# Patient Record
Sex: FEMALE | Race: WHITE | NOT HISPANIC OR LATINO | Employment: OTHER | URBAN - METROPOLITAN AREA
[De-identification: names, ages, dates, MRNs, and addresses within clinical notes are randomized per-mention and may not be internally consistent; named-entity substitution may affect disease eponyms.]

---

## 2017-01-02 ENCOUNTER — COMMUNICATION - HEALTHEAST (OUTPATIENT)
Dept: INTERNAL MEDICINE | Facility: CLINIC | Age: 62
End: 2017-01-02

## 2017-01-02 DIAGNOSIS — I10 ESSENTIAL HYPERTENSION WITH GOAL BLOOD PRESSURE LESS THAN 140/90: ICD-10-CM

## 2017-01-06 ENCOUNTER — COMMUNICATION - HEALTHEAST (OUTPATIENT)
Dept: INTERNAL MEDICINE | Facility: CLINIC | Age: 62
End: 2017-01-06

## 2017-01-06 DIAGNOSIS — M25.50 JOINT PAIN: ICD-10-CM

## 2017-01-09 ENCOUNTER — COMMUNICATION - HEALTHEAST (OUTPATIENT)
Dept: NURSING | Facility: CLINIC | Age: 62
End: 2017-01-09

## 2017-01-09 DIAGNOSIS — E11.9 DIABETES TYPE 2, CONTROLLED (H): ICD-10-CM

## 2017-02-06 ENCOUNTER — OFFICE VISIT - HEALTHEAST (OUTPATIENT)
Dept: NURSING | Facility: CLINIC | Age: 62
End: 2017-02-06

## 2017-02-06 DIAGNOSIS — E11.65 TYPE 2 DIABETES MELLITUS WITH HYPERGLYCEMIA, WITH LONG-TERM CURRENT USE OF INSULIN (H): ICD-10-CM

## 2017-02-06 DIAGNOSIS — Z79.4 TYPE 2 DIABETES MELLITUS WITH HYPERGLYCEMIA, WITH LONG-TERM CURRENT USE OF INSULIN (H): ICD-10-CM

## 2017-02-07 ENCOUNTER — COMMUNICATION - HEALTHEAST (OUTPATIENT)
Dept: NURSING | Facility: CLINIC | Age: 62
End: 2017-02-07

## 2017-02-08 ENCOUNTER — OFFICE VISIT - HEALTHEAST (OUTPATIENT)
Dept: INTERNAL MEDICINE | Facility: CLINIC | Age: 62
End: 2017-02-08

## 2017-02-08 DIAGNOSIS — M25.50 JOINT PAIN: ICD-10-CM

## 2017-02-08 DIAGNOSIS — E11.65 TYPE 2 DIABETES MELLITUS WITH HYPERGLYCEMIA, WITH LONG-TERM CURRENT USE OF INSULIN (H): ICD-10-CM

## 2017-02-08 DIAGNOSIS — Z79.4 TYPE 2 DIABETES MELLITUS WITH HYPERGLYCEMIA, WITH LONG-TERM CURRENT USE OF INSULIN (H): ICD-10-CM

## 2017-02-08 DIAGNOSIS — I10 ESSENTIAL HYPERTENSION WITH GOAL BLOOD PRESSURE LESS THAN 140/90: ICD-10-CM

## 2017-02-08 ASSESSMENT — MIFFLIN-ST. JEOR: SCORE: 1700.75

## 2017-02-21 ENCOUNTER — COMMUNICATION - HEALTHEAST (OUTPATIENT)
Dept: NURSING | Facility: CLINIC | Age: 62
End: 2017-02-21

## 2017-02-21 DIAGNOSIS — E11.9 DIABETES TYPE 2, CONTROLLED (H): ICD-10-CM

## 2017-02-22 ENCOUNTER — COMMUNICATION - HEALTHEAST (OUTPATIENT)
Dept: NURSING | Facility: CLINIC | Age: 62
End: 2017-02-22

## 2017-02-22 ENCOUNTER — AMBULATORY - HEALTHEAST (OUTPATIENT)
Dept: INTERNAL MEDICINE | Facility: CLINIC | Age: 62
End: 2017-02-22

## 2017-02-22 ENCOUNTER — COMMUNICATION - HEALTHEAST (OUTPATIENT)
Dept: INTERNAL MEDICINE | Facility: CLINIC | Age: 62
End: 2017-02-22

## 2017-02-23 ENCOUNTER — COMMUNICATION - HEALTHEAST (OUTPATIENT)
Dept: NURSING | Facility: CLINIC | Age: 62
End: 2017-02-23

## 2017-02-23 ENCOUNTER — COMMUNICATION - HEALTHEAST (OUTPATIENT)
Dept: INTERNAL MEDICINE | Facility: CLINIC | Age: 62
End: 2017-02-23

## 2017-03-14 ENCOUNTER — COMMUNICATION - HEALTHEAST (OUTPATIENT)
Dept: NURSING | Facility: CLINIC | Age: 62
End: 2017-03-14

## 2017-03-31 ENCOUNTER — COMMUNICATION - HEALTHEAST (OUTPATIENT)
Dept: SCHEDULING | Facility: CLINIC | Age: 62
End: 2017-03-31

## 2017-04-05 ENCOUNTER — COMMUNICATION - HEALTHEAST (OUTPATIENT)
Dept: INTERNAL MEDICINE | Facility: CLINIC | Age: 62
End: 2017-04-05

## 2017-04-05 ENCOUNTER — OFFICE VISIT - HEALTHEAST (OUTPATIENT)
Dept: INTERNAL MEDICINE | Facility: CLINIC | Age: 62
End: 2017-04-05

## 2017-04-05 DIAGNOSIS — Z79.4 TYPE 2 DIABETES MELLITUS WITH HYPERGLYCEMIA, WITH LONG-TERM CURRENT USE OF INSULIN (H): ICD-10-CM

## 2017-04-05 DIAGNOSIS — E11.65 TYPE 2 DIABETES MELLITUS WITH HYPERGLYCEMIA, WITH LONG-TERM CURRENT USE OF INSULIN (H): ICD-10-CM

## 2017-04-05 DIAGNOSIS — K52.9 GASTROENTERITIS: ICD-10-CM

## 2017-04-05 DIAGNOSIS — R05.9 COUGH: ICD-10-CM

## 2017-04-07 ENCOUNTER — COMMUNICATION - HEALTHEAST (OUTPATIENT)
Dept: SCHEDULING | Facility: CLINIC | Age: 62
End: 2017-04-07

## 2017-04-07 ENCOUNTER — AMBULATORY - HEALTHEAST (OUTPATIENT)
Dept: INTERNAL MEDICINE | Facility: CLINIC | Age: 62
End: 2017-04-07

## 2017-04-17 ENCOUNTER — OFFICE VISIT - HEALTHEAST (OUTPATIENT)
Dept: INTERNAL MEDICINE | Facility: CLINIC | Age: 62
End: 2017-04-17

## 2017-04-17 ENCOUNTER — COMMUNICATION - HEALTHEAST (OUTPATIENT)
Dept: SCHEDULING | Facility: CLINIC | Age: 62
End: 2017-04-17

## 2017-04-17 DIAGNOSIS — R10.84 GENERALIZED ABDOMINAL PAIN: ICD-10-CM

## 2017-04-17 DIAGNOSIS — L30.9 DERMATITIS: ICD-10-CM

## 2017-04-18 ENCOUNTER — COMMUNICATION - HEALTHEAST (OUTPATIENT)
Dept: INTERNAL MEDICINE | Facility: CLINIC | Age: 62
End: 2017-04-18

## 2017-04-18 ENCOUNTER — COMMUNICATION - HEALTHEAST (OUTPATIENT)
Dept: SCHEDULING | Facility: CLINIC | Age: 62
End: 2017-04-18

## 2017-04-19 ENCOUNTER — COMMUNICATION - HEALTHEAST (OUTPATIENT)
Dept: INTERNAL MEDICINE | Facility: CLINIC | Age: 62
End: 2017-04-19

## 2017-04-19 ENCOUNTER — OFFICE VISIT - HEALTHEAST (OUTPATIENT)
Dept: INTERNAL MEDICINE | Facility: CLINIC | Age: 62
End: 2017-04-19

## 2017-04-19 DIAGNOSIS — M25.50 JOINT PAIN: ICD-10-CM

## 2017-04-19 DIAGNOSIS — E11.65 TYPE 2 DIABETES MELLITUS WITH HYPERGLYCEMIA, WITH LONG-TERM CURRENT USE OF INSULIN (H): ICD-10-CM

## 2017-04-19 DIAGNOSIS — Z79.4 TYPE 2 DIABETES MELLITUS WITH HYPERGLYCEMIA, WITH LONG-TERM CURRENT USE OF INSULIN (H): ICD-10-CM

## 2017-04-19 LAB
HSV1 IGG SERPL QL IA: POSITIVE
HSV2 IGG SERPL QL IA: POSITIVE

## 2017-04-21 ENCOUNTER — COMMUNICATION - HEALTHEAST (OUTPATIENT)
Dept: SCHEDULING | Facility: CLINIC | Age: 62
End: 2017-04-21

## 2017-04-21 ASSESSMENT — MIFFLIN-ST. JEOR
SCORE: 1713.75
SCORE: 1705.74

## 2017-04-22 ASSESSMENT — MIFFLIN-ST. JEOR: SCORE: 1708.29

## 2017-04-23 ASSESSMENT — MIFFLIN-ST. JEOR: SCORE: 1698.49

## 2017-04-24 ENCOUNTER — SURGERY - HEALTHEAST (OUTPATIENT)
Dept: CARDIOLOGY | Facility: CLINIC | Age: 62
End: 2017-04-24

## 2017-04-24 ASSESSMENT — MIFFLIN-ST. JEOR: SCORE: 1712.55

## 2017-04-25 ENCOUNTER — COMMUNICATION - HEALTHEAST (OUTPATIENT)
Dept: INTERNAL MEDICINE | Facility: CLINIC | Age: 62
End: 2017-04-25

## 2017-04-25 ASSESSMENT — MIFFLIN-ST. JEOR: SCORE: 1715.72

## 2017-04-26 ENCOUNTER — AMBULATORY - HEALTHEAST (OUTPATIENT)
Dept: INTERNAL MEDICINE | Facility: CLINIC | Age: 62
End: 2017-04-26

## 2017-04-26 ENCOUNTER — COMMUNICATION - HEALTHEAST (OUTPATIENT)
Dept: SCHEDULING | Facility: CLINIC | Age: 62
End: 2017-04-26

## 2017-04-26 DIAGNOSIS — R10.84 GENERALIZED ABDOMINAL PAIN: ICD-10-CM

## 2017-04-27 ENCOUNTER — HOSPITAL ENCOUNTER (OUTPATIENT)
Dept: CT IMAGING | Facility: CLINIC | Age: 62
Discharge: HOME | End: 2017-04-27
Attending: INTERNAL MEDICINE

## 2017-04-27 ENCOUNTER — AMBULATORY - HEALTHEAST (OUTPATIENT)
Dept: CARDIOLOGY | Facility: CLINIC | Age: 62
End: 2017-04-27

## 2017-04-27 ENCOUNTER — COMMUNICATION - HEALTHEAST (OUTPATIENT)
Dept: INTERNAL MEDICINE | Facility: CLINIC | Age: 62
End: 2017-04-27

## 2017-04-27 DIAGNOSIS — R10.84 GENERALIZED ABDOMINAL PAIN: ICD-10-CM

## 2017-04-29 ENCOUNTER — COMMUNICATION - HEALTHEAST (OUTPATIENT)
Dept: INTERNAL MEDICINE | Facility: CLINIC | Age: 62
End: 2017-04-29

## 2017-04-29 DIAGNOSIS — K21.00 GASTROESOPHAGEAL REFLUX DISEASE WITH ESOPHAGITIS: ICD-10-CM

## 2017-04-29 DIAGNOSIS — I10 ESSENTIAL HYPERTENSION WITH GOAL BLOOD PRESSURE LESS THAN 140/90: ICD-10-CM

## 2017-05-01 ENCOUNTER — COMMUNICATION - HEALTHEAST (OUTPATIENT)
Dept: INTERNAL MEDICINE | Facility: CLINIC | Age: 62
End: 2017-05-01

## 2017-05-02 ENCOUNTER — OFFICE VISIT - HEALTHEAST (OUTPATIENT)
Dept: INTERNAL MEDICINE | Facility: CLINIC | Age: 62
End: 2017-05-02

## 2017-05-02 ENCOUNTER — COMMUNICATION - HEALTHEAST (OUTPATIENT)
Dept: INTERNAL MEDICINE | Facility: CLINIC | Age: 62
End: 2017-05-02

## 2017-05-02 DIAGNOSIS — I25.10 CORONARY ARTERY DISEASE DUE TO LIPID RICH PLAQUE: ICD-10-CM

## 2017-05-02 DIAGNOSIS — Z79.4 TYPE 2 DIABETES MELLITUS WITH HYPERGLYCEMIA, WITH LONG-TERM CURRENT USE OF INSULIN (H): ICD-10-CM

## 2017-05-02 DIAGNOSIS — I65.22 STENOSIS OF LEFT CAROTID ARTERY: ICD-10-CM

## 2017-05-02 DIAGNOSIS — E11.65 TYPE 2 DIABETES MELLITUS WITH HYPERGLYCEMIA, WITH LONG-TERM CURRENT USE OF INSULIN (H): ICD-10-CM

## 2017-05-02 DIAGNOSIS — I25.83 CORONARY ARTERY DISEASE DUE TO LIPID RICH PLAQUE: ICD-10-CM

## 2017-05-10 ENCOUNTER — COMMUNICATION - HEALTHEAST (OUTPATIENT)
Dept: SCHEDULING | Facility: CLINIC | Age: 62
End: 2017-05-10

## 2017-05-11 ENCOUNTER — OFFICE VISIT - HEALTHEAST (OUTPATIENT)
Dept: INTERNAL MEDICINE | Facility: CLINIC | Age: 62
End: 2017-05-11

## 2017-05-11 ENCOUNTER — OFFICE VISIT - HEALTHEAST (OUTPATIENT)
Dept: NURSING | Facility: CLINIC | Age: 62
End: 2017-05-11

## 2017-05-11 DIAGNOSIS — R11.0 NAUSEA: ICD-10-CM

## 2017-05-11 DIAGNOSIS — E78.5 DYSLIPIDEMIA, GOAL LDL BELOW 70: ICD-10-CM

## 2017-05-11 DIAGNOSIS — I20.0 UNSTABLE ANGINA PECTORIS (H): ICD-10-CM

## 2017-05-11 DIAGNOSIS — I25.10 CAD (CORONARY ARTERY DISEASE): ICD-10-CM

## 2017-05-11 DIAGNOSIS — R42 DIZZINESS: ICD-10-CM

## 2017-05-11 DIAGNOSIS — E11.69 TYPE 2 DIABETES MELLITUS WITH OTHER SPECIFIED COMPLICATION, WITH LONG-TERM CURRENT USE OF INSULIN (H): ICD-10-CM

## 2017-05-11 DIAGNOSIS — Z79.899 MEDICATION MANAGEMENT: ICD-10-CM

## 2017-05-11 DIAGNOSIS — Z79.4 TYPE 2 DIABETES MELLITUS WITH OTHER SPECIFIED COMPLICATION, WITH LONG-TERM CURRENT USE OF INSULIN (H): ICD-10-CM

## 2017-05-11 DIAGNOSIS — I25.119 CORONARY ARTERY DISEASE INVOLVING NATIVE HEART WITH ANGINA PECTORIS, UNSPECIFIED VESSEL OR LESION TYPE (H): ICD-10-CM

## 2017-05-12 ENCOUNTER — COMMUNICATION - HEALTHEAST (OUTPATIENT)
Dept: INTERNAL MEDICINE | Facility: CLINIC | Age: 62
End: 2017-05-12

## 2017-05-12 ENCOUNTER — COMMUNICATION - HEALTHEAST (OUTPATIENT)
Dept: SCHEDULING | Facility: CLINIC | Age: 62
End: 2017-05-12

## 2017-05-12 LAB
ATRIAL RATE - MUSE: 75 BPM
DIASTOLIC BLOOD PRESSURE - MUSE: NORMAL MMHG
INTERPRETATION ECG - MUSE: NORMAL
P AXIS - MUSE: 47 DEGREES
PR INTERVAL - MUSE: 146 MS
QRS DURATION - MUSE: 72 MS
QT - MUSE: 418 MS
QTC - MUSE: 466 MS
R AXIS - MUSE: -8 DEGREES
SYSTOLIC BLOOD PRESSURE - MUSE: NORMAL MMHG
T AXIS - MUSE: 70 DEGREES
VENTRICULAR RATE- MUSE: 75 BPM

## 2017-05-16 ENCOUNTER — AMBULATORY - HEALTHEAST (OUTPATIENT)
Dept: CARDIAC REHAB | Facility: CLINIC | Age: 62
End: 2017-05-16

## 2017-05-16 ENCOUNTER — AMBULATORY - HEALTHEAST (OUTPATIENT)
Dept: INTERNAL MEDICINE | Facility: CLINIC | Age: 62
End: 2017-05-16

## 2017-05-16 ENCOUNTER — COMMUNICATION - HEALTHEAST (OUTPATIENT)
Dept: INTERNAL MEDICINE | Facility: CLINIC | Age: 62
End: 2017-05-16

## 2017-05-16 ENCOUNTER — COMMUNICATION - HEALTHEAST (OUTPATIENT)
Dept: SCHEDULING | Facility: CLINIC | Age: 62
End: 2017-05-16

## 2017-05-16 DIAGNOSIS — Z09 HOSPITAL DISCHARGE FOLLOW-UP: ICD-10-CM

## 2017-05-16 DIAGNOSIS — Z95.5 STENTED CORONARY ARTERY: ICD-10-CM

## 2017-05-16 DIAGNOSIS — R07.9 CHEST PAIN: ICD-10-CM

## 2017-05-17 ENCOUNTER — COMMUNICATION - HEALTHEAST (OUTPATIENT)
Dept: INTERNAL MEDICINE | Facility: CLINIC | Age: 62
End: 2017-05-17

## 2017-05-17 ENCOUNTER — AMBULATORY - HEALTHEAST (OUTPATIENT)
Dept: CARDIAC REHAB | Facility: CLINIC | Age: 62
End: 2017-05-17

## 2017-05-17 DIAGNOSIS — Z95.5 STENTED CORONARY ARTERY: ICD-10-CM

## 2017-05-18 ENCOUNTER — SURGERY - HEALTHEAST (OUTPATIENT)
Dept: CARDIOLOGY | Facility: CLINIC | Age: 62
End: 2017-05-18

## 2017-05-18 ENCOUNTER — AMBULATORY - HEALTHEAST (OUTPATIENT)
Dept: CARDIOLOGY | Facility: CLINIC | Age: 62
End: 2017-05-18

## 2017-05-18 ENCOUNTER — OFFICE VISIT - HEALTHEAST (OUTPATIENT)
Dept: CARDIOLOGY | Facility: CLINIC | Age: 62
End: 2017-05-18

## 2017-05-18 DIAGNOSIS — E66.01 MORBID OBESITY (H): ICD-10-CM

## 2017-05-18 DIAGNOSIS — Z78.9 STATIN INTOLERANCE: ICD-10-CM

## 2017-05-18 DIAGNOSIS — E78.5 DYSLIPIDEMIA, GOAL LDL BELOW 70: ICD-10-CM

## 2017-05-18 DIAGNOSIS — I10 ESSENTIAL HYPERTENSION WITH GOAL BLOOD PRESSURE LESS THAN 140/90: ICD-10-CM

## 2017-05-18 DIAGNOSIS — E11.9 TYPE 2 DIABETES MELLITUS (H): ICD-10-CM

## 2017-05-18 DIAGNOSIS — I65.22 STENOSIS OF LEFT CAROTID ARTERY: ICD-10-CM

## 2017-05-18 DIAGNOSIS — I25.83 CORONARY ARTERY DISEASE DUE TO LIPID RICH PLAQUE: ICD-10-CM

## 2017-05-18 DIAGNOSIS — R07.9 CHEST PAIN: ICD-10-CM

## 2017-05-18 DIAGNOSIS — I25.10 CORONARY ARTERY DISEASE DUE TO LIPID RICH PLAQUE: ICD-10-CM

## 2017-05-19 ENCOUNTER — SURGERY - HEALTHEAST (OUTPATIENT)
Dept: CARDIOLOGY | Facility: CLINIC | Age: 62
End: 2017-05-19

## 2017-05-19 LAB
ATRIAL RATE - MUSE: 70 BPM
DIASTOLIC BLOOD PRESSURE - MUSE: NORMAL MMHG
INTERPRETATION ECG - MUSE: NORMAL
P AXIS - MUSE: 48 DEGREES
PR INTERVAL - MUSE: 162 MS
QRS DURATION - MUSE: 72 MS
QT - MUSE: 454 MS
QTC - MUSE: 490 MS
R AXIS - MUSE: -8 DEGREES
SYSTOLIC BLOOD PRESSURE - MUSE: NORMAL MMHG
T AXIS - MUSE: 49 DEGREES
VENTRICULAR RATE- MUSE: 70 BPM

## 2017-05-19 ASSESSMENT — MIFFLIN-ST. JEOR: SCORE: 1737.49

## 2017-05-21 ENCOUNTER — COMMUNICATION - HEALTHEAST (OUTPATIENT)
Dept: NURSING | Facility: CLINIC | Age: 62
End: 2017-05-21

## 2017-05-21 DIAGNOSIS — E11.9 DIABETES TYPE 2, CONTROLLED (H): ICD-10-CM

## 2017-05-24 ENCOUNTER — AMBULATORY - HEALTHEAST (OUTPATIENT)
Dept: CARDIAC REHAB | Facility: CLINIC | Age: 62
End: 2017-05-24

## 2017-05-24 DIAGNOSIS — Z95.5 STENTED CORONARY ARTERY: ICD-10-CM

## 2017-05-25 ENCOUNTER — COMMUNICATION - HEALTHEAST (OUTPATIENT)
Dept: INTERNAL MEDICINE | Facility: CLINIC | Age: 62
End: 2017-05-25

## 2017-05-26 ENCOUNTER — AMBULATORY - HEALTHEAST (OUTPATIENT)
Dept: CARDIAC REHAB | Facility: CLINIC | Age: 62
End: 2017-05-26

## 2017-05-26 ENCOUNTER — COMMUNICATION - HEALTHEAST (OUTPATIENT)
Dept: NURSING | Facility: CLINIC | Age: 62
End: 2017-05-26

## 2017-05-26 DIAGNOSIS — Z95.5 STENTED CORONARY ARTERY: ICD-10-CM

## 2017-05-26 DIAGNOSIS — E11.9 DIABETES TYPE 2, CONTROLLED (H): ICD-10-CM

## 2017-05-31 ENCOUNTER — AMBULATORY - HEALTHEAST (OUTPATIENT)
Dept: CARDIAC REHAB | Facility: CLINIC | Age: 62
End: 2017-05-31

## 2017-05-31 DIAGNOSIS — Z95.5 STENTED CORONARY ARTERY: ICD-10-CM

## 2017-06-05 ENCOUNTER — AMBULATORY - HEALTHEAST (OUTPATIENT)
Dept: CARDIAC REHAB | Facility: CLINIC | Age: 62
End: 2017-06-05

## 2017-06-05 DIAGNOSIS — Z95.5 STENTED CORONARY ARTERY: ICD-10-CM

## 2017-06-06 ENCOUNTER — OFFICE VISIT - HEALTHEAST (OUTPATIENT)
Dept: CARDIOLOGY | Facility: CLINIC | Age: 62
End: 2017-06-06

## 2017-06-06 DIAGNOSIS — I25.10 CORONARY ARTERY DISEASE DUE TO LIPID RICH PLAQUE: ICD-10-CM

## 2017-06-06 DIAGNOSIS — I25.83 CORONARY ARTERY DISEASE DUE TO LIPID RICH PLAQUE: ICD-10-CM

## 2017-06-06 DIAGNOSIS — E78.5 DYSLIPIDEMIA, GOAL LDL BELOW 70: ICD-10-CM

## 2017-06-06 LAB
CHOLEST SERPL-MCNC: 106 MG/DL
FASTING STATUS PATIENT QL REPORTED: ABNORMAL
HDLC SERPL-MCNC: 20 MG/DL
LDLC SERPL CALC-MCNC: 42 MG/DL
LDLC SERPL CALC-MCNC: 48 MG/DL
TRIGL SERPL-MCNC: 222 MG/DL

## 2017-06-06 ASSESSMENT — MIFFLIN-ST. JEOR: SCORE: 1735.68

## 2017-06-07 ENCOUNTER — COMMUNICATION - HEALTHEAST (OUTPATIENT)
Dept: CARDIOLOGY | Facility: CLINIC | Age: 62
End: 2017-06-07

## 2017-06-09 ENCOUNTER — AMBULATORY - HEALTHEAST (OUTPATIENT)
Dept: CARDIAC REHAB | Facility: CLINIC | Age: 62
End: 2017-06-09

## 2017-06-09 DIAGNOSIS — Z95.5 STENTED CORONARY ARTERY: ICD-10-CM

## 2017-06-12 ENCOUNTER — AMBULATORY - HEALTHEAST (OUTPATIENT)
Dept: CARDIAC REHAB | Facility: CLINIC | Age: 62
End: 2017-06-12

## 2017-06-12 DIAGNOSIS — Z95.5 STENTED CORONARY ARTERY: ICD-10-CM

## 2017-06-14 ENCOUNTER — AMBULATORY - HEALTHEAST (OUTPATIENT)
Dept: CARDIAC REHAB | Facility: CLINIC | Age: 62
End: 2017-06-14

## 2017-06-14 DIAGNOSIS — Z95.5 STENTED CORONARY ARTERY: ICD-10-CM

## 2017-06-22 ENCOUNTER — COMMUNICATION - HEALTHEAST (OUTPATIENT)
Dept: INTERNAL MEDICINE | Facility: CLINIC | Age: 62
End: 2017-06-22

## 2017-06-22 ENCOUNTER — COMMUNICATION - HEALTHEAST (OUTPATIENT)
Dept: INTENSIVE CARE | Facility: CLINIC | Age: 62
End: 2017-06-22

## 2017-06-26 ENCOUNTER — COMMUNICATION - HEALTHEAST (OUTPATIENT)
Dept: INTERNAL MEDICINE | Facility: CLINIC | Age: 62
End: 2017-06-26

## 2017-06-26 DIAGNOSIS — M25.50 JOINT PAIN: ICD-10-CM

## 2017-06-28 ENCOUNTER — AMBULATORY - HEALTHEAST (OUTPATIENT)
Dept: CARDIAC REHAB | Facility: CLINIC | Age: 62
End: 2017-06-28

## 2017-06-28 ENCOUNTER — COMMUNICATION - HEALTHEAST (OUTPATIENT)
Dept: LAB | Facility: CLINIC | Age: 62
End: 2017-06-28

## 2017-06-28 ENCOUNTER — OFFICE VISIT - HEALTHEAST (OUTPATIENT)
Dept: INTERNAL MEDICINE | Facility: CLINIC | Age: 62
End: 2017-06-28

## 2017-06-28 DIAGNOSIS — Z79.4 TYPE 2 DIABETES MELLITUS WITH HYPERGLYCEMIA, WITH LONG-TERM CURRENT USE OF INSULIN (H): ICD-10-CM

## 2017-06-28 DIAGNOSIS — I25.10 CORONARY ARTERY DISEASE DUE TO LIPID RICH PLAQUE: ICD-10-CM

## 2017-06-28 DIAGNOSIS — Z95.5 STENTED CORONARY ARTERY: ICD-10-CM

## 2017-06-28 DIAGNOSIS — Z79.4 TYPE 2 DIABETES MELLITUS WITH OTHER SPECIFIED COMPLICATION, WITH LONG-TERM CURRENT USE OF INSULIN (H): ICD-10-CM

## 2017-06-28 DIAGNOSIS — M25.50 JOINT PAIN: ICD-10-CM

## 2017-06-28 DIAGNOSIS — R13.10 DYSPHAGIA: ICD-10-CM

## 2017-06-28 DIAGNOSIS — E11.65 TYPE 2 DIABETES MELLITUS WITH HYPERGLYCEMIA, WITH LONG-TERM CURRENT USE OF INSULIN (H): ICD-10-CM

## 2017-06-28 DIAGNOSIS — I10 ESSENTIAL HYPERTENSION WITH GOAL BLOOD PRESSURE LESS THAN 140/90: ICD-10-CM

## 2017-06-28 DIAGNOSIS — E11.69 TYPE 2 DIABETES MELLITUS WITH OTHER SPECIFIED COMPLICATION, WITH LONG-TERM CURRENT USE OF INSULIN (H): ICD-10-CM

## 2017-06-28 DIAGNOSIS — I25.83 CORONARY ARTERY DISEASE DUE TO LIPID RICH PLAQUE: ICD-10-CM

## 2017-06-28 LAB — HBA1C MFR BLD: 9.6 % (ref 3.5–6)

## 2017-06-30 ENCOUNTER — AMBULATORY - HEALTHEAST (OUTPATIENT)
Dept: CARDIAC REHAB | Facility: CLINIC | Age: 62
End: 2017-06-30

## 2017-06-30 DIAGNOSIS — Z95.5 STENTED CORONARY ARTERY: ICD-10-CM

## 2017-07-03 ENCOUNTER — OFFICE VISIT - HEALTHEAST (OUTPATIENT)
Dept: NURSING | Facility: CLINIC | Age: 62
End: 2017-07-03

## 2017-07-03 ENCOUNTER — COMMUNICATION - HEALTHEAST (OUTPATIENT)
Dept: NURSING | Facility: CLINIC | Age: 62
End: 2017-07-03

## 2017-07-03 ENCOUNTER — AMBULATORY - HEALTHEAST (OUTPATIENT)
Dept: NURSING | Facility: CLINIC | Age: 62
End: 2017-07-03

## 2017-07-03 DIAGNOSIS — Z79.4 TYPE 2 DIABETES MELLITUS WITH OTHER SPECIFIED COMPLICATION, WITH LONG-TERM CURRENT USE OF INSULIN (H): ICD-10-CM

## 2017-07-03 DIAGNOSIS — E11.69 TYPE 2 DIABETES MELLITUS WITH OTHER SPECIFIED COMPLICATION, WITH LONG-TERM CURRENT USE OF INSULIN (H): ICD-10-CM

## 2017-07-05 ENCOUNTER — AMBULATORY - HEALTHEAST (OUTPATIENT)
Dept: CARDIAC REHAB | Facility: CLINIC | Age: 62
End: 2017-07-05

## 2017-07-05 DIAGNOSIS — Z95.5 STENTED CORONARY ARTERY: ICD-10-CM

## 2017-07-10 ENCOUNTER — AMBULATORY - HEALTHEAST (OUTPATIENT)
Dept: CARDIAC REHAB | Facility: CLINIC | Age: 62
End: 2017-07-10

## 2017-07-10 DIAGNOSIS — Z95.5 STENTED CORONARY ARTERY: ICD-10-CM

## 2017-07-11 ENCOUNTER — OFFICE VISIT - HEALTHEAST (OUTPATIENT)
Dept: NURSING | Facility: CLINIC | Age: 62
End: 2017-07-11

## 2017-07-11 DIAGNOSIS — E11.65 TYPE 2 DIABETES MELLITUS WITH HYPERGLYCEMIA, WITH LONG-TERM CURRENT USE OF INSULIN (H): ICD-10-CM

## 2017-07-11 DIAGNOSIS — I10 ESSENTIAL HYPERTENSION WITH GOAL BLOOD PRESSURE LESS THAN 140/90: ICD-10-CM

## 2017-07-11 DIAGNOSIS — Z79.4 TYPE 2 DIABETES MELLITUS WITH OTHER SPECIFIED COMPLICATION, WITH LONG-TERM CURRENT USE OF INSULIN (H): ICD-10-CM

## 2017-07-11 DIAGNOSIS — Z79.4 TYPE 2 DIABETES MELLITUS WITH HYPERGLYCEMIA, WITH LONG-TERM CURRENT USE OF INSULIN (H): ICD-10-CM

## 2017-07-11 DIAGNOSIS — E11.69 TYPE 2 DIABETES MELLITUS WITH OTHER SPECIFIED COMPLICATION, WITH LONG-TERM CURRENT USE OF INSULIN (H): ICD-10-CM

## 2017-07-12 ENCOUNTER — COMMUNICATION - HEALTHEAST (OUTPATIENT)
Dept: INTERNAL MEDICINE | Facility: CLINIC | Age: 62
End: 2017-07-12

## 2017-07-12 ENCOUNTER — HOSPITAL ENCOUNTER (OUTPATIENT)
Dept: RADIOLOGY | Facility: CLINIC | Age: 62
Discharge: HOME | End: 2017-07-12
Attending: INTERNAL MEDICINE

## 2017-07-12 DIAGNOSIS — R13.10 DYSPHAGIA: ICD-10-CM

## 2017-07-13 ENCOUNTER — AMBULATORY - HEALTHEAST (OUTPATIENT)
Dept: CARDIAC REHAB | Facility: CLINIC | Age: 62
End: 2017-07-13

## 2017-07-13 DIAGNOSIS — Z95.5 STENTED CORONARY ARTERY: ICD-10-CM

## 2017-07-14 ENCOUNTER — COMMUNICATION - HEALTHEAST (OUTPATIENT)
Dept: NURSING | Facility: CLINIC | Age: 62
End: 2017-07-14

## 2017-07-17 ENCOUNTER — OFFICE VISIT - HEALTHEAST (OUTPATIENT)
Dept: INTERNAL MEDICINE | Facility: CLINIC | Age: 62
End: 2017-07-17

## 2017-07-17 ENCOUNTER — COMMUNICATION - HEALTHEAST (OUTPATIENT)
Dept: SCHEDULING | Facility: CLINIC | Age: 62
End: 2017-07-17

## 2017-07-17 DIAGNOSIS — L02.91 ABSCESS: ICD-10-CM

## 2017-07-19 ENCOUNTER — COMMUNICATION - HEALTHEAST (OUTPATIENT)
Dept: INTERNAL MEDICINE | Facility: CLINIC | Age: 62
End: 2017-07-19

## 2017-07-19 ENCOUNTER — COMMUNICATION - HEALTHEAST (OUTPATIENT)
Dept: NURSING | Facility: CLINIC | Age: 62
End: 2017-07-19

## 2017-07-20 ENCOUNTER — OFFICE VISIT - HEALTHEAST (OUTPATIENT)
Dept: INTERNAL MEDICINE | Facility: CLINIC | Age: 62
End: 2017-07-20

## 2017-07-20 ENCOUNTER — OFFICE VISIT - HEALTHEAST (OUTPATIENT)
Dept: NURSING | Facility: CLINIC | Age: 62
End: 2017-07-20

## 2017-07-20 DIAGNOSIS — Z79.4 TYPE 2 DIABETES MELLITUS WITH OTHER SPECIFIED COMPLICATION, WITH LONG-TERM CURRENT USE OF INSULIN (H): ICD-10-CM

## 2017-07-20 DIAGNOSIS — L02.91 ABSCESS: ICD-10-CM

## 2017-07-20 DIAGNOSIS — I10 ESSENTIAL HYPERTENSION WITH GOAL BLOOD PRESSURE LESS THAN 140/90: ICD-10-CM

## 2017-07-20 DIAGNOSIS — E11.69 TYPE 2 DIABETES MELLITUS WITH OTHER SPECIFIED COMPLICATION, WITH LONG-TERM CURRENT USE OF INSULIN (H): ICD-10-CM

## 2017-07-24 ENCOUNTER — AMBULATORY - HEALTHEAST (OUTPATIENT)
Dept: CARDIAC REHAB | Facility: CLINIC | Age: 62
End: 2017-07-24

## 2017-07-24 DIAGNOSIS — Z95.5 STENTED CORONARY ARTERY: ICD-10-CM

## 2017-07-26 ENCOUNTER — OFFICE VISIT - HEALTHEAST (OUTPATIENT)
Dept: INTERNAL MEDICINE | Facility: CLINIC | Age: 62
End: 2017-07-26

## 2017-07-26 DIAGNOSIS — I25.83 CORONARY ARTERY DISEASE DUE TO LIPID RICH PLAQUE: ICD-10-CM

## 2017-07-26 DIAGNOSIS — I25.10 CORONARY ARTERY DISEASE DUE TO LIPID RICH PLAQUE: ICD-10-CM

## 2017-07-26 DIAGNOSIS — M54.9 CHRONIC BACK PAIN: ICD-10-CM

## 2017-07-26 DIAGNOSIS — E11.9 TYPE 2 DIABETES MELLITUS (H): ICD-10-CM

## 2017-07-26 DIAGNOSIS — I10 ESSENTIAL HYPERTENSION WITH GOAL BLOOD PRESSURE LESS THAN 140/90: ICD-10-CM

## 2017-07-26 DIAGNOSIS — G89.29 CHRONIC BACK PAIN: ICD-10-CM

## 2017-07-26 DIAGNOSIS — E66.9 OBESITY: ICD-10-CM

## 2017-08-04 ENCOUNTER — OFFICE VISIT - HEALTHEAST (OUTPATIENT)
Dept: FAMILY MEDICINE | Facility: CLINIC | Age: 62
End: 2017-08-04

## 2017-08-04 ENCOUNTER — COMMUNICATION - HEALTHEAST (OUTPATIENT)
Dept: OBGYN | Facility: HOSPITAL | Age: 62
End: 2017-08-04

## 2017-08-04 DIAGNOSIS — L03.90 CELLULITIS: ICD-10-CM

## 2017-08-07 ENCOUNTER — HOSPITAL ENCOUNTER (OUTPATIENT)
Dept: MAMMOGRAPHY | Facility: HOSPITAL | Age: 62
Discharge: HOME | End: 2017-08-07
Attending: SURGERY

## 2017-08-07 ENCOUNTER — OFFICE VISIT - HEALTHEAST (OUTPATIENT)
Dept: INTERNAL MEDICINE | Facility: CLINIC | Age: 62
End: 2017-08-07

## 2017-08-07 ENCOUNTER — OFFICE VISIT - HEALTHEAST (OUTPATIENT)
Dept: SURGERY | Facility: CLINIC | Age: 62
End: 2017-08-07

## 2017-08-07 DIAGNOSIS — N61.1 BREAST ABSCESS: ICD-10-CM

## 2017-08-07 ASSESSMENT — MIFFLIN-ST. JEOR: SCORE: 1764.71

## 2017-08-09 ENCOUNTER — COMMUNICATION - HEALTHEAST (OUTPATIENT)
Dept: INTERNAL MEDICINE | Facility: CLINIC | Age: 62
End: 2017-08-09

## 2017-08-09 ENCOUNTER — OFFICE VISIT - HEALTHEAST (OUTPATIENT)
Dept: INTERNAL MEDICINE | Facility: CLINIC | Age: 62
End: 2017-08-09

## 2017-08-09 DIAGNOSIS — I10 ESSENTIAL HYPERTENSION WITH GOAL BLOOD PRESSURE LESS THAN 140/90: ICD-10-CM

## 2017-08-09 DIAGNOSIS — K21.00 GASTROESOPHAGEAL REFLUX DISEASE WITH ESOPHAGITIS: ICD-10-CM

## 2017-08-09 DIAGNOSIS — N61.1 BREAST ABSCESS: ICD-10-CM

## 2017-08-12 ENCOUNTER — COMMUNICATION - HEALTHEAST (OUTPATIENT)
Dept: INTERNAL MEDICINE | Facility: CLINIC | Age: 62
End: 2017-08-12

## 2017-08-12 DIAGNOSIS — E78.5 HYPERLIPEMIA: ICD-10-CM

## 2017-08-14 ENCOUNTER — AMBULATORY - HEALTHEAST (OUTPATIENT)
Dept: CARDIAC REHAB | Facility: CLINIC | Age: 62
End: 2017-08-14

## 2017-08-14 DIAGNOSIS — Z95.5 STENTED CORONARY ARTERY: ICD-10-CM

## 2017-08-15 ENCOUNTER — OFFICE VISIT - HEALTHEAST (OUTPATIENT)
Dept: SURGERY | Facility: CLINIC | Age: 62
End: 2017-08-15

## 2017-08-15 DIAGNOSIS — L72.3 INFECTED SEBACEOUS CYST: ICD-10-CM

## 2017-08-15 DIAGNOSIS — L08.9 INFECTED SEBACEOUS CYST: ICD-10-CM

## 2017-08-17 ENCOUNTER — AMBULATORY - HEALTHEAST (OUTPATIENT)
Dept: CARE COORDINATION | Facility: CLINIC | Age: 62
End: 2017-08-17

## 2017-08-21 ENCOUNTER — OFFICE VISIT - HEALTHEAST (OUTPATIENT)
Dept: NURSING | Facility: CLINIC | Age: 62
End: 2017-08-21

## 2017-08-21 ENCOUNTER — AMBULATORY - HEALTHEAST (OUTPATIENT)
Dept: CARDIAC REHAB | Facility: CLINIC | Age: 62
End: 2017-08-21

## 2017-08-21 DIAGNOSIS — E11.69 TYPE 2 DIABETES MELLITUS WITH OTHER SPECIFIED COMPLICATION, WITH LONG-TERM CURRENT USE OF INSULIN (H): ICD-10-CM

## 2017-08-21 DIAGNOSIS — Z95.5 STENTED CORONARY ARTERY: ICD-10-CM

## 2017-08-21 DIAGNOSIS — Z79.4 TYPE 2 DIABETES MELLITUS WITH OTHER SPECIFIED COMPLICATION, WITH LONG-TERM CURRENT USE OF INSULIN (H): ICD-10-CM

## 2017-08-23 ENCOUNTER — OFFICE VISIT - HEALTHEAST (OUTPATIENT)
Dept: INTERNAL MEDICINE | Facility: CLINIC | Age: 62
End: 2017-08-23

## 2017-08-23 DIAGNOSIS — M54.9 CHRONIC BACK PAIN: ICD-10-CM

## 2017-08-23 DIAGNOSIS — Z79.4 TYPE 2 DIABETES MELLITUS WITH HYPERGLYCEMIA, WITH LONG-TERM CURRENT USE OF INSULIN (H): ICD-10-CM

## 2017-08-23 DIAGNOSIS — E11.65 TYPE 2 DIABETES MELLITUS WITH HYPERGLYCEMIA, WITH LONG-TERM CURRENT USE OF INSULIN (H): ICD-10-CM

## 2017-08-23 DIAGNOSIS — N61.1 BREAST ABSCESS: ICD-10-CM

## 2017-08-23 DIAGNOSIS — G89.29 CHRONIC BACK PAIN: ICD-10-CM

## 2017-09-11 ENCOUNTER — COMMUNICATION - HEALTHEAST (OUTPATIENT)
Dept: INTERNAL MEDICINE | Facility: CLINIC | Age: 62
End: 2017-09-11

## 2017-09-11 ENCOUNTER — COMMUNICATION - HEALTHEAST (OUTPATIENT)
Dept: SCHEDULING | Facility: CLINIC | Age: 62
End: 2017-09-11

## 2017-09-11 DIAGNOSIS — E11.69 TYPE 2 DIABETES MELLITUS WITH OTHER SPECIFIED COMPLICATION, WITH LONG-TERM CURRENT USE OF INSULIN (H): ICD-10-CM

## 2017-09-11 DIAGNOSIS — Z79.4 TYPE 2 DIABETES MELLITUS WITH OTHER SPECIFIED COMPLICATION, WITH LONG-TERM CURRENT USE OF INSULIN (H): ICD-10-CM

## 2017-09-15 ENCOUNTER — COMMUNICATION - HEALTHEAST (OUTPATIENT)
Dept: NURSING | Facility: CLINIC | Age: 62
End: 2017-09-15

## 2017-10-09 ENCOUNTER — COMMUNICATION - HEALTHEAST (OUTPATIENT)
Dept: SURGERY | Facility: CLINIC | Age: 62
End: 2017-10-09

## 2017-10-13 ENCOUNTER — COMMUNICATION - HEALTHEAST (OUTPATIENT)
Dept: CARDIOLOGY | Facility: CLINIC | Age: 62
End: 2017-10-13

## 2017-10-16 ENCOUNTER — COMMUNICATION - HEALTHEAST (OUTPATIENT)
Dept: INTERNAL MEDICINE | Facility: CLINIC | Age: 62
End: 2017-10-16

## 2017-10-16 ENCOUNTER — COMMUNICATION - HEALTHEAST (OUTPATIENT)
Dept: NURSING | Facility: CLINIC | Age: 62
End: 2017-10-16

## 2017-10-20 ENCOUNTER — COMMUNICATION - HEALTHEAST (OUTPATIENT)
Dept: NURSING | Facility: CLINIC | Age: 62
End: 2017-10-20

## 2017-10-23 ENCOUNTER — COMMUNICATION - HEALTHEAST (OUTPATIENT)
Dept: INTERNAL MEDICINE | Facility: CLINIC | Age: 62
End: 2017-10-23

## 2017-10-23 ENCOUNTER — COMMUNICATION - HEALTHEAST (OUTPATIENT)
Dept: NURSING | Facility: CLINIC | Age: 62
End: 2017-10-23

## 2017-10-23 DIAGNOSIS — E11.69 TYPE 2 DIABETES MELLITUS WITH OTHER SPECIFIED COMPLICATION, WITH LONG-TERM CURRENT USE OF INSULIN (H): ICD-10-CM

## 2017-10-23 DIAGNOSIS — Z79.4 TYPE 2 DIABETES MELLITUS WITH OTHER SPECIFIED COMPLICATION, WITH LONG-TERM CURRENT USE OF INSULIN (H): ICD-10-CM

## 2017-10-24 ENCOUNTER — OFFICE VISIT - HEALTHEAST (OUTPATIENT)
Dept: NURSING | Facility: CLINIC | Age: 62
End: 2017-10-24

## 2017-10-24 ENCOUNTER — COMMUNICATION - HEALTHEAST (OUTPATIENT)
Dept: NURSING | Facility: CLINIC | Age: 62
End: 2017-10-24

## 2017-10-24 DIAGNOSIS — E11.65 TYPE 2 DIABETES MELLITUS WITH HYPERGLYCEMIA, WITH LONG-TERM CURRENT USE OF INSULIN (H): ICD-10-CM

## 2017-10-24 DIAGNOSIS — Z79.4 TYPE 2 DIABETES MELLITUS WITH OTHER SPECIFIED COMPLICATION, WITH LONG-TERM CURRENT USE OF INSULIN (H): ICD-10-CM

## 2017-10-24 DIAGNOSIS — Z79.4 TYPE 2 DIABETES MELLITUS WITH HYPERGLYCEMIA, WITH LONG-TERM CURRENT USE OF INSULIN (H): ICD-10-CM

## 2017-10-24 DIAGNOSIS — E11.69 TYPE 2 DIABETES MELLITUS WITH OTHER SPECIFIED COMPLICATION, WITH LONG-TERM CURRENT USE OF INSULIN (H): ICD-10-CM

## 2017-10-24 LAB — HBA1C MFR BLD: 8.9 % (ref 3.5–6)

## 2017-10-25 ENCOUNTER — COMMUNICATION - HEALTHEAST (OUTPATIENT)
Dept: INTERNAL MEDICINE | Facility: CLINIC | Age: 62
End: 2017-10-25

## 2017-10-25 ENCOUNTER — COMMUNICATION - HEALTHEAST (OUTPATIENT)
Dept: NURSING | Facility: CLINIC | Age: 62
End: 2017-10-25

## 2017-10-25 DIAGNOSIS — G62.9 NEUROPATHY: ICD-10-CM

## 2017-10-27 ENCOUNTER — COMMUNICATION - HEALTHEAST (OUTPATIENT)
Dept: NURSING | Facility: CLINIC | Age: 62
End: 2017-10-27

## 2017-10-27 ENCOUNTER — COMMUNICATION - HEALTHEAST (OUTPATIENT)
Dept: INTERNAL MEDICINE | Facility: CLINIC | Age: 62
End: 2017-10-27

## 2017-10-31 ENCOUNTER — RECORDS - HEALTHEAST (OUTPATIENT)
Dept: GENERAL RADIOLOGY | Facility: CLINIC | Age: 62
End: 2017-10-31

## 2017-10-31 ENCOUNTER — OFFICE VISIT - HEALTHEAST (OUTPATIENT)
Dept: NURSING | Facility: CLINIC | Age: 62
End: 2017-10-31

## 2017-10-31 ENCOUNTER — AMBULATORY - HEALTHEAST (OUTPATIENT)
Dept: INTERNAL MEDICINE | Facility: CLINIC | Age: 62
End: 2017-10-31

## 2017-10-31 ENCOUNTER — COMMUNICATION - HEALTHEAST (OUTPATIENT)
Dept: NURSING | Facility: CLINIC | Age: 62
End: 2017-10-31

## 2017-10-31 DIAGNOSIS — M54.9 CHRONIC BACK PAIN: ICD-10-CM

## 2017-10-31 DIAGNOSIS — Z79.4 TYPE 2 DIABETES MELLITUS WITH HYPERGLYCEMIA, WITH LONG-TERM CURRENT USE OF INSULIN (H): ICD-10-CM

## 2017-10-31 DIAGNOSIS — M25.552 HIP PAIN, ACUTE, LEFT: ICD-10-CM

## 2017-10-31 DIAGNOSIS — I10 ESSENTIAL HYPERTENSION WITH GOAL BLOOD PRESSURE LESS THAN 140/90: ICD-10-CM

## 2017-10-31 DIAGNOSIS — E11.69 TYPE 2 DIABETES MELLITUS WITH OTHER SPECIFIED COMPLICATION, WITH LONG-TERM CURRENT USE OF INSULIN (H): ICD-10-CM

## 2017-10-31 DIAGNOSIS — E11.65 TYPE 2 DIABETES MELLITUS WITH HYPERGLYCEMIA, WITH LONG-TERM CURRENT USE OF INSULIN (H): ICD-10-CM

## 2017-10-31 DIAGNOSIS — G62.9 NEUROPATHY: ICD-10-CM

## 2017-10-31 DIAGNOSIS — M25.552 PAIN IN LEFT HIP: ICD-10-CM

## 2017-10-31 DIAGNOSIS — Z79.4 TYPE 2 DIABETES MELLITUS WITH OTHER SPECIFIED COMPLICATION, WITH LONG-TERM CURRENT USE OF INSULIN (H): ICD-10-CM

## 2017-10-31 DIAGNOSIS — G89.29 CHRONIC BACK PAIN: ICD-10-CM

## 2017-11-01 ENCOUNTER — COMMUNICATION - HEALTHEAST (OUTPATIENT)
Dept: INTERNAL MEDICINE | Facility: CLINIC | Age: 62
End: 2017-11-01

## 2017-11-14 ENCOUNTER — OFFICE VISIT - HEALTHEAST (OUTPATIENT)
Dept: INTERNAL MEDICINE | Facility: CLINIC | Age: 62
End: 2017-11-14

## 2017-11-14 ENCOUNTER — OFFICE VISIT - HEALTHEAST (OUTPATIENT)
Dept: NURSING | Facility: CLINIC | Age: 62
End: 2017-11-14

## 2017-11-14 DIAGNOSIS — Z79.4 TYPE 2 DIABETES MELLITUS WITH HYPERGLYCEMIA, WITH LONG-TERM CURRENT USE OF INSULIN (H): ICD-10-CM

## 2017-11-14 DIAGNOSIS — R20.2 NUMBNESS AND TINGLING IN RIGHT HAND: ICD-10-CM

## 2017-11-14 DIAGNOSIS — G62.9 NEUROPATHY: ICD-10-CM

## 2017-11-14 DIAGNOSIS — E11.69 TYPE 2 DIABETES MELLITUS WITH OTHER SPECIFIED COMPLICATION, WITH LONG-TERM CURRENT USE OF INSULIN (H): ICD-10-CM

## 2017-11-14 DIAGNOSIS — G89.29 HIP PAIN, CHRONIC, LEFT: ICD-10-CM

## 2017-11-14 DIAGNOSIS — Z79.4 TYPE 2 DIABETES MELLITUS WITH OTHER SPECIFIED COMPLICATION, WITH LONG-TERM CURRENT USE OF INSULIN (H): ICD-10-CM

## 2017-11-14 DIAGNOSIS — R20.0 NUMBNESS AND TINGLING IN RIGHT HAND: ICD-10-CM

## 2017-11-14 DIAGNOSIS — E11.65 TYPE 2 DIABETES MELLITUS WITH HYPERGLYCEMIA, WITH LONG-TERM CURRENT USE OF INSULIN (H): ICD-10-CM

## 2017-11-14 DIAGNOSIS — M25.552 HIP PAIN, CHRONIC, LEFT: ICD-10-CM

## 2017-11-20 ENCOUNTER — OFFICE VISIT - HEALTHEAST (OUTPATIENT)
Dept: INTERNAL MEDICINE | Facility: CLINIC | Age: 62
End: 2017-11-20

## 2017-11-20 ENCOUNTER — COMMUNICATION - HEALTHEAST (OUTPATIENT)
Dept: NURSING | Facility: CLINIC | Age: 62
End: 2017-11-20

## 2017-11-20 ENCOUNTER — COMMUNICATION - HEALTHEAST (OUTPATIENT)
Dept: INTERNAL MEDICINE | Facility: CLINIC | Age: 62
End: 2017-11-20

## 2017-11-20 DIAGNOSIS — M54.50 ACUTE RIGHT-SIDED LOW BACK PAIN WITHOUT SCIATICA: ICD-10-CM

## 2017-12-04 ENCOUNTER — RECORDS - HEALTHEAST (OUTPATIENT)
Dept: ADMINISTRATIVE | Facility: OTHER | Age: 62
End: 2017-12-04

## 2017-12-04 ENCOUNTER — COMMUNICATION - HEALTHEAST (OUTPATIENT)
Dept: INTERNAL MEDICINE | Facility: CLINIC | Age: 62
End: 2017-12-04

## 2017-12-04 ENCOUNTER — HOSPITAL ENCOUNTER (OUTPATIENT)
Dept: PHYSICAL MEDICINE AND REHAB | Facility: CLINIC | Age: 62
Discharge: HOME | End: 2017-12-04
Attending: INTERNAL MEDICINE

## 2017-12-04 ENCOUNTER — AMBULATORY - HEALTHEAST (OUTPATIENT)
Dept: EDUCATION SERVICES | Facility: CLINIC | Age: 62
End: 2017-12-04

## 2017-12-04 ENCOUNTER — AMBULATORY - HEALTHEAST (OUTPATIENT)
Dept: INTERNAL MEDICINE | Facility: CLINIC | Age: 62
End: 2017-12-04

## 2017-12-04 DIAGNOSIS — Z79.4 TYPE 2 DIABETES MELLITUS WITH HYPERGLYCEMIA, WITH LONG-TERM CURRENT USE OF INSULIN (H): ICD-10-CM

## 2017-12-04 DIAGNOSIS — E11.65 TYPE 2 DIABETES MELLITUS WITH HYPERGLYCEMIA, WITH LONG-TERM CURRENT USE OF INSULIN (H): ICD-10-CM

## 2017-12-04 DIAGNOSIS — R20.2 NUMBNESS AND TINGLING IN RIGHT HAND: ICD-10-CM

## 2017-12-04 DIAGNOSIS — R20.0 NUMBNESS AND TINGLING IN RIGHT HAND: ICD-10-CM

## 2017-12-04 DIAGNOSIS — G56.00 CARPAL TUNNEL SYNDROME: ICD-10-CM

## 2017-12-06 ENCOUNTER — COMMUNICATION - HEALTHEAST (OUTPATIENT)
Dept: INTERNAL MEDICINE | Facility: CLINIC | Age: 62
End: 2017-12-06

## 2017-12-11 ENCOUNTER — RECORDS - HEALTHEAST (OUTPATIENT)
Dept: ADMINISTRATIVE | Facility: OTHER | Age: 62
End: 2017-12-11

## 2017-12-11 ENCOUNTER — OFFICE VISIT - HEALTHEAST (OUTPATIENT)
Dept: EDUCATION SERVICES | Facility: CLINIC | Age: 62
End: 2017-12-11

## 2017-12-11 DIAGNOSIS — E11.65 TYPE 2 DIABETES MELLITUS WITH HYPERGLYCEMIA, WITH LONG-TERM CURRENT USE OF INSULIN (H): ICD-10-CM

## 2017-12-11 DIAGNOSIS — Z79.4 TYPE 2 DIABETES MELLITUS WITH HYPERGLYCEMIA, WITH LONG-TERM CURRENT USE OF INSULIN (H): ICD-10-CM

## 2017-12-12 ENCOUNTER — OFFICE VISIT - HEALTHEAST (OUTPATIENT)
Dept: INTERNAL MEDICINE | Facility: CLINIC | Age: 62
End: 2017-12-12

## 2017-12-12 DIAGNOSIS — Z79.4 TYPE 2 DIABETES MELLITUS WITH HYPERGLYCEMIA, WITH LONG-TERM CURRENT USE OF INSULIN (H): ICD-10-CM

## 2017-12-12 DIAGNOSIS — G62.9 NEUROPATHY: ICD-10-CM

## 2017-12-12 DIAGNOSIS — E11.65 TYPE 2 DIABETES MELLITUS WITH HYPERGLYCEMIA, WITH LONG-TERM CURRENT USE OF INSULIN (H): ICD-10-CM

## 2017-12-12 DIAGNOSIS — G56.00 CARPAL TUNNEL SYNDROME: ICD-10-CM

## 2017-12-14 ENCOUNTER — RECORDS - HEALTHEAST (OUTPATIENT)
Dept: ADMINISTRATIVE | Facility: OTHER | Age: 62
End: 2017-12-14

## 2017-12-18 ENCOUNTER — OFFICE VISIT - HEALTHEAST (OUTPATIENT)
Dept: EDUCATION SERVICES | Facility: CLINIC | Age: 62
End: 2017-12-18

## 2017-12-18 DIAGNOSIS — E11.65 TYPE 2 DIABETES MELLITUS WITH HYPERGLYCEMIA, WITH LONG-TERM CURRENT USE OF INSULIN (H): ICD-10-CM

## 2017-12-18 DIAGNOSIS — Z79.4 TYPE 2 DIABETES MELLITUS WITH HYPERGLYCEMIA, WITH LONG-TERM CURRENT USE OF INSULIN (H): ICD-10-CM

## 2017-12-19 ENCOUNTER — COMMUNICATION - HEALTHEAST (OUTPATIENT)
Dept: INTERNAL MEDICINE | Facility: CLINIC | Age: 62
End: 2017-12-19

## 2017-12-20 ENCOUNTER — COMMUNICATION - HEALTHEAST (OUTPATIENT)
Dept: NURSING | Facility: CLINIC | Age: 62
End: 2017-12-20

## 2017-12-21 ENCOUNTER — COMMUNICATION - HEALTHEAST (OUTPATIENT)
Dept: NURSING | Facility: CLINIC | Age: 62
End: 2017-12-21

## 2017-12-27 ENCOUNTER — COMMUNICATION - HEALTHEAST (OUTPATIENT)
Dept: NURSING | Facility: CLINIC | Age: 62
End: 2017-12-27

## 2017-12-27 ENCOUNTER — OFFICE VISIT - HEALTHEAST (OUTPATIENT)
Dept: INTERNAL MEDICINE | Facility: CLINIC | Age: 62
End: 2017-12-27

## 2017-12-27 ENCOUNTER — OFFICE VISIT - HEALTHEAST (OUTPATIENT)
Dept: NURSING | Facility: CLINIC | Age: 62
End: 2017-12-27

## 2017-12-27 DIAGNOSIS — I25.83 CORONARY ARTERY DISEASE DUE TO LIPID RICH PLAQUE: ICD-10-CM

## 2017-12-27 DIAGNOSIS — E11.69 TYPE 2 DIABETES MELLITUS WITH OTHER SPECIFIED COMPLICATION, WITH LONG-TERM CURRENT USE OF INSULIN (H): ICD-10-CM

## 2017-12-27 DIAGNOSIS — I25.10 CORONARY ARTERY DISEASE DUE TO LIPID RICH PLAQUE: ICD-10-CM

## 2017-12-27 DIAGNOSIS — J06.9 VIRAL URI WITH COUGH: ICD-10-CM

## 2017-12-27 DIAGNOSIS — G56.00 CARPAL TUNNEL SYNDROME: ICD-10-CM

## 2017-12-27 DIAGNOSIS — Z01.818 PREOP EXAM FOR INTERNAL MEDICINE: ICD-10-CM

## 2017-12-27 DIAGNOSIS — Z79.4 TYPE 2 DIABETES MELLITUS WITH HYPERGLYCEMIA, WITH LONG-TERM CURRENT USE OF INSULIN (H): ICD-10-CM

## 2017-12-27 DIAGNOSIS — G62.9 NEUROPATHY: ICD-10-CM

## 2017-12-27 DIAGNOSIS — Z79.4 TYPE 2 DIABETES MELLITUS WITH OTHER SPECIFIED COMPLICATION, WITH LONG-TERM CURRENT USE OF INSULIN (H): ICD-10-CM

## 2017-12-27 DIAGNOSIS — E11.65 TYPE 2 DIABETES MELLITUS WITH HYPERGLYCEMIA, WITH LONG-TERM CURRENT USE OF INSULIN (H): ICD-10-CM

## 2017-12-27 DIAGNOSIS — I10 ESSENTIAL HYPERTENSION: ICD-10-CM

## 2017-12-27 LAB
ATRIAL RATE - MUSE: 78 BPM
DIASTOLIC BLOOD PRESSURE - MUSE: NORMAL MMHG
HBA1C MFR BLD: 7.7 % (ref 3.5–6)
INTERPRETATION ECG - MUSE: NORMAL
P AXIS - MUSE: 56 DEGREES
PR INTERVAL - MUSE: 156 MS
QRS DURATION - MUSE: 68 MS
QT - MUSE: 400 MS
QTC - MUSE: 456 MS
R AXIS - MUSE: 2 DEGREES
SYSTOLIC BLOOD PRESSURE - MUSE: NORMAL MMHG
T AXIS - MUSE: 44 DEGREES
VENTRICULAR RATE- MUSE: 78 BPM

## 2017-12-27 ASSESSMENT — MIFFLIN-ST. JEOR: SCORE: 1796.46

## 2018-01-02 ENCOUNTER — COMMUNICATION - HEALTHEAST (OUTPATIENT)
Dept: NURSING | Facility: CLINIC | Age: 63
End: 2018-01-02

## 2018-01-02 ENCOUNTER — COMMUNICATION - HEALTHEAST (OUTPATIENT)
Dept: INTERNAL MEDICINE | Facility: CLINIC | Age: 63
End: 2018-01-02

## 2018-01-04 ENCOUNTER — COMMUNICATION - HEALTHEAST (OUTPATIENT)
Dept: NURSING | Facility: CLINIC | Age: 63
End: 2018-01-04

## 2018-01-08 ENCOUNTER — RECORDS - HEALTHEAST (OUTPATIENT)
Dept: ADMINISTRATIVE | Facility: OTHER | Age: 63
End: 2018-01-08

## 2018-01-18 ENCOUNTER — RECORDS - HEALTHEAST (OUTPATIENT)
Dept: ADMINISTRATIVE | Facility: OTHER | Age: 63
End: 2018-01-18

## 2018-01-22 ENCOUNTER — COMMUNICATION - HEALTHEAST (OUTPATIENT)
Dept: NURSING | Facility: CLINIC | Age: 63
End: 2018-01-22

## 2018-02-02 ENCOUNTER — COMMUNICATION - HEALTHEAST (OUTPATIENT)
Dept: NURSING | Facility: CLINIC | Age: 63
End: 2018-02-02

## 2018-02-02 ENCOUNTER — COMMUNICATION - HEALTHEAST (OUTPATIENT)
Dept: INTERNAL MEDICINE | Facility: CLINIC | Age: 63
End: 2018-02-02

## 2018-02-02 DIAGNOSIS — G56.00 CARPAL TUNNEL SYNDROME: ICD-10-CM

## 2018-02-02 DIAGNOSIS — K21.00 GASTROESOPHAGEAL REFLUX DISEASE WITH ESOPHAGITIS: ICD-10-CM

## 2018-02-06 ENCOUNTER — AMBULATORY - HEALTHEAST (OUTPATIENT)
Dept: INTERNAL MEDICINE | Facility: CLINIC | Age: 63
End: 2018-02-06

## 2018-02-06 DIAGNOSIS — Z98.890 HISTORY OF CARPAL TUNNEL SURGERY: ICD-10-CM

## 2018-02-08 ENCOUNTER — COMMUNICATION - HEALTHEAST (OUTPATIENT)
Dept: INTERNAL MEDICINE | Facility: CLINIC | Age: 63
End: 2018-02-08

## 2018-02-08 ENCOUNTER — RECORDS - HEALTHEAST (OUTPATIENT)
Dept: ADMINISTRATIVE | Facility: OTHER | Age: 63
End: 2018-02-08

## 2018-02-09 ENCOUNTER — COMMUNICATION - HEALTHEAST (OUTPATIENT)
Dept: INTERNAL MEDICINE | Facility: CLINIC | Age: 63
End: 2018-02-09

## 2018-02-09 DIAGNOSIS — G62.9 NEUROPATHY: ICD-10-CM

## 2018-02-15 ENCOUNTER — OFFICE VISIT - HEALTHEAST (OUTPATIENT)
Dept: OCCUPATIONAL THERAPY | Facility: REHABILITATION | Age: 63
End: 2018-02-15

## 2018-02-15 DIAGNOSIS — M25.531 WRIST PAIN, RIGHT: ICD-10-CM

## 2018-02-15 DIAGNOSIS — Z78.9 DECREASED ACTIVITIES OF DAILY LIVING (ADL): ICD-10-CM

## 2018-02-15 DIAGNOSIS — R29.898 WEAKNESS OF RIGHT HAND: ICD-10-CM

## 2018-02-15 DIAGNOSIS — R27.9 INCOORDINATION: ICD-10-CM

## 2018-02-20 ENCOUNTER — COMMUNICATION - HEALTHEAST (OUTPATIENT)
Dept: INTERNAL MEDICINE | Facility: CLINIC | Age: 63
End: 2018-02-20

## 2018-02-20 DIAGNOSIS — M54.9 CHRONIC BACK PAIN: ICD-10-CM

## 2018-02-20 DIAGNOSIS — G89.29 CHRONIC BACK PAIN: ICD-10-CM

## 2018-02-22 ENCOUNTER — OFFICE VISIT - HEALTHEAST (OUTPATIENT)
Dept: OCCUPATIONAL THERAPY | Facility: REHABILITATION | Age: 63
End: 2018-02-22

## 2018-02-22 DIAGNOSIS — R29.898 WEAKNESS OF RIGHT HAND: ICD-10-CM

## 2018-02-22 DIAGNOSIS — Z78.9 DECREASED ACTIVITIES OF DAILY LIVING (ADL): ICD-10-CM

## 2018-02-22 DIAGNOSIS — M25.531 WRIST PAIN, RIGHT: ICD-10-CM

## 2018-02-22 DIAGNOSIS — R27.9 INCOORDINATION: ICD-10-CM

## 2018-02-23 ENCOUNTER — COMMUNICATION - HEALTHEAST (OUTPATIENT)
Dept: CARE COORDINATION | Facility: CLINIC | Age: 63
End: 2018-02-23

## 2018-03-01 ENCOUNTER — OFFICE VISIT - HEALTHEAST (OUTPATIENT)
Dept: OCCUPATIONAL THERAPY | Facility: REHABILITATION | Age: 63
End: 2018-03-01

## 2018-03-01 ENCOUNTER — RECORDS - HEALTHEAST (OUTPATIENT)
Dept: ADMINISTRATIVE | Facility: OTHER | Age: 63
End: 2018-03-01

## 2018-03-01 DIAGNOSIS — R27.9 INCOORDINATION: ICD-10-CM

## 2018-03-01 DIAGNOSIS — Z78.9 DECREASED ACTIVITIES OF DAILY LIVING (ADL): ICD-10-CM

## 2018-03-01 DIAGNOSIS — R29.898 WEAKNESS OF RIGHT HAND: ICD-10-CM

## 2018-03-01 DIAGNOSIS — M25.531 WRIST PAIN, RIGHT: ICD-10-CM

## 2018-03-02 ENCOUNTER — COMMUNICATION - HEALTHEAST (OUTPATIENT)
Dept: INTERNAL MEDICINE | Facility: CLINIC | Age: 63
End: 2018-03-02

## 2018-03-02 DIAGNOSIS — E11.69 TYPE 2 DIABETES MELLITUS WITH OTHER SPECIFIED COMPLICATION, WITH LONG-TERM CURRENT USE OF INSULIN (H): ICD-10-CM

## 2018-03-02 DIAGNOSIS — Z79.4 TYPE 2 DIABETES MELLITUS WITH OTHER SPECIFIED COMPLICATION, WITH LONG-TERM CURRENT USE OF INSULIN (H): ICD-10-CM

## 2018-03-06 ENCOUNTER — COMMUNICATION - HEALTHEAST (OUTPATIENT)
Dept: SCHEDULING | Facility: CLINIC | Age: 63
End: 2018-03-06

## 2018-03-06 ENCOUNTER — COMMUNICATION - HEALTHEAST (OUTPATIENT)
Dept: NURSING | Facility: CLINIC | Age: 63
End: 2018-03-06

## 2018-03-06 DIAGNOSIS — Z79.4 TYPE 2 DIABETES MELLITUS WITH OTHER SPECIFIED COMPLICATION, WITH LONG-TERM CURRENT USE OF INSULIN (H): ICD-10-CM

## 2018-03-06 DIAGNOSIS — E11.69 TYPE 2 DIABETES MELLITUS WITH OTHER SPECIFIED COMPLICATION, WITH LONG-TERM CURRENT USE OF INSULIN (H): ICD-10-CM

## 2018-03-09 ENCOUNTER — COMMUNICATION - HEALTHEAST (OUTPATIENT)
Dept: NURSING | Facility: CLINIC | Age: 63
End: 2018-03-09

## 2018-03-09 ENCOUNTER — COMMUNICATION - HEALTHEAST (OUTPATIENT)
Dept: SCHEDULING | Facility: CLINIC | Age: 63
End: 2018-03-09

## 2018-03-09 ENCOUNTER — OFFICE VISIT - HEALTHEAST (OUTPATIENT)
Dept: OCCUPATIONAL THERAPY | Facility: REHABILITATION | Age: 63
End: 2018-03-09

## 2018-03-09 DIAGNOSIS — I25.83 CORONARY ARTERY DISEASE DUE TO LIPID RICH PLAQUE: ICD-10-CM

## 2018-03-09 DIAGNOSIS — Z78.9 DECREASED ACTIVITIES OF DAILY LIVING (ADL): ICD-10-CM

## 2018-03-09 DIAGNOSIS — R27.9 INCOORDINATION: ICD-10-CM

## 2018-03-09 DIAGNOSIS — I25.10 CORONARY ARTERY DISEASE DUE TO LIPID RICH PLAQUE: ICD-10-CM

## 2018-03-09 DIAGNOSIS — R29.898 WEAKNESS OF RIGHT HAND: ICD-10-CM

## 2018-03-09 DIAGNOSIS — M25.531 WRIST PAIN, RIGHT: ICD-10-CM

## 2018-03-13 ENCOUNTER — OFFICE VISIT - HEALTHEAST (OUTPATIENT)
Dept: INTERNAL MEDICINE | Facility: CLINIC | Age: 63
End: 2018-03-13

## 2018-03-13 DIAGNOSIS — I25.83 CORONARY ARTERY DISEASE DUE TO LIPID RICH PLAQUE: ICD-10-CM

## 2018-03-13 DIAGNOSIS — Z79.4 TYPE 2 DIABETES MELLITUS WITH HYPERGLYCEMIA, WITH LONG-TERM CURRENT USE OF INSULIN (H): ICD-10-CM

## 2018-03-13 DIAGNOSIS — Z51.81 MEDICATION MONITORING ENCOUNTER: ICD-10-CM

## 2018-03-13 DIAGNOSIS — M79.602 LEFT ARM PAIN: ICD-10-CM

## 2018-03-13 DIAGNOSIS — I10 ESSENTIAL HYPERTENSION: ICD-10-CM

## 2018-03-13 DIAGNOSIS — E03.9 HYPOTHYROIDISM: ICD-10-CM

## 2018-03-13 DIAGNOSIS — E11.65 TYPE 2 DIABETES MELLITUS WITH HYPERGLYCEMIA, WITH LONG-TERM CURRENT USE OF INSULIN (H): ICD-10-CM

## 2018-03-13 DIAGNOSIS — I25.10 CORONARY ARTERY DISEASE DUE TO LIPID RICH PLAQUE: ICD-10-CM

## 2018-03-13 DIAGNOSIS — E78.5 DYSLIPIDEMIA, GOAL LDL BELOW 70: ICD-10-CM

## 2018-03-13 LAB
ALBUMIN SERPL-MCNC: 4 G/DL (ref 3.5–5)
ALP SERPL-CCNC: 109 U/L (ref 45–120)
ALT SERPL W P-5'-P-CCNC: 27 U/L (ref 0–45)
ANION GAP SERPL CALCULATED.3IONS-SCNC: 9 MMOL/L (ref 5–18)
AST SERPL W P-5'-P-CCNC: 25 U/L (ref 0–40)
ATRIAL RATE - MUSE: 88 BPM
BILIRUB SERPL-MCNC: 0.5 MG/DL (ref 0–1)
BUN SERPL-MCNC: 16 MG/DL (ref 8–22)
CALCIUM SERPL-MCNC: 10 MG/DL (ref 8.5–10.5)
CHLORIDE BLD-SCNC: 105 MMOL/L (ref 98–107)
CHOLEST SERPL-MCNC: 134 MG/DL
CO2 SERPL-SCNC: 27 MMOL/L (ref 22–31)
CREAT SERPL-MCNC: 0.86 MG/DL (ref 0.6–1.1)
DIASTOLIC BLOOD PRESSURE - MUSE: NORMAL MMHG
ERYTHROCYTE [DISTWIDTH] IN BLOOD BY AUTOMATED COUNT: 13.8 % (ref 11–14.5)
FASTING STATUS PATIENT QL REPORTED: YES
GFR SERPL CREATININE-BSD FRML MDRD: >60 ML/MIN/1.73M2
GLUCOSE BLD-MCNC: 166 MG/DL (ref 70–125)
HCT VFR BLD AUTO: 36.1 % (ref 35–47)
HDLC SERPL-MCNC: 28 MG/DL
HGB BLD-MCNC: 12.2 G/DL (ref 12–16)
INTERPRETATION ECG - MUSE: NORMAL
LDLC SERPL CALC-MCNC: 51 MG/DL
MCH RBC QN AUTO: 28.8 PG (ref 27–34)
MCHC RBC AUTO-ENTMCNC: 33.8 G/DL (ref 32–36)
MCV RBC AUTO: 85 FL (ref 80–100)
P AXIS - MUSE: 61 DEGREES
PLATELET # BLD AUTO: 233 THOU/UL (ref 140–440)
PMV BLD AUTO: 7.7 FL (ref 7–10)
POTASSIUM BLD-SCNC: 4.2 MMOL/L (ref 3.5–5)
PR INTERVAL - MUSE: 142 MS
PROT SERPL-MCNC: 7.2 G/DL (ref 6–8)
QRS DURATION - MUSE: 66 MS
QT - MUSE: 390 MS
QTC - MUSE: 471 MS
R AXIS - MUSE: -3 DEGREES
RBC # BLD AUTO: 4.23 MILL/UL (ref 3.8–5.4)
SODIUM SERPL-SCNC: 141 MMOL/L (ref 136–145)
SYSTOLIC BLOOD PRESSURE - MUSE: NORMAL MMHG
T AXIS - MUSE: 35 DEGREES
TRIGL SERPL-MCNC: 276 MG/DL
TSH SERPL DL<=0.005 MIU/L-ACNC: 3.6 UIU/ML (ref 0.3–5)
VENTRICULAR RATE- MUSE: 88 BPM
WBC: 6.9 THOU/UL (ref 4–11)

## 2018-03-20 ENCOUNTER — OFFICE VISIT - HEALTHEAST (OUTPATIENT)
Dept: OCCUPATIONAL THERAPY | Facility: REHABILITATION | Age: 63
End: 2018-03-20

## 2018-03-20 DIAGNOSIS — M25.531 WRIST PAIN, RIGHT: ICD-10-CM

## 2018-03-20 DIAGNOSIS — Z78.9 DECREASED ACTIVITIES OF DAILY LIVING (ADL): ICD-10-CM

## 2018-03-20 DIAGNOSIS — R29.898 WEAKNESS OF RIGHT HAND: ICD-10-CM

## 2018-03-20 DIAGNOSIS — R27.9 INCOORDINATION: ICD-10-CM

## 2018-03-29 ENCOUNTER — RECORDS - HEALTHEAST (OUTPATIENT)
Dept: ADMINISTRATIVE | Facility: OTHER | Age: 63
End: 2018-03-29

## 2018-04-09 ENCOUNTER — COMMUNICATION - HEALTHEAST (OUTPATIENT)
Dept: NURSING | Facility: CLINIC | Age: 63
End: 2018-04-09

## 2018-04-11 ENCOUNTER — OFFICE VISIT - HEALTHEAST (OUTPATIENT)
Dept: NURSING | Facility: CLINIC | Age: 63
End: 2018-04-11

## 2018-04-11 DIAGNOSIS — Z79.4 TYPE 2 DIABETES MELLITUS WITH HYPERGLYCEMIA, WITH LONG-TERM CURRENT USE OF INSULIN (H): ICD-10-CM

## 2018-04-11 DIAGNOSIS — E11.65 TYPE 2 DIABETES MELLITUS WITH HYPERGLYCEMIA, WITH LONG-TERM CURRENT USE OF INSULIN (H): ICD-10-CM

## 2018-04-12 ENCOUNTER — OFFICE VISIT - HEALTHEAST (OUTPATIENT)
Dept: OCCUPATIONAL THERAPY | Facility: REHABILITATION | Age: 63
End: 2018-04-12

## 2018-04-12 DIAGNOSIS — R27.9 INCOORDINATION: ICD-10-CM

## 2018-04-12 DIAGNOSIS — R29.898 WEAKNESS OF RIGHT HAND: ICD-10-CM

## 2018-04-12 DIAGNOSIS — Z78.9 DECREASED ACTIVITIES OF DAILY LIVING (ADL): ICD-10-CM

## 2018-04-12 DIAGNOSIS — M25.531 WRIST PAIN, RIGHT: ICD-10-CM

## 2018-04-25 ENCOUNTER — RECORDS - HEALTHEAST (OUTPATIENT)
Dept: ADMINISTRATIVE | Facility: OTHER | Age: 63
End: 2018-04-25

## 2018-04-25 ENCOUNTER — COMMUNICATION - HEALTHEAST (OUTPATIENT)
Dept: INTERNAL MEDICINE | Facility: CLINIC | Age: 63
End: 2018-04-25

## 2018-04-26 ENCOUNTER — RECORDS - HEALTHEAST (OUTPATIENT)
Dept: ADMINISTRATIVE | Facility: OTHER | Age: 63
End: 2018-04-26

## 2018-04-27 ENCOUNTER — COMMUNICATION - HEALTHEAST (OUTPATIENT)
Dept: NURSING | Facility: CLINIC | Age: 63
End: 2018-04-27

## 2018-05-03 ENCOUNTER — COMMUNICATION - HEALTHEAST (OUTPATIENT)
Dept: INTERNAL MEDICINE | Facility: CLINIC | Age: 63
End: 2018-05-03

## 2018-05-03 DIAGNOSIS — G89.29 CHRONIC BACK PAIN: ICD-10-CM

## 2018-05-03 DIAGNOSIS — M54.9 CHRONIC BACK PAIN: ICD-10-CM

## 2018-05-07 ENCOUNTER — COMMUNICATION - HEALTHEAST (OUTPATIENT)
Dept: INTERNAL MEDICINE | Facility: CLINIC | Age: 63
End: 2018-05-07

## 2018-05-07 DIAGNOSIS — Z11.1 ENCOUNTER FOR TUBERCULIN SKIN TEST: ICD-10-CM

## 2018-05-08 ENCOUNTER — AMBULATORY - HEALTHEAST (OUTPATIENT)
Dept: NURSING | Facility: CLINIC | Age: 63
End: 2018-05-08

## 2018-05-08 DIAGNOSIS — Z11.1 ENCOUNTER FOR TUBERCULIN SKIN TEST: ICD-10-CM

## 2018-05-10 ENCOUNTER — AMBULATORY - HEALTHEAST (OUTPATIENT)
Dept: NURSING | Facility: CLINIC | Age: 63
End: 2018-05-10

## 2018-05-10 DIAGNOSIS — Z11.1 ENCOUNTER FOR TUBERCULIN SKIN TEST: ICD-10-CM

## 2018-05-10 LAB — TB SKIN TEST - HISTORICAL: NEGATIVE

## 2018-05-15 ENCOUNTER — OFFICE VISIT - HEALTHEAST (OUTPATIENT)
Dept: INTERNAL MEDICINE | Facility: CLINIC | Age: 63
End: 2018-05-15

## 2018-05-15 DIAGNOSIS — E11.9 TYPE 2 DIABETES MELLITUS (H): ICD-10-CM

## 2018-05-15 DIAGNOSIS — I10 ESSENTIAL HYPERTENSION: ICD-10-CM

## 2018-05-15 DIAGNOSIS — M54.9 CHRONIC BACK PAIN: ICD-10-CM

## 2018-05-15 DIAGNOSIS — G89.29 CHRONIC BACK PAIN: ICD-10-CM

## 2018-05-15 DIAGNOSIS — E66.01 MORBID OBESITY (H): ICD-10-CM

## 2018-05-17 ENCOUNTER — COMMUNICATION - HEALTHEAST (OUTPATIENT)
Dept: NURSING | Facility: CLINIC | Age: 63
End: 2018-05-17

## 2018-05-18 ENCOUNTER — COMMUNICATION - HEALTHEAST (OUTPATIENT)
Dept: NURSING | Facility: CLINIC | Age: 63
End: 2018-05-18

## 2018-05-21 ENCOUNTER — HOSPITAL ENCOUNTER (OUTPATIENT)
Dept: PHYSICAL MEDICINE AND REHAB | Facility: CLINIC | Age: 63
Discharge: HOME | End: 2018-05-21
Attending: INTERNAL MEDICINE

## 2018-05-21 DIAGNOSIS — G89.29 CHRONIC BACK PAIN: ICD-10-CM

## 2018-05-21 DIAGNOSIS — M54.50 LUMBALGIA: ICD-10-CM

## 2018-05-21 DIAGNOSIS — M79.18 MYOFASCIAL PAIN: ICD-10-CM

## 2018-05-21 DIAGNOSIS — G47.9 SLEEP DISTURBANCE: ICD-10-CM

## 2018-05-21 DIAGNOSIS — M54.9 CHRONIC BACK PAIN: ICD-10-CM

## 2018-05-21 DIAGNOSIS — M54.16 LUMBAR RADICULITIS: ICD-10-CM

## 2018-05-21 ASSESSMENT — MIFFLIN-ST. JEOR: SCORE: 1826.85

## 2018-05-23 ENCOUNTER — HOSPITAL ENCOUNTER (OUTPATIENT)
Dept: MRI IMAGING | Facility: CLINIC | Age: 63
Discharge: HOME | End: 2018-05-23
Attending: PHYSICIAN ASSISTANT

## 2018-05-23 DIAGNOSIS — M79.18 MYOFASCIAL PAIN: ICD-10-CM

## 2018-05-23 DIAGNOSIS — G47.9 SLEEP DISTURBANCE: ICD-10-CM

## 2018-05-23 DIAGNOSIS — M54.50 LUMBALGIA: ICD-10-CM

## 2018-05-23 DIAGNOSIS — G89.29 CHRONIC BACK PAIN: ICD-10-CM

## 2018-05-23 DIAGNOSIS — M54.16 LUMBAR RADICULITIS: ICD-10-CM

## 2018-05-23 DIAGNOSIS — M54.9 CHRONIC BACK PAIN: ICD-10-CM

## 2018-05-25 ENCOUNTER — HOSPITAL ENCOUNTER (OUTPATIENT)
Dept: PHYSICAL MEDICINE AND REHAB | Facility: CLINIC | Age: 63
Discharge: HOME | End: 2018-05-25
Attending: PHYSICIAN ASSISTANT

## 2018-05-25 DIAGNOSIS — M54.16 LUMBAR RADICULITIS: ICD-10-CM

## 2018-05-25 DIAGNOSIS — M54.9 CHRONIC BACK PAIN: ICD-10-CM

## 2018-05-25 DIAGNOSIS — M54.50 LUMBALGIA: ICD-10-CM

## 2018-05-25 DIAGNOSIS — G47.9 SLEEP DISTURBANCE: ICD-10-CM

## 2018-05-25 DIAGNOSIS — G89.29 CHRONIC BACK PAIN: ICD-10-CM

## 2018-05-25 DIAGNOSIS — M79.18 MYOFASCIAL PAIN: ICD-10-CM

## 2018-05-25 ASSESSMENT — MIFFLIN-ST. JEOR: SCORE: 1823.68

## 2018-06-01 ENCOUNTER — COMMUNICATION - HEALTHEAST (OUTPATIENT)
Dept: NURSING | Facility: CLINIC | Age: 63
End: 2018-06-01

## 2018-06-04 ENCOUNTER — OFFICE VISIT - HEALTHEAST (OUTPATIENT)
Dept: CARDIOLOGY | Facility: CLINIC | Age: 63
End: 2018-06-04

## 2018-06-04 DIAGNOSIS — E78.5 DYSLIPIDEMIA, GOAL LDL BELOW 70: ICD-10-CM

## 2018-06-04 DIAGNOSIS — I25.83 CORONARY ARTERY DISEASE DUE TO LIPID RICH PLAQUE: ICD-10-CM

## 2018-06-04 DIAGNOSIS — I25.10 CORONARY ARTERY DISEASE DUE TO LIPID RICH PLAQUE: ICD-10-CM

## 2018-06-04 ASSESSMENT — MIFFLIN-ST. JEOR: SCORE: 1837.29

## 2018-06-07 ENCOUNTER — OFFICE VISIT - HEALTHEAST (OUTPATIENT)
Dept: PHYSICAL THERAPY | Facility: REHABILITATION | Age: 63
End: 2018-06-07

## 2018-06-07 DIAGNOSIS — M53.86 DECREASED ROM OF LUMBAR SPINE: ICD-10-CM

## 2018-06-07 DIAGNOSIS — M54.16 LUMBAR RADICULOPATHY: ICD-10-CM

## 2018-06-07 DIAGNOSIS — R29.3 POOR POSTURE: ICD-10-CM

## 2018-06-07 DIAGNOSIS — G89.29 CHRONIC BACK PAIN, UNSPECIFIED BACK LOCATION, UNSPECIFIED BACK PAIN LATERALITY: ICD-10-CM

## 2018-06-07 DIAGNOSIS — M79.18 MYOFASCIAL PAIN: ICD-10-CM

## 2018-06-07 DIAGNOSIS — M62.81 MUSCLE WEAKNESS (GENERALIZED): ICD-10-CM

## 2018-06-07 DIAGNOSIS — M54.9 CHRONIC BACK PAIN, UNSPECIFIED BACK LOCATION, UNSPECIFIED BACK PAIN LATERALITY: ICD-10-CM

## 2018-06-12 ENCOUNTER — OFFICE VISIT - HEALTHEAST (OUTPATIENT)
Dept: PHYSICAL THERAPY | Facility: REHABILITATION | Age: 63
End: 2018-06-12

## 2018-06-12 DIAGNOSIS — M79.18 MYOFASCIAL PAIN: ICD-10-CM

## 2018-06-12 DIAGNOSIS — M62.81 MUSCLE WEAKNESS (GENERALIZED): ICD-10-CM

## 2018-06-12 DIAGNOSIS — M54.9 CHRONIC BACK PAIN, UNSPECIFIED BACK LOCATION, UNSPECIFIED BACK PAIN LATERALITY: ICD-10-CM

## 2018-06-12 DIAGNOSIS — M54.16 LUMBAR RADICULOPATHY: ICD-10-CM

## 2018-06-12 DIAGNOSIS — M53.86 DECREASED ROM OF LUMBAR SPINE: ICD-10-CM

## 2018-06-12 DIAGNOSIS — G89.29 CHRONIC BACK PAIN, UNSPECIFIED BACK LOCATION, UNSPECIFIED BACK PAIN LATERALITY: ICD-10-CM

## 2018-06-12 DIAGNOSIS — R29.3 POOR POSTURE: ICD-10-CM

## 2018-06-14 ENCOUNTER — OFFICE VISIT - HEALTHEAST (OUTPATIENT)
Dept: PODIATRY | Facility: CLINIC | Age: 63
End: 2018-06-14

## 2018-06-14 ENCOUNTER — OFFICE VISIT - HEALTHEAST (OUTPATIENT)
Dept: PHYSICAL THERAPY | Facility: REHABILITATION | Age: 63
End: 2018-06-14

## 2018-06-14 DIAGNOSIS — G89.29 CHRONIC BACK PAIN, UNSPECIFIED BACK LOCATION, UNSPECIFIED BACK PAIN LATERALITY: ICD-10-CM

## 2018-06-14 DIAGNOSIS — E11.9 TYPE 2 DIABETES MELLITUS WITHOUT COMPLICATION, WITHOUT LONG-TERM CURRENT USE OF INSULIN (H): ICD-10-CM

## 2018-06-14 DIAGNOSIS — M62.81 MUSCLE WEAKNESS (GENERALIZED): ICD-10-CM

## 2018-06-14 DIAGNOSIS — M54.9 CHRONIC BACK PAIN, UNSPECIFIED BACK LOCATION, UNSPECIFIED BACK PAIN LATERALITY: ICD-10-CM

## 2018-06-14 DIAGNOSIS — M21.6X2 PRONATION DEFORMITY OF BOTH FEET: ICD-10-CM

## 2018-06-14 DIAGNOSIS — R29.3 POOR POSTURE: ICD-10-CM

## 2018-06-14 DIAGNOSIS — M53.86 DECREASED ROM OF LUMBAR SPINE: ICD-10-CM

## 2018-06-14 DIAGNOSIS — M21.6X1 PRONATION DEFORMITY OF BOTH FEET: ICD-10-CM

## 2018-06-14 DIAGNOSIS — M79.18 MYOFASCIAL PAIN: ICD-10-CM

## 2018-06-14 DIAGNOSIS — M54.16 LUMBAR RADICULOPATHY: ICD-10-CM

## 2018-06-15 ENCOUNTER — COMMUNICATION - HEALTHEAST (OUTPATIENT)
Dept: SURGERY | Facility: CLINIC | Age: 63
End: 2018-06-15

## 2018-06-15 ENCOUNTER — OFFICE VISIT - HEALTHEAST (OUTPATIENT)
Dept: INTERNAL MEDICINE | Facility: CLINIC | Age: 63
End: 2018-06-15

## 2018-06-15 ENCOUNTER — COMMUNICATION - HEALTHEAST (OUTPATIENT)
Dept: PHYSICAL MEDICINE AND REHAB | Facility: CLINIC | Age: 63
End: 2018-06-15

## 2018-06-15 DIAGNOSIS — E04.9 GOITER: ICD-10-CM

## 2018-06-15 DIAGNOSIS — E11.65 TYPE 2 DIABETES MELLITUS WITH HYPERGLYCEMIA, WITH LONG-TERM CURRENT USE OF INSULIN (H): ICD-10-CM

## 2018-06-15 DIAGNOSIS — M54.9 CHRONIC BACK PAIN: ICD-10-CM

## 2018-06-15 DIAGNOSIS — Z79.4 TYPE 2 DIABETES MELLITUS WITH HYPERGLYCEMIA, WITH LONG-TERM CURRENT USE OF INSULIN (H): ICD-10-CM

## 2018-06-15 DIAGNOSIS — I10 ESSENTIAL HYPERTENSION: ICD-10-CM

## 2018-06-15 DIAGNOSIS — E66.01 MORBID OBESITY (H): ICD-10-CM

## 2018-06-15 DIAGNOSIS — R10.9 FLANK PAIN: ICD-10-CM

## 2018-06-15 DIAGNOSIS — G89.29 CHRONIC BACK PAIN: ICD-10-CM

## 2018-06-15 LAB
ALBUMIN UR-MCNC: NEGATIVE MG/DL
ANION GAP SERPL CALCULATED.3IONS-SCNC: 11 MMOL/L (ref 5–18)
APPEARANCE UR: CLEAR
BILIRUB UR QL STRIP: NEGATIVE
BUN SERPL-MCNC: 16 MG/DL (ref 8–22)
CALCIUM SERPL-MCNC: 10.3 MG/DL (ref 8.5–10.5)
CHLORIDE BLD-SCNC: 104 MMOL/L (ref 98–107)
CO2 SERPL-SCNC: 24 MMOL/L (ref 22–31)
COLOR UR AUTO: YELLOW
CREAT SERPL-MCNC: 1.17 MG/DL (ref 0.6–1.1)
GFR SERPL CREATININE-BSD FRML MDRD: 47 ML/MIN/1.73M2
GLUCOSE BLD-MCNC: 258 MG/DL (ref 70–125)
GLUCOSE UR STRIP-MCNC: ABNORMAL MG/DL
HBA1C MFR BLD: 9.6 % (ref 3.5–6)
HGB UR QL STRIP: NEGATIVE
KETONES UR STRIP-MCNC: NEGATIVE MG/DL
LEUKOCYTE ESTERASE UR QL STRIP: NEGATIVE
NITRATE UR QL: NEGATIVE
PH UR STRIP: 5.5 [PH] (ref 5–8)
POTASSIUM BLD-SCNC: 4.6 MMOL/L (ref 3.5–5)
SODIUM SERPL-SCNC: 139 MMOL/L (ref 136–145)
SP GR UR STRIP: 1.02 (ref 1–1.03)
T4 FREE SERPL-MCNC: 0.9 NG/DL (ref 0.7–1.8)
TSH SERPL DL<=0.005 MIU/L-ACNC: 2.12 UIU/ML (ref 0.3–5)
UROBILINOGEN UR STRIP-ACNC: ABNORMAL

## 2018-06-15 ASSESSMENT — MIFFLIN-ST. JEOR: SCORE: 1832.75

## 2018-06-18 ENCOUNTER — HOSPITAL ENCOUNTER (OUTPATIENT)
Dept: ULTRASOUND IMAGING | Facility: CLINIC | Age: 63
Discharge: HOME | End: 2018-06-18
Attending: INTERNAL MEDICINE

## 2018-06-18 ENCOUNTER — OFFICE VISIT - HEALTHEAST (OUTPATIENT)
Dept: PHARMACY | Facility: CLINIC | Age: 63
End: 2018-06-18

## 2018-06-18 ENCOUNTER — AMBULATORY - HEALTHEAST (OUTPATIENT)
Dept: LAB | Facility: CLINIC | Age: 63
End: 2018-06-18

## 2018-06-18 ENCOUNTER — HOSPITAL ENCOUNTER (OUTPATIENT)
Dept: CT IMAGING | Facility: CLINIC | Age: 63
Discharge: HOME | End: 2018-06-18
Attending: INTERNAL MEDICINE

## 2018-06-18 DIAGNOSIS — Z79.4 TYPE 2 DIABETES MELLITUS WITH HYPERGLYCEMIA, WITH LONG-TERM CURRENT USE OF INSULIN (H): ICD-10-CM

## 2018-06-18 DIAGNOSIS — M47.819 ARTHROPATHY OF SPINAL FACET JOINT: ICD-10-CM

## 2018-06-18 DIAGNOSIS — E04.9 GOITER: ICD-10-CM

## 2018-06-18 DIAGNOSIS — E11.65 TYPE 2 DIABETES MELLITUS WITH HYPERGLYCEMIA, WITH LONG-TERM CURRENT USE OF INSULIN (H): ICD-10-CM

## 2018-06-18 DIAGNOSIS — R10.9 FLANK PAIN: ICD-10-CM

## 2018-06-18 LAB
CREAT UR-MCNC: 96.6 MG/DL
MICROALBUMIN UR-MCNC: 3.04 MG/DL (ref 0–1.99)
MICROALBUMIN/CREAT UR: 31.5 MG/G

## 2018-06-19 ENCOUNTER — COMMUNICATION - HEALTHEAST (OUTPATIENT)
Dept: INTERNAL MEDICINE | Facility: CLINIC | Age: 63
End: 2018-06-19

## 2018-06-19 ENCOUNTER — OFFICE VISIT - HEALTHEAST (OUTPATIENT)
Dept: PHYSICAL THERAPY | Facility: REHABILITATION | Age: 63
End: 2018-06-19

## 2018-06-19 DIAGNOSIS — G89.29 CHRONIC BACK PAIN, UNSPECIFIED BACK LOCATION, UNSPECIFIED BACK PAIN LATERALITY: ICD-10-CM

## 2018-06-19 DIAGNOSIS — M54.16 LUMBAR RADICULOPATHY: ICD-10-CM

## 2018-06-19 DIAGNOSIS — M54.9 CHRONIC BACK PAIN, UNSPECIFIED BACK LOCATION, UNSPECIFIED BACK PAIN LATERALITY: ICD-10-CM

## 2018-06-19 DIAGNOSIS — M62.81 MUSCLE WEAKNESS (GENERALIZED): ICD-10-CM

## 2018-06-19 DIAGNOSIS — M79.18 MYOFASCIAL PAIN: ICD-10-CM

## 2018-06-19 DIAGNOSIS — E04.1 THYROID NODULE: ICD-10-CM

## 2018-06-19 DIAGNOSIS — M53.86 DECREASED ROM OF LUMBAR SPINE: ICD-10-CM

## 2018-06-19 DIAGNOSIS — R29.3 POOR POSTURE: ICD-10-CM

## 2018-06-20 ENCOUNTER — COMMUNICATION - HEALTHEAST (OUTPATIENT)
Dept: INTERNAL MEDICINE | Facility: CLINIC | Age: 63
End: 2018-06-20

## 2018-06-21 ENCOUNTER — OFFICE VISIT - HEALTHEAST (OUTPATIENT)
Dept: PHYSICAL THERAPY | Facility: REHABILITATION | Age: 63
End: 2018-06-21

## 2018-06-21 ENCOUNTER — COMMUNICATION - HEALTHEAST (OUTPATIENT)
Dept: MEDSURG UNIT | Facility: CLINIC | Age: 63
End: 2018-06-21

## 2018-06-21 DIAGNOSIS — M53.86 DECREASED ROM OF LUMBAR SPINE: ICD-10-CM

## 2018-06-21 DIAGNOSIS — M79.18 MYOFASCIAL PAIN: ICD-10-CM

## 2018-06-21 DIAGNOSIS — R29.3 POOR POSTURE: ICD-10-CM

## 2018-06-21 DIAGNOSIS — M62.81 MUSCLE WEAKNESS (GENERALIZED): ICD-10-CM

## 2018-06-21 DIAGNOSIS — G89.29 CHRONIC BACK PAIN, UNSPECIFIED BACK LOCATION, UNSPECIFIED BACK PAIN LATERALITY: ICD-10-CM

## 2018-06-21 DIAGNOSIS — M54.16 LUMBAR RADICULOPATHY: ICD-10-CM

## 2018-06-21 DIAGNOSIS — M54.9 CHRONIC BACK PAIN, UNSPECIFIED BACK LOCATION, UNSPECIFIED BACK PAIN LATERALITY: ICD-10-CM

## 2018-06-22 ENCOUNTER — COMMUNICATION - HEALTHEAST (OUTPATIENT)
Dept: PHARMACY | Facility: CLINIC | Age: 63
End: 2018-06-22

## 2018-06-22 ENCOUNTER — COMMUNICATION - HEALTHEAST (OUTPATIENT)
Dept: NURSING | Facility: CLINIC | Age: 63
End: 2018-06-22

## 2018-06-22 ENCOUNTER — HOSPITAL ENCOUNTER (OUTPATIENT)
Dept: ULTRASOUND IMAGING | Facility: HOSPITAL | Age: 63
Discharge: HOME | End: 2018-06-22
Attending: INTERNAL MEDICINE | Admitting: RADIOLOGY

## 2018-06-22 DIAGNOSIS — E66.01 MORBID OBESITY (H): ICD-10-CM

## 2018-06-22 DIAGNOSIS — E04.1 THYROID NODULE: ICD-10-CM

## 2018-06-25 ENCOUNTER — COMMUNICATION - HEALTHEAST (OUTPATIENT)
Dept: INTERNAL MEDICINE | Facility: CLINIC | Age: 63
End: 2018-06-25

## 2018-06-26 ENCOUNTER — OFFICE VISIT - HEALTHEAST (OUTPATIENT)
Dept: PHARMACY | Facility: CLINIC | Age: 63
End: 2018-06-26

## 2018-06-26 ENCOUNTER — OFFICE VISIT - HEALTHEAST (OUTPATIENT)
Dept: PHYSICAL THERAPY | Facility: REHABILITATION | Age: 63
End: 2018-06-26

## 2018-06-26 DIAGNOSIS — M54.9 CHRONIC BACK PAIN, UNSPECIFIED BACK LOCATION, UNSPECIFIED BACK PAIN LATERALITY: ICD-10-CM

## 2018-06-26 DIAGNOSIS — G89.29 CHRONIC BACK PAIN, UNSPECIFIED BACK LOCATION, UNSPECIFIED BACK PAIN LATERALITY: ICD-10-CM

## 2018-06-26 DIAGNOSIS — G89.29 CHRONIC BACK PAIN: ICD-10-CM

## 2018-06-26 DIAGNOSIS — M53.86 DECREASED ROM OF LUMBAR SPINE: ICD-10-CM

## 2018-06-26 DIAGNOSIS — M79.18 MYOFASCIAL PAIN: ICD-10-CM

## 2018-06-26 DIAGNOSIS — M62.81 MUSCLE WEAKNESS (GENERALIZED): ICD-10-CM

## 2018-06-26 DIAGNOSIS — M54.9 CHRONIC BACK PAIN: ICD-10-CM

## 2018-06-26 DIAGNOSIS — R29.3 POOR POSTURE: ICD-10-CM

## 2018-06-26 DIAGNOSIS — M54.16 LUMBAR RADICULOPATHY: ICD-10-CM

## 2018-06-26 DIAGNOSIS — E11.65 TYPE 2 DIABETES MELLITUS WITH HYPERGLYCEMIA, WITH LONG-TERM CURRENT USE OF INSULIN (H): ICD-10-CM

## 2018-06-26 DIAGNOSIS — Z79.4 TYPE 2 DIABETES MELLITUS WITH HYPERGLYCEMIA, WITH LONG-TERM CURRENT USE OF INSULIN (H): ICD-10-CM

## 2018-06-28 ENCOUNTER — RECORDS - HEALTHEAST (OUTPATIENT)
Dept: ADMINISTRATIVE | Facility: OTHER | Age: 63
End: 2018-06-28

## 2018-06-28 ENCOUNTER — AMBULATORY - HEALTHEAST (OUTPATIENT)
Dept: OTHER | Facility: CLINIC | Age: 63
End: 2018-06-28

## 2018-06-28 ENCOUNTER — COMMUNICATION - HEALTHEAST (OUTPATIENT)
Dept: INTERNAL MEDICINE | Facility: CLINIC | Age: 63
End: 2018-06-28

## 2018-06-28 ENCOUNTER — OFFICE VISIT - HEALTHEAST (OUTPATIENT)
Dept: PHYSICAL THERAPY | Facility: REHABILITATION | Age: 63
End: 2018-06-28

## 2018-06-28 DIAGNOSIS — G89.29 CHRONIC BACK PAIN, UNSPECIFIED BACK LOCATION, UNSPECIFIED BACK PAIN LATERALITY: ICD-10-CM

## 2018-06-28 DIAGNOSIS — M62.81 MUSCLE WEAKNESS (GENERALIZED): ICD-10-CM

## 2018-06-28 DIAGNOSIS — M53.86 DECREASED ROM OF LUMBAR SPINE: ICD-10-CM

## 2018-06-28 DIAGNOSIS — E11.9 TYPE 2 DIABETES MELLITUS WITHOUT COMPLICATION, WITHOUT LONG-TERM CURRENT USE OF INSULIN (H): ICD-10-CM

## 2018-06-28 DIAGNOSIS — M54.16 LUMBAR RADICULOPATHY: ICD-10-CM

## 2018-06-28 DIAGNOSIS — M54.9 CHRONIC BACK PAIN, UNSPECIFIED BACK LOCATION, UNSPECIFIED BACK PAIN LATERALITY: ICD-10-CM

## 2018-06-28 DIAGNOSIS — R29.3 POOR POSTURE: ICD-10-CM

## 2018-06-28 DIAGNOSIS — M79.18 MYOFASCIAL PAIN: ICD-10-CM

## 2018-06-28 LAB
LAB AP CHARGES (HE HISTORICAL CONVERSION): NORMAL
LAB AP INITIAL CYTO EVAL (HE HISTORICAL CONVERSION): NORMAL
LAB MED GENERAL PATH INTERP (HE HISTORICAL CONVERSION): NORMAL
PATH REPORT.COMMENTS IMP SPEC: NORMAL
PATH REPORT.COMMENTS IMP SPEC: NORMAL
PATH REPORT.FINAL DX SPEC: NORMAL
PATH REPORT.MICROSCOPIC SPEC OTHER STN: NORMAL
PATH REPORT.RELEVANT HX SPEC: NORMAL
SPECIMEN DESCRIPTION: NORMAL

## 2018-06-29 ENCOUNTER — RECORDS - HEALTHEAST (OUTPATIENT)
Dept: GENERAL RADIOLOGY | Facility: CLINIC | Age: 63
End: 2018-06-29

## 2018-06-29 ENCOUNTER — AMBULATORY - HEALTHEAST (OUTPATIENT)
Dept: INTERNAL MEDICINE | Facility: CLINIC | Age: 63
End: 2018-06-29

## 2018-06-29 ENCOUNTER — COMMUNICATION - HEALTHEAST (OUTPATIENT)
Dept: NURSING | Facility: CLINIC | Age: 63
End: 2018-06-29

## 2018-06-29 ENCOUNTER — OFFICE VISIT - HEALTHEAST (OUTPATIENT)
Dept: INTERNAL MEDICINE | Facility: CLINIC | Age: 63
End: 2018-06-29

## 2018-06-29 DIAGNOSIS — M25.561 PAIN IN RIGHT KNEE: ICD-10-CM

## 2018-06-29 DIAGNOSIS — G89.29 CHRONIC BACK PAIN: ICD-10-CM

## 2018-06-29 DIAGNOSIS — M25.561 RIGHT KNEE PAIN: ICD-10-CM

## 2018-06-29 DIAGNOSIS — E11.65 TYPE 2 DIABETES MELLITUS WITH HYPERGLYCEMIA, WITH LONG-TERM CURRENT USE OF INSULIN (H): ICD-10-CM

## 2018-06-29 DIAGNOSIS — G47.33 OSA (OBSTRUCTIVE SLEEP APNEA): ICD-10-CM

## 2018-06-29 DIAGNOSIS — Z79.4 TYPE 2 DIABETES MELLITUS WITH HYPERGLYCEMIA, WITH LONG-TERM CURRENT USE OF INSULIN (H): ICD-10-CM

## 2018-06-29 DIAGNOSIS — M54.9 CHRONIC BACK PAIN: ICD-10-CM

## 2018-06-29 DIAGNOSIS — E04.9 GOITER: ICD-10-CM

## 2018-06-29 DIAGNOSIS — I25.83 CORONARY ARTERY DISEASE DUE TO LIPID RICH PLAQUE: ICD-10-CM

## 2018-06-29 DIAGNOSIS — I25.10 CORONARY ARTERY DISEASE DUE TO LIPID RICH PLAQUE: ICD-10-CM

## 2018-07-03 ENCOUNTER — OFFICE VISIT - HEALTHEAST (OUTPATIENT)
Dept: PHYSICAL THERAPY | Facility: REHABILITATION | Age: 63
End: 2018-07-03

## 2018-07-03 DIAGNOSIS — M54.9 CHRONIC BACK PAIN, UNSPECIFIED BACK LOCATION, UNSPECIFIED BACK PAIN LATERALITY: ICD-10-CM

## 2018-07-03 DIAGNOSIS — M54.16 LUMBAR RADICULOPATHY: ICD-10-CM

## 2018-07-03 DIAGNOSIS — R29.3 POOR POSTURE: ICD-10-CM

## 2018-07-03 DIAGNOSIS — M62.81 MUSCLE WEAKNESS (GENERALIZED): ICD-10-CM

## 2018-07-03 DIAGNOSIS — M53.86 DECREASED ROM OF LUMBAR SPINE: ICD-10-CM

## 2018-07-03 DIAGNOSIS — G89.29 CHRONIC BACK PAIN, UNSPECIFIED BACK LOCATION, UNSPECIFIED BACK PAIN LATERALITY: ICD-10-CM

## 2018-07-03 DIAGNOSIS — M79.18 MYOFASCIAL PAIN: ICD-10-CM

## 2018-07-05 ENCOUNTER — OFFICE VISIT - HEALTHEAST (OUTPATIENT)
Dept: PHYSICAL THERAPY | Facility: REHABILITATION | Age: 63
End: 2018-07-05

## 2018-07-05 DIAGNOSIS — M79.18 MYOFASCIAL PAIN: ICD-10-CM

## 2018-07-05 DIAGNOSIS — G89.29 CHRONIC BACK PAIN, UNSPECIFIED BACK LOCATION, UNSPECIFIED BACK PAIN LATERALITY: ICD-10-CM

## 2018-07-05 DIAGNOSIS — M53.86 DECREASED ROM OF LUMBAR SPINE: ICD-10-CM

## 2018-07-05 DIAGNOSIS — R29.3 POOR POSTURE: ICD-10-CM

## 2018-07-05 DIAGNOSIS — M54.16 LUMBAR RADICULOPATHY: ICD-10-CM

## 2018-07-05 DIAGNOSIS — M54.9 CHRONIC BACK PAIN, UNSPECIFIED BACK LOCATION, UNSPECIFIED BACK PAIN LATERALITY: ICD-10-CM

## 2018-07-05 DIAGNOSIS — M25.561 RIGHT KNEE PAIN, UNSPECIFIED CHRONICITY: ICD-10-CM

## 2018-07-05 DIAGNOSIS — M62.81 MUSCLE WEAKNESS (GENERALIZED): ICD-10-CM

## 2018-07-10 ENCOUNTER — AMBULATORY - HEALTHEAST (OUTPATIENT)
Dept: OTHER | Facility: CLINIC | Age: 63
End: 2018-07-10

## 2018-07-12 ENCOUNTER — HOSPITAL ENCOUNTER (OUTPATIENT)
Dept: PHYSICAL MEDICINE AND REHAB | Facility: CLINIC | Age: 63
Discharge: HOME | End: 2018-07-12
Attending: PHYSICIAN ASSISTANT

## 2018-07-12 ENCOUNTER — OFFICE VISIT - HEALTHEAST (OUTPATIENT)
Dept: PHYSICAL THERAPY | Facility: REHABILITATION | Age: 63
End: 2018-07-12

## 2018-07-12 DIAGNOSIS — M79.18 MYOFASCIAL PAIN: ICD-10-CM

## 2018-07-12 DIAGNOSIS — M54.50 LUMBALGIA: ICD-10-CM

## 2018-07-12 DIAGNOSIS — M25.561 RIGHT KNEE PAIN, UNSPECIFIED CHRONICITY: ICD-10-CM

## 2018-07-12 DIAGNOSIS — M47.817 FACET ARTHROPATHY, LUMBOSACRAL: ICD-10-CM

## 2018-07-12 DIAGNOSIS — M54.16 LUMBAR RADICULOPATHY: ICD-10-CM

## 2018-07-12 DIAGNOSIS — G89.29 CHRONIC BACK PAIN, UNSPECIFIED BACK LOCATION, UNSPECIFIED BACK PAIN LATERALITY: ICD-10-CM

## 2018-07-12 DIAGNOSIS — M54.9 CHRONIC BACK PAIN, UNSPECIFIED BACK LOCATION, UNSPECIFIED BACK PAIN LATERALITY: ICD-10-CM

## 2018-07-12 ASSESSMENT — MIFFLIN-ST. JEOR: SCORE: 1834.11

## 2018-07-15 ENCOUNTER — COMMUNICATION - HEALTHEAST (OUTPATIENT)
Dept: SCHEDULING | Facility: CLINIC | Age: 63
End: 2018-07-15

## 2018-07-15 DIAGNOSIS — Z79.4 TYPE 2 DIABETES MELLITUS WITH OTHER SPECIFIED COMPLICATION, WITH LONG-TERM CURRENT USE OF INSULIN (H): ICD-10-CM

## 2018-07-15 DIAGNOSIS — E11.69 TYPE 2 DIABETES MELLITUS WITH OTHER SPECIFIED COMPLICATION, WITH LONG-TERM CURRENT USE OF INSULIN (H): ICD-10-CM

## 2018-07-18 ENCOUNTER — COMMUNICATION - HEALTHEAST (OUTPATIENT)
Dept: SLEEP MEDICINE | Facility: CLINIC | Age: 63
End: 2018-07-18

## 2018-07-24 ENCOUNTER — COMMUNICATION - HEALTHEAST (OUTPATIENT)
Dept: INTERNAL MEDICINE | Facility: CLINIC | Age: 63
End: 2018-07-24

## 2018-07-24 ENCOUNTER — COMMUNICATION - HEALTHEAST (OUTPATIENT)
Dept: NURSING | Facility: CLINIC | Age: 63
End: 2018-07-24

## 2018-07-24 ENCOUNTER — OFFICE VISIT - HEALTHEAST (OUTPATIENT)
Dept: PHYSICAL THERAPY | Facility: REHABILITATION | Age: 63
End: 2018-07-24

## 2018-07-24 DIAGNOSIS — M62.81 MUSCLE WEAKNESS (GENERALIZED): ICD-10-CM

## 2018-07-24 DIAGNOSIS — M79.18 MYOFASCIAL PAIN: ICD-10-CM

## 2018-07-24 DIAGNOSIS — M25.561 RIGHT KNEE PAIN, UNSPECIFIED CHRONICITY: ICD-10-CM

## 2018-07-24 DIAGNOSIS — R29.3 POOR POSTURE: ICD-10-CM

## 2018-07-24 DIAGNOSIS — M53.86 DECREASED ROM OF LUMBAR SPINE: ICD-10-CM

## 2018-07-24 DIAGNOSIS — M54.16 LUMBAR RADICULOPATHY: ICD-10-CM

## 2018-07-24 DIAGNOSIS — M54.9 CHRONIC BACK PAIN, UNSPECIFIED BACK LOCATION, UNSPECIFIED BACK PAIN LATERALITY: ICD-10-CM

## 2018-07-24 DIAGNOSIS — G89.29 CHRONIC BACK PAIN, UNSPECIFIED BACK LOCATION, UNSPECIFIED BACK PAIN LATERALITY: ICD-10-CM

## 2018-07-25 ENCOUNTER — AMBULATORY - HEALTHEAST (OUTPATIENT)
Dept: INTERNAL MEDICINE | Facility: CLINIC | Age: 63
End: 2018-07-25

## 2018-07-25 ENCOUNTER — OFFICE VISIT - HEALTHEAST (OUTPATIENT)
Dept: SLEEP MEDICINE | Facility: CLINIC | Age: 63
End: 2018-07-25

## 2018-07-25 DIAGNOSIS — G47.33 OSA ON CPAP: ICD-10-CM

## 2018-07-25 DIAGNOSIS — M47.816 FACET ARTHROPATHY, LUMBAR: ICD-10-CM

## 2018-07-25 DIAGNOSIS — G89.29 CHRONIC BACK PAIN: ICD-10-CM

## 2018-07-25 DIAGNOSIS — M54.9 CHRONIC BACK PAIN: ICD-10-CM

## 2018-07-25 ASSESSMENT — MIFFLIN-ST. JEOR: SCORE: 1832.75

## 2018-07-26 ENCOUNTER — OFFICE VISIT - HEALTHEAST (OUTPATIENT)
Dept: PHYSICAL THERAPY | Facility: REHABILITATION | Age: 63
End: 2018-07-26

## 2018-07-26 ENCOUNTER — HOSPITAL ENCOUNTER (OUTPATIENT)
Dept: PHYSICAL MEDICINE AND REHAB | Facility: CLINIC | Age: 63
Discharge: HOME | End: 2018-07-26
Attending: PHYSICIAN ASSISTANT

## 2018-07-26 ENCOUNTER — AMBULATORY - HEALTHEAST (OUTPATIENT)
Dept: SLEEP MEDICINE | Facility: CLINIC | Age: 63
End: 2018-07-26

## 2018-07-26 DIAGNOSIS — M79.18 MYOFASCIAL PAIN: ICD-10-CM

## 2018-07-26 DIAGNOSIS — G47.9 SLEEP DISTURBANCE: ICD-10-CM

## 2018-07-26 DIAGNOSIS — M47.817 FACET ARTHROPATHY, LUMBOSACRAL: ICD-10-CM

## 2018-07-26 DIAGNOSIS — M62.81 MUSCLE WEAKNESS (GENERALIZED): ICD-10-CM

## 2018-07-26 DIAGNOSIS — M54.50 LUMBALGIA: ICD-10-CM

## 2018-07-26 DIAGNOSIS — G89.29 CHRONIC BACK PAIN, UNSPECIFIED BACK LOCATION, UNSPECIFIED BACK PAIN LATERALITY: ICD-10-CM

## 2018-07-26 DIAGNOSIS — M54.9 CHRONIC BACK PAIN, UNSPECIFIED BACK LOCATION, UNSPECIFIED BACK PAIN LATERALITY: ICD-10-CM

## 2018-07-26 DIAGNOSIS — M53.86 DECREASED ROM OF LUMBAR SPINE: ICD-10-CM

## 2018-07-26 DIAGNOSIS — M25.561 RIGHT KNEE PAIN, UNSPECIFIED CHRONICITY: ICD-10-CM

## 2018-07-26 DIAGNOSIS — R29.3 POOR POSTURE: ICD-10-CM

## 2018-07-26 DIAGNOSIS — M54.16 LUMBAR RADICULOPATHY: ICD-10-CM

## 2018-07-26 ASSESSMENT — MIFFLIN-ST. JEOR: SCORE: 1834.56

## 2018-07-31 ENCOUNTER — AMBULATORY - HEALTHEAST (OUTPATIENT)
Dept: PHYSICAL MEDICINE AND REHAB | Facility: CLINIC | Age: 63
End: 2018-07-31

## 2018-07-31 ENCOUNTER — HOSPITAL ENCOUNTER (OUTPATIENT)
Dept: PHYSICAL MEDICINE AND REHAB | Facility: CLINIC | Age: 63
Discharge: HOME | End: 2018-07-31
Attending: PHYSICIAN ASSISTANT

## 2018-07-31 ENCOUNTER — COMMUNICATION - HEALTHEAST (OUTPATIENT)
Dept: INTERNAL MEDICINE | Facility: CLINIC | Age: 63
End: 2018-07-31

## 2018-07-31 DIAGNOSIS — M47.817 FACET ARTHROPATHY, LUMBOSACRAL: ICD-10-CM

## 2018-07-31 DIAGNOSIS — E11.9 DIABETES MELLITUS, TYPE 2 (H): ICD-10-CM

## 2018-07-31 DIAGNOSIS — E10.9 DIABETES MELLITUS TYPE 1 (H): ICD-10-CM

## 2018-07-31 DIAGNOSIS — M79.18 MYOFASCIAL PAIN: ICD-10-CM

## 2018-07-31 DIAGNOSIS — M54.50 LUMBALGIA: ICD-10-CM

## 2018-07-31 LAB — GLUCOSE BLDC GLUCOMTR-MCNC: 444 MG/DL (ref 70–125)

## 2018-08-01 ENCOUNTER — COMMUNICATION - HEALTHEAST (OUTPATIENT)
Dept: NURSING | Facility: CLINIC | Age: 63
End: 2018-08-01

## 2018-08-01 ENCOUNTER — COMMUNICATION - HEALTHEAST (OUTPATIENT)
Dept: PHYSICAL MEDICINE AND REHAB | Facility: CLINIC | Age: 63
End: 2018-08-01

## 2018-08-01 DIAGNOSIS — Z76.89 REFERRED BY PRIMARY CARE PHYSICIAN: ICD-10-CM

## 2018-08-08 ENCOUNTER — AMBULATORY - HEALTHEAST (OUTPATIENT)
Dept: INTERNAL MEDICINE | Facility: CLINIC | Age: 63
End: 2018-08-08

## 2018-08-08 ENCOUNTER — COMMUNICATION - HEALTHEAST (OUTPATIENT)
Dept: INTERNAL MEDICINE | Facility: CLINIC | Age: 63
End: 2018-08-08

## 2018-08-09 ENCOUNTER — COMMUNICATION - HEALTHEAST (OUTPATIENT)
Dept: INTERNAL MEDICINE | Facility: CLINIC | Age: 63
End: 2018-08-09

## 2018-08-09 DIAGNOSIS — K21.00 GASTROESOPHAGEAL REFLUX DISEASE WITH ESOPHAGITIS: ICD-10-CM

## 2018-08-15 ENCOUNTER — RECORDS - HEALTHEAST (OUTPATIENT)
Dept: ADMINISTRATIVE | Facility: OTHER | Age: 63
End: 2018-08-15

## 2018-08-20 ENCOUNTER — AMBULATORY - HEALTHEAST (OUTPATIENT)
Dept: ADMINISTRATIVE | Facility: REHABILITATION | Age: 63
End: 2018-08-20

## 2018-08-20 DIAGNOSIS — M54.50 LOW BACK PAIN: ICD-10-CM

## 2018-08-24 ENCOUNTER — COMMUNICATION - HEALTHEAST (OUTPATIENT)
Dept: INTERNAL MEDICINE | Facility: CLINIC | Age: 63
End: 2018-08-24

## 2018-08-24 ENCOUNTER — OFFICE VISIT - HEALTHEAST (OUTPATIENT)
Dept: PHYSICAL THERAPY | Facility: REHABILITATION | Age: 63
End: 2018-08-24

## 2018-08-24 ENCOUNTER — RECORDS - HEALTHEAST (OUTPATIENT)
Dept: ADMINISTRATIVE | Facility: OTHER | Age: 63
End: 2018-08-24

## 2018-08-24 DIAGNOSIS — M62.81 MUSCLE WEAKNESS (GENERALIZED): ICD-10-CM

## 2018-08-24 DIAGNOSIS — M54.9 CHRONIC BACK PAIN, UNSPECIFIED BACK LOCATION, UNSPECIFIED BACK PAIN LATERALITY: ICD-10-CM

## 2018-08-24 DIAGNOSIS — M54.9 CHRONIC BACK PAIN: ICD-10-CM

## 2018-08-24 DIAGNOSIS — G89.29 CHRONIC BACK PAIN, UNSPECIFIED BACK LOCATION, UNSPECIFIED BACK PAIN LATERALITY: ICD-10-CM

## 2018-08-24 DIAGNOSIS — M79.18 MYOFASCIAL PAIN: ICD-10-CM

## 2018-08-24 DIAGNOSIS — M53.86 DECREASED ROM OF LUMBAR SPINE: ICD-10-CM

## 2018-08-24 DIAGNOSIS — M54.16 LUMBAR RADICULOPATHY: ICD-10-CM

## 2018-08-24 DIAGNOSIS — R29.3 POOR POSTURE: ICD-10-CM

## 2018-08-24 DIAGNOSIS — G89.29 CHRONIC BACK PAIN: ICD-10-CM

## 2018-08-31 ENCOUNTER — COMMUNICATION - HEALTHEAST (OUTPATIENT)
Dept: NURSING | Facility: CLINIC | Age: 63
End: 2018-08-31

## 2018-09-04 ENCOUNTER — OFFICE VISIT - HEALTHEAST (OUTPATIENT)
Dept: INTERNAL MEDICINE | Facility: CLINIC | Age: 63
End: 2018-09-04

## 2018-09-04 ENCOUNTER — COMMUNICATION - HEALTHEAST (OUTPATIENT)
Dept: PHARMACY | Facility: CLINIC | Age: 63
End: 2018-09-04

## 2018-09-04 ENCOUNTER — COMMUNICATION - HEALTHEAST (OUTPATIENT)
Dept: INTERNAL MEDICINE | Facility: CLINIC | Age: 63
End: 2018-09-04

## 2018-09-04 DIAGNOSIS — I25.10 CORONARY ARTERY DISEASE DUE TO LIPID RICH PLAQUE: ICD-10-CM

## 2018-09-04 DIAGNOSIS — R23.8 OTHER SKIN CHANGES: ICD-10-CM

## 2018-09-04 DIAGNOSIS — Z79.4 TYPE 2 DIABETES MELLITUS WITH OTHER SPECIFIED COMPLICATION, WITH LONG-TERM CURRENT USE OF INSULIN (H): ICD-10-CM

## 2018-09-04 DIAGNOSIS — Z79.4 TYPE 2 DIABETES MELLITUS WITH HYPERGLYCEMIA, WITH LONG-TERM CURRENT USE OF INSULIN (H): ICD-10-CM

## 2018-09-04 DIAGNOSIS — I10 ESSENTIAL HYPERTENSION: ICD-10-CM

## 2018-09-04 DIAGNOSIS — I25.83 CORONARY ARTERY DISEASE DUE TO LIPID RICH PLAQUE: ICD-10-CM

## 2018-09-04 DIAGNOSIS — E11.65 TYPE 2 DIABETES MELLITUS WITH HYPERGLYCEMIA, WITH LONG-TERM CURRENT USE OF INSULIN (H): ICD-10-CM

## 2018-09-04 DIAGNOSIS — E66.01 MORBID OBESITY (H): ICD-10-CM

## 2018-09-04 DIAGNOSIS — E11.69 TYPE 2 DIABETES MELLITUS WITH OTHER SPECIFIED COMPLICATION, WITH LONG-TERM CURRENT USE OF INSULIN (H): ICD-10-CM

## 2018-09-04 DIAGNOSIS — Z01.818 PREOP GENERAL PHYSICAL EXAM: ICD-10-CM

## 2018-09-04 LAB
ANION GAP SERPL CALCULATED.3IONS-SCNC: 8 MMOL/L (ref 5–18)
BUN SERPL-MCNC: 16 MG/DL (ref 8–22)
CALCIUM SERPL-MCNC: 9.6 MG/DL (ref 8.5–10.5)
CHLORIDE BLD-SCNC: 104 MMOL/L (ref 98–107)
CO2 SERPL-SCNC: 26 MMOL/L (ref 22–31)
CREAT SERPL-MCNC: 1 MG/DL (ref 0.6–1.1)
ERYTHROCYTE [DISTWIDTH] IN BLOOD BY AUTOMATED COUNT: 13.5 % (ref 11–14.5)
GFR SERPL CREATININE-BSD FRML MDRD: 56 ML/MIN/1.73M2
GLUCOSE BLD-MCNC: 240 MG/DL (ref 70–125)
HBA1C MFR BLD: 10.3 % (ref 3.5–6)
HCT VFR BLD AUTO: 35.3 % (ref 35–47)
HGB BLD-MCNC: 11.9 G/DL (ref 12–16)
MCH RBC QN AUTO: 29.3 PG (ref 27–34)
MCHC RBC AUTO-ENTMCNC: 33.6 G/DL (ref 32–36)
MCV RBC AUTO: 87 FL (ref 80–100)
PLATELET # BLD AUTO: 236 THOU/UL (ref 140–440)
PMV BLD AUTO: 7.7 FL (ref 7–10)
POTASSIUM BLD-SCNC: 4.7 MMOL/L (ref 3.5–5)
RBC # BLD AUTO: 4.06 MILL/UL (ref 3.8–5.4)
SODIUM SERPL-SCNC: 138 MMOL/L (ref 136–145)
WBC: 6.3 THOU/UL (ref 4–11)

## 2018-09-04 ASSESSMENT — MIFFLIN-ST. JEOR: SCORE: 1820.36

## 2018-09-05 ENCOUNTER — RECORDS - HEALTHEAST (OUTPATIENT)
Dept: ADMINISTRATIVE | Facility: OTHER | Age: 63
End: 2018-09-05

## 2018-09-06 ENCOUNTER — AMBULATORY - HEALTHEAST (OUTPATIENT)
Dept: OTHER | Facility: CLINIC | Age: 63
End: 2018-09-06

## 2018-09-12 ENCOUNTER — COMMUNICATION - HEALTHEAST (OUTPATIENT)
Dept: NURSING | Facility: CLINIC | Age: 63
End: 2018-09-12

## 2018-09-13 ENCOUNTER — OFFICE VISIT - HEALTHEAST (OUTPATIENT)
Dept: PHYSICAL THERAPY | Facility: REHABILITATION | Age: 63
End: 2018-09-13

## 2018-09-13 DIAGNOSIS — M79.18 MYOFASCIAL PAIN: ICD-10-CM

## 2018-09-13 DIAGNOSIS — M54.16 LUMBAR RADICULOPATHY: ICD-10-CM

## 2018-09-13 DIAGNOSIS — M53.86 DECREASED ROM OF LUMBAR SPINE: ICD-10-CM

## 2018-09-13 DIAGNOSIS — M54.9 CHRONIC BACK PAIN, UNSPECIFIED BACK LOCATION, UNSPECIFIED BACK PAIN LATERALITY: ICD-10-CM

## 2018-09-13 DIAGNOSIS — G89.29 CHRONIC BACK PAIN, UNSPECIFIED BACK LOCATION, UNSPECIFIED BACK PAIN LATERALITY: ICD-10-CM

## 2018-09-13 DIAGNOSIS — R29.3 POOR POSTURE: ICD-10-CM

## 2018-09-13 DIAGNOSIS — M62.81 MUSCLE WEAKNESS (GENERALIZED): ICD-10-CM

## 2018-09-14 ENCOUNTER — COMMUNICATION - HEALTHEAST (OUTPATIENT)
Dept: SURGERY | Facility: CLINIC | Age: 63
End: 2018-09-14

## 2018-09-14 ENCOUNTER — COMMUNICATION - HEALTHEAST (OUTPATIENT)
Dept: INTERNAL MEDICINE | Facility: CLINIC | Age: 63
End: 2018-09-14

## 2018-09-17 ENCOUNTER — COMMUNICATION - HEALTHEAST (OUTPATIENT)
Dept: INTERNAL MEDICINE | Facility: CLINIC | Age: 63
End: 2018-09-17

## 2018-09-17 DIAGNOSIS — E78.5 HYPERLIPEMIA: ICD-10-CM

## 2018-09-18 ENCOUNTER — OFFICE VISIT - HEALTHEAST (OUTPATIENT)
Dept: PHYSICAL THERAPY | Facility: REHABILITATION | Age: 63
End: 2018-09-18

## 2018-09-18 DIAGNOSIS — M79.18 MYOFASCIAL PAIN: ICD-10-CM

## 2018-09-18 DIAGNOSIS — M53.86 DECREASED ROM OF LUMBAR SPINE: ICD-10-CM

## 2018-09-18 DIAGNOSIS — M25.561 RIGHT KNEE PAIN, UNSPECIFIED CHRONICITY: ICD-10-CM

## 2018-09-18 DIAGNOSIS — G89.29 CHRONIC BACK PAIN, UNSPECIFIED BACK LOCATION, UNSPECIFIED BACK PAIN LATERALITY: ICD-10-CM

## 2018-09-18 DIAGNOSIS — R29.3 POOR POSTURE: ICD-10-CM

## 2018-09-18 DIAGNOSIS — M54.16 LUMBAR RADICULOPATHY: ICD-10-CM

## 2018-09-18 DIAGNOSIS — M62.81 MUSCLE WEAKNESS (GENERALIZED): ICD-10-CM

## 2018-09-18 DIAGNOSIS — M54.9 CHRONIC BACK PAIN, UNSPECIFIED BACK LOCATION, UNSPECIFIED BACK PAIN LATERALITY: ICD-10-CM

## 2018-09-19 ENCOUNTER — RECORDS - HEALTHEAST (OUTPATIENT)
Dept: ADMINISTRATIVE | Facility: OTHER | Age: 63
End: 2018-09-19

## 2018-09-20 ENCOUNTER — COMMUNICATION - HEALTHEAST (OUTPATIENT)
Dept: NURSING | Facility: CLINIC | Age: 63
End: 2018-09-20

## 2018-09-21 ENCOUNTER — COMMUNICATION - HEALTHEAST (OUTPATIENT)
Dept: PHARMACY | Facility: CLINIC | Age: 63
End: 2018-09-21

## 2018-09-21 ENCOUNTER — COMMUNICATION - HEALTHEAST (OUTPATIENT)
Dept: NURSING | Facility: CLINIC | Age: 63
End: 2018-09-21

## 2018-09-21 ENCOUNTER — OFFICE VISIT - HEALTHEAST (OUTPATIENT)
Dept: PHARMACY | Facility: CLINIC | Age: 63
End: 2018-09-21

## 2018-09-21 DIAGNOSIS — Z79.4 TYPE 2 DIABETES MELLITUS WITH OTHER SPECIFIED COMPLICATION, WITH LONG-TERM CURRENT USE OF INSULIN (H): ICD-10-CM

## 2018-09-21 DIAGNOSIS — I25.83 CORONARY ARTERY DISEASE DUE TO LIPID RICH PLAQUE: ICD-10-CM

## 2018-09-21 DIAGNOSIS — M54.5 LOW BACK PAIN, UNSPECIFIED BACK PAIN LATERALITY, UNSPECIFIED CHRONICITY, WITH SCIATICA PRESENCE UNSPECIFIED: ICD-10-CM

## 2018-09-21 DIAGNOSIS — I25.10 CORONARY ARTERY DISEASE DUE TO LIPID RICH PLAQUE: ICD-10-CM

## 2018-09-21 DIAGNOSIS — E11.69 TYPE 2 DIABETES MELLITUS WITH OTHER SPECIFIED COMPLICATION, WITH LONG-TERM CURRENT USE OF INSULIN (H): ICD-10-CM

## 2018-09-27 ENCOUNTER — COMMUNICATION - HEALTHEAST (OUTPATIENT)
Dept: SCHEDULING | Facility: CLINIC | Age: 63
End: 2018-09-27

## 2018-09-28 ENCOUNTER — OFFICE VISIT - HEALTHEAST (OUTPATIENT)
Dept: PHYSICAL THERAPY | Facility: REHABILITATION | Age: 63
End: 2018-09-28

## 2018-09-28 ENCOUNTER — OFFICE VISIT - HEALTHEAST (OUTPATIENT)
Dept: PHARMACY | Facility: CLINIC | Age: 63
End: 2018-09-28

## 2018-09-28 DIAGNOSIS — R29.3 POOR POSTURE: ICD-10-CM

## 2018-09-28 DIAGNOSIS — M62.81 MUSCLE WEAKNESS (GENERALIZED): ICD-10-CM

## 2018-09-28 DIAGNOSIS — Z79.4 TYPE 2 DIABETES MELLITUS WITH OTHER SPECIFIED COMPLICATION, WITH LONG-TERM CURRENT USE OF INSULIN (H): ICD-10-CM

## 2018-09-28 DIAGNOSIS — M53.86 DECREASED ROM OF LUMBAR SPINE: ICD-10-CM

## 2018-09-28 DIAGNOSIS — E11.69 TYPE 2 DIABETES MELLITUS WITH OTHER SPECIFIED COMPLICATION, WITH LONG-TERM CURRENT USE OF INSULIN (H): ICD-10-CM

## 2018-09-28 DIAGNOSIS — M79.18 MYOFASCIAL PAIN: ICD-10-CM

## 2018-09-28 DIAGNOSIS — G89.29 CHRONIC BACK PAIN, UNSPECIFIED BACK LOCATION, UNSPECIFIED BACK PAIN LATERALITY: ICD-10-CM

## 2018-09-28 DIAGNOSIS — M54.9 CHRONIC BACK PAIN, UNSPECIFIED BACK LOCATION, UNSPECIFIED BACK PAIN LATERALITY: ICD-10-CM

## 2018-09-28 DIAGNOSIS — M54.16 LUMBAR RADICULOPATHY: ICD-10-CM

## 2018-10-01 ENCOUNTER — COMMUNICATION - HEALTHEAST (OUTPATIENT)
Dept: PHARMACY | Facility: CLINIC | Age: 63
End: 2018-10-01

## 2018-10-02 ENCOUNTER — OFFICE VISIT - HEALTHEAST (OUTPATIENT)
Dept: PHYSICAL THERAPY | Facility: REHABILITATION | Age: 63
End: 2018-10-02

## 2018-10-02 DIAGNOSIS — M62.81 MUSCLE WEAKNESS (GENERALIZED): ICD-10-CM

## 2018-10-02 DIAGNOSIS — G89.29 CHRONIC BACK PAIN, UNSPECIFIED BACK LOCATION, UNSPECIFIED BACK PAIN LATERALITY: ICD-10-CM

## 2018-10-02 DIAGNOSIS — M53.86 DECREASED ROM OF LUMBAR SPINE: ICD-10-CM

## 2018-10-02 DIAGNOSIS — M54.16 LUMBAR RADICULOPATHY: ICD-10-CM

## 2018-10-02 DIAGNOSIS — M54.9 CHRONIC BACK PAIN, UNSPECIFIED BACK LOCATION, UNSPECIFIED BACK PAIN LATERALITY: ICD-10-CM

## 2018-10-02 DIAGNOSIS — M79.18 MYOFASCIAL PAIN: ICD-10-CM

## 2018-10-02 DIAGNOSIS — R29.3 POOR POSTURE: ICD-10-CM

## 2018-10-04 ENCOUNTER — OFFICE VISIT - HEALTHEAST (OUTPATIENT)
Dept: SLEEP MEDICINE | Facility: CLINIC | Age: 63
End: 2018-10-04

## 2018-10-04 ENCOUNTER — OFFICE VISIT - HEALTHEAST (OUTPATIENT)
Dept: PHYSICAL THERAPY | Facility: REHABILITATION | Age: 63
End: 2018-10-04

## 2018-10-04 DIAGNOSIS — R29.3 POOR POSTURE: ICD-10-CM

## 2018-10-04 DIAGNOSIS — M54.9 CHRONIC BACK PAIN, UNSPECIFIED BACK LOCATION, UNSPECIFIED BACK PAIN LATERALITY: ICD-10-CM

## 2018-10-04 DIAGNOSIS — M62.81 MUSCLE WEAKNESS (GENERALIZED): ICD-10-CM

## 2018-10-04 DIAGNOSIS — M53.86 DECREASED ROM OF LUMBAR SPINE: ICD-10-CM

## 2018-10-04 DIAGNOSIS — M25.561 RIGHT KNEE PAIN, UNSPECIFIED CHRONICITY: ICD-10-CM

## 2018-10-04 DIAGNOSIS — M79.18 MYOFASCIAL PAIN: ICD-10-CM

## 2018-10-04 DIAGNOSIS — G47.33 OSA ON CPAP: ICD-10-CM

## 2018-10-04 DIAGNOSIS — G89.29 CHRONIC BACK PAIN, UNSPECIFIED BACK LOCATION, UNSPECIFIED BACK PAIN LATERALITY: ICD-10-CM

## 2018-10-04 DIAGNOSIS — M54.16 LUMBAR RADICULOPATHY: ICD-10-CM

## 2018-10-04 ASSESSMENT — MIFFLIN-ST. JEOR: SCORE: 1815.74

## 2018-10-05 ENCOUNTER — COMMUNICATION - HEALTHEAST (OUTPATIENT)
Dept: INTERNAL MEDICINE | Facility: CLINIC | Age: 63
End: 2018-10-05

## 2018-10-05 DIAGNOSIS — E66.01 MORBID OBESITY (H): ICD-10-CM

## 2018-10-09 ENCOUNTER — COMMUNICATION - HEALTHEAST (OUTPATIENT)
Dept: PHARMACY | Facility: CLINIC | Age: 63
End: 2018-10-09

## 2018-10-09 ENCOUNTER — OFFICE VISIT - HEALTHEAST (OUTPATIENT)
Dept: PHYSICAL THERAPY | Facility: REHABILITATION | Age: 63
End: 2018-10-09

## 2018-10-09 DIAGNOSIS — G89.29 CHRONIC BACK PAIN, UNSPECIFIED BACK LOCATION, UNSPECIFIED BACK PAIN LATERALITY: ICD-10-CM

## 2018-10-09 DIAGNOSIS — Z79.4 TYPE 2 DIABETES MELLITUS WITH HYPERGLYCEMIA, WITH LONG-TERM CURRENT USE OF INSULIN (H): ICD-10-CM

## 2018-10-09 DIAGNOSIS — M54.9 CHRONIC BACK PAIN, UNSPECIFIED BACK LOCATION, UNSPECIFIED BACK PAIN LATERALITY: ICD-10-CM

## 2018-10-09 DIAGNOSIS — M79.18 MYOFASCIAL PAIN: ICD-10-CM

## 2018-10-09 DIAGNOSIS — M62.81 MUSCLE WEAKNESS (GENERALIZED): ICD-10-CM

## 2018-10-09 DIAGNOSIS — E11.65 TYPE 2 DIABETES MELLITUS WITH HYPERGLYCEMIA, WITH LONG-TERM CURRENT USE OF INSULIN (H): ICD-10-CM

## 2018-10-09 DIAGNOSIS — M54.16 LUMBAR RADICULOPATHY: ICD-10-CM

## 2018-10-09 DIAGNOSIS — R29.3 POOR POSTURE: ICD-10-CM

## 2018-10-09 DIAGNOSIS — M53.86 DECREASED ROM OF LUMBAR SPINE: ICD-10-CM

## 2018-10-10 ENCOUNTER — COMMUNICATION - HEALTHEAST (OUTPATIENT)
Dept: INTERNAL MEDICINE | Facility: CLINIC | Age: 63
End: 2018-10-10

## 2018-10-10 DIAGNOSIS — E11.65 TYPE 2 DIABETES MELLITUS WITH HYPERGLYCEMIA, WITH LONG-TERM CURRENT USE OF INSULIN (H): ICD-10-CM

## 2018-10-10 DIAGNOSIS — Z79.4 TYPE 2 DIABETES MELLITUS WITH HYPERGLYCEMIA, WITH LONG-TERM CURRENT USE OF INSULIN (H): ICD-10-CM

## 2018-10-11 ENCOUNTER — AMBULATORY - HEALTHEAST (OUTPATIENT)
Dept: SURGERY | Facility: CLINIC | Age: 63
End: 2018-10-11

## 2018-10-11 ENCOUNTER — AMBULATORY - HEALTHEAST (OUTPATIENT)
Dept: LAB | Facility: CLINIC | Age: 63
End: 2018-10-11

## 2018-10-11 ENCOUNTER — OFFICE VISIT - HEALTHEAST (OUTPATIENT)
Dept: SURGERY | Facility: CLINIC | Age: 63
End: 2018-10-11

## 2018-10-11 DIAGNOSIS — G47.33 OSA (OBSTRUCTIVE SLEEP APNEA): ICD-10-CM

## 2018-10-11 DIAGNOSIS — K76.0 FATTY LIVER: ICD-10-CM

## 2018-10-11 DIAGNOSIS — E11.9 DIABETES MELLITUS, TYPE 2 (H): ICD-10-CM

## 2018-10-11 DIAGNOSIS — E66.01 MORBID OBESITY WITH BMI OF 40.0-44.9, ADULT (H): ICD-10-CM

## 2018-10-11 DIAGNOSIS — E78.5 DYSLIPIDEMIA: ICD-10-CM

## 2018-10-11 DIAGNOSIS — Z86.14 HISTORY OF MRSA INFECTION: ICD-10-CM

## 2018-10-11 DIAGNOSIS — R60.0 LOWER LEG EDEMA: ICD-10-CM

## 2018-10-11 LAB
ALBUMIN SERPL-MCNC: 4 G/DL (ref 3.5–5)
ALP SERPL-CCNC: 107 U/L (ref 45–120)
ALT SERPL W P-5'-P-CCNC: 33 U/L (ref 0–45)
AST SERPL W P-5'-P-CCNC: 24 U/L (ref 0–40)
BILIRUB DIRECT SERPL-MCNC: 0.1 MG/DL
BILIRUB SERPL-MCNC: 0.4 MG/DL (ref 0–1)
FERRITIN SERPL-MCNC: 657 NG/ML (ref 10–130)
FOLATE SERPL-MCNC: 9.2 NG/ML
PROT SERPL-MCNC: 7.2 G/DL (ref 6–8)
PTH-INTACT SERPL-MCNC: 95 PG/ML (ref 10–86)
VIT B12 SERPL-MCNC: 358 PG/ML (ref 213–816)

## 2018-10-11 ASSESSMENT — MIFFLIN-ST. JEOR: SCORE: 1810.07

## 2018-10-12 ENCOUNTER — COMMUNICATION - HEALTHEAST (OUTPATIENT)
Dept: PHARMACY | Facility: CLINIC | Age: 63
End: 2018-10-12

## 2018-10-12 ENCOUNTER — OFFICE VISIT - HEALTHEAST (OUTPATIENT)
Dept: PHARMACY | Facility: CLINIC | Age: 63
End: 2018-10-12

## 2018-10-12 DIAGNOSIS — G89.29 CHRONIC LOW BACK PAIN, UNSPECIFIED BACK PAIN LATERALITY, WITH SCIATICA PRESENCE UNSPECIFIED: ICD-10-CM

## 2018-10-12 DIAGNOSIS — Z79.4 TYPE 2 DIABETES MELLITUS WITH HYPERGLYCEMIA, WITH LONG-TERM CURRENT USE OF INSULIN (H): ICD-10-CM

## 2018-10-12 DIAGNOSIS — M54.5 LOW BACK PAIN, UNSPECIFIED BACK PAIN LATERALITY, UNSPECIFIED CHRONICITY, WITH SCIATICA PRESENCE UNSPECIFIED: ICD-10-CM

## 2018-10-12 DIAGNOSIS — E11.65 TYPE 2 DIABETES MELLITUS WITH HYPERGLYCEMIA, WITH LONG-TERM CURRENT USE OF INSULIN (H): ICD-10-CM

## 2018-10-12 DIAGNOSIS — E78.5 DYSLIPIDEMIA: ICD-10-CM

## 2018-10-12 DIAGNOSIS — M54.5 CHRONIC LOW BACK PAIN, UNSPECIFIED BACK PAIN LATERALITY, WITH SCIATICA PRESENCE UNSPECIFIED: ICD-10-CM

## 2018-10-12 DIAGNOSIS — I10 ESSENTIAL HYPERTENSION: ICD-10-CM

## 2018-10-12 LAB
25(OH)D3 SERPL-MCNC: 24 NG/ML (ref 30–80)
BACTERIA SPEC CULT: ABNORMAL

## 2018-10-12 RX ORDER — ACETAMINOPHEN 500 MG
1000 TABLET ORAL 3 TIMES DAILY PRN
Status: SHIPPED | COMMUNITY
Start: 2018-10-12

## 2018-10-14 LAB
ANNOTATION COMMENT IMP: NORMAL
VIT A SERPL-MCNC: 0.73 MG/L (ref 0.3–1.2)
VITAMIN A (RETINYL PALMITATE): 0.05 MG/L (ref 0–0.1)

## 2018-10-15 LAB
C PEPTIDE SERPL-MCNC: 7.4 NG/ML (ref 0.9–6.9)
ZINC SERPL-MCNC: 69 UG/DL (ref 60–120)

## 2018-10-16 ENCOUNTER — OFFICE VISIT - HEALTHEAST (OUTPATIENT)
Dept: PHYSICAL THERAPY | Facility: REHABILITATION | Age: 63
End: 2018-10-16

## 2018-10-16 DIAGNOSIS — M53.86 DECREASED ROM OF LUMBAR SPINE: ICD-10-CM

## 2018-10-16 DIAGNOSIS — M54.9 CHRONIC BACK PAIN, UNSPECIFIED BACK LOCATION, UNSPECIFIED BACK PAIN LATERALITY: ICD-10-CM

## 2018-10-16 DIAGNOSIS — M79.18 MYOFASCIAL PAIN: ICD-10-CM

## 2018-10-16 DIAGNOSIS — G89.29 CHRONIC BACK PAIN, UNSPECIFIED BACK LOCATION, UNSPECIFIED BACK PAIN LATERALITY: ICD-10-CM

## 2018-10-16 DIAGNOSIS — R29.3 POOR POSTURE: ICD-10-CM

## 2018-10-16 DIAGNOSIS — M62.81 MUSCLE WEAKNESS (GENERALIZED): ICD-10-CM

## 2018-10-16 DIAGNOSIS — M54.16 LUMBAR RADICULOPATHY: ICD-10-CM

## 2018-10-16 LAB — VIT B1 PYROPHOSHATE BLD-SCNC: 59 NMOL/L (ref 70–180)

## 2018-10-18 ENCOUNTER — COMMUNICATION - HEALTHEAST (OUTPATIENT)
Dept: INTERNAL MEDICINE | Facility: CLINIC | Age: 63
End: 2018-10-18

## 2018-10-18 DIAGNOSIS — Z79.4 TYPE 2 DIABETES MELLITUS WITH OTHER SPECIFIED COMPLICATION, WITH LONG-TERM CURRENT USE OF INSULIN (H): ICD-10-CM

## 2018-10-18 DIAGNOSIS — E11.69 TYPE 2 DIABETES MELLITUS WITH OTHER SPECIFIED COMPLICATION, WITH LONG-TERM CURRENT USE OF INSULIN (H): ICD-10-CM

## 2018-10-23 ENCOUNTER — OFFICE VISIT - HEALTHEAST (OUTPATIENT)
Dept: PHYSICAL THERAPY | Facility: REHABILITATION | Age: 63
End: 2018-10-23

## 2018-10-23 ENCOUNTER — COMMUNICATION - HEALTHEAST (OUTPATIENT)
Dept: NURSING | Facility: CLINIC | Age: 63
End: 2018-10-23

## 2018-10-23 DIAGNOSIS — M62.81 MUSCLE WEAKNESS (GENERALIZED): ICD-10-CM

## 2018-10-23 DIAGNOSIS — M53.86 DECREASED ROM OF LUMBAR SPINE: ICD-10-CM

## 2018-10-23 DIAGNOSIS — R29.3 POOR POSTURE: ICD-10-CM

## 2018-10-23 DIAGNOSIS — M54.9 CHRONIC BACK PAIN, UNSPECIFIED BACK LOCATION, UNSPECIFIED BACK PAIN LATERALITY: ICD-10-CM

## 2018-10-23 DIAGNOSIS — M79.18 MYOFASCIAL PAIN: ICD-10-CM

## 2018-10-23 DIAGNOSIS — M54.16 LUMBAR RADICULOPATHY: ICD-10-CM

## 2018-10-23 DIAGNOSIS — G89.29 CHRONIC BACK PAIN, UNSPECIFIED BACK LOCATION, UNSPECIFIED BACK PAIN LATERALITY: ICD-10-CM

## 2018-10-24 ENCOUNTER — AMBULATORY - HEALTHEAST (OUTPATIENT)
Dept: SURGERY | Facility: CLINIC | Age: 63
End: 2018-10-24

## 2018-10-24 ENCOUNTER — COMMUNICATION - HEALTHEAST (OUTPATIENT)
Dept: NURSING | Facility: CLINIC | Age: 63
End: 2018-10-24

## 2018-10-25 ENCOUNTER — OFFICE VISIT - HEALTHEAST (OUTPATIENT)
Dept: PHYSICAL THERAPY | Facility: REHABILITATION | Age: 63
End: 2018-10-25

## 2018-10-25 DIAGNOSIS — R29.3 POOR POSTURE: ICD-10-CM

## 2018-10-25 DIAGNOSIS — M54.16 LUMBAR RADICULOPATHY: ICD-10-CM

## 2018-10-25 DIAGNOSIS — M54.9 CHRONIC BACK PAIN, UNSPECIFIED BACK LOCATION, UNSPECIFIED BACK PAIN LATERALITY: ICD-10-CM

## 2018-10-25 DIAGNOSIS — M79.18 MYOFASCIAL PAIN: ICD-10-CM

## 2018-10-25 DIAGNOSIS — G89.29 CHRONIC BACK PAIN, UNSPECIFIED BACK LOCATION, UNSPECIFIED BACK PAIN LATERALITY: ICD-10-CM

## 2018-10-25 DIAGNOSIS — M53.86 DECREASED ROM OF LUMBAR SPINE: ICD-10-CM

## 2018-10-25 DIAGNOSIS — M62.81 MUSCLE WEAKNESS (GENERALIZED): ICD-10-CM

## 2018-10-26 ENCOUNTER — COMMUNICATION - HEALTHEAST (OUTPATIENT)
Dept: INTERNAL MEDICINE | Facility: CLINIC | Age: 63
End: 2018-10-26

## 2018-10-29 ENCOUNTER — OFFICE VISIT - HEALTHEAST (OUTPATIENT)
Dept: SURGERY | Facility: CLINIC | Age: 63
End: 2018-10-29

## 2018-10-29 ENCOUNTER — COMMUNICATION - HEALTHEAST (OUTPATIENT)
Dept: INTERNAL MEDICINE | Facility: CLINIC | Age: 63
End: 2018-10-29

## 2018-10-29 ENCOUNTER — AMBULATORY - HEALTHEAST (OUTPATIENT)
Dept: NURSING | Facility: CLINIC | Age: 63
End: 2018-10-29

## 2018-10-29 DIAGNOSIS — Z79.4 TYPE 2 DIABETES MELLITUS WITH OTHER SPECIFIED COMPLICATION, WITH LONG-TERM CURRENT USE OF INSULIN (H): ICD-10-CM

## 2018-10-29 DIAGNOSIS — E11.69 TYPE 2 DIABETES MELLITUS WITH OTHER SPECIFIED COMPLICATION, WITH LONG-TERM CURRENT USE OF INSULIN (H): ICD-10-CM

## 2018-10-29 DIAGNOSIS — E66.01 MORBID OBESITY (H): ICD-10-CM

## 2018-10-30 ENCOUNTER — OFFICE VISIT - HEALTHEAST (OUTPATIENT)
Dept: PHYSICAL THERAPY | Facility: REHABILITATION | Age: 63
End: 2018-10-30

## 2018-10-30 ENCOUNTER — OFFICE VISIT - HEALTHEAST (OUTPATIENT)
Dept: CARDIOLOGY | Facility: CLINIC | Age: 63
End: 2018-10-30

## 2018-10-30 DIAGNOSIS — M54.16 LUMBAR RADICULOPATHY: ICD-10-CM

## 2018-10-30 DIAGNOSIS — G47.33 OSA ON CPAP: ICD-10-CM

## 2018-10-30 DIAGNOSIS — M79.18 MYOFASCIAL PAIN: ICD-10-CM

## 2018-10-30 DIAGNOSIS — I25.10 CORONARY ARTERY DISEASE DUE TO LIPID RICH PLAQUE: ICD-10-CM

## 2018-10-30 DIAGNOSIS — R29.3 POOR POSTURE: ICD-10-CM

## 2018-10-30 DIAGNOSIS — M53.86 DECREASED ROM OF LUMBAR SPINE: ICD-10-CM

## 2018-10-30 DIAGNOSIS — G89.29 CHRONIC BACK PAIN, UNSPECIFIED BACK LOCATION, UNSPECIFIED BACK PAIN LATERALITY: ICD-10-CM

## 2018-10-30 DIAGNOSIS — E78.5 DYSLIPIDEMIA, GOAL LDL BELOW 70: ICD-10-CM

## 2018-10-30 DIAGNOSIS — Z01.810 PREOPERATIVE CARDIOVASCULAR EXAMINATION: ICD-10-CM

## 2018-10-30 DIAGNOSIS — I25.83 CORONARY ARTERY DISEASE DUE TO LIPID RICH PLAQUE: ICD-10-CM

## 2018-10-30 DIAGNOSIS — M54.9 CHRONIC BACK PAIN, UNSPECIFIED BACK LOCATION, UNSPECIFIED BACK PAIN LATERALITY: ICD-10-CM

## 2018-10-30 DIAGNOSIS — M62.81 MUSCLE WEAKNESS (GENERALIZED): ICD-10-CM

## 2018-10-30 ASSESSMENT — MIFFLIN-ST. JEOR: SCORE: 1819.14

## 2018-11-01 ENCOUNTER — COMMUNICATION - HEALTHEAST (OUTPATIENT)
Dept: NURSING | Facility: CLINIC | Age: 63
End: 2018-11-01

## 2018-11-04 ENCOUNTER — COMMUNICATION - HEALTHEAST (OUTPATIENT)
Dept: SURGERY | Facility: CLINIC | Age: 63
End: 2018-11-04

## 2018-11-04 ENCOUNTER — AMBULATORY - HEALTHEAST (OUTPATIENT)
Dept: SURGERY | Facility: CLINIC | Age: 63
End: 2018-11-04

## 2018-11-04 DIAGNOSIS — E51.9 THIAMINE DEFICIENCY: ICD-10-CM

## 2018-11-05 ENCOUNTER — OFFICE VISIT - HEALTHEAST (OUTPATIENT)
Dept: SURGERY | Facility: CLINIC | Age: 63
End: 2018-11-05

## 2018-11-05 ENCOUNTER — COMMUNICATION - HEALTHEAST (OUTPATIENT)
Dept: SURGERY | Facility: CLINIC | Age: 63
End: 2018-11-05

## 2018-11-05 DIAGNOSIS — E66.01 MORBID OBESITY (H): ICD-10-CM

## 2018-11-06 ENCOUNTER — OFFICE VISIT - HEALTHEAST (OUTPATIENT)
Dept: PHYSICAL THERAPY | Facility: REHABILITATION | Age: 63
End: 2018-11-06

## 2018-11-06 DIAGNOSIS — R29.3 POOR POSTURE: ICD-10-CM

## 2018-11-06 DIAGNOSIS — M54.9 CHRONIC BACK PAIN, UNSPECIFIED BACK LOCATION, UNSPECIFIED BACK PAIN LATERALITY: ICD-10-CM

## 2018-11-06 DIAGNOSIS — G89.29 CHRONIC BACK PAIN, UNSPECIFIED BACK LOCATION, UNSPECIFIED BACK PAIN LATERALITY: ICD-10-CM

## 2018-11-06 DIAGNOSIS — M54.16 LUMBAR RADICULOPATHY: ICD-10-CM

## 2018-11-06 DIAGNOSIS — M62.81 MUSCLE WEAKNESS (GENERALIZED): ICD-10-CM

## 2018-11-06 DIAGNOSIS — M53.86 DECREASED ROM OF LUMBAR SPINE: ICD-10-CM

## 2018-11-06 DIAGNOSIS — M79.18 MYOFASCIAL PAIN: ICD-10-CM

## 2018-11-07 ENCOUNTER — COMMUNICATION - HEALTHEAST (OUTPATIENT)
Dept: INTERNAL MEDICINE | Facility: CLINIC | Age: 63
End: 2018-11-07

## 2018-11-07 DIAGNOSIS — M54.5 CHRONIC LOW BACK PAIN, UNSPECIFIED BACK PAIN LATERALITY, WITH SCIATICA PRESENCE UNSPECIFIED: ICD-10-CM

## 2018-11-07 DIAGNOSIS — G89.29 CHRONIC LOW BACK PAIN, UNSPECIFIED BACK PAIN LATERALITY, WITH SCIATICA PRESENCE UNSPECIFIED: ICD-10-CM

## 2018-11-07 DIAGNOSIS — I10 ESSENTIAL HYPERTENSION WITH GOAL BLOOD PRESSURE LESS THAN 140/90: ICD-10-CM

## 2018-11-09 ENCOUNTER — OFFICE VISIT - HEALTHEAST (OUTPATIENT)
Dept: INTERNAL MEDICINE | Facility: CLINIC | Age: 63
End: 2018-11-09

## 2018-11-09 DIAGNOSIS — E11.9 DIABETES MELLITUS, TYPE 2 (H): ICD-10-CM

## 2018-11-09 DIAGNOSIS — M54.5 CHRONIC LOW BACK PAIN, UNSPECIFIED BACK PAIN LATERALITY, WITH SCIATICA PRESENCE UNSPECIFIED: ICD-10-CM

## 2018-11-09 DIAGNOSIS — Z00.00 PREVENTATIVE HEALTH CARE: ICD-10-CM

## 2018-11-09 DIAGNOSIS — I10 ESSENTIAL HYPERTENSION WITH GOAL BLOOD PRESSURE LESS THAN 140/90: ICD-10-CM

## 2018-11-09 DIAGNOSIS — G89.29 CHRONIC LOW BACK PAIN, UNSPECIFIED BACK PAIN LATERALITY, WITH SCIATICA PRESENCE UNSPECIFIED: ICD-10-CM

## 2018-11-09 LAB
AMPHETAMINES UR QL SCN: ABNORMAL
BARBITURATES UR QL: ABNORMAL
BENZODIAZ UR QL: ABNORMAL
CANNABINOIDS UR QL SCN: ABNORMAL
COCAINE UR QL: ABNORMAL
CREAT UR-MCNC: 245.1 MG/DL
HBA1C MFR BLD: 8.7 % (ref 3.5–6)
OPIATES UR QL SCN: ABNORMAL
OXYCODONE UR QL: ABNORMAL
PCP UR QL SCN: ABNORMAL

## 2018-11-12 ENCOUNTER — COMMUNICATION - HEALTHEAST (OUTPATIENT)
Dept: NURSING | Facility: CLINIC | Age: 63
End: 2018-11-12

## 2018-11-13 ENCOUNTER — OFFICE VISIT - HEALTHEAST (OUTPATIENT)
Dept: SURGERY | Facility: CLINIC | Age: 63
End: 2018-11-13

## 2018-11-13 ENCOUNTER — COMMUNICATION - HEALTHEAST (OUTPATIENT)
Dept: PHARMACY | Facility: CLINIC | Age: 63
End: 2018-11-13

## 2018-11-13 ENCOUNTER — AMBULATORY - HEALTHEAST (OUTPATIENT)
Dept: CARE COORDINATION | Facility: CLINIC | Age: 63
End: 2018-11-13

## 2018-11-13 DIAGNOSIS — Z79.4 TYPE 2 DIABETES MELLITUS WITH OTHER SPECIFIED COMPLICATION, WITH LONG-TERM CURRENT USE OF INSULIN (H): ICD-10-CM

## 2018-11-13 DIAGNOSIS — E11.69 TYPE 2 DIABETES MELLITUS WITH OTHER SPECIFIED COMPLICATION, WITH LONG-TERM CURRENT USE OF INSULIN (H): ICD-10-CM

## 2018-11-13 DIAGNOSIS — E78.5 DYSLIPIDEMIA: ICD-10-CM

## 2018-11-13 DIAGNOSIS — Z71.3 DIETARY COUNSELING: ICD-10-CM

## 2018-11-13 DIAGNOSIS — G47.33 OSA (OBSTRUCTIVE SLEEP APNEA): ICD-10-CM

## 2018-11-13 DIAGNOSIS — E66.01 OBESITY, MORBID, BMI 40.0-49.9 (H): ICD-10-CM

## 2018-11-13 DIAGNOSIS — E11.8 TYPE 2 DIABETES MELLITUS WITH COMPLICATION, UNSPECIFIED WHETHER LONG TERM INSULIN USE: ICD-10-CM

## 2018-11-13 ASSESSMENT — MIFFLIN-ST. JEOR: SCORE: 1823.68

## 2018-11-14 ENCOUNTER — COMMUNICATION - HEALTHEAST (OUTPATIENT)
Dept: INTERNAL MEDICINE | Facility: CLINIC | Age: 63
End: 2018-11-14

## 2018-11-20 ENCOUNTER — AMBULATORY - HEALTHEAST (OUTPATIENT)
Dept: LAB | Facility: CLINIC | Age: 63
End: 2018-11-20

## 2018-11-20 ENCOUNTER — OFFICE VISIT - HEALTHEAST (OUTPATIENT)
Dept: PHYSICAL THERAPY | Facility: REHABILITATION | Age: 63
End: 2018-11-20

## 2018-11-20 ENCOUNTER — COMMUNICATION - HEALTHEAST (OUTPATIENT)
Dept: INTERNAL MEDICINE | Facility: CLINIC | Age: 63
End: 2018-11-20

## 2018-11-20 DIAGNOSIS — M54.9 CHRONIC BACK PAIN, UNSPECIFIED BACK LOCATION, UNSPECIFIED BACK PAIN LATERALITY: ICD-10-CM

## 2018-11-20 DIAGNOSIS — M79.18 MYOFASCIAL PAIN: ICD-10-CM

## 2018-11-20 DIAGNOSIS — R29.3 POOR POSTURE: ICD-10-CM

## 2018-11-20 DIAGNOSIS — G89.29 CHRONIC BACK PAIN, UNSPECIFIED BACK LOCATION, UNSPECIFIED BACK PAIN LATERALITY: ICD-10-CM

## 2018-11-20 DIAGNOSIS — M62.81 MUSCLE WEAKNESS (GENERALIZED): ICD-10-CM

## 2018-11-20 DIAGNOSIS — M54.16 LUMBAR RADICULOPATHY: ICD-10-CM

## 2018-11-20 DIAGNOSIS — E51.9 THIAMINE DEFICIENCY: ICD-10-CM

## 2018-11-20 DIAGNOSIS — M53.86 DECREASED ROM OF LUMBAR SPINE: ICD-10-CM

## 2018-11-21 ENCOUNTER — AMBULATORY - HEALTHEAST (OUTPATIENT)
Dept: SURGERY | Facility: CLINIC | Age: 63
End: 2018-11-21

## 2018-11-23 LAB — VIT B1 PYROPHOSHATE BLD-SCNC: 113 NMOL/L (ref 70–180)

## 2018-11-26 ENCOUNTER — COMMUNICATION - HEALTHEAST (OUTPATIENT)
Dept: PHARMACY | Facility: CLINIC | Age: 63
End: 2018-11-26

## 2018-11-28 ENCOUNTER — OFFICE VISIT - HEALTHEAST (OUTPATIENT)
Dept: PHYSICAL THERAPY | Facility: REHABILITATION | Age: 63
End: 2018-11-28

## 2018-11-28 DIAGNOSIS — M53.86 DECREASED ROM OF LUMBAR SPINE: ICD-10-CM

## 2018-11-28 DIAGNOSIS — M62.81 MUSCLE WEAKNESS (GENERALIZED): ICD-10-CM

## 2018-11-28 DIAGNOSIS — M79.18 MYOFASCIAL PAIN: ICD-10-CM

## 2018-11-28 DIAGNOSIS — M54.16 LUMBAR RADICULOPATHY: ICD-10-CM

## 2018-11-28 DIAGNOSIS — M54.9 CHRONIC BACK PAIN, UNSPECIFIED BACK LOCATION, UNSPECIFIED BACK PAIN LATERALITY: ICD-10-CM

## 2018-11-28 DIAGNOSIS — G89.29 CHRONIC BACK PAIN, UNSPECIFIED BACK LOCATION, UNSPECIFIED BACK PAIN LATERALITY: ICD-10-CM

## 2018-11-29 ENCOUNTER — COMMUNICATION - HEALTHEAST (OUTPATIENT)
Dept: INTERNAL MEDICINE | Facility: CLINIC | Age: 63
End: 2018-11-29

## 2018-11-29 DIAGNOSIS — E11.69 TYPE 2 DIABETES MELLITUS WITH OTHER SPECIFIED COMPLICATION, WITH LONG-TERM CURRENT USE OF INSULIN (H): ICD-10-CM

## 2018-11-29 DIAGNOSIS — Z79.4 TYPE 2 DIABETES MELLITUS WITH OTHER SPECIFIED COMPLICATION, WITH LONG-TERM CURRENT USE OF INSULIN (H): ICD-10-CM

## 2018-12-03 ENCOUNTER — OFFICE VISIT - HEALTHEAST (OUTPATIENT)
Dept: SURGERY | Facility: CLINIC | Age: 63
End: 2018-12-03

## 2018-12-03 DIAGNOSIS — Z71.3 DIETARY COUNSELING: ICD-10-CM

## 2018-12-03 DIAGNOSIS — E78.5 DYSLIPIDEMIA: ICD-10-CM

## 2018-12-03 DIAGNOSIS — E66.01 OBESITY, MORBID, BMI 40.0-49.9 (H): ICD-10-CM

## 2018-12-03 DIAGNOSIS — G47.33 OSA (OBSTRUCTIVE SLEEP APNEA): ICD-10-CM

## 2018-12-03 DIAGNOSIS — E11.8 TYPE 2 DIABETES MELLITUS WITH COMPLICATION, UNSPECIFIED WHETHER LONG TERM INSULIN USE: ICD-10-CM

## 2018-12-03 ASSESSMENT — MIFFLIN-ST. JEOR: SCORE: 1796.46

## 2018-12-06 ENCOUNTER — COMMUNICATION - HEALTHEAST (OUTPATIENT)
Dept: SURGERY | Facility: CLINIC | Age: 63
End: 2018-12-06

## 2018-12-10 ENCOUNTER — COMMUNICATION - HEALTHEAST (OUTPATIENT)
Dept: SURGERY | Facility: CLINIC | Age: 63
End: 2018-12-10

## 2018-12-11 ENCOUNTER — OFFICE VISIT - HEALTHEAST (OUTPATIENT)
Dept: PHYSICAL THERAPY | Facility: REHABILITATION | Age: 63
End: 2018-12-11

## 2018-12-11 ENCOUNTER — OFFICE VISIT - HEALTHEAST (OUTPATIENT)
Dept: INTERNAL MEDICINE | Facility: CLINIC | Age: 63
End: 2018-12-11

## 2018-12-11 DIAGNOSIS — M53.86 DECREASED ROM OF LUMBAR SPINE: ICD-10-CM

## 2018-12-11 DIAGNOSIS — M54.16 LUMBAR RADICULOPATHY: ICD-10-CM

## 2018-12-11 DIAGNOSIS — K57.32 DIVERTICULITIS OF COLON: ICD-10-CM

## 2018-12-11 DIAGNOSIS — M79.18 MYOFASCIAL PAIN: ICD-10-CM

## 2018-12-11 DIAGNOSIS — M62.81 MUSCLE WEAKNESS (GENERALIZED): ICD-10-CM

## 2018-12-11 DIAGNOSIS — Z12.11 SCREEN FOR COLON CANCER: ICD-10-CM

## 2018-12-11 DIAGNOSIS — G89.29 CHRONIC BACK PAIN, UNSPECIFIED BACK LOCATION, UNSPECIFIED BACK PAIN LATERALITY: ICD-10-CM

## 2018-12-11 DIAGNOSIS — M54.9 CHRONIC BACK PAIN, UNSPECIFIED BACK LOCATION, UNSPECIFIED BACK PAIN LATERALITY: ICD-10-CM

## 2018-12-11 DIAGNOSIS — R29.3 POOR POSTURE: ICD-10-CM

## 2018-12-17 ENCOUNTER — OFFICE VISIT - HEALTHEAST (OUTPATIENT)
Dept: SURGERY | Facility: CLINIC | Age: 63
End: 2018-12-17

## 2018-12-17 DIAGNOSIS — E66.01 MORBID OBESITY (H): ICD-10-CM

## 2018-12-18 ENCOUNTER — OFFICE VISIT - HEALTHEAST (OUTPATIENT)
Dept: PHYSICAL THERAPY | Facility: REHABILITATION | Age: 63
End: 2018-12-18

## 2018-12-18 DIAGNOSIS — M79.18 MYOFASCIAL PAIN: ICD-10-CM

## 2018-12-18 DIAGNOSIS — M54.16 LUMBAR RADICULOPATHY: ICD-10-CM

## 2018-12-18 DIAGNOSIS — M53.86 DECREASED ROM OF LUMBAR SPINE: ICD-10-CM

## 2018-12-18 DIAGNOSIS — R29.3 POOR POSTURE: ICD-10-CM

## 2018-12-18 DIAGNOSIS — M54.9 CHRONIC BACK PAIN, UNSPECIFIED BACK LOCATION, UNSPECIFIED BACK PAIN LATERALITY: ICD-10-CM

## 2018-12-18 DIAGNOSIS — G89.29 CHRONIC BACK PAIN, UNSPECIFIED BACK LOCATION, UNSPECIFIED BACK PAIN LATERALITY: ICD-10-CM

## 2018-12-18 DIAGNOSIS — M62.81 MUSCLE WEAKNESS (GENERALIZED): ICD-10-CM

## 2018-12-25 ENCOUNTER — COMMUNICATION - HEALTHEAST (OUTPATIENT)
Dept: INTERNAL MEDICINE | Facility: CLINIC | Age: 63
End: 2018-12-25

## 2018-12-25 DIAGNOSIS — E11.69 TYPE 2 DIABETES MELLITUS WITH OTHER SPECIFIED COMPLICATION, WITH LONG-TERM CURRENT USE OF INSULIN (H): ICD-10-CM

## 2018-12-25 DIAGNOSIS — Z79.4 TYPE 2 DIABETES MELLITUS WITH OTHER SPECIFIED COMPLICATION, WITH LONG-TERM CURRENT USE OF INSULIN (H): ICD-10-CM

## 2019-01-03 ENCOUNTER — COMMUNICATION - HEALTHEAST (OUTPATIENT)
Dept: INTERNAL MEDICINE | Facility: CLINIC | Age: 64
End: 2019-01-03

## 2019-01-03 ENCOUNTER — OFFICE VISIT - HEALTHEAST (OUTPATIENT)
Dept: INTERNAL MEDICINE | Facility: CLINIC | Age: 64
End: 2019-01-03

## 2019-01-03 DIAGNOSIS — Z12.11 SCREEN FOR COLON CANCER: ICD-10-CM

## 2019-01-03 DIAGNOSIS — G89.29 CHRONIC LOW BACK PAIN, UNSPECIFIED BACK PAIN LATERALITY, WITH SCIATICA PRESENCE UNSPECIFIED: ICD-10-CM

## 2019-01-03 DIAGNOSIS — G57.12 MERALGIA PARESTHETICA OF LEFT SIDE: ICD-10-CM

## 2019-01-03 DIAGNOSIS — M54.5 CHRONIC LOW BACK PAIN, UNSPECIFIED BACK PAIN LATERALITY, WITH SCIATICA PRESENCE UNSPECIFIED: ICD-10-CM

## 2019-01-03 ASSESSMENT — MIFFLIN-ST. JEOR: SCORE: 1798.16

## 2019-01-04 ENCOUNTER — OFFICE VISIT - HEALTHEAST (OUTPATIENT)
Dept: INTERNAL MEDICINE | Facility: CLINIC | Age: 64
End: 2019-01-04

## 2019-01-04 DIAGNOSIS — Z79.4 TYPE 2 DIABETES MELLITUS WITH HYPERGLYCEMIA, WITH LONG-TERM CURRENT USE OF INSULIN (H): ICD-10-CM

## 2019-01-04 DIAGNOSIS — G57.12 MERALGIA PARAESTHETICA, LEFT: ICD-10-CM

## 2019-01-04 DIAGNOSIS — E66.01 MORBID OBESITY WITH BMI OF 40.0-44.9, ADULT (H): ICD-10-CM

## 2019-01-04 DIAGNOSIS — R20.0 NUMBNESS AND TINGLING OF LEFT LOWER EXTREMITY: ICD-10-CM

## 2019-01-04 DIAGNOSIS — R20.2 NUMBNESS AND TINGLING OF LEFT LOWER EXTREMITY: ICD-10-CM

## 2019-01-04 DIAGNOSIS — E11.65 TYPE 2 DIABETES MELLITUS WITH HYPERGLYCEMIA, WITH LONG-TERM CURRENT USE OF INSULIN (H): ICD-10-CM

## 2019-01-04 LAB
ERYTHROCYTE [DISTWIDTH] IN BLOOD BY AUTOMATED COUNT: 12.8 % (ref 11–14.5)
HBA1C MFR BLD: 6.9 % (ref 3.5–6)
HCT VFR BLD AUTO: 34.7 % (ref 35–47)
HGB BLD-MCNC: 11.4 G/DL (ref 12–16)
MCH RBC QN AUTO: 28.6 PG (ref 27–34)
MCHC RBC AUTO-ENTMCNC: 33 G/DL (ref 32–36)
MCV RBC AUTO: 87 FL (ref 80–100)
PLATELET # BLD AUTO: 278 THOU/UL (ref 140–440)
PMV BLD AUTO: 8.2 FL (ref 7–10)
RBC # BLD AUTO: 4 MILL/UL (ref 3.8–5.4)
WBC: 6.8 THOU/UL (ref 4–11)

## 2019-01-08 ENCOUNTER — OFFICE VISIT - HEALTHEAST (OUTPATIENT)
Dept: PHYSICAL THERAPY | Facility: REHABILITATION | Age: 64
End: 2019-01-08

## 2019-01-08 DIAGNOSIS — M54.16 LUMBAR RADICULOPATHY: ICD-10-CM

## 2019-01-08 DIAGNOSIS — G89.29 CHRONIC BACK PAIN, UNSPECIFIED BACK LOCATION, UNSPECIFIED BACK PAIN LATERALITY: ICD-10-CM

## 2019-01-08 DIAGNOSIS — M54.9 CHRONIC BACK PAIN, UNSPECIFIED BACK LOCATION, UNSPECIFIED BACK PAIN LATERALITY: ICD-10-CM

## 2019-01-08 DIAGNOSIS — R29.3 POOR POSTURE: ICD-10-CM

## 2019-01-08 DIAGNOSIS — M53.86 DECREASED ROM OF LUMBAR SPINE: ICD-10-CM

## 2019-01-08 DIAGNOSIS — M62.81 MUSCLE WEAKNESS (GENERALIZED): ICD-10-CM

## 2019-01-08 DIAGNOSIS — M79.18 MYOFASCIAL PAIN: ICD-10-CM

## 2019-01-08 DIAGNOSIS — M25.561 RIGHT KNEE PAIN, UNSPECIFIED CHRONICITY: ICD-10-CM

## 2019-01-14 ENCOUNTER — OFFICE VISIT - HEALTHEAST (OUTPATIENT)
Dept: SURGERY | Facility: CLINIC | Age: 64
End: 2019-01-14

## 2019-01-14 ENCOUNTER — COMMUNICATION - HEALTHEAST (OUTPATIENT)
Dept: INTERNAL MEDICINE | Facility: CLINIC | Age: 64
End: 2019-01-14

## 2019-01-14 ENCOUNTER — RECORDS - HEALTHEAST (OUTPATIENT)
Dept: ADMINISTRATIVE | Facility: OTHER | Age: 64
End: 2019-01-14

## 2019-01-14 DIAGNOSIS — E66.01 MORBID OBESITY (H): ICD-10-CM

## 2019-01-16 ENCOUNTER — OFFICE VISIT - HEALTHEAST (OUTPATIENT)
Dept: SLEEP MEDICINE | Facility: CLINIC | Age: 64
End: 2019-01-16

## 2019-01-16 DIAGNOSIS — G47.33 OBSTRUCTIVE SLEEP APNEA (ADULT) (PEDIATRIC): ICD-10-CM

## 2019-01-16 ASSESSMENT — MIFFLIN-ST. JEOR: SCORE: 1780.13

## 2019-01-17 ENCOUNTER — COMMUNICATION - HEALTHEAST (OUTPATIENT)
Dept: INTERNAL MEDICINE | Facility: CLINIC | Age: 64
End: 2019-01-17

## 2019-01-17 DIAGNOSIS — E11.65 TYPE 2 DIABETES MELLITUS WITH HYPERGLYCEMIA, WITH LONG-TERM CURRENT USE OF INSULIN (H): ICD-10-CM

## 2019-01-17 DIAGNOSIS — Z79.4 TYPE 2 DIABETES MELLITUS WITH HYPERGLYCEMIA, WITH LONG-TERM CURRENT USE OF INSULIN (H): ICD-10-CM

## 2019-01-22 ENCOUNTER — OFFICE VISIT - HEALTHEAST (OUTPATIENT)
Dept: PHYSICAL THERAPY | Facility: REHABILITATION | Age: 64
End: 2019-01-22

## 2019-01-22 DIAGNOSIS — M54.9 CHRONIC BACK PAIN, UNSPECIFIED BACK LOCATION, UNSPECIFIED BACK PAIN LATERALITY: ICD-10-CM

## 2019-01-22 DIAGNOSIS — G89.29 CHRONIC BACK PAIN, UNSPECIFIED BACK LOCATION, UNSPECIFIED BACK PAIN LATERALITY: ICD-10-CM

## 2019-01-22 DIAGNOSIS — R29.3 POOR POSTURE: ICD-10-CM

## 2019-01-22 DIAGNOSIS — M54.16 LUMBAR RADICULOPATHY: ICD-10-CM

## 2019-01-22 DIAGNOSIS — M62.81 MUSCLE WEAKNESS (GENERALIZED): ICD-10-CM

## 2019-01-22 DIAGNOSIS — M53.86 DECREASED ROM OF LUMBAR SPINE: ICD-10-CM

## 2019-01-22 DIAGNOSIS — M79.18 MYOFASCIAL PAIN: ICD-10-CM

## 2019-01-25 ENCOUNTER — COMMUNICATION - HEALTHEAST (OUTPATIENT)
Dept: PHARMACY | Facility: CLINIC | Age: 64
End: 2019-01-25

## 2019-01-25 DIAGNOSIS — E11.69 TYPE 2 DIABETES MELLITUS WITH OTHER SPECIFIED COMPLICATION, WITH LONG-TERM CURRENT USE OF INSULIN (H): ICD-10-CM

## 2019-01-25 DIAGNOSIS — Z79.4 TYPE 2 DIABETES MELLITUS WITH HYPERGLYCEMIA, WITH LONG-TERM CURRENT USE OF INSULIN (H): ICD-10-CM

## 2019-01-25 DIAGNOSIS — Z79.4 TYPE 2 DIABETES MELLITUS WITH OTHER SPECIFIED COMPLICATION, WITH LONG-TERM CURRENT USE OF INSULIN (H): ICD-10-CM

## 2019-01-25 DIAGNOSIS — E11.65 TYPE 2 DIABETES MELLITUS WITH HYPERGLYCEMIA, WITH LONG-TERM CURRENT USE OF INSULIN (H): ICD-10-CM

## 2019-01-28 ENCOUNTER — OFFICE VISIT - HEALTHEAST (OUTPATIENT)
Dept: SURGERY | Facility: CLINIC | Age: 64
End: 2019-01-28

## 2019-01-28 DIAGNOSIS — E66.01 MORBID OBESITY (H): ICD-10-CM

## 2019-01-28 DIAGNOSIS — Z71.3 DIETARY COUNSELING: ICD-10-CM

## 2019-01-28 DIAGNOSIS — E66.01 OBESITY, MORBID, BMI 40.0-49.9 (H): ICD-10-CM

## 2019-01-28 DIAGNOSIS — G47.33 OSA (OBSTRUCTIVE SLEEP APNEA): ICD-10-CM

## 2019-01-28 DIAGNOSIS — E11.8 TYPE 2 DIABETES MELLITUS WITH COMPLICATION, UNSPECIFIED WHETHER LONG TERM INSULIN USE: ICD-10-CM

## 2019-01-28 DIAGNOSIS — E78.5 DYSLIPIDEMIA: ICD-10-CM

## 2019-01-28 ASSESSMENT — MIFFLIN-ST. JEOR: SCORE: 1768.34

## 2019-02-06 ENCOUNTER — COMMUNICATION - HEALTHEAST (OUTPATIENT)
Dept: INTERNAL MEDICINE | Facility: CLINIC | Age: 64
End: 2019-02-06

## 2019-02-06 DIAGNOSIS — K21.00 GASTROESOPHAGEAL REFLUX DISEASE WITH ESOPHAGITIS: ICD-10-CM

## 2019-02-13 ENCOUNTER — COMMUNICATION - HEALTHEAST (OUTPATIENT)
Dept: NURSING | Facility: CLINIC | Age: 64
End: 2019-02-13

## 2019-03-04 ENCOUNTER — OFFICE VISIT - HEALTHEAST (OUTPATIENT)
Dept: SURGERY | Facility: CLINIC | Age: 64
End: 2019-03-04

## 2019-03-04 DIAGNOSIS — E66.01 OBESITY, MORBID, BMI 40.0-49.9 (H): ICD-10-CM

## 2019-03-04 DIAGNOSIS — Z71.3 DIETARY COUNSELING: ICD-10-CM

## 2019-03-04 DIAGNOSIS — E11.8 TYPE 2 DIABETES MELLITUS WITH COMPLICATION, UNSPECIFIED WHETHER LONG TERM INSULIN USE: ICD-10-CM

## 2019-03-04 DIAGNOSIS — E66.01 MORBID OBESITY (H): ICD-10-CM

## 2019-03-04 ASSESSMENT — MIFFLIN-ST. JEOR: SCORE: 1751.1

## 2019-03-08 ENCOUNTER — OFFICE VISIT - HEALTHEAST (OUTPATIENT)
Dept: INTERNAL MEDICINE | Facility: CLINIC | Age: 64
End: 2019-03-08

## 2019-03-08 DIAGNOSIS — G89.29 CHRONIC LOW BACK PAIN, UNSPECIFIED BACK PAIN LATERALITY, WITH SCIATICA PRESENCE UNSPECIFIED: ICD-10-CM

## 2019-03-08 DIAGNOSIS — R19.5 GREEN STOOL: ICD-10-CM

## 2019-03-08 DIAGNOSIS — Z79.4 TYPE 2 DIABETES MELLITUS WITH OTHER SPECIFIED COMPLICATION, WITH LONG-TERM CURRENT USE OF INSULIN (H): ICD-10-CM

## 2019-03-08 DIAGNOSIS — G60.9 IDIOPATHIC PERIPHERAL NEUROPATHY: ICD-10-CM

## 2019-03-08 DIAGNOSIS — M54.5 CHRONIC LOW BACK PAIN, UNSPECIFIED BACK PAIN LATERALITY, WITH SCIATICA PRESENCE UNSPECIFIED: ICD-10-CM

## 2019-03-08 DIAGNOSIS — E11.69 TYPE 2 DIABETES MELLITUS WITH OTHER SPECIFIED COMPLICATION, WITH LONG-TERM CURRENT USE OF INSULIN (H): ICD-10-CM

## 2019-03-08 LAB
ALBUMIN SERPL-MCNC: 4 G/DL (ref 3.5–5)
ALP SERPL-CCNC: 85 U/L (ref 45–120)
ALT SERPL W P-5'-P-CCNC: 19 U/L (ref 0–45)
ANION GAP SERPL CALCULATED.3IONS-SCNC: 10 MMOL/L (ref 5–18)
AST SERPL W P-5'-P-CCNC: 20 U/L (ref 0–40)
BILIRUB DIRECT SERPL-MCNC: 0.1 MG/DL
BILIRUB SERPL-MCNC: 0.3 MG/DL (ref 0–1)
BUN SERPL-MCNC: 17 MG/DL (ref 8–22)
CALCIUM SERPL-MCNC: 10.6 MG/DL (ref 8.5–10.5)
CHLORIDE BLD-SCNC: 104 MMOL/L (ref 98–107)
CO2 SERPL-SCNC: 26 MMOL/L (ref 22–31)
CREAT SERPL-MCNC: 0.91 MG/DL (ref 0.6–1.1)
GFR SERPL CREATININE-BSD FRML MDRD: >60 ML/MIN/1.73M2
GLUCOSE BLD-MCNC: 79 MG/DL (ref 70–125)
POTASSIUM BLD-SCNC: 4 MMOL/L (ref 3.5–5)
PROT SERPL-MCNC: 7.6 G/DL (ref 6–8)
SODIUM SERPL-SCNC: 140 MMOL/L (ref 136–145)

## 2019-03-08 ASSESSMENT — MIFFLIN-ST. JEOR: SCORE: 1748.84

## 2019-03-12 ENCOUNTER — COMMUNICATION - HEALTHEAST (OUTPATIENT)
Dept: INTERNAL MEDICINE | Facility: CLINIC | Age: 64
End: 2019-03-12

## 2019-03-21 ENCOUNTER — OFFICE VISIT - HEALTHEAST (OUTPATIENT)
Dept: PHYSICAL THERAPY | Facility: REHABILITATION | Age: 64
End: 2019-03-21

## 2019-03-21 DIAGNOSIS — M54.16 LUMBAR RADICULOPATHY: ICD-10-CM

## 2019-03-21 DIAGNOSIS — G89.29 CHRONIC BACK PAIN, UNSPECIFIED BACK LOCATION, UNSPECIFIED BACK PAIN LATERALITY: ICD-10-CM

## 2019-03-21 DIAGNOSIS — M79.18 MYOFASCIAL PAIN: ICD-10-CM

## 2019-03-21 DIAGNOSIS — M53.86 DECREASED ROM OF LUMBAR SPINE: ICD-10-CM

## 2019-03-21 DIAGNOSIS — R29.3 POOR POSTURE: ICD-10-CM

## 2019-03-21 DIAGNOSIS — M62.81 MUSCLE WEAKNESS (GENERALIZED): ICD-10-CM

## 2019-03-21 DIAGNOSIS — M54.9 CHRONIC BACK PAIN, UNSPECIFIED BACK LOCATION, UNSPECIFIED BACK PAIN LATERALITY: ICD-10-CM

## 2019-03-22 ENCOUNTER — COMMUNICATION - HEALTHEAST (OUTPATIENT)
Dept: INTERNAL MEDICINE | Facility: CLINIC | Age: 64
End: 2019-03-22

## 2019-03-22 DIAGNOSIS — E78.5 HYPERLIPEMIA: ICD-10-CM

## 2019-03-25 ENCOUNTER — COMMUNICATION - HEALTHEAST (OUTPATIENT)
Dept: INTERNAL MEDICINE | Facility: CLINIC | Age: 64
End: 2019-03-25

## 2019-03-25 ENCOUNTER — HOSPITAL ENCOUNTER (OUTPATIENT)
Dept: PHYSICAL MEDICINE AND REHAB | Facility: CLINIC | Age: 64
Discharge: HOME | End: 2019-03-25
Attending: INTERNAL MEDICINE

## 2019-03-25 DIAGNOSIS — G60.9 IDIOPATHIC PERIPHERAL NEUROPATHY: ICD-10-CM

## 2019-03-31 ENCOUNTER — COMMUNICATION - HEALTHEAST (OUTPATIENT)
Dept: INTERNAL MEDICINE | Facility: CLINIC | Age: 64
End: 2019-03-31

## 2019-04-01 ENCOUNTER — COMMUNICATION - HEALTHEAST (OUTPATIENT)
Dept: INTERNAL MEDICINE | Facility: CLINIC | Age: 64
End: 2019-04-01

## 2019-04-01 ENCOUNTER — AMBULATORY - HEALTHEAST (OUTPATIENT)
Dept: INTERNAL MEDICINE | Facility: CLINIC | Age: 64
End: 2019-04-01

## 2019-04-01 DIAGNOSIS — R30.0 DYSURIA: ICD-10-CM

## 2019-04-08 ENCOUNTER — OFFICE VISIT - HEALTHEAST (OUTPATIENT)
Dept: SURGERY | Facility: CLINIC | Age: 64
End: 2019-04-08

## 2019-04-08 DIAGNOSIS — Z71.3 DIETARY COUNSELING: ICD-10-CM

## 2019-04-08 DIAGNOSIS — G47.33 OSA (OBSTRUCTIVE SLEEP APNEA): ICD-10-CM

## 2019-04-08 DIAGNOSIS — E78.5 DYSLIPIDEMIA: ICD-10-CM

## 2019-04-08 DIAGNOSIS — E11.8 TYPE 2 DIABETES MELLITUS WITH COMPLICATION, UNSPECIFIED WHETHER LONG TERM INSULIN USE: ICD-10-CM

## 2019-04-08 DIAGNOSIS — E66.01 OBESITY, MORBID, BMI 40.0-49.9 (H): ICD-10-CM

## 2019-04-08 ASSESSMENT — MIFFLIN-ST. JEOR: SCORE: 1714.81

## 2019-04-15 ENCOUNTER — COMMUNICATION - HEALTHEAST (OUTPATIENT)
Dept: INTERNAL MEDICINE | Facility: CLINIC | Age: 64
End: 2019-04-15

## 2019-04-15 DIAGNOSIS — Z79.4 TYPE 2 DIABETES MELLITUS WITH HYPERGLYCEMIA, WITH LONG-TERM CURRENT USE OF INSULIN (H): ICD-10-CM

## 2019-04-15 DIAGNOSIS — E11.65 TYPE 2 DIABETES MELLITUS WITH HYPERGLYCEMIA, WITH LONG-TERM CURRENT USE OF INSULIN (H): ICD-10-CM

## 2019-04-16 ENCOUNTER — AMBULATORY - HEALTHEAST (OUTPATIENT)
Dept: LAB | Facility: CLINIC | Age: 64
End: 2019-04-16

## 2019-04-16 DIAGNOSIS — R30.0 DYSURIA: ICD-10-CM

## 2019-04-16 LAB
ALBUMIN UR-MCNC: NEGATIVE MG/DL
APPEARANCE UR: ABNORMAL
BACTERIA #/AREA URNS HPF: ABNORMAL HPF
BILIRUB UR QL STRIP: NEGATIVE
COLOR UR AUTO: YELLOW
GLUCOSE UR STRIP-MCNC: NEGATIVE MG/DL
HGB UR QL STRIP: NEGATIVE
KETONES UR STRIP-MCNC: NEGATIVE MG/DL
LEUKOCYTE ESTERASE UR QL STRIP: ABNORMAL
NITRATE UR QL: POSITIVE
PH UR STRIP: 5.5 [PH] (ref 5–8)
RBC #/AREA URNS AUTO: ABNORMAL HPF
SP GR UR STRIP: 1.02 (ref 1–1.03)
SQUAMOUS #/AREA URNS AUTO: ABNORMAL LPF
UROBILINOGEN UR STRIP-ACNC: ABNORMAL
WBC #/AREA URNS AUTO: ABNORMAL HPF

## 2019-04-18 LAB — BACTERIA SPEC CULT: ABNORMAL

## 2019-04-19 ENCOUNTER — COMMUNICATION - HEALTHEAST (OUTPATIENT)
Dept: INTERNAL MEDICINE | Facility: CLINIC | Age: 64
End: 2019-04-19

## 2019-04-19 DIAGNOSIS — E11.9 TYPE 2 DIABETES MELLITUS WITHOUT COMPLICATION, WITH LONG-TERM CURRENT USE OF INSULIN (H): ICD-10-CM

## 2019-04-19 DIAGNOSIS — Z79.4 TYPE 2 DIABETES MELLITUS WITHOUT COMPLICATION, WITH LONG-TERM CURRENT USE OF INSULIN (H): ICD-10-CM

## 2019-04-29 ENCOUNTER — COMMUNICATION - HEALTHEAST (OUTPATIENT)
Dept: INTERNAL MEDICINE | Facility: CLINIC | Age: 64
End: 2019-04-29

## 2019-04-29 DIAGNOSIS — E11.9 TYPE 2 DIABETES MELLITUS WITHOUT COMPLICATION, WITH LONG-TERM CURRENT USE OF INSULIN (H): ICD-10-CM

## 2019-04-29 DIAGNOSIS — E11.65 TYPE 2 DIABETES MELLITUS WITH HYPERGLYCEMIA, WITH LONG-TERM CURRENT USE OF INSULIN (H): ICD-10-CM

## 2019-04-29 DIAGNOSIS — Z79.4 TYPE 2 DIABETES MELLITUS WITH HYPERGLYCEMIA, WITH LONG-TERM CURRENT USE OF INSULIN (H): ICD-10-CM

## 2019-04-29 DIAGNOSIS — Z79.4 TYPE 2 DIABETES MELLITUS WITHOUT COMPLICATION, WITH LONG-TERM CURRENT USE OF INSULIN (H): ICD-10-CM

## 2019-05-01 ENCOUNTER — OFFICE VISIT - HEALTHEAST (OUTPATIENT)
Dept: INTERNAL MEDICINE | Facility: CLINIC | Age: 64
End: 2019-05-01

## 2019-05-01 DIAGNOSIS — M54.5 CHRONIC LOW BACK PAIN, UNSPECIFIED BACK PAIN LATERALITY, WITH SCIATICA PRESENCE UNSPECIFIED: ICD-10-CM

## 2019-05-01 DIAGNOSIS — N10 PYELONEPHRITIS, ACUTE: ICD-10-CM

## 2019-05-01 DIAGNOSIS — G89.29 CHRONIC LOW BACK PAIN, UNSPECIFIED BACK PAIN LATERALITY, WITH SCIATICA PRESENCE UNSPECIFIED: ICD-10-CM

## 2019-05-01 DIAGNOSIS — Z79.4 TYPE 2 DIABETES MELLITUS WITH HYPERGLYCEMIA, WITH LONG-TERM CURRENT USE OF INSULIN (H): ICD-10-CM

## 2019-05-01 DIAGNOSIS — E11.65 TYPE 2 DIABETES MELLITUS WITH HYPERGLYCEMIA, WITH LONG-TERM CURRENT USE OF INSULIN (H): ICD-10-CM

## 2019-05-01 DIAGNOSIS — R11.2 NON-INTRACTABLE VOMITING WITH NAUSEA, UNSPECIFIED VOMITING TYPE: ICD-10-CM

## 2019-05-10 ENCOUNTER — OFFICE VISIT - HEALTHEAST (OUTPATIENT)
Dept: INTERNAL MEDICINE | Facility: CLINIC | Age: 64
End: 2019-05-10

## 2019-05-10 DIAGNOSIS — Z79.4 TYPE 2 DIABETES MELLITUS WITH HYPERGLYCEMIA, WITH LONG-TERM CURRENT USE OF INSULIN (H): ICD-10-CM

## 2019-05-10 DIAGNOSIS — M54.5 CHRONIC LOW BACK PAIN, UNSPECIFIED BACK PAIN LATERALITY, WITH SCIATICA PRESENCE UNSPECIFIED: ICD-10-CM

## 2019-05-10 DIAGNOSIS — I10 ESSENTIAL HYPERTENSION WITH GOAL BLOOD PRESSURE LESS THAN 140/90: ICD-10-CM

## 2019-05-10 DIAGNOSIS — E11.65 TYPE 2 DIABETES MELLITUS WITH HYPERGLYCEMIA, WITH LONG-TERM CURRENT USE OF INSULIN (H): ICD-10-CM

## 2019-05-10 DIAGNOSIS — I10 ESSENTIAL HYPERTENSION: ICD-10-CM

## 2019-05-10 DIAGNOSIS — N12 PYELONEPHRITIS: ICD-10-CM

## 2019-05-10 DIAGNOSIS — G89.29 CHRONIC LOW BACK PAIN, UNSPECIFIED BACK PAIN LATERALITY, WITH SCIATICA PRESENCE UNSPECIFIED: ICD-10-CM

## 2019-05-20 ENCOUNTER — AMBULATORY - HEALTHEAST (OUTPATIENT)
Dept: NURSING | Facility: CLINIC | Age: 64
End: 2019-05-20

## 2019-05-20 ENCOUNTER — AMBULATORY - HEALTHEAST (OUTPATIENT)
Dept: LAB | Facility: CLINIC | Age: 64
End: 2019-05-20

## 2019-05-20 DIAGNOSIS — N12 PYELONEPHRITIS: ICD-10-CM

## 2019-05-20 LAB
ALBUMIN UR-MCNC: NEGATIVE MG/DL
APPEARANCE UR: CLEAR
BACTERIA #/AREA URNS HPF: ABNORMAL HPF
BILIRUB UR QL STRIP: NEGATIVE
COLOR UR AUTO: YELLOW
GLUCOSE UR STRIP-MCNC: NEGATIVE MG/DL
HGB UR QL STRIP: NEGATIVE
KETONES UR STRIP-MCNC: NEGATIVE MG/DL
LEUKOCYTE ESTERASE UR QL STRIP: ABNORMAL
NITRATE UR QL: NEGATIVE
PH UR STRIP: 5.5 [PH] (ref 5–8)
RBC #/AREA URNS AUTO: ABNORMAL HPF
SP GR UR STRIP: 1.02 (ref 1–1.03)
SQUAMOUS #/AREA URNS AUTO: ABNORMAL LPF
UROBILINOGEN UR STRIP-ACNC: ABNORMAL
WBC #/AREA URNS AUTO: ABNORMAL HPF

## 2019-05-21 LAB — BACTERIA SPEC CULT: NO GROWTH

## 2019-06-05 ENCOUNTER — OFFICE VISIT - HEALTHEAST (OUTPATIENT)
Dept: INTERNAL MEDICINE | Facility: CLINIC | Age: 64
End: 2019-06-05

## 2019-06-05 DIAGNOSIS — M25.562 LEFT KNEE PAIN, UNSPECIFIED CHRONICITY: ICD-10-CM

## 2019-06-05 ASSESSMENT — MIFFLIN-ST. JEOR: SCORE: 1726.16

## 2019-06-07 ENCOUNTER — HOSPITAL ENCOUNTER (OUTPATIENT)
Dept: ULTRASOUND IMAGING | Facility: HOSPITAL | Age: 64
Discharge: HOME | End: 2019-06-07

## 2019-06-07 DIAGNOSIS — M25.562 LEFT KNEE PAIN, UNSPECIFIED CHRONICITY: ICD-10-CM

## 2019-06-18 ENCOUNTER — COMMUNICATION - HEALTHEAST (OUTPATIENT)
Dept: INTERNAL MEDICINE | Facility: CLINIC | Age: 64
End: 2019-06-18

## 2019-06-18 DIAGNOSIS — G89.29 CHRONIC LOW BACK PAIN, UNSPECIFIED BACK PAIN LATERALITY, WITH SCIATICA PRESENCE UNSPECIFIED: ICD-10-CM

## 2019-06-18 DIAGNOSIS — M25.562 ACUTE PAIN OF LEFT KNEE: ICD-10-CM

## 2019-06-18 DIAGNOSIS — M54.5 CHRONIC LOW BACK PAIN, UNSPECIFIED BACK PAIN LATERALITY, WITH SCIATICA PRESENCE UNSPECIFIED: ICD-10-CM

## 2019-06-20 ENCOUNTER — COMMUNICATION - HEALTHEAST (OUTPATIENT)
Dept: INTERNAL MEDICINE | Facility: CLINIC | Age: 64
End: 2019-06-20

## 2019-06-20 DIAGNOSIS — E78.5 HYPERLIPEMIA: ICD-10-CM

## 2019-06-21 ENCOUNTER — OFFICE VISIT - HEALTHEAST (OUTPATIENT)
Dept: INTERNAL MEDICINE | Facility: CLINIC | Age: 64
End: 2019-06-21

## 2019-06-21 DIAGNOSIS — M25.562 ACUTE PAIN OF LEFT KNEE: ICD-10-CM

## 2019-06-21 DIAGNOSIS — E11.69 TYPE 2 DIABETES MELLITUS WITH OTHER SPECIFIED COMPLICATION, WITH LONG-TERM CURRENT USE OF INSULIN (H): ICD-10-CM

## 2019-06-21 DIAGNOSIS — G89.29 CHRONIC LOW BACK PAIN, UNSPECIFIED BACK PAIN LATERALITY, WITH SCIATICA PRESENCE UNSPECIFIED: ICD-10-CM

## 2019-06-21 DIAGNOSIS — M54.5 CHRONIC LOW BACK PAIN, UNSPECIFIED BACK PAIN LATERALITY, WITH SCIATICA PRESENCE UNSPECIFIED: ICD-10-CM

## 2019-06-21 DIAGNOSIS — B37.31 VAGINAL YEAST INFECTION: ICD-10-CM

## 2019-06-21 DIAGNOSIS — Z79.4 TYPE 2 DIABETES MELLITUS WITH OTHER SPECIFIED COMPLICATION, WITH LONG-TERM CURRENT USE OF INSULIN (H): ICD-10-CM

## 2019-06-28 ENCOUNTER — COMMUNICATION - HEALTHEAST (OUTPATIENT)
Dept: INTERNAL MEDICINE | Facility: CLINIC | Age: 64
End: 2019-06-28

## 2019-07-02 ENCOUNTER — RECORDS - HEALTHEAST (OUTPATIENT)
Dept: ADMINISTRATIVE | Facility: OTHER | Age: 64
End: 2019-07-02

## 2019-07-10 ENCOUNTER — COMMUNICATION - HEALTHEAST (OUTPATIENT)
Dept: INTERNAL MEDICINE | Facility: CLINIC | Age: 64
End: 2019-07-10

## 2019-07-12 ENCOUNTER — COMMUNICATION - HEALTHEAST (OUTPATIENT)
Dept: LAB | Facility: CLINIC | Age: 64
End: 2019-07-12

## 2019-07-23 ENCOUNTER — RECORDS - HEALTHEAST (OUTPATIENT)
Dept: ADMINISTRATIVE | Facility: OTHER | Age: 64
End: 2019-07-23

## 2019-07-29 ENCOUNTER — AMBULATORY - HEALTHEAST (OUTPATIENT)
Dept: LAB | Facility: CLINIC | Age: 64
End: 2019-07-29

## 2019-07-29 DIAGNOSIS — I25.10 CORONARY ARTERY DISEASE DUE TO LIPID RICH PLAQUE: ICD-10-CM

## 2019-07-29 DIAGNOSIS — Z79.4 TYPE 2 DIABETES MELLITUS WITH HYPERGLYCEMIA, WITH LONG-TERM CURRENT USE OF INSULIN (H): ICD-10-CM

## 2019-07-29 DIAGNOSIS — E78.5 DYSLIPIDEMIA, GOAL LDL BELOW 70: ICD-10-CM

## 2019-07-29 DIAGNOSIS — E55.9 VITAMIN D DEFICIENCY: ICD-10-CM

## 2019-07-29 DIAGNOSIS — E04.9 GOITER: ICD-10-CM

## 2019-07-29 DIAGNOSIS — E11.65 TYPE 2 DIABETES MELLITUS WITH HYPERGLYCEMIA, WITH LONG-TERM CURRENT USE OF INSULIN (H): ICD-10-CM

## 2019-07-29 DIAGNOSIS — I25.83 CORONARY ARTERY DISEASE DUE TO LIPID RICH PLAQUE: ICD-10-CM

## 2019-07-29 DIAGNOSIS — D64.9 ANEMIA, UNSPECIFIED TYPE: ICD-10-CM

## 2019-07-29 DIAGNOSIS — R30.0 DYSURIA: ICD-10-CM

## 2019-07-29 LAB
25(OH)D3 SERPL-MCNC: 35.5 NG/ML (ref 30–80)
25(OH)D3 SERPL-MCNC: 35.5 NG/ML (ref 30–80)
ALBUMIN SERPL-MCNC: 3.6 G/DL (ref 3.5–5)
ALBUMIN UR-MCNC: NEGATIVE MG/DL
ALP SERPL-CCNC: 104 U/L (ref 45–120)
ALT SERPL W P-5'-P-CCNC: 20 U/L (ref 0–45)
ANION GAP SERPL CALCULATED.3IONS-SCNC: 9 MMOL/L (ref 5–18)
APPEARANCE UR: ABNORMAL
AST SERPL W P-5'-P-CCNC: 18 U/L (ref 0–40)
BACTERIA #/AREA URNS HPF: ABNORMAL HPF
BILIRUB SERPL-MCNC: 0.3 MG/DL (ref 0–1)
BILIRUB UR QL STRIP: NEGATIVE
BUN SERPL-MCNC: 18 MG/DL (ref 8–22)
CALCIUM SERPL-MCNC: 9.7 MG/DL (ref 8.5–10.5)
CHLORIDE BLD-SCNC: 103 MMOL/L (ref 98–107)
CO2 SERPL-SCNC: 27 MMOL/L (ref 22–31)
COLOR UR AUTO: YELLOW
CREAT SERPL-MCNC: 0.88 MG/DL (ref 0.6–1.1)
ERYTHROCYTE [DISTWIDTH] IN BLOOD BY AUTOMATED COUNT: 12.8 % (ref 11–14.5)
GFR SERPL CREATININE-BSD FRML MDRD: >60 ML/MIN/1.73M2
GLUCOSE BLD-MCNC: 109 MG/DL (ref 70–125)
GLUCOSE UR STRIP-MCNC: NEGATIVE MG/DL
HBA1C MFR BLD: 6.9 % (ref 3.5–6)
HCT VFR BLD AUTO: 36 % (ref 35–47)
HGB BLD-MCNC: 12 G/DL (ref 12–16)
HGB UR QL STRIP: NEGATIVE
KETONES UR STRIP-MCNC: NEGATIVE MG/DL
LDLC SERPL CALC-MCNC: 63 MG/DL
LEUKOCYTE ESTERASE UR QL STRIP: ABNORMAL
MCH RBC QN AUTO: 30 PG (ref 27–34)
MCHC RBC AUTO-ENTMCNC: 33.4 G/DL (ref 32–36)
MCV RBC AUTO: 90 FL (ref 80–100)
NITRATE UR QL: NEGATIVE
PH UR STRIP: 6.5 [PH] (ref 5–8)
PLATELET # BLD AUTO: 269 THOU/UL (ref 140–440)
PMV BLD AUTO: 7.3 FL (ref 7–10)
POTASSIUM BLD-SCNC: 4 MMOL/L (ref 3.5–5)
PROT SERPL-MCNC: 6.6 G/DL (ref 6–8)
RBC # BLD AUTO: 4.01 MILL/UL (ref 3.8–5.4)
RBC #/AREA URNS AUTO: ABNORMAL HPF
SODIUM SERPL-SCNC: 139 MMOL/L (ref 136–145)
SP GR UR STRIP: 1.02 (ref 1–1.03)
SQUAMOUS #/AREA URNS AUTO: ABNORMAL LPF
TSH SERPL DL<=0.005 MIU/L-ACNC: 3.99 UIU/ML (ref 0.3–5)
UROBILINOGEN UR STRIP-ACNC: ABNORMAL
WBC #/AREA URNS AUTO: ABNORMAL HPF
WBC: 7.7 THOU/UL (ref 4–11)

## 2019-07-30 LAB — BACTERIA SPEC CULT: NORMAL

## 2019-08-02 ENCOUNTER — COMMUNICATION - HEALTHEAST (OUTPATIENT)
Dept: INTERNAL MEDICINE | Facility: CLINIC | Age: 64
End: 2019-08-02

## 2019-08-02 ENCOUNTER — OFFICE VISIT - HEALTHEAST (OUTPATIENT)
Dept: INTERNAL MEDICINE | Facility: CLINIC | Age: 64
End: 2019-08-02

## 2019-08-02 DIAGNOSIS — M17.10 ARTHRITIS OF KNEE: ICD-10-CM

## 2019-08-02 DIAGNOSIS — Z79.4 TYPE 2 DIABETES MELLITUS WITH HYPERGLYCEMIA, WITH LONG-TERM CURRENT USE OF INSULIN (H): ICD-10-CM

## 2019-08-02 DIAGNOSIS — M54.5 CHRONIC LOW BACK PAIN, UNSPECIFIED BACK PAIN LATERALITY, WITH SCIATICA PRESENCE UNSPECIFIED: ICD-10-CM

## 2019-08-02 DIAGNOSIS — B00.9 HSV (HERPES SIMPLEX VIRUS) INFECTION: ICD-10-CM

## 2019-08-02 DIAGNOSIS — Z12.31 ENCOUNTER FOR SCREENING MAMMOGRAM FOR MALIGNANT NEOPLASM OF BREAST: ICD-10-CM

## 2019-08-02 DIAGNOSIS — I10 ESSENTIAL HYPERTENSION: ICD-10-CM

## 2019-08-02 DIAGNOSIS — I25.10 CORONARY ARTERY DISEASE DUE TO LIPID RICH PLAQUE: ICD-10-CM

## 2019-08-02 DIAGNOSIS — G89.29 CHRONIC LOW BACK PAIN, UNSPECIFIED BACK PAIN LATERALITY, WITH SCIATICA PRESENCE UNSPECIFIED: ICD-10-CM

## 2019-08-02 DIAGNOSIS — E11.65 TYPE 2 DIABETES MELLITUS WITH HYPERGLYCEMIA, WITH LONG-TERM CURRENT USE OF INSULIN (H): ICD-10-CM

## 2019-08-02 DIAGNOSIS — S49.92XA INJURY OF LEFT SHOULDER, INITIAL ENCOUNTER: ICD-10-CM

## 2019-08-02 DIAGNOSIS — Z00.00 PREVENTATIVE HEALTH CARE: ICD-10-CM

## 2019-08-02 DIAGNOSIS — I25.83 CORONARY ARTERY DISEASE DUE TO LIPID RICH PLAQUE: ICD-10-CM

## 2019-08-02 ASSESSMENT — MIFFLIN-ST. JEOR: SCORE: 1739.77

## 2019-08-06 ENCOUNTER — RECORDS - HEALTHEAST (OUTPATIENT)
Dept: ADMINISTRATIVE | Facility: OTHER | Age: 64
End: 2019-08-06

## 2019-08-06 ENCOUNTER — COMMUNICATION - HEALTHEAST (OUTPATIENT)
Dept: INTERNAL MEDICINE | Facility: CLINIC | Age: 64
End: 2019-08-06

## 2019-08-06 ENCOUNTER — RECORDS - HEALTHEAST (OUTPATIENT)
Dept: MAMMOGRAPHY | Facility: CLINIC | Age: 64
End: 2019-08-06

## 2019-08-06 DIAGNOSIS — Z00.00 ENCOUNTER FOR GENERAL ADULT MEDICAL EXAMINATION WITHOUT ABNORMAL FINDINGS: ICD-10-CM

## 2019-08-06 DIAGNOSIS — Z12.31 ENCOUNTER FOR SCREENING MAMMOGRAM FOR MALIGNANT NEOPLASM OF BREAST: ICD-10-CM

## 2019-08-13 ENCOUNTER — COMMUNICATION - HEALTHEAST (OUTPATIENT)
Dept: INTERNAL MEDICINE | Facility: CLINIC | Age: 64
End: 2019-08-13

## 2019-08-19 ENCOUNTER — OFFICE VISIT - HEALTHEAST (OUTPATIENT)
Dept: PHARMACY | Facility: CLINIC | Age: 64
End: 2019-08-19

## 2019-08-19 DIAGNOSIS — I10 ESSENTIAL HYPERTENSION WITH GOAL BLOOD PRESSURE LESS THAN 140/90: ICD-10-CM

## 2019-08-19 DIAGNOSIS — Z79.4 TYPE 2 DIABETES MELLITUS WITH HYPERGLYCEMIA, WITH LONG-TERM CURRENT USE OF INSULIN (H): ICD-10-CM

## 2019-08-19 DIAGNOSIS — E11.65 TYPE 2 DIABETES MELLITUS WITH HYPERGLYCEMIA, WITH LONG-TERM CURRENT USE OF INSULIN (H): ICD-10-CM

## 2019-08-20 ENCOUNTER — RECORDS - HEALTHEAST (OUTPATIENT)
Dept: ADMINISTRATIVE | Facility: OTHER | Age: 64
End: 2019-08-20

## 2019-08-27 ENCOUNTER — RECORDS - HEALTHEAST (OUTPATIENT)
Dept: ADMINISTRATIVE | Facility: OTHER | Age: 64
End: 2019-08-27

## 2019-08-27 ENCOUNTER — COMMUNICATION - HEALTHEAST (OUTPATIENT)
Dept: INTERNAL MEDICINE | Facility: CLINIC | Age: 64
End: 2019-08-27

## 2019-08-27 DIAGNOSIS — E11.69 TYPE 2 DIABETES MELLITUS WITH OTHER SPECIFIED COMPLICATION, WITH LONG-TERM CURRENT USE OF INSULIN (H): ICD-10-CM

## 2019-08-27 DIAGNOSIS — Z79.4 TYPE 2 DIABETES MELLITUS WITH OTHER SPECIFIED COMPLICATION, WITH LONG-TERM CURRENT USE OF INSULIN (H): ICD-10-CM

## 2019-08-28 ENCOUNTER — COMMUNICATION - HEALTHEAST (OUTPATIENT)
Dept: INTERNAL MEDICINE | Facility: CLINIC | Age: 64
End: 2019-08-28

## 2019-09-03 ENCOUNTER — RECORDS - HEALTHEAST (OUTPATIENT)
Dept: HEALTH INFORMATION MANAGEMENT | Facility: CLINIC | Age: 64
End: 2019-09-03

## 2019-09-05 ENCOUNTER — COMMUNICATION - HEALTHEAST (OUTPATIENT)
Dept: PHARMACY | Facility: CLINIC | Age: 64
End: 2019-09-05

## 2019-09-09 ENCOUNTER — RECORDS - HEALTHEAST (OUTPATIENT)
Dept: ADMINISTRATIVE | Facility: OTHER | Age: 64
End: 2019-09-09

## 2019-09-12 ENCOUNTER — COMMUNICATION - HEALTHEAST (OUTPATIENT)
Dept: INTERNAL MEDICINE | Facility: CLINIC | Age: 64
End: 2019-09-12

## 2019-09-12 DIAGNOSIS — G89.29 CHRONIC LOW BACK PAIN, UNSPECIFIED BACK PAIN LATERALITY, WITH SCIATICA PRESENCE UNSPECIFIED: ICD-10-CM

## 2019-09-12 DIAGNOSIS — S49.92XA INJURY OF LEFT SHOULDER, INITIAL ENCOUNTER: ICD-10-CM

## 2019-09-12 DIAGNOSIS — M17.10 ARTHRITIS OF KNEE: ICD-10-CM

## 2019-09-12 DIAGNOSIS — M54.5 CHRONIC LOW BACK PAIN, UNSPECIFIED BACK PAIN LATERALITY, WITH SCIATICA PRESENCE UNSPECIFIED: ICD-10-CM

## 2019-09-23 ENCOUNTER — COMMUNICATION - HEALTHEAST (OUTPATIENT)
Dept: NURSING | Facility: CLINIC | Age: 64
End: 2019-09-23

## 2019-09-23 DIAGNOSIS — Z79.4 TYPE 2 DIABETES MELLITUS WITH HYPERGLYCEMIA, WITH LONG-TERM CURRENT USE OF INSULIN (H): ICD-10-CM

## 2019-09-23 DIAGNOSIS — E11.65 TYPE 2 DIABETES MELLITUS WITH HYPERGLYCEMIA, WITH LONG-TERM CURRENT USE OF INSULIN (H): ICD-10-CM

## 2019-09-24 ENCOUNTER — COMMUNICATION - HEALTHEAST (OUTPATIENT)
Dept: INTERNAL MEDICINE | Facility: CLINIC | Age: 64
End: 2019-09-24

## 2019-09-24 DIAGNOSIS — E78.5 HYPERLIPEMIA: ICD-10-CM

## 2019-09-25 ENCOUNTER — COMMUNICATION - HEALTHEAST (OUTPATIENT)
Dept: INTERNAL MEDICINE | Facility: CLINIC | Age: 64
End: 2019-09-25

## 2019-09-26 ENCOUNTER — COMMUNICATION - HEALTHEAST (OUTPATIENT)
Dept: INTERNAL MEDICINE | Facility: CLINIC | Age: 64
End: 2019-09-26

## 2019-10-01 ENCOUNTER — COMMUNICATION - HEALTHEAST (OUTPATIENT)
Dept: SCHEDULING | Facility: CLINIC | Age: 64
End: 2019-10-01

## 2019-10-02 ENCOUNTER — OFFICE VISIT - HEALTHEAST (OUTPATIENT)
Dept: INTERNAL MEDICINE | Facility: CLINIC | Age: 64
End: 2019-10-02

## 2019-10-02 DIAGNOSIS — E11.65 TYPE 2 DIABETES MELLITUS WITH HYPERGLYCEMIA, WITH LONG-TERM CURRENT USE OF INSULIN (H): ICD-10-CM

## 2019-10-02 DIAGNOSIS — Z79.4 TYPE 2 DIABETES MELLITUS WITH HYPERGLYCEMIA, WITH LONG-TERM CURRENT USE OF INSULIN (H): ICD-10-CM
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
## 2019-10-02 DIAGNOSIS — Z86.0100 HISTORY OF COLONIC POLYPS: ICD-10-CM

## 2019-10-02 DIAGNOSIS — I87.8 VENOUS STASIS: ICD-10-CM

## 2019-10-17 ENCOUNTER — COMMUNICATION - HEALTHEAST (OUTPATIENT)
Dept: INTERNAL MEDICINE | Facility: CLINIC | Age: 64
End: 2019-10-17

## 2019-10-20 ENCOUNTER — COMMUNICATION - HEALTHEAST (OUTPATIENT)
Dept: INTERNAL MEDICINE | Facility: CLINIC | Age: 64
End: 2019-10-20

## 2019-10-20 DIAGNOSIS — I10 ESSENTIAL HYPERTENSION WITH GOAL BLOOD PRESSURE LESS THAN 140/90: ICD-10-CM

## 2019-10-25 ENCOUNTER — COMMUNICATION - HEALTHEAST (OUTPATIENT)
Dept: INTERNAL MEDICINE | Facility: CLINIC | Age: 64
End: 2019-10-25

## 2019-11-01 ENCOUNTER — COMMUNICATION - HEALTHEAST (OUTPATIENT)
Dept: INTERNAL MEDICINE | Facility: CLINIC | Age: 64
End: 2019-11-01

## 2019-11-01 DIAGNOSIS — S49.92XA INJURY OF LEFT SHOULDER, INITIAL ENCOUNTER: ICD-10-CM

## 2019-11-01 DIAGNOSIS — G89.29 CHRONIC LOW BACK PAIN: ICD-10-CM

## 2019-11-01 DIAGNOSIS — M25.569 KNEE PAIN: ICD-10-CM

## 2019-11-01 DIAGNOSIS — M25.562 LEFT KNEE PAIN: ICD-10-CM

## 2019-11-01 DIAGNOSIS — M17.10 ARTHRITIS OF KNEE: ICD-10-CM

## 2019-11-01 DIAGNOSIS — M54.50 CHRONIC LOW BACK PAIN: ICD-10-CM

## 2019-11-11 ENCOUNTER — COMMUNICATION - HEALTHEAST (OUTPATIENT)
Dept: INTERNAL MEDICINE | Facility: CLINIC | Age: 64
End: 2019-11-11

## 2019-11-11 DIAGNOSIS — I10 ESSENTIAL HYPERTENSION WITH GOAL BLOOD PRESSURE LESS THAN 140/90: ICD-10-CM

## 2019-11-18 ENCOUNTER — COMMUNICATION - HEALTHEAST (OUTPATIENT)
Dept: NURSING | Facility: CLINIC | Age: 64
End: 2019-11-18

## 2019-11-19 ENCOUNTER — COMMUNICATION - HEALTHEAST (OUTPATIENT)
Dept: INTERNAL MEDICINE | Facility: CLINIC | Age: 64
End: 2019-11-19

## 2019-11-20 ENCOUNTER — COMMUNICATION - HEALTHEAST (OUTPATIENT)
Dept: INTERNAL MEDICINE | Facility: CLINIC | Age: 64
End: 2019-11-20

## 2019-11-21 ENCOUNTER — RECORDS - HEALTHEAST (OUTPATIENT)
Dept: ADMINISTRATIVE | Facility: OTHER | Age: 64
End: 2019-11-21

## 2019-11-25 ENCOUNTER — OFFICE VISIT - HEALTHEAST (OUTPATIENT)
Dept: INTERNAL MEDICINE | Facility: CLINIC | Age: 64
End: 2019-11-25

## 2019-11-25 ENCOUNTER — COMMUNICATION - HEALTHEAST (OUTPATIENT)
Dept: INTERNAL MEDICINE | Facility: CLINIC | Age: 64
End: 2019-11-25

## 2019-11-25 ENCOUNTER — RECORDS - HEALTHEAST (OUTPATIENT)
Dept: ADMINISTRATIVE | Facility: OTHER | Age: 64
End: 2019-11-25

## 2019-11-25 ENCOUNTER — RECORDS - HEALTHEAST (OUTPATIENT)
Dept: GENERAL RADIOLOGY | Facility: CLINIC | Age: 64
End: 2019-11-25

## 2019-11-25 DIAGNOSIS — E11.65 TYPE 2 DIABETES MELLITUS WITH HYPERGLYCEMIA, WITH LONG-TERM CURRENT USE OF INSULIN (H): ICD-10-CM

## 2019-11-25 DIAGNOSIS — M10.9 ACUTE GOUTY ARTHROPATHY: ICD-10-CM

## 2019-11-25 DIAGNOSIS — L29.9 PRURITIC DISORDER: ICD-10-CM

## 2019-11-25 DIAGNOSIS — M10.072 IDIOPATHIC GOUT, LEFT ANKLE AND FOOT: ICD-10-CM

## 2019-11-25 DIAGNOSIS — M10.072 ACUTE IDIOPATHIC GOUT OF LEFT FOOT: ICD-10-CM

## 2019-11-25 DIAGNOSIS — I25.10 CORONARY ARTERY DISEASE DUE TO LIPID RICH PLAQUE: ICD-10-CM

## 2019-11-25 DIAGNOSIS — I25.83 CORONARY ARTERY DISEASE DUE TO LIPID RICH PLAQUE: ICD-10-CM

## 2019-11-25 DIAGNOSIS — Z79.4 TYPE 2 DIABETES MELLITUS WITH HYPERGLYCEMIA, WITH LONG-TERM CURRENT USE OF INSULIN (H): ICD-10-CM

## 2019-11-25 DIAGNOSIS — I10 ESSENTIAL HYPERTENSION: ICD-10-CM

## 2019-11-25 DIAGNOSIS — E51.9 THIAMINE DEFICIENCY: ICD-10-CM

## 2019-11-25 DIAGNOSIS — N95.1 MENOPAUSAL SYNDROME (HOT FLASHES): ICD-10-CM

## 2019-11-25 LAB
ALBUMIN SERPL-MCNC: 4 G/DL (ref 3.5–5)
ALP SERPL-CCNC: 90 U/L (ref 45–120)
ALT SERPL W P-5'-P-CCNC: 22 U/L (ref 0–45)
ANION GAP SERPL CALCULATED.3IONS-SCNC: 7 MMOL/L (ref 5–18)
AST SERPL W P-5'-P-CCNC: 20 U/L (ref 0–40)
BILIRUB SERPL-MCNC: 0.4 MG/DL (ref 0–1)
BUN SERPL-MCNC: 14 MG/DL (ref 8–22)
CALCIUM SERPL-MCNC: 10.1 MG/DL (ref 8.5–10.5)
CHLORIDE BLD-SCNC: 105 MMOL/L (ref 98–107)
CO2 SERPL-SCNC: 27 MMOL/L (ref 22–31)
CREAT SERPL-MCNC: 0.89 MG/DL (ref 0.6–1.1)
ERYTHROCYTE [DISTWIDTH] IN BLOOD BY AUTOMATED COUNT: 12.1 % (ref 11–14.5)
GFR SERPL CREATININE-BSD FRML MDRD: >60 ML/MIN/1.73M2
GLUCOSE BLD-MCNC: 103 MG/DL (ref 70–125)
HBA1C MFR BLD: 6.7 % (ref 3.5–6)
HCT VFR BLD AUTO: 36.6 % (ref 35–47)
HGB BLD-MCNC: 12.2 G/DL (ref 12–16)
LDLC SERPL CALC-MCNC: 60 MG/DL
MCH RBC QN AUTO: 30.6 PG (ref 27–34)
MCHC RBC AUTO-ENTMCNC: 33.3 G/DL (ref 32–36)
MCV RBC AUTO: 92 FL (ref 80–100)
PLATELET # BLD AUTO: 311 THOU/UL (ref 140–440)
PMV BLD AUTO: 7.3 FL (ref 7–10)
POTASSIUM BLD-SCNC: 4.6 MMOL/L (ref 3.5–5)
PROT SERPL-MCNC: 7 G/DL (ref 6–8)
RBC # BLD AUTO: 3.99 MILL/UL (ref 3.8–5.4)
SODIUM SERPL-SCNC: 139 MMOL/L (ref 136–145)
TSH SERPL DL<=0.005 MIU/L-ACNC: 2.69 UIU/ML (ref 0.3–5)
URATE SERPL-MCNC: 8.1 MG/DL (ref 2–7.5)
WBC: 7.8 THOU/UL (ref 4–11)

## 2019-11-26 ENCOUNTER — RECORDS - HEALTHEAST (OUTPATIENT)
Dept: ADMINISTRATIVE | Facility: OTHER | Age: 64
End: 2019-11-26

## 2019-12-05 ENCOUNTER — COMMUNICATION - HEALTHEAST (OUTPATIENT)
Dept: INTERNAL MEDICINE | Facility: CLINIC | Age: 64
End: 2019-12-05

## 2019-12-05 DIAGNOSIS — Z79.4 TYPE 2 DIABETES MELLITUS WITH HYPERGLYCEMIA, WITH LONG-TERM CURRENT USE OF INSULIN (H): ICD-10-CM

## 2019-12-05 DIAGNOSIS — E11.65 TYPE 2 DIABETES MELLITUS WITH HYPERGLYCEMIA, WITH LONG-TERM CURRENT USE OF INSULIN (H): ICD-10-CM

## 2019-12-12 ENCOUNTER — COMMUNICATION - HEALTHEAST (OUTPATIENT)
Dept: INTERNAL MEDICINE | Facility: CLINIC | Age: 64
End: 2019-12-12

## 2019-12-14 ENCOUNTER — COMMUNICATION - HEALTHEAST (OUTPATIENT)
Dept: INTERNAL MEDICINE | Facility: CLINIC | Age: 64
End: 2019-12-14

## 2019-12-14 DIAGNOSIS — E11.69 TYPE 2 DIABETES MELLITUS WITH OTHER SPECIFIED COMPLICATION, WITH LONG-TERM CURRENT USE OF INSULIN (H): ICD-10-CM

## 2019-12-14 DIAGNOSIS — Z79.4 TYPE 2 DIABETES MELLITUS WITH OTHER SPECIFIED COMPLICATION, WITH LONG-TERM CURRENT USE OF INSULIN (H): ICD-10-CM

## 2019-12-17 ENCOUNTER — COMMUNICATION - HEALTHEAST (OUTPATIENT)
Dept: INTERNAL MEDICINE | Facility: CLINIC | Age: 64
End: 2019-12-17

## 2019-12-26 ENCOUNTER — COMMUNICATION - HEALTHEAST (OUTPATIENT)
Dept: SCHEDULING | Facility: CLINIC | Age: 64
End: 2019-12-26

## 2019-12-26 ENCOUNTER — COMMUNICATION - HEALTHEAST (OUTPATIENT)
Dept: INTERNAL MEDICINE | Facility: CLINIC | Age: 64
End: 2019-12-26

## 2019-12-27 ENCOUNTER — RECORDS - HEALTHEAST (OUTPATIENT)
Dept: ADMINISTRATIVE | Facility: OTHER | Age: 64
End: 2019-12-27

## 2019-12-27 ENCOUNTER — COMMUNICATION - HEALTHEAST (OUTPATIENT)
Dept: SCHEDULING | Facility: CLINIC | Age: 64
End: 2019-12-27

## 2019-12-30 ENCOUNTER — COMMUNICATION - HEALTHEAST (OUTPATIENT)
Dept: FAMILY MEDICINE | Facility: CLINIC | Age: 64
End: 2019-12-30

## 2019-12-30 ENCOUNTER — OFFICE VISIT - HEALTHEAST (OUTPATIENT)
Dept: INTERNAL MEDICINE | Facility: CLINIC | Age: 64
End: 2019-12-30

## 2019-12-30 DIAGNOSIS — S06.0X0S CONCUSSION WITHOUT LOSS OF CONSCIOUSNESS, SEQUELA (H): ICD-10-CM

## 2019-12-30 DIAGNOSIS — I10 ESSENTIAL HYPERTENSION: ICD-10-CM

## 2019-12-30 DIAGNOSIS — G47.33 OSA ON CPAP: ICD-10-CM

## 2019-12-30 DIAGNOSIS — Z01.818 PRE-OPERATIVE EXAMINATION: ICD-10-CM

## 2019-12-30 DIAGNOSIS — M10.9 GOUT, UNSPECIFIED CAUSE, UNSPECIFIED CHRONICITY, UNSPECIFIED SITE: ICD-10-CM

## 2019-12-30 DIAGNOSIS — Z79.4 TYPE 2 DIABETES MELLITUS WITH HYPERGLYCEMIA, WITH LONG-TERM CURRENT USE OF INSULIN (H): ICD-10-CM

## 2019-12-30 DIAGNOSIS — E11.65 TYPE 2 DIABETES MELLITUS WITH HYPERGLYCEMIA, WITH LONG-TERM CURRENT USE OF INSULIN (H): ICD-10-CM

## 2019-12-30 DIAGNOSIS — I25.10 CORONARY ARTERY DISEASE DUE TO LIPID RICH PLAQUE: ICD-10-CM

## 2019-12-30 DIAGNOSIS — I25.83 CORONARY ARTERY DISEASE DUE TO LIPID RICH PLAQUE: ICD-10-CM

## 2019-12-30 DIAGNOSIS — S83.207S ACUTE MENISCAL TEAR OF LEFT KNEE, SEQUELA: ICD-10-CM

## 2019-12-30 LAB
ALBUMIN UR-MCNC: ABNORMAL MG/DL
APPEARANCE UR: ABNORMAL
BACTERIA #/AREA URNS HPF: ABNORMAL HPF
BILIRUB UR QL STRIP: NEGATIVE
COLOR UR AUTO: YELLOW
GLUCOSE UR STRIP-MCNC: NEGATIVE MG/DL
HGB UR QL STRIP: NEGATIVE
KETONES UR STRIP-MCNC: NEGATIVE MG/DL
LEUKOCYTE ESTERASE UR QL STRIP: ABNORMAL
NITRATE UR QL: NEGATIVE
PH UR STRIP: 5.5 [PH] (ref 5–8)
RBC #/AREA URNS AUTO: ABNORMAL HPF
SP GR UR STRIP: 1.02 (ref 1–1.03)
SQUAMOUS #/AREA URNS AUTO: ABNORMAL LPF
UROBILINOGEN UR STRIP-ACNC: ABNORMAL
WBC #/AREA URNS AUTO: ABNORMAL HPF

## 2019-12-30 ASSESSMENT — PATIENT HEALTH QUESTIONNAIRE - PHQ9: SUM OF ALL RESPONSES TO PHQ QUESTIONS 1-9: 0

## 2019-12-30 ASSESSMENT — MIFFLIN-ST. JEOR: SCORE: 1785.58

## 2019-12-31 LAB
ATRIAL RATE - MUSE: 77 BPM
BACTERIA SPEC CULT: NO GROWTH
DIASTOLIC BLOOD PRESSURE - MUSE: NORMAL
INTERPRETATION ECG - MUSE: NORMAL
P AXIS - MUSE: 53 DEGREES
PR INTERVAL - MUSE: 154 MS
QRS DURATION - MUSE: 70 MS
QT - MUSE: 380 MS
QTC - MUSE: 430 MS
R AXIS - MUSE: -11 DEGREES
SYSTOLIC BLOOD PRESSURE - MUSE: NORMAL
T AXIS - MUSE: 71 DEGREES
VENTRICULAR RATE- MUSE: 77 BPM

## 2020-01-02 ENCOUNTER — HOSPITAL ENCOUNTER (OUTPATIENT)
Dept: CARDIOLOGY | Facility: HOSPITAL | Age: 65
Discharge: HOME | End: 2020-01-02
Attending: INTERNAL MEDICINE

## 2020-01-02 DIAGNOSIS — I25.83 CORONARY ARTERY DISEASE DUE TO LIPID RICH PLAQUE: ICD-10-CM

## 2020-01-02 DIAGNOSIS — I25.10 CORONARY ARTERY DISEASE DUE TO LIPID RICH PLAQUE: ICD-10-CM

## 2020-01-02 DIAGNOSIS — Z01.818 PRE-OPERATIVE EXAMINATION: ICD-10-CM

## 2020-01-02 DIAGNOSIS — S83.207S ACUTE MENISCAL TEAR OF LEFT KNEE, SEQUELA: ICD-10-CM

## 2020-01-02 LAB
AORTIC ROOT: 2.9 CM
BSA FOR ECHO PROCEDURE: 2.37 M2
CV BLOOD PRESSURE: ABNORMAL MMHG
CV ECHO HEIGHT: 65 IN
CV ECHO WEIGHT: 271 LBS
DOP CALC LVOT AREA: 3.14 CM2
DOP CALC LVOT DIAMETER: 2 CM
DOP CALC LVOT STROKE VOLUME: 58.7 CM3
DOP CALCLVOT PEAK VEL VTI: 18.7 CM
EJECTION FRACTION: 57 % (ref 55–75)
FRACTIONAL SHORTENING: 29.7 % (ref 28–44)
INTERVENTRICULAR SEPTUM IN END DIASTOLE: 1.7 CM (ref 0.6–0.9)
IVS/PW RATIO: 1.1
LA AREA 1: 16.9 CM2
LA AREA 2: 18 CM2
LEFT ATRIUM LENGTH: 4.4 CM
LEFT ATRIUM SIZE: 3.5 CM
LEFT ATRIUM VOLUME INDEX: 24.8 ML/M2
LEFT ATRIUM VOLUME: 58.8 ML
LEFT VENTRICLE CARDIAC INDEX: 2 L/MIN/M2
LEFT VENTRICLE CARDIAC OUTPUT: 4.6 L/MIN
LEFT VENTRICLE DIASTOLIC VOLUME INDEX: 25.7 CM3/M2 (ref 29–61)
LEFT VENTRICLE DIASTOLIC VOLUME: 61 CM3 (ref 46–106)
LEFT VENTRICLE HEART RATE: 79 BPM
LEFT VENTRICLE MASS INDEX: 97.8 G/M2
LEFT VENTRICLE SYSTOLIC VOLUME INDEX: 11 CM3/M2 (ref 8–24)
LEFT VENTRICLE SYSTOLIC VOLUME: 26 CM3 (ref 14–42)
LEFT VENTRICULAR INTERNAL DIMENSION IN DIASTOLE: 3.7 CM (ref 3.8–5.2)
LEFT VENTRICULAR INTERNAL DIMENSION IN SYSTOLE: 2.6 CM (ref 2.2–3.5)
LEFT VENTRICULAR MASS: 231.8 G
LEFT VENTRICULAR OUTFLOW TRACT MEAN GRADIENT: 2 MMHG
LEFT VENTRICULAR OUTFLOW TRACT MEAN VELOCITY: 57.2 CM/S
LEFT VENTRICULAR POSTERIOR WALL IN END DIASTOLE: 1.5 CM (ref 0.6–0.9)
LV STROKE VOLUME INDEX: 24.8 ML/M2
MITRAL VALVE E/A RATIO: 1.4
MV AVERAGE E/E' RATIO: 11.4 CM/S
MV DECELERATION TIME: 113 MS
MV E'TISSUE VEL-LAT: 11.6 CM/S
MV E'TISSUE VEL-MED: 7.12 CM/S
MV LATERAL E/E' RATIO: 9.2
MV MEDIAL E/E' RATIO: 15
MV PEAK A VELOCITY: 77.9 CM/S
MV PEAK E VELOCITY: 107 CM/S
NUC REST DIASTOLIC VOLUME INDEX: 4336 LBS
NUC REST SYSTOLIC VOLUME INDEX: 65 IN
PR MAX PG: 4 MMHG
PR PEAK VELOCITY: 95.6 CM/S
TRICUSPID REGURGITATION PEAK PRESSURE GRADIENT: 16.5 MMHG
TRICUSPID VALVE ANULAR PLANE SYSTOLIC EXCURSION: 1.7 CM
TRICUSPID VALVE PEAK REGURGITANT VELOCITY: 203 CM/S

## 2020-01-02 ASSESSMENT — MIFFLIN-ST. JEOR: SCORE: 1785.13

## 2020-01-06 ENCOUNTER — RECORDS - HEALTHEAST (OUTPATIENT)
Dept: ADMINISTRATIVE | Facility: OTHER | Age: 65
End: 2020-01-06

## 2020-01-06 ENCOUNTER — COMMUNICATION - HEALTHEAST (OUTPATIENT)
Dept: SCHEDULING | Facility: CLINIC | Age: 65
End: 2020-01-06

## 2020-01-11 ENCOUNTER — COMMUNICATION - HEALTHEAST (OUTPATIENT)
Dept: INTERNAL MEDICINE | Facility: CLINIC | Age: 65
End: 2020-01-11

## 2020-01-11 DIAGNOSIS — K21.00 GASTROESOPHAGEAL REFLUX DISEASE WITH ESOPHAGITIS: ICD-10-CM

## 2020-01-13 ENCOUNTER — RECORDS - HEALTHEAST (OUTPATIENT)
Dept: ADMINISTRATIVE | Facility: OTHER | Age: 65
End: 2020-01-13

## 2020-01-20 ENCOUNTER — COMMUNICATION - HEALTHEAST (OUTPATIENT)
Dept: INTERNAL MEDICINE | Facility: CLINIC | Age: 65
End: 2020-01-20

## 2020-01-20 DIAGNOSIS — M54.50 CHRONIC LOW BACK PAIN: ICD-10-CM

## 2020-01-20 DIAGNOSIS — G89.29 CHRONIC LOW BACK PAIN: ICD-10-CM

## 2020-01-20 DIAGNOSIS — M17.10 ARTHRITIS OF KNEE: ICD-10-CM

## 2020-01-20 DIAGNOSIS — S49.92XA INJURY OF LEFT SHOULDER, INITIAL ENCOUNTER: ICD-10-CM

## 2020-01-22 ENCOUNTER — COMMUNICATION - HEALTHEAST (OUTPATIENT)
Dept: INTERNAL MEDICINE | Facility: CLINIC | Age: 65
End: 2020-01-22

## 2020-01-22 DIAGNOSIS — Z79.4 TYPE 2 DIABETES MELLITUS WITH HYPERGLYCEMIA, WITH LONG-TERM CURRENT USE OF INSULIN (H): ICD-10-CM

## 2020-01-22 DIAGNOSIS — E11.65 TYPE 2 DIABETES MELLITUS WITH HYPERGLYCEMIA, WITH LONG-TERM CURRENT USE OF INSULIN (H): ICD-10-CM

## 2020-01-23 ENCOUNTER — RECORDS - HEALTHEAST (OUTPATIENT)
Dept: ADMINISTRATIVE | Facility: OTHER | Age: 65
End: 2020-01-23

## 2020-01-28 ENCOUNTER — OFFICE VISIT - HEALTHEAST (OUTPATIENT)
Dept: INTERNAL MEDICINE | Facility: CLINIC | Age: 65
End: 2020-01-28

## 2020-01-28 DIAGNOSIS — Z11.4 ENCOUNTER FOR SCREENING FOR HUMAN IMMUNODEFICIENCY VIRUS (HIV): ICD-10-CM

## 2020-01-28 DIAGNOSIS — Z00.00 PREVENTATIVE HEALTH CARE: ICD-10-CM

## 2020-01-28 DIAGNOSIS — M17.10 ARTHRITIS OF KNEE: ICD-10-CM

## 2020-01-28 DIAGNOSIS — Z79.4 TYPE 2 DIABETES MELLITUS WITH HYPERGLYCEMIA, WITH LONG-TERM CURRENT USE OF INSULIN (H): ICD-10-CM

## 2020-01-28 DIAGNOSIS — E55.9 VITAMIN D DEFICIENCY: ICD-10-CM

## 2020-01-28 DIAGNOSIS — Z79.899 MEDICATION MANAGEMENT: ICD-10-CM

## 2020-01-28 DIAGNOSIS — M54.50 CHRONIC LOW BACK PAIN: ICD-10-CM

## 2020-01-28 DIAGNOSIS — E11.65 TYPE 2 DIABETES MELLITUS WITH HYPERGLYCEMIA, WITH LONG-TERM CURRENT USE OF INSULIN (H): ICD-10-CM

## 2020-01-28 DIAGNOSIS — S06.0X9S CONCUSSION WITH LOSS OF CONSCIOUSNESS, SEQUELA (H): ICD-10-CM

## 2020-01-28 DIAGNOSIS — M10.072 ACUTE IDIOPATHIC GOUT OF LEFT FOOT: ICD-10-CM

## 2020-01-28 DIAGNOSIS — G89.29 CHRONIC LOW BACK PAIN: ICD-10-CM

## 2020-01-28 LAB
ALBUMIN SERPL-MCNC: 4 G/DL (ref 3.5–5)
ALP SERPL-CCNC: 92 U/L (ref 45–120)
ALT SERPL W P-5'-P-CCNC: 25 U/L (ref 0–45)
AMPHETAMINES UR QL SCN: NORMAL
ANION GAP SERPL CALCULATED.3IONS-SCNC: 10 MMOL/L (ref 5–18)
AST SERPL W P-5'-P-CCNC: 18 U/L (ref 0–40)
BARBITURATES UR QL: NORMAL
BENZODIAZ UR QL: NORMAL
BILIRUB SERPL-MCNC: 0.4 MG/DL (ref 0–1)
BUN SERPL-MCNC: 15 MG/DL (ref 8–22)
CALCIUM SERPL-MCNC: 9.5 MG/DL (ref 8.5–10.5)
CANNABINOIDS UR QL SCN: NORMAL
CHLORIDE BLD-SCNC: 104 MMOL/L (ref 98–107)
CO2 SERPL-SCNC: 24 MMOL/L (ref 22–31)
COCAINE UR QL: NORMAL
CREAT SERPL-MCNC: 0.95 MG/DL (ref 0.6–1.1)
CREAT UR-MCNC: 184 MG/DL
ERYTHROCYTE [DISTWIDTH] IN BLOOD BY AUTOMATED COUNT: 11.8 % (ref 11–14.5)
ERYTHROCYTE [SEDIMENTATION RATE] IN BLOOD BY WESTERGREN METHOD: 46 MM/HR (ref 0–20)
GFR SERPL CREATININE-BSD FRML MDRD: 60 ML/MIN/1.73M2
GLUCOSE BLD-MCNC: 176 MG/DL (ref 70–125)
HCT VFR BLD AUTO: 34.4 % (ref 35–47)
HCV AB SERPL QL IA: NEGATIVE
HGB BLD-MCNC: 11.7 G/DL (ref 12–16)
HIV 1+2 AB+HIV1 P24 AG SERPL QL IA: NEGATIVE
MCH RBC QN AUTO: 30.9 PG (ref 27–34)
MCHC RBC AUTO-ENTMCNC: 34 G/DL (ref 32–36)
MCV RBC AUTO: 91 FL (ref 80–100)
OPIATES UR QL SCN: NORMAL
OXYCODONE UR QL: NORMAL
PCP UR QL SCN: NORMAL
PLATELET # BLD AUTO: 281 THOU/UL (ref 140–440)
PMV BLD AUTO: 7.3 FL (ref 7–10)
POTASSIUM BLD-SCNC: 3.8 MMOL/L (ref 3.5–5)
PROT SERPL-MCNC: 6.9 G/DL (ref 6–8)
RBC # BLD AUTO: 3.78 MILL/UL (ref 3.8–5.4)
SODIUM SERPL-SCNC: 138 MMOL/L (ref 136–145)
URATE SERPL-MCNC: 6.1 MG/DL (ref 2–7.5)
WBC: 6.6 THOU/UL (ref 4–11)

## 2020-01-31 ENCOUNTER — RECORDS - HEALTHEAST (OUTPATIENT)
Dept: ADMINISTRATIVE | Facility: OTHER | Age: 65
End: 2020-01-31

## 2020-02-19 ENCOUNTER — COMMUNICATION - HEALTHEAST (OUTPATIENT)
Dept: INTERNAL MEDICINE | Facility: CLINIC | Age: 65
End: 2020-02-19

## 2020-03-10 ENCOUNTER — COMMUNICATION - HEALTHEAST (OUTPATIENT)
Dept: INTERNAL MEDICINE | Facility: CLINIC | Age: 65
End: 2020-03-10

## 2020-03-28 ENCOUNTER — COMMUNICATION - HEALTHEAST (OUTPATIENT)
Dept: INTERNAL MEDICINE | Facility: CLINIC | Age: 65
End: 2020-03-28

## 2020-03-28 DIAGNOSIS — E11.65 TYPE 2 DIABETES MELLITUS WITH HYPERGLYCEMIA, WITH LONG-TERM CURRENT USE OF INSULIN (H): ICD-10-CM

## 2020-03-28 DIAGNOSIS — Z79.4 TYPE 2 DIABETES MELLITUS WITH HYPERGLYCEMIA, WITH LONG-TERM CURRENT USE OF INSULIN (H): ICD-10-CM

## 2020-04-06 ENCOUNTER — COMMUNICATION - HEALTHEAST (OUTPATIENT)
Dept: SCHEDULING | Facility: CLINIC | Age: 65
End: 2020-04-06

## 2020-04-07 ENCOUNTER — COMMUNICATION - HEALTHEAST (OUTPATIENT)
Dept: INTERNAL MEDICINE | Facility: CLINIC | Age: 65
End: 2020-04-07

## 2020-04-13 ENCOUNTER — AMBULATORY - HEALTHEAST (OUTPATIENT)
Dept: INTERNAL MEDICINE | Facility: CLINIC | Age: 65
End: 2020-04-13

## 2020-04-13 DIAGNOSIS — Z79.4 TYPE 2 DIABETES MELLITUS WITH HYPERGLYCEMIA, WITH LONG-TERM CURRENT USE OF INSULIN (H): ICD-10-CM

## 2020-04-13 DIAGNOSIS — E11.65 TYPE 2 DIABETES MELLITUS WITH HYPERGLYCEMIA, WITH LONG-TERM CURRENT USE OF INSULIN (H): ICD-10-CM

## 2020-04-14 ENCOUNTER — COMMUNICATION - HEALTHEAST (OUTPATIENT)
Dept: INTERNAL MEDICINE | Facility: CLINIC | Age: 65
End: 2020-04-14

## 2020-04-14 DIAGNOSIS — G89.29 CHRONIC LOW BACK PAIN: ICD-10-CM

## 2020-04-14 DIAGNOSIS — M54.50 CHRONIC LOW BACK PAIN: ICD-10-CM

## 2020-04-14 DIAGNOSIS — M17.10 ARTHRITIS OF KNEE: ICD-10-CM

## 2020-04-17 ENCOUNTER — OFFICE VISIT - HEALTHEAST (OUTPATIENT)
Dept: INTERNAL MEDICINE | Facility: CLINIC | Age: 65
End: 2020-04-17

## 2020-04-17 DIAGNOSIS — R09.82 POST-NASAL DRIP: ICD-10-CM

## 2020-04-17 DIAGNOSIS — E11.65 TYPE 2 DIABETES MELLITUS WITH HYPERGLYCEMIA, WITH LONG-TERM CURRENT USE OF INSULIN (H): ICD-10-CM

## 2020-04-17 DIAGNOSIS — L29.9 PRURITIC DISORDER: ICD-10-CM

## 2020-04-17 DIAGNOSIS — K21.00 GASTROESOPHAGEAL REFLUX DISEASE WITH ESOPHAGITIS: ICD-10-CM

## 2020-04-17 DIAGNOSIS — G89.29 CHRONIC LOW BACK PAIN: ICD-10-CM

## 2020-04-17 DIAGNOSIS — M54.50 CHRONIC LOW BACK PAIN: ICD-10-CM

## 2020-04-17 DIAGNOSIS — E66.01 MORBID OBESITY WITH BMI OF 40.0-44.9, ADULT (H): ICD-10-CM

## 2020-04-17 DIAGNOSIS — Z79.4 TYPE 2 DIABETES MELLITUS WITH HYPERGLYCEMIA, WITH LONG-TERM CURRENT USE OF INSULIN (H): ICD-10-CM

## 2020-04-17 DIAGNOSIS — M17.10 ARTHRITIS OF KNEE: ICD-10-CM

## 2020-04-17 DIAGNOSIS — I10 ESSENTIAL HYPERTENSION: ICD-10-CM

## 2020-04-17 DIAGNOSIS — I65.22 STENOSIS OF LEFT CAROTID ARTERY: ICD-10-CM

## 2020-04-17 RX ORDER — CETIRIZINE HYDROCHLORIDE 10 MG/1
10 TABLET ORAL DAILY
Qty: 30 TABLET | Refills: 2 | Status: SHIPPED | OUTPATIENT
Start: 2020-04-17 | End: 2021-08-27

## 2020-04-20 ENCOUNTER — AMBULATORY - HEALTHEAST (OUTPATIENT)
Dept: LAB | Facility: CLINIC | Age: 65
End: 2020-04-20

## 2020-04-20 ENCOUNTER — COMMUNICATION - HEALTHEAST (OUTPATIENT)
Dept: INTERNAL MEDICINE | Facility: CLINIC | Age: 65
End: 2020-04-20

## 2020-04-20 DIAGNOSIS — Z79.4 TYPE 2 DIABETES MELLITUS WITH HYPERGLYCEMIA, WITH LONG-TERM CURRENT USE OF INSULIN (H): ICD-10-CM

## 2020-04-20 DIAGNOSIS — L29.9 PRURITIC DISORDER: ICD-10-CM

## 2020-04-20 DIAGNOSIS — E11.65 TYPE 2 DIABETES MELLITUS WITH HYPERGLYCEMIA, WITH LONG-TERM CURRENT USE OF INSULIN (H): ICD-10-CM

## 2020-04-20 LAB
ALBUMIN SERPL-MCNC: 4.2 G/DL (ref 3.5–5)
ALP SERPL-CCNC: 100 U/L (ref 45–120)
ALT SERPL W P-5'-P-CCNC: 25 U/L (ref 0–45)
ANION GAP SERPL CALCULATED.3IONS-SCNC: 8 MMOL/L (ref 5–18)
AST SERPL W P-5'-P-CCNC: 21 U/L (ref 0–40)
BILIRUB SERPL-MCNC: 0.3 MG/DL (ref 0–1)
BUN SERPL-MCNC: 14 MG/DL (ref 8–22)
CALCIUM SERPL-MCNC: 10.4 MG/DL (ref 8.5–10.5)
CHLORIDE BLD-SCNC: 103 MMOL/L (ref 98–107)
CO2 SERPL-SCNC: 27 MMOL/L (ref 22–31)
CREAT SERPL-MCNC: 1.07 MG/DL (ref 0.6–1.1)
GFR SERPL CREATININE-BSD FRML MDRD: 52 ML/MIN/1.73M2
GLUCOSE BLD-MCNC: 195 MG/DL (ref 70–125)
POTASSIUM BLD-SCNC: 4.5 MMOL/L (ref 3.5–5)
PROT SERPL-MCNC: 7.4 G/DL (ref 6–8)
SODIUM SERPL-SCNC: 138 MMOL/L (ref 136–145)

## 2020-04-21 ENCOUNTER — COMMUNICATION - HEALTHEAST (OUTPATIENT)
Dept: INTERNAL MEDICINE | Facility: CLINIC | Age: 65
End: 2020-04-21

## 2020-04-21 DIAGNOSIS — E78.5 HYPERLIPEMIA: ICD-10-CM

## 2020-04-21 LAB — HBA1C MFR BLD: 7.2 % (ref 3.5–6)

## 2020-04-25 ENCOUNTER — COMMUNICATION - HEALTHEAST (OUTPATIENT)
Dept: INTERNAL MEDICINE | Facility: CLINIC | Age: 65
End: 2020-04-25

## 2020-04-27 ENCOUNTER — COMMUNICATION - HEALTHEAST (OUTPATIENT)
Dept: INTERNAL MEDICINE | Facility: CLINIC | Age: 65
End: 2020-04-27

## 2020-04-27 ENCOUNTER — COMMUNICATION - HEALTHEAST (OUTPATIENT)
Dept: NURSING | Facility: CLINIC | Age: 65
End: 2020-04-27

## 2020-04-28 ENCOUNTER — COMMUNICATION - HEALTHEAST (OUTPATIENT)
Dept: INTERNAL MEDICINE | Facility: CLINIC | Age: 65
End: 2020-04-28

## 2020-05-26 ENCOUNTER — COMMUNICATION - HEALTHEAST (OUTPATIENT)
Dept: NURSING | Facility: CLINIC | Age: 65
End: 2020-05-26

## 2020-05-26 DIAGNOSIS — Z79.4 TYPE 2 DIABETES MELLITUS WITH HYPERGLYCEMIA, WITH LONG-TERM CURRENT USE OF INSULIN (H): ICD-10-CM

## 2020-05-26 DIAGNOSIS — E11.65 TYPE 2 DIABETES MELLITUS WITH HYPERGLYCEMIA, WITH LONG-TERM CURRENT USE OF INSULIN (H): ICD-10-CM

## 2020-06-22 ENCOUNTER — COMMUNICATION - HEALTHEAST (OUTPATIENT)
Dept: INTERNAL MEDICINE | Facility: CLINIC | Age: 65
End: 2020-06-22

## 2020-07-02 ENCOUNTER — COMMUNICATION - HEALTHEAST (OUTPATIENT)
Dept: INTERNAL MEDICINE | Facility: CLINIC | Age: 65
End: 2020-07-02

## 2020-07-10 ENCOUNTER — COMMUNICATION - HEALTHEAST (OUTPATIENT)
Dept: INTERNAL MEDICINE | Facility: CLINIC | Age: 65
End: 2020-07-10

## 2020-07-14 ENCOUNTER — COMMUNICATION - HEALTHEAST (OUTPATIENT)
Dept: INTERNAL MEDICINE | Facility: CLINIC | Age: 65
End: 2020-07-14

## 2020-07-17 ENCOUNTER — HOSPITAL ENCOUNTER (OUTPATIENT)
Dept: ULTRASOUND IMAGING | Facility: HOSPITAL | Age: 65
Discharge: HOME | End: 2020-07-17
Attending: INTERNAL MEDICINE

## 2020-07-17 ENCOUNTER — AMBULATORY - HEALTHEAST (OUTPATIENT)
Dept: INTERNAL MEDICINE | Facility: CLINIC | Age: 65
End: 2020-07-17

## 2020-07-17 DIAGNOSIS — I25.83 CORONARY ARTERY DISEASE DUE TO LIPID RICH PLAQUE: ICD-10-CM

## 2020-07-17 DIAGNOSIS — I65.22 STENOSIS OF LEFT CAROTID ARTERY: ICD-10-CM

## 2020-07-17 DIAGNOSIS — I25.10 CORONARY ARTERY DISEASE DUE TO LIPID RICH PLAQUE: ICD-10-CM

## 2020-07-17 DIAGNOSIS — R07.89 OTHER CHEST PAIN: ICD-10-CM

## 2020-07-21 ENCOUNTER — AMBULATORY - HEALTHEAST (OUTPATIENT)
Dept: NURSING | Facility: CLINIC | Age: 65
End: 2020-07-21

## 2020-07-21 ENCOUNTER — OFFICE VISIT - HEALTHEAST (OUTPATIENT)
Dept: INTERNAL MEDICINE | Facility: CLINIC | Age: 65
End: 2020-07-21

## 2020-07-21 ENCOUNTER — AMBULATORY - HEALTHEAST (OUTPATIENT)
Dept: LAB | Facility: CLINIC | Age: 65
End: 2020-07-21

## 2020-07-21 DIAGNOSIS — Z79.4 TYPE 2 DIABETES MELLITUS WITH HYPERGLYCEMIA, WITH LONG-TERM CURRENT USE OF INSULIN (H): ICD-10-CM

## 2020-07-21 DIAGNOSIS — L30.9 DERMATITIS: ICD-10-CM

## 2020-07-21 DIAGNOSIS — R07.9 CHEST PAIN, UNSPECIFIED TYPE: ICD-10-CM

## 2020-07-21 DIAGNOSIS — I25.10 CORONARY ARTERY DISEASE DUE TO LIPID RICH PLAQUE: ICD-10-CM

## 2020-07-21 DIAGNOSIS — E11.65 TYPE 2 DIABETES MELLITUS WITH HYPERGLYCEMIA, WITH LONG-TERM CURRENT USE OF INSULIN (H): ICD-10-CM

## 2020-07-21 DIAGNOSIS — L29.9 PRURITIC DISORDER: ICD-10-CM

## 2020-07-21 DIAGNOSIS — F43.21 GRIEF: ICD-10-CM

## 2020-07-21 DIAGNOSIS — I10 ESSENTIAL HYPERTENSION: ICD-10-CM

## 2020-07-21 DIAGNOSIS — I25.83 CORONARY ARTERY DISEASE DUE TO LIPID RICH PLAQUE: ICD-10-CM

## 2020-07-21 DIAGNOSIS — I65.22 STENOSIS OF LEFT CAROTID ARTERY: ICD-10-CM

## 2020-07-21 LAB
ANION GAP SERPL CALCULATED.3IONS-SCNC: 9 MMOL/L (ref 5–18)
ATRIAL RATE - MUSE: 79 BPM
BUN SERPL-MCNC: 14 MG/DL (ref 8–22)
CALCIUM SERPL-MCNC: 9.6 MG/DL (ref 8.5–10.5)
CHLORIDE BLD-SCNC: 106 MMOL/L (ref 98–107)
CO2 SERPL-SCNC: 26 MMOL/L (ref 22–31)
CREAT SERPL-MCNC: 0.98 MG/DL (ref 0.6–1.1)
DIASTOLIC BLOOD PRESSURE - MUSE: NORMAL
ERYTHROCYTE [DISTWIDTH] IN BLOOD BY AUTOMATED COUNT: 13.7 % (ref 11–14.5)
GFR SERPL CREATININE-BSD FRML MDRD: 57 ML/MIN/1.73M2
GLUCOSE BLD-MCNC: 102 MG/DL (ref 70–125)
HBA1C MFR BLD: 7.2 % (ref 3.5–6)
HCT VFR BLD AUTO: 35 % (ref 35–47)
HGB BLD-MCNC: 11.7 G/DL (ref 12–16)
INTERPRETATION ECG - MUSE: NORMAL
LDLC SERPL CALC-MCNC: 59 MG/DL
MCH RBC QN AUTO: 30 PG (ref 27–34)
MCHC RBC AUTO-ENTMCNC: 33.5 G/DL (ref 32–36)
MCV RBC AUTO: 90 FL (ref 80–100)
P AXIS - MUSE: 55 DEGREES
PLATELET # BLD AUTO: 306 THOU/UL (ref 140–440)
PMV BLD AUTO: 7.4 FL (ref 7–10)
POTASSIUM BLD-SCNC: 3.7 MMOL/L (ref 3.5–5)
PR INTERVAL - MUSE: 160 MS
QRS DURATION - MUSE: 70 MS
QT - MUSE: 418 MS
QTC - MUSE: 479 MS
R AXIS - MUSE: -13 DEGREES
RBC # BLD AUTO: 3.9 MILL/UL (ref 3.8–5.4)
SODIUM SERPL-SCNC: 141 MMOL/L (ref 136–145)
SYSTOLIC BLOOD PRESSURE - MUSE: NORMAL
T AXIS - MUSE: 51 DEGREES
VENTRICULAR RATE- MUSE: 79 BPM
WBC: 6.7 THOU/UL (ref 4–11)

## 2020-07-21 RX ORDER — NITROGLYCERIN 0.3 MG/1
0.3 TABLET SUBLINGUAL EVERY 5 MIN PRN
Qty: 100 TABLET | Refills: 3 | Status: SHIPPED | OUTPATIENT
Start: 2020-07-21 | End: 2022-12-12

## 2020-07-21 RX ORDER — TRIAMCINOLONE ACETONIDE 1 MG/G
CREAM TOPICAL
Qty: 30 G | Refills: 0 | Status: SHIPPED | OUTPATIENT
Start: 2020-07-21 | End: 2022-09-06

## 2020-07-22 ENCOUNTER — HOSPITAL ENCOUNTER (OUTPATIENT)
Dept: CT IMAGING | Facility: HOSPITAL | Age: 65
Discharge: HOME | End: 2020-07-22
Attending: INTERNAL MEDICINE

## 2020-07-22 DIAGNOSIS — I65.22 STENOSIS OF LEFT CAROTID ARTERY: ICD-10-CM

## 2020-07-24 ENCOUNTER — COMMUNICATION - HEALTHEAST (OUTPATIENT)
Dept: INTERNAL MEDICINE | Facility: CLINIC | Age: 65
End: 2020-07-24

## 2020-07-27 ENCOUNTER — COMMUNICATION - HEALTHEAST (OUTPATIENT)
Dept: INTERNAL MEDICINE | Facility: CLINIC | Age: 65
End: 2020-07-27

## 2020-07-27 DIAGNOSIS — L72.3 SEBACEOUS CYST: ICD-10-CM

## 2020-07-28 ENCOUNTER — COMMUNICATION - HEALTHEAST (OUTPATIENT)
Dept: CARDIOLOGY | Facility: CLINIC | Age: 65
End: 2020-07-28

## 2020-07-28 ENCOUNTER — OFFICE VISIT - HEALTHEAST (OUTPATIENT)
Dept: INTERNAL MEDICINE | Facility: CLINIC | Age: 65
End: 2020-07-28

## 2020-07-28 DIAGNOSIS — E55.9 VITAMIN D DEFICIENCY: ICD-10-CM

## 2020-07-28 DIAGNOSIS — Z12.31 ENCOUNTER FOR SCREENING MAMMOGRAM FOR MALIGNANT NEOPLASM OF BREAST: ICD-10-CM

## 2020-07-28 DIAGNOSIS — Z79.4 TYPE 2 DIABETES MELLITUS WITH HYPERGLYCEMIA, WITH LONG-TERM CURRENT USE OF INSULIN (H): ICD-10-CM

## 2020-07-28 DIAGNOSIS — E11.65 TYPE 2 DIABETES MELLITUS WITH HYPERGLYCEMIA, WITH LONG-TERM CURRENT USE OF INSULIN (H): ICD-10-CM

## 2020-07-28 DIAGNOSIS — Z12.4 ENCOUNTER FOR SCREENING FOR MALIGNANT NEOPLASM OF CERVIX: ICD-10-CM

## 2020-07-28 DIAGNOSIS — I10 ESSENTIAL HYPERTENSION: ICD-10-CM

## 2020-07-28 DIAGNOSIS — Z00.00 ENCOUNTER FOR PREVENTIVE CARE: ICD-10-CM

## 2020-07-28 DIAGNOSIS — I65.22 STENOSIS OF LEFT CAROTID ARTERY: ICD-10-CM

## 2020-07-28 DIAGNOSIS — I25.83 CORONARY ARTERY DISEASE DUE TO LIPID RICH PLAQUE: ICD-10-CM

## 2020-07-28 DIAGNOSIS — I25.10 CORONARY ARTERY DISEASE DUE TO LIPID RICH PLAQUE: ICD-10-CM

## 2020-07-28 ASSESSMENT — MIFFLIN-ST. JEOR: SCORE: 1780.59

## 2020-07-29 ENCOUNTER — RECORDS - HEALTHEAST (OUTPATIENT)
Dept: ADMINISTRATIVE | Facility: OTHER | Age: 65
End: 2020-07-29

## 2020-07-29 ENCOUNTER — OFFICE VISIT - HEALTHEAST (OUTPATIENT)
Dept: CARDIOLOGY | Facility: CLINIC | Age: 65
End: 2020-07-29

## 2020-07-29 DIAGNOSIS — I25.10 CORONARY ARTERY DISEASE DUE TO LIPID RICH PLAQUE: ICD-10-CM

## 2020-07-29 DIAGNOSIS — E78.5 DYSLIPIDEMIA, GOAL LDL BELOW 70: ICD-10-CM

## 2020-07-29 DIAGNOSIS — I25.83 CORONARY ARTERY DISEASE DUE TO LIPID RICH PLAQUE: ICD-10-CM

## 2020-07-29 DIAGNOSIS — R07.9 NONSPECIFIC CHEST PAIN: ICD-10-CM

## 2020-07-29 ASSESSMENT — MIFFLIN-ST. JEOR: SCORE: 1785.13

## 2020-07-30 ENCOUNTER — HOSPITAL ENCOUNTER (OUTPATIENT)
Dept: NUCLEAR MEDICINE | Facility: HOSPITAL | Age: 65
Discharge: HOME | End: 2020-07-30
Attending: INTERNAL MEDICINE

## 2020-07-30 ENCOUNTER — HOSPITAL ENCOUNTER (OUTPATIENT)
Dept: CARDIOLOGY | Facility: HOSPITAL | Age: 65
Discharge: HOME | End: 2020-07-30
Attending: INTERNAL MEDICINE

## 2020-07-30 DIAGNOSIS — I25.10 CORONARY ARTERY DISEASE DUE TO LIPID RICH PLAQUE: ICD-10-CM

## 2020-07-30 DIAGNOSIS — I25.83 CORONARY ARTERY DISEASE DUE TO LIPID RICH PLAQUE: ICD-10-CM

## 2020-07-30 DIAGNOSIS — R07.9 NONSPECIFIC CHEST PAIN: ICD-10-CM

## 2020-07-30 LAB
CV STRESS CURRENT BP HE: NORMAL
CV STRESS CURRENT HR HE: 101
CV STRESS CURRENT HR HE: 101
CV STRESS CURRENT HR HE: 102
CV STRESS CURRENT HR HE: 102
CV STRESS CURRENT HR HE: 104
CV STRESS CURRENT HR HE: 104
CV STRESS CURRENT HR HE: 108
CV STRESS CURRENT HR HE: 111
CV STRESS CURRENT HR HE: 111
CV STRESS CURRENT HR HE: 115
CV STRESS CURRENT HR HE: 116
CV STRESS CURRENT HR HE: 84
CV STRESS CURRENT HR HE: 86
CV STRESS CURRENT HR HE: 89
CV STRESS CURRENT HR HE: 91
CV STRESS CURRENT HR HE: 94
CV STRESS CURRENT HR HE: 94
CV STRESS CURRENT HR HE: 96
CV STRESS CURRENT HR HE: 98
CV STRESS DEVIATION TIME HE: NORMAL
CV STRESS ECHO PERCENT HR HE: NORMAL
CV STRESS EXERCISE STAGE HE: NORMAL
CV STRESS FINAL RESTING BP HE: NORMAL
CV STRESS FINAL RESTING HR HE: 94
CV STRESS MAX HR HE: 117
CV STRESS MAX TREADMILL GRADE HE: 0
CV STRESS MAX TREADMILL SPEED HE: 0
CV STRESS PEAK DIA BP HE: NORMAL
CV STRESS PEAK SYS BP HE: NORMAL
CV STRESS PHASE HE: NORMAL
CV STRESS PROTOCOL HE: NORMAL
CV STRESS RESTING PT POSITION HE: NORMAL
CV STRESS ST DEVIATION AMOUNT HE: NORMAL
CV STRESS ST DEVIATION ELEVATION HE: NORMAL
CV STRESS ST EVELATION AMOUNT HE: NORMAL
CV STRESS TEST TYPE HE: NORMAL
CV STRESS TOTAL STAGE TIME MIN 1 HE: NORMAL
NUC STRESS EJECTION FRACTION: 60 %
RATE PRESSURE PRODUCT: NORMAL
STRESS ECHO BASELINE DIASTOLIC HE: 67
STRESS ECHO BASELINE HR: 87
STRESS ECHO BASELINE SYSTOLIC BP: 135
STRESS ECHO CALCULATED PERCENT HR: 75 %
STRESS ECHO LAST STRESS DIASTOLIC BP: 58
STRESS ECHO LAST STRESS HR: 101
STRESS ECHO LAST STRESS SYSTOLIC BP: 122
STRESS ECHO TARGET HR: 157

## 2020-08-01 ENCOUNTER — COMMUNICATION - HEALTHEAST (OUTPATIENT)
Dept: INTERNAL MEDICINE | Facility: CLINIC | Age: 65
End: 2020-08-01

## 2020-08-04 ENCOUNTER — COMMUNICATION - HEALTHEAST (OUTPATIENT)
Dept: SCHEDULING | Facility: CLINIC | Age: 65
End: 2020-08-04

## 2020-08-05 ENCOUNTER — OFFICE VISIT - HEALTHEAST (OUTPATIENT)
Dept: INTERNAL MEDICINE | Facility: CLINIC | Age: 65
End: 2020-08-05

## 2020-08-05 ENCOUNTER — COMMUNICATION - HEALTHEAST (OUTPATIENT)
Dept: INTERNAL MEDICINE | Facility: CLINIC | Age: 65
End: 2020-08-05

## 2020-08-05 ENCOUNTER — AMBULATORY - HEALTHEAST (OUTPATIENT)
Dept: CARE COORDINATION | Facility: CLINIC | Age: 65
End: 2020-08-05

## 2020-08-05 DIAGNOSIS — J18.9 PNEUMONIA OF RIGHT LOWER LOBE DUE TO INFECTIOUS ORGANISM: ICD-10-CM

## 2020-08-05 DIAGNOSIS — I25.10 CORONARY ARTERY DISEASE DUE TO LIPID RICH PLAQUE: ICD-10-CM

## 2020-08-05 DIAGNOSIS — I25.83 CORONARY ARTERY DISEASE DUE TO LIPID RICH PLAQUE: ICD-10-CM

## 2020-08-05 DIAGNOSIS — R11.0 NAUSEA: ICD-10-CM

## 2020-08-05 DIAGNOSIS — U07.1 INFECTION DUE TO 2019 NOVEL CORONAVIRUS: ICD-10-CM

## 2020-08-05 DIAGNOSIS — U07.1 2019 NOVEL CORONAVIRUS DISEASE (COVID-19): ICD-10-CM

## 2020-08-05 DIAGNOSIS — E11.9 TYPE 2 DIABETES MELLITUS (H): ICD-10-CM

## 2020-08-06 ENCOUNTER — COMMUNICATION - HEALTHEAST (OUTPATIENT)
Dept: INTERNAL MEDICINE | Facility: CLINIC | Age: 65
End: 2020-08-06

## 2020-08-07 ENCOUNTER — COMMUNICATION - HEALTHEAST (OUTPATIENT)
Dept: NURSING | Facility: CLINIC | Age: 65
End: 2020-08-07

## 2020-08-09 ENCOUNTER — COMMUNICATION - HEALTHEAST (OUTPATIENT)
Dept: SCHEDULING | Facility: CLINIC | Age: 65
End: 2020-08-09

## 2020-08-10 ENCOUNTER — OFFICE VISIT - HEALTHEAST (OUTPATIENT)
Dept: FAMILY MEDICINE | Facility: CLINIC | Age: 65
End: 2020-08-10

## 2020-08-10 DIAGNOSIS — E11.65 TYPE 2 DIABETES MELLITUS WITH HYPERGLYCEMIA, WITH LONG-TERM CURRENT USE OF INSULIN (H): ICD-10-CM

## 2020-08-10 DIAGNOSIS — K59.00 CONSTIPATION, UNSPECIFIED CONSTIPATION TYPE: ICD-10-CM

## 2020-08-10 DIAGNOSIS — Z79.4 TYPE 2 DIABETES MELLITUS WITH HYPERGLYCEMIA, WITH LONG-TERM CURRENT USE OF INSULIN (H): ICD-10-CM

## 2020-08-10 DIAGNOSIS — U07.1 INFECTION DUE TO 2019 NOVEL CORONAVIRUS: ICD-10-CM

## 2020-08-10 DIAGNOSIS — I10 ESSENTIAL HYPERTENSION: ICD-10-CM

## 2020-08-12 ENCOUNTER — COMMUNICATION - HEALTHEAST (OUTPATIENT)
Dept: SCHEDULING | Facility: CLINIC | Age: 65
End: 2020-08-12

## 2020-08-13 ENCOUNTER — OFFICE VISIT - HEALTHEAST (OUTPATIENT)
Dept: INTERNAL MEDICINE | Facility: CLINIC | Age: 65
End: 2020-08-13

## 2020-08-13 DIAGNOSIS — Z79.4 TYPE 2 DIABETES MELLITUS WITHOUT COMPLICATION, WITH LONG-TERM CURRENT USE OF INSULIN (H): ICD-10-CM

## 2020-08-13 DIAGNOSIS — E11.9 TYPE 2 DIABETES MELLITUS WITHOUT COMPLICATION, WITH LONG-TERM CURRENT USE OF INSULIN (H): ICD-10-CM

## 2020-08-13 DIAGNOSIS — U07.1 INFECTION DUE TO 2019 NOVEL CORONAVIRUS: ICD-10-CM

## 2020-08-13 DIAGNOSIS — R05.9 COUGH: ICD-10-CM

## 2020-08-18 ENCOUNTER — OFFICE VISIT - HEALTHEAST (OUTPATIENT)
Dept: INTERNAL MEDICINE | Facility: CLINIC | Age: 65
End: 2020-08-18

## 2020-08-18 DIAGNOSIS — E11.65 TYPE 2 DIABETES MELLITUS WITH HYPERGLYCEMIA, WITH LONG-TERM CURRENT USE OF INSULIN (H): ICD-10-CM

## 2020-08-18 DIAGNOSIS — Z79.4 TYPE 2 DIABETES MELLITUS WITH HYPERGLYCEMIA, WITH LONG-TERM CURRENT USE OF INSULIN (H): ICD-10-CM

## 2020-08-18 DIAGNOSIS — G47.33 OSA ON CPAP: ICD-10-CM

## 2020-08-18 DIAGNOSIS — E66.01 MORBID OBESITY WITH BMI OF 40.0-44.9, ADULT (H): ICD-10-CM

## 2020-08-18 DIAGNOSIS — U07.1 2019 NOVEL CORONAVIRUS DISEASE (COVID-19): ICD-10-CM

## 2020-08-18 DIAGNOSIS — I10 ESSENTIAL HYPERTENSION: ICD-10-CM

## 2020-08-26 ENCOUNTER — COMMUNICATION - HEALTHEAST (OUTPATIENT)
Dept: INTERNAL MEDICINE | Facility: CLINIC | Age: 65
End: 2020-08-26

## 2020-08-26 DIAGNOSIS — E11.65 TYPE 2 DIABETES MELLITUS WITH HYPERGLYCEMIA, WITH LONG-TERM CURRENT USE OF INSULIN (H): ICD-10-CM

## 2020-08-26 DIAGNOSIS — Z79.4 TYPE 2 DIABETES MELLITUS WITH HYPERGLYCEMIA, WITH LONG-TERM CURRENT USE OF INSULIN (H): ICD-10-CM

## 2020-08-27 ENCOUNTER — AMBULATORY - HEALTHEAST (OUTPATIENT)
Dept: MULTI SPECIALTY CLINIC | Facility: CLINIC | Age: 65
End: 2020-08-27

## 2020-08-27 ENCOUNTER — COMMUNICATION - HEALTHEAST (OUTPATIENT)
Dept: INTERNAL MEDICINE | Facility: CLINIC | Age: 65
End: 2020-08-27

## 2020-08-27 DIAGNOSIS — Z79.4 TYPE 2 DIABETES MELLITUS WITH OTHER SPECIFIED COMPLICATION, WITH LONG-TERM CURRENT USE OF INSULIN (H): ICD-10-CM

## 2020-08-27 DIAGNOSIS — E11.69 TYPE 2 DIABETES MELLITUS WITH OTHER SPECIFIED COMPLICATION, WITH LONG-TERM CURRENT USE OF INSULIN (H): ICD-10-CM

## 2020-08-27 LAB — RETINOPATHY: NEGATIVE

## 2020-08-27 RX ORDER — SEMAGLUTIDE 1.34 MG/ML
INJECTION, SOLUTION SUBCUTANEOUS
Qty: 3 ML | Refills: 12 | Status: SHIPPED | OUTPATIENT
Start: 2020-08-27 | End: 2021-09-28

## 2020-09-02 ENCOUNTER — OFFICE VISIT - HEALTHEAST (OUTPATIENT)
Dept: INTERNAL MEDICINE | Facility: CLINIC | Age: 65
End: 2020-09-02

## 2020-09-02 DIAGNOSIS — G44.209 TENSION HEADACHE: ICD-10-CM

## 2020-09-02 DIAGNOSIS — U07.1 CLINICAL DIAGNOSIS OF COVID-19: ICD-10-CM

## 2020-09-02 DIAGNOSIS — Z79.4 TYPE 2 DIABETES MELLITUS WITH HYPERGLYCEMIA, WITH LONG-TERM CURRENT USE OF INSULIN (H): ICD-10-CM

## 2020-09-02 DIAGNOSIS — I10 ESSENTIAL HYPERTENSION: ICD-10-CM

## 2020-09-02 DIAGNOSIS — Z12.4 ENCOUNTER FOR SCREENING FOR MALIGNANT NEOPLASM OF CERVIX: ICD-10-CM

## 2020-09-02 DIAGNOSIS — N93.9 VAGINAL BLEEDING: ICD-10-CM

## 2020-09-02 DIAGNOSIS — Z00.00 ENCOUNTER FOR PREVENTIVE CARE: ICD-10-CM

## 2020-09-02 DIAGNOSIS — J34.89 NASAL OBSTRUCTION: ICD-10-CM

## 2020-09-02 DIAGNOSIS — E11.65 TYPE 2 DIABETES MELLITUS WITH HYPERGLYCEMIA, WITH LONG-TERM CURRENT USE OF INSULIN (H): ICD-10-CM

## 2020-09-02 LAB
ANION GAP SERPL CALCULATED.3IONS-SCNC: 10 MMOL/L (ref 5–18)
BUN SERPL-MCNC: 17 MG/DL (ref 8–22)
CALCIUM SERPL-MCNC: 10.1 MG/DL (ref 8.5–10.5)
CHLORIDE BLD-SCNC: 105 MMOL/L (ref 98–107)
CO2 SERPL-SCNC: 25 MMOL/L (ref 22–31)
CREAT SERPL-MCNC: 1.05 MG/DL (ref 0.6–1.1)
ERYTHROCYTE [DISTWIDTH] IN BLOOD BY AUTOMATED COUNT: 14.1 % (ref 11–14.5)
ERYTHROCYTE [SEDIMENTATION RATE] IN BLOOD BY WESTERGREN METHOD: 65 MM/HR (ref 0–20)
GFR SERPL CREATININE-BSD FRML MDRD: 53 ML/MIN/1.73M2
GLUCOSE BLD-MCNC: 150 MG/DL (ref 70–125)
HCT VFR BLD AUTO: 35.7 % (ref 35–47)
HGB BLD-MCNC: 11.7 G/DL (ref 12–16)
MCH RBC QN AUTO: 29.5 PG (ref 27–34)
MCHC RBC AUTO-ENTMCNC: 32.9 G/DL (ref 32–36)
MCV RBC AUTO: 90 FL (ref 80–100)
PLATELET # BLD AUTO: 282 THOU/UL (ref 140–440)
PMV BLD AUTO: 7.3 FL (ref 7–10)
POTASSIUM BLD-SCNC: 4.2 MMOL/L (ref 3.5–5)
RBC # BLD AUTO: 3.98 MILL/UL (ref 3.8–5.4)
SODIUM SERPL-SCNC: 140 MMOL/L (ref 136–145)
WBC: 6.3 THOU/UL (ref 4–11)

## 2020-09-02 ASSESSMENT — MIFFLIN-ST. JEOR: SCORE: 1757.91

## 2020-09-03 LAB
HPV SOURCE: NORMAL
HUMAN PAPILLOMA VIRUS 16 DNA: NEGATIVE
HUMAN PAPILLOMA VIRUS 18 DNA: NEGATIVE
HUMAN PAPILLOMA VIRUS FINAL DIAGNOSIS: NORMAL
HUMAN PAPILLOMA VIRUS OTHER HR: NEGATIVE
SPECIMEN DESCRIPTION: NORMAL

## 2020-09-04 ENCOUNTER — COMMUNICATION - HEALTHEAST (OUTPATIENT)
Dept: PHARMACY | Facility: CLINIC | Age: 65
End: 2020-09-04

## 2020-09-04 DIAGNOSIS — Z79.4 TYPE 2 DIABETES MELLITUS WITH HYPERGLYCEMIA, WITH LONG-TERM CURRENT USE OF INSULIN (H): ICD-10-CM

## 2020-09-04 DIAGNOSIS — E11.65 TYPE 2 DIABETES MELLITUS WITH HYPERGLYCEMIA, WITH LONG-TERM CURRENT USE OF INSULIN (H): ICD-10-CM

## 2020-09-10 ENCOUNTER — COMMUNICATION - HEALTHEAST (OUTPATIENT)
Dept: INTERNAL MEDICINE | Facility: CLINIC | Age: 65
End: 2020-09-10

## 2020-09-10 RX ORDER — FLASH GLUCOSE SCANNING READER
1 EACH MISCELLANEOUS DAILY
Qty: 1 EACH | Refills: 1 | Status: SHIPPED | OUTPATIENT
Start: 2020-09-10 | End: 2021-10-11 | Stop reason: ALTCHOICE

## 2020-09-10 RX ORDER — FLASH GLUCOSE SENSOR
1 KIT MISCELLANEOUS
Qty: 2 KIT | Refills: 11 | Status: SHIPPED | OUTPATIENT
Start: 2020-09-10 | End: 2022-01-26

## 2020-09-13 ENCOUNTER — HOSPITAL ENCOUNTER (OUTPATIENT)
Dept: CT IMAGING | Facility: HOSPITAL | Age: 65
Discharge: HOME | End: 2020-09-13
Attending: INTERNAL MEDICINE

## 2020-09-13 ENCOUNTER — HOSPITAL ENCOUNTER (OUTPATIENT)
Dept: ULTRASOUND IMAGING | Facility: HOSPITAL | Age: 65
Discharge: HOME | End: 2020-09-13
Attending: INTERNAL MEDICINE

## 2020-09-13 DIAGNOSIS — G44.209 TENSION HEADACHE: ICD-10-CM

## 2020-09-13 DIAGNOSIS — N93.9 VAGINAL BLEEDING: ICD-10-CM

## 2020-09-14 ENCOUNTER — COMMUNICATION - HEALTHEAST (OUTPATIENT)
Dept: INTERNAL MEDICINE | Facility: CLINIC | Age: 65
End: 2020-09-14

## 2020-10-26 ENCOUNTER — COMMUNICATION - HEALTHEAST (OUTPATIENT)
Dept: INTERNAL MEDICINE | Facility: CLINIC | Age: 65
End: 2020-10-26

## 2020-11-08 ENCOUNTER — COMMUNICATION - HEALTHEAST (OUTPATIENT)
Dept: INTERNAL MEDICINE | Facility: CLINIC | Age: 65
End: 2020-11-08

## 2020-11-08 DIAGNOSIS — M54.50 CHRONIC LOW BACK PAIN: ICD-10-CM

## 2020-11-08 DIAGNOSIS — G89.29 CHRONIC LOW BACK PAIN: ICD-10-CM

## 2020-11-08 DIAGNOSIS — I10 ESSENTIAL HYPERTENSION WITH GOAL BLOOD PRESSURE LESS THAN 140/90: ICD-10-CM

## 2020-11-10 RX ORDER — HYDROCHLOROTHIAZIDE 12.5 MG/1
CAPSULE ORAL
Qty: 90 CAPSULE | Refills: 3 | Status: SHIPPED | OUTPATIENT
Start: 2020-11-10 | End: 2021-10-01

## 2020-11-18 ENCOUNTER — COMMUNICATION - HEALTHEAST (OUTPATIENT)
Dept: INTERNAL MEDICINE | Facility: CLINIC | Age: 65
End: 2020-11-18

## 2020-11-18 DIAGNOSIS — M10.9 ACUTE GOUTY ARTHROPATHY: ICD-10-CM

## 2020-11-18 DIAGNOSIS — E78.5 HYPERLIPEMIA: ICD-10-CM

## 2020-11-20 RX ORDER — OMEGA-3-ACID ETHYL ESTERS 1 G/1
CAPSULE, LIQUID FILLED ORAL
Qty: 360 CAPSULE | Refills: 3 | Status: SHIPPED | OUTPATIENT
Start: 2020-11-20 | End: 2021-12-31

## 2020-11-29 ENCOUNTER — COMMUNICATION - HEALTHEAST (OUTPATIENT)
Dept: INTERNAL MEDICINE | Facility: CLINIC | Age: 65
End: 2020-11-29

## 2020-11-29 DIAGNOSIS — E11.65 TYPE 2 DIABETES MELLITUS WITH HYPERGLYCEMIA, WITH LONG-TERM CURRENT USE OF INSULIN (H): ICD-10-CM

## 2020-11-29 DIAGNOSIS — Z79.4 TYPE 2 DIABETES MELLITUS WITH HYPERGLYCEMIA, WITH LONG-TERM CURRENT USE OF INSULIN (H): ICD-10-CM

## 2020-11-30 ENCOUNTER — COMMUNICATION - HEALTHEAST (OUTPATIENT)
Dept: INTERNAL MEDICINE | Facility: CLINIC | Age: 65
End: 2020-11-30

## 2020-11-30 DIAGNOSIS — M54.50 CHRONIC LOW BACK PAIN: ICD-10-CM

## 2020-11-30 DIAGNOSIS — M17.10 ARTHRITIS OF KNEE: ICD-10-CM

## 2020-11-30 DIAGNOSIS — G89.29 CHRONIC LOW BACK PAIN: ICD-10-CM

## 2020-11-30 DIAGNOSIS — M10.072 ACUTE IDIOPATHIC GOUT OF LEFT FOOT: ICD-10-CM

## 2020-12-01 ENCOUNTER — RECORDS - HEALTHEAST (OUTPATIENT)
Dept: ADMINISTRATIVE | Facility: OTHER | Age: 65
End: 2020-12-01

## 2020-12-01 LAB — RETINOPATHY: NEGATIVE

## 2020-12-03 ENCOUNTER — COMMUNICATION - HEALTHEAST (OUTPATIENT)
Dept: INTERNAL MEDICINE | Facility: CLINIC | Age: 65
End: 2020-12-03

## 2020-12-03 DIAGNOSIS — G89.29 CHRONIC LOW BACK PAIN: ICD-10-CM

## 2020-12-03 DIAGNOSIS — M17.10 ARTHRITIS OF KNEE: ICD-10-CM

## 2020-12-03 DIAGNOSIS — M54.50 CHRONIC LOW BACK PAIN: ICD-10-CM

## 2020-12-03 DIAGNOSIS — M10.072 ACUTE IDIOPATHIC GOUT OF LEFT FOOT: ICD-10-CM

## 2020-12-03 RX ORDER — GABAPENTIN 300 MG/1
CAPSULE ORAL
Qty: 90 CAPSULE | Refills: 3 | Status: SHIPPED | OUTPATIENT
Start: 2020-12-03 | End: 2021-08-12

## 2020-12-08 ENCOUNTER — RECORDS - HEALTHEAST (OUTPATIENT)
Dept: HEALTH INFORMATION MANAGEMENT | Facility: CLINIC | Age: 65
End: 2020-12-08

## 2020-12-15 ENCOUNTER — COMMUNICATION - HEALTHEAST (OUTPATIENT)
Dept: SCHEDULING | Facility: CLINIC | Age: 65
End: 2020-12-15

## 2020-12-15 DIAGNOSIS — I10 ESSENTIAL HYPERTENSION WITH GOAL BLOOD PRESSURE LESS THAN 140/90: ICD-10-CM

## 2020-12-17 ENCOUNTER — COMMUNICATION - HEALTHEAST (OUTPATIENT)
Dept: INTERNAL MEDICINE | Facility: CLINIC | Age: 65
End: 2020-12-17

## 2020-12-17 DIAGNOSIS — I10 ESSENTIAL HYPERTENSION WITH GOAL BLOOD PRESSURE LESS THAN 140/90: ICD-10-CM

## 2020-12-17 RX ORDER — LISINOPRIL 5 MG/1
7.5 TABLET ORAL DAILY
Qty: 135 TABLET | Refills: 2 | Status: SHIPPED | OUTPATIENT
Start: 2020-12-17 | End: 2021-12-31

## 2021-01-12 ENCOUNTER — COMMUNICATION - HEALTHEAST (OUTPATIENT)
Dept: INTERNAL MEDICINE | Facility: CLINIC | Age: 66
End: 2021-01-12

## 2021-01-12 DIAGNOSIS — E11.65 TYPE 2 DIABETES MELLITUS WITH HYPERGLYCEMIA, WITH LONG-TERM CURRENT USE OF INSULIN (H): ICD-10-CM

## 2021-01-12 DIAGNOSIS — Z79.4 TYPE 2 DIABETES MELLITUS WITH HYPERGLYCEMIA, WITH LONG-TERM CURRENT USE OF INSULIN (H): ICD-10-CM

## 2021-01-12 DIAGNOSIS — G89.29 CHRONIC LOW BACK PAIN: ICD-10-CM

## 2021-01-12 DIAGNOSIS — M54.50 CHRONIC LOW BACK PAIN: ICD-10-CM

## 2021-01-12 DIAGNOSIS — M17.10 ARTHRITIS OF KNEE: ICD-10-CM

## 2021-01-16 ENCOUNTER — COMMUNICATION - HEALTHEAST (OUTPATIENT)
Dept: INTERNAL MEDICINE | Facility: CLINIC | Age: 66
End: 2021-01-16

## 2021-01-16 DIAGNOSIS — E11.65 TYPE 2 DIABETES MELLITUS WITH HYPERGLYCEMIA, WITH LONG-TERM CURRENT USE OF INSULIN (H): ICD-10-CM

## 2021-01-16 DIAGNOSIS — Z79.4 TYPE 2 DIABETES MELLITUS WITH HYPERGLYCEMIA, WITH LONG-TERM CURRENT USE OF INSULIN (H): ICD-10-CM

## 2021-01-19 ENCOUNTER — OFFICE VISIT - HEALTHEAST (OUTPATIENT)
Dept: INTERNAL MEDICINE | Facility: CLINIC | Age: 66
End: 2021-01-19

## 2021-01-19 DIAGNOSIS — Z20.822 ENCOUNTER FOR LABORATORY TESTING FOR COVID-19 VIRUS: ICD-10-CM

## 2021-01-19 DIAGNOSIS — E11.65 TYPE 2 DIABETES MELLITUS WITH HYPERGLYCEMIA, WITH LONG-TERM CURRENT USE OF INSULIN (H): ICD-10-CM

## 2021-01-19 DIAGNOSIS — I25.83 CORONARY ARTERY DISEASE DUE TO LIPID RICH PLAQUE: ICD-10-CM

## 2021-01-19 DIAGNOSIS — I25.10 CORONARY ARTERY DISEASE DUE TO LIPID RICH PLAQUE: ICD-10-CM

## 2021-01-19 DIAGNOSIS — M54.42 CHRONIC BILATERAL LOW BACK PAIN WITH LEFT-SIDED SCIATICA: ICD-10-CM

## 2021-01-19 DIAGNOSIS — G89.29 CHRONIC BILATERAL LOW BACK PAIN WITH LEFT-SIDED SCIATICA: ICD-10-CM

## 2021-01-19 DIAGNOSIS — E04.9 GOITER: ICD-10-CM

## 2021-01-19 DIAGNOSIS — I65.22 STENOSIS OF LEFT CAROTID ARTERY: ICD-10-CM

## 2021-01-19 DIAGNOSIS — N39.491 COITAL URINARY INCONTINENCE: ICD-10-CM

## 2021-01-19 DIAGNOSIS — Z79.4 TYPE 2 DIABETES MELLITUS WITH HYPERGLYCEMIA, WITH LONG-TERM CURRENT USE OF INSULIN (H): ICD-10-CM

## 2021-01-19 DIAGNOSIS — I10 ESSENTIAL HYPERTENSION: ICD-10-CM

## 2021-01-19 DIAGNOSIS — E66.01 MORBID OBESITY WITH BMI OF 40.0-44.9, ADULT (H): ICD-10-CM

## 2021-01-19 DIAGNOSIS — B00.9 HSV (HERPES SIMPLEX VIRUS) INFECTION: ICD-10-CM

## 2021-01-19 LAB
ALBUMIN SERPL-MCNC: 4.1 G/DL (ref 3.5–5)
ALBUMIN UR-MCNC: ABNORMAL MG/DL
ALP SERPL-CCNC: 96 U/L (ref 45–120)
ALT SERPL W P-5'-P-CCNC: 20 U/L (ref 0–45)
ANION GAP SERPL CALCULATED.3IONS-SCNC: 8 MMOL/L (ref 5–18)
APPEARANCE UR: CLEAR
AST SERPL W P-5'-P-CCNC: 20 U/L (ref 0–40)
BACTERIA #/AREA URNS HPF: ABNORMAL HPF
BILIRUB SERPL-MCNC: 0.3 MG/DL (ref 0–1)
BILIRUB UR QL STRIP: NEGATIVE
BUN SERPL-MCNC: 15 MG/DL (ref 8–22)
CALCIUM SERPL-MCNC: 9.5 MG/DL (ref 8.5–10.5)
CHLORIDE BLD-SCNC: 106 MMOL/L (ref 98–107)
CO2 SERPL-SCNC: 27 MMOL/L (ref 22–31)
COLOR UR AUTO: YELLOW
CREAT SERPL-MCNC: 0.88 MG/DL (ref 0.6–1.1)
ERYTHROCYTE [DISTWIDTH] IN BLOOD BY AUTOMATED COUNT: 14.1 % (ref 11–14.5)
GFR SERPL CREATININE-BSD FRML MDRD: >60 ML/MIN/1.73M2
GLUCOSE BLD-MCNC: 121 MG/DL (ref 70–125)
GLUCOSE UR STRIP-MCNC: NEGATIVE MG/DL
HBA1C MFR BLD: 6.8 %
HCT VFR BLD AUTO: 33.5 % (ref 35–47)
HGB BLD-MCNC: 11.5 G/DL (ref 12–16)
HGB UR QL STRIP: NEGATIVE
KETONES UR STRIP-MCNC: NEGATIVE MG/DL
LEUKOCYTE ESTERASE UR QL STRIP: ABNORMAL
MCH RBC QN AUTO: 29.8 PG (ref 27–34)
MCHC RBC AUTO-ENTMCNC: 34.4 G/DL (ref 32–36)
MCV RBC AUTO: 87 FL (ref 80–100)
NITRATE UR QL: NEGATIVE
PH UR STRIP: 5.5 [PH] (ref 5–8)
PLATELET # BLD AUTO: 262 THOU/UL (ref 140–440)
PMV BLD AUTO: 7.3 FL (ref 7–10)
POTASSIUM BLD-SCNC: 4.3 MMOL/L (ref 3.5–5)
PROT SERPL-MCNC: 7.1 G/DL (ref 6–8)
RBC # BLD AUTO: 3.87 MILL/UL (ref 3.8–5.4)
RBC #/AREA URNS AUTO: ABNORMAL HPF
SODIUM SERPL-SCNC: 141 MMOL/L (ref 136–145)
SP GR UR STRIP: 1.02 (ref 1–1.03)
SQUAMOUS #/AREA URNS AUTO: ABNORMAL LPF
TSH SERPL DL<=0.005 MIU/L-ACNC: 3.78 UIU/ML (ref 0.3–5)
UROBILINOGEN UR STRIP-ACNC: ABNORMAL
WBC #/AREA URNS AUTO: ABNORMAL HPF
WBC: 7 THOU/UL (ref 4–11)

## 2021-01-19 ASSESSMENT — MIFFLIN-ST. JEOR: SCORE: 1789.66

## 2021-01-20 ENCOUNTER — COMMUNICATION - HEALTHEAST (OUTPATIENT)
Dept: INTERNAL MEDICINE | Facility: CLINIC | Age: 66
End: 2021-01-20

## 2021-01-20 ENCOUNTER — COMMUNICATION - HEALTHEAST (OUTPATIENT)
Dept: NURSING | Facility: CLINIC | Age: 66
End: 2021-01-20

## 2021-01-20 LAB — BACTERIA SPEC CULT: NO GROWTH

## 2021-01-21 ENCOUNTER — COMMUNICATION - HEALTHEAST (OUTPATIENT)
Dept: INTERNAL MEDICINE | Facility: CLINIC | Age: 66
End: 2021-01-21

## 2021-01-22 ENCOUNTER — COMMUNICATION - HEALTHEAST (OUTPATIENT)
Dept: INTERNAL MEDICINE | Facility: CLINIC | Age: 66
End: 2021-01-22

## 2021-01-22 ENCOUNTER — COMMUNICATION - HEALTHEAST (OUTPATIENT)
Dept: PHARMACY | Facility: CLINIC | Age: 66
End: 2021-01-22

## 2021-01-22 ENCOUNTER — COMMUNICATION - HEALTHEAST (OUTPATIENT)
Dept: SCHEDULING | Facility: CLINIC | Age: 66
End: 2021-01-22

## 2021-01-25 ENCOUNTER — COMMUNICATION - HEALTHEAST (OUTPATIENT)
Dept: INTERNAL MEDICINE | Facility: CLINIC | Age: 66
End: 2021-01-25

## 2021-01-27 ENCOUNTER — OFFICE VISIT - HEALTHEAST (OUTPATIENT)
Dept: PHARMACY | Facility: CLINIC | Age: 66
End: 2021-01-27

## 2021-01-27 DIAGNOSIS — E11.65 TYPE 2 DIABETES MELLITUS WITH HYPERGLYCEMIA, WITH LONG-TERM CURRENT USE OF INSULIN (H): ICD-10-CM

## 2021-01-27 DIAGNOSIS — Z79.4 TYPE 2 DIABETES MELLITUS WITH HYPERGLYCEMIA, WITH LONG-TERM CURRENT USE OF INSULIN (H): ICD-10-CM

## 2021-01-28 ENCOUNTER — HOSPITAL ENCOUNTER (OUTPATIENT)
Dept: MRI IMAGING | Facility: HOSPITAL | Age: 66
Discharge: HOME | End: 2021-01-28
Attending: INTERNAL MEDICINE

## 2021-01-28 DIAGNOSIS — N39.491 COITAL URINARY INCONTINENCE: ICD-10-CM

## 2021-01-28 DIAGNOSIS — G89.29 CHRONIC BILATERAL LOW BACK PAIN WITH LEFT-SIDED SCIATICA: ICD-10-CM

## 2021-01-28 DIAGNOSIS — M54.42 CHRONIC BILATERAL LOW BACK PAIN WITH LEFT-SIDED SCIATICA: ICD-10-CM

## 2021-01-29 ENCOUNTER — COMMUNICATION - HEALTHEAST (OUTPATIENT)
Dept: INTERNAL MEDICINE | Facility: CLINIC | Age: 66
End: 2021-01-29

## 2021-02-02 ENCOUNTER — COMMUNICATION - HEALTHEAST (OUTPATIENT)
Dept: PHARMACY | Facility: CLINIC | Age: 66
End: 2021-02-02

## 2021-02-03 ENCOUNTER — COMMUNICATION - HEALTHEAST (OUTPATIENT)
Dept: INTERNAL MEDICINE | Facility: CLINIC | Age: 66
End: 2021-02-03

## 2021-02-03 DIAGNOSIS — K21.00 GASTROESOPHAGEAL REFLUX DISEASE WITH ESOPHAGITIS: ICD-10-CM

## 2021-02-03 RX ORDER — OMEPRAZOLE 40 MG/1
CAPSULE, DELAYED RELEASE ORAL
Qty: 90 CAPSULE | Refills: 3 | Status: SHIPPED | OUTPATIENT
Start: 2021-02-03 | End: 2021-10-01

## 2021-02-05 ENCOUNTER — COMMUNICATION - HEALTHEAST (OUTPATIENT)
Dept: INTERNAL MEDICINE | Facility: CLINIC | Age: 66
End: 2021-02-05

## 2021-02-05 DIAGNOSIS — Z79.4 TYPE 2 DIABETES MELLITUS WITH HYPERGLYCEMIA, WITH LONG-TERM CURRENT USE OF INSULIN (H): ICD-10-CM

## 2021-02-05 DIAGNOSIS — E11.65 TYPE 2 DIABETES MELLITUS WITH HYPERGLYCEMIA, WITH LONG-TERM CURRENT USE OF INSULIN (H): ICD-10-CM

## 2021-02-05 DIAGNOSIS — M10.9 ACUTE GOUTY ARTHROPATHY: ICD-10-CM

## 2021-02-05 RX ORDER — ALLOPURINOL 100 MG/1
100 TABLET ORAL DAILY
Qty: 90 TABLET | Refills: 2 | Status: SHIPPED | OUTPATIENT
Start: 2021-02-05 | End: 2021-12-31

## 2021-02-05 RX ORDER — GLUCOSAMINE HCL/CHONDROITIN SU 500-400 MG
1 CAPSULE ORAL
Qty: 300 STRIP | Refills: 3 | Status: SHIPPED | OUTPATIENT
Start: 2021-02-05 | End: 2023-11-16

## 2021-02-11 ENCOUNTER — COMMUNICATION - HEALTHEAST (OUTPATIENT)
Dept: SCHEDULING | Facility: CLINIC | Age: 66
End: 2021-02-11

## 2021-02-17 ENCOUNTER — OFFICE VISIT - HEALTHEAST (OUTPATIENT)
Dept: PHARMACY | Facility: CLINIC | Age: 66
End: 2021-02-17

## 2021-02-17 DIAGNOSIS — Z79.4 TYPE 2 DIABETES MELLITUS WITH HYPERGLYCEMIA, WITH LONG-TERM CURRENT USE OF INSULIN (H): ICD-10-CM

## 2021-02-17 DIAGNOSIS — E11.65 TYPE 2 DIABETES MELLITUS WITH HYPERGLYCEMIA, WITH LONG-TERM CURRENT USE OF INSULIN (H): ICD-10-CM

## 2021-02-24 ENCOUNTER — COMMUNICATION - HEALTHEAST (OUTPATIENT)
Dept: PHARMACY | Facility: CLINIC | Age: 66
End: 2021-02-24

## 2021-02-24 DIAGNOSIS — E11.65 TYPE 2 DIABETES MELLITUS WITH HYPERGLYCEMIA, WITH LONG-TERM CURRENT USE OF INSULIN (H): ICD-10-CM

## 2021-02-24 DIAGNOSIS — Z79.4 TYPE 2 DIABETES MELLITUS WITH HYPERGLYCEMIA, WITH LONG-TERM CURRENT USE OF INSULIN (H): ICD-10-CM

## 2021-02-25 ENCOUNTER — COMMUNICATION - HEALTHEAST (OUTPATIENT)
Dept: NURSING | Facility: CLINIC | Age: 66
End: 2021-02-25

## 2021-02-26 ENCOUNTER — AMBULATORY - HEALTHEAST (OUTPATIENT)
Dept: NURSING | Facility: CLINIC | Age: 66
End: 2021-02-26

## 2021-02-28 ENCOUNTER — COMMUNICATION - HEALTHEAST (OUTPATIENT)
Dept: INTERNAL MEDICINE | Facility: CLINIC | Age: 66
End: 2021-02-28

## 2021-03-15 ENCOUNTER — COMMUNICATION - HEALTHEAST (OUTPATIENT)
Dept: NURSING | Facility: CLINIC | Age: 66
End: 2021-03-15

## 2021-03-19 ENCOUNTER — AMBULATORY - HEALTHEAST (OUTPATIENT)
Dept: NURSING | Facility: CLINIC | Age: 66
End: 2021-03-19

## 2021-03-19 ENCOUNTER — COMMUNICATION - HEALTHEAST (OUTPATIENT)
Dept: SCHEDULING | Facility: CLINIC | Age: 66
End: 2021-03-19

## 2021-03-19 ENCOUNTER — MEDICAL CORRESPONDENCE (OUTPATIENT)
Dept: HEALTH INFORMATION MANAGEMENT | Facility: CLINIC | Age: 66
End: 2021-03-19

## 2021-03-22 ENCOUNTER — COMMUNICATION - HEALTHEAST (OUTPATIENT)
Dept: INTERNAL MEDICINE | Facility: CLINIC | Age: 66
End: 2021-03-22

## 2021-04-30 ENCOUNTER — RECORDS - HEALTHEAST (OUTPATIENT)
Dept: PHARMACY | Facility: CLINIC | Age: 66
End: 2021-04-30

## 2021-05-04 ENCOUNTER — OFFICE VISIT - HEALTHEAST (OUTPATIENT)
Dept: INTERNAL MEDICINE | Facility: CLINIC | Age: 66
End: 2021-05-04

## 2021-05-04 DIAGNOSIS — Z79.4 TYPE 2 DIABETES MELLITUS WITH HYPERGLYCEMIA, WITH LONG-TERM CURRENT USE OF INSULIN (H): ICD-10-CM

## 2021-05-04 DIAGNOSIS — M54.50 CHRONIC LOW BACK PAIN: ICD-10-CM

## 2021-05-04 DIAGNOSIS — E53.8 LOW SERUM VITAMIN B12: ICD-10-CM

## 2021-05-04 DIAGNOSIS — I25.83 CORONARY ARTERY DISEASE DUE TO LIPID RICH PLAQUE: ICD-10-CM

## 2021-05-04 DIAGNOSIS — M54.42 CHRONIC MIDLINE LOW BACK PAIN WITH LEFT-SIDED SCIATICA: ICD-10-CM

## 2021-05-04 DIAGNOSIS — M17.10 ARTHRITIS OF KNEE: ICD-10-CM

## 2021-05-04 DIAGNOSIS — I25.10 CORONARY ARTERY DISEASE DUE TO LIPID RICH PLAQUE: ICD-10-CM

## 2021-05-04 DIAGNOSIS — K21.00 GASTROESOPHAGEAL REFLUX DISEASE WITH ESOPHAGITIS WITHOUT HEMORRHAGE: ICD-10-CM

## 2021-05-04 DIAGNOSIS — G89.29 CHRONIC MIDLINE LOW BACK PAIN WITH LEFT-SIDED SCIATICA: ICD-10-CM

## 2021-05-04 DIAGNOSIS — G89.29 CHRONIC LOW BACK PAIN: ICD-10-CM

## 2021-05-04 DIAGNOSIS — E11.65 TYPE 2 DIABETES MELLITUS WITH HYPERGLYCEMIA, WITH LONG-TERM CURRENT USE OF INSULIN (H): ICD-10-CM

## 2021-05-04 DIAGNOSIS — I65.22 STENOSIS OF LEFT CAROTID ARTERY: ICD-10-CM

## 2021-05-04 DIAGNOSIS — R79.89 ELEVATED FERRITIN LEVEL: ICD-10-CM

## 2021-05-04 DIAGNOSIS — I10 ESSENTIAL HYPERTENSION: ICD-10-CM

## 2021-05-04 RX ORDER — HYDROCODONE BITARTRATE AND ACETAMINOPHEN 5; 325 MG/1; MG/1
1 TABLET ORAL 2 TIMES DAILY PRN
Qty: 40 TABLET | Refills: 0 | Status: SHIPPED | OUTPATIENT
Start: 2021-05-04 | End: 2021-07-16

## 2021-05-04 RX ORDER — CYCLOBENZAPRINE HCL 10 MG
10 TABLET ORAL
Qty: 21 TABLET | Refills: 1 | Status: SHIPPED | OUTPATIENT
Start: 2021-05-04 | End: 2022-04-05

## 2021-05-17 ENCOUNTER — COMMUNICATION - HEALTHEAST (OUTPATIENT)
Dept: PHARMACY | Facility: CLINIC | Age: 66
End: 2021-05-17

## 2021-05-19 ENCOUNTER — OFFICE VISIT - HEALTHEAST (OUTPATIENT)
Dept: PHARMACY | Facility: CLINIC | Age: 66
End: 2021-05-19

## 2021-05-19 ENCOUNTER — AMBULATORY - HEALTHEAST (OUTPATIENT)
Dept: LAB | Facility: CLINIC | Age: 66
End: 2021-05-19

## 2021-05-19 DIAGNOSIS — E11.65 TYPE 2 DIABETES MELLITUS WITH HYPERGLYCEMIA, WITH LONG-TERM CURRENT USE OF INSULIN (H): ICD-10-CM

## 2021-05-19 DIAGNOSIS — Z79.4 TYPE 2 DIABETES MELLITUS WITH HYPERGLYCEMIA, WITH LONG-TERM CURRENT USE OF INSULIN (H): ICD-10-CM

## 2021-05-19 DIAGNOSIS — R79.89 ELEVATED FERRITIN LEVEL: ICD-10-CM

## 2021-05-19 DIAGNOSIS — I25.83 CORONARY ARTERY DISEASE DUE TO LIPID RICH PLAQUE: ICD-10-CM

## 2021-05-19 DIAGNOSIS — I25.10 CORONARY ARTERY DISEASE DUE TO LIPID RICH PLAQUE: ICD-10-CM

## 2021-05-19 LAB
ANION GAP SERPL CALCULATED.3IONS-SCNC: 12 MMOL/L (ref 5–18)
BUN SERPL-MCNC: 16 MG/DL (ref 8–22)
CALCIUM SERPL-MCNC: 9.3 MG/DL (ref 8.5–10.5)
CHLORIDE BLD-SCNC: 106 MMOL/L (ref 98–107)
CHOLEST SERPL-MCNC: 117 MG/DL
CO2 SERPL-SCNC: 23 MMOL/L (ref 22–31)
CREAT SERPL-MCNC: 0.95 MG/DL (ref 0.6–1.1)
ERYTHROCYTE [DISTWIDTH] IN BLOOD BY AUTOMATED COUNT: 13.6 % (ref 11–14.5)
FASTING STATUS PATIENT QL REPORTED: YES
FERRITIN SERPL-MCNC: 468 NG/ML (ref 10–130)
GFR SERPL CREATININE-BSD FRML MDRD: 59 ML/MIN/1.73M2
GLUCOSE BLD-MCNC: 127 MG/DL (ref 70–125)
HBA1C MFR BLD: 7 %
HCT VFR BLD AUTO: 32.8 % (ref 35–47)
HDLC SERPL-MCNC: 31 MG/DL
HGB BLD-MCNC: 10.6 G/DL (ref 12–16)
LDLC SERPL CALC-MCNC: 46 MG/DL
MCH RBC QN AUTO: 29.4 PG (ref 27–34)
MCHC RBC AUTO-ENTMCNC: 32.3 G/DL (ref 32–36)
MCV RBC AUTO: 91 FL (ref 80–100)
PLATELET # BLD AUTO: 226 THOU/UL (ref 140–440)
PMV BLD AUTO: 9.6 FL (ref 7–10)
POTASSIUM BLD-SCNC: 4 MMOL/L (ref 3.5–5)
RBC # BLD AUTO: 3.61 MILL/UL (ref 3.8–5.4)
SODIUM SERPL-SCNC: 141 MMOL/L (ref 136–145)
TRIGL SERPL-MCNC: 200 MG/DL
WBC: 6.6 THOU/UL (ref 4–11)

## 2021-05-19 RX ORDER — INSULIN DEGLUDEC 200 U/ML
80 INJECTION, SOLUTION SUBCUTANEOUS DAILY
Status: SHIPPED
Start: 2021-05-19 | End: 2021-08-12

## 2021-05-24 ENCOUNTER — RECORDS - HEALTHEAST (OUTPATIENT)
Dept: ADMINISTRATIVE | Facility: CLINIC | Age: 66
End: 2021-05-24

## 2021-05-26 ENCOUNTER — RECORDS - HEALTHEAST (OUTPATIENT)
Dept: ADMINISTRATIVE | Facility: OTHER | Age: 66
End: 2021-05-26

## 2021-05-26 DIAGNOSIS — E78.5 HYPERLIPEMIA: ICD-10-CM

## 2021-05-26 RX ORDER — ROSUVASTATIN CALCIUM 10 MG/1
10 TABLET, COATED ORAL DAILY
Qty: 90 TABLET | Refills: 3 | Status: SHIPPED | OUTPATIENT
Start: 2021-05-26 | End: 2022-06-06

## 2021-05-26 ASSESSMENT — PATIENT HEALTH QUESTIONNAIRE - PHQ9: SUM OF ALL RESPONSES TO PHQ QUESTIONS 1-9: 0

## 2021-05-26 NOTE — TELEPHONE ENCOUNTER
RN cannot approve Refill Request    RN can NOT refill this medication PCP messaged that patient is overdue for Labs.      Last office visit: 4/17/2017 Tim Moe MD Last Physical: Visit date not found Last MTM visit: Visit date not found Last visit same specialty: 3/8/2019 Mimi Fuentes MD.  Next visit within 3 mo: Visit date not found  Next physical within 3 mo: Visit date not found      Georges Cool, [facility] [place] Connection Triage/Med Refill 3/23/2019    Requested Prescriptions   Pending Prescriptions Disp Refills     omega-3 acid ethyl esters (LOVAZA) 1 gram capsule [Pharmacy Med Name: OMEGA-3-ACID 1GM CAPSULES (RX)] 360 capsule 0     Sig: TAKE 2 CAPSULES BY MOUTH TWICE DAILY    Lovaza/Ezetimibe-Combination Refill Protocol Failed - 3/22/2019  5:03 AM       Failed - Fasting lipid cascade in last 12 months    Cholesterol   Date Value Ref Range Status   03/13/2018 134 <=199 mg/dL Final     Triglycerides   Date Value Ref Range Status   03/13/2018 276 (H) <=149 mg/dL Final     HDL Cholesterol   Date Value Ref Range Status   03/13/2018 28 (L) >=50 mg/dL Final     LDL Calculated   Date Value Ref Range Status   03/13/2018 51 <=129 mg/dL Final     Patient Fasting > 8hrs?   Date Value Ref Range Status   03/13/2018 Yes  Final            Passed - PCP or prescribing provider visit in past 12 months      Last office visit with prescriber/PCP: 4/17/2017 Tim Moe MD OR same dept: 3/8/2019 Mimi Fuentes MD OR same specialty: 3/8/2019 Mimi Fuentes MD  Last physical: Visit date not found Last MTM visit: Visit date not found   Next visit within 3 mo: Visit date not found  Next physical within 3 mo: Visit date not found  Prescriber OR PCP: Tim Moe MD  Last diagnosis associated with med order: 1. Hyperlipemia  - omega-3 acid ethyl esters (LOVAZA) 1 gram capsule [Pharmacy Med Name: OMEGA-3-ACID 1GM CAPSULES (RX)]; TAKE 2 CAPSULES BY MOUTH TWICE DAILY  Dispense: 360 capsule; Refill: 0    If protocol  passes may refill for 12 months if within 3 months of last provider visit (or a total of 15 months).

## 2021-05-27 NOTE — PROGRESS NOTES
Non-surgical Weight Loss Follow Up Diet Evaluation    Assessment:  This patient is a 63 y.o. female is being seen today for follow-up non-surgical nutritional evaluation. Today we reviewed the patients current eating habits and level of physical activity, and instructed on the changes that are required for successful weight loss outcomes.    ++weekends are challenging  GOAL: under 200lbs by end of year    Pt's Initial Weight: 273 lbs  Weight: (!) 252 lb (114.3 kg)  Weight loss from initial: 21  % Weight loss: 7.69 %    BMI: Body mass index is 40.67 kg/m .     Pt Active Problem List Diagnosis:     Patient Active Problem List   Diagnosis     Obstructive Sleep Apnea     Diffuse Nontoxic Goiter     Type 2 diabetes mellitus (H)     Essential hypertension     Esophageal reflux     Dyslipidemia, goal LDL below 70     Hiatal Hernia     Sensorineural hearing loss     Stenosis of left carotid artery     Coronary artery disease due to lipid rich plaque     Morbid obesity with BMI of 40.0-44.9, adult (H)     RADHA on CPAP     Thiamine deficiency     Vitamin D deficiency     Increased PTH level     Low serum vitamin B12     Elevated ferritin level     Positive result for methicillin resistant Staphylococcus aureus (MRSA) screening     Estimated RMR (Sycamore-St Jeor equation): 1839 calories  Protein requirements (.5grams to .9grams per pound IBW, 20-30% of calories, minimum of 60-80gm per day):   grams    Progress made since last visit: Pt has found a rhythm during the week and has even started exercising more regularly   CONCERNS: pt has poor meal choices    +Nueropathy - gabapentin making her crabby and out of it (1time vs 3 times recommended)    Diabetes  ++BS more controlled 108-112    Diet Recall/Time:   +looked over mitesh     Meals per week away from home: 1-2X/month    Recommended limiting eating out to no more than 2x/week.  Patient and I reviewed the importance of eating three consistent meals per day; as well as meal  timing to be spaced 4-5 hours apart.  Snack choices: 100-150 calories (1-2x/day if physically hungry), incorporating a fruit/vegetable w/ protein source.    Portion Sizes problematic? NO per patient/diet recall  Encouraged slowing meal times down, 20-30 minutes, chewing to applesauce consistency.   To aid in proper portion control and slow meal time down discussed consuming meals off smaller plates, use toddler/children utensils and set utensils down after each bite.    Protein, vegetables/fruits, carbohydrates:   The patient and I discussed the importance of including lean/low fat protein at each meal and limiting carbohydrate intake to less than 25% of plate volume.     Beverages (Type/Oz. per day)  Water: 32-48oz  Coffee: 1-2 cups daily   Tea: none    Discussed the importance of adequate hydration and the goal of 64+ oz of fluid daily.   The patient understands the importance of  avoiding all sweetened and alcoholic drinks, and instead choosing 64 oz plain water.    Exercise  3-5X/week     Pt's understands that 45-60 minutes of daily activity is an important part of weight loss success.   Encouraged pt to incorporate  strength training exercise in addition to cardiovascular exercise most days of the week.    PES statement:     1. (NC-3.3.5) Obese, class III, BMI ?40 related to physical inactivity as evidenced by Infrequent, low-duration and or low intensity physical activity; and Large amounts of sedentary activities; no structured physical activity regimen    Intervention:  Discussion:  1. Tried to assist with other options for meals   2. Plate Method: The patient and I discussed the importance of including lean/low  fat protein at each meal and limiting carbohydrate intake to less  than 25% of plate volume.  Instructions/Goals:   1. Include 20-30gm protein at each meal.  2. Increase vegetable/fruit intake, by having a vegetable or fruit with each meal daily. Recommended pt to increase vegetable/fruit intake to  4-5 servings daily.  3. Increase fluid intake to 64oz daily: choose plain or calorie/alcohol-free beverages.  4. Incorporate daily structured activity, 45-60 minutes most days of the week  5. Read food labels more consistently: keeping total fat grams <10, total sugar grams <10, fiber >3gm per serving.  6. Practice plate method: 1/2 plate lean/low fat protein source, vegetable/fruit, <25% of plate complex carbohydrates.  7. Carbohydrates from grain sources at meal times to be no more than 1 Carb Choice, ie: 15-20 gm total carbohydrate per serving  8. Practice eating off of smaller plates/bowls, chewing to applesauce consistency, taking 20-30 minutes to eat in a calm/relaxed environment without distractions of tv/email/cell phone.    Handouts Provided:  Plate Method    Monitor/Evaluation:    Pt will f/u in one month with bariatrician, and f/u in two months with RD.    Plan for next visit with RD:  GOALS:  1) lunch protein (protein drink)  2) try out more options    Time In: 1:20p  Time Out: 1:50p    ABN signed: Yes

## 2021-05-27 NOTE — TELEPHONE ENCOUNTER
Really, the treatment to prevent progression is good control.  It needs to be sustained over time.  So hopefully, things will continue to go well for her.  Medications like gabapentin, which she has tried in the past are typically used to treat the symptoms.  Also, some patients do fine with just plain Tylenol.  We will have to just work to monitor her discomfort.

## 2021-05-27 NOTE — TELEPHONE ENCOUNTER
Medication Request  Medication name: Freestyle meter and sensor  Pharmacy Name and Location: [facility].      Reason for request:Patient would like to try this meter.  Please have covering provider address this message.        Okay to leave a detailed message: yes, call patient when sent to pharmacy.

## 2021-05-27 NOTE — PROGRESS NOTES
The patient presents at the request of  for bilateral lower extremity EMG.  She has bilateral foot numbness and tingling from the toes up to the ankles.  Burning.  Worse at night.  Right equal to left in severity.  History of diabetes mellitus.    EMG/NCS  results: Please see scanned full report.    Comment NCS: Abnormal study  1.  Absent bilateral sural SNAPs.  2.  Low amplitude bilateral tibial CMAPs with slightly slowed conduction velocities.  3.  Normal right peroneal CMAP, right radial SNAP and ulnar CMAP.    Comment EMG: Normal study  1.  Normal needle EMG bilateral lower extremities.    Interpretation: Abnormal study    1.  Electrodiagnostic findings are consistent with a length dependent sensorimotor polyneuropathy, predominantly axonal.  Relatively mild in severity.  This would be consistent with her history of diabetes mellitus.    2. There is no electrodiagnostic evidence of lumbosacral radiculopathy, lumbosacral plexopathy, or focal neuropathy in the bilateral lower extremities.    The testing was completed in its entirety by the physician.      It was our pleasure caring for your patient today, if there any questions or concerns please do not hesitate to contact us.

## 2021-05-27 NOTE — TELEPHONE ENCOUNTER
Please let Nila know that she has diabetic neuropathy causing the numbness in the lower extremities and likely not the back.  We need to continue to work on diabetic control which ultimately is the way to prevent this from progressing.

## 2021-05-27 NOTE — TELEPHONE ENCOUNTER
Medication Question or Clarification  Who is calling: Patient  What medication are you calling about? (include dose and sig)   blood glucose test strips 30  10/10/2018     Sig - Route: Use 1 each As Directed 3 (three) times a day. Dispense brand per patient's insurance at pharmacy discretion. (E11.9) - Miscellaneous    Sent to pharmacy as: blood glucose test strips      Who prescribed the medication?: Dr Fuentes    What is your question/concern?: Patient is requesting a small amount be sent to the pharmacy as she is trying to get the Freestyle meter/sensor ordered and will run out before new meter comes in.  Please have covering provider address.        Pharmacy: [facility].  Okay to leave a detailed message?: Yes  Site CMT - Please call the pharmacy to obtain any additional needed information.

## 2021-05-27 NOTE — TELEPHONE ENCOUNTER
Spoke with pt and relayed PCP message.  PT understanding, and would like to know what the next step is to control her diabetic neuropathy.  Pt reports good blood sugar readings and improved A1C results.

## 2021-05-27 NOTE — TELEPHONE ENCOUNTER
Contacted patient and gave message below. Patient verbalizes understanding and has no further questions or concerns at this time.

## 2021-05-27 NOTE — PATIENT INSTRUCTIONS - HE
Thank you for choosing the [facility] for your EMG testing.    The ordering provider will receive your final EMG results within the next few days.  Please follow up with your provider for the results and further treatment recommendations.

## 2021-05-28 NOTE — PROGRESS NOTES
ASSESSMENT:  1. Pyelonephritis, acute  Reviewed original urinary tract infection with E. coli from April 14, haphazard treatment, then recurrence of symptoms with back pain.  Reviewed normal white count, back pain, and perinephric stranding which suggests pyelonephritis.  Doubt benefit of nitrofurantoin with tissue penetration.  Side effects of nausea noted.    Adjust antibiotics to decrease side effects and achieve higher tissue penetration.  Treat back pain with aggressive anti-inflammatories.  Renal function stable  - levoFLOXacin (LEVAQUIN) 750 MG tablet; Take 1 tablet (750 mg total) by mouth daily for 10 days.  Dispense: 10 tablet; Refill: 0    2. Type 2 diabetes mellitus with hyperglycemia, with long-term current use of insulin (H)  Hyperglycemia the last 5 days while ill normal.  Continue medicines same    3. Chronic low back pain, unspecified back pain laterality, with sciatica presence unspecified  Trial of prednisone and anti-inflammatories  - predniSONE (DELTASONE) 20 MG tablet; Take 20 mg by mouth daily for 4 days.  Dispense: 4 tablet; Refill: 0    4. Non-intractable vomiting with nausea, unspecified vomiting type  Clinic dose Zofran.  Home dose metoclopramide and Zofran with close follow-up  - metoclopramide (REGLAN) 10 MG tablet; Take 1 tablet (10 mg total) by mouth 3 (three) times a day as needed for nausea.  Dispense: 20 tablet; Refill: 1  - ondansetron (ZOFRAN) 8 MG tablet; Take 1 tablet (8 mg total) by mouth every 8 (eight) hours as needed for nausea.  Dispense: 20 tablet; Refill: 1        PLAN:  Patient Instructions   Stop Nitrofurantoin     Levofloxacin 750 mgs once a day for 10 days for kidney and bladder infection    zofran 8 mgs every 6 hours as needed for nausea.  Works in the brain for nausea    Metoclopramide 10 mgs every 8 hours as needed for nausea.  That works on a sluggish diabetic stomach     Continue Omeprazole once a day    You may not feel better until 72 hours    advil 400 mgs with  food twice a day for back pain which is inflammatory     We could add Prednisone to the Advil for 4 days               No orders of the defined types were placed in this encounter.    There are no discontinued medications.  Administrations This Visit     ondansetron disintegrating tablet 4 mg (ZOFRAN-ODT)     Admin Date  05/01/2019 Action  Given Dose  4 mg Route  Oral Administered By  Nohemi Pierce LPN              Return if symptoms worsen or fail to improve.    CHIEF COMPLAINT:  Chief Complaint   Patient presents with     Back Pain       HISTORY OF PRESENT ILLNESS:  Liana is a 63 y.o. female Dr. Fuentes patient presenting to the clinic today for ED follow up with recent bladder infection, back pain, and perinephric soft tissue stranding on CT, and medication monitoring for GERD and DMII.     Nephritis/back pain: She recently went to ER for a lot of pain in her back. Before that she started taking abx for bladder infection, and did not take it the right way, with a missed day, and altered dose another day. Then that following weekend, she went to [facility]'s ER with excruciating pain in her back that she could not tolerate anymore. They did a CT, urine test, and said the bladder infection was not gone. They gave her nitrofurantoin, and it is making her sick. Yesterday she has a temperature of 101. Every time she takes her medication it makes her nauseated and makes her belch. She uses ice packs. She has been on a few Abx in the past couple years. She has not had this current problem with prior Abx. She was told at the hospital that it could be a kidney infection. She does not normally get nauseated with Abx.     Pain management: For the back pain she was taking vicodin, but she is down to taking only a 1/2 pill and that does not cover it. She tried ibuprofen, but she has an ulcer to consider as well. She would prefer to avoid prednisone due to it's affect on her diabetes.     GERD: She takes omeprazole that  helps her with her stomach. She tolerates it well, but she can not be without it. If she misses it for even a day, she will get heartburn.     DMII: She has lost 23 pounds and has been exercising. 19 A1C 6.9. She has taken prednisone in the past and does not like it as she eats everything she can, and her blood sugars go way up on it. Her blood sugars have been up with this infection. This concerns her because they had been so good.     REVIEW OF SYSTEMS:   Admits to eructation, nausea, hot flashes, back pain, fever, current heartburn  All other systems are negative.    PFSH:  She has been exercising recently and lost 23 pounds.   She is hoping for a work slip as she can not work while feeling so poorly with this illness and back pain.   Reviewed as below.     Social History     Tobacco Use   Smoking Status Former Smoker     Packs/day: 0.25     Years: 44.00     Pack years: 11.00     Types: Cigarettes     Last attempt to quit: 2016     Years since quitting: 3.2   Smokeless Tobacco Never Used   Tobacco Comment    age 18 to age 62 up to 1/2 ppd       Family History   Problem Relation Age of Onset     Heart disease Father         unknown type     Valvular heart disease Mother      Kidney disease Mother      Hypertension Mother      Diabetes Mother      Leukemia Brother          at a young age     No Medical Problems Son      Diabetes Paternal Aunt      Kidney disease Sister      Heart disease Sister      Breast cancer Neg Hx        Social History     Socioeconomic History     Marital status: Domestic Partner     Spouse name: Panchito Mclean     Number of children: 1     Years of education: Not on file     Highest education level: Not on file   Occupational History     Occupation: no longer there as they closed the center     Employer: [employer]     Occupation: PCA     Comment: four hours a day   Social Needs     Financial resource strain: Not on file     Food insecurity:     Worry: Not on file      Inability: Not on file     Transportation needs:     Medical: Not on file     Non-medical: Not on file   Tobacco Use     Smoking status: Former Smoker     Packs/day: 0.25     Years: 44.00     Pack years: 11.00     Types: Cigarettes     Last attempt to quit: 1/24/2016     Years since quitting: 3.2     Smokeless tobacco: Never Used     Tobacco comment: age 18 to age 62 up to 1/2 ppd   Substance and Sexual Activity     Alcohol use: Yes     Alcohol/week: 0.0 oz     Comment: < 1 drink a week     Drug use: No     Sexual activity: Not on file   Lifestyle     Physical activity:     Days per week: Not on file     Minutes per session: Not on file     Stress: Not on file   Relationships     Social connections:     Talks on phone: Not on file     Gets together: Not on file     Attends Adventism service: Not on file     Active member of club or organization: Not on file     Attends meetings of clubs or organizations: Not on file     Relationship status: Not on file     Intimate partner violence:     Fear of current or ex partner: Not on file     Emotionally abused: Not on file     Physically abused: Not on file     Forced sexual activity: Not on file   Other Topics Concern     Not on file   Social History Narrative    Single but lives with her significant other, has one son, she is a .  She is a PCA.     Disability for LBP. Work injury.       Past Surgical History:   Procedure Laterality Date     BREAST BIOPSY       BREAST CYST EXCISION       CARDIAC CATHETERIZATION N/A 4/24/2017    Procedure: Coronary Angiogram;  Surgeon: Ariane Biswas MD;  Location: [facility] Cath Lab;  Service:      CARDIAC CATHETERIZATION N/A 5/19/2017    Procedure: Coronary Angiogram;  Surgeon: Howard Gutierrez MD;  Location: [facility] Cath Lab;  Service:      CHOLECYSTECTOMY  2000     CORONARY STENT PLACEMENT  04/24/2017    BO to RCA by Dr. Biswas     WI EXPLO/DRAIN BREAST ABSCESS      Description: Breast Surgery Mastotomy With Drainage  Of Abscess     NH L HRT CATH W/NJX L VENTRICULOGRAPHY IMG S&I N/A 4/24/2017    Procedure: Left Heart Catheterization with Left Ventriculogram;  Surgeon: Ariane Biswas MD;  Location: [facility] Cath Lab;  Service: Cardiology     NH REVISE MEDIAN N/CARPAL TUNNEL SURG      Description: Neuroplasty Decompression Median Nerve At Carpal Tunnel;  Recorded: 05/22/2009;     NH THYROID LOBECTOMY,UNILAT       TOTAL HIP ARTHROPLASTY Right        Allergies   Allergen Reactions     Penicillins Other (See Comments)     Passed out.      Doxycycline Dizziness     Eggshell Membrane      Naloxone      itching     Pneumococcal 23-Valent Polysaccharide Vaccine Other (See Comments)     pneumonia     Statins-Hmg-Coa Reductase Inhibitors Myalgia     Shaking with simvastatin and atorvastatin     Azithromycin Rash       Active Ambulatory Problems     Diagnosis Date Noted     Obstructive Sleep Apnea      Diffuse Nontoxic Goiter      Type 2 diabetes mellitus (H)      Essential hypertension      Esophageal reflux      Dyslipidemia, goal LDL below 70      Hiatal Hernia      Sensorineural hearing loss      Stenosis of left carotid artery 05/02/2017     Coronary artery disease due to lipid rich plaque 05/12/2017     Morbid obesity with BMI of 40.0-44.9, adult (H)      RADHA on CPAP      Thiamine deficiency 11/04/2018     Vitamin D deficiency 11/04/2018     Increased PTH level 11/04/2018     Low serum vitamin B12 11/04/2018     Elevated ferritin level 11/04/2018     Positive result for methicillin resistant Staphylococcus aureus (MRSA) screening 11/04/2018     Resolved Ambulatory Problems     Diagnosis Date Noted     Carpal tunnel syndrome      Paroxysmal SVT -- S/P ablation      Arthropathy Of The Shoulder Region      Joint Pain, Localized In The Right Shoulder      Contact Dermatitis      Skin: A Rash      Hoarseness      Sweating Heavily At Night      Nausea      Morbid obesity (H)      Benign Adenomatous Polyp Of The Large Intestine       Headache      Strained Right Biceps Tendon      Difficulty Swallowing (Dysphagia)      Osteoarthritis Of The Knee      Backache      Abdomen Tenderness Direct RUQ      Cerumen Impaction In The Left Ear      Muscle Aches, Generalized (Myalgias)      Numbness (Hypesthesia)      Shortness of breath      Joint Pain, Localized In The Hip      Lower Back Pain      Joint Pain, Localized In The Knee      Chest pain, Atypical -- Probable Costochondritis 01/22/2016     Smoker 01/22/2016     Lateral epicondylitis of elbow 02/12/2016     Paresthesia facial tinlging and concerns with primary for facial droop 02/12/2016     Cramps of right lower extremity lateral 02/12/2016     Statin intolerance 09/24/2016     Precordial pain 09/30/2016     Unstable angina (H) 09/30/2016     Chest pain 04/21/2017     Unstable angina pectoris (H) 04/22/2017     Former smoker      Dizziness 05/11/2017     Dyslipidemia      Fatty liver      Lower leg edema      RADHA (obstructive sleep apnea)      Urinary incontinence      Low back pain      Foot pain      S/P hip replacement      Arthritis      Past Medical History:   Diagnosis Date     Anemia      Arthritis      Breast cyst      Carpal tunnel syndrome      Coronary artery disease due to lipid rich plaque      Dyslipidemia, goal LDL below 70      Esophageal reflux      Essential hypertension      Fatty liver      Goiter diffuse, nontoxic      Hiatal hernia      History of gestational diabetes      History of MRSA infection 07/17/2017     Morbid obesity with BMI of 40.0-44.9, adult (H)      RADHA on CPAP      Paroxysmal SVT (supraventricular tachycardia) (H)      S/P hip replacement      Type 2 diabetes mellitus (H)        VITALS:  Vitals:    05/01/19 1325 05/01/19 1327   BP: 148/72 138/74   Patient Site: Left Arm Right Arm   Patient Position: Sitting Sitting   Cuff Size: Adult Large Adult Large   Pulse: 78    Temp: 97.2  F (36.2  C)    TempSrc: Tympanic    SpO2: 98%    Weight: (!) 256 lb (116.1 kg)       Wt Readings from Last 3 Encounters:   05/01/19 (!) 256 lb (116.1 kg)   04/28/19 (!) 255 lb (115.7 kg)   04/08/19 (!) 252 lb (114.3 kg)     Body mass index is 41.32 kg/m .    PHYSICAL EXAM:  Constitutional:  Reveals a somnolent oriented woman, affect lethargic, speaks slowly, appears uncomfortable. Vitals:  Per nursing notes.  Cardiac:  Bradycardic rate and regular rhythm without murmurs, rubs, or gallops. Legs without edema. Palpation of the radial pulse regular.  Lungs: Clear lung fields bilaterally, no wheezes crackles or rhonchi.  Respiratory effort normal.  Back: Right CVA pain  Neurologic: Cranial nerves II-XII, motor strength, sensation, and coordination grossly intact.     Psychiatric:  Mood appropriate, memory intact.     QUALITY MEASURES:  The following high BMI interventions were performed this visit: weight loss from baseline weight    MEDICATIONS:  Current Outpatient Medications   Medication Sig Dispense Refill     acetaminophen (TYLENOL) 500 MG tablet Take 1,000 mg by mouth 3 (three) times a day as needed.       aspirin 81 MG EC tablet Take 81 mg by mouth daily.       blood glucose meter (FREESTYLE LITE METER) Use 1 each As Directed 3 (three) times a day as needed. Dispense glucometer brand per patient's insurance at pharmacy discretion. 1 each 0     blood glucose test strips Use 1 each As Directed 3 (three) times a day. Dispense brand per patient's insurance at pharmacy discretion. (E11.9) 300 strip 1     cholecalciferol, vitamin D3, (CHOLECALCIFEROL) 1,000 unit tablet Take 1,000 Units by mouth daily.       cyclobenzaprine (FLEXERIL) 10 MG tablet Take 1 tablet (10 mg total) by mouth at bedtime as needed for muscle spasms. 21 tablet 1     flash glucose sensor (FREESTYLE MIRANDA 14 DAY SENSOR) Kit Use 1 each As Directed 4 (four) times a day. 1 kit 0     fluconazole (DIFLUCAN) 150 MG tablet Use once per week times three 3 tablet 0     generic lancets (MICROLET LANCET) Use 1 each As Directed 4 (four)  "times a day. 300 each 3     hydroCHLOROthiazide (MICROZIDE) 12.5 mg capsule Take 1 capsule (12.5 mg total) by mouth daily. 90 capsule 3     HYDROcodone-acetaminophen 5-325 mg per tablet Take 1 tablet by mouth 2 (two) times a day as needed for pain. 40 tablet 0     insulin degludec (TRESIBA FLEXTOUCH U-200) 200 unit/mL (3 mL) InPn Inject 115 Units under the skin daily. 20 mL 11     insulin lispro (HUMALOG KWIKPEN INSULIN) 100 unit/mL pen Inject 30 Units under the skin 3 (three) times a day. 15 adj dose pen 3     levoFLOXacin (LEVAQUIN) 750 MG tablet Take 1 tablet (750 mg total) by mouth daily for 10 days. 10 tablet 0     lisinopril (PRINIVIL,ZESTRIL) 5 MG tablet Take 1.5 tablets (7.5 mg total) by mouth daily. 135 tablet 3     melatonin (MELATIN) 3 mg Tab tablet Take 3 mg by mouth at bedtime as needed.       metoclopramide (REGLAN) 10 MG tablet Take 1 tablet (10 mg total) by mouth 3 (three) times a day as needed for nausea. 20 tablet 1     nitrofurantoin, macrocrystal-monohydrate, (MACROBID) 100 MG capsule Take 1 capsule (100 mg total) by mouth 2 (two) times a day for 5 days. 10 capsule 0     nitroglycerin (NITROSTAT) 0.4 MG SL tablet Place 1 tablet (0.4 mg total) under the tongue every 5 (five) minutes as needed for chest pain. 25 tablet 3     omega-3 acid ethyl esters (LOVAZA) 1 gram capsule TAKE 2 CAPSULES BY MOUTH TWICE DAILY 360 capsule 0     omeprazole (PRILOSEC) 40 MG capsule TAKE 1 CAPSULE BY MOUTH EVERY DAY 90 capsule 3     ondansetron (ZOFRAN) 8 MG tablet Take 1 tablet (8 mg total) by mouth every 8 (eight) hours as needed for nausea. 20 tablet 1     pen needle, diabetic (BD ULTRA-FINE ZULMA PEN NEEDLES) 32 gauge x 5/32\" Ndle Inject 4 each under the skin daily. With Tresiba and Novolog 120 each 3     predniSONE (DELTASONE) 20 MG tablet Take 20 mg by mouth daily for 4 days. 4 tablet 0     rosuvastatin (CRESTOR) 10 MG tablet TAKE 1 TABLET BY MOUTH DAILY 90 tablet 2     semaglutide (OZEMPIC) 1 mg/0.75 mL (2 " mg/1.5 mL) PnIj Inject 1 mg under the skin every 7 days. 3 mL 11     No current facility-administered medications for this visit.        ADDITIONAL HISTORY SUMMARIZED (2): 4/28/18 ED notes reviewed for ED course. 3/8/19 Dr. Fuentes note reviewed showing current medical management.   DECISION TO OBTAIN EXTRA INFORMATION (1): None.   RADIOLOGY TESTS (1): 4/28/19 CT Abdo/pelvis reviewed showing no nephrolithiasis, but swelling and inflammation around the kidneys.   LABS (1): 4/28/19 labs reviewed.  MEDICINE TESTS (1): None.  INDEPENDENT REVIEW (2 each): None.     The visit lasted a total of 24 minutes face to face with the patient. Over 50% of the time was spent counseling and educating the patient about nephritis, bladder infection, back pain, GERD, and DMII.    ISylvain, am scribing for and in the presence of, Dr. Monsivais.    I, Dr. Monsivais, personally performed the services described in this documentation, as scribed by Sylvain Guerra in my presence, and it is both accurate and complete.    Total data points: 4

## 2021-05-28 NOTE — TELEPHONE ENCOUNTER
Fax received from [employer] requesting Prior Authorization    Medication Name: Prodigy No Coding Test Strips    Insurance Plan: [employer]   PBM: Ju   Patient ID Number: [id number]    Please start PA process

## 2021-05-28 NOTE — TELEPHONE ENCOUNTER
[employer] Medicare order form, most recent clinical note, updated medication list faxed to [facility] at [phone].

## 2021-05-28 NOTE — TELEPHONE ENCOUNTER
Fax received from [facility] pharmacy requesting Prior Authorization    Medication Name:   flash glucose sensor (FREESTYLE MIRANDA 14 DAY SENSOR) Kit 1 kit 0 4/19/2019     Sig - Route: Use 1 each As Directed 4 (four) times a day. - Miscellaneous          Insurance Plan:    [facility] Phone Number: [phone]   Patient ID Number: [id number]    Please start PA process

## 2021-05-28 NOTE — PATIENT INSTRUCTIONS - HE
Stop Nitrofurantoin     Levofloxacin 750 mgs once a day for 10 days for kidney and bladder infection    zofran 8 mgs every 6 hours as needed for nausea.  Works in the brain for nausea    Metoclopramide 10 mgs every 8 hours as needed for nausea.  That works on a sluggish diabetic stomach     Continue Omeprazole once a day    You may not feel better until 72 hours    advil 400 mgs with food twice a day for back pain which is inflammatory     We could add Prednisone to the Advil for 4 days

## 2021-05-28 NOTE — TELEPHONE ENCOUNTER
Medication Question or Clarification  Who is calling: Patient  What medication are you calling about? (include dose and sig) Patient called and stated she was notified the PA for Freestyle Germaine is approved.  Writer did call Renuka at [facility] with the order for Freestyle Germaine meter.  They need an order for the sensors also.  Please advise.   Who prescribed the medication?: Dr Fuentes  What is your question/concern?: Neeed order for sensors please.   Pharmacy: [facility]  Okay to leave a detailed message?: Yes  Site CMT - Please call the pharmacy to obtain any additional needed information.

## 2021-05-28 NOTE — PROGRESS NOTES
[facility] Follow Up Note    Assessment/Plan:  1. Pyelonephritis  Status post completion of 10 days of Levaquin at 750 mg.  Symptoms improving.  Mild CVA tenderness on the right still present.  Recommendation: Continue with increased fluids.  Would like to see surveillance urine analysis next week.  - Urinalysis-UC if Indicated; Future    2. Chronic low back pain, unspecified back pain laterality, with sciatica presence unspecified  Chronic low back pain with slight exacerbation due to inability to stretch and exercise with pyelonephritis.  Will renew chronic medications  - HYDROcodone-acetaminophen 5-325 mg per tablet; Take 1 tablet by mouth 2 (two) times a day as needed for pain.  Dispense: 40 tablet; Refill: 0  - cyclobenzaprine (FLEXERIL) 10 MG tablet; Take 1 tablet (10 mg total) by mouth at bedtime as needed for muscle spasms.  Dispense: 21 tablet; Refill: 1      3. Type 2 diabetes mellitus with hyperglycemia, with long-term current use of insulin (H)  Some blurriness of vision noted.  Patient needs referral to ophthalmology  - Ambulatory referral to Ophthalmology    4. Essential hypertension  Blood pressure slightly low.  Will decrease Zestril to 1 tablet daily and monitor.          Follow-up in the next couple of months    Mimi Fuentes MD    Chief Complaint:  Chief Complaint   Patient presents with     Follow-up       History of Present Illness:  Liana is a 63 y.o. female who was recently diagnosed with acute pyelonephritis.  Of note, the results from the emergency department, including CT scan, and notes from Dr. Monsivais are reviewed.  She presented with malodorous urine, flank pain of a severe nature and an abnormal UA to the emergency department.  CT scan of the abdomen was performed which showed some stranding in the area of the kidney.  She was diagnosed with pyelonephritis.  Of note, prior to this diagnosis, she did have a cystitis.  She states that she was sporadic with taking the  antibiotics for this.  She would skip a couple of days etc.  She was then placed on Macrobid which resulted in nausea.  She followed up with Dr. Monsivais who placed her on Levaquin at 750 mg/day for 10 days.  Today is her last day.  She does report that the Levaquin has left her stomach feeling somewhat upset.  She is happy to be coming off of this medication.    With regard to type 2 diabetes, she states her sugars have been elevated.  She was placed on prednisone for her chronic back pain.  Her sugars been in the 200s over the last few days.  However, over time she has lost about 23 pounds.  Her overall insulin needs have decreased.    She continues to have an exacerbation of her usual lower back pain.  She attributes this to the fact that she is not able to exercise.        Review of Systems:  A comprehensive review of systems was performed and was otherwise negative    PFSH:  Social History: She is leaving her common-law  of many years.  They were going to have an official wedding.  She has decided to call it off.  Social History     Tobacco Use   Smoking Status Former Smoker     Packs/day: 0.25     Years: 44.00     Pack years: 11.00     Types: Cigarettes     Last attempt to quit: 1/24/2016     Years since quitting: 3.2   Smokeless Tobacco Never Used   Tobacco Comment    age 18 to age 62 up to 1/2 ppd       Past History:   Past Medical History:   Diagnosis Date     Anemia      Arthritis      Breast cyst      Carpal tunnel syndrome      Coronary artery disease due to lipid rich plaque      Dyslipidemia, goal LDL below 70      Esophageal reflux      Essential hypertension      Fatty liver      Goiter diffuse, nontoxic      Hiatal hernia      History of gestational diabetes      History of MRSA infection 07/17/2017    Pilonidal cyst culture     Morbid obesity with BMI of 40.0-44.9, adult (H)      RADHA on CPAP      Paroxysmal SVT (supraventricular tachycardia) (H)     S/P ablation     S/P hip replacement       Type 2 diabetes mellitus (H)        Current Outpatient Medications   Medication Sig Dispense Refill     acetaminophen (TYLENOL) 500 MG tablet Take 1,000 mg by mouth 3 (three) times a day as needed.       aspirin 81 MG EC tablet Take 81 mg by mouth daily.       blood glucose meter (FREESTYLE LITE METER) Use 1 each As Directed 3 (three) times a day as needed. Dispense glucometer brand per patient's insurance at pharmacy discretion. 1 each 0     blood glucose test strips Use 1 each As Directed 3 (three) times a day. Dispense brand per patient's insurance at pharmacy discretion. (E11.9) 300 strip 1     cholecalciferol, vitamin D3, (CHOLECALCIFEROL) 1,000 unit tablet Take 1,000 Units by mouth daily.       cyclobenzaprine (FLEXERIL) 10 MG tablet Take 1 tablet (10 mg total) by mouth at bedtime as needed for muscle spasms. 21 tablet 1     flash glucose sensor (FREESTYLE MIRANDA 14 DAY SENSOR) Kit Use 1 each As Directed 4 (four) times a day. 1 kit 0     fluconazole (DIFLUCAN) 150 MG tablet Use once per week times three 3 tablet 0     generic lancets (MICROLET LANCET) Use 1 each As Directed 4 (four) times a day. 300 each 3     hydroCHLOROthiazide (MICROZIDE) 12.5 mg capsule Take 1 capsule (12.5 mg total) by mouth daily. 90 capsule 3     HYDROcodone-acetaminophen 5-325 mg per tablet Take 1 tablet by mouth 2 (two) times a day as needed for pain. 40 tablet 0     insulin degludec (TRESIBA FLEXTOUCH U-200) 200 unit/mL (3 mL) InPn Inject 115 Units under the skin daily. 20 mL 11     insulin lispro (HUMALOG KWIKPEN INSULIN) 100 unit/mL pen Inject 30 Units under the skin 3 (three) times a day. 15 adj dose pen 3     lisinopril (PRINIVIL,ZESTRIL) 5 MG tablet Take 1 tablet (5 mg total) by mouth daily. 135 tablet 3     melatonin (MELATIN) 3 mg Tab tablet Take 3 mg by mouth at bedtime as needed.       metoclopramide (REGLAN) 10 MG tablet Take 1 tablet (10 mg total) by mouth 3 (three) times a day as needed for nausea. 20 tablet 1      "nitroglycerin (NITROSTAT) 0.4 MG SL tablet Place 1 tablet (0.4 mg total) under the tongue every 5 (five) minutes as needed for chest pain. 25 tablet 3     omega-3 acid ethyl esters (LOVAZA) 1 gram capsule TAKE 2 CAPSULES BY MOUTH TWICE DAILY 360 capsule 0     omeprazole (PRILOSEC) 40 MG capsule TAKE 1 CAPSULE BY MOUTH EVERY DAY 90 capsule 3     ondansetron (ZOFRAN) 8 MG tablet Take 1 tablet (8 mg total) by mouth every 8 (eight) hours as needed for nausea. 20 tablet 1     pen needle, diabetic (BD ULTRA-FINE ZULMA PEN NEEDLES) 32 gauge x 5/32\" Ndle Inject 4 each under the skin daily. With Tresiba and Novolog 120 each 3     rosuvastatin (CRESTOR) 10 MG tablet TAKE 1 TABLET BY MOUTH DAILY 90 tablet 2     semaglutide (OZEMPIC) 1 mg/0.75 mL (2 mg/1.5 mL) PnIj Inject 1 mg under the skin every 7 days. 3 mL 11     No current facility-administered medications for this visit.        Family History: Nothing new    Physical Exam:  General Appearance:   Pleasant and well-appearing and in no acute distress  Vitals:    05/10/19 1425   BP: 106/52   Patient Site: Left Arm   Patient Position: Sitting   Cuff Size: Adult Large   Pulse: 80   SpO2: 98%   Weight: (!) 252 lb 14.4 oz (114.7 kg)     Wt Readings from Last 3 Encounters:   05/10/19 (!) 252 lb 14.4 oz (114.7 kg)   05/01/19 (!) 256 lb (116.1 kg)   04/28/19 (!) 255 lb (115.7 kg)     Body mass index is 40.82 kg/m .    Mild right CVA tenderness  Abdomen is soft and non-distended lying calves are examined.  There is no swelling, erythema or induration    Data Review:    Analysis and Summary of Old Records (2): Reviewed emergency department records along with Dr. Monsivais's records    Records Requested (1):       Other History Summarized (from other people in the room) (2):     Radiology Tests Summarized (XRAY/CT/MRI/DXA) (1): Viewed CAT scan    Labs Reviewed (1): Ordered a follow-up UA    Medicine Tests Reviewed (EKG/ECHO/COLONOSCOPY/EGD) (1):     Independent Review of EKG or X-RAY " (2):

## 2021-05-28 NOTE — TELEPHONE ENCOUNTER
Central PA team  [phone]  Pool: [facility] PA MED ([id number])          PA has been initiated.       PA form completed and faxed insurance via Cover My Meds     Key: [id number]       Medication:  FreeStyle Maria Victoria 14 Day Sensor     Insurance:  [employer] Medicare Part D         Response will be received via fax and may take up to 5-10 business days depending on plan

## 2021-05-28 NOTE — TELEPHONE ENCOUNTER
Central PA team  [phone]  Pool: [employer] PA MED ([id number])          PA has been initiated.       PA form completed and faxed insurance via Cover My Meds     Key:  [id number]      Medication: Prodigy No Coding Blood Gluc VI STRP      Insurance: [employer] Medicare Part D         Response will be received via fax and may take up to 5-10 business days depending on plan

## 2021-05-28 NOTE — TELEPHONE ENCOUNTER
I will work on getting coverage through Medicare part B through a medical supply company.  If there is a form that I will fax in with clinical notes, she will be contacted by this company.

## 2021-05-29 NOTE — PROGRESS NOTES
[facility] Follow Up Note    Assessment/Plan:  1. Chronic low back pain, unspecified back pain laterality, with sciatica presence unspecified  She has chronic low back pain.  She manages with Flexeril, gabapentin and occasional Vicodin.  She has been doing fairly well with regard to the back pain.  She has been working out in the gym until her knee injury.  Recommendations: We will renew medications.  Encourage weight loss and regular exercise  - cyclobenzaprine (FLEXERIL) 10 MG tablet; Take 1 tablet (10 mg total) by mouth at bedtime as needed for muscle spasms.  Dispense: 21 tablet; Refill: 1  - gabapentin (NEURONTIN) 300 MG capsule; Take one tablet hs  Dispense: 90 capsule; Refill: 1  - HYDROcodone-acetaminophen 5-325 mg per tablet; Take 1 tablet by mouth 2 (two) times a day as needed for pain.  Dispense: 40 tablet; Refill: 0    2. Acute pain of left knee  Acute left knee injury.  Results from Dr. Louis's visit reviewed.  She had a negative venous ultrasound.  X-ray seems consistent with arthritis.  Knee pain remains present.  Recommendation: Ice and elevation.  She is using the Flexeril and Vicodin prescribed for her back for this pain.  Encouraged a visit with Dr. Eaton at [facility] for consideration of injection.  She is reminded that she needs to gradually increase workouts.  Her typical pattern is to go full force when feeling well even though she may not be conditioned for this.    3. Type 2 diabetes mellitus with other specified complication, with long-term current use of insulin (H)  Due for glycohemoglobin in 1 month.  Feels that sugars have been off over the past couple of weeks.  Recommendation: Would like patient to collect data on fasting sugars, pre-meal and 2 hours postprandially.  Currently she is using 120 units of Tresiba long-acting insulin.  She is injecting 24 units of the Humalog insulin.  Postprandial sugars seem to be under 180.  She is not consistent with  data collection.  - fluconazole (DIFLUCAN) 150 MG tablet; Use once per week times three  Dispense: 3 tablet; Refill: 0  - insulin degludec (TRESIBA FLEXTOUCH U-200) 200 unit/mL (3 mL) InPn; Inject 120 Units under the skin daily. Increase Tressiba to 122 units in am  Dispense: 20 mL; Refill: 11    4. Vaginal yeast infection  Likely secondary to poorly controlled diabetes and recent history of pyelonephritis and antibiotic use.  Recommendation: We will prescribe Diflucan.  Pelvic exam next visit  - fluconazole (DIFLUCAN) 150 MG tablet; Use once per week times three  Dispense: 3 tablet; Refill: 0      She is due for a physical and Pap smear    Mimi Fuentes MD    Chief Complaint:  Chief Complaint   Patient presents with     Follow-up       History of Present Illness:  Liana is a 63 y.o. female who is here today for follow-up with regard to left knee pain.  Of note, she was seen by Dr. Louis for evaluation of acute knee pain which occurred after doing an excessive work out at her gym.  Of note, things have been going well with regard to her back and diabetes.  She states that she was very excited to be working out.  She states that she felt so good that she continued to work out without taking many breaks.  The next day, upon awakening, she had fairly intense knee pain and swelling.  She was evaluated with an ultrasound that ruled out a DVT.  She had an x-ray which showed signs of arthritis.  She has since been using a muscle relaxant at bedtime and occasional Vicodin.  She is wondering if she could have an injection.    With regard to blood sugars, she reports that her insulin has been changed from NovoLog to Humalog.  She feels as though she has not been having as good a response.  She does, however, admit that there have been quite a few birthday parties and family gatherings that may have resulted in noncompliance with diet.  She is here without a diabetes log but does report her fasting sugars have been  elevated.  She checks postprandial sugars that are typically less than 180.    Of note, she denies any current kidney symptoms.  She does have a whitish vaginal discharge and wonders if she has a yeast infection.  She has not been sexually active and notes some itching.    Review of Systems:  A comprehensive review of systems was performed and was otherwise negative    PFSH:  Social History: She is in a common-law marriage with a significant other.  She is hoping to separate.  She has a 23-year-old son area  Social History     Tobacco Use   Smoking Status Former Smoker     Packs/day: 0.25     Years: 44.00     Pack years: 11.00     Types: Cigarettes     Last attempt to quit: 1/24/2016     Years since quitting: 3.4   Smokeless Tobacco Never Used   Tobacco Comment    age 18 to age 62 up to 1/2 ppd       Past History:   Past Medical History:   Diagnosis Date     Anemia      Arthritis      Breast cyst      Carpal tunnel syndrome      Coronary artery disease due to lipid rich plaque      Dyslipidemia, goal LDL below 70      Esophageal reflux      Essential hypertension      Fatty liver      Goiter diffuse, nontoxic      Hiatal hernia      History of gestational diabetes      History of MRSA infection 07/17/2017    Pilonidal cyst culture     Morbid obesity with BMI of 40.0-44.9, adult (H)      RADHA on CPAP      Paroxysmal SVT (supraventricular tachycardia) (H)     S/P ablation     S/P hip replacement      Type 2 diabetes mellitus (H)        Current Outpatient Medications   Medication Sig Dispense Refill     acetaminophen (TYLENOL) 500 MG tablet Take 1,000 mg by mouth 3 (three) times a day as needed.       aspirin 81 MG EC tablet Take 81 mg by mouth daily.       blood glucose meter (FREESTYLE LITE METER) Use 1 each As Directed 6 (six) times a day. Dispense glucometer brand per patient's insurance at pharmacy discretion. 1 each 0     blood glucose test strips Use 1 each As Directed 6 (six) times a day. Dispense brand per  patient's insurance at pharmacy discretion. (E11.9) 300 strip 1     cholecalciferol, vitamin D3, (CHOLECALCIFEROL) 1,000 unit tablet Take 1,000 Units by mouth daily.       cyclobenzaprine (FLEXERIL) 10 MG tablet Take 1 tablet (10 mg total) by mouth at bedtime as needed for muscle spasms. 21 tablet 1     flash glucose sensor (FREESTYLE MIRANDA 14 DAY SENSOR) Kit Use 1 each As Directed 6 (six) times a day. 1 kit 0     fluconazole (DIFLUCAN) 150 MG tablet Use once per week times three 3 tablet 0     generic lancets (MICROLET LANCET) Use 1 each As Directed 6 (six) times a day. 300 each 3     hydroCHLOROthiazide (MICROZIDE) 12.5 mg capsule Take 1 capsule (12.5 mg total) by mouth daily. 90 capsule 3     HYDROcodone-acetaminophen 5-325 mg per tablet Take 1 tablet by mouth 2 (two) times a day as needed for pain. 40 tablet 0     insulin degludec (TRESIBA FLEXTOUCH U-200) 200 unit/mL (3 mL) InPn Inject 115 Units under the skin daily. Increase Tressiba to 122 units in am 20 mL 11     insulin lispro (HUMALOG KWIKPEN INSULIN) 100 unit/mL pen Inject 30 Units under the skin 3 (three) times a day. 15 adj dose pen 3     lisinopril (PRINIVIL,ZESTRIL) 5 MG tablet Take 1 tablet (5 mg total) by mouth daily. 135 tablet 3     melatonin (MELATIN) 3 mg Tab tablet Take 3 mg by mouth at bedtime as needed.       metoclopramide (REGLAN) 10 MG tablet Take 1 tablet (10 mg total) by mouth 3 (three) times a day as needed for nausea. 20 tablet 1     nitroglycerin (NITROSTAT) 0.4 MG SL tablet Place 1 tablet (0.4 mg total) under the tongue every 5 (five) minutes as needed for chest pain. 25 tablet 3     omega-3 acid ethyl esters (LOVAZA) 1 gram capsule TAKE 2 CAPSULES BY MOUTH TWICE DAILY 360 capsule 0     omeprazole (PRILOSEC) 40 MG capsule TAKE 1 CAPSULE BY MOUTH EVERY DAY 90 capsule 3     ondansetron (ZOFRAN) 8 MG tablet Take 1 tablet (8 mg total) by mouth every 8 (eight) hours as needed for nausea. 20 tablet 1     pen needle, diabetic (BD ULTRA-FINE  "ZULMA PEN NEEDLES) 32 gauge x /32\" Ndle Inject 4 each under the skin daily. With Tresiba and Novolog 120 each 3     rosuvastatin (CRESTOR) 10 MG tablet TAKE 1 TABLET BY MOUTH DAILY 90 tablet 2     semaglutide (OZEMPIC) 1 mg/0.75 mL (2 mg/1.5 mL) PnIj Inject 1 mg under the skin every 7 days. 3 mL 11     gabapentin (NEURONTIN) 300 MG capsule Take one tablet hs 90 capsule 1     No current facility-administered medications for this visit.        Family History:   Family History   Problem Relation Age of Onset     Heart disease Father         unknown type     Valvular heart disease Mother      Kidney disease Mother      Hypertension Mother      Diabetes Mother      Leukemia Brother          at a young age     No Medical Problems Son      Diabetes Paternal Aunt      Kidney disease Sister      Heart disease Sister      Breast cancer Neg Hx          Physical Exam:  General Appearance:   She appears well and in no acute distress  Vitals:    19 1348   BP: 124/58   Patient Site: Left Arm   Patient Position: Sitting   Cuff Size: Adult Large   Pulse: 78   SpO2: 98%     Wt Readings from Last 3 Encounters:   19 (!) 258 lb (117 kg)   19 (!) 258 lb 3 oz (117.1 kg)   05/10/19 (!) 252 lb 14.4 oz (114.7 kg)     There is no height or weight on file to calculate BMI.    Knee is mildly swollen on left with limited flexion.    Data Review:    Analysis and Summary of Old Records (2): Reviewed Dr. Louis's notes    Records Requested (1):       Other History Summarized (from other people in the room) (2):     Radiology Tests Summarized (XRAY/CT/MRI/DXA) (1): Reviewed x-ray and ultrasound    Labs Reviewed (1): Labs next visit    Medicine Tests Reviewed (EKG/ECHO/COLONOSCOPY/EGD) (1):     Independent Review of EKG or X-RAY (2):         "

## 2021-05-29 NOTE — PROGRESS NOTES
[facility] Clinic Note    Patient Name: Liana Briceño  Patient Age: 63 y.o.  YOB: 1955  MRN: [id number]    Date of visit: 6/5/2019    Assessment/Plan:  No results found for this or any previous visit (from the past 24 hour(s)).  No medications were ordered this encounter      ICD-10-CM    1. Left knee pain, unspecified chronicity M25.562 XR Knee Left Plus [place]y VW     US Venous Leg Left     Ambulatory referral to PT/OT       We will start with x-ray and also get an ultrasound since some of her pain is posterior.  Would recommend using a knee brace since her patella seems to track medially and have her see physical therapy as well.  She is working on weight loss which is wonderful.    Patient Instructions   I recommend using a neoprene knee brace with patella cut-out.  You may try arnica montana gel.        Counseled patient regarding treatments, treatment options, risks and benefits and diagnosis.  The patient was interactive, attentive, verbalized understanding, and we discussed plan.       Patient Active Problem List   Diagnosis     Obstructive Sleep Apnea     Diffuse Nontoxic Goiter     Type 2 diabetes mellitus (H)     Essential hypertension     Esophageal reflux     Dyslipidemia, goal LDL below 70     Hiatal Hernia     Sensorineural hearing loss     Stenosis of left carotid artery     Coronary artery disease due to lipid rich plaque     Morbid obesity with BMI of 40.0-44.9, adult (H)     RADHA on CPAP     Thiamine deficiency     Vitamin D deficiency     Increased PTH level     Low serum vitamin B12     Elevated ferritin level     Positive result for methicillin resistant Staphylococcus aureus (MRSA) screening     Social History     Social History Narrative    Single but lives with her significant other, has one son, she is a .  She is a PCA.     Disability for LBP. Work injury.     Family History   Problem Relation Age of Onset     Heart disease Father         unknown type     Valvular heart  disease Mother      Kidney disease Mother      Hypertension Mother      Diabetes Mother      Leukemia Brother          at a young age     No Medical Problems Son      Diabetes Paternal Aunt      Kidney disease Sister      Heart disease Sister      Breast cancer Neg Hx      Outpatient Encounter Medications as of 2019   Medication Sig Dispense Refill     acetaminophen (TYLENOL) 500 MG tablet Take 1,000 mg by mouth 3 (three) times a day as needed.       aspirin 81 MG EC tablet Take 81 mg by mouth daily.       blood glucose meter (FREESTYLE LITE METER) Use 1 each As Directed 6 (six) times a day. Dispense glucometer brand per patient's insurance at pharmacy discretion. 1 each 0     blood glucose test strips Use 1 each As Directed 6 (six) times a day. Dispense brand per patient's insurance at pharmacy discretion. (E11.9) 300 strip 1     cholecalciferol, vitamin D3, (CHOLECALCIFEROL) 1,000 unit tablet Take 1,000 Units by mouth daily.       cyclobenzaprine (FLEXERIL) 10 MG tablet Take 1 tablet (10 mg total) by mouth at bedtime as needed for muscle spasms. 21 tablet 1     flash glucose sensor (FREESTYLE MIRANDA 14 DAY SENSOR) Kit Use 1 each As Directed 6 (six) times a day. 1 kit 0     fluconazole (DIFLUCAN) 150 MG tablet Use once per week times three 3 tablet 0     generic lancets (MICROLET LANCET) Use 1 each As Directed 6 (six) times a day. 300 each 3     hydroCHLOROthiazide (MICROZIDE) 12.5 mg capsule Take 1 capsule (12.5 mg total) by mouth daily. 90 capsule 3     HYDROcodone-acetaminophen 5-325 mg per tablet Take 1 tablet by mouth 2 (two) times a day as needed for pain. 40 tablet 0     insulin degludec (TRESIBA FLEXTOUCH U-200) 200 unit/mL (3 mL) InPn Inject 115 Units under the skin daily. 20 mL 11     insulin lispro (HUMALOG KWIKPEN INSULIN) 100 unit/mL pen Inject 30 Units under the skin 3 (three) times a day. 15 adj dose pen 3     lisinopril (PRINIVIL,ZESTRIL) 5 MG tablet Take 1 tablet (5 mg total) by mouth daily.  "135 tablet 3     melatonin (MELATIN) 3 mg Tab tablet Take 3 mg by mouth at bedtime as needed.       metoclopramide (REGLAN) 10 MG tablet Take 1 tablet (10 mg total) by mouth 3 (three) times a day as needed for nausea. 20 tablet 1     nitroglycerin (NITROSTAT) 0.4 MG SL tablet Place 1 tablet (0.4 mg total) under the tongue every 5 (five) minutes as needed for chest pain. 25 tablet 3     omega-3 acid ethyl esters (LOVAZA) 1 gram capsule TAKE 2 CAPSULES BY MOUTH TWICE DAILY 360 capsule 0     omeprazole (PRILOSEC) 40 MG capsule TAKE 1 CAPSULE BY MOUTH EVERY DAY 90 capsule 3     ondansetron (ZOFRAN) 8 MG tablet Take 1 tablet (8 mg total) by mouth every 8 (eight) hours as needed for nausea. 20 tablet 1     pen needle, diabetic (BD ULTRA-FINE ZULMA PEN NEEDLES) 32 gauge x 5/32\" Ndle Inject 4 each under the skin daily. With Tresiba and Novolog 120 each 3     rosuvastatin (CRESTOR) 10 MG tablet TAKE 1 TABLET BY MOUTH DAILY 90 tablet 2     semaglutide (OZEMPIC) 1 mg/0.75 mL (2 mg/1.5 mL) PnIj Inject 1 mg under the skin every 7 days. 3 mL 11     No facility-administered encounter medications on file as of 6/5/2019.        Chief Complaint:   Chief Complaint   Patient presents with     Knee Pain     left knee       /66 (Patient Site: Left Arm, Patient Position: Sitting, Cuff Size: Adult Large)   Pulse 100   Ht 5' 5\" (1.651 m)   Wt (!) 258 lb (117 kg)   SpO2 95%   BMI 42.93 kg/m    HPI:   Has had left knee pain for the past few weeks. Has been at the gym previously, has lost 20 lbs intentionally.  Left knee hurts when she goes to get up after sitting for awhile.  Not hurting while sitting.  Sometimes will hurt when she's walking.  No known injury.  Was using treadmill and bike.  No numb/weak distal. No hx surgery.      No significant leg edema. No hx dvt/pe.      ROS: Pertinent ros findings in hpi, all other systems negative.    Objective/Physical Exam:     /66 (Patient Site: Left Arm, Patient Position: Sitting, " "Cuff Size: Adult Large)   Pulse 100   Ht 5' 5\" (1.651 m)   Wt (!) 258 lb (117 kg)   SpO2 95%   BMI 42.93 kg/m      Gen: NAD, appears age  Skin: warm, dry  HENT: normocephalic atraumatic, MMM  Eyes: non-icteric, no proptosis  CV: NRRR no m/r/g, no peripheral edema  Resp: CTAB no w/r/r, normal respiratory effort  Abd: non-distended, soft  Hematologic: No petechiae or purpura  MSK: no muscle or joint swelling  Neuro: no dysarthria or gross asymmetry  Psych: Cooperative, full affect    Knee: left    Effusion: no      Tender at medial joint line: no  Plica palpated: no  Tender at lateral joint line: no  Tender over MCL: no  Tender over LCL: no  Tender over lateral femoral condyle: no    Pain over patella: no  Pain over patellar tendon: no  Pain over tibial tuberosity: no    Pain over proximal radha-medial tibia: no    Pain in popliteal area: yes  Mass in popliteal area: no    Lateral tracking of patella: medial  Pain with rocking patella: yes      Lateral or medial laxity: no  Lachman's normal: Yes  Posterior drawer normal: Yes      Pain reproduced with passive hip range of motion: no    Able to perform full flexion: Yes  Able to perform full extension: Yes  Crepitus palpated with range of motion: no      5/5 flexion and extension: Yes    Ankle exam: NTTP, no swelling, no pain with rom.    No calf swelling but tender posteriorly upper.      Jeff Louis MD  "

## 2021-05-29 NOTE — TELEPHONE ENCOUNTER
The company faxed back stating that she does not qualify based on Medicare guidelines.  I will look into this further.

## 2021-05-29 NOTE — TELEPHONE ENCOUNTER
Refill Approved    Rx renewed per Medication Renewal Policy. Medication was last renewed on 9/18/18.    Amy Velez, [facility] Triage/Med Refill 6/21/2019     Requested Prescriptions   Pending Prescriptions Disp Refills     rosuvastatin (CRESTOR) 10 MG tablet [Pharmacy Med Name: ROSUVASTATIN 10MG TABLETS] 90 tablet 0     Sig: TAKE 1 TABLET BY MOUTH DAILY       Statins Refill Protocol (Hmg CoA Reductase Inhibitors) Passed - 6/20/2019  5:05 AM        Passed - PCP or prescribing provider visit in past 12 months      Last office visit with prescriber/PCP: 4/17/2017 Tim Moe MD OR same dept: 6/5/2019 Jeff Louis MD OR same specialty: 6/5/2019 Jeff Louis MD  Last physical: Visit date not found Last MTM visit: Visit date not found   Next visit within 3 mo: Visit date not found  Next physical within 3 mo: Visit date not found  Prescriber OR PCP: Tim Moe MD  Last diagnosis associated with med order: 1. Hyperlipemia  - rosuvastatin (CRESTOR) 10 MG tablet [Pharmacy Med Name: ROSUVASTATIN 10MG TABLETS]; TAKE 1 TABLET BY MOUTH DAILY  Dispense: 90 tablet; Refill: 0    If protocol passes may refill for 12 months if within 3 months of last provider visit (or a total of 15 months).

## 2021-05-30 ENCOUNTER — RECORDS - HEALTHEAST (OUTPATIENT)
Dept: ADMINISTRATIVE | Facility: CLINIC | Age: 66
End: 2021-05-30

## 2021-05-30 VITALS — WEIGHT: 249 LBS | BODY MASS INDEX: 40.19 KG/M2

## 2021-05-30 VITALS — HEIGHT: 66 IN | BODY MASS INDEX: 41.24 KG/M2 | WEIGHT: 256.6 LBS

## 2021-05-30 VITALS — WEIGHT: 250.3 LBS | BODY MASS INDEX: 40.4 KG/M2

## 2021-05-30 VITALS — WEIGHT: 257.4 LBS | BODY MASS INDEX: 41.55 KG/M2

## 2021-05-30 VITALS — BODY MASS INDEX: 41.19 KG/M2 | WEIGHT: 255.2 LBS

## 2021-05-30 VITALS — HEIGHT: 66 IN | BODY MASS INDEX: 41.3 KG/M2 | WEIGHT: 257 LBS

## 2021-05-30 VITALS — HEIGHT: 66 IN | WEIGHT: 252.2 LBS | BODY MASS INDEX: 40.53 KG/M2

## 2021-05-30 VITALS — BODY MASS INDEX: 40 KG/M2 | HEIGHT: 66 IN | WEIGHT: 248.9 LBS

## 2021-05-30 VITALS — BODY MASS INDEX: 41.48 KG/M2 | WEIGHT: 257 LBS

## 2021-05-30 VITALS — WEIGHT: 252.6 LBS | BODY MASS INDEX: 40.77 KG/M2

## 2021-05-30 VITALS — WEIGHT: 254.9 LBS | BODY MASS INDEX: 41.14 KG/M2

## 2021-05-30 NOTE — TELEPHONE ENCOUNTER
Patient Returning Call  Reason for call:  The patient is returning the call.  Information relayed to patient:  Below message has been relayed to the patient. Transferred patient to appointment scheduling.  Patient has additional questions:  No  If YES, what are your questions/concerns:  NA  Okay to leave a detailed message?: No call back needed

## 2021-05-30 NOTE — TELEPHONE ENCOUNTER
New Appointment Needed  What is the reason for the visit:    Same Date/Next Day Appt Request  What is the reason for your visit?: The patient had an appointment today at 2 pm but her car broke down.  The patient is wondering if she can still be seen today, if Dr. Fuentes would squeeze her in.  She states she will be home in a half hour.    Provider Preference: PCP only  How soon do you need to be seen?: today  Waitlist offered?: No  Okay to leave a detailed message:  Yes

## 2021-05-30 NOTE — TELEPHONE ENCOUNTER
Patient was a late cancel for OV on 7/10/19. lab orders canceled and placed as future; please advise if you would like her to come in prior to next OV on 8/2/19 for lab only appt for blood work.     Thanks

## 2021-05-30 NOTE — TELEPHONE ENCOUNTER
Spoke with pt and explained that PCP could not add her on today, as pt wouldn't be able to make it to clinic until 3pm.  PT would like to reschedule the AWV. Due to session limits, unable to schedule pt until late August for AWV.  Pt would like message sent to PCP to see if she could see her sooner.

## 2021-05-30 NOTE — TELEPHONE ENCOUNTER
I did speak with [facility], and they stated that they did speak with the patient about a month ago regarding this denial.

## 2021-05-31 ENCOUNTER — RECORDS - HEALTHEAST (OUTPATIENT)
Dept: ADMINISTRATIVE | Facility: CLINIC | Age: 66
End: 2021-05-31

## 2021-05-31 VITALS — BODY MASS INDEX: 44.06 KG/M2 | WEIGHT: 273 LBS

## 2021-05-31 VITALS — WEIGHT: 270 LBS | HEIGHT: 66 IN | BODY MASS INDEX: 43.39 KG/M2

## 2021-05-31 VITALS — WEIGHT: 263 LBS | HEIGHT: 66 IN | BODY MASS INDEX: 42.27 KG/M2

## 2021-05-31 VITALS — BODY MASS INDEX: 41.93 KG/M2 | WEIGHT: 259.8 LBS

## 2021-05-31 VITALS — BODY MASS INDEX: 43.26 KG/M2 | WEIGHT: 268 LBS

## 2021-05-31 VITALS — BODY MASS INDEX: 41.42 KG/M2 | HEIGHT: 66 IN

## 2021-05-31 VITALS — WEIGHT: 271 LBS | BODY MASS INDEX: 43.74 KG/M2

## 2021-05-31 VITALS — BODY MASS INDEX: 42.76 KG/M2 | WEIGHT: 264.9 LBS

## 2021-05-31 VITALS — BODY MASS INDEX: 43.34 KG/M2 | WEIGHT: 268.5 LBS

## 2021-05-31 NOTE — TELEPHONE ENCOUNTER
Sukhdeep, when you have time, can you check in with her.  This is okay for next week    I did not get to do a detailed visit with regard to diabetes as she arrived more than 20 minutes late for her annual wellness visit.  She did not have data but feels that her sugars are running around 130 on average.  Of note, she has had steroid injections for arthritis of the knee.  She will likely have a shoulder injection as well.  She is not exercising.    Also, she will need guidance with her diabetic medications prior to her colonoscopy.  She is going to check into when that is scheduled.  Can you give her some guidance on her insulin use and medications on the day of colonoscopy.  Thanks

## 2021-05-31 NOTE — PATIENT INSTRUCTIONS - HE
1. Go to the gym -walk in pool to keep from causing worsening pain?   2. Keep up the great work with diet, you are down 20lbs over the last year!   3. Adjust tresiba to 120 units daily, humalog 36 units three times daily with meals

## 2021-05-31 NOTE — TELEPHONE ENCOUNTER
LMTCB.  Pt's health maintenance indicates they are due for a diabetic eye exam. This is important to manage any signs/symptoms of diabetic retinopathy. Has pt had a diabetic eye exam within the past year?  If so, where?  Clinic will call and request records. Dr. Fuentes entered a referral for pt for a diabetic eye exam in May.  Did pt ever schedule an appt?

## 2021-05-31 NOTE — TELEPHONE ENCOUNTER
RN cannot approve Refill Request    RN can NOT refill this medication med is not covered by policy/route to provider. Last office visit: Visit date not found Last Physical: Visit date not found Last MTM visit: Visit date not found Last visit same specialty: 6/21/2019 Mimi Fuentes MD.  Next visit within 3 mo: Visit date not found  Next physical within 3 mo: Visit date not found      Michelle Scott, [facility] Triage/Med Refill 8/27/2019    Requested Prescriptions   Pending Prescriptions Disp Refills     OZEMPIC 1 mg/dose (2 mg/1.5 mL) PnIj [Pharmacy Med Name: OZEMPIC 1MG PER DOSE-2MG/1.5ML]  6     Sig: ADMINISTER 1 MG UNDER THE SKIN EVERY 7 DAYS       There is no refill protocol information for this order

## 2021-05-31 NOTE — PROGRESS NOTES
Assessment and Plan:   Annual wellness forms reviewed.  The possibility for depression needs to be assessed.  However, today she is dealing with significant pain contributing to her elevated PHQ 9.  We will readdress this at next visit    1. Preventative health care  Frank is due for a Pap smear-however arrived approximately 20 minutes late.  This portion of the exam will be rescheduled.  Recommendations: She needs to proceed with eye examination, colonoscopy and mammogram.  Immunizations reviewed.  Will update tetanus shot.  Will hold on Prevnar 13 due to previous reaction to 23 valent pneumonia vaccine  - Mammo Screening Bilateral; Future  - Ambulatory referral for Colonoscopy    2. Essential hypertension  Stable on current medications    3. Coronary artery disease due to lipid rich plaque  No recurrent angina    4. Type 2 diabetes mellitus with hyperglycemia, with long-term current use of insulin (H)  Latest glycohemoglobin improved.  Fasting sugars around 130.  She is recently increased her short acting insulin to 40 units.  This has been maintaining her blood sugar levels around 130.  Recommendation: Stay the course and monitor glucose levels closely  - Ambulatory referral to Ophthalmology    5. Injury of left shoulder, initial encounter  Likely rotator cuff injury.  Unable to attend physical therapy due to insurance constraints.  Recommendation: Ice, limited prescription for Vicodin provided.  Rest shoulder.  See orthopedic surgery  - Ambulatory referral to Orthopedics  - HYDROcodone-acetaminophen 5-325 mg per tablet; Take 1 tablet by mouth 2 (two) times a day as needed for pain.  Dispense: 42 tablet; Refill: 0    6. Encounter for screening mammogram for malignant neoplasm of breast   Ordered  - Mammo Screening Bilateral; Future    7. HSV (herpes simplex virus) infection  Recurrent herpes simplex infection in the gluteal fold area.  Zovirax renewed.  No active disease currently-although she feels some  tingling.  - acyclovir (ZOVIRAX) 200 MG capsule; Take 1 capsule (200 mg total) by mouth 5 (five) times a day for 7 days.  Dispense: 35 capsule; Refill: 6    8. Chronic low back pain, unspecified back pain laterality, with sciatica presence unspecified  Medicines ordered  - HYDROcodone-acetaminophen 5-325 mg per tablet; Take 1 tablet by mouth 2 (two) times a day as needed for pain.  Dispense: 42 tablet; Refill: 0    9. Arthritis of knee  Seeing orthopedic surgery-medicines ordered  - HYDROcodone-acetaminophen 5-325 mg per tablet; Take 1 tablet by mouth 2 (two) times a day as needed for pain.  Dispense: 42 tablet; Refill: 0     The patient's current medical problems were reviewed.      The following health maintenance schedule was reviewed with the patient and provided in printed form in the after visit summary:   Health Maintenance   Topic Date Due     HEPATITIS C SCREENING  1955     HIV SCREENING  07/07/1972     MEDICARE ANNUAL WELLNESS VISIT  07/07/1975     ZOSTER VACCINES (2 of 3) 10/23/2012     DIABETES OPHTHALMOLOGY EXAM  09/09/2017     COLONOSCOPY  05/16/2019     PAP SMEAR  06/17/2019     MAMMOGRAM  08/07/2019     DIABETES FOLLOW-UP  12/21/2019     DIABETES HEMOGLOBIN A1C  01/29/2020     DIABETES FOOT EXAM  06/21/2020     ADVANCE CARE PLANNING  10/12/2021     TD 18+ HE  03/08/2029     TDAP ADULT ONE TIME DOSE  Completed        Subjective:   Chief Complaint: Liana Briceño is an 64 y.o. female here for an Annual Wellness visit.   HPI: Frank is here for preventative health examination.  She has a variety of concerns, but unfortunately arrives about 20 minutes late for her appointment.  Therefore, things needed to be expedited.  Health maintenance is reviewed.  She is due for an ophthalmologic visit.  She is also due for colon cancer screening and mammography.  She could benefit from a tetanus shot.  She does have allergies to 23 valent pneumonia vaccine, therefore this will be held.    Her main complaints  today are with regard to musculoskeletal issues.  She has chronic back pain, pain-for which she was just seen at [facility], and now new shoulder pain.  Of note, she was lifting a mattress to smooth this sheets.  She felt a severe pain in the left shoulder.  This occurred 2 weeks ago.  Since that time, she has been unable to lift the shoulder above the level of the head.  She has occasional tingling.  She has been icing.  She declines physical therapy as her insurance does not cover this.  She also states that her elderly client, fell into her arms.  This further injured her shoulder.    She has type 2 diabetes and morbid obesity.  Her insulin is reviewed.  She does not have blood sugar data today but states that her sugars are running around the 130s.    Health maintenance is reviewed.  She is due for colon cancer screening, mammography, tetanus shot, ophthalmologic screening.  She is due for dental care as well    Review of Systems:    Please see above.  The rest of the review of systems are negative for all systems.    Patient Care Team:  Mimi Fuentes MD as PCP - General  Jose Raul Coy, PharmD as Pharmacist (Pharmacist)     Patient Active Problem List   Diagnosis     Obstructive Sleep Apnea     Diffuse Nontoxic Goiter     Type 2 diabetes mellitus (H)     Essential hypertension     Esophageal reflux     Dyslipidemia, goal LDL below 70     Hiatal Hernia     Sensorineural hearing loss     Stenosis of left carotid artery     Coronary artery disease due to lipid rich plaque     Morbid obesity with BMI of 40.0-44.9, adult (H)     RADHA on CPAP     Thiamine deficiency     Vitamin D deficiency     Increased PTH level     Low serum vitamin B12     Elevated ferritin level     Positive result for methicillin resistant Staphylococcus aureus (MRSA) screening     Past Medical History:   Diagnosis Date     Anemia      Arthritis      Breast cyst      Carpal tunnel syndrome      Coronary artery disease due  to lipid rich plaque      Dyslipidemia, goal LDL below 70      Esophageal reflux      Essential hypertension      Fatty liver      Goiter diffuse, nontoxic      Hiatal hernia      History of gestational diabetes      History of MRSA infection 2017    Pilonidal cyst culture     Morbid obesity with BMI of 40.0-44.9, adult (H)      RADHA on CPAP      Paroxysmal SVT (supraventricular tachycardia) (H)     S/P ablation     S/P hip replacement      Type 2 diabetes mellitus (H)       Past Surgical History:   Procedure Laterality Date     BREAST BIOPSY       BREAST CYST EXCISION       CARDIAC CATHETERIZATION N/A 2017    Procedure: Coronary Angiogram;  Surgeon: Ariane Biswas MD;  Location: [facility] Lab;  Service:      CARDIAC CATHETERIZATION N/A 2017    Procedure: Coronary Angiogram;  Surgeon: Howard Gutierrez MD;  Location: [facility] Cath Lab;  Service:      CHOLECYSTECTOMY  2000     CORONARY STENT PLACEMENT  2017    BO to RCA by Dr. Biswas     PA EXPLO/DRAIN BREAST ABSCESS      Description: Breast Surgery Mastotomy With Drainage Of Abscess     PA L HRT CATH W/NJX L VENTRICULOGRAPHY IMG S&I N/A 2017    Procedure: Left Heart Catheterization with Left Ventriculogram;  Surgeon: Ariane Biswas MD;  Location: [facility] Cath Lab;  Service: Cardiology     PA REVISE MEDIAN N/CARPAL TUNNEL SURG      Description: Neuroplasty Decompression Median Nerve At Carpal Tunnel;  Recorded: 2009;     PA THYROID LOBECTOMY,UNILAT       TOTAL HIP ARTHROPLASTY Right       Family History   Problem Relation Age of Onset     Heart disease Father         unknown type     Valvular heart disease Mother      Kidney disease Mother      Hypertension Mother      Diabetes Mother      Leukemia Brother          at a young age     No Medical Problems Son      Diabetes Paternal Aunt      Kidney disease Sister      Heart disease Sister      Breast cancer Neg Hx       Social History     Socioeconomic History      Marital status: Domestic Partner     Spouse name: Panchito Mclean     Number of children: 1     Years of education: Not on file     Highest education level: Not on file   Occupational History     Occupation: no longer there as they closed the center     Employer: [employer]     Occupation: PCA     Comment: four hours a day   Social Needs     Financial resource strain: Not on file     Food insecurity:     Worry: Not on file     Inability: Not on file     Transportation needs:     Medical: Not on file     Non-medical: Not on file   Tobacco Use     Smoking status: Former Smoker     Packs/day: 0.25     Years: 44.00     Pack years: 11.00     Types: Cigarettes     Last attempt to quit: 1/24/2016     Years since quitting: 3.5     Smokeless tobacco: Never Used     Tobacco comment: age 18 to age 62 up to 1/2 ppd   Substance and Sexual Activity     Alcohol use: Yes     Alcohol/week: 0.0 oz     Comment: < 1 drink a week     Drug use: No     Sexual activity: Not on file   Lifestyle     Physical activity:     Days per week: Not on file     Minutes per session: Not on file     Stress: Not on file   Relationships     Social connections:     Talks on phone: Not on file     Gets together: Not on file     Attends [employer] service: Not on file     Active member of club or organization: Not on file     Attends meetings of clubs or organizations: Not on file     Relationship status: Not on file     Intimate partner violence:     Fear of current or ex partner: Not on file     Emotionally abused: Not on file     Physically abused: Not on file     Forced sexual activity: Not on file   Other Topics Concern     Not on file   Social History Narrative    Single but lives with her significant other, has one son, she is a .  She is a PCA.     Disability for LBP. Work injury.      Current Outpatient Medications   Medication Sig Dispense Refill     acetaminophen (TYLENOL) 500 MG tablet Take 1,000 mg by mouth 3 (three) times a day  as needed.       acyclovir (ZOVIRAX) 200 MG capsule Take 1 capsule (200 mg total) by mouth 5 (five) times a day for 7 days. 35 capsule 6     aspirin 81 MG EC tablet Take 81 mg by mouth daily.       blood glucose meter (FREESTYLE LITE METER) Use 1 each As Directed 6 (six) times a day. Dispense glucometer brand per patient's insurance at pharmacy discretion. 1 each 0     blood glucose test strips Use 1 each As Directed 6 (six) times a day. Dispense brand per patient's insurance at pharmacy discretion. (E11.9) 300 strip 1     cholecalciferol, vitamin D3, (CHOLECALCIFEROL) 1,000 unit tablet Take 1,000 Units by mouth daily.       cyclobenzaprine (FLEXERIL) 10 MG tablet Take 1 tablet (10 mg total) by mouth at bedtime as needed for muscle spasms. 21 tablet 1     flash glucose sensor (FREESTYLE MIRANDA 14 DAY SENSOR) Kit Use 1 each As Directed 6 (six) times a day. 1 kit 0     fluconazole (DIFLUCAN) 150 MG tablet Use once per week times three 3 tablet 0     gabapentin (NEURONTIN) 300 MG capsule Take one tablet hs 90 capsule 1     generic lancets (MICROLET LANCET) Use 1 each As Directed 6 (six) times a day. 300 each 3     hydroCHLOROthiazide (MICROZIDE) 12.5 mg capsule Take 1 capsule (12.5 mg total) by mouth daily. 90 capsule 3     HYDROcodone-acetaminophen 5-325 mg per tablet Take 1 tablet by mouth 2 (two) times a day as needed for pain. 42 tablet 0     insulin degludec (TRESIBA FLEXTOUCH U-200) 200 unit/mL (3 mL) InPn Inject 115 Units under the skin daily. Increase Tressiba to 122 units in am 20 mL 11     insulin lispro (HUMALOG KWIKPEN INSULIN) 100 unit/mL pen Inject 30 Units under the skin 3 (three) times a day. 15 adj dose pen 3     lisinopril (PRINIVIL,ZESTRIL) 5 MG tablet Take 1 tablet (5 mg total) by mouth daily. 135 tablet 3     melatonin (MELATIN) 3 mg Tab tablet Take 3 mg by mouth at bedtime as needed.       metoclopramide (REGLAN) 10 MG tablet Take 1 tablet (10 mg total) by mouth 3 (three) times a day as needed for  "nausea. 20 tablet 1     nitroglycerin (NITROSTAT) 0.4 MG SL tablet Place 1 tablet (0.4 mg total) under the tongue every 5 (five) minutes as needed for chest pain. 25 tablet 3     omega-3 acid ethyl esters (LOVAZA) 1 gram capsule TAKE 2 CAPSULES BY MOUTH TWICE DAILY 360 capsule 0     omeprazole (PRILOSEC) 40 MG capsule TAKE 1 CAPSULE BY MOUTH EVERY DAY 90 capsule 3     ondansetron (ZOFRAN) 8 MG tablet Take 1 tablet (8 mg total) by mouth every 8 (eight) hours as needed for nausea. 20 tablet 1     pen needle, diabetic (BD ULTRA-FINE ZULMA PEN NEEDLES) 32 gauge x 5/32\" Ndle Inject 4 each under the skin daily. With Tresiba and Novolog 120 each 3     rosuvastatin (CRESTOR) 10 MG tablet TAKE 1 TABLET BY MOUTH DAILY 90 tablet 3     semaglutide (OZEMPIC) 1 mg/0.75 mL (2 mg/1.5 mL) PnIj Inject 1 mg under the skin every 7 days. 3 mL 11     No current facility-administered medications for this visit.       Objective:   Vital Signs:   Visit Vitals  /62 (Patient Site: Left Arm, Patient Position: Sitting, Cuff Size: Adult Large)   Pulse (!) 102   Ht 5' 5\" (1.651 m)   Wt (!) 261 lb (118.4 kg)   SpO2 97%   BMI 43.43 kg/m         VisionScreening:  No exam data present     PHYSICAL EXAM  EYES: Eyelids, conjunctiva, and sclera were normal. Pupils were normal. Cornea, iris, and lens were normal bilaterally.  HEAD, EARS, NOSE, MOUTH, AND THROAT: Head and face were normal. Hearing was normal to voice and the ears were normal to external exam. Nose appearance was normal and there was no discharge. Oropharynx was normal.  TMs were normal.  NECK: Neck appearance was normal. There were no neck masses and the thyroid was not enlarged.  RESPIRATORY: Breathing pattern was normal and the chest moved symmetrically.   Lung sounds were equal bilaterally.  CARDIOVASCULAR: Heart rate and rhythm were normal.  S1 and S2 were normal and there were no extra sounds or murmurs. Peripheral pulses in arms and legs were normal.  Jugular venous pressure " was normal.  There was no peripheral edema.  GASTROINTESTINAL: The abdomen was normal in contour.  Bowel sounds were present.   Palpation detected no tenderness, mass, or enlarged organs.   MUSCULOSKELETAL: Skeletal configuration was normal and muscle mass was normal for age. Joint appearance was overall normal.  The left shoulder is examined.  Pain with internal rotation and external rotation.  Patient is unable to elevate left shoulder toward the head.  Significant pain but no crepitation  LYMPHATIC: There were no enlarged nodes.  SKIN/HAIR/NAILS: Skin color was normal.  There were no abnormal skin lesions.  Hair and nails were normal.  NEUROLOGIC: The patient was alert and oriented to person, place, time, and circumstance. Speech was normal. Cranial nerves were normal. Motor strength was normal for age. The patient was normally coordinated.  PSYCHIATRIC:  Mood and affect were normal and the patient had normal recent and remote memory. The patient's judgment and insight were normal.  BREASTS: Examined- limited due to limited range of motion of left shoulder.  No masses, nipple discharge or adenopathy noted        Assessment Results 8/2/2019   Activities of Daily Living No help needed   Instrumental Activities of Daily Living No help needed   Get Up and Go Score Less than 12 seconds   Mini Cog Total Score 4   Some recent data might be hidden     A Mini-Cog score of 0-2 suggests the possibility of dementia, score of 3-5 suggests no dementia    Identified Health Risks:     The patient was provided with suggestions to help her develop a healthy physical lifestyle.   She is at risk for lack of exercise and has been provided with information to increase physical activity for the benefit of her well-being.  Information on urinary incontinence and treatment options given to patient.  The patient's PHQ-9 score is consistent with mild depression. She was provided with information regarding depression and was advised to  schedule a follow up appointment in 6 weeks to further address this issue.  Information regarding advance directives (living ochoa), including where she can download the appropriate form, was provided to the patient via the AVS.

## 2021-05-31 NOTE — TELEPHONE ENCOUNTER
Spoke with pt regarding diabetic eye quality.  Pt reports she went to [facility].  Received copy of exam and sent to scan.

## 2021-05-31 NOTE — PROGRESS NOTES
MTM Consult Encounter    Liana Briceño is a 64 y.o. female referred for a clinical pharmacist consult from Dr. Fuentes for diabetes education consult.    Discussion:   Type 2 diabetes: unfortunately her insurance does not cover a continuous glucose monitor. She is frustrated about the CGM and then when she got new insurance and her gym is no longer covered. She has not yet gotten this fixed, she needs to do so. She has been out of the gym for months, frustrated that she may not be able to make her weight goal by the end of the year. She has maintained about a 20lb weight loss over the last year. After the steroid injections her insulin needs have increased.  She has specific questions on insulin adjustment, as her blood sugars have fluctuated so largely after steroid injection in her knee.  She will also be seeing [facility] for her left shoulder, and does not know what type of work-up or treatment will be appropriate there either.  She had previously been thinking about gastric bypass, however was able to lose about 20 pounds in the last year through diet and exercise.  Due to the steroid injection she also increased her lisinopril dose to 7.5 mg daily.  Lab Results   Component Value Date    HGBA1C 6.9 (H) 07/29/2019    HGBA1C 6.9 (H) 01/04/2019    HGBA1C 8.7 (H) 11/09/2018     Lab Results   Component Value Date    MICROALBUR 3.04 (H) 06/18/2018    LDLCALC 51 03/13/2018    CREATININE 0.88 07/29/2019     Plan:  1.  Educated to adjust insulin doses, to help facilitate weight loss and prevent hypoglycemia as steroid injection with wears down  2.  Educated her to continue her current blood pressure pill dose, as she is asymptomatic  3.  Educated her to resume exercise as able, as this is the most effective way for her to improve her blood sugars and lose weight    Follow up:   2 weeks via [employer] for questions    Jose Raul Coy, PharmD, BCACP  Medication Management (MTM) Pharmacist  [facility]      Current Outpatient Medications   Medication Sig Dispense Refill     acetaminophen (TYLENOL) 500 MG tablet Take 1,000 mg by mouth 3 (three) times a day as needed.       aspirin 81 MG EC tablet Take 81 mg by mouth daily.       blood glucose meter (FREESTYLE LITE METER) Use 1 each As Directed 6 (six) times a day. Dispense glucometer brand per patient's insurance at pharmacy discretion. 1 each 0     blood glucose test strips Use 1 each As Directed 6 (six) times a day. Prodigy Meter 300 strip 3     cholecalciferol, vitamin D3, (CHOLECALCIFEROL) 1,000 unit tablet Take 2,000 Units by mouth daily.             cyclobenzaprine (FLEXERIL) 10 MG tablet Take 1 tablet (10 mg total) by mouth at bedtime as needed for muscle spasms. 21 tablet 1     generic lancets (MICROLET LANCET) Use 1 each As Directed 6 (six) times a day. 300 each 3     hydroCHLOROthiazide (MICROZIDE) 12.5 mg capsule Take 1 capsule (12.5 mg total) by mouth daily. 90 capsule 3     HYDROcodone-acetaminophen 5-325 mg per tablet Take 1 tablet by mouth 2 (two) times a day as needed for pain. 42 tablet 0     insulin degludec (TRESIBA FLEXTOUCH U-200) 200 unit/mL (3 mL) InPn Inject 120 Units under the skin daily. 20 mL 11     insulin lispro (HUMALOG KWIKPEN INSULIN) 100 unit/mL pen Inject 36 Units under the skin 3 (three) times a day. 15 adj dose pen 3     melatonin (MELATIN) 3 mg Tab tablet Take 3 mg by mouth at bedtime as needed.       nitroglycerin (NITROSTAT) 0.4 MG SL tablet Place 1 tablet (0.4 mg total) under the tongue every 5 (five) minutes as needed for chest pain. 25 tablet 3     omega-3 acid ethyl esters (LOVAZA) 1 gram capsule TAKE 2 CAPSULES BY MOUTH TWICE DAILY 360 capsule 0     omeprazole (PRILOSEC) 40 MG capsule TAKE 1 CAPSULE BY MOUTH EVERY DAY 90 capsule 3     pen needle, diabetic (BD ULTRA-FINE ZULMA PEN NEEDLE) 32 gauge x 5/32\" Ndle Inject 4 each under the skin daily. With Tresiba and Novolog 120 each 3     rosuvastatin (CRESTOR) " 10 MG tablet TAKE 1 TABLET BY MOUTH DAILY 90 tablet 3     semaglutide (OZEMPIC) 1 mg/0.75 mL (2 mg/1.5 mL) PnIj Inject 1 mg under the skin every 7 days. 3 mL 11     VITAMIN B-1 100 mg tablet Take 1 tablet by mouth daily.  0     lisinopril (PRINIVIL,ZESTRIL) 5 MG tablet Take 1.5 tablets (7.5 mg total) by mouth daily. 135 tablet 3     No current facility-administered medications for this visit.

## 2021-06-01 ENCOUNTER — RECORDS - HEALTHEAST (OUTPATIENT)
Dept: ADMINISTRATIVE | Facility: CLINIC | Age: 66
End: 2021-06-01

## 2021-06-01 VITALS — WEIGHT: 278 LBS | HEIGHT: 66 IN | BODY MASS INDEX: 44.68 KG/M2

## 2021-06-01 VITALS — BODY MASS INDEX: 45.1 KG/M2 | WEIGHT: 276 LBS

## 2021-06-01 VITALS — BODY MASS INDEX: 44.06 KG/M2 | HEIGHT: 66 IN

## 2021-06-01 VITALS — WEIGHT: 276 LBS | BODY MASS INDEX: 45.1 KG/M2

## 2021-06-01 VITALS — WEIGHT: 276.7 LBS | BODY MASS INDEX: 44.47 KG/M2 | HEIGHT: 66 IN

## 2021-06-01 VITALS — HEIGHT: 66 IN | BODY MASS INDEX: 44.74 KG/M2 | WEIGHT: 278.4 LBS

## 2021-06-01 VITALS — WEIGHT: 276.7 LBS | HEIGHT: 66 IN | BODY MASS INDEX: 44.47 KG/M2

## 2021-06-01 VITALS — WEIGHT: 273 LBS | BODY MASS INDEX: 44.06 KG/M2

## 2021-06-01 VITALS — BODY MASS INDEX: 44.87 KG/M2 | WEIGHT: 278 LBS

## 2021-06-01 VITALS — HEIGHT: 66 IN | BODY MASS INDEX: 44.36 KG/M2 | WEIGHT: 276 LBS

## 2021-06-01 VITALS — HEIGHT: 66 IN | WEIGHT: 278.3 LBS | BODY MASS INDEX: 44.72 KG/M2

## 2021-06-01 VITALS — WEIGHT: 271.8 LBS | BODY MASS INDEX: 43.87 KG/M2

## 2021-06-01 VITALS — BODY MASS INDEX: 44.84 KG/M2 | WEIGHT: 279 LBS | HEIGHT: 66 IN

## 2021-06-01 VITALS — WEIGHT: 278 LBS | BODY MASS INDEX: 44.68 KG/M2 | HEIGHT: 66 IN

## 2021-06-01 VITALS — WEIGHT: 273 LBS | HEIGHT: 66 IN | BODY MASS INDEX: 43.87 KG/M2

## 2021-06-01 VITALS — BODY MASS INDEX: 44.36 KG/M2 | HEIGHT: 66 IN | WEIGHT: 276 LBS

## 2021-06-01 NOTE — TELEPHONE ENCOUNTER
Pt states she has a bite on her leg that she has had 1 month, not getting better.   Pt had tightness in the chest last week, see patient message 9/25/19 as this was addressed from Dr. Fuentes.     Reason for Disposition    Red or very tender (to touch) area, getting larger over 48 hours after the bite    Protocols used: INSECT BITE-A-OH    Rain radiates up her leg.   Rates pain a 6-7/10.  Does control it with medication.   Pt states there was a hard lump and it felt like glass coming out.  Pt states she couldn't find it once she picked it off (happened last week).   Pt advised to be seen today due to sx. Pt would like to see PCP. PCP does not have openings today but has 1 opening tomorrow. RN advised if she has any new or worsening sx to call back. Pt agreed.  Humaira Bhagat, RN   [facility] RN Triage

## 2021-06-01 NOTE — TELEPHONE ENCOUNTER
Controlled Substance Refill Request  Medication Name:   Requested Prescriptions     Pending Prescriptions Disp Refills     HYDROcodone-acetaminophen 5-325 mg per tablet 42 tablet 0     Sig: Take 1 tablet by mouth 2 (two) times a day as needed for pain.     Date Last Fill: 8/2/19  Pharmacy: [facility] on [address]      Submit electronically to pharmacy  Controlled Substance Agreement Date Scanned:   Encounter-Level CSA Scan Date - 07/26/2017:    Scan on 8/2/2017  6:44 AM (below)             Encounter-Level CSA Scan Date - 06/24/2016:    Scan on 6/27/2016  1:57 PM (below)         Last office visit with prescriber/PCP: 6/21/2019 Mimi Fuentes MD OR same dept: 6/21/2019 Mimi Fuentes MD OR same specialty: 6/21/2019 Mimi Fuentes MD  Last physical: 8/2/2019 Last MTM visit: Visit date not found

## 2021-06-01 NOTE — PROGRESS NOTES
"[facility] Follow Up Note    Assessment/Plan:  1. Bug bite without infection, initial encounter  Inflammation, swelling has subsided.  Small circular scaly lesion noted.  Recommendation: Continue to monitor.  Apply hydrocortisone ointment twice daily for the next 7 to 10 days.  If no improvement, may need further follow-up    2. Venous stasis  Pain in a segment of varicosities of the left calf.  Recommendation: Compression stockings.  A written prescription was given with moderate compression to the knee to be worn daily.    3. Type 2 diabetes mellitus with hyperglycemia, with long-term current use of insulin (H)  Blood sugars have been running on the low side.  Patient has titrated down the insulin as is noted in the chart notes.  Labs in 2 months    4. History of colonic polyps  Ordered  - Ambulatory referral for Colonoscopy      Mimi Fuentes MD    Chief Complaint:  Chief Complaint   Patient presents with     Follow-up     bug bite       History of Present Illness:  Liana is a 64 y.o. female who is seen here today for evaluation of a \"bug bite \"in the lower ankle area.  Frank states that she has had this for over 1 month.  Initially, it was a swelling that was pruritic.  She thought perhaps it had been a tick or mosquito bite as she had been doing a lot of fishing.  She states that over the past month, it had increased and become red in color.  She states she squeezed it.  It felt as though a shard of glass was extruded.  Over the weekend, it was painful.  Additionally, she described pain and swelling over the ankle and pain over a tuft of varicose veins in the left calf.    She denies any fever or chills.    She states that her blood sugars have been running on the low side.  She is disappointed that her weight is up.  She states she is slowly dialed back her insulin as is noted in the chart notes.    Review of Systems:  A comprehensive review of systems was performed and was otherwise " negative    PFSH:  Social History: She lives with her common-law  of many years and son.  She is contemplating a separation  Social History     Tobacco Use   Smoking Status Former Smoker     Packs/day: 0.25     Years: 44.00     Pack years: 11.00     Types: Cigarettes     Last attempt to quit: 2017     Years since quittin.6   Smokeless Tobacco Never Used   Tobacco Comment    age 18 to age 62 up to 1/2 ppd       Past History:   Past Medical History:   Diagnosis Date     Anemia      Arthritis      Breast cyst      Carpal tunnel syndrome      Coronary artery disease due to lipid rich plaque      Dyslipidemia, goal LDL below 70      Esophageal reflux      Essential hypertension      Fatty liver      Goiter diffuse, nontoxic      Hiatal hernia      History of gestational diabetes      History of MRSA infection 2017    Pilonidal cyst culture     Morbid obesity with BMI of 40.0-44.9, adult (H)      RADHA on CPAP      Paroxysmal SVT (supraventricular tachycardia) (H)     S/P ablation     S/P hip replacement      Type 2 diabetes mellitus (H)        Current Outpatient Medications   Medication Sig Dispense Refill     acetaminophen (TYLENOL) 500 MG tablet Take 1,000 mg by mouth 3 (three) times a day as needed.       aspirin 81 MG EC tablet Take 81 mg by mouth daily.       blood glucose meter (FREESTYLE LITE METER) Use 1 each As Directed 6 (six) times a day. Dispense glucometer brand per patient's insurance at pharmacy discretion. 1 each 0     blood glucose test strips Use 1 each As Directed 6 (six) times a day. Prodigy Meter 300 strip 3     cholecalciferol, vitamin D3, (CHOLECALCIFEROL) 1,000 unit tablet Take 2,000 Units by mouth daily.             cyclobenzaprine (FLEXERIL) 10 MG tablet Take 1 tablet (10 mg total) by mouth at bedtime as needed for muscle spasms. 21 tablet 1     generic lancets (MICROLET LANCET) Use 1 each As Directed 6 (six) times a day. 300 each 3     hydroCHLOROthiazide (MICROZIDE) 12.5 mg  "capsule Take 1 capsule (12.5 mg total) by mouth daily. 90 capsule 3     HYDROcodone-acetaminophen 5-325 mg per tablet Take 1 tablet by mouth 2 (two) times a day as needed for pain. 42 tablet 0     insulin degludec (TRESIBA FLEXTOUCH U-200) 200 unit/mL (3 mL) InPn Inject 110 Units under the skin daily. 20 mL 11     insulin lispro (HUMALOG KWIKPEN INSULIN) 100 unit/mL pen Inject 22 Units under the skin 3 (three) times a day. 15 adj dose pen 3     lisinopril (PRINIVIL,ZESTRIL) 5 MG tablet Take 1.5 tablets (7.5 mg total) by mouth daily. 135 tablet 3     melatonin (MELATIN) 3 mg Tab tablet Take 3 mg by mouth at bedtime as needed.       nitroglycerin (NITROSTAT) 0.4 MG SL tablet Place 1 tablet (0.4 mg total) under the tongue every 5 (five) minutes as needed for chest pain. 25 tablet 3     omega-3 acid ethyl esters (LOVAZA) 1 gram capsule Take 2 capsules (2 g total) by mouth 2 (two) times a day. 360 capsule 3     omeprazole (PRILOSEC) 40 MG capsule TAKE 1 CAPSULE BY MOUTH EVERY DAY 90 capsule 3     OZEMPIC 1 mg/dose (2 mg/1.5 mL) PnIj ADMINISTER 1 MG UNDER THE SKIN EVERY 7 DAYS 3 mL 12     pen needle, diabetic (BD ULTRA-FINE ZULMA PEN NEEDLE) 32 gauge x 5/32\" Ndle Inject 4 each under the skin daily. With Tresiba and Novolog 120 each 3     rosuvastatin (CRESTOR) 10 MG tablet TAKE 1 TABLET BY MOUTH DAILY 90 tablet 3     VITAMIN B-1 100 mg tablet Take 1 tablet by mouth daily.  0     No current facility-administered medications for this visit.        Family History: Nothing new.    Physical Exam:  General Appearance:   He is pleasant and well-appearing and in no acute distress  Vitals:    10/02/19 1220   BP: 116/58   Patient Site: Left Arm   Patient Position: Sitting   Cuff Size: Adult Large   Pulse: 68   Temp: 97.8  F (36.6  C)   TempSrc: Tympanic   SpO2: 96%   Weight: (!) 262 lb 1 oz (118.9 kg)     Wt Readings from Last 3 Encounters:   10/02/19 (!) 262 lb 1 oz (118.9 kg)   08/19/19 (!) 259 lb 14.4 oz (117.9 kg)   08/02/19 (!) " 261 lb (118.4 kg)     Body mass index is 43.61 kg/m .    Left lower extremity is examined.  There is absolutely no swelling at all.  The leg appears normal caliber.  There is no swelling or erythema around the area of described bug bite.  There is a very small circular scaly lesion noted.  It is not tender to palpation.  There is no fluctuance.  There is a tuft of varicose veins in the medial aspect of the left calf.  These are slightly tender to palpation.  There is no overall edema

## 2021-06-01 NOTE — TELEPHONE ENCOUNTER
RN cannot approve Refill Request    RN can NOT refill this medication Protocol failed and NO refill given.        Amy Velez, [facility] Triage/Med Refill 9/24/2019    Requested Prescriptions   Pending Prescriptions Disp Refills     omega-3 acid ethyl esters (LOVAZA) 1 gram capsule 360 capsule 3     Sig: Take 2 capsules (2 g total) by mouth 2 (two) times a day.       Lovaza/Ezetimibe-Combination Refill Protocol Failed - 9/24/2019 11:16 AM        Failed - Fasting lipid cascade in last 12 months     Cholesterol   Date Value Ref Range Status   03/13/2018 134 <=199 mg/dL Final     Triglycerides   Date Value Ref Range Status   03/13/2018 276 (H) <=149 mg/dL Final     HDL Cholesterol   Date Value Ref Range Status   03/13/2018 28 (L) >=50 mg/dL Final     LDL Calculated   Date Value Ref Range Status   03/13/2018 51 <=129 mg/dL Final     Patient Fasting > 8hrs?   Date Value Ref Range Status   03/13/2018 Yes  Final             Passed - PCP or prescribing provider visit in past 12 months       Last office visit with prescriber/PCP: 6/21/2019 Mimi Fuentes MD OR same dept: 6/21/2019 Mimi Fuentes MD OR same specialty: 6/21/2019 Mimi Fuentes MD  Last physical: 8/2/2019 Last MTM visit: Visit date not found   Next visit within 3 mo: Visit date not found  Next physical within 3 mo: Visit date not found  Prescriber OR PCP: Mimi Fuentes MD  Last diagnosis associated with med order: 1. Hyperlipemia  - omega-3 acid ethyl esters (LOVAZA) 1 gram capsule; Take 2 capsules (2 g total) by mouth 2 (two) times a day.  Dispense: 360 capsule; Refill: 0    If protocol passes may refill for 12 months if within 3 months of last provider visit (or a total of 15 months).

## 2021-06-02 ENCOUNTER — RECORDS - HEALTHEAST (OUTPATIENT)
Dept: ADMINISTRATIVE | Facility: CLINIC | Age: 66
End: 2021-06-02

## 2021-06-02 VITALS — WEIGHT: 270.38 LBS | BODY MASS INDEX: 43.45 KG/M2 | HEIGHT: 66 IN

## 2021-06-02 VITALS — WEIGHT: 252.9 LBS | BODY MASS INDEX: 40.82 KG/M2

## 2021-06-02 VITALS — BODY MASS INDEX: 44.2 KG/M2 | WEIGHT: 275 LBS | HEIGHT: 66 IN

## 2021-06-02 VITALS — HEIGHT: 66 IN | WEIGHT: 260 LBS | BODY MASS INDEX: 41.78 KG/M2

## 2021-06-02 VITALS — HEIGHT: 65 IN | WEIGHT: 258 LBS | BODY MASS INDEX: 42.99 KG/M2

## 2021-06-02 VITALS — HEIGHT: 65 IN | WEIGHT: 263 LBS | BODY MASS INDEX: 43.82 KG/M2

## 2021-06-02 VITALS — BODY MASS INDEX: 43.58 KG/M2 | WEIGHT: 270 LBS

## 2021-06-02 VITALS — HEIGHT: 66 IN | WEIGHT: 266.4 LBS | BODY MASS INDEX: 42.81 KG/M2

## 2021-06-02 VITALS — BODY MASS INDEX: 43.63 KG/M2 | WEIGHT: 270.3 LBS

## 2021-06-02 VITALS — HEIGHT: 66 IN | BODY MASS INDEX: 40.5 KG/M2 | WEIGHT: 252 LBS

## 2021-06-02 VITALS — WEIGHT: 263.8 LBS | HEIGHT: 66 IN | BODY MASS INDEX: 42.4 KG/M2

## 2021-06-02 VITALS — WEIGHT: 256 LBS | BODY MASS INDEX: 41.32 KG/M2

## 2021-06-02 VITALS — BODY MASS INDEX: 44.36 KG/M2 | HEIGHT: 66 IN | WEIGHT: 276 LBS

## 2021-06-02 VITALS — HEIGHT: 66 IN | WEIGHT: 270 LBS | BODY MASS INDEX: 43.39 KG/M2

## 2021-06-02 VITALS — BODY MASS INDEX: 41.67 KG/M2 | WEIGHT: 258.19 LBS

## 2021-06-02 NOTE — TELEPHONE ENCOUNTER
1. What is the nature of the form? Diabetic DMV    2. Has patient signed form if applicable? Yes    3. Where should completed form go? Fax [phone]    4. When was patient's last appointment with [facility]? Within one year    5. If further questions or when form is completed, is it okay to leave detailed message on patient's phone? YES

## 2021-06-02 NOTE — TELEPHONE ENCOUNTER
Patient states she is down to two tabs for the weekend.  She is having increase in knee and back pain.  See separate encounter.   Controlled Substance Refill Request  Medication Name:Hydrocodone/ ace 5-325 mg Take one tablet my mouth two times a day.  See order as it will not reorder due to diagnosis issue  Date Last Fill: 9/13/19  Pharmacy: [facility]    Submit electronically to pharmacy  Controlled Substance Agreement Date Scanned:   Encounter-Level CSA Scan Date - 07/26/2017:    Scan on 8/2/2017  6:44 AM           Encounter-Level CSA Scan Date - 06/24/2016:    Scan on 6/27/2016  1:57 PM       Last office visit with prescriber/PCP: 10/2/2019 Mimi Fuentes MD OR same dept: 10/2/2019 Mimi Fuentes MD OR same specialty: 10/2/2019 Mimi Fuentes MD  Last physical: 8/2/2019 Last MTM visit: Visit date not found

## 2021-06-02 NOTE — TELEPHONE ENCOUNTER
Who is calling:  Patient   Reason for Call:  Is having an increase in knee pain and now the back is hurting.  asking for Dr Fuentes to advise if patient needs to see be seen in clinic or can a referral be made directly to Dr Eaton at [facility] who has done her injections in the past? Please advise.  Date of last appointment with primary care: NA  Okay to leave a detailed message: Yes

## 2021-06-02 NOTE — TELEPHONE ENCOUNTER
Refill Approved    Rx renewed per Medication Renewal Policy. Medication was last renewed on 11/8/18 .    Viviana Moore, [facility] Triage/Med Refill 10/20/2019     Requested Prescriptions   Pending Prescriptions Disp Refills     hydroCHLOROthiazide (MICROZIDE) 12.5 mg capsule 90 capsule 3     Sig: Take 1 capsule (12.5 mg total) by mouth daily.       Diuretics/Combination Diuretics Refill Protocol  Passed - 10/20/2019  5:05 PM        Passed - Visit with PCP or prescribing provider visit in past 12 months     Last office visit with prescriber/PCP: 10/2/2019 Mimi Fuentes MD OR same dept: 10/2/2019 Mimi Fuentes MD OR same specialty: 10/2/2019 Mimi Fuentes MD  Last physical: 8/2/2019 Last MTM visit: Visit date not found   Next visit within 3 mo: Visit date not found  Next physical within 3 mo: Visit date not found  Prescriber OR PCP: Mimi Fuentes MD  Last diagnosis associated with med order: 1. Essential hypertension with goal blood pressure less than 140/90  - hydroCHLOROthiazide (MICROZIDE) 12.5 mg capsule; Take 1 capsule (12.5 mg total) by mouth daily.  Dispense: 90 capsule; Refill: 3    If protocol passes may refill for 12 months if within 3 months of last provider visit (or a total of 15 months).             Passed - Serum Potassium in past 12 months      Lab Results   Component Value Date    Potassium 4.0 07/29/2019             Passed - Serum Sodium in past 12 months      Lab Results   Component Value Date    Sodium 139 07/29/2019             Passed - Blood pressure on file in past 12 months     BP Readings from Last 1 Encounters:   10/20/19 145/78             Passed - Serum Creatinine in past 12 months      Creatinine   Date Value Ref Range Status   07/29/2019 0.88 0.60 - 1.10 mg/dL Final

## 2021-06-02 NOTE — TELEPHONE ENCOUNTER
Medication Request  Medication name:   lisinopril (PRINIVIL,ZESTRIL) 5 MG tablet 135 tablet 3 8/19/2019  No   Sig - Route: Take 1.5 tablets (7.5 mg total) by mouth daily. - Oral   Class: No Print       Pharmacy Name and Location: [facility]  Reason for request: Refill requested for above medication, medication is listed as historical.     When did you use medication last?:  Last fill 8.28.2019  Patient offered appointment:  n/a  Okay to leave a detailed message: yes

## 2021-06-03 ENCOUNTER — RECORDS - HEALTHEAST (OUTPATIENT)
Dept: ADMINISTRATIVE | Facility: CLINIC | Age: 66
End: 2021-06-03

## 2021-06-03 VITALS — BODY MASS INDEX: 41.16 KG/M2 | HEIGHT: 66 IN

## 2021-06-03 VITALS — BODY MASS INDEX: 43.49 KG/M2 | HEIGHT: 65 IN | WEIGHT: 261 LBS

## 2021-06-03 VITALS
HEART RATE: 68 BPM | DIASTOLIC BLOOD PRESSURE: 58 MMHG | WEIGHT: 262.06 LBS | OXYGEN SATURATION: 96 % | BODY MASS INDEX: 43.61 KG/M2 | SYSTOLIC BLOOD PRESSURE: 116 MMHG | TEMPERATURE: 97.8 F

## 2021-06-03 VITALS — WEIGHT: 265 LBS | BODY MASS INDEX: 42.77 KG/M2

## 2021-06-03 VITALS
DIASTOLIC BLOOD PRESSURE: 66 MMHG | SYSTOLIC BLOOD PRESSURE: 118 MMHG | WEIGHT: 271.1 LBS | HEIGHT: 65 IN | BODY MASS INDEX: 45.17 KG/M2 | OXYGEN SATURATION: 96 % | TEMPERATURE: 98.2 F | HEART RATE: 85 BPM

## 2021-06-03 VITALS — WEIGHT: 259.9 LBS | BODY MASS INDEX: 43.25 KG/M2

## 2021-06-03 VITALS — HEART RATE: 88 BPM | DIASTOLIC BLOOD PRESSURE: 58 MMHG | SYSTOLIC BLOOD PRESSURE: 114 MMHG | OXYGEN SATURATION: 94 %

## 2021-06-03 VITALS — BODY MASS INDEX: 45.15 KG/M2 | WEIGHT: 271 LBS | HEIGHT: 65 IN

## 2021-06-03 NOTE — TELEPHONE ENCOUNTER
Who is calling:  Patient  Reason for Call:  Patient is questioning what about the other medications that she is currently taking? Patient is questioning if she should take them or not? Patient stated that Mimi Fuentes MD should know which medications because it's all listed in her chart. Patient stated that a My-Chart response is preferred.  Okay to leave a detailed message: No  My-chart message

## 2021-06-03 NOTE — TELEPHONE ENCOUNTER
Refill Request  Did you contact pharmacy: Yes  Medication name: hydroCHLOROthiazide (MICROZIDE) 12.5 mg capsule, VITAMIN B-1 100 mg tablet  Requested Prescriptions      No prescriptions requested or ordered in this encounter     Who prescribed the medication: Dr. Fuentes  Pharmacy Name and Location: [facility].  Is patient out of medication: No.  3 days left  Patient notified refills processed in 72 hours:  yes  Okay to leave a detailed message: yes

## 2021-06-03 NOTE — TELEPHONE ENCOUNTER
CALLED PT AND THEN WE CALLED [facility].  HER LAST INJECTION WAS AUG 6-AND THEY CAN ONLY BE DONE EVERY 6 MONTHS.  SHE DID END UP SCHEDULING AT [place] ON 11/26/19 9AM WITH DR RICHARDS

## 2021-06-03 NOTE — TELEPHONE ENCOUNTER
Patient Returning Call  Reason for call:  Patient calling back   Information relayed to patient:  Below message relayed to patient  Patient has additional questions:  No  If YES, what are your questions/concerns: no  andrew to leave a detailed message?:  No call back needed

## 2021-06-03 NOTE — PROGRESS NOTES
[facility] Follow Up Note    Assessment/Plan:  1. Pruritic disorder  Unclear as to whether this is a medication phenomenon or not.  Seem to be worse when taking antiviral for herpes type rash.  Slightly improved but present for about 10 days.  Vicodin can also trigger pruritus.  Recommendations: Cetirizine or Zyrtec-10 mg/day for the next 7 to 10 days.  Will check liver enzymes thyroid functions, etc.  Continue to monitor  - HM2(CBC w/o Differential)  - Comprehensive Metabolic Panel  - Thyroid Cascade  - Thiamin (Vitamin B1), Whole Blood    2. Type 2 diabetes mellitus with hyperglycemia, with long-term current use of insulin (H)  Glycohemoglobin at goal.  - Glycosylated Hemoglobin A1c  - LDL Cholesterol, Direct  - VITAMIN B-1 100 mg tablet; Take 1 tablet (100 mg total) by mouth daily.; Refill: 3    3. Essential hypertension  At goal    4. Coronary artery disease due to lipid rich plaque  Updating labs  - LDL Cholesterol, Direct    5. Acute idiopathic gout of right foot  Reports from emergency department reviewed.  Seen in October for gouty arthritis.  Course of prednisone.  Recommendation: We will check uric acid and x-ray.  Still has some tenderness over the right toe joint.  We will also recommend diclofenac  - Uric Acid  - XR Toe Right 2 or More VWS; Future  - diclofenac sodium (VOLTAREN) 1 % Gel; Apply one thin layer over toe joint/ affected joint  Dispense: 1 Tube; Refill: 0    6. Menopausal syndrome (hot flashes)  Hot flashes-3 to 4/day.  Will check thyroid function    7. Thiamine deficiency  Medication ordered      Follow-up quarterly    Mimi Fuentes MD    Chief Complaint:  Chief Complaint   Patient presents with     Pruritus       History of Present Illness:  Liana is a 64 y.o. female who is here today for multitude of issues.  First, she reports that she has had her colonoscopy and had a polyp extracted.  Things otherwise went well.  She was pleased with her directions with regard to  insulin.  Additionally, she thought her insulin and diabetes would be off.  She states for the past week, her sugars have been elevated.  However, she has been on prednisone for a bout of gouty arthritis in her toe.  She was seen in the emergency room for this.  The pain persists despite prednisone.  Of note, she did not have an x-ray.  She has tenderness at the MCP joint of the left great toe.    Additionally, she describes a nonspecific itching of her hips, bottom of her feet, etc.  This is been present and intense over the past 10 days or so.  She states it seems to start after the prednisone usage.  Additionally, she took some antiviral medication for a herpetic skin rash as well.  She uses Vicodin periodically.  She denies any rashes.    She also complains of hot flashes.    She is contemplating a move.  This will occur in May.  She is inquiring about a handicap parking space per se.    Additionally, we discussed exercise options.  She belongs to [employer].  Often, when her pain feels better, she over does her workouts.  Encourage swimming/water exercise    Review of Systems:  A comprehensive review of systems was performed and was otherwise negative    PFSH:  Social History: She is in a common-law marriage with Panchito.  She has 1 son  Social History     Tobacco Use   Smoking Status Former Smoker     Packs/day: 0.25     Years: 44.00     Pack years: 11.00     Types: Cigarettes     Last attempt to quit: 2017     Years since quittin.8   Smokeless Tobacco Never Used   Tobacco Comment    age 18 to age 62 up to 1/2 ppd       Past History:   Past Medical History:   Diagnosis Date     Anemia      Arthritis      Breast cyst      Carpal tunnel syndrome      Coronary artery disease due to lipid rich plaque      Dyslipidemia, goal LDL below 70      Esophageal reflux      Essential hypertension      Fatty liver      Goiter diffuse, nontoxic      Hiatal hernia      History of gestational diabetes      History of  MRSA infection 07/17/2017    Pilonidal cyst culture     Morbid obesity with BMI of 40.0-44.9, adult (H)      RADHA on CPAP      Paroxysmal SVT (supraventricular tachycardia) (H)     S/P ablation     S/P hip replacement      Type 2 diabetes mellitus (H)        Current Outpatient Medications   Medication Sig Dispense Refill     acetaminophen (TYLENOL) 500 MG tablet Take 1,000 mg by mouth 3 (three) times a day as needed.       aspirin 81 MG EC tablet Take 81 mg by mouth daily.       blood glucose meter (FREESTYLE LITE METER) Use 1 each As Directed 6 (six) times a day. Dispense glucometer brand per patient's insurance at pharmacy discretion. 1 each 0     blood glucose test strips Use 1 each As Directed 6 (six) times a day. Prodigy Meter 300 strip 3     cholecalciferol, vitamin D3, (CHOLECALCIFEROL) 1,000 unit tablet Take 2,000 Units by mouth daily.             cyclobenzaprine (FLEXERIL) 10 MG tablet Take 1 tablet (10 mg total) by mouth at bedtime as needed for muscle spasms. 21 tablet 1     generic lancets (MICROLET LANCET) Use 1 each As Directed 6 (six) times a day. 300 each 3     hydroCHLOROthiazide (MICROZIDE) 12.5 mg capsule Take 1 capsule (12.5 mg total) by mouth daily. 90 capsule 3     HYDROcodone-acetaminophen 5-325 mg per tablet Take 1 tablet by mouth 2 (two) times a day as needed for pain. 42 tablet 0     insulin degludec (TRESIBA FLEXTOUCH U-200) 200 unit/mL (3 mL) InPn Inject 110 Units under the skin daily. 20 mL 11     insulin lispro (HUMALOG KWIKPEN INSULIN) 100 unit/mL pen Inject 22 Units under the skin 3 (three) times a day. 15 adj dose pen 3     lisinopril (PRINIVIL,ZESTRIL) 5 MG tablet Take 1.5 tablets (7.5 mg total) by mouth daily. 135 tablet 3     melatonin (MELATIN) 3 mg Tab tablet Take 3 mg by mouth at bedtime as needed.       nitroglycerin (NITROSTAT) 0.4 MG SL tablet Place 1 tablet (0.4 mg total) under the tongue every 5 (five) minutes as needed for chest pain. 25 tablet 3     omega-3 acid ethyl  "esters (LOVAZA) 1 gram capsule Take 2 capsules (2 g total) by mouth 2 (two) times a day. 360 capsule 3     omeprazole (PRILOSEC) 40 MG capsule TAKE 1 CAPSULE BY MOUTH EVERY DAY 90 capsule 3     OZEMPIC 1 mg/dose (2 mg/1.5 mL) PnIj ADMINISTER 1 MG UNDER THE SKIN EVERY 7 DAYS 3 mL 12     pen needle, diabetic (BD ULTRA-FINE ZULMA PEN NEEDLE) 32 gauge x 5/32\" Ndle Inject 4 each under the skin daily. With Tresiba and Novolog 120 each 3     rosuvastatin (CRESTOR) 10 MG tablet TAKE 1 TABLET BY MOUTH DAILY 90 tablet 3     VITAMIN B-1 100 mg tablet Take 1 tablet (100 mg total) by mouth daily.  3     diclofenac sodium (VOLTAREN) 1 % Gel Apply one thin layer over toe joint/ affected joint 1 Tube 0     No current facility-administered medications for this visit.        Family History:     Physical Exam:  General Appearance:   She appears well and in no acute distress  Vitals:    11/25/19 0959   BP: 114/58   Patient Site: Left Arm   Patient Position: Sitting   Cuff Size: Adult Large   Pulse: 88   SpO2: 94%     Wt Readings from Last 3 Encounters:   10/20/19 (!) 255 lb (115.7 kg)   10/02/19 (!) 262 lb 1 oz (118.9 kg)   08/19/19 (!) 259 lb 14.4 oz (117.9 kg)     There is no height or weight on file to calculate BMI.    Left great toe is examined.  It is tender to palpation.  Pulses intact      "

## 2021-06-03 NOTE — TELEPHONE ENCOUNTER
Liana, your uric acid is up a bit.  This can cause gout.  I want to start a low dose medicine to prevent any further recurrences.  It is allopurinol taken once a day.  Drink lots of water!

## 2021-06-04 VITALS
HEIGHT: 65 IN | WEIGHT: 265 LBS | BODY MASS INDEX: 44.15 KG/M2 | OXYGEN SATURATION: 96 % | HEART RATE: 88 BPM | DIASTOLIC BLOOD PRESSURE: 66 MMHG | SYSTOLIC BLOOD PRESSURE: 116 MMHG

## 2021-06-04 VITALS
OXYGEN SATURATION: 94 % | SYSTOLIC BLOOD PRESSURE: 120 MMHG | HEART RATE: 90 BPM | DIASTOLIC BLOOD PRESSURE: 58 MMHG | HEIGHT: 65 IN | BODY MASS INDEX: 44.98 KG/M2 | WEIGHT: 270 LBS | TEMPERATURE: 98.7 F

## 2021-06-04 VITALS
BODY MASS INDEX: 45.15 KG/M2 | RESPIRATION RATE: 18 BRPM | HEIGHT: 65 IN | DIASTOLIC BLOOD PRESSURE: 60 MMHG | SYSTOLIC BLOOD PRESSURE: 120 MMHG | WEIGHT: 271 LBS | HEART RATE: 85 BPM

## 2021-06-04 VITALS — DIASTOLIC BLOOD PRESSURE: 56 MMHG | OXYGEN SATURATION: 99 % | SYSTOLIC BLOOD PRESSURE: 114 MMHG | HEART RATE: 88 BPM

## 2021-06-04 NOTE — PROGRESS NOTES
Preoperative Exam    Scheduled Procedure: Left knee repair   Surgery Date: 1/6/20  Surgery Location: [facility]    Surgeon:  Dr. Eaton     Assessment/Plan:     1. Pre-operative examination  Is medically stable for this surgery.  Recent blood work done 2 days ago in the emergency room was was acceptable limits.  EKG today did not show any acute changes.  Will check urinalysis prior to her surgery and echocardiogram due to mild increase in shortness of breath.  - Electrocardiogram Perform - Clinic  - Urinalysis-UC if Indicated  - Echo Complete; Future    2. Acute meniscal tear of left knee, sequela  It is affects her ability to walk for prolonged distances  - Electrocardiogram Perform - Clinic  - Urinalysis-UC if Indicated  - Echo Complete; Future    3. Concussion without loss of consciousness, sequela (H)  Mild concussion due to hitting her head on the fridge recently.  CT scan of her head was negative for bleeding.  Currently headache has improved.  Since Benadryl has been making her to 5 to discuss to stopped it and she can use Reglan and Tylenol as needed.  She will hold all NSAIDs a week prior to surgery    4. Type 2 diabetes mellitus with hyperglycemia, with long-term current use of insulin (H)  She is on high-dose of insulin with Tresiba 110 units in the morning, lispro 22 units 3 times a day with meals and Ozempic every Wednesday.  Discussed not to use any short acting insulin day of surgery and use only two thirds of her regular Tresiba insulin which would equal to 80 units a day.    5. Coronary artery disease due to lipid rich plaque  Patient has had stent placed into RCA in 2017.  No recurrent chest pains.  Will check echo due to mild shortness of breath.  No palpitations.  EKG today did not show any acute changes  - Echo Complete; Future    6. Essential hypertension  She will hold her hydrochlorothiazide and lisinopril day of surgery    7. RADHA on CPAP  We will need close  monitoring during conscious sedation    8. Gout, unspecified cause, unspecified chronicity, unspecified site  Discussed to take her allopurinol day of surgery      Surgical Procedure Risk: Intermediate (reported cardiac risk generally 1-5%)  Have you had prior anesthesia?: Yes  Have you or any family members had a previous anesthesia reaction:  Yes: nausea.  Do you or any family members have a history of a clotting or bleeding disorder?: No  Cardiac Risk Assessment: no increased risk for major cardiac complications    APPROVAL GIVEN to proceed with proposed procedure, without further diagnostic evaluation    Please Note: Patient has latex allergy    Functional Status: Independent  Patient plans to recover at home with family.     Subjective:      Liana Briceño is a 64 y.o. female who presents for a preoperative consultation.      Liana has history of type 2 diabetes, high blood pressure, heart disease, gout, thiamine deficiency and menopause.  She is currently here for preoperative evaluation for arthroscopic meniscal surgery on her left knee.  Patient developed 2-year and currently has been having a lot of pain.    3 days ago she also had mild concussion.  She he did her head on the door over for age.  She was seen in the emergency room because headache was escalating and CT scan of the head was negative for acute bleeding.  They have given her Reglan and Benadryl which she has been using and headache has improved.  This morning she did not take her Reglan and it is 5 out of 10.  She denies dizziness.  Initially she did have mild mental sluggishness but it has improved.    She does have history of heart disease and has had stents placed in 2017 into RCA.  She has not been seen her cardiologist recently.  She is on aspirin.  She denies any chest pains or palpitations.  Recently she gained back a few pounds and has been having slightly increased shortness of breath with exertion.  We will check her EKG today and I  recommended doing heart ultrasound prior to surgery.    She does have history of smoking but quit 3 years ago.  Currently she denies any upper respiratory infections or cough.  Lungs are clear.    For diabetes she is on Tresiba 110 units in the morning, lispro 22 units 3 times a day with meals and Ozempic every Wednesday.  Recent A1c in November was 6.7.    Blood pressure is well controlled on lisinopril and hydrochlorothiazide.    All other systems reviewed and are negative, other than those listed in the HPI.    Pertinent History  Do you have difficulty breathing or chest pain after walking up a flight of stairs: Yes: just recently after the head injury becoming short of breath after walking up flights of stairs.   History of obstructive sleep apnea: Yes: using c-pap machine, working well.  Steroid use in the last 6 months: Yes: Patient has had steroids due to gout flareup  Frequent Aspirin/NSAID use: Yes: 81 mg  Prior Blood Transfusion: No  Prior Blood Transfusion Reaction: No  If for some reason prior to, during or after the procedure, if it is medically indicated, would you be willing to have a blood transfusion?:  There is no transfusion refusal.    Current Outpatient Medications   Medication Sig Dispense Refill     acetaminophen (TYLENOL) 500 MG tablet Take 1,000 mg by mouth 3 (three) times a day as needed.       allopurinol (ZYLOPRIM) 100 MG tablet Take 1 tablet (100 mg total) by mouth daily. 90 tablet 3     aspirin 81 MG EC tablet Take 81 mg by mouth daily.       blood glucose meter (FREESTYLE LITE METER) Use 1 each As Directed 6 (six) times a day. Dispense glucometer brand per patient's insurance at pharmacy discretion. 1 each 0     blood glucose test strips Use 1 each As Directed 6 (six) times a day. Prodigy Meter 300 strip 3     cholecalciferol, vitamin D3, (CHOLECALCIFEROL) 1,000 unit tablet Take 2,000 Units by mouth daily.             cyclobenzaprine (FLEXERIL) 10 MG tablet Take 1 tablet (10 mg total)  "by mouth at bedtime as needed for muscle spasms. 21 tablet 1     diclofenac sodium (VOLTAREN) 1 % Gel Apply one thin layer over toe joint/ affected joint 1 Tube 0     generic lancets (MICROLET LANCET) Use 1 each As Directed 6 (six) times a day. 300 each 3     hydroCHLOROthiazide (MICROZIDE) 12.5 mg capsule Take 1 capsule (12.5 mg total) by mouth daily. 90 capsule 3     HYDROcodone-acetaminophen 5-325 mg per tablet Take 1 tablet by mouth 2 (two) times a day as needed for pain. 42 tablet 0     insulin degludec (TRESIBA FLEXTOUCH U-200) 200 unit/mL (3 mL) InPn Inject 110 Units under the skin daily. 20 mL 11     insulin lispro (HUMALOG KWIKPEN INSULIN) 100 unit/mL pen Inject 22 Units under the skin 3 (three) times a day. 15 adj dose pen 3     lisinopril (PRINIVIL,ZESTRIL) 5 MG tablet Take 1.5 tablets (7.5 mg total) by mouth daily. 135 tablet 3     melatonin (MELATIN) 3 mg Tab tablet Take 3 mg by mouth at bedtime as needed.       metoclopramide (REGLAN) 10 MG tablet 10 mg p.o. every 8 hours as needed headache, take with Benadryl 50 mg 20 tablet 0     nitroglycerin (NITROSTAT) 0.4 MG SL tablet Place 1 tablet (0.4 mg total) under the tongue every 5 (five) minutes as needed for chest pain. 25 tablet 3     omega-3 acid ethyl esters (LOVAZA) 1 gram capsule Take 2 capsules (2 g total) by mouth 2 (two) times a day. 360 capsule 3     omeprazole (PRILOSEC) 40 MG capsule TAKE 1 CAPSULE BY MOUTH EVERY DAY 90 capsule 3     OZEMPIC 1 mg/dose (2 mg/1.5 mL) PnIj ADMINISTER 1 MG UNDER THE SKIN EVERY 7 DAYS 3 mL 12     pen needle, diabetic (BD ULTRA-FINE ZULMA PEN NEEDLE) 32 gauge x 5/32\" Ndle Inject 4 each under the skin daily. With Tresiba and Novolog 120 each 3     rosuvastatin (CRESTOR) 10 MG tablet TAKE 1 TABLET BY MOUTH DAILY 90 tablet 3     TRESIBA FLEXTOUCH U-200 200 unit/mL (3 mL) InPn INJECT 136 UNITS UNDER THE SKIN DAILY 21 Syringe 4     VITAMIN B-1 100 mg tablet Take 1 tablet (100 mg total) by mouth daily. 90 tablet 3     No " current facility-administered medications for this visit.         Allergies   Allergen Reactions     Penicillins Other (See Comments)     Passed out.      Doxycycline Dizziness     Eggshell Membrane      Naloxone      itching     Pneumococcal 23-Valent Polysaccharide Vaccine Other (See Comments)     pneumonia     Statins-Hmg-Coa Reductase Inhibitors Myalgia     Shaking with simvastatin and atorvastatin     Azithromycin Rash       Patient Active Problem List   Diagnosis     Obstructive Sleep Apnea     Diffuse Nontoxic Goiter     Type 2 diabetes mellitus (H)     Essential hypertension     Esophageal reflux     Dyslipidemia, goal LDL below 70     Hiatal Hernia     Sensorineural hearing loss     Stenosis of left carotid artery     Coronary artery disease due to lipid rich plaque     Morbid obesity with BMI of 40.0-44.9, adult (H)     RADHA on CPAP     Thiamine deficiency     Vitamin D deficiency     Increased PTH level     Low serum vitamin B12     Elevated ferritin level     Positive result for methicillin resistant Staphylococcus aureus (MRSA) screening       Past Medical History:   Diagnosis Date     Anemia      Arthritis      Breast cyst      Carpal tunnel syndrome      Coronary artery disease due to lipid rich plaque      Dyslipidemia, goal LDL below 70      Esophageal reflux      Essential hypertension      Fatty liver      Goiter diffuse, nontoxic      Hiatal hernia      History of gestational diabetes      History of MRSA infection 07/17/2017    Pilonidal cyst culture     Morbid obesity with BMI of 40.0-44.9, adult (H)      RADHA on CPAP      Paroxysmal SVT (supraventricular tachycardia) (H)     S/P ablation     S/P hip replacement      Type 2 diabetes mellitus (H)        Past Surgical History:   Procedure Laterality Date     BREAST BIOPSY       BREAST CYST EXCISION       CARDIAC CATHETERIZATION N/A 4/24/2017    Procedure: Coronary Angiogram;  Surgeon: Ariane Biswas MD;  Location: [facility];  Service:       CARDIAC CATHETERIZATION N/A 2017    Procedure: Coronary Angiogram;  Surgeon: Howard Gutierrez MD;  Location: [facility];  Service:      CHOLECYSTECTOMY  2000     CORONARY STENT PLACEMENT  2017    BO to RCA by Dr. Biswas     DC EXPLO/DRAIN BREAST ABSCESS      Description: Breast Surgery Mastotomy With Drainage Of Abscess     DC L HRT CATH W/NJX L VENTRICULOGRAPHY IMG S&I N/A 2017    Procedure: Left Heart Catheterization with Left Ventriculogram;  Surgeon: Ariane Biswas MD;  Location: [facility];  Service: Cardiology     DC REVISE MEDIAN N/CARPAL TUNNEL SURG      Description: Neuroplasty Decompression Median Nerve At Carpal Tunnel;  Recorded: 2009;     DC THYROID LOBECTOMY,UNILAT       TOTAL HIP ARTHROPLASTY Right        Social History     Socioeconomic History     Marital status: Domestic Partner     Spouse name: Panchito Mclean     Number of children: 1     Years of education: Not on file     Highest education level: Not on file   Occupational History     Occupation: no longer there as they closed the center     Employer: [employer]     Occupation: PCA     Comment: four hours a day   Social Needs     Financial resource strain: Not on file     Food insecurity:     Worry: Not on file     Inability: Not on file     Transportation needs:     Medical: Not on file     Non-medical: Not on file   Tobacco Use     Smoking status: Former Smoker     Packs/day: 0.25     Years: 44.00     Pack years: 11.00     Types: Cigarettes     Last attempt to quit: 2017     Years since quittin.9     Smokeless tobacco: Never Used     Tobacco comment: age 18 to age 62 up to 1/2 ppd   Substance and Sexual Activity     Alcohol use: Yes     Alcohol/week: 0.0 standard drinks     Comment: < 1 drink a week     Drug use: No     Sexual activity: Not on file   Lifestyle     Physical activity:     Days per week: Not on file     Minutes per session: Not on file     Stress: Not on file  "  Relationships     Social connections:     Talks on phone: Not on file     Gets together: Not on file     Attends Druze service: Not on file     Active member of club or organization: Not on file     Attends meetings of clubs or organizations: Not on file     Relationship status: Not on file     Intimate partner violence:     Fear of current or ex partner: Not on file     Emotionally abused: Not on file     Physically abused: Not on file     Forced sexual activity: Not on file   Other Topics Concern     Not on file   Social History Narrative    Single but lives with her significant other, has one son, she is a .  She is a PCA.     Disability for LBP. Work injury.           Objective:     Vitals:    12/30/19 1549   BP: 118/66   Pulse: 85   Temp: 98.2  F (36.8  C)   TempSrc: Oral   SpO2: 96%   Weight: (!) 271 lb 1.6 oz (123 kg)   Height: 5' 5\" (1.651 m)         Physical Exam:  General: well appearing female, alert and oriented x3  EYES: Eyelids, conjunctiva, and sclera were normal. Pupils were normal.    HEAD, EARS, NOSE, MOUTH, AND THROAT: no cervical LAD, no thyromegaly or nodules appreciated. TMs are visualized and normal, oropharynx is clear but crowded  RESPIRATORY: respirations non labored, CTA bl, no wheezes, rales, no forced expiratory wheezing.  CARDIOVASCULAR: Heart rate and rhythm were normal. No murmurs, rubs,gallops. There was trace peripheral edema.   GASTROINTESTINAL: Positive bowel sounds, abdomen is soft, non tender, non distended.     MUSCULOSKELETAL: Muscle mass was normal for age. No joint synovitis or deformity.  LYMPHATIC: There were no enlarged nodes palpable.  SKIN/HAIR/NAILS: Skin color was normal.  No rashes.  NEUROLOGIC: The patient was alert and oriented.  Speech was normal.  There is no facial asymmetry.   PSYCHIATRIC:  Mood and affect were normal.         Patient Instructions   1. Stop Ibuprofen and aspirin 5 days before surgery.    2. For headache use Tylenol (do not exceed " 3,000 mg a day) and Reglan as needed.    3. Hold b/p medications day of surgery.    Day of surgery use Treciba 80 units and no short acting insulin.     Day of surgery take Allopurinol and Omeprazole with small sip of water.    4. Have heart US done due to shortness of breath       EKG:  was reviewed.  Normal sinus rhythm, no ST changes.    Labs:  Patient had blood work done in the emergency room 2 days ago which showed normal CMP, held anemia with hemoglobin of 10.9, and ESR of 48.    Immunization History   Administered Date(s) Administered     Influenza, inj, historic,unspecified 11/01/2001     PPD Test 05/08/2018     Pneumo Polysac 23-V 08/28/2012     Td, adult adsorbed, PF 03/08/2019     Td,adult,historic,unspecified 06/10/2001, 06/10/2008     Tdap 06/10/2008     ZOSTER, LIVE 08/28/2012           Electronically signed by Irene Bagley MD 12/30/19 3:40 PM

## 2021-06-04 NOTE — TELEPHONE ENCOUNTER
"Patient Returning Call  Reason for call:    Returning call  Information relayed to patient:    Relayed below information to patient.  Patient has additional questions:  Yes  If YES, what are your questions/concerns:    Patient states her surgery is on 1/6/20.  She needs to get in before that date.   Patient states \"I have been trying to get this taken care of since 12/15/19.\"  Patient was frustrated and hung up before this nurse could ask her the questions.  Please reach out to patient and advise.  Okay to leave a detailed message?: Yes  "

## 2021-06-04 NOTE — PATIENT INSTRUCTIONS - HE
1. Stop Ibuprofen and aspirin 5 days before surgery.    2. For headache use Tylenol (do not exceed 3,000 mg a day) and Reglan as needed.    3. Hold b/p medications day of surgery.    Day of surgery use Treciba 80 units and no short acting insulin.     Day of surgery take Allopurinol and Omeprazole with small sip of water.    4. Have heart US done due to shortness of breath

## 2021-06-04 NOTE — TELEPHONE ENCOUNTER
Original note had stated incorrectly that pt's surgery was 1/16/20.  Pt surgery is actually 1/6/19.    Spoke with pt, and assisted in scheduling her for a pre op physical with Dr. Bagley on 12/30/19.  Also relayed triage message, that pt should be seen to evaluated d/t head injury.  This will also be done at appt on 12/30/19.  Pt understanding.

## 2021-06-04 NOTE — TELEPHONE ENCOUNTER
Left pt detailed message that she had been scheduled for a pre op physical with pcp for 1/15/20 at 9:40am with pcp.  Advised pt to call back if this date/time didn't work.  Also asked pt to call back regarding head injury.  Please get more information from pt about her head injury.      When did it occur?    Any LOC?    Bruise/hematoma?    Bleeding?    Vision changes?

## 2021-06-04 NOTE — TELEPHONE ENCOUNTER
Pt stopped down to schedule but office is closed.  Will call pt tomorrow to schedule after 1pm per her request

## 2021-06-04 NOTE — TELEPHONE ENCOUNTER
New Appointment Needed  What is the reason for the visit:  Also Patient wants to talk to PCP that hit head real hard on the Fridge and its still real sore.  Pre-Op Appt Request  When is the surgery? :  01/16/2020  Where is the surgery?:   [facility]  Who is the surgeon? :  Dr [facility]  What type of surgery is being done?: Left Knee Surgery  Provider Preference: PCP only  How soon do you need to be seen?: Next Available 1 week before surgery or sooner if possible.  Waitlist offered?: No  Okay to leave a detailed message:  Yes

## 2021-06-04 NOTE — TELEPHONE ENCOUNTER
As pt was leaving she said I could call her early this morning, I called her and we scheduled at 8am on Thursday, Jan. 2

## 2021-06-04 NOTE — TELEPHONE ENCOUNTER
RN cannot approve Refill Request    RN can NOT refill this medication Protocol failed and NO refill given.    Amy Velez, [facility] Triage/Med Refill 12/15/2019    Requested Prescriptions   Pending Prescriptions Disp Refills     TRESIBA FLEXTOUCH U-200 200 unit/mL (3 mL) InPn [Pharmacy Med Name: TRESIBA FLEXTOUCH PEN(U-200)INJ 3ML]  0     Sig: INJECT 136 UNITS UNDER THE SKIN DAILY       Insulin/GLP-1 Refill Protocol Failed - 12/14/2019  8:52 AM        Failed - Microalbumin in last year     Microalbumin, Random Urine   Date Value Ref Range Status   06/18/2018 3.04 (H) 0.00 - 1.99 mg/dL Final                  Passed - Visit with PCP or prescribing provider visit in last 6 months     Last office visit with prescriber/PCP: 11/25/2019 OR same dept: 11/25/2019 Mimi Fuentes MD OR same specialty: 11/25/2019 Mimi Fuentes MD Last physical: 8/2/2019 Last MTM visit: Visit date not found     Next appt within 3 mo: Visit date not found  Next physical within 3 mo: Visit date not found  Prescriber OR PCP: Mimi Fuentes MD  Last diagnosis associated with med order: 1. Type 2 diabetes mellitus with other specified complication, with long-term current use of insulin (H)  - TRESIBA FLEXTOUCH U-200 200 unit/mL (3 mL) InPn [Pharmacy Med Name: TRESIBA FLEXTOUCH PEN(U-200)INJ 3ML]; INJECT 136 UNITS UNDER THE SKIN DAILY; Refill: 0    If protocol passes may refill for 6 months if within 3 months of last provider visit (or a total of 9 months).              Passed - A1C in last 6 months     Hemoglobin A1c   Date Value Ref Range Status   11/25/2019 6.7 (H) 3.5 - 6.0 % Final               Passed - Blood pressure in last year     BP Readings from Last 1 Encounters:   11/25/19 114/58             Passed - Creatinine done in last year     Creatinine   Date Value Ref Range Status   11/25/2019 0.89 0.60 - 1.10 mg/dL Final

## 2021-06-04 NOTE — TELEPHONE ENCOUNTER
Name of form/paperwork: Other:  For diabetic shoes  Date form received by clinic:  11/27 and 12/07  When is the form needed by? Br Friday  Patient Notified form requests are processed in 3-5 business days: No  (If patient needs for sooner, please note that in this message)  Okay to leave a detailed message: Yes     [facility] Dr. Umair Das. Per pt, Nasrin from the podiatry clinic has sent over the form via fax twice to the clinic. She needs the form to be signed and faxed back. Nasrin last day to process diabetic shoes for this year is this Friday. The phone # for the clinic is [phone].

## 2021-06-04 NOTE — TELEPHONE ENCOUNTER
Rn triage  Patient is calling to report that on 12/23/19 she hit her head top left of head on  corner of fridge. No bleeding or swelling. Patient remembers hitting her head no loss of consciousness.  Patient is concerned because since this happened she reports some memory issues. She reports getting angry that she could not find her pajama shirt then realized that she had it on. Today she reports laying in bed and not knowing what day it is. Also reports writing something down and had to have someone read it later to let her know that it said what it said.  At first part of phone call patient states this injury happened 12/19/19 but later in the conversation stated it happened 12/23  Part of her head that was hit is still sore to the touch and only slight headache no change in vision, no clear fluid leaking from nose or ear, no open wound. No trouble walking. Did call in to not go to work today.  Patient reports last night:  Acid reflux that strangled her but had no stomach pain does have history of GERD is on omeprazole  Couldn't lay flat to sleep used Rolaids that helped. No chest pain currently.  Denies vomiting  Gave disposition to be evaluated in ED now but patient would like PCP or covering provider to advise.  Please have covering provider review where and when patient to be seen.  Reviewed with patient signs and symptoms of when to call back.   Thank you.   Claudia Hernández, RN  [facility] Triage Nurse  11:20 AM  12/26/2019            Reason for Disposition    ACUTE NEUROLOGIC SYMPTOM and now fine    Protocols used: HEAD INJURY-A-OH

## 2021-06-04 NOTE — TELEPHONE ENCOUNTER
Please help pt schedule echo, her surgery is on Jan 6th, I would need report before then.    Thank you,  DR THEODORE

## 2021-06-04 NOTE — TELEPHONE ENCOUNTER
Patient calling - says she hit her head really hard on the refrigerator on Monday. Called yesterday and spoke with a triage nurse.  Recommendation from Dr. Bagley was to be seen in office.  Was scheduled for Monday.      Says she had some confusion today which is not usual for her.  Earlier today she was looking for her pajama top and she was wearing it.  Is not confused now but has a headache.  Took tylenol.  Rates headache now 5/10.  Has bruised area on her head that is bigger than 2 inches.    Triaged to disposition of Go to ED Now.  Advised patient to have another adult drive.    Linda New, [facility]  Triage Nurse Advisor    Reason for Disposition    [1] ACUTE NEURO SYMPTOM AND [2] now fine  (DEFINITION: difficult to awaken OR confused thinking and talking OR slurred speech OR weakness of arms OR unsteady walking)    Large swelling or bruise > 2 inches (5 cm)    Protocols used: HEAD INJURY-A-AH

## 2021-06-05 VITALS
SYSTOLIC BLOOD PRESSURE: 138 MMHG | WEIGHT: 272 LBS | BODY MASS INDEX: 45.32 KG/M2 | DIASTOLIC BLOOD PRESSURE: 66 MMHG | TEMPERATURE: 96.6 F | HEART RATE: 88 BPM | OXYGEN SATURATION: 97 % | HEIGHT: 65 IN

## 2021-06-05 NOTE — TELEPHONE ENCOUNTER
Info relayed and pt verbalizes understanding of directions on insulin and to resume all other meds.

## 2021-06-05 NOTE — TELEPHONE ENCOUNTER
FYI - Status Update  Who is Calling: Patient  Update: She is out of her Humalog insulin and has not been able to have it today.  Please send in refill as soon as possible.  Okay to leave a detailed message?:  No return call needed

## 2021-06-05 NOTE — TELEPHONE ENCOUNTER
If eating normally after surgery, can go back to the 24 units of mealtime insulin, resume the same dose of long acting insulin when next due.      It was lowered because of the period of fasting needed before a surgery

## 2021-06-05 NOTE — TELEPHONE ENCOUNTER
RN cannot approve Refill Request    RN can NOT refill this medication Protocol failed and NO refill given.      Amy Velez, [facility] Triage/Med Refill 1/22/2020    Requested Prescriptions   Pending Prescriptions Disp Refills     HUMALOG KWIKPEN INSULIN 100 unit/mL pen [Pharmacy Med Name: HUMALOG 100 U/ML KWIK PEN INJ 3ML] 45 mL 3     Sig: INJECT 45 UNITS UNDER THE SKIN THREE TIMES A DAY       Insulin/GLP-1 Refill Protocol Failed - 1/22/2020 10:47 AM        Failed - Microalbumin in last year     Microalbumin, Random Urine   Date Value Ref Range Status   06/18/2018 3.04 (H) 0.00 - 1.99 mg/dL Final                  Passed - Visit with PCP or prescribing provider visit in last 6 months     Last office visit with prescriber/PCP: 11/25/2019 OR same dept: 11/25/2019 Mimi Fuentes MD OR same specialty: 11/25/2019 Mimi Fuentes MD Last physical: 8/2/2019 Last MTM visit: Visit date not found     Next appt within 3 mo: Visit date not found  Next physical within 3 mo: Visit date not found  Prescriber OR PCP: Mimi Fuentes MD  Last diagnosis associated with med order: 1. Type 2 diabetes mellitus with hyperglycemia, with long-term current use of insulin (H)  - HUMALOG KWIKPEN INSULIN 100 unit/mL pen [Pharmacy Med Name: HUMALOG 100 U/ML KWIK PEN INJ 3ML]; INJECT 45 UNITS UNDER THE SKIN THREE TIMES A DAY  Dispense: 45 mL; Refill: 3    If protocol passes may refill for 6 months if within 3 months of last provider visit (or a total of 9 months).              Passed - A1C in last 6 months     Hemoglobin A1c   Date Value Ref Range Status   11/25/2019 6.7 (H) 3.5 - 6.0 % Final               Passed - Blood pressure in last year     BP Readings from Last 1 Encounters:   01/02/20 124/62             Passed - Creatinine done in last year     Creatinine   Date Value Ref Range Status   12/28/2019 0.83 0.60 - 1.10 mg/dL Final

## 2021-06-05 NOTE — TELEPHONE ENCOUNTER
Reviewed narcotic use    40 hydrocodone every 2 months March, May, June.  In August, quantity increased to 42.  Fall with rotator cuff tear in September with Vicodin September 13 and November 1    Knee surgery with oxycodone given January 6    Notes reviewed dating back last 6 months.  Reference to shoulder rotator cuff tear and back pain    Use of chronic narcotics for intermittent pain no longer acceptable    Please call and clarify usage, which she is taking this for, and why this should be continued in current environment    Urine toxicology screen last done November 2018 and should be updated    Orthopedic notes reviewed    MRI of lumbar spine May 23, 2018 shows severe lower lumbar spine facet arthropathy    Consider adjunctive medications.  Review of medication list shows cyclobenzaprine, and rosuvastatin    Consider trial off statin if any musculoskeletal low back pain    Uric acid level elevated at 8.1 on November 25, 2019.  Consider trial of allopurinol for back pain and joint pain    B12 level normal but low side of normal October 11, 2018.  Consider trial of B12 shots

## 2021-06-05 NOTE — PROGRESS NOTES
[facility] Follow Up Note    Assessment/Plan:  1. Medication management  Seen here today for medication management-specifically pain medication management.  Dr. Monsivais's notes reviewed.  Recommendations and response: Patient is using of low-dose Vicodin every couple of months for severe pain.  In the interim, she uses Tylenol.  She is status post knee surgery for meniscus repair in early January of this year.  She continues also to experience postoperative pain.  Her physical maladies are well documented.  She has severe spinal arthritis.  She has multiple other musculoskeletal abnormalities and has chronic pain as a result.  A CSA is signed.  She will provide a drug screen although uses the pain medication sporadically.  Liver enzymes and labs will be updated.  Of note also, a mention was made of treatment of gout.  She has been on allopurinol for approximately a year and a half.  Will check uric acid today  - Drugs of Abuse 1,Urine  - Comprehensive Metabolic Panel  - HM2(CBC w/o Differential)  - Hepatitis C Antibody (Anti-HCV)    2. Chronic low back pain  See above  - HYDROcodone-acetaminophen 5-325 mg per tablet; Take 1 tablet by mouth 2 (two) times a day as needed for pain.  Dispense: 40 tablet; Refill: 0    3. Arthritis of knee-status post surgery for meniscus repair January 6    - HYDROcodone-acetaminophen 5-325 mg per tablet; Take 1 tablet by mouth 2 (two) times a day as needed for pain.  Dispense: 40 tablet; Refill: 0  - Erythrocyte Sedimentation Rate; Future      4. Acute idiopathic gout of left foot  We will update lab  - Uric Acid  - diclofenac sodium (VOLTAREN) 1 % Gel; Apply one thin layer over toe joint/ affected joint  Dispense: 1 Tube; Refill: 0    5. Preventative health care  We will update lab  - HIV Antigen/Antibody Screening [place]    6. Encounter for screening for human immunodeficiency virus (HIV)   Lab updated  - HIV Antigen/Antibody Screening [place]    7.  Recent concussive  head injury.  Occasional vertigo and headache.  Slowly improving.    Follow-up for physical and Pap as well as diabetic check    Mimi Fuentes MD    Chief Complaint:  Chief Complaint   Patient presents with     Medication Management     CSA and Utox needed      Follow-up     Gout       History of Present Illness:  Liana is a 64 y.o. female who is here today for medication management.  She is here for renewal of her Vicodin.  Of note, she is using 40 tablets every 2 to 3 months of Vicodin at 5 mg/325.  She has recently had knee surgery and therefore her use has been increased.  She had meniscus repair on January 6.  Her recovery has been slow.  She is following up regularly with orthopedic surgery.  She has some swelling of the knee and altered gait.  Her underlying pain medication use is for chronic severe back pain.  This is been outlined in the chart.  Of note, she has not been able to give a drug screen in the past because she uses the Vicodin only sporadically.  She will provide a specimen today.    She is doing well with regard to her sugars.  They were initially increased with her surgery and increased pain.  She states that she bumped up her long-acting insulin for a few days.  Things are normalizing.  She has done well on Ozempic.    She is not exercising.  She denies any chest pain or shortness of breath.  She denies any bowel or bladder symptoms.    Her labs are reviewed from December when she was in the ER with a concussion.  She did have an elevated sed rate and a new anemia.    With regard to concussive head injury, she is slowly recovering.    Review of Systems:  A comprehensive review of systems was performed and was otherwise negative    PFSH:  Social History: She lives with her common-law .  She is thinking of moving out  Social History     Tobacco Use   Smoking Status Former Smoker     Packs/day: 0.25     Years: 44.00     Pack years: 11.00     Types: Cigarettes     Last attempt to quit:  1/20/2017     Years since quitting: 3.0   Smokeless Tobacco Never Used   Tobacco Comment    age 18 to age 62 up to 1/2 ppd       Past History:   Past Medical History:   Diagnosis Date     Anemia      Arthritis      Breast cyst      Carpal tunnel syndrome      Coronary artery disease due to lipid rich plaque      Dyslipidemia, goal LDL below 70      Esophageal reflux      Essential hypertension      Fatty liver      Goiter diffuse, nontoxic      Hiatal hernia      History of gestational diabetes      History of MRSA infection 07/17/2017    Pilonidal cyst culture     Morbid obesity with BMI of 40.0-44.9, adult (H)      RADHA on CPAP      Paroxysmal SVT (supraventricular tachycardia) (H)     S/P ablation     S/P hip replacement      Type 2 diabetes mellitus (H)        Current Outpatient Medications   Medication Sig Dispense Refill     acetaminophen (TYLENOL) 500 MG tablet Take 1,000 mg by mouth 3 (three) times a day as needed.       allopurinol (ZYLOPRIM) 100 MG tablet Take 1 tablet (100 mg total) by mouth daily. 90 tablet 3     aspirin 81 MG EC tablet Take 81 mg by mouth daily.       blood glucose meter (FREESTYLE LITE METER) Use 1 each As Directed 6 (six) times a day. Dispense glucometer brand per patient's insurance at pharmacy discretion. 1 each 0     blood glucose test strips Use 1 each As Directed 6 (six) times a day. Prodigy Meter 300 strip 3     cholecalciferol, vitamin D3, (CHOLECALCIFEROL) 1,000 unit tablet Take 2,000 Units by mouth daily.             cyclobenzaprine (FLEXERIL) 10 MG tablet Take 1 tablet (10 mg total) by mouth at bedtime as needed for muscle spasms. 21 tablet 1     diclofenac sodium (VOLTAREN) 1 % Gel Apply one thin layer over toe joint/ affected joint 1 Tube 0     generic lancets (MICROLET LANCET) Use 1 each As Directed 6 (six) times a day. 300 each 3     HUMALOG KWIKPEN INSULIN 100 unit/mL pen INJECT 45 UNITS UNDER THE SKIN THREE TIMES DAILY 129 mL 0     hydroCHLOROthiazide (MICROZIDE) 12.5  "mg capsule Take 1 capsule (12.5 mg total) by mouth daily. 90 capsule 3     insulin degludec (TRESIBA FLEXTOUCH U-200) 200 unit/mL (3 mL) InPn Inject 110 Units under the skin daily. 20 mL 11     lisinopril (PRINIVIL,ZESTRIL) 5 MG tablet Take 1.5 tablets (7.5 mg total) by mouth daily. 135 tablet 3     melatonin (MELATIN) 3 mg Tab tablet Take 3 mg by mouth at bedtime as needed.       metoclopramide (REGLAN) 10 MG tablet 10 mg p.o. every 8 hours as needed headache, take with Benadryl 50 mg 20 tablet 0     nitroglycerin (NITROSTAT) 0.4 MG SL tablet Place 1 tablet (0.4 mg total) under the tongue every 5 (five) minutes as needed for chest pain. 25 tablet 3     omega-3 acid ethyl esters (LOVAZA) 1 gram capsule Take 2 capsules (2 g total) by mouth 2 (two) times a day. 360 capsule 3     omeprazole (PRILOSEC) 40 MG capsule TAKE 1 CAPSULE BY MOUTH EVERY DAY 90 capsule 3     OZEMPIC 1 mg/dose (2 mg/1.5 mL) PnIj ADMINISTER 1 MG UNDER THE SKIN EVERY 7 DAYS 3 mL 12     pen needle, diabetic (BD ULTRA-FINE ZULMA PEN NEEDLE) 32 gauge x 5/32\" Ndle Inject 4 each under the skin daily. With Tresiba and Novolog 120 each 3     rosuvastatin (CRESTOR) 10 MG tablet TAKE 1 TABLET BY MOUTH DAILY 90 tablet 3     TRESIBA FLEXTOUCH U-200 200 unit/mL (3 mL) InPn INJECT 136 UNITS UNDER THE SKIN DAILY 21 Syringe 4     VITAMIN B-1 100 mg tablet Take 1 tablet (100 mg total) by mouth daily. 90 tablet 3     HYDROcodone-acetaminophen 5-325 mg per tablet Take 1 tablet by mouth 2 (two) times a day as needed for pain. 40 tablet 0     No current facility-administered medications for this visit.        Family History: Nothing new    Physical Exam:  General Appearance:   Peers at baseline.  Weight is up  Vitals:    01/28/20 0835   BP: 114/56   Patient Site: Left Arm   Patient Position: Sitting   Cuff Size: Adult Large   Pulse: 88   SpO2: 99%     Wt Readings from Last 3 Encounters:   01/02/20 (!) 271 lb (122.9 kg)   12/30/19 (!) 271 lb 1.6 oz (123 kg)   12/28/19 " (!) 265 lb (120.2 kg)     There is no height or weight on file to calculate BMI.    Neck is in a negative  Lungs are clear to auscultation and percussion  Cardiac exam reveals regular rate and rhythm  Knee is examined.  There is mild swelling-incision intact    Data Review:    Analysis and Summary of Old Records (2): Viewed Dr. Monsivais's notes and Dr. Jacob Eaton-orthopedic surgery notes    Records Requested (1):       Other History Summarized (from other people in the room) (2):     Radiology Tests Summarized (XRAY/CT/MRI/DXA) (1):     Labs Reviewed (1): Ordered labs    Medicine Tests Reviewed (EKG/ECHO/COLONOSCOPY/EGD) (1):     Independent Review of EKG or X-RAY (2):

## 2021-06-05 NOTE — TELEPHONE ENCOUNTER
Pt is calling in with another question. Pt indicates she did not get instructions regarding this after surgery today. Can pt resume taking all of the medications they told her to hold.   Please call Liana at [phone]    Provider please advise.    Altaf Coello RN [facility] Triage/Medication Refill

## 2021-06-05 NOTE — TELEPHONE ENCOUNTER
Refill Approved    Rx renewed per Medication Renewal Policy. Medication was last renewed on 2/6/19.    Viviana Moore, [facility] [place] [facility] Triage/Med Refill 1/13/2020     Requested Prescriptions   Pending Prescriptions Disp Refills     omeprazole (PRILOSEC) 40 MG capsule [Pharmacy Med Name: OMEPRAZOLE 40MG CAPSULES] 90 capsule 3     Sig: TAKE 1 CAPSULE BY MOUTH EVERY DAY       GI Medications Refill Protocol Passed - 1/11/2020  5:26 AM        Passed - PCP or prescribing provider visit in last 12 or next 3 months.     Last office visit with prescriber/PCP: 11/25/2019 Mimi Fuentes MD OR same dept: 11/25/2019 Mimi Fuentes MD OR same specialty: 11/25/2019 Mimi Fuentes MD  Last physical: 8/2/2019 Last MTM visit: Visit date not found   Next visit within 3 mo: Visit date not found  Next physical within 3 mo: Visit date not found  Prescriber OR PCP: Mimi Fuentes MD  Last diagnosis associated with med order: 1. Gastroesophageal reflux disease with esophagitis  - omeprazole (PRILOSEC) 40 MG capsule [Pharmacy Med Name: OMEPRAZOLE 40MG CAPSULES]; TAKE 1 CAPSULE BY MOUTH EVERY DAY  Dispense: 90 capsule; Refill: 3    If protocol passes may refill for 12 months if within 3 months of last provider visit (or a total of 15 months).

## 2021-06-06 NOTE — TELEPHONE ENCOUNTER
Upcoming Appointment Question  When is the appointment: 5/19/20  What is your appointment for?: Annual Wellness Visit  Who is your appointment scheduled with?: PCP only  What is your question/concern?: Patient would like to know if they van be sooner than 5/19/20. Patient states they were scheduled for 4/10/20, and some how the appointment was rescheduled for 5/19/2020, and the patient is wondering if they can have the appointment on 4/10/20 instead of 5/19/20.  Okay to leave a detailed message?: Yes

## 2021-06-07 NOTE — TELEPHONE ENCOUNTER
Who is calling:  Patient  Reason for Call:  Coming in today at 3:30 for labs per order from Dr. Fuentes   Date of last appointment with primary care: 4/20/20  Okay to leave a detailed message: Yes

## 2021-06-07 NOTE — TELEPHONE ENCOUNTER
Refill Approved    Rx renewed per Medication Renewal Policy. Medication was last renewed on 5/15/19.    Amy Velez, [facility] Triage/Med Refill 3/30/2020     Requested Prescriptions   Pending Prescriptions Disp Refills     ONETOUCH DELICA PLUS LANCET 33 gauge Misc [Pharmacy Med Name: ONE TOUCH DEL PLUS 33G EXTRA FINE] 600 each 0     Sig: USE TO TEST BLOOD SUGAR 6 TIMES DAILY       Diabetic Supplies Refill Protocol Passed - 3/28/2020  3:53 PM        Passed - Visit with PCP or prescribing provider visit in last 6 months     Last office visit with prescriber/PCP: 1/28/2020 Mimi Fuentes MD OR same dept: 1/28/2020 Mimi Fuentes MD OR same specialty: 1/28/2020 Mimi Fuentes MD  Last physical: 8/2/2019 Last MTM visit: Visit date not found   Next visit within 3 mo: Visit date not found  Next physical within 3 mo: Visit date not found  Prescriber OR PCP: Mimi Fuentes MD  Last diagnosis associated with med order: There are no diagnoses linked to this encounter.  If protocol passes may refill for 12 months if within 3 months of last provider visit (or a total of 15 months).             Passed - A1C in last 6 months     Hemoglobin A1c   Date Value Ref Range Status   11/25/2019 6.7 (H) 3.5 - 6.0 % Final

## 2021-06-07 NOTE — PROGRESS NOTES
"Liana Briceño is a 64 y.o. female who is being evaluated via a billable video visit.      The patient has been notified of following:     \"This video visit will be conducted via a call between you and your physician/provider. We have found that certain health care needs can be provided without the need for an in-person physical exam.  This service lets us provide the care you need with a video conversation.  If a prescription is necessary we can send it directly to your pharmacy.  If lab work is needed we can place an order for that and you can then stop by our lab to have the test done at a later time.    Video visits are billed at different rates depending on your insurance coverage. Please reach out to your insurance provider with any questions.    If during the course of the call the physician/provider feels a video visit is not appropriate, you will not be charged for this service.\"    Patient has given verbal consent to a Video visit? Yes    Patient would like to receive their AVS by AVS Preference: Diana.    Patient would like the video invitation sent by: Send to e-mail at: [email or web address]      Video Start Time: 1:38 PM    Additional provider notes:   [facility] Follow Up Note    Assessment/Plan:  1. Pruritic disorder  Chart notes reviewed.  1 week history of generalized body pruritus with some mild rash noted.  Onset seem to be after starting chromium picolinate.  This medication was discontinued.  She thought her symptoms improved but is still having sporadic symptoms.  No other new meds.  Allopurinol back in the fall.  She had been on this for 3 or 4 months before the symptoms occurred.  The only other new exposure is a new type of laundry detergent.  Recommendations: Begin Zyrtec at bedtime.  Switch to a bland laundry detergent such as Dreft or Ivory Snow.  Give more time.  She may have a hive-like reaction from the chromium that will take a bit of time to improve.  She will contact me " if she develops any respiratory issues  - Comprehensive Metabolic Panel; Future  - cetirizine (ZYRTEC) 10 MG tablet; Take 1 tablet (10 mg total) by mouth daily.  Dispense: 30 tablet; Refill: 2    2. Chronic low back pain  Continues with an exacerbation of chronic low back back pain after carrying a laundry basket down the stairs.  She has a longstanding history of severe lumbar disc disease.  Recommendation: Ice followed by heat.  Have renewed her cyclobenzaprine and limited Vicodin.  She uses this only for back exacerbations.  Have recommended that she do gentle stretches.  - cyclobenzaprine (FLEXERIL) 10 MG tablet; Take 1 tablet (10 mg total) by mouth at bedtime as needed for muscle spasms.  Dispense: 21 tablet; Refill: 1  - HYDROcodone-acetaminophen 5-325 mg per tablet; Take 1 tablet by mouth 2 (two) times a day as needed for pain.  Dispense: 40 tablet; Refill: 0    3. Arthritis of knee  As above  - HYDROcodone-acetaminophen 5-325 mg per tablet; Take 1 tablet by mouth 2 (two) times a day as needed for pain.  Dispense: 40 tablet; Refill: 0    4. Post-nasal drip  Awakens in the morning with mucus and congestion.  Denies exacerbation of reflux.  Recommendation: Sleep with head of bed elevated at 30 degrees.  Begin Zyrtec and monitor    5. Type 2 diabetes mellitus with hyperglycemia, with long-term current use of insulin (H)  Fasting sugars have been within normal limits.  Patient has been cutting back on her insulin.  Maximum blood glucose at 160 postprandially.  She is going to drop in for labs.    6. Essential hypertension  She will begin blood pressure monitoring    7. Stenosis of left carotid artery  Due for carotid ultrasound when coronavirus restrictions lifted  - US Carotid Bilateral; Future    8. Gastroesophageal reflux disease with esophagitis  Stable    9. Morbid obesity with BMI of 40.0-44.9, adult (H)  Encourage portion reduction      Follow-up in 2 to 3 months and via my chart    Mimi Fuentes,  MD    Chief Complaint:  Chief Complaint   Patient presents with     Medication Management       History of Present Illness:  Liana is a 64 y.o. female who is scheduled for a video visit.  However, there were significant technical difficulties and slowing of the system.  Therefore, we needed to convert to a phone visit.    Frank contacted me via my chart with concerns regarding generalized body pruritus and some redness in the skin.  These notes are reviewed.  She states that with cessation of chromium: 8, which she had started a week or 2 earlier, the symptoms seem to improve.  However, they still persist somewhat.  Last night, she took a Benadryl because she was having difficulty relaxing.  She states the Benadryl helped with the symptoms however she woke up with a headache.  She denies any other new exposures.  She is taking the same doses of medication as before.  She did wonder if allopurinol-which was started in the fall could have been a contributing factor.  However, she had been on this for a full 3 months before her symptoms began.  Of note, she is using an off brand laundry detergent due to current conditions.  It has some kind of an unusual sense.    Diabetes is reviewed.  Her fasting sugars run between 96 and 100.  She has been gently dialing back her insulin.  She titrates it as needed.  She states her weight has been stable.  She denies any polyuria or polydipsia.  She denies any new numbness or tingling.    She has not been checking her blood pressures    She has had an exacerbation of back pain.  She has chronic knee pain as well.  She states she was carrying a laundry bin down the stairs.  It was quite heavy.  This resulted in back spasm and back discomfort.  She is disappointed because she has been riding her exercise cycle in her basement for about 20 minutes each day.  Now she is doing no exercise.    She is attempting to eat healthy.  She is temporarily suspended from work due to current COVID  conditions and all of her medical comorbidities.    Review of Systems:  A comprehensive review of systems was performed and was otherwise negative    PFSH:  Social History: She is in a common-law marriage with Panchito.  She has 1 son who is 24  Social History     Tobacco Use   Smoking Status Former Smoker     Packs/day: 0.25     Years: 44.00     Pack years: 11.00     Types: Cigarettes     Last attempt to quit: 1/20/2017     Years since quitting: 3.2   Smokeless Tobacco Never Used   Tobacco Comment    age 18 to age 62 up to 1/2 ppd       Past History:   Past Medical History:   Diagnosis Date     Anemia      Arthritis      Breast cyst      Carpal tunnel syndrome      Coronary artery disease due to lipid rich plaque      Dyslipidemia, goal LDL below 70      Esophageal reflux      Essential hypertension      Fatty liver      Goiter diffuse, nontoxic      Hiatal hernia      History of gestational diabetes      History of MRSA infection 07/17/2017    Pilonidal cyst culture     Morbid obesity with BMI of 40.0-44.9, adult (H)      RADHA on CPAP      Paroxysmal SVT (supraventricular tachycardia) (H)     S/P ablation     S/P hip replacement      Type 2 diabetes mellitus (H)        Current Outpatient Medications   Medication Sig Dispense Refill     acetaminophen (TYLENOL) 500 MG tablet Take 1,000 mg by mouth 3 (three) times a day as needed.       allopurinol (ZYLOPRIM) 100 MG tablet Take 1 tablet (100 mg total) by mouth daily. 90 tablet 3     aspirin 81 MG EC tablet Take 81 mg by mouth daily.       blood glucose meter (FREESTYLE LITE METER) Use 1 each As Directed 6 (six) times a day. Dispense glucometer brand per patient's insurance at pharmacy discretion. 1 each 0     blood glucose test strips Use 1 each As Directed 6 (six) times a day. Prodigy Meter 300 strip 3     cholecalciferol, vitamin D3, (CHOLECALCIFEROL) 1,000 unit tablet Take 2,000 Units by mouth daily.             cinnamon bark (CINNAMON ORAL) Take by mouth.        "cyanocobalamin (VITAMIN B-12) 500 MCG tablet Take 500 mcg by mouth daily.       cyanocobalamin 250 MCG tablet Take 250 mcg by mouth daily.       cyclobenzaprine (FLEXERIL) 10 MG tablet Take 1 tablet (10 mg total) by mouth at bedtime as needed for muscle spasms. 21 tablet 1     diclofenac sodium (VOLTAREN) 1 % Gel Apply one thin layer over toe joint/ affected joint 1 Tube 0     generic lancets (MICROLET LANCET) Use 1 each As Directed 6 (six) times a day. 300 each 3     HUMALOG KWIKPEN INSULIN 100 unit/mL pen INJECT 45 UNITS UNDER THE SKIN THREE TIMES DAILY 129 mL 0     hydroCHLOROthiazide (MICROZIDE) 12.5 mg capsule Take 1 capsule (12.5 mg total) by mouth daily. 90 capsule 3     HYDROcodone-acetaminophen 5-325 mg per tablet Take 1 tablet by mouth 2 (two) times a day as needed for pain. 40 tablet 0     insulin degludec (TRESIBA FLEXTOUCH U-200) 200 unit/mL (3 mL) InPn Inject 110 Units under the skin daily. 20 mL 11     lisinopril (PRINIVIL,ZESTRIL) 5 MG tablet Take 1.5 tablets (7.5 mg total) by mouth daily. 135 tablet 3     melatonin (MELATIN) 3 mg Tab tablet Take 3 mg by mouth at bedtime as needed.       nitroglycerin (NITROSTAT) 0.4 MG SL tablet Place 1 tablet (0.4 mg total) under the tongue every 5 (five) minutes as needed for chest pain. 25 tablet 3     omega-3 acid ethyl esters (LOVAZA) 1 gram capsule Take 2 capsules (2 g total) by mouth 2 (two) times a day. 360 capsule 3     omeprazole (PRILOSEC) 40 MG capsule TAKE 1 CAPSULE BY MOUTH EVERY DAY 90 capsule 3     ONETOUCH DELICA PLUS LANCET 33 gauge Misc USE TO TEST BLOOD SUGAR 6 TIMES DAILY 600 each 3     OZEMPIC 1 mg/dose (2 mg/1.5 mL) PnIj ADMINISTER 1 MG UNDER THE SKIN EVERY 7 DAYS 3 mL 12     pen needle, diabetic (BD ULTRA-FINE ZULMA PEN NEEDLE) 32 gauge x 5/32\" Ndle Inject 4 each under the skin daily. With Tresiba and Novolog 120 each 3     rosuvastatin (CRESTOR) 10 MG tablet TAKE 1 TABLET BY MOUTH DAILY 90 tablet 3     VITAMIN B-1 100 mg tablet Take 1 tablet " (100 mg total) by mouth daily. 90 tablet 3     cetirizine (ZYRTEC) 10 MG tablet Take 1 tablet (10 mg total) by mouth daily. 30 tablet 2     No current facility-administered medications for this visit.        Family History: Nothing new    Physical Exam:  General Appearance:   Unable to assess appearance but voice sounds strong.  She is going to do some blood pressure checks  There were no vitals filed for this visit.  Wt Readings from Last 3 Encounters:   01/02/20 (!) 271 lb (122.9 kg)   12/30/19 (!) 271 lb 1.6 oz (123 kg)   12/28/19 (!) 265 lb (120.2 kg)     There is no height or weight on file to calculate BMI.        Data Review:    Analysis and Summary of Old Records (2): Reviewed records from care connection and notes    Records Requested (1):       Other History Summarized (from other people in the room) (2):     Radiology Tests Summarized (XRAY/CT/MRI/DXA) (1): Reviewed last carotid artery study    Labs Reviewed (1): Ordered labs    Medicine Tests Reviewed (EKG/ECHO/COLONOSCOPY/EGD) (1):     Independent Review of EKG or X-RAY (2):             Video-Visit Details    Type of service:  Video Visit    Video End Time (time video stopped): Time of conversation approximately 30 minutes    Originating Location (pt. Location): Home    Distant Location (provider location):  [facility] INTERNAL MEDICINE     Mode of Communication:  Video Conference via [place]      iMmi Fuentes MD

## 2021-06-07 NOTE — TELEPHONE ENCOUNTER
"Pt is calling    Itching all over x 4-6 weeks  Ankles, scalp, legs, hands, arms, back.  Hard to fall asleep at night, but is does not awaken him from sleep  Has tried 3-4 different soaps and detergents with no help  Has been using vasaline, as that helps a lot better than lotions, but doesn't seem to stop the itching  Pt is using hypoallergenic soaps, lotions, and detergents  Went off a new vitamin to see if that would help, no help at all.  No other new medications in the last 2 months per pt.  Rash occurs with itching only and dissipates after-reddened skin and pink reddish dots that are slightly raised.      No wheezing or shortness of breath  No fever  No swelling anywhere on the body  No hives or purple dots/spots  No dryness or peeling  Has been using vasaline, as that helps a lot better than lotions, but doesn't seem to stop the itching  Pt is using hypoallergenic soaps, lotions, and detergents    H/O sensitive skin    Pt wishes to schedule a TV with Dr Fuentes and is ok to wait for next week.        Reason for Disposition    Widespread itching and cause unknown and present > 48 hours    Answer Assessment - Initial Assessment Questions  1. DESCRIPTION: \"Describe the itching you are having.\"      Skin feels itchy all over.  When itching it turns red, with some red spots  2. SEVERITY: \"How bad is it?\"     - MILD - doesn't interfere with normal activities    - MODERATE-SEVERE: interferes with work, school, sleep, or other activities       Mild  3. SCRATCHING: \"Are there any scratch marks? Bleeding?\"      None  4. ONSET: \"When did this begin?\"       X 4-6 weeks ago  5. CAUSE: \"What do you think is causing the itching?\" (ask about swimming pools, pollen, animals, soaps, etc.)      Unsure  6. OTHER SYMPTOMS: \"Do you have any other symptoms?\"       Red spots and redness when he itches the areas  7. PREGNANCY: \"Is there any chance you are pregnant?\" \"When was your last menstrual period?\"      n/a    Protocols used: " ITCHING - WIDESPREAD-A-OH    Protocol reviewed with patient.  He verbalized understanding.  Cannot to [facility] and wishes to speak with Dr Fuentes as a TV  I advised him to call back with any new or worsening signs or symptoms or concerns  He verbalized understanding.    Anyi Holt, RN [facility]

## 2021-06-07 NOTE — TELEPHONE ENCOUNTER
Controlled Substance Refill Request  Medication Name:   Requested Prescriptions     Pending Prescriptions Disp Refills     HYDROcodone-acetaminophen 5-325 mg per tablet 40 tablet 0     Sig: Take 1 tablet by mouth 2 (two) times a day as needed for pain.     Date Last Fill: 1/28/20  Is patient out of medication?:  Yes  Patient notified refills processed within 3 business days:  Yes  [facility]ed [facility]acy: [facility]  Submit electronically to pharmacy  Controlled Substance Agreement on file:   Encounter-Level CSA Scan Date - 11/09/2018:    Scan on 11/12/2018  3:42 PM           Encounter-Level CSA Scan Date - 07/26/2017:    Scan on 8/2/2017  6:44 AM           Encounter-Level CSA Scan Date - 06/24/2016:    Scan on 6/27/2016  1:57 PM        Last office visit:  1/28/20

## 2021-06-07 NOTE — PATIENT INSTRUCTIONS - HE
Liana, it was nice to chat with you today.    In summation, I am hoping that the Zyrtec that was recommended for your itching will help with both the itching, skin disturbance as well as the mucus that you are experiencing.  We should give it a couple of weeks to see if it is helpful.  Additionally, I recommend that you use a very bland detergent such as Ivory Snow, Dreft or some sort of gentle unscented product.    Avoid any new supplements.  Always keep me posted.  Keep your head of bed elevated.    With regard to your back, make sure you are icing followed by heat.  Do gentle stretches as you have been taught in your previous physical therapy sessions.  Your medications have been renewed.    There are lab orders for you in the chart.

## 2021-06-08 NOTE — PROGRESS NOTES
Spoke to pt, she is struggling with food but doing trying to get back on track. Having a little family stress she is dealing with after her father . She is coming in tomorrow so I advised I would be available to talk when she arrives.

## 2021-06-08 NOTE — PROGRESS NOTES
[facility] Follow Up Note    Assessment/Plan:  1. Type 2 diabetes mellitus with hyperglycemia, with long-term current use of insulin  Overall, slightly improved.  Glycohemoglobin at 8.3, down from 8.8.  Weight loss equals 20 pounds.  No blood sugar data today, however, some elevated blood sugars noted recently, especially after super bowl,etc  Recommendations: Continue with Tressibe as she is doing at 110 units.  Continue with not a lot of 6 units with dinner.  I would like her to add 4 units of nonlocking insulin with a normal size lunch.  Also, recommend that she continue to monitor portions.  She gets very hungry at night.  Recommend two whole grain crackers with peanut butter in the evening with a glass of water.  She should also pack small Ziploc sized snacks for during the day when she does not get a lunch break.  Resume regular exercise  Follow-up with Jose Raul Coy in 4-6 weeks.  She is to bring her blood sugar log with her at that time.  Follow-up with me in approximately 3 months.      2. Essential hypertension with goal blood pressure less than 140/90  At goal on metoprolol and hydrochlorothiazide    3. Joint pain  Right tendinitis of the forearm.  Recommendations: Ice, cessation of lifting her heavy purse, pain medications as needed.  - HYDROcodone-acetaminophen 5-325 mg per tablet; Take 1 tablet by mouth every 4 (four) hours as needed for pain.  Dispense: 60 tablet; Refill: 0      The following high BMI interventions were performed this visit: encouragement to exercise  Mimi Fuentes MD    Chief Complaint:  Chief Complaint   Patient presents with     Follow-up     Diabetes       History of Present Illness:  Liana is a 61 y.o. female who is here today for follow-up with regard to type 2 diabetes, hypertension, hyperlipidemia and other medical maladies.    First, with regard to diabetes, she reports that she has lost 20 pounds.  She is working hard to reestablish her exercise routine  "[facility].  She states that she is starting a yoga class.  She does report that she continues to be somewhat erratic with her daytime meals.  She will often skip breakfast and lunch.  She finds nighttime the most difficult.  This is when she has difficulty controlling her snacking.  She does eat a sensible dinner.  She is using the insulin as prescribed for the most part.  Of note, she states that she has had some higher blood sugars recently.  She attributes this to poor exercise and noncompliance with diet during Super Bowl party etc.  She does report one episode of lightheadedness.  She states it was because she did not eat for 8 hours.  She does not often carry snacks in her purse.    Discussion today was had with regard to blood pressure.  She is wondering if she can get off of her blood pressure medications.  Of note, home blood pressure readings are in the 120s to 130s.    Additionally, she has a pain in the right forearm.  It is similar to the tendinitis that she has had in the past.  She states that it was exquisitely painful couple of days ago.  She occasionally needs Vicodin for that in her lower back pain    Review of Systems:  A comprehensive review of systems was performed and was otherwise negative    PFSH:  Social History: She is in a relationship with her partner for greater than 20 years.  They have a 20-year-old son.  She is a nonsmoker.  She drinks alcohol only socially  History   Smoking Status     Former Smoker     Packs/day: 0.25     Years: 40.00     Quit date: 1/24/2016   Smokeless Tobacco     Not on file     Comment: Reports 1 cigarette a day now       Past History: As noted for the problems in the snapshot  Current Outpatient Prescriptions   Medication Sig Dispense Refill     aspirin 81 MG EC tablet Take 81 mg by mouth daily.       BD INSULIN PEN NEEDLE UF SHORT 31 gauge x 5/16\" Ndle Use 1 Device As Directed 3 (three) times a day. 100 each 11     " "blood sugar diagnostic Strp Use 1 each As Directed 3 (three) times a day. (Patient taking differently: Use 1 each As Directed 2 (two) times a day. ) 200 strip 12     cholecalciferol, vitamin D3, (VITAMIN D3) 2,000 unit Tab Take 1,000 Units by mouth daily.        hydroCHLOROthiazide (MICROZIDE) 12.5 mg capsule Take 1 capsule (12.5 mg total) by mouth daily. 90 capsule 1     HYDROcodone-acetaminophen 5-325 mg per tablet Take 1 tablet by mouth every 4 (four) hours as needed for pain. 60 tablet 0     insulin degludec (TRESIBA FLEXTOUCH U-200) 200 unit/mL (3 mL) InPn Inject 110 Units under the skin daily. 18 mL 11     metoprolol succinate (TOPROL-XL) 50 MG 24 hr tablet TAKE 1 TABLET(50 MG) BY MOUTH DAILY 90 tablet 1     NOVOLOG FLEXPEN 100 unit/mL injection pen INJECT 14 UNITS UNDER THE SKIN EVERY MORNING, 12 UNITS AT NOON, AND 10 UNITS AT BEDTIME (Patient taking differently: 6 units at supper) 15 mL 0     omeprazole (PRILOSEC) 40 MG capsule TAKE ONE CAPSULE BY MOUTH EVERY DAY 90 capsule 1     No current facility-administered medications for this visit.        Family History: Her father recently passed away at the age of 94.  He had kidney failure.  Her cousin passed away from asthma    Physical Exam:  General Appearance:   She appears well and in no acute distress.  Weight is down about 20 pounds from the fall  Vitals:    02/08/17 0822   BP: 118/56   Patient Site: Left Arm   Patient Position: Sitting   Cuff Size: Adult Large   Pulse: 88   Weight: (!) 248 lb 14.4 oz (112.9 kg)   Height: 5' 6\" (1.676 m)     Wt Readings from Last 3 Encounters:   02/08/17 (!) 248 lb 14.4 oz (112.9 kg)   02/06/17 (!) 250 lb 4.8 oz (113.5 kg)   11/28/16 (!) 258 lb 3.2 oz (117.1 kg)     Body mass index is 40.17 kg/(m^2).        Data Review:    Analysis and Summary of Old Records (2): Reviewed [facility] Coy's records      Records Requested (1): 0      Other History Summarized (from other people in the room) (2): 0    Radiology Tests Summarized " (XRAY/CT/MRI/DXA) (1): 0    Labs Reviewed (1): Reviewed glycohemoglobin with her today    Medicine Tests Reviewed (EKG/ECHO/COLONOSCOPY/EGD) (1): 0    Independent Review of EKG or X-RAY (2): 0    Time spent in excess of 30 minutes with more than half the time spent in counseling on diabetes management

## 2021-06-08 NOTE — PROGRESS NOTES
Medication Therapy Management Follow Up Visit     ASSESSMENT AND PLAN  1. Type 2 diabetes mellitus with hyperglycemia, with long-term current use of insulin  Not at goal A1C <7% per ADA guidelines, largely due to poor diet and lack of exercise likely due to her father being sick. She has started her insulin again and will start checking her blood sugars more frequently. She is loosing weight and very motivated by this. I have encouraged her to continue to do exactly what she is doing, taking her medications, eating , and checking her blood sugars. No further interventions at this time. On daily aspirin, statin intolerant.     The following high BMI interventions were performed this visit: encouragement to exercise and lifestyle education regarding diet    FOLLOW-UP PLAN  Liana was advised to follow up as directed by Dr. Fuentes.    SUBJECTIVE AND OBJECTIVE  Liana Briceño is a 61 y.o. female who was referred by Mimi Fuentes MD for MT services.  Liana's chief concern today is diabetes follow up. She had a rough few months while her dad was in palliative care, and has now passed. This has been very difficult for her. She is now trying to focus on herself and get her blood sugars under control.  She was over 10 minutes late for her appointment.     Diabetes: did not take her insulin this morning. She checked her sugars last night after her SuperBowl party with food and drinks and her sugars was 360. Before the  her blood sugars were very well conrolled, now after her dad  her motivation, and diet and exercise when down hill. She feels like she needs to get back on her previous plan. Tolerating her once daily tresiba, and increased her Novolog to 6 units with dinner. She tries to check her sugars after eating dinner but may forget. She is trying to cut herself off from eating around 8pm. She is going to go back to the [facility].     This note has been dictated using voice  "recognition software. Any grammatical or context distortions are unintentional and inherent to the software.    Liana was provided follow up instructions, and this care plan was communicated via EMR with her primary care provider, Mimi Fuentes MD, and is the authorizing prescriber for this visit.  Direct supervision was available by either the patient's PCP or another available physician when needed.    Time spent: 20 minutes  Level of service: 1    Jose Raul Coy, PharmD, BCACP  Medication Management (MTM) Pharmacist  [facility]    We reviewed Liana's medication list with them, discussing reason for use, directions for use, and potential side effects of each medication.  Indication, safety, efficacy, and convenience was assessed for all reviewed medications.  No environmental factors were noted currently affecting patient.    Current Outpatient Prescriptions   Medication Sig Dispense Refill     aspirin 81 MG EC tablet Take 81 mg by mouth daily.       BD INSULIN PEN NEEDLE UF SHORT 31 gauge x 5/16\" Ndle Use 1 Device As Directed 3 (three) times a day. 100 each 11     insulin degludec (TRESIBA FLEXTOUCH U-200) 200 unit/mL (3 mL) InPn Inject 110 Units under the skin daily. 18 mL 11     blood sugar diagnostic Strp Use 1 each As Directed 3 (three) times a day. (Patient taking differently: Use 1 each As Directed 2 (two) times a day. ) 200 strip 12     cholecalciferol, vitamin D3, (VITAMIN D3) 2,000 unit Tab Take 1,000 Units by mouth daily.        hydroCHLOROthiazide (MICROZIDE) 12.5 mg capsule Take 1 capsule (12.5 mg total) by mouth daily. 90 capsule 1     HYDROcodone-acetaminophen 5-325 mg per tablet Take 1 tablet by mouth every 4 (four) hours as needed for pain. 60 tablet 0     metoprolol succinate (TOPROL-XL) 50 MG 24 hr tablet TAKE 1 TABLET(50 MG) BY MOUTH DAILY 90 tablet 1     NOVOLOG FLEXPEN 100 unit/mL injection pen INJECT 14 UNITS UNDER THE SKIN EVERY MORNING, 12 UNITS AT NOON, AND 10 UNITS AT " BEDTIME (Patient taking differently: 6 units at supper) 15 mL 0     omeprazole (PRILOSEC) 40 MG capsule TAKE ONE CAPSULE BY MOUTH EVERY DAY 90 capsule 1     No current facility-administered medications for this visit.

## 2021-06-09 NOTE — TELEPHONE ENCOUNTER
Who is calling:  patient  Reason for Call:  Calling because she was suppose to have a video visit at 1140 and has not heard from anybody. Please call back.  Date of last appointment with primary care: 04.17.20  Okay to leave a detailed message: Yes

## 2021-06-09 NOTE — PROGRESS NOTES
Pt called yesterday while at appointment to go over medications and dosing for forms she was filling out. Went over them.

## 2021-06-09 NOTE — TELEPHONE ENCOUNTER
Please let Frank know that it would be important first to find out if the coworker does indeed have COVID-19.  In the interim, she should try to separate herself from her son within their home as best they can.  If at all possible, use separate bathrooms, do aggressive cleaning of surfaces and circulate fresh air.  Monitor self and son for fever and any potential symptoms of COVID.  Let us know if any symptoms arise.  Hang in there.  I hope we can do a follow-up visit soon.    Apologized to her.  We did not connect during her scheduled visit last week.  I tried to reach her multiple times.  The technology was not good that day.

## 2021-06-09 NOTE — PROGRESS NOTES
Patient would like the video invitation sent by: Other e-mail: [email or web address]    ASSESSMENT:    1. Grief  Cousin son was murdered in the [facility] area.  A 25-year-old young man lost his life.   Friday.  Frank has been involved in the planning.  Of discussion was had today with regard to her risk for COVID-19, the large gathering planned, safety, etc.    2. Essential hypertension  Blood pressure at goal between 115 and 130.  Recommendation: Continue current medications  - HM2(CBC w/o Differential); Future  - Basic Metabolic Panel; Future    3. Chest pain, unspecified type  Left anterior chest pain in the setting of a history of coronary artery disease.  However, also very stressed.  New finding of severe eccentric carotid stenosis.  Do not want to do stress test.  Recommendation: Prescription for nitroglycerin sent.  Advised to limit activity and keep calm.  Would like cardiology to see ASAP.  Question CTA?  Go to emergency department if chest pain does not betina after 5 to 10 minutes.  - nitroglycerin (NITROSTAT) 0.3 MG SL tablet; Place 1 tablet (0.3 mg total) under the tongue every 5 (five) minutes as needed for chest pain.  Dispense: 100 tablet; Refill: 3  - Electrocardiogram Perform and Read; Future-addendum, the EKG is reviewed.  It reveals normal sinus rhythm with nonspecific T wave abnormality and a prolonged QT.  No acute ST elevation is seen.  - LDL Cholesterol, Direct    4. Stenosis of left carotid artery  Significant escalation of carotid stenosis.  Eccentric in nature and described as severe.  Recommendation: We will proceed with CT angios of the neck.  Would like to have her seen by Dr. Acosta Marshall, ASAP  - Ambulatory referral to General Surgery  - Ambulatory referral to Cardiology  - LDL Cholesterol, Direct    5. Type 2 diabetes mellitus with hyperglycemia, with long-term current use of insulin (H)  Updating labs  - Glycosylated Hemoglobin A1c; Future    6. Coronary artery disease due to  lipid rich plaque  As above  - Ambulatory referral to Cardiology  - nitroglycerin (NITROSTAT) 0.3 MG SL tablet; Place 1 tablet (0.3 mg total) under the tongue every 5 (five) minutes as needed for chest pain.  Dispense: 100 tablet; Refill: 3    7. Dermatitis  Itchy dermatitis  - triamcinolone (KENALOG) 0.1 % cream; Apply bid  Dispense: 30 g; Refill: 0    8. Pruritic disorder  Zyrtec for general pruritus.  We will update labs  - cetirizine (ZYRTEC) 10 MG tablet; Take 1 tablet (10 mg total) by mouth daily.  Dispense: 30 tablet; Refill: 2      Time spent today 50 minutes    Preventive Health Care:      PLAN:  There are no Patient Instructions on file for this visit.  No orders of the defined types were placed in this encounter.    No follow-ups on file.    CHIEF COMPLAINT:  Chief Complaint   Patient presents with     Grief/loss       HISTORY OF PRESENT ILLNESS:  Liana is a 65 y.o. female contacting the clinic today via video for discussion of multiple issues.  She is very upset as her cousin's son was murdered last week in the [facility].  This is an ongoing criminal investigation.  The  is Friday.  She is very close with her family and this is devastating for them.  They are having a  on Friday-expected to be attended by many people.  There will be many young people.    She is dealing with grief.    Additionally, her routine carotid ultrasound found an escalation of carotid plaque.  There is eccentric stenosis noted on ultrasound.  She is scheduled for a CTA of the neck.  Additionally, since her family member was murdered, she has been having intermittent left-sided chest pain.  She states sometimes it hurts in the epigastrium as well.  It is exacerbated with movement as well as anxiety.  She denies any worsening of her usual heartburn-but does have heartburn.    She is doing home blood pressure monitoring.  It is running between 120 and 130 systolic.  Her weight is 261 pounds.  She has  chronic back pain and it is intermittently active.    He has concerns over an area of dermatitis that is itchy.  She also has some generalized itching when her diabetes is active.    REVIEW OF SYSTEMS:     All other systems are negative.    PFSH:  Social History     Social History Narrative    Single but lives with her significant other, has one son, she is a .  She is a PCA.     Disability for LBP. Work injury.         TOBACCO USE:  Social History     Tobacco Use   Smoking Status Former Smoker     Packs/day: 0.25     Years: 44.00     Pack years: 11.00     Types: Cigarettes     Last attempt to quit: 1/20/2017     Years since quitting: 3.5   Smokeless Tobacco Never Used   Tobacco Comment    age 18 to age 62 up to 1/2 ppd       VITALS:  There were no vitals filed for this visit.  Wt Readings from Last 3 Encounters:   01/02/20 (!) 271 lb (122.9 kg)   12/30/19 (!) 271 lb 1.6 oz (123 kg)   12/28/19 (!) 265 lb (120.2 kg)       PHYSICAL EXAM:  (observations via Video)  He appears at baseline.  She does not appear uncomfortable.  Weight is 261 pounds as reported by patient.  Blood pressure between 120 and 130 systolic      ADDITIONAL HISTORY SUMMARIZED (2):   CARE EVERYWHERE/ EXTRA INFORMATION (1):   RADIOLOGY TESTS (1): Reviewed carotid ultrasound  LABS (1): Ordered lab  CARDIOLOGY/MEDICINE TESTS (1): Ordered EKg  INDEPENDENT REVIEW (2 each):     Total data points: Mauro    Video Start time: 10 AM aM  Video End time: 10:50 AM    The visit lasted a total of 50 minutes face to face    CA Intake Time: 4:30min  I have reviewed and updated the patient's Past Medical History, Social History, Family History as appropriate.    MEDICATIONS: Reviewed and updated per CA and MD  Current Outpatient Medications   Medication Sig Dispense Refill     acetaminophen (TYLENOL) 500 MG tablet Take 1,000 mg by mouth 3 (three) times a day as needed.       allopurinol (ZYLOPRIM) 100 MG tablet Take 1 tablet (100 mg total) by mouth daily. 90  tablet 3     aspirin 81 MG EC tablet Take 81 mg by mouth daily.       blood glucose meter (FREESTYLE LITE METER) Use 1 each As Directed 6 (six) times a day. Dispense glucometer brand per patient's insurance at pharmacy discretion. 1 each 0     blood glucose test strips Use 1 each As Directed 6 (six) times a day. Prodigy Meter 300 strip 3     cetirizine (ZYRTEC) 10 MG tablet Take 1 tablet (10 mg total) by mouth daily. 30 tablet 2     cholecalciferol, vitamin D3, (CHOLECALCIFEROL) 1,000 unit tablet Take 2,000 Units by mouth daily.             cinnamon bark (CINNAMON ORAL) Take by mouth.       cyanocobalamin (VITAMIN B-12) 500 MCG tablet Take 500 mcg by mouth daily.       cyanocobalamin 250 MCG tablet Take 250 mcg by mouth daily.       cyclobenzaprine (FLEXERIL) 10 MG tablet Take 1 tablet (10 mg total) by mouth at bedtime as needed for muscle spasms. 21 tablet 1     diclofenac sodium (VOLTAREN) 1 % Gel Apply one thin layer over toe joint/ affected joint 1 Tube 0     flash glucose scanning reader (FREESTYLE MIRANDA 14 DAY READER) Misc Use 1 each As Directed daily. 1 each 1     flash glucose sensor (FREESTYLE MIRANDA 14 DAY SENSOR) Kit Use 1 each As Directed every 14 (fourteen) days. 2 kit 11     generic lancets (MICROLET LANCET) Use 1 each As Directed 6 (six) times a day. 300 each 3     HUMALOG KWIKPEN INSULIN 100 unit/mL pen INJECT 45 UNITS UNDER THE SKIN THREE TIMES DAILY 129 mL 0     hydroCHLOROthiazide (MICROZIDE) 12.5 mg capsule Take 1 capsule (12.5 mg total) by mouth daily. 90 capsule 3     HYDROcodone-acetaminophen 5-325 mg per tablet Take 1 tablet by mouth 2 (two) times a day as needed for pain. 40 tablet 0     insulin degludec (TRESIBA FLEXTOUCH U-200) 200 unit/mL (3 mL) InPn Inject 110 Units under the skin daily. 20 mL 11     lisinopril (PRINIVIL,ZESTRIL) 5 MG tablet Take 1.5 tablets (7.5 mg total) by mouth daily. 135 tablet 3     melatonin (MELATIN) 3 mg Tab tablet Take 3 mg by mouth at bedtime as needed.        "nitroglycerin (NITROSTAT) 0.4 MG SL tablet Place 1 tablet (0.4 mg total) under the tongue every 5 (five) minutes as needed for chest pain. 25 tablet 3     omega-3 acid ethyl esters (LOVAZA) 1 gram capsule Take 2 capsules (2 g total) by mouth 2 (two) times a day. 360 capsule 3     omeprazole (PRILOSEC) 40 MG capsule TAKE 1 CAPSULE BY MOUTH EVERY DAY 90 capsule 3     ONETOUCH DELICA PLUS LANCET 33 gauge Misc USE TO TEST BLOOD SUGAR 6 TIMES DAILY 600 each 3     OZEMPIC 1 mg/dose (2 mg/1.5 mL) PnIj ADMINISTER 1 MG UNDER THE SKIN EVERY 7 DAYS 3 mL 12     pen needle, diabetic (BD ULTRA-FINE ZULMA PEN NEEDLE) 32 gauge x 5/32\" Ndle Inject 4 each under the skin daily. With Tresiba and Novolog 120 each 3     rosuvastatin (CRESTOR) 10 MG tablet TAKE 1 TABLET BY MOUTH DAILY 90 tablet 3     VITAMIN B-1 100 mg tablet Take 1 tablet (100 mg total) by mouth daily. 90 tablet 3     No current facility-administered medications for this visit.         The patient has been notified of following:     \"This video visit will be conducted via a call between you and your physician/provider. We have found that certain health care needs can be provided without the need for an in-person physical exam.  This service lets us provide the care you need with a video conversation.  If a prescription is necessary we can send it directly to your pharmacy.  If lab work is needed we can place an order for that and you can then stop by our lab to have the test done at a later time.    Video visits are billed at different rates depending on your insurance coverage. Please reach out to your insurance provider with any questions.    If during the course of the call the physician/provider feels a video visit is not appropriate, you will not be charged for this service.\"    Patient has given verbal consent to a Video visit? Yes  How would you like to obtain your AVS? AVS Preference: MyChart.  If dropped by the video visit, the video invitation should be sent to: " Other e-mail: [email or web address]  Will anyone else be joining your video visit? No          Video-Visit Details    Type of service:  Video Visit    Originating Location (pt. Location): Home    Distant Location (provider location):  [facility] INTERNAL MEDICINE     Platform used for Video Visit: Manuel Fuentes MD

## 2021-06-09 NOTE — TELEPHONE ENCOUNTER
Spoke with pt, who wanted assistance rescheduling a video visit with pcp.  This was done.    Pt also wanted pcp to know, that a coworker of her son's called in sick with a cough and fever.  Pt does not know if he has covid, he is supposedly getting tested this week.  Pt concerned because she and her son live together.  Neither is showing any symptoms at this time.

## 2021-06-09 NOTE — TELEPHONE ENCOUNTER
Who is calling:  Patient  Reason for Call:  Pt calling asking that her provider or assistant give her a call it is urgent. Please advise.  Date of last appointment with primary care: N/A  Okay to leave a detailed message: No

## 2021-06-09 NOTE — PROGRESS NOTES
Returned patients call, she is not feeling very good today. Sent message to her MD. BS are have been pretty good in the 110's.

## 2021-06-09 NOTE — PROGRESS NOTES
[facility] Follow Up Note    Assessment/Plan:  1. Gastroenteritis  Likely neurovirus.  Resolving.  Onset of symptoms last Thursday with 48 hours of vomiting followed by 3-4 days of diarrhea.  Those symptoms have now resolved.  She is noting borborygmi or hyperactive bowel sounds but is tolerating food and fluids  Recommendations: Continue with bland, BRAT diet.  Avoid dairy and coffee until completely recovered as these will contribute to gas and recurrent diarrhea.    2. Cough/bronchitis with significant bronchospasm noted on exam in a diabetic.  Mentation: Complete course of Levaquin.  Take with food.  Prednisone 50 mg for 5 days for bronchospasm.  Albuterol 2 puffs 4 times daily      Mimi Fuentes MD    Chief Complaint:  Chief Complaint   Patient presents with     Fatigue     Shortness of Breath       History of Present Illness:  Liana is a 61 y.o. female who is here today for evaluation after having a several day illness that began last Thursday.  Of note, she was at [facility].  She noticed an unusual substance on her hand.  She forgot to wash her hands prior to eating her lunch.  She states she had chicken wings and potato wedges.  About 4-5 hours later, she developed profuse vomiting.  This lasted for approximately 48 hours.  After that, she had diarrhea for approximately 3-4 days.  She currently has recovered from the vomiting and diarrhea.  She is tolerating food and fluids.  She states her body feels hungry.  She has been taking Levaquin for her cough.  She states she had some left over from a prior illness.  She is more worried today about her cough and wheezing along with shortness of breath.  In addition to the above, she has some chest wall and side pain from the coughing.  She has some achiness.  She denies any fever or chills.  She is planning on working today.    With regard to her type 2 diabetes, her sugars have been slightly elevated.  She attributes this to her illness.  Prior to her  illness, she had been doing well.  She had lost about 14 pounds and her blood sugars were improving.    In addition to the above, she has some concerns regarding insomnia.  She is not sure whether it is an uncomfortable bed or whether her brain just does not relax.  She states she has been struggling with insomnia.  She would like a sleeping pill    Review of Systems:  A comprehensive review of systems was performed and was otherwise negative    PFSH:  Social History: He is in a common-law marriage.  She has lived with her significant other for greater than 20 years.  She has a 20-year-old son.  History   Smoking Status     Former Smoker     Packs/day: 0.25     Years: 40.00     Quit date: 1/24/2016   Smokeless Tobacco     Not on file     Comment: Reports 1 cigarette a day now       Past History: [place] for type 2 diabetes  Current Outpatient Prescriptions   Medication Sig Dispense Refill     aspirin 81 MG EC tablet Take 81 mg by mouth daily.       blood sugar diagnostic Strp Use 1 each As Directed 3 (three) times a day. (Patient taking differently: Use 1 each As Directed 2 (two) times a day. ) 200 strip 12     cholecalciferol, vitamin D3, (VITAMIN D3) 2,000 unit Tab Take 1,000 Units by mouth daily.        fluconazole (DIFLUCAN) 150 MG tablet TAKE 1 TABLET BY MOUTH ONCE PER WEEK FOR 3 WEEKS 3 tablet 0     hydroCHLOROthiazide (MICROZIDE) 12.5 mg capsule Take 1 capsule (12.5 mg total) by mouth daily. 90 capsule 1     HYDROcodone-acetaminophen 5-325 mg per tablet Take 1 tablet by mouth every 4 (four) hours as needed for pain. 60 tablet 0     insulin degludec (TRESIBA FLEXTOUCH U-200) 200 unit/mL (3 mL) InPn Inject 110 Units under the skin daily. 18 mL 11     metoprolol succinate (TOPROL-XL) 50 MG 24 hr tablet TAKE 1 TABLET(50 MG) BY MOUTH DAILY 90 tablet 1     NOVOLOG FLEXPEN 100 unit/mL injection pen INJECT 14 UNITS UNDER THE SKIN EVERY MORNING, 12 UNITS AT NOON, AND 10 UNITS AT BEDTIME 30 mL 0     omeprazole  "(PRILOSEC) 40 MG capsule TAKE ONE CAPSULE BY MOUTH EVERY DAY 90 capsule 1     BD INSULIN PEN NEEDLE UF SHORT 31 gauge x 5/16\" Ndle Use 1 Device As Directed 3 (three) times a day. 100 each 11     levoFLOXacin (LEVAQUIN) 500 MG tablet Take 1 tablet (500 mg total) by mouth daily for 5 days. 5 tablet 0     predniSONE (DELTASONE) 50 MG tablet Take 1 tablet (50 mg total) by mouth daily for 10 days. 10 tablet 0     traZODone (DESYREL) 50 MG tablet Take about 1.5 hours before desired sleep 30 tablet 1     No current facility-administered medications for this visit.        Family History: Her father recently passed away.  He was in his 90s    Physical Exam:  General Appearance:   She appears mildly ill.  She is coughing during the interview  Vitals:    04/05/17 0950   BP: 126/62   Patient Site: Left Arm   Patient Position: Sitting   Cuff Size: Adult Large   Pulse: 73   Temp: 97.8  F (36.6  C)   TempSrc: Tympanic   SpO2: 96%   Weight: (!) 252 lb 9.6 oz (114.6 kg)     Wt Readings from Last 3 Encounters:   04/05/17 (!) 252 lb 9.6 oz (114.6 kg)   02/08/17 (!) 248 lb 14.4 oz (112.9 kg)   02/06/17 (!) 250 lb 4.8 oz (113.5 kg)     Body mass index is 40.77 kg/(m^2).    Neck exam is performed.  Pupils are equal reactive to light.  Ears nose and throat are intact  Lungs reveal scattered rhonchi and expiratory wheezes throughout  Cardiac exam reveals a mild tachycardia  Abdomen is examined.  Bowel sounds are slightly hyperactive.  Abdomen is soft and nontender no distention is noted    "

## 2021-06-09 NOTE — TELEPHONE ENCOUNTER
Patient was called multiple times by PCP to start the visit.     Registration will call her to reschedule. See previous message from today.

## 2021-06-09 NOTE — TELEPHONE ENCOUNTER
Please contact Frank and let her know that I tried to reach her several times from 1142 to 1157.  I think we will have to reschedule for next week.  I have to move onto the next patient.      These close the chart when complete

## 2021-06-10 ENCOUNTER — RECORDS - HEALTHEAST (OUTPATIENT)
Dept: ADMINISTRATIVE | Facility: OTHER | Age: 66
End: 2021-06-10

## 2021-06-10 NOTE — PROGRESS NOTES
[facility] Follow Up Note    Assessment/Plan:  1. Coronary artery disease due to lipid rich plaque  Status post stent of the right coronary artery.  Experiencing intermittent twinges of discomfort.  No real angina.  Recommendation: Continue to aggressively modify risk factors.  She is working on titrating insulin.  She is tolerating rosuvastatin.  No cigarettes for the past 2 months.  Follow-up in 3-4 weeks.  At that time, we will check lipid profile, liver enzymes and glycohemoglobin  She needs to follow-up with cardiac rehab.  Recommend initiation of cardiac rehab program    2. Type 2 diabetes mellitus with hyperglycemia, with long-term current use of insulin  With suboptimal control for several months.  Much of this is been due to stress noncompliance with the death of her father and cousin.  Recommendation: Continue titration of insulin as is happening currently.  Glycohemoglobin in 4 weeks.  Of note, she is now on a statin and ACE inhibitor    3. Stenosis of left carotid artery  Viewed carotid artery ultrasound with her.  Recommendations: Modification of cardiovascular risk factors as above.  Ultrasound in one year    4.  Exacerbation of chronic back pain  Recommendation: Okay to increase Vicodin to 1 tablet twice daily short-term.  I believe that initiation of treadmill exercise will help with chronic back pain.  Encourage her to contact them ASAP        The following high BMI interventions were performed this visit: encouragement to exercise she needs to contact cardiac rehab and initiate exercise  Mimi Fuentes MD    Chief Complaint:  Chief Complaint   Patient presents with     Follow-up       History of Present Illness:  Liana is a 61 y.o. female who is here today for follow-up after having hospitalization for chest pain.  Of note, her history is quite complex.  She has poorly controlled diabetes due to noncompliance with diet, exercise etc.  She has been having difficulties of  a gastrointestinal nature for the past 3-4 weeks which occurred following the ingestion of a toxic substance and also a respiratory tract infection with bronchitis and reactive airways.  She has been seen here by Dr. Truong and by myself for a right sided abdominal pain.  Of note, the workup that has been unremarkable.  Ultrasound and CT scans of the abdomen have been negative.  She had been slowly improving when she developed a left-sided chest pain with radiation to the left shoulder.  She went to the emergency room her troponins were negative.  Because of her multiple cardiovascular risk factors, she went for angiogram and was found to have a high-grade right coronary artery lesion and is status post stenting.  While she is not having chest pain, her abdominal pain really did not change much with the procedure.  It continues to slowly improve.    She states she is committed to not ever smoking again.  She is compliant with her insulin.  She has not yet reestablished exercise as she needs to begin with cardiac rehab.    Review of Systems:  A comprehensive review of systems was performed and was otherwise negative    PFSH:  Social History: She is in a common-law marriage.  She lives with her  and son.  she quit smoking  History   Smoking Status     Current Every Day Smoker     Packs/day: 0.25     Years: 40.00     Types: Cigarettes     Last attempt to quit: 1/24/2016   Smokeless Tobacco     Not on file     Comment: Reports 1 cigarette a day now       Past History: As noted in the snapshot  Medication list reviewed  Current Outpatient Prescriptions   Medication Sig Dispense Refill     acetaminophen (TYLENOL) 500 MG tablet Take 1-2 tablets (500-1,000 mg total) by mouth every 4 (four) hours as needed. 30 tablet 0     albuterol (PROAIR HFA;PROVENTIL HFA;VENTOLIN HFA) 90 mcg/actuation inhaler Inhale 2 puffs every 6 (six) hours as needed for wheezing. 1 Inhaler 1     aspirin 81 MG EC tablet Take 81 mg by mouth  "daily.       cholecalciferol, vitamin D3, (CHOLECALCIFEROL) 1,000 unit tablet Take 1,000 Units by mouth daily.       clopidogrel (PLAVIX) 75 mg tablet Take 1 tablet (75 mg total) by mouth daily. 30 tablet 11     hydroCHLOROthiazide (MICROZIDE) 12.5 mg capsule Take 1 capsule (12.5 mg total) by mouth daily. 90 capsule 1     HYDROcodone-acetaminophen 5-325 mg per tablet Take 1 tablet by mouth every 4 (four) hours as needed for pain. 60 tablet 0     insulin aspart (NOVOLOG) 100 unit/mL injection pen Inject under the skin 2 (two) times daily before lunch and supper. Inject 10 units before lunch and 8 units before supper.       insulin degludec (TRESIBA FLEXTOUCH U-200) 200 unit/mL (3 mL) InPn Inject 110 Units under the skin daily. 18 mL 11     lisinopril (PRINIVIL,ZESTRIL) 5 MG tablet Take 1 tablet (5 mg total) by mouth daily. 30 tablet 11     metoprolol succinate (TOPROL-XL) 50 MG 24 hr tablet Take 50 mg by mouth daily.       nitroglycerin (NITROSTAT) 0.4 MG SL tablet Place 1 tablet (0.4 mg total) under the tongue every 5 (five) minutes as needed for chest pain. 25 tablet 3     omega-3 acid ethyl esters (LOVAZA) 1 gram capsule Take 2 capsules (2 g total) by mouth 2 (two) times a day. 120 capsule 11     omeprazole (PRILOSEC) 40 MG capsule Take 40 mg by mouth Daily before breakfast.       pen needle, diabetic (BD ULTRA-FINE ZULMA PEN NEEDLES) 32 gauge x 5/32\" Ndle Inject 1 each under the skin daily. With Tresiba 100 each 3     rosuvastatin (CRESTOR) 10 MG tablet Take 1 tablet (10 mg total) by mouth daily. 30 tablet 11     traZODone (DESYREL) 50 MG tablet Take about 1.5 hours before desired sleep (Patient taking differently: Take 50 mg by mouth at bedtime as needed. ) 30 tablet 1     blood glucose test strips Use 1 each As Directed 3 (three) times a day. 300 strip 12     No current facility-administered medications for this visit.        Family History: Her mother  of complications of coronary artery disease.  Her " sister had a sudden cardiac death secondary to coronary artery disease and chronic kidney disease    Physical Exam:  General Appearance:   She appears well and in no acute distress  Vitals:    05/02/17 0930   BP: 124/56   Patient Site: Left Arm   Patient Position: Sitting   Cuff Size: Adult Large   Pulse: 80   Weight: (!) 249 lb (112.9 kg)     Wt Readings from Last 3 Encounters:   05/02/17 (!) 249 lb (112.9 kg)   04/25/17 (!) 252 lb 3.2 oz (114.4 kg)   04/05/17 (!) 252 lb 9.6 oz (114.6 kg)     Body mass index is 40.19 kg/(m^2).    Radial artery is palpated and within normal limits    Data Review:    Analysis and Summary of Old Records (2): Detailed review of hospital records    Records Requested (1): 0      Other History Summarized (from other people in the room) (2): 0    Radiology Tests Summarized (XRAY/CT/MRI/DXA) (1): Reviewed angiogram and CT scan of the abdomen    Labs Reviewed (1): Hospital labs    Medicine Tests Reviewed (EKG/ECHO/COLONOSCOPY/EGD) (1): 0    Independent Review of EKG or X-RAY (2): 0

## 2021-06-10 NOTE — TELEPHONE ENCOUNTER
Refill Request  Did you contact pharmacy: Yes  Medication name:   Requested Prescriptions     Pending Prescriptions Disp Refills     OZEMPIC 1 mg/dose (2 mg/1.5 mL) PnIj [Pharmacy Med Name: OZEMPIC 1MG PER DOSE (2X2MG PEN)] 3 mL 12     Sig: INJECT 1 MG UNDER THE SKIN EVERY 7 DAYS     Who prescribed the medication: Mimi Fuentes MD    Requested Pharmacy: [facility]  Is patient out of medication: Yes  Patient notified refills processed in 3 business days:  yes  Okay to leave a detailed message: yes  Please expedite patient out of medication

## 2021-06-10 NOTE — PROGRESS NOTES
"Liana Briceño is a 65 y.o. female who is being evaluated via a billable video visit.      The patient has been notified of following:     \"This video visit will be conducted via a call between you and your physician/provider. We have found that certain health care needs can be provided without the need for an in-person physical exam.  This service lets us provide the care you need with a video conversation.  If a prescription is necessary we can send it directly to your pharmacy.  If lab work is needed we can place an order for that and you can then stop by our lab to have the test done at a later time.    Video visits are billed at different rates depending on your insurance coverage. Please reach out to your insurance provider with any questions.    If during the course of the call the physician/provider feels a video visit is not appropriate, you will not be charged for this service.\"    Patient has given verbal consent to a Video visit? Yes  How would you like to obtain your AVS? AVS Preference: MyChart.  If dropped by the video visit, the video invitation should be sent to: Text to cell phone: [phone]  Will anyone else be joining your video visit? No        Video Start Time: 1:40 PM    Additional provider notes: This is a 65-year-old woman who recently was diagnosed with COVID-19.  She said the diagnosis was made about 7 days ago.  She went to the emergency room last evening because she was coughing up some yellow-brown sputum.  There was no pleurisy and no increasing dyspnea.  The chest x-ray apparently was done and showed a small right lower lobe infiltrate.  She is allergic to many antibiotics such as penicillin, doxycycline, Z-Surinder, and erythromycin.  She therefore was  placed on Levaquin.  within 2 to 3 hours of taking the medications, she developed nausea and vomiting.  She is miserable with miguel sea.  She wants to quit the antibiotic and try something different.  She was told her oxygen saturations in " the emergency room were 95%.  She has no dyspnea.  Her home oxygen meter does not work.  On exam she is comfortable and in no respiratory distress.  Assessment  1 COVID-19 with recent diagnosis of right lower lobe pneumonia.  ER personnel felt she must of had a secondary bacterial infection.  She is intolerant of penicillins: Tetracyclines, erythromycin, and now Levaquin.  I will try her on Ceftin 250 mg twice daily.  The reactivity with Ceftin versus penicillin should be quite small.  She will contact us if there is any problems.  She continues to quarantine herself.    Video-Visit Details    Type of service:  Video Visit    Video End Time (time video stopped): 2 PM  Originating Location (pt. Location): Home    Distant Location (provider location):  [facility] INTERNAL MEDICINE      Platform used for Video Visit: Lilly Bishop

## 2021-06-10 NOTE — TELEPHONE ENCOUNTER
"COVID 19 Nurse Triage Plan/Patient Instructions    Please be aware that novel coronavirus (COVID-19) may be circulating in the community. If you develop symptoms such as fever, cough, or SOB or if you have concerns about the presence of another infection including coronavirus (COVID-19), please contact your health care provider or visit [email or web address].     Disposition/Instructions    Virtual Visit with provider recommended. Reference Visit Selection Guide.    Thank you for taking steps to prevent the spread of this virus.  o Limit your contact with others.  o Wear a simple mask to cover your cough.  o Wash your hands well and often.    Resources    [facility]: About COVID-19: [email or web address]    CDC: What to Do If You're Sick: www.cdc.gov/coronavirus/2019-ncov/about/steps-when-sick.html    CDC: Ending Home Isolation: www.cdc.gov/coronavirus/2019-ncov/hcp/disposition-in-home-patients.html     CDC: Caring for Someone: www.cdc.gov/coronavirus/2019-ncov/if-you-are-sick/care-for-someone.html     [facility]: Interim Guidance for Hospital Discharge to Home: www.health.[facility].[place]/diseases/coronavirus/hcp/hospdischarge.pdf    [facility] clinical trials (COVID-19 research studies): [email or web address]-clinical-trials     Below are the COVID-19 hotlines at the [place] Department of Health ([facility]). Interpreters are available.   o For health questions: Call 225-555-0196 or 1-537.831.2586 (7 a.m. to 7 p.m.)  o For questions about schools and childcare: Call 190-529-1634 or 1-775.872.5060 (7 a.m. to 7 p.m.)     RN triage   Call from pt   Pt states she was dx covid 19 on 7/30-- readmitted to [facility] on 8/4 w/ pneumonia --   Pt states she was prescribed cefuroxime x5 days -- has completed medication -- and still has cough and coughing up brown stuff -  No red blood or clots -- - still feeling her cough in her \" stomach \"  Pt still sick -- gets shortness of breath w/ activity -- but " "states she is feeling some better   Today T = 97.7 [ last fever on Monday 99.9]    BP = 114/59  P= 87  sats 94 on RA   Pt thinks she may need more antibiotics  Blood sugars = 101   Reviewed home care advice   Transferred to    Hemalatha Albrecht RN [facility] RN triage        Reason for Disposition    [1] Finished taking antibiotics AND [2] breathing not back to normal (e.g., \"breathing still worse than normal\")    Additional Information    Negative: Severe difficulty breathing (e.g., struggling for each breath, speaks in single words)    Negative: Bluish (or gray) lips or face now    Negative: Difficult to awaken or acting confused (e.g., disoriented, slurred speech)    Negative: Stridor    Negative: Slow, shallow and weak breathing    Negative: Sounds like a life-threatening emergency to the triager    Negative: New onset or worsening chest pain    Negative: MODERATE difficulty breathing (e.g., speaks in phrases, SOB even at rest, pulse 100-120)    Negative: Fever > 103 F (39.4 C)    Negative: [1] Coughed up blood AND [2] large amount or blood clots (Exception: blood-tinged sputum)    Negative: [1] MILD difficulty breathing  (e.g., minimal/no SOB at rest, SOB with walking, pulse <100) AND [2] worse than when discharged from hospital    Negative: Fever > 100.5 F (38.1 C)    Negative: Continuous (nonstop) coughing interferes with work or school    Protocols used: PNEUMONIA ON ANTIBIOTIC POST-HOSPITALIZATION FOLLOW-UP CALL-A-AH      "

## 2021-06-10 NOTE — PROGRESS NOTES
Cardiac Rehab  Phase II Assessment    Assessment Date: 5/16/17    Diagnosis: CAD Date of Onset: 4/24/2017  Procedure:BO to RCA Date of Onset:4/24/2017  ICD/Pacemaker: No Parameters:   Post-op Complications: none  ECG History: SR-ST EF%:50-55%  Past Medical History:  Patient Active Problem List   Diagnosis     Carpal tunnel syndrome     Obstructive Sleep Apnea     Diffuse Nontoxic Goiter     Type 2 diabetes mellitus     Morbid obesity     Essential hypertension     Esophageal reflux     Difficulty Swallowing (Dysphagia)     Dyslipidemia, goal LDL below 70     Hiatal Hernia     Sensorineural hearing loss     Statin intolerance     Unstable angina pectoris     Stenosis of left carotid artery     Dizziness     Coronary artery disease due to lipid rich plaque       Physical Assessment  Precautions/ Physical Limitations: DM, Chronic Back Pain  Oxygen: No  O2 Sats: 98% Lung Sounds: Sl. diminished, clear Edema: none  Incisions: none  Sleeping Pattern: good   Appetite: good   Nutrition Risk Screen: Insulin dependent DM    Pain  Location:   Characteristics:back and  chest  Intensity: (0-10 scale) 3  Current Pain Management: tylenol  Intervention: subsides with rest  Response: pain comes and goes    Psychosocial/ Emotional Health  1. In the past 12 months, have you been in a relationship where you have been abused physically, emotionally, sexually or financially? No  notified: NA  2. Who do you turn to for emotional support?: family  3. Do you have cultural or spiritual needs? No  4. Have there been any major life changes in the past 12 months? Yes     Referral Information  Primary Physician: Mimi Fuentes MD  Cardiologist: Dr. Georges Walton  Surgeon:    Home exercise/Equipment:  Stationary Bike, [employer]    Patient's long-term goal(s):Lose weight, Control diabetes, Resume working out at [employer].    1. Living Accommodations: Home Steps: Yes      Support people at home: yes   2. Marital Status:   3. Family  is able to assist with cares      [employer]/Community involvement: [employer] [facility] [employer]  4. Recreation/Hobbies: Fishing

## 2021-06-10 NOTE — PROGRESS NOTES
"  Assessment and Plan:       1. Encounter for preventive care  Seen here today for annual wellness visit.  Multiple problems.  Significant emotional distress secondary to family death/cousin murdered last week.  Continues to experience significant grief and anxiety.  Subsequently, not exercising and diet not in line with diabetes.  This was reviewed.  Recommendations: For preventative care will update mammography and Pap smear will be done at next visit.  Additionally, it is noted that she declines any immunizations.  She believes that \"these make me ill \".  - Mammo Screening Bilateral; Future  - Gynecologic Cytology (PAP Smear)    2. Stenosis of left carotid artery  Recent carotid ultrasound revealed eccentric severe stenotic lesion  CTA of the neck reveals a 65% stenosis.  Recommendations: Continue statins, aspirin.  We will ask Dr. Acosta Marshall to have a look at these 2 studies and comment.  This appointment is scheduled.    3.  Atypical chest pain/coronary artery disease due to lipid rich plaque-previous drug-eluting stent in RCA  Recent atypical chest pain.  She is having a visit with cardiology tomorrow.  Known coronary disease.  Chest pain not necessarily exertional.  It is exacerbated by anxiety.  However, known coronary artery disease with multiple cardiovascular risk factors.  Recommendations: Per Dr. Walton tomorrow.  Will decide if she needs stress test versus CTA, etc.  Of note, she had an untoward reaction to a stress nuclear test in the past.    4. Essential hypertension  Stable on current medications    5. Type 2 diabetes mellitus with hyperglycemia, with long-term current use of insulin (H)  Glycohemoglobin at 7.2.  Not necessarily at goal but certainly given the circumstances I expected it to be worse.  Recommendations: She is to return in 1 month with fasting, pre-meal and 2-hour postprandial blood sugar data.  She will remain on Tresiba, Ozempic and mealtime insulin.  Encourage compliance with " diabetic diet    6. Vitamin D deficiency  We will update labs    7. Encounter for screening mammogram for malignant neoplasm of breast   Ordered  - Mammo Screening Bilateral; Future    8.  Sebaceous cyst groin  Recently started on Bactrim.  Encourage compliance with this and will recheck in 1 month and will do Pap smear at that time          The patient's current medical problems were reviewed.      The following health maintenance schedule was reviewed with the patient and provided in printed form in the after visit summary:   Health Maintenance   Topic Date Due     HEPATITIS B VACCINES (1 of 3 - Risk 3-dose series) 07/07/1976     ZOSTER VACCINES (2 of 3) 10/23/2012     PAP SMEAR  06/17/2019     HPV TEST  06/17/2019     DIABETIC FOOT EXAM  06/21/2020     MEDICARE ANNUAL WELLNESS VISIT  08/02/2020     DIABETIC EYE EXAM  08/27/2020     LIPID  11/25/2020     A1C  01/21/2021     BMP  07/21/2021     MAMMOGRAM  08/06/2021     ADVANCE CARE PLANNING  10/12/2021     TD 18+ HE  03/08/2029     COLORECTAL CANCER SCREENING  11/21/2029     HEPATITIS C SCREENING  Completed     HIV SCREENING  Completed     PNEUMOCOCCAL IMMUNIZATION 19-64 MEDIUM RISK  Completed     TDAP ADULT ONE TIME DOSE  Completed     MICROALBUMIN  Discontinued        Subjective:   Chief Complaint: Liana Briceño is an 65 y.o. female here for an Annual Wellness visit.   HPI: Frank is a pleasant 65-year-old female who is seen here today for an annual wellness visit.  However, she has multiple concerns.  Of note, she has known coronary disease and is status post drug-eluting stent in the RCA-2017.  She had mild disease in the LAD as well.  More recently, she has been experiencing some right sided chest pain.  It occurs when she is driving a car and when she is upset.  Of note, her family member was murdered in [place] approximately 1 week ago.  She states that this trauma has greatly contributed to her chest pain.  She denies exertional chest pain per se.  She  "denies shortness of breath with this or nausea.  She states the pain comes and last for approximately 5 minutes.  She denies reflux or any new gastrointestinal symptoms.  However, she does have a history of the same.    She is experiencing symptoms of grief currently.  She has had many family members who have passed.  Her good friend's daughter has also recently passed.    With regard to type 2 diabetes, she continues on Ozempic and both long-acting and short acting insulin.  Her fasting sugars are in the 130s.  She has not been checking any postprandial sugars.  She dines out quite a bit or does take out.  She states her diet has not been compliant recently due to these family events.  Additionally, she has not been able to exercise.    She is wondering about a walker.  With her spine/lumbar disc disease, she states she is \"always looking for a cart \".  She is not sure whether her insurance would cover a walker per se.    Of note also, she was recently started on Bactrim for some sebaceous cysts in the groin.  These will be evaluated.  She is only had 2 doses of the medication.    Health maintenance is reviewed.  She is due for a Pap smear.  She is due for a mammogram.  Colon cancer screening is up-to-date.  She declines immunizations.    Review of Systems: She denies any bowel or bladder symptoms.  Please see above.  The rest of the review of systems are negative for all systems.        Patient Care Team:  Mimi Fuentes MD as PCP - General  Jose Raul Coy, PharmD as Pharmacist (Pharmacist)  Mimi Fuentes MD as Assigned PCP     Patient Active Problem List   Diagnosis     Obstructive Sleep Apnea     Diffuse Nontoxic Goiter     Type 2 diabetes mellitus (H)     Essential hypertension     Esophageal reflux     Dyslipidemia, goal LDL below 70     Hiatal Hernia     Sensorineural hearing loss     Stenosis of left carotid artery     Coronary artery disease due to lipid rich plaque     Morbid obesity with BMI " of 40.0-44.9, adult (H)     RADHA on CPAP     Thiamine deficiency     Vitamin D deficiency     Increased PTH level     Low serum vitamin B12     Elevated ferritin level     Positive result for methicillin resistant Staphylococcus aureus (MRSA) screening     Past Medical History:   Diagnosis Date     Anemia      Arthritis      Breast cyst      Carpal tunnel syndrome      Coronary artery disease due to lipid rich plaque      Dyslipidemia, goal LDL below 70      Esophageal reflux      Essential hypertension      Fatty liver      Goiter diffuse, nontoxic      Hiatal hernia      History of gestational diabetes      History of MRSA infection 07/17/2017    Pilonidal cyst culture     Morbid obesity with BMI of 40.0-44.9, adult (H)      RADHA on CPAP      Paroxysmal SVT (supraventricular tachycardia) (H)     S/P ablation     S/P hip replacement      Type 2 diabetes mellitus (H)       Past Surgical History:   Procedure Laterality Date     BREAST BIOPSY       BREAST CYST EXCISION       CARDIAC CATHETERIZATION N/A 4/24/2017    Procedure: Coronary Angiogram;  Surgeon: Ariane Biswas MD;  Location: [facility];  Service:      CARDIAC CATHETERIZATION N/A 5/19/2017    Procedure: Coronary Angiogram;  Surgeon: Howard Gutierrez MD;  Location: [facility];  Service:      CHOLECYSTECTOMY  2000     CORONARY STENT PLACEMENT  04/24/2017    BO to RCA by Dr. Biswas     DE EXPLO/DRAIN BREAST ABSCESS      Description: Breast Surgery Mastotomy With Drainage Of Abscess     DE L HRT CATH W/NJX L VENTRICULOGRAPHY IMG S&I N/A 4/24/2017    Procedure: Left Heart Catheterization with Left Ventriculogram;  Surgeon: Ariane Biswas MD;  Location: [facility];  Service: Cardiology     DE REVISE MEDIAN N/CARPAL TUNNEL SURG      Description: Neuroplasty Decompression Median Nerve At Carpal Tunnel;  Recorded: 05/22/2009;     DE THYROID LOBECTOMY,UNILAT       TOTAL HIP ARTHROPLASTY Right       Family History   Problem Relation  Age of Onset     Heart disease Father         unknown type     Valvular heart disease Mother      Kidney disease Mother      Hypertension Mother      Diabetes Mother      Leukemia Brother          at a young age     No Medical Problems Son      Diabetes Paternal Aunt      Kidney disease Sister      Heart disease Sister      Breast cancer Neg Hx       Social History     Socioeconomic History     Marital status: Domestic Partner     Spouse name: Panchito Mclean     Number of children: 1     Years of education: Not on file     Highest education level: Not on file   Occupational History     Occupation: no longer there as they closed the center     Employer: [employer]     Occupation: PCA     Comment: four hours a day   Social Needs     Financial resource strain: Not on file     Food insecurity     Worry: Not on file     Inability: Not on file     Transportation needs     Medical: Not on file     Non-medical: Not on file   Tobacco Use     Smoking status: Former Smoker     Packs/day: 0.25     Years: 44.00     Pack years: 11.00     Types: Cigarettes     Last attempt to quit: 2017     Years since quitting: 3.5     Smokeless tobacco: Never Used     Tobacco comment: age 18 to age 62 up to 1/2 ppd   Substance and Sexual Activity     Alcohol use: Yes     Alcohol/week: 0.0 standard drinks     Comment: < 1 drink a week     Drug use: No     Sexual activity: Not on file   Lifestyle     Physical activity     Days per week: Not on file     Minutes per session: Not on file     Stress: Not on file   Relationships     Social connections     Talks on phone: Not on file     Gets together: Not on file     Attends [employer] service: Not on file     Active member of club or organization: Not on file     Attends meetings of clubs or organizations: Not on file     Relationship status: Not on file     Intimate partner violence     Fear of current or ex partner: Not on file     Emotionally abused: Not on file     Physically  abused: Not on file     Forced sexual activity: Not on file   Other Topics Concern     Not on file   Social History Narrative    Single but lives with her significant other, has one son, she is a .  She is a PCA.     Disability for LBP. Work injury.      Current Outpatient Medications   Medication Sig Dispense Refill     acetaminophen (TYLENOL) 500 MG tablet Take 1,000 mg by mouth 3 (three) times a day as needed.       allopurinol (ZYLOPRIM) 100 MG tablet Take 1 tablet (100 mg total) by mouth daily. 90 tablet 3     aspirin 81 MG EC tablet Take 81 mg by mouth daily.       blood glucose meter (FREESTYLE LITE METER) Use 1 each As Directed 6 (six) times a day. Dispense glucometer brand per patient's insurance at pharmacy discretion. 1 each 0     blood glucose test strips Use 1 each As Directed 6 (six) times a day. Prodigy Meter 300 strip 3     cetirizine (ZYRTEC) 10 MG tablet Take 1 tablet (10 mg total) by mouth daily. 30 tablet 2     cholecalciferol, vitamin D3, (CHOLECALCIFEROL) 1,000 unit tablet Take 2,000 Units by mouth daily.             cinnamon bark (CINNAMON ORAL) Take by mouth.       cyanocobalamin (VITAMIN B-12) 500 MCG tablet Take 500 mcg by mouth daily.       cyanocobalamin 250 MCG tablet Take 250 mcg by mouth daily.       cyclobenzaprine (FLEXERIL) 10 MG tablet Take 1 tablet (10 mg total) by mouth at bedtime as needed for muscle spasms. 21 tablet 1     diclofenac sodium (VOLTAREN) 1 % Gel Apply one thin layer over toe joint/ affected joint 1 Tube 0     flash glucose scanning reader (FREESTYLE MIRANDA 14 DAY READER) Misc Use 1 each As Directed daily. 1 each 1     flash glucose sensor (FREESTYLE MIRANDA 14 DAY SENSOR) Kit Use 1 each As Directed every 14 (fourteen) days. 2 kit 11     generic lancets (MICROLET LANCET) Use 1 each As Directed 6 (six) times a day. 300 each 3     HUMALOG KWIKPEN INSULIN 100 unit/mL pen INJECT 45 UNITS UNDER THE SKIN THREE TIMES DAILY 129 mL 0     hydroCHLOROthiazide (MICROZIDE)  "12.5 mg capsule Take 1 capsule (12.5 mg total) by mouth daily. 90 capsule 3     HYDROcodone-acetaminophen 5-325 mg per tablet Take 1 tablet by mouth 2 (two) times a day as needed for pain. 40 tablet 0     insulin degludec (TRESIBA FLEXTOUCH U-200) 200 unit/mL (3 mL) InPn Inject 110 Units under the skin daily. 20 mL 11     lisinopril (PRINIVIL,ZESTRIL) 5 MG tablet Take 1.5 tablets (7.5 mg total) by mouth daily. 135 tablet 3     melatonin (MELATIN) 3 mg Tab tablet Take 3 mg by mouth at bedtime as needed.       nitroglycerin (NITROSTAT) 0.3 MG SL tablet Place 1 tablet (0.3 mg total) under the tongue every 5 (five) minutes as needed for chest pain. 100 tablet 3     nitroglycerin (NITROSTAT) 0.4 MG SL tablet Place 1 tablet (0.4 mg total) under the tongue every 5 (five) minutes as needed for chest pain. 25 tablet 3     omega-3 acid ethyl esters (LOVAZA) 1 gram capsule Take 2 capsules (2 g total) by mouth 2 (two) times a day. 360 capsule 3     omeprazole (PRILOSEC) 40 MG capsule TAKE 1 CAPSULE BY MOUTH EVERY DAY 90 capsule 3     ONETOUCH DELICA PLUS LANCET 33 gauge Misc USE TO TEST BLOOD SUGAR 6 TIMES DAILY 600 each 3     OZEMPIC 1 mg/dose (2 mg/1.5 mL) PnIj ADMINISTER 1 MG UNDER THE SKIN EVERY 7 DAYS 3 mL 12     pen needle, diabetic (BD ULTRA-FINE ZULMA PEN NEEDLE) 32 gauge x 5/32\" Ndle Inject 4 each under the skin daily. With Tresiba and Novolog 120 each 3     rosuvastatin (CRESTOR) 10 MG tablet TAKE 1 TABLET BY MOUTH DAILY 90 tablet 3     sulfamethoxazole-trimethoprim (BACTRIM DS) 800-160 mg per tablet Take 1 tablet by mouth 2 (two) times a day for 10 days. 20 tablet 0     triamcinolone (KENALOG) 0.1 % cream Apply bid 30 g 0     VITAMIN B-1 100 mg tablet Take 1 tablet (100 mg total) by mouth daily. 90 tablet 3     No current facility-administered medications for this visit.       Objective:   Vital Signs:   Visit Vitals  /58 (Patient Site: Left Arm, Patient Position: Sitting, Cuff Size: Adult Large)   Pulse 90 " "  Temp 98.7  F (37.1  C) (Oral)   Ht 5' 5\" (1.651 m)   Wt (!) 270 lb (122.5 kg)   SpO2 94%   BMI 44.93 kg/m           VisionScreening:  No exam data present     PHYSICAL EXAM  EYES: Eyelids, conjunctiva, and sclera were normal. Pupils were normal. Cornea, iris, and lens were normal bilaterally.  HEAD, EARS, NOSE, MOUTH, AND THROAT: Head and face were normal. Hearing was normal to voice and the ears were normal to external exam. Nose appearance was normal and there was no discharge. Oropharynx was normal.  TMs were normal.  NECK: Neck appearance was normal. There were no neck masses and the thyroid was not enlarged.  RESPIRATORY: Breathing pattern was normal and the chest moved symmetrically.   Lung sounds were equal bilaterally.  CARDIOVASCULAR: Heart rate and rhythm were normal.  S1 and S2 were normal and there were no extra sounds or murmurs. Peripheral pulses in arms and legs were normal.  Jugular venous pressure was normal.  There was no peripheral edema.  GASTROINTESTINAL: The abdomen was normal in contour.  Bowel sounds were present.   Palpation detected no tenderness, mass, or enlarged organs.   MUSCULOSKELETAL: Skeletal configuration was normal and muscle mass was normal for age. Joint appearance was overall normal.  LYMPHATIC: There were no enlarged nodes.  SKIN/HAIR/NAILS: Skin color was normal.  There were no abnormal skin lesions.  Hair and nails were normal.  NEUROLOGIC: The patient was alert and oriented to person, place, time, and circumstance. Speech was normal. Cranial nerves were normal. Motor strength was normal for age. The patient was normally coordinated.  PSYCHIATRIC:  Mood and affect were normal and the patient had normal recent and remote memory. The patient's judgment and insight were normal.  BREASTS: Pendulous.  No discrete masses were noted  External groin exam: Sebaceous cysts on the mons pubis.  Mildly fluctuant but relatively small.        Assessment Results 7/28/2020   Activities of " Daily Living No help needed   Instrumental Activities of Daily Living 1 - Full function   Get Up and Go Score Less than 12 seconds   Mini Cog Total Score 5   Some recent data might be hidden     A Mini-Cog score of 0-2 suggests the possibility of dementia, score of 3-5 suggests no dementia        Identified Health Risks:     The patient was provided with suggestions to help her develop a healthy physical lifestyle.   She is at risk for lack of exercise and has been provided with information to increase physical activity for the benefit of her well-being.  The patient reports that she has difficulty with instrumental activities of daily living.  She was provided with a list of local organizations that provide support services and advised to make a follow up appointment in 4 weeks to address this further.   The patient was provided with suggestions to help her develop a healthy emotional lifestyle.   Information regarding advance directives (living ochoa), including where she can download the appropriate form, was provided to the patient via the AVS.

## 2021-06-10 NOTE — PROGRESS NOTES
Liana Briceño  [address] [place] [address]  [phone] (home)     Primary cardiologist:  Dr. Gutierrez  Procedure:  Coronary Angio poss PCI   H&P completed by:  Dr. Gutierrez  Case MD:  Dr. Gutierrez  Admit date and time:  5/19/17 06:30 am  Case start time:  08:30 am  Ordering MD:  Dr. Gutierrez  Diagnosis:  Chest pain  Anticoagulation: ASA 81, Plavix 75 mg  CPAP: Yes, but does not use it. Instructed to bring in on 5/19  Bypass Grafts: No  Renal Issues: No  Allergies:   Allergies   Allergen Reactions     Penicillins Other (See Comments)     Passed out.      Doxycycline Dizziness     Egg Derived      Pneumococcal 23-Valent Polysaccharide Vaccine Unknown     Statins-Hmg-Coa Reductase Inhibitors      Shaking with simvastatin and atorvastatin     Azithromycin Rash     Diabetic?: Yes  Device?: No     Angiogram Teaching    Reason for Visit:  Patient seen for pre-procedure education in preparation for: Coronary angio poss pci      Procedure Prep:  Cardiologist note dated: 5/18  EKG results obtained, dated: 5/18  Hemogram results obtained: 5/18  Basic Metabolic Panel results obtained: 5/18  Lipid Profile results obtained: 4/17    Patient Education  Explained indications/risks for diagnostic evaluation, including one or more of the following:  coronary angiogram, less than 0.1% of acute myocardial infarction and CVA or death or any of the following to the degree that it could threaten life:  allergic reaction, arrhythmia, renal failure, hemorrhage, thrombosis and infection  Explained indications/risks for therapeutic interventions, including one or more of the following: PTCA, artherectomy, stent, 2% or less risk of MI, less than 1% risk of CVA, emergency heart surgery, death, less than 4 % risk of vascular injury requiring surgical repair or blood transfusion, 20-30% risk of restonosis with a bare metal stent, less than 10% risk of restonosis with a drug-eluting stent, 0.5-1% chance of stent thrombosis and may need  clopidogrel (Plavix) form greater than 1 year.  These risks are in addition to baseline risks associated with a Diagnostic Evaluation.  Patient states understanding of procedure and risks and agrees to proceed.    Pre-procedure instructions  Patient instructed to be NPO after midnight.  Patient instructed to arrange for transportation home following procedure.  Patient instructed to have a responsible adult with them for 24 hours post-procedure.  Post-procedure follow up process.  Conscious sedation discussed.  The patient was given the pre-procedure letter (If requested) on 5/18    Pre-procedure medication instructions  Patient instructed on antiplatelet medication.  Continue medications as scheduled, with a small amount of water on the day of the procedure unless indicated.  Patient instructed to take 81mg of Aspirin am of procedure: Yes  Other medication: instructed to hold novolog, microzide, lisinopril a.m. of the procedure. Pt takes Tresiba late am. [facility] RN will check BG and administer as appropriate.    Order set was entered on this date: 5/18      Patient Active Problem List   Diagnosis     Carpal tunnel syndrome     Obstructive Sleep Apnea     Diffuse Nontoxic Goiter     Type 2 diabetes mellitus     Morbid obesity     Essential hypertension     Esophageal reflux     Difficulty Swallowing (Dysphagia)     Dyslipidemia, goal LDL below 70     Hiatal Hernia     Sensorineural hearing loss     Statin intolerance     Unstable angina pectoris     Stenosis of left carotid artery     Dizziness     Coronary artery disease due to lipid rich plaque       Current Outpatient Prescriptions   Medication Sig Dispense Refill     albuterol (PROAIR HFA;PROVENTIL HFA;VENTOLIN HFA) 90 mcg/actuation inhaler Inhale 2 puffs every 6 (six) hours as needed for wheezing. 1 Inhaler 1     aspirin 81 MG EC tablet Take 81 mg by mouth daily.       blood glucose test strips Use 1 each As Directed 3 (three) times a day. 300 strip 12      "cholecalciferol, vitamin D3, (CHOLECALCIFEROL) 1,000 unit tablet Take 1,000 Units by mouth daily.       clopidogrel (PLAVIX) 75 mg tablet Take 1 tablet (75 mg total) by mouth daily. 30 tablet 11     hydroCHLOROthiazide (MICROZIDE) 12.5 mg capsule Take 1 capsule (12.5 mg total) by mouth daily. 90 capsule 1     HYDROcodone-acetaminophen 5-325 mg per tablet Take 1 tablet by mouth every 4 (four) hours as needed for pain. 60 tablet 0     insulin aspart (NOVOLOG) 100 unit/mL injection pen Inject 25 Units under the skin 3 (three) times a day with meals.       insulin degludec (TRESIBA FLEXTOUCH U-200) 200 unit/mL (3 mL) InPn Inject 124 Units under the skin daily. 18 mL 11     ipratropium (ATROVENT) 0.06 % nasal spray 1 spray into each nostril 3 (three) times a day. 15 mL 12     lisinopril (PRINIVIL,ZESTRIL) 5 MG tablet Take 1 tablet (5 mg total) by mouth daily. 30 tablet 11     metoprolol succinate (TOPROL-XL) 50 MG 24 hr tablet TAKE 1 TABLET(50 MG) BY MOUTH DAILY 90 tablet 0     nitroglycerin (NITROSTAT) 0.4 MG SL tablet Place 1 tablet (0.4 mg total) under the tongue every 5 (five) minutes as needed for chest pain. 25 tablet 3     omega-3 acid ethyl esters (LOVAZA) 1 gram capsule Take 2 capsules (2 g total) by mouth 2 (two) times a day. 120 capsule 11     omeprazole (PRILOSEC) 40 MG capsule TAKE 1 CAPSULE BY MOUTH EVERY DAY 90 capsule 0     ondansetron (ZOFRAN, AS HYDROCHLORIDE,) 4 MG tablet Take 1 tablet (4 mg total) by mouth daily as needed for nausea. 15 tablet 0     pen needle, diabetic (BD ULTRA-FINE ZULMA PEN NEEDLES) 32 gauge x 5/32\" Ndle Inject 1 each under the skin daily. With Tresiba 100 each 3     rosuvastatin (CRESTOR) 10 MG tablet Take 1 tablet (10 mg total) by mouth daily. 30 tablet 11     traZODone (DESYREL) 50 MG tablet Take about 1.5 hours before desired sleep (Patient taking differently: Take 50 mg by mouth at bedtime as needed. ) 30 tablet 1     Current Facility-Administered Medications   Medication Dose " Route Frequency Provider Last Rate Last Dose     ondansetron disintegrating tablet 4 mg (ZOFRAN-ODT)  4 mg Oral Once Maximiliano Borden, DO           Allergies   Allergen Reactions     Penicillins Other (See Comments)     Passed out.      Doxycycline Dizziness     Egg Derived      Pneumococcal 23-Valent Polysaccharide Vaccine Unknown     Statins-Hmg-Coa Reductase Inhibitors      Shaking with simvastatin and atorvastatin     Azithromycin Rash       Patient verbalized understanding of the above teaching.    Tab Vasquez RN, BSN, PHN  Cardiovascular Nurse Clinician  [facility]  Direct: [phone]  [phone]  Email: [email or web address]

## 2021-06-10 NOTE — PROGRESS NOTES
"Liana Briceño is a 65 y.o. female who is being evaluated via a billable telephone visit.      The patient has been notified of following:     \"This telephone visit will be conducted via a call between you and your physician/provider. We have found that certain health care needs can be provided without the need for a physical exam.  This service lets us provide the care you need with a short phone conversation.  If a prescription is necessary we can send it directly to your pharmacy.  If lab work is needed we can place an order for that and you can then stop by our lab to have the test done at a later time.    Telephone visits are billed at different rates depending on your insurance coverage. During this emergency period, for some insurers they may be billed the same as an in-person visit.  Please reach out to your insurance provider with any questions.    If during the course of the call the physician/provider feels a telephone visit is not appropriate, you will not be charged for this service.\"    Patient has given verbal consent to a Telephone visit? Yes    What phone number would you like to be contacted at? [phone]    Patient would like to receive their AVS by AVS Preference: Mail a copy.    Additional provider notes:     [facility] Follow Up Note    Assessment/Plan:    1. Infection due to 2019 novel coronavirus  Patient had documented infection on July 30.  She will continue in isolation for total 14 days.  Her significant other was hospitalized and required ICU treatment.    2. Cough  Cough is currently persistent and we discussed that many people experience slow recovery from coronavirus including persistent cough.  She did have chest x-ray in the emergency room which was nonspecific for possible left lower lobe infiltrate, it was not a great study.  Patient actually reports that she has been having chest pains on the right side which has improved after she started on antibiotic and home " oxygen levels improved as well.  She only had 5 days out of it.  Currently since she has multiple comorbidities and is still has not gained her appetite back we will give her additional 5 days to complete a total 10-day course.  Because she does have mild bronchospasm will give her a Medrol Dosepak.    She has pulse oximeter at home and will continue monitoring her heart rate and oxygen levels at home.    - cefuroxime (CEFTIN) 250 MG tablet; Take 1 tablet (250 mg total) by mouth 2 (two) times a day for 5 days.  Dispense: 10 tablet; Refill: 0  - methylPREDNISolone (MEDROL DOSEPACK) 4 mg tablet; follow package directions  Dispense: 21 tablet; Refill: 0    3. Type 2 diabetes mellitus without complication, with long-term current use of insulin (H)  She is on Ozempic, longer short acting insulin.  Overall sugars have been well controlled.  Discussed that she may need slightly more insulin while on Medrol Dosepak, she will monitor her sugars closely.      Irene Bagley MD    Chief Complaint:  Chief Complaint   Patient presents with     Cough       History of Present Illness:  Liana is a 65 y.o. female with history of morbid obesity, obstructive sleep apnea, type 2 diabetes, high blood pressure, coronary artery disease, hiatal hernia and acid reflux who is currently here for acute telephone visit to follow-up on persistent symptoms since recent coronavirus infection.    Patient was diagnosed with coronavirus on July 30.  She had some fever and mild sore throat but not too many symptoms.  Her significant other actually had a lot of respiratory symptoms and ended up hospitalized in the ICU (he is currently doing better and still hospitalized by out of the ICU).  Subsequently patient developed increased shortness of breath and pain in her chest and low oxygen levels at home (he does have pulse oximeter).  Was seen in the [facility] emergency room.  Emergency room notes were reviewed: She did have a fever there at 101  Fahrenheit's, oxygen saturation was 94%.  Blood work showed a low white cell count at 3.2 and hemoglobin at 11.8, troponin level was negative, creatinine was 1.3.  Procalcitonin level was normal.  Portable x-ray showed normal pulmonary vascularity and mild atelectasis versus infiltrate in the left lung base, right lung is clear.  Patient was prescribed Levaquin but developed side effects to it and the following day was changed to cefuroxime and she completed 5 days of it.    Patient reports that since she completed antibiotic chest pain has resolved.  She has been monitoring her vital signs at home: Her oxygen level is 94%, heart rate is 79 and blood pressure is 123/57.  Overall she is feeling better however she still continues to have some wet cough with brownish mucus.  At times it is hard to bring up mucus.  She denies any nasal congestion or postnasal drainage.  No shortness of breath.  She does not have history of asthma or smoking.  She does have allergies to penicillin, doxycycline and erythromycin.    She is also diabetic and is on Ozempic once a week as well as insulin.  She is on long-acting insulins as well as short acting insulin 14 units 3 times a day.  She has not been checking her sugars but today random sugar was 130.  She has not been very hungry and appetite is still low.  She tells me that she lost about 20 pounds.  She has been trying to utilize protein shakes to prevent further weight loss.    Review of Systems:  A comprehensive review of systems was performed and was otherwise negative    PFSH:  Social History: Reviewed  Social History     Tobacco Use   Smoking Status Former Smoker     Packs/day: 0.25     Years: 44.00     Pack years: 11.00     Types: Cigarettes     Last attempt to quit: 1/20/2017     Years since quitting: 3.5   Smokeless Tobacco Never Used   Tobacco Comment    age 18 to age 62 up to 1/2 ppd     Social History     Social History Narrative    Single but lives with her significant  other, has one son, she is a .  She is a PCA.     Disability for LBP. Work injury.       Past History: Reviewed  Current Outpatient Medications   Medication Sig Dispense Refill     acetaminophen (TYLENOL) 500 MG tablet Take 1,000 mg by mouth 3 (three) times a day as needed.       allopurinol (ZYLOPRIM) 100 MG tablet Take 1 tablet (100 mg total) by mouth daily. 90 tablet 3     aspirin 81 MG EC tablet Take 81 mg by mouth daily.       blood glucose meter (FREESTYLE LITE METER) Use 1 each As Directed 6 (six) times a day. Dispense glucometer brand per patient's insurance at pharmacy discretion. 1 each 0     blood glucose test strips Use 1 each As Directed 6 (six) times a day. Prodigy Meter 300 strip 3     cetirizine (ZYRTEC) 10 MG tablet Take 1 tablet (10 mg total) by mouth daily. 30 tablet 2     cholecalciferol, vitamin D3, (CHOLECALCIFEROL) 1,000 unit tablet Take 2,000 Units by mouth daily.             cinnamon bark (CINNAMON ORAL) Take by mouth.       cyanocobalamin (VITAMIN B-12) 500 MCG tablet Take 500 mcg by mouth daily.       cyanocobalamin 250 MCG tablet Take 250 mcg by mouth daily.       cyclobenzaprine (FLEXERIL) 10 MG tablet Take 1 tablet (10 mg total) by mouth at bedtime as needed for muscle spasms. 21 tablet 1     diclofenac sodium (VOLTAREN) 1 % Gel Apply one thin layer over toe joint/ affected joint 1 Tube 0     flash glucose scanning reader (FREESTYLE MIRANDA 14 DAY READER) Misc Use 1 each As Directed daily. 1 each 1     flash glucose sensor (FREESTYLE MIRANDA 14 DAY SENSOR) Kit Use 1 each As Directed every 14 (fourteen) days. 2 kit 11     generic lancets (MICROLET LANCET) Use 1 each As Directed 6 (six) times a day. 300 each 3     HUMALOG KWIKPEN INSULIN 100 unit/mL pen INJECT 45 UNITS UNDER THE SKIN THREE TIMES DAILY 129 mL 0     hydroCHLOROthiazide (MICROZIDE) 12.5 mg capsule Take 1 capsule (12.5 mg total) by mouth daily. 90 capsule 3     HYDROcodone-acetaminophen 5-325 mg per tablet Take 1 tablet by  "mouth 2 (two) times a day as needed for pain. 40 tablet 0     insulin degludec (TRESIBA FLEXTOUCH U-200) 200 unit/mL (3 mL) InPn Inject 110 Units under the skin daily. 20 mL 11     lisinopril (PRINIVIL,ZESTRIL) 5 MG tablet Take 1.5 tablets (7.5 mg total) by mouth daily. 135 tablet 3     melatonin (MELATIN) 3 mg Tab tablet Take 3 mg by mouth at bedtime as needed.       nitroglycerin (NITROSTAT) 0.3 MG SL tablet Place 1 tablet (0.3 mg total) under the tongue every 5 (five) minutes as needed for chest pain. 100 tablet 3     nitroglycerin (NITROSTAT) 0.4 MG SL tablet Place 1 tablet (0.4 mg total) under the tongue every 5 (five) minutes as needed for chest pain. 25 tablet 3     omega-3 acid ethyl esters (LOVAZA) 1 gram capsule Take 2 capsules (2 g total) by mouth 2 (two) times a day. 360 capsule 3     omeprazole (PRILOSEC) 40 MG capsule TAKE 1 CAPSULE BY MOUTH EVERY DAY 90 capsule 3     ondansetron (ZOFRAN) 4 MG tablet Take 1 tablet (4 mg total) by mouth every 8 (eight) hours as needed for nausea. 12 tablet 1     ONETOUCH DELICA PLUS LANCET 33 gauge Misc USE TO TEST BLOOD SUGAR 6 TIMES DAILY 600 each 3     OZEMPIC 1 mg/dose (2 mg/1.5 mL) PnIj ADMINISTER 1 MG UNDER THE SKIN EVERY 7 DAYS 3 mL 12     pen needle, diabetic (BD ULTRA-FINE ZULMA PEN NEEDLE) 32 gauge x 5/32\" Ndle Inject 4 each under the skin daily. With Tresiba and Novolog 120 each 3     rosuvastatin (CRESTOR) 10 MG tablet TAKE 1 TABLET BY MOUTH DAILY 90 tablet 3     triamcinolone (KENALOG) 0.1 % cream Apply bid 30 g 0     VITAMIN B-1 100 mg tablet Take 1 tablet (100 mg total) by mouth daily. 90 tablet 3     cefuroxime (CEFTIN) 250 MG tablet Take 1 tablet (250 mg total) by mouth 2 (two) times a day for 5 days. 10 tablet 0     methylPREDNISolone (MEDROL DOSEPACK) 4 mg tablet follow package directions 21 tablet 0     No current facility-administered medications for this visit.        Family History: Reviewed    Physical Exam: Limited by telephone visit  General " Appearance:   Vital signs as per H&P  There were no vitals filed for this visit.  Wt Readings from Last 3 Encounters:   07/29/20 (!) 271 lb (122.9 kg)   07/28/20 (!) 270 lb (122.5 kg)   01/02/20 (!) 271 lb (122.9 kg)     There is no height or weight on file to calculate BMI.    Pleasant female, in no acute distress, awake alert and oriented, thought processes linear.  No respiratory distress while she is talking to me, no nasal congestion noted, I asked her to cough and there is slight tightness in her airway.    Data Review:    Analysis and Summary of Old Records (2): Yes including care everywhere    Records Requested (1): No    Other History Summarized (from other people in the room) (2): No    Radiology Tests Summarized (XRAY/CT/MRI/DXA) (1): Chest x-ray    Labs Reviewed (1): Yes    Medicine Tests Reviewed (EKG/ECHO/COLONOSCOPY/EGD) (1): No    Independent Review of EKG or X-RAY (2): No    Telephone visit start time 10:52 AM   Telephone visit end time: 11:19 AM   phone call duration: 27 minutes    Joy C Steinert, CMA

## 2021-06-10 NOTE — PATIENT INSTRUCTIONS - HE
Liana,    It was a pleasure to see you today at [facility].    My nurse or I will call you with the stress test results.     You will see Dr. Walton in two years.    Please call us if you have any questions or concerns about your heart.      Georges Walton M.D.  [facility]

## 2021-06-10 NOTE — PROGRESS NOTES
Spke with pt today she has been going through a lot, she had stent placed 3 weeks and now having chest pain. MD came in to discuss with me and asked me to call and get her into Cardiology ASAP, I did her appointment is tomorrow. She is now going to cardiac rehab also . Having problems with blood sugars, she sometimes does not eat well so we talk about this all time. Seeing Pharm D and she working on blood sugars also.

## 2021-06-10 NOTE — TELEPHONE ENCOUNTER
Wellness Screening Tool  Symptom Screening:  Do you have one of the following NEW symptoms:    Fever (subjective or >100.0)?  No    A new cough?  No    Shortness of breath?  No     Chills? No     New loss of taste or smell? No     Generalized body aches? No     New persistent headache? No     New sore throat? No     Nausea, vomiting, or diarrhea?  No    Within the past 3 weeks, have you been exposed to someone with a known positive illness below:    COVID-19 (known or suspected)?  No    Chicken pox?  No    Mealses?  No    Pertussis?  No    Patient notified of visitor policy- They may have one person accompany them to their appointment, but they will need to wear a mask and will be screened upon arrival for symptoms: Yes - sister will accompany pt to visit.  mg  Pt informed to wear a mask: Yes  Pt notified if they develop any symptoms listed above, prior to their appointment, they are to call the clinic directly at [phone] for further instructions.  Yes  Patient's appointment status: Patient will be seen in clinic as scheduled on Wed. 7-29-20 @ 0920 in [facility] clinic with Dr. Walton.  mg

## 2021-06-10 NOTE — PROGRESS NOTES
Pt stopped by  heart clinic with concerns of previous right upper arm pain. Pt had coronary angiogram with radial approach on 4/24/17 at [facility]. Pt states this morning the medial aspect of her right upper arm ached. Assessed pts right radial site which looks likes good. No bruising, hematoma, pulse is strong, CMS intact. Pts right upper arm did not have any discomfort at the time of evaluation. Arm is soft to touch and no signs of bruising or hematoma. Pt will continue to monitor pain and call pmd if it reoccurs. Patient verbalizes understanding and agrees with plan. No further questions or concerns at this time.

## 2021-06-10 NOTE — TELEPHONE ENCOUNTER
"Pt called stated she has been diagnosed covid 19 last Thursday. Pt is currently having  A temp of 100.1,100.9 and the highest was 102, 100.7 yeasterday. Pt is coughing up brown phlegm on and off. Pt stated she has diarrhea,chills and loss of appitate. Pt stated she has shortness of breath when going up and down the staires. Pt provided fallowing vital signs, 02 sat's are 87%, 89%, without oxygen,P 93, /54. Pt stated her pulse oximetry is not reliable. Pt deineid having difficulty breathing or chest pain or pressure. Pt has been taking 500 mg tylenol for fever since yesterday as needed.Kamila paged on call provider and received a return call from JASWANT Snyder. Provider was updated, and she recommended pt to go to the ER to be evaluated including shortness of breath. Writer called back pt informed her to go to the ER, pt stated \"I really don't want to go\".Writer educated pt about above symptomes, and that she needed to be evaluated. Writer heard pt telling Dr Fuentes  that her 02 sats are 92%, 93%. Pt stated she will go to [facility] ER. Writer called [facility] ER, and gave report to Dr Bashir.    Tito Spann,RN    COVID 19 Nurse Triage Plan/Patient Instructions    Please be aware that novel coronavirus (COVID-19) may be circulating in the community. If you develop symptoms such as fever, cough, or SOB or if you have concerns about the presence of another infection including coronavirus (COVID-19), please contact your health care provider or visit [email or web address].     Disposition/Instructions    ED Visit recommended. Follow protocol based instructions.  Pt has no transportation stated she will be calling 911.    Bring Your Own Device:  Please also bring your smart device(s) (smart phones, tablets, laptops) and their charging cables for your personal use and to communicate with your care team during your visit.      Thank you for taking steps to prevent the spread of this virus.  o Limit your contact with " others.  o Wear a simple mask to cover your cough.  o Wash your hands well and often.    Resources    [facility]: About COVID-19: [email or web address]    CDC: What to Do If You're Sick: www.cdc.gov/coronavirus/2019-ncov/about/steps-when-sick.html    CDC: Ending Home Isolation: www.cdc.gov/coronavirus/2019-ncov/hcp/disposition-in-home-patients.html     CDC: Caring for Someone: www.cdc.gov/coronavirus/2019-ncov/if-you-are-sick/care-for-someone.html     [facility]: Interim Guidance for Hospital Discharge to Home: www.[email or web address].[email or web address]/diseases/coronavirus/hcp/hospdischarge.pdf    [facility] clinical trials (COVID-19 research studies): [email or web address].[facility]-clinical-trials     Below are the COVID-19 hotlines at the [place] [facility] ([facility]). Interpreters are available.   o For health questions: Call 986-285-8769 or 1-456.619.8262 (7 a.m. to 7 p.m.)  o For questions about schools and childcare: Call 122-967-1165 or 1-501.313.1401 (7 a.m. to 7 p.m.)     Reason for Disposition    [1] Fever > 101 F (38.3 C) AND [2] age > 60    Additional Information    Negative: SEVERE difficulty breathing (e.g., struggling for each breath, speaks in single words)    Negative: Difficult to awaken or acting confused (e.g., disoriented, slurred speech)    Negative: Bluish (or gray) lips or face now    Negative: Shock suspected (e.g., cold/pale/clammy skin, too weak to stand, low BP, rapid pulse)    Negative: Sounds like a life-threatening emergency to the triager    Negative: [1] COVID-19 exposure AND [2] no symptoms    Negative: COVID-19 and Breastfeeding, questions about    Negative: [1] Adult with possible COVID-19 symptoms AND [2] triager concerned about severity of symptoms or other causes    Negative: SEVERE or constant chest pain or pressure (Exception: mild central chest pain, present only when coughing)    Negative: MODERATE difficulty breathing (e.g., speaks in phrases, SOB even at rest,  pulse 100-120)    Negative: Patient sounds very sick or weak to the triager    Negative: MILD difficulty breathing (e.g., minimal/no SOB at rest, SOB with walking, pulse <100)    Negative: Chest pain or pressure    Negative: Fever > 103 F (39.4 C)    Protocols used: CORONAVIRUS (COVID-19) DIAGNOSED OR [id number] 5.16.20

## 2021-06-10 NOTE — PROGRESS NOTES
Community Health Worker called and left a message for the patient.  If the patient is returning my call, please transfer the patient to [facility] at ext. [phone].   Patient has been mailed a unreachable letter and was provided with CHW contact information if they are interested in accessing Clinic Care Coordination.  Order for Care Management has been closed, no further outreach will be done at this time and patient can be re-referred.       Clinic Care Coordination Contact  [facility]       Clinical Data: Care Coordinator Outreach  Outreach attempted x 1.  CHW attempted to reach patient to discuss CCC referral- NO answer or VM option   Care Coordinator will try to reach patient again in 3-5 business days.  8/10

## 2021-06-10 NOTE — TELEPHONE ENCOUNTER
RN Triage:     Day 10 of covid19 quarantine.     NO BM for 4 days. Taking tylenol for fever. Temp 98.7 now. 99.3 -99.5 for the last several days on Tylenol.   Not eating a lot of food per her report. Ate soup, watermelon and mashed potatoes and gravy.   1/2 cheeseburger  Drinking lots of fluids. Gatorade and water.  Her son is helping care for her leaving food outside the door.   She had a lot of diarrhea at the beginning of COVID19. No black or tarry stools. She is belching and reported some abdominal bloating. No pain in the abdomen. Taking tylenol, zofran and antibiotic cefuroxime.   No shortness of breath, no difficulty breathing.   Cough is still present, bringing up sputum.     Her significant other is hospitalized at [facility], he is doing better which made the patient happy.     Advised a televist tomorrow to discuss her constipation and bloating. Transferred to scheduling.     Juana Tse RN, BSN Care Connection Triage Nurse           Reason for Disposition    Last bowel movement (BM) > 4 days ago    Abdomen is more swollen than usual    Additional Information    Negative: [1] Abdomen pain is main symptom AND [2] male    Negative: [1] Abdomen pain is main symptom AND [2] adult female    Negative: Rectal bleeding or blood in stool is main symptom    Negative: Rectal pain or itching is main symptom    Negative: Constipation in a cancer patient who is currently (or recently) receiving chemotherapy or radiation therapy, or cancer patient who has metastatic or end-stage cancer and is receiving palliative care    Negative: Patient sounds very sick or weak to the triager    Negative: [1] Vomiting AND [2] abdomen looks much more swollen than usual    Negative: [1] Vomiting AND [2] contains bile (green color)    Negative: [1] Constant abdominal pain AND [2] present > 2 hours    Negative: [1] Rectal pain or fullness from fecal impaction (rectum full of stool) AND [2] NOT better after SITZ bath, suppository or  "enema    Negative: [1] Intermittent mild abdominal pain AND [2] fever    Answer Assessment - Initial Assessment Questions  1. STOOL PATTERN OR FREQUENCY: \"How often do you pass bowel movements (BMs)?\"  (Normal range: tid to q 3 days)  \"When was the last BM passed?\"        BM daily last one was 4 days ago.   2. STRAINING: \"Do you have to strain to have a BM?\"       Yes  3. RECTAL PAIN: \"Does your rectum hurt when the stool comes out?\" If so, ask: \"Do you have hemorrhoids? How bad is the pain?\"  (Scale 1-10; or mild, moderate, severe)      NO  4. STOOL COMPOSITION: \"Are the stools hard?\"       Diarrhea was the last BM.   5. BLOOD ON STOOLS: \"Has there been any blood on the toilet tissue or on the surface of the BM?\" If so, ask: \"When was the last time?\"       NO   6. CHRONIC CONSTIPATION: \"Is this a new problem for you?\"  If no, ask: \"How long have you had this problem?\" (days, weeks, months)       New as she was taking tylenol for pain.   7. CHANGES IN DIET: \"Have there been any recent changes in your diet?\"       Not eating as she is nauseated.   8. MEDICATIONS: \"Have you been taking any new medications?\"      Tylenol, taking antibiotics ceferoxime 250 mg, taking zofran 4mg  9. LAXATIVES: \"Have you been using any laxatives or enemas?\"  If yes, ask \"What, how often, and when was the last time?\"      NO  10. CAUSE: \"What do you think is causing the constipation?\"         Medication changes  11. OTHER SYMPTOMS: \"Do you have any other symptoms?\" (e.g., abdominal pain, fever, vomiting)        COVID19  12. PREGNANCY: \"Is there any chance you are pregnant?\" \"When was your last menstrual period?\"        No    Protocols used: CONSTIPATION-A-AH      "

## 2021-06-10 NOTE — PROGRESS NOTES
ASSESSMENT:  1.  Abdominal pain:  Causes uncertain.  She just completed Levaquin 2 days ago.  Some of her symptoms could be related to the antibiotic.  She has had previous cholecystectomy.  Labs will be done to screen for liver enzyme abnormalities and pancreatitis.  Her recent GI illness seemed infectious.  Those  symptoms have resolved.  She is already on high-dose omeprazole.  A short course of Carafate will be added since at least part of her symptoms seem to be related to worsening reflux  2.  Bronchitis with bronchospasm:  His symptoms have improved  3.  Type 2 diabetes:  She noted a marked spike in blood sugars with her recent prednisone use as expected.  Blood sugars are beginning to decrease.  Her hemoglobin A1c was somewhat above goal at 8.3 in February  PLAN:  1.  Labs as outlined.  She'll be notified of test results  2.  No further antibiotics seem indicated.  No need to extend prednisone.  3.  Continue omeprazole.  Add Sulcrafate 1 g 4 times daily for 2 weeks.  Report effect on GI symptoms at that time  4.  See Dr. Fuentes as planned for follow-up of type 2 diabetes        ASSESSED PROBLEMS:  No diagnosis found.    CHIEF COMPLAINT:  Chief Complaint   Patient presents with     Fatigue       HISTORY OF PRESENT ILLNESS:  Liana is a 61 y.o. female presenting to the clinic today for follow up for upper respiratory illness and abdominal pain. She reports that 2 weeks ago, she was shopping at [employer] and got some sticky substance on her hands from a package of meat. The substance had a chemical smell like ammonia. She forgot to wash her hands before she ate french fries in the car. 45 minutes later she began to vomit and vomited 8 times until 3:30 in the morning. She then developed chills and diarrhea and then upper respiratory symptoms with cough. She saw Dr. Fuentes at the clinic and was prescribed levofloxacin, prednisone, cough suppressant with codeine, and was instructed to use her albuterol inhaler. She  has finished the antibiotic and the prednisone. She is improved though she continues to cough.     She presents to the clinic today for symptoms of abdominal bloating, esophageal pain, and leg pain. The pain is in both legs in her upper thighs and is described as aching with occasional burning, shooting pain. The pain radiates to her hips. On Saturday, she felt the shooting pain constantly and did take a Vicodin for relief. The discomfort in her chest radiates to her back and is not associated with exertion. She has had no additional vomiting or diarrhea. She has a history of gall bladder removal and does have heartburn for which she takes omeprazole 40mg daily.     Type 2 Diabetes: Her blood sugars have spiked with prednisone use.     REVIEW OF SYSTEMS:   She also reports a long-time intermittent rash on her back. She attributes this to changing soaps. All other systems are negative.    PFSH:      TOBACCO USE:  History   Smoking Status     Former Smoker     Packs/day: 0.25     Years: 40.00     Quit date: 1/24/2016   Smokeless Tobacco     Not on file     Comment: Reports 1 cigarette a day now       VITALS:  Vitals:    04/17/17 1407   BP: 134/60   Patient Site: Left Arm   Patient Position: Sitting   Cuff Size: Adult Large   Pulse: 86   Temp: 97.8  F (36.6  C)   TempSrc: Tympanic   SpO2: 98%     Wt Readings from Last 3 Encounters:   04/05/17 (!) 252 lb 9.6 oz (114.6 kg)   02/08/17 (!) 248 lb 14.4 oz (112.9 kg)   02/06/17 (!) 250 lb 4.8 oz (113.5 kg)     There is no height or weight on file to calculate BMI.    PHYSICAL EXAM:  Constitutional:   Reveals an alert talkative obese woman with an intermittent cough. Does not appear in respiratory distress. Vitals: per nursing notes.  Neck:  Supple, no carotid bruits or adenopathy.  Back:  No spine or CVA pain.  Lungs: Clear to A&P without rales or wheezes.  Respiratory effort normal.  Cardiac:   Regular rate and rhythm, normal S1, S2, no murmur.  Abdomen: Obese. Active bowel  sounds. Mild tenderness in right abdomen and left lower abdomen. No rebound, guarding, or mass.   Skin:  Healing eruption in upper rectal area posteriorly. No active vesicles currently.       ADDITIONAL HISTORY SUMMARIZED (2): Reviewed note of Dr. Fuentes, 4/5/17.   DECISION TO OBTAIN EXTRA INFORMATION (1): ISAIAH for Care Everywhere obtained.   RADIOLOGY TESTS (1): None.  LABS (1): Labs ordered.   MEDICINE TESTS (1): None.  INDEPENDENT REVIEW (2 each): None.     The visit lasted a total of 18 minutes face to face with the patient. Over 50% of the time was spent counseling and educating the patient about abdominal discomfort.    I, Jose Raul Ley, am scribing for and in the presence of, Dr. Moe.    I, Dr. Moe, personally performed the services described in this documentation, as scribed by Jose Raul Ley in my presence, and it is both accurate and complete.    Dragon dictation was used for this note.  Speech recognition errors are a possibility.    MEDICATIONS:  Current Outpatient Prescriptions   Medication Sig Dispense Refill     aspirin 81 MG EC tablet Take 81 mg by mouth daily.       blood sugar diagnostic Strp Use 1 each As Directed 3 (three) times a day. (Patient taking differently: Use 1 each As Directed 2 (two) times a day. ) 200 strip 12     cholecalciferol, vitamin D3, (VITAMIN D3) 2,000 unit Tab Take 1,000 Units by mouth daily.        codeine-guaiFENesin (GUAIFENESIN AC)  mg/5 mL liquid Take 10 mL by mouth 2 (two) times a day as needed for cough. 240 mL 0     fluconazole (DIFLUCAN) 150 MG tablet TAKE 1 TABLET BY MOUTH ONCE PER WEEK FOR 3 WEEKS 3 tablet 0     hydroCHLOROthiazide (MICROZIDE) 12.5 mg capsule Take 1 capsule (12.5 mg total) by mouth daily. 90 capsule 1     HYDROcodone-acetaminophen 5-325 mg per tablet Take 1 tablet by mouth every 4 (four) hours as needed for pain. 60 tablet 0     insulin degludec (TRESIBA FLEXTOUCH U-200) 200 unit/mL (3 mL) InPn Inject 110 Units under the skin daily. 18  "mL 11     metoprolol succinate (TOPROL-XL) 50 MG 24 hr tablet TAKE 1 TABLET(50 MG) BY MOUTH DAILY 90 tablet 1     NOVOLOG FLEXPEN 100 unit/mL injection pen INJECT 14 UNITS UNDER THE SKIN EVERY MORNING, 12 UNITS AT NOON, AND 10 UNITS AT BEDTIME 30 mL 0     omeprazole (PRILOSEC) 40 MG capsule TAKE ONE CAPSULE BY MOUTH EVERY DAY 90 capsule 1     pen needle, diabetic (BD ULTRA-FINE ZULMA PEN NEEDLES) 32 gauge x 5/32\" Ndle Inject 1 each under the skin daily. With Tresiba 100 each 3     traZODone (DESYREL) 50 MG tablet Take about 1.5 hours before desired sleep 30 tablet 1     No current facility-administered medications for this visit.        Total data points: 4      "

## 2021-06-10 NOTE — TELEPHONE ENCOUNTER
Spoke with pt to get update.  Pt also had vv with walk in clinic today. Pt reports she no longer has a fever.  Pt reports she still mcintosh a bad cough. Pt c/o of nausea.  Pt took zofran as needed which helped, but pt has not taken it recently as she is worried it is causing constipation.  Pt has been able to eat chicken noodle soup and has been drinking tea, Gatorade, and water. Pt is feeling a little better, but is still extremely fatigued.    Pt also reports her significant other was moved out of ICU over the weekend.  Pt does not know when he's going to be discharged.  PT does have concerns about when he does come home how to protect themselves from each other from getting worse.

## 2021-06-10 NOTE — PROGRESS NOTES
Patient would like to be contacted via the following phone number [phone]     ASSESSMENT:    2019 novel coronavirus disease (COVID-19)  Diagnosed on .  Afebrile for approximately 5 days.  Taste resuming.  Still with dry cough and some sinus symptoms.  Recommendation: Please take Medrol Dosepak as was recommended by Dr. THEODORE.  This will help with dry cough and sinus inflammation.  Continue to push fluids and eat healthy foods.  In person follow-up in 2 weeks    2. Type 2 diabetes mellitus with hyperglycemia, with long-term current use of insulin (H)  20 pound weight loss with coronavirus.  Diabetes is stable.  Fasting blood sugars in the 120s    3. RADHA on CPAP  Not using CPAP    4. Morbid obesity with BMI of 40.0-44.9, adult (H)  20 pound weight loss with corona    5. Essential hypertension  Stable with blood pressure at 119/61        Preventive Health Care: Due for Pap smear    PLAN:  There are no Patient Instructions on file for this visit.    No follow-ups on file.      CHIEF COMPLAINT:  Chief Complaint   Patient presents with     Follow-up     covid positive on 20       HISTORY OF PRESENT ILLNESS:  Liana is a 65 y.o. female contacting the clinic today via phone for follow-up after having contracted coronavirus on .  She was diagnosed on that day.  This likely occurred as she attended the  of her cousin son in [place].  There were 400 people present.  She and her significant other both had been quite sick.  Of note, she did not require hospitalization or rammed a severe.  She did not require anticoagulation.  Currently, she states the cough has improved.  She was coughing up brown phlegm that improved with a course of Ceftin.  She did not take the Medrol Dosepak that was recommended.  Her symptoms today include a dry cough and sinus congestion with rhinorrhea.  She states her sense of smell and taste are slowly returning.  However, her appetite is decreased.  She has not had a fever  in 5 days.  She denies any diarrhea or constipation.  She describes significant fatigue.    Her blood sugars are running in the 120s in the a.m.  She denies polyuria or polydipsia    REVIEW OF SYSTEMS:     All other systems are negative.    PFSH:  Social History     Social History Narrative    Single but lives with her significant other, has one son, she is a .  She is a PCA.     Disability for LBP. Work injury.     She is in a common-law marriage.    TOBACCO USE:  Social History     Tobacco Use   Smoking Status Former Smoker     Packs/day: 0.25     Years: 44.00     Pack years: 11.00     Types: Cigarettes     Last attempt to quit: 1/20/2017     Years since quitting: 3.5   Smokeless Tobacco Never Used   Tobacco Comment    age 18 to age 62 up to 1/2 ppd       VITALS:  There were no vitals filed for this visit.  Wt Readings from Last 3 Encounters:   07/29/20 (!) 271 lb (122.9 kg)   07/28/20 (!) 270 lb (122.5 kg)   01/02/20 (!) 271 lb (122.9 kg)       PHYSICAL EXAM:  (observations via Phone)  Voice is raspy but she does not sound short of breath.  Oxygen saturations reportedly over 92%.  Blood pressure 119/61      ADDITIONAL HISTORY SUMMARIZED (2):  emergency room records from [facility] reviewed/chart messages and Dr. THEODORE's note reviewed  CARE EVERYWHERE/ EXTRA INFORMATION (1):     RADIOLOGY TESTS (1): Viewed chest x-ray from [facility]  LABS (1):   CARDIOLOGY/MEDICINE TESTS (1):   INDEPENDENT REVIEW (2 each):     Total data points: 5    Phone Start time: 115  Phone End time: 126    The visit lasted a total of 11 minutes     CA Intake Time: 8    I have reviewed and updated the patient's Past Medical History, Social History, Family History as appropriate.    MEDICATIONS: Reviewed and updated per CA and MD  Current Outpatient Medications   Medication Sig Dispense Refill     acetaminophen (TYLENOL) 500 MG tablet Take 1,000 mg by mouth 3 (three) times a day as needed.       allopurinol (ZYLOPRIM) 100 MG tablet Take 1 tablet  (100 mg total) by mouth daily. 90 tablet 3     aspirin 81 MG EC tablet Take 81 mg by mouth daily.       blood glucose meter (FREESTYLE LITE METER) Use 1 each As Directed 6 (six) times a day. Dispense glucometer brand per patient's insurance at pharmacy discretion. 1 each 0     blood glucose test strips Use 1 each As Directed 6 (six) times a day. Prodigy Meter 300 strip 3     cefuroxime (CEFTIN) 250 MG tablet Take 1 tablet (250 mg total) by mouth 2 (two) times a day for 5 days. 10 tablet 0     cetirizine (ZYRTEC) 10 MG tablet Take 1 tablet (10 mg total) by mouth daily. 30 tablet 2     cholecalciferol, vitamin D3, (CHOLECALCIFEROL) 1,000 unit tablet Take 2,000 Units by mouth daily.             cinnamon bark (CINNAMON ORAL) Take by mouth.       cyanocobalamin (VITAMIN B-12) 500 MCG tablet Take 500 mcg by mouth daily.       cyanocobalamin 250 MCG tablet Take 250 mcg by mouth daily.       cyclobenzaprine (FLEXERIL) 10 MG tablet Take 1 tablet (10 mg total) by mouth at bedtime as needed for muscle spasms. 21 tablet 1     diclofenac sodium (VOLTAREN) 1 % Gel Apply one thin layer over toe joint/ affected joint 1 Tube 0     flash glucose scanning reader (FREESTYLE MIRANDA 14 DAY READER) Misc Use 1 each As Directed daily. 1 each 1     flash glucose sensor (FREESTYLE MIRANDA 14 DAY SENSOR) Kit Use 1 each As Directed every 14 (fourteen) days. 2 kit 11     generic lancets (MICROLET LANCET) Use 1 each As Directed 6 (six) times a day. 300 each 3     HUMALOG KWIKPEN INSULIN 100 unit/mL pen INJECT 45 UNITS UNDER THE SKIN THREE TIMES DAILY 129 mL 0     hydroCHLOROthiazide (MICROZIDE) 12.5 mg capsule Take 1 capsule (12.5 mg total) by mouth daily. 90 capsule 3     HYDROcodone-acetaminophen 5-325 mg per tablet Take 1 tablet by mouth 2 (two) times a day as needed for pain. 40 tablet 0     insulin degludec (TRESIBA FLEXTOUCH U-200) 200 unit/mL (3 mL) InPn Inject 110 Units under the skin daily. 20 mL 11     lisinopril (PRINIVIL,ZESTRIL) 5 MG  "tablet Take 1.5 tablets (7.5 mg total) by mouth daily. 135 tablet 3     melatonin (MELATIN) 3 mg Tab tablet Take 3 mg by mouth at bedtime as needed.       methylPREDNISolone (MEDROL DOSEPACK) 4 mg tablet follow package directions 21 tablet 0     nitroglycerin (NITROSTAT) 0.3 MG SL tablet Place 1 tablet (0.3 mg total) under the tongue every 5 (five) minutes as needed for chest pain. 100 tablet 3     nitroglycerin (NITROSTAT) 0.4 MG SL tablet Place 1 tablet (0.4 mg total) under the tongue every 5 (five) minutes as needed for chest pain. 25 tablet 3     omega-3 acid ethyl esters (LOVAZA) 1 gram capsule Take 2 capsules (2 g total) by mouth 2 (two) times a day. 360 capsule 3     omeprazole (PRILOSEC) 40 MG capsule TAKE 1 CAPSULE BY MOUTH EVERY DAY 90 capsule 3     ondansetron (ZOFRAN) 4 MG tablet Take 1 tablet (4 mg total) by mouth every 8 (eight) hours as needed for nausea. 12 tablet 1     ONETOUCH DELICA PLUS LANCET 33 gauge Misc USE TO TEST BLOOD SUGAR 6 TIMES DAILY 600 each 3     OZEMPIC 1 mg/dose (2 mg/1.5 mL) PnIj ADMINISTER 1 MG UNDER THE SKIN EVERY 7 DAYS 3 mL 12     pen needle, diabetic (BD ULTRA-FINE ZULMA PEN NEEDLE) 32 gauge x 5/32\" Ndle Inject 4 each under the skin daily. With Tresiba and Novolog 120 each 3     rosuvastatin (CRESTOR) 10 MG tablet TAKE 1 TABLET BY MOUTH DAILY 90 tablet 3     triamcinolone (KENALOG) 0.1 % cream Apply bid 30 g 0     VITAMIN B-1 100 mg tablet Take 1 tablet (100 mg total) by mouth daily. 90 tablet 3     No current facility-administered medications for this visit.        The patient has been notified of following:     \"This telephone visit will be conducted via a call between you and your physician/provider. We have found that certain health care needs can be provided without the need for a physical exam.  This service lets us provide the care you need with a short phone conversation.  If a prescription is necessary we can send it directly to your pharmacy.  If lab work is needed " "we can place an order for that and you can then stop by our lab to have the test done at a later time.    Telephone visits are billed at different rates depending on your insurance coverage. During this emergency period, for some insurers they may be billed the same as an in-person visit.  Please reach out to your insurance provider with any questions.    If during the course of the call the physician/provider feels a telephone visit is not appropriate, you will not be charged for this service.\"    Patient has given verbal consent to a Telephone visit? Yes    Patient would like to receive their AVS by AVS Preference: Mail a copy.    Frances Michel CMA      "

## 2021-06-10 NOTE — TELEPHONE ENCOUNTER
Can we send her the lates [facility] guidelines for coming out of isolation post covid.  I am also sure that her partner will have discharge instructions as well.  They both contracted it at the same time.  Their son needs to be protected and very careful as they were doing before.

## 2021-06-10 NOTE — PROGRESS NOTES
"Liana Briceño is a 65 y.o. female who is being evaluated via a billable telephone visit.      The patient has been notified of following:     \"This telephone visit will be conducted via a call between you and your physician/provider. We have found that certain health care needs can be provided without the need for a physical exam.  This service lets us provide the care you need with a short phone conversation.  If a prescription is necessary we can send it directly to your pharmacy.  If lab work is needed we can place an order for that and you can then stop by our lab to have the test done at a later time.    Telephone visits are billed at different rates depending on your insurance coverage. During this emergency period, for some insurers they may be billed the same as an in-person visit.  Please reach out to your insurance provider with any questions.    If during the course of the call the physician/provider feels a telephone visit is not appropriate, you will not be charged for this service.\"    Patient has given verbal consent to a Telephone visit? Yes    What phone number would you like to be contacted at? cell    Patient would like to receive their AVS by AVS Preference: Diana.    Patient seeks telephone visit consultation today with regard to some ongoing issues with cough, constipation, abdominal bloating, and other problems she has experienced since being diagnosed with COVID-19 approximately 10 days ago.    She presented to the ED with a cough and fever.  She was diagnosed with COVID-19.  X-ray revealed a suspicious infiltrate so she was started on Levaquin.  She had issues with nausea after starting the Levaquin and was switched to cefuroxime.    She has about 1 day of the cefuroxime left.  She continues to have an intermittent cough, lightish brown sputum at times.  She is using Tylenol around-the-clock; her temperature this morning was 98.6  F.  Some night sweats.    She denies any issues with " respiratory distress currently.  She will feel mildly short of breath at times but does not feel like it is a struggle to breathe.  Her significant other is hospitalized at [facility] right now and is set to come home within the next couple of days; he was diagnosed with COVID-19 as well.    They think they were exposed to COVID-19 after going to a  a couple of weeks ago for her nephew.    She was using some Zofran for some nausea that she was having but stopped taking that because she was concerned it was causing issues with constipation.  She has not had a bowel movement in about 4 days.  She admits that she is not eating very much.  She is doing chicken noodle soup right now.  Trying to drink lots of Gatorade and water.    She checked her blood pressure yesterday and saw that her systolic measurement was around 90.  She reduced her lisinopril on her own.    She checked her blood sugar a few days ago and saw it was down to 80.  She reduced both her Elham but and NovoLog insulins.    Her son is helping her at home with food.  She is trying to rest as best she can.  She wonders if she needs more cefuroxime antibiotic.  She wants to know what she should do with her medications and if she should make any further changes based on her symptoms    Assessment/Plan:  1. Infection due to 2019 novel coronavirus  I do not think she needs any more antibiotics.  I explained my rationale for this.  If anything, she may start to feel better in a GI perspective after she completes her last dose of antibiotic today.  I told her to stick to chicken noodle soup and to continue with Tylenol.  We talked about warning/red flag symptoms that she should be mindful of and when she should proceed to the ED for urgent evaluation.  She does not appear to be in respiratory distress today.    2. Constipation, unspecified constipation type  No concerns for obstruction based on our conversation today.  She may start to feel better  after she completes her antibiotic and works up a better appetite.  Frankly, she is not eating much right now.  We talked about pushing fluids, and holding her hydrochlorothiazide for a few days.    3. Type 2 diabetes mellitus with hyperglycemia, with long-term current use of insulin (H)  She reduced her Elham been NovoLog on her own and is in blood sugars around 130.  She was told to resume her normal dosing once her diet and appetite normalized    4. Essential hypertension  I advised that she should try holding her hydrochlorothiazide for a few days while her oral intake improves.  I think it would be a good idea for her to check in with her PCP early next week to make sure that the things are improving.  I told her that it may take another week for her to get back to some sort of normalcy, but that it may take quite a bit longer for her to get back to her baseline completely        Phone call duration:  20 minutes    Ariane Moe MA

## 2021-06-10 NOTE — TELEPHONE ENCOUNTER
First, the CT of the arteries of the neck show that the blockage is not as severe as seen as on the ultrasound, none the less , we will get that surgical opinion as ordered.    Secondly, the kidney function is stable on the labs.    Thirdly, I will order an antibiotic for the groin cyst,  If not better in 48 hours, I need to examine in person.  (will need 40min).

## 2021-06-10 NOTE — PROGRESS NOTES
[facility] Follow Up Note    Assessment/Plan:  1. Joint pain  Back and lower extremity myalgias and muscle spasm.  Slowly improving.  Patient is convinced this is secondary to the 5 day course of prednisone that was provided for her significant bronchospasm.  Additional consideration would be for the Levaquin that was administered concurrently.  It is hard to say.  Nonetheless, it appears to be subsiding.  Recommendation: We will renew pain medication.  Have encouraged her to push fluids, do light stretching and rest  - HYDROcodone-acetaminophen 5-325 mg per tablet; Take 1 tablet by mouth every 4 (four) hours as needed for pain.  Dispense: 60 tablet; Refill: 0    2.  Follow-up abdominal pain  Right mid abdominal discomfort with worsening of GE reflux.  Started on sucralfate 2 days ago.  Tolerating food and fluids.  Seems to be slowly improving  Recommendation: Continue to eat small portions.  Focus on liquids more than solids.  Refrain from caffeine and dairy products for the next week.  Take Carafate as directed.  Continue on omeprazole.  Sleep with head of bed elevated.  Avoid eating 2 hours before bedtime.  If pain does not gradually dissipate, consider imaging.  Please note, however, that imaging was normal in the fall for nonspecific GI symptoms.    3.  Type 2 diabetes mellitus on insulin  6 hyperglycemia noted secondary to recent illness and prednisone use    Mimi Fuentes MD    Chief Complaint:  Chief Complaint   Patient presents with     Follow-up       History of Present Illness:  Liana is a 61 y.o. female who is here today for follow-up with regard to abdominal discomfort.  Please refer to Dr. Truong's note dated Monday of this week.  Patient presented with diffuse abdominal pain.  It follows an office visit in early April for a diarrheal illness and bronchitis.  She was treated at that time for bronchitis with significant reactive airways with Levaquin and prednisone.  She was  improving from a respiratory standpoint.  Approximately 36 hours after last doses of both prednisone and Levaquin, she developed myalgias and abdominal discomfort.  She is fairly convinced that it is secondary to prednisone usage and withdrawal.  Her complaints are abdominal bloating with pain that seems to be concentrated more on the right side as opposed to the left.  She had associated significant reflux that was clearly positional in relation.  She states if she lies down the reflux is quite severe.  It presents as an acidic burning pain that travels up her esophagus into the left.  If she sits up, the pain dissipates.  She had the lower extremity myalgias and back pain as well.  Of note, her history is significant for lumbar disc disease with chronic back pain.  She does report that of Vicodin relieved all of her symptoms.  She does report that she had not been taking it along with the codeine that she was using for cough.  She denies associated constipation.  She denied any further emesis with the exception of the emesis that she experienced in early April.  She states she has been moving her bowels regularly.    She denies any fever or chills    Review of Systems:  A comprehensive review of systems was performed and was otherwise negative    PFSH:  Social History: She lives with her significant other and son.  She is a non-smoker  History   Smoking Status     Former Smoker     Packs/day: 0.25     Years: 40.00     Quit date: 1/24/2016   Smokeless Tobacco     Not on file     Comment: Reports 1 cigarette a day now       Past History: Significant for poorly controlled type 2 diabetes, likely gastrointestinal paresis from poorly controlled diabetes, irritable bowel    Current Outpatient Prescriptions   Medication Sig Dispense Refill     aspirin 81 MG EC tablet Take 81 mg by mouth daily.       blood sugar diagnostic Strp Use 1 each As Directed 3 (three) times a day. (Patient taking differently: Use 1 each As  "Directed 2 (two) times a day. ) 200 strip 12     cholecalciferol, vitamin D3, (VITAMIN D3) 2,000 unit Tab Take 1,000 Units by mouth daily.        codeine-guaiFENesin (GUAIFENESIN AC)  mg/5 mL liquid Take 10 mL by mouth 2 (two) times a day as needed for cough. 240 mL 0     hydroCHLOROthiazide (MICROZIDE) 12.5 mg capsule Take 1 capsule (12.5 mg total) by mouth daily. 90 capsule 1     insulin degludec (TRESIBA FLEXTOUCH U-200) 200 unit/mL (3 mL) InPn Inject 110 Units under the skin daily. 18 mL 11     metoprolol succinate (TOPROL-XL) 50 MG 24 hr tablet TAKE 1 TABLET(50 MG) BY MOUTH DAILY 90 tablet 1     NOVOLOG FLEXPEN 100 unit/mL injection pen INJECT 14 UNITS UNDER THE SKIN EVERY MORNING, 12 UNITS AT NOON, AND 10 UNITS AT BEDTIME 30 mL 0     omeprazole (PRILOSEC) 40 MG capsule TAKE ONE CAPSULE BY MOUTH EVERY DAY 90 capsule 1     pen needle, diabetic (BD ULTRA-FINE ZULMA PEN NEEDLES) 32 gauge x 5/32\" Ndle Inject 1 each under the skin daily. With Tresiba 100 each 3     sucralfate (CARAFATE) 1 gram tablet Take 1 tablet (1 g total) by mouth 4 (four) times a day. 120 tablet 1     traZODone (DESYREL) 50 MG tablet Take about 1.5 hours before desired sleep 30 tablet 1     albuterol (PROAIR HFA;PROVENTIL HFA;VENTOLIN HFA) 90 mcg/actuation inhaler Inhale 2 puffs every 6 (six) hours as needed for wheezing. 1 Inhaler 1     HYDROcodone-acetaminophen 5-325 mg per tablet Take 1 tablet by mouth every 4 (four) hours as needed for pain. 60 tablet 0     No current facility-administered medications for this visit.        Family History: Nothing new.  Family members did not have a similar illness    Physical Exam:  General Appearance:   The initially is angry.  She is angry that she was placed on prednisone for reactive airways disease.  Anger dissipates with the appointment  Vitals:    04/19/17 1317   BP: 136/54   Patient Site: Left Arm   Patient Position: Sitting   Cuff Size: Adult Large   Pulse: 88     Wt Readings from Last 3 " Encounters:   04/05/17 (!) 252 lb 9.6 oz (114.6 kg)   02/08/17 (!) 248 lb 14.4 oz (112.9 kg)   02/06/17 (!) 250 lb 4.8 oz (113.5 kg)     There is no height or weight on file to calculate BMI.    Head neck exam is unremarkable  Lungs are with an occasional scattered rhonchi but bronchospasm is completely resolved  Cardiac exam reveals regular rate and rhythm with no murmurs or gallops  Abdomen is obese.  There is some right-sided tenderness to deep palpation.  There is no rebound or distention    Data Review:    Analysis and Summary of Old Records (2): Reviewed Dr. Truong's note    Records Requested (1): 0      Other History Summarized (from other people in the room) (2): 0    Radiology Tests Summarized (XRAY/CT/MRI/DXA) (1): 0    Labs Reviewed (1): Viewed labs    Medicine Tests Reviewed (EKG/ECHO/COLONOSCOPY/EGD) (1): Reviewed colonoscopy from the past    Independent Review of EKG or X-RAY (2):

## 2021-06-10 NOTE — TELEPHONE ENCOUNTER
Medication Question or Clarification  Who is calling: Patient   What medication are you calling about (include dose and sig)?:    Disp  Refills  Start  End  BORA    cefuroxime (CEFTIN) 250 MG tablet  10 tablet  0  8/5/2020  8/10/2020  --    Sig - Route: Take 1 tablet (250 mg total) by mouth 2 (two) times a day for 5 days. - Oral    Sent to pharmacy as: cefuroxime axetil 250 mg tablet (CEFTIN)    E-Prescribing Status: Receipt confirmed by pharmacy (8/5/2020  1:57 PM CDT)      Who prescribed the medication?: Isak Crum MD  What is your question/concern?: Patient is questioning if she should start this medication today or tomorrow. Patient would like a call back  Requested [facility]acy: [id number]  Okay to leave a detailed message?: Yes  [phone]

## 2021-06-10 NOTE — TELEPHONE ENCOUNTER
RN cannot approve Refill Request    RN can NOT refill this medication med is not covered by policy/route to provider. Last office visit: 1/28/2020 Mimi Fuentes MD Last Physical: 7/28/2020 Last MTM visit: Visit date not found Last visit same specialty: 1/28/2020 Mimi Fuentes MD.  Next visit within 3 mo: Visit date not found  Next physical within 3 mo: Visit date not found      Amy Velez, [facility] Triage/Med Refill 8/27/2020    Requested Prescriptions   Pending Prescriptions Disp Refills     OZEMPIC 1 mg/dose (2 mg/1.5 mL) PnIj [Pharmacy Med Name: OZEMPIC 1MG PER DOSE (2X2MG PEN)] 3 mL 12     Sig: INJECT 1 MG UNDER THE SKIN EVERY 7 DAYS       There is no refill protocol information for this order

## 2021-06-10 NOTE — PROGRESS NOTES
Medication Therapy Management Follow Up Visit     ASSESSMENT AND PLAN  1. Type 2 diabetes mellitus with other specified complication, with long-term current use of insulin  Based on daily blood sugars her diabetes continues to be poorly controlled.  She has been titrating her basal and bolus insulin however her sugars do not seem to change.  Regardless of 124 units of basal insulin daily her fasting sugars continue to be in the 200s.  Discussed the imbalance between her bolus insulin and basal insulin total daily doses.  Recommend continue current dose of basal insulin at 124 units daily, and titrate mealtime insulin to 15 units 3 times daily.  She is likely a very low risk of hypoglycemia based on her blood sugars have been throughout the day.  She may also becoming increasingly more insulin resistant.  May consider using an ipro sensor which she would wear for 5 days to get blood sugars every 5 minutes and assess exactly what is happening within her body based on medications and diet.  Will discuss with Dr. Fuentes  -Increase novolog to 15 units three times daily with meals   -Consider ipro sensor    2. CAD (coronary artery disease)  Recent stenting, stable on aspirin, statin ,Plavix, ACE inhibitor, beta-blocker.  It is unlikely that her current symptoms of feeling sick and dizzy are due to these medications based on blood pressures however Dr. Suarez will order additional testing to assess for etiology.  Based on Dr. Suarez's assessment she may have an infection which could contribute to her symptoms.  Regardless recommend to switch her lisinopril to evening dosing to assess if she has episodes of hypotension throughout the day.  She is also rather sensitive to medications therefore encouraged her to always take these with food.  -Adjust to take lisinopril to 8-9pm (along with fish oil)   -Educated to take medications with food    3. Dyslipidemia, goal LDL below 70  History of statin  "intolerance, however has marked hypertriglyceridemia.  She is not adherent to appropriate dose of fish oil and is only taking half of the daily dose.  Provided education that the goal dose for triglycerides is to take 4000 mg daily.  She will increase her dose of fish oil.  -Increase fish oil to 2 g twice daily    FOLLOW-UP PLAN  Liana was advised to follow up as directed.    SUBJECTIVE AND OBJECTIVE  Liana Briceño is a 61 y.o. female who was referred by Mimi Fuentes MD for MT services.  Liana's chief concern today is potential medication side effect and poorly controlled diabetes.     Diabetes: Blood sugars have been out of control, frustrated. Tresiba 124 total per day split into two doses.   Has been taking novolog 12 units with each meal. Frustrated with her blood sugars, no matter how she increases her insulin her sugars don't seem to change. Sugars throughout the day range from 152-almost 400. She is trying to work on her blood sugars.     CAD: Had been coughing and has lost her voice this morning. Ever since she left the hospital she keeps getting light headed. She takes her time getting up but takes about two to three minutes for the dizzy to pass. Had a dry cough, but seems that last night she slept better. Lightheadedness can happen at anytime, not necessarily when she stands up. Has had a few little \"trinkles\" of pain in her chest but feels different than when she went into the hospital. She is starting cardiac rehab on 5/16/17. Has not taken nitroglycerin since leaving the hospital. Had tried  her lisinopril and metoprolol at a different times of day and this made no difference. Checking blood pressures at home, has been very \"good\" so she stopped checking this week. She has never checked her pressure when she was feeling \"dizzy\" since she is worried about fainting and her machine is not there. She is drinking \"plenty of water.\" Continues to have stomach issues, taking sucralfate two " hours before medications and this does help.      Hyperlipidemia: has been having some muscle weakness or pain but intermittent. Has only been taking 2g of fish oil once daily, misread the bottle.   Lab Results   Component Value Date    HDL 22 (L) 04/25/2017    HDL 30 (L) 01/25/2016    HDL 30 (L) 01/20/2016     Lab Results   Component Value Date    TRIG 481 (H) 04/25/2017    TRIG 487 (H) 06/24/2016    TRIG 285 (H) 03/11/2016     This note has been dictated using voice recognition software. Any grammatical or context distortions are unintentional and inherent to the software.    Liana was provided follow up instructions, and this care plan was communicated via EMR with her primary care provider, Mimi Fuentes MD, and is the authorizing prescriber for this visit.  Direct supervision was available by either the patient's PCP or another available physician when needed.    Time spent: 30 minutes  Level of service: 4    Jose Raul Coy PharmD, BCACP  Medication Management (MTM) Pharmacist  [facility]    We reviewed Liana's medication list with them, discussing reason for use, directions for use, and potential side effects of each medication.  Indication, safety, efficacy, and convenience was assessed for all reviewed medications.  No environmental factors were noted currently affecting patient.    Current Outpatient Prescriptions   Medication Sig Dispense Refill     albuterol (PROAIR HFA;PROVENTIL HFA;VENTOLIN HFA) 90 mcg/actuation inhaler Inhale 2 puffs every 6 (six) hours as needed for wheezing. 1 Inhaler 1     aspirin 81 MG EC tablet Take 81 mg by mouth daily.       blood glucose test strips Use 1 each As Directed 3 (three) times a day. 300 strip 12     cholecalciferol, vitamin D3, (CHOLECALCIFEROL) 1,000 unit tablet Take 1,000 Units by mouth daily.       clopidogrel (PLAVIX) 75 mg tablet Take 1 tablet (75 mg total) by mouth daily. 30 tablet 11     hydroCHLOROthiazide (MICROZIDE) 12.5 mg capsule Take  "1 capsule (12.5 mg total) by mouth daily. 90 capsule 1     HYDROcodone-acetaminophen 5-325 mg per tablet Take 1 tablet by mouth every 4 (four) hours as needed for pain. 60 tablet 0     insulin aspart (NOVOLOG) 100 unit/mL injection pen Inject 15 Units under the skin 3 (three) times a day with meals.       insulin degludec (TRESIBA FLEXTOUCH U-200) 200 unit/mL (3 mL) InPn Inject 124 Units under the skin daily. 18 mL 11     ipratropium (ATROVENT) 0.06 % nasal spray 1 spray into each nostril 3 (three) times a day. 15 mL 12     lisinopril (PRINIVIL,ZESTRIL) 5 MG tablet Take 1 tablet (5 mg total) by mouth daily. 30 tablet 11     metoprolol succinate (TOPROL-XL) 50 MG 24 hr tablet TAKE 1 TABLET(50 MG) BY MOUTH DAILY 90 tablet 0     nitroglycerin (NITROSTAT) 0.4 MG SL tablet Place 1 tablet (0.4 mg total) under the tongue every 5 (five) minutes as needed for chest pain. 25 tablet 3     omega-3 acid ethyl esters (LOVAZA) 1 gram capsule Take 2 capsules (2 g total) by mouth 2 (two) times a day. 120 capsule 11     omeprazole (PRILOSEC) 40 MG capsule TAKE 1 CAPSULE BY MOUTH EVERY DAY 90 capsule 0     ondansetron (ZOFRAN, AS HYDROCHLORIDE,) 4 MG tablet Take 1 tablet (4 mg total) by mouth daily as needed for nausea. 15 tablet 0     pen needle, diabetic (BD ULTRA-FINE ZULMA PEN NEEDLES) 32 gauge x 5/32\" Ndle Inject 1 each under the skin daily. With Tresiba 100 each 3     rosuvastatin (CRESTOR) 10 MG tablet Take 1 tablet (10 mg total) by mouth daily. 30 tablet 11     traZODone (DESYREL) 50 MG tablet Take about 1.5 hours before desired sleep (Patient taking differently: Take 50 mg by mouth at bedtime as needed. ) 30 tablet 1     Current Facility-Administered Medications   Medication Dose Route Frequency Provider Last Rate Last Dose     ondansetron disintegrating tablet 4 mg (ZOFRAN-ODT)  4 mg Oral Once Maximiliano Borden, DO             "

## 2021-06-10 NOTE — PROGRESS NOTES
ITP ASSESSMENT   Assessment Day: Initial  Session Number: 1  Precautions: Standard/DM  No Data Recorded  Risk Stratification: High  Referring Provider: Ariane Biswas MD  EXERCISE  Exercise Assessment: Initial     6 Minute Walk Test   Pre   Pre Exercise HR: 80                  Pre Exercise BP: 129/58    Peak  Peak HR: 115                 Peak BP: 174/71  Peak feet: 1100  Peak O2 SAT: 98  Peak RPE: 13  Peak MPH: 2.08    Symptoms:  Peak Symptoms: mild chest and back pain; relieved with rest    5 mins. Post  5 Min Post HR: 84  5 Min Post BP: 150/69                         Exercise Plan  Goals Next 30 days  ADL'S: Clean and sort out computer desk  Leisure: Resume Split  Work: Resume working out at [facility] 5 days/week      Education Goals: All goals in this section met  Education Goals Met: Patient can state cardiac s/s and appropriate emergency response.;Has system for taking medication.;Medication review.    Exercise Prescription  Exercise Mode: Bike;Nustep;Arm Erg.;Hallway Walking  Frequency: 2 x week  Duration: 30-40 min  Intensity / THR: 20-30 beats above resting heart rate  RPE 11-14  Progression / Met level: 2.5-3.5 mets  Resistive Training?: No    Current Exercise (mins/week): 1    Interventions  Home Exercise:  Mode: walking (Pt. wants to resume working out at the [facility])  Frequency: daily (Pt. plans to start a walking program)  Duration: 15 min x2 daily    Education Material : Educational videos;Provide written material;Individual education and counseling;Offer educational classes    Education Completed  Exercise Education Completed: Cardiac Anatomy;Signs and Symptoms;Medication review;RPE;Emergency Plan;Home Exercise;End point of exercise            Exercise Follow-up/Discharge   NUTRITION  Nutrition Assessment: Initial    Nutrition Risk Factors:  Nutrition Risk Factors: Diabetes;Dyslipidemia;Overweight  HbA1c: 8.3  Monitors blood sugar at home: Yes  Frequency: 3 x /day  Cholesterol: 205  HDL:  "22  Triglycerides: 481    Nutrition Plan  Interventions  Nutrition Interventions: Diet consult;Diet class;Therapist/Patient discussion;Educational videos;Provide with written material      Education Completed      Goals  Nutrition Goals (Next 30 days): Patient can identify their risk factors for CAD;Patient will follow a low sodium diet;Patient able to demonstrate carbohydrate counting;Review Dietitian schedule;Patient will follow a low saturated fat diet;Patient knows appropriate portion size;Patient will lose weight    Goals Met  Nutrition Goals Met: Completed Nutritional Risk Screen;Provided Rate your Plate Survey    Height, Weight, and  BMI  Weight: 257 lb (116.6 kg)  Height: 5' 6\" (1.676 m)  BMI: 41.5    Nutrition Follow-up  Follow-up/Discharge: Pt. would like to meet with dietition and learn more about controlling diabetes.       Other Risk Factors  Other Risk Factor Assessment: Initial    HTN Risk Factor: Hypertension    Pre Exercise BP: 129/58  Post Exercise BP: 150/69    Hypertension Plan  Goals  HTN Goals: Follow low sodium diet;Exercises regularly    Goals Met  HTN Goals Met: Take medication as prescribed    HTN Interventions  HTN Interventions: Diet consult;Therapist/patient discussion;Provide written material;Offer educational videos;Offer educational classes    HTN Education Completed  HTN Education Completed: Medication review      Risk Factor Follow-up   Follow-up/Discharge: Discuss risk factor modification   PSYCHOSOCIAL  Psychosocial Assessment: Initial     [place] [employer] Q of L Summary Score: 33    MAXINE-D Score: 15    Psychosocial Risk Factor: Stress    Psychosocial Plan  Interventions  If MAXINE-D > 15 send letter to MD  Interventions: Offer educational videos and classes;Provide written material;Individual education and counseling    Education Completed  No Data Recorded    Goals  Goals (Next 30 days): Oriented to stress management classes;Improvement in [place] [employer] score;Practicing stress " management skills    Goals Met  Goals Met: Identified Support system;Identify stressors    Psychosocial Follow-up  Follow-up/Discharge: Pt. would like more education about stress and other risk factors           Patient involved in Goal setting?: Yes    Signature: _____________________________________________________________    Date: __________________    Time: __________________

## 2021-06-10 NOTE — TELEPHONE ENCOUNTER
Spoke with pt and relayed pcp message.  Pt understanding and agreeable.  Abx sent to mail order in error, pended to go to local pharmacy.

## 2021-06-10 NOTE — PROGRESS NOTES
Consultation - [facility]  Liana Briceño,  1955, MRN [id number]    PCP: Mimi Funetes MD, [phone]    Assessment and Plan: Chest pain syndrome  Recommendations: Her EKG does not show any new changes nevertheless she is absolutely convinced this is the exact same symptom that had been her symptom of ischemic heart disease and the symptoms she had up until her stent and now has recurred.  EKG does not show any acute changes I cannot exclude that she could not of had an acute ischemic event on Tuesday I discussed with her my feeling that we should proceed with coronary angiography.  Will arrange for cardiac angiogram tomorrow.  Continue with all current medical therapy for now.  If this symptom recurs I think she should call EMS and come to the emergency room directly.  Will arrange for coronary angiogram possible intervention if there is sign of a stent associated complication.  Given the nature of her symptoms it would suggest that only a critical lesion would likely be behind this.  A moderate grade lesion I think would be unlikely to be causing her symptoms.    Chief Complaint: Chest pain syndrome    HPI:  We have been requested to evaluate Liana Briceño for consultation who is a  61 y.o. year old female for RAC.   Hx: 61-year-old woman who recently was seen for chest pain and pressure and underwent successful coronary stent placement to the right coronary artery now returns with recurrence of her exact same symptom.  She said that she been symptom-free up until 3 days ago she began to experience the exact same chest pain aching symptom that was the symptom that prompted her evaluation for coronary disease.  The able  she had a drug-eluting stent placed with severe right coronary lesion.  The episode occurred while at [facility] in cardiac rehabilitation.  She took 1 nitro and the symptoms seem to be relieved and eventually resolved.  She denies any associated symptoms with  this.  Is a right to center chest discomfort without radiation or associated symptoms.  Was most intense on Tuesday.  She later had a second event and that some events yesterday.  But they were much milder.  Short in duration then.  She is a chronic active tobacco smoker although says she is recently tried to discontinue.  Type 2 diabetes mellitus obesity hypertension hyperlipidemia and statin intolerance although is able to take rosuvastatin.  She has had a stent placed to her     Medical History  Active Ambulatory (Non-Hospital) Problems    Diagnosis     Coronary artery disease due to lipid rich plaque     Dizziness     Stenosis of left carotid artery     Unstable angina pectoris     Statin intolerance     Sensorineural hearing loss     Carpal tunnel syndrome     Obstructive Sleep Apnea     Diffuse Nontoxic Goiter     Type 2 diabetes mellitus     Morbid obesity     Essential hypertension     Esophageal reflux     Difficulty Swallowing (Dysphagia)     Dyslipidemia, goal LDL below 70     Hiatal Hernia     Past Medical History:   Diagnosis Date     Anemia      Breast cyst      Hyperlipidemia      Paroxysmal SVT (supraventricular tachycardia)        Surgical History  She  has a past surgical history that includes revise median n/carpal tunnel surg; removal gallbladder (2000); explo/drain breast abscess; thyroid lobectomy,unilat; Breast cyst excision; Breast biopsy; l hrt cath w/njx l ventriculography img s&i (N/A, 4/24/2017); Cardiac catheterization (N/A, 4/24/2017); and Cholecystectomy.    Social History  Reviewed, and she  reports that she quit smoking about 15 months ago. Her smoking use included Cigarettes. She has a 10.00 pack-year smoking history. She does not have any smokeless tobacco history on file. She reports that she drinks alcohol. She reports that she does not use illicit drugs.  Smoking status reviewed.  Social history othrwise not contributory to HPI.  Allergies  Allergies   Allergen Reactions      Penicillins Other (See Comments)     Passed out.      Doxycycline Dizziness     Egg Derived      Pneumococcal 23-Valent Polysaccharide Vaccine Unknown     Statins-Hmg-Coa Reductase Inhibitors      Shaking with simvastatin and atorvastatin     Azithromycin Rash       Family History  Reviewed, and family history includes Diabetes type II in her other; Heart disease in her father and mother; Hypertension in her other; Kidney failure in her other; Leukemia in her brother. There is no history of Breast cancer.  Extended Emergency Contact Information  Primary Emergency Contact: Panchito Mclean  Address: [address] [place] [address] [place]  Home Phone: [phone]  Relation: Spouse  Family history otherwise negative or not conributory to HPI.    Psychosocial Needs  Social History     Social History Narrative    Single but lives with her significant other, has one son, she works with the [employer] -- and is one herself.  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     Additional psychosocial needs reviewed per nursing assessment.    Prior to Admission Medications    (Not in a hospital admission)    Review of Systems:  A 12 point comprehensive review of systems was negative except as noted.  Review of systems is negative except for HPI  Physical Exam:  [unfilled]    /58 (Patient Site: Left Arm, Patient Position: Sitting, Cuff Size: Adult Large)  Pulse 80  Resp 18  General appearance: alert, appears stated age and cooperative  Marked obesity.  Head and neck without focal cranial neurologic defect.  External eye ear exam normal.  JVD difficult to discern but not overtly distended.  Carotid upstrokes were normal without bruit.  No cervical lymphadenopathy or thyromegaly.  Heart tones S1-S2 normal distinct regular distant without S3-S4.  Breath sounds clear.  Skin warm and dry.  Small duran of the right wrist with recent sheath was placed areas soft and nontender no hematoma or mass felt.  Pertinent  Labs  Lab Results: personally reviewed.   Lab Results   Component Value Date     05/11/2017    K 3.9 05/11/2017     05/11/2017    CO2 25 05/11/2017    BUN 13 05/11/2017    CREATININE 0.89 05/11/2017    CALCIUM 10.1 05/11/2017     Lab Results   Component Value Date    CKTOTAL 109 05/11/2017    TROPONINI <0.01 04/24/2017     Lab Results   Component Value Date    WBC 7.6 05/11/2017    WBC 8.0 06/30/2015    HGB 12.0 05/11/2017    HCT 35.7 05/11/2017    MCV 86 05/11/2017     05/11/2017     Lab Results   Component Value Date    CHOL 205 (H) 04/25/2017    TRIG 481 (H) 04/25/2017    HDL 22 (L) 04/25/2017    LDLDIRECT 90 01/20/2016       Pertinent Radiology  Radiology Results: See Report  EKG Results: personally reviewed.  and See Report

## 2021-06-10 NOTE — PROGRESS NOTES
ASSESSMENT:  1. Dizziness  Liana Briceño is a 61-year-old woman with history of recent PCI and diabetes, who presents for follow-up.  Since her hospital discharge in late April following PCI to the RCA, she started on a number of medications including Plavix, rosuvastatin, lisinopril, and is blaming the dizziness on one of these medications.  I discussed with [facility] pharmacist, who recommended changing lisinopril to nighttime use.  Orthostatics were negative today.  She seems to be having symptoms of an upper respiratory tract infection, mild in severity, and I provided a nasal spray in the form of ipratropium for the drainage and discharge.  --Follow-up in 1-2 weeks after changing the lisinopril with Mimi Fuentes MD     2. Unstable angina pectoris  Recently had a stent placed.  She has been feeling mildly nauseated lately, and has ongoing abdominal pain, which predated the angiogram/PCI.  EKG was done which is stable.  I do not think the nausea represents unstable angina, or ischemia.  --Provided injection of Zofran during visit, tablets sent to pharmacy  --I checked complete metabolic panel and CK as pertaining to the recent addition of rosuvastatin.    3. Nausea  See above.  Also mention abdominal pain, has had extensive evaluation of this previously.  - Electrocardiogram Perform - Clinic    4. Medication management  - HM2(CBC w/o Differential)  - Comprehensive Metabolic Panel  - CK Total  - Thyroid Stimulating Hormone (TSH)      PLAN:  Patient Instructions   Upper respiratory tract infection    I would recommend starting ipratropium, which will help with nasal congestion, discharge.  This might be contributing to the nausea you are experiencing.    I have also sent over an antinausea medication called ondansetron/Zofran.    The EKG looks normal    We will try to get you back into cardiology clinic for follow-up    Lab checks to make sure that new medications are not causing problems in your body.    Take  "lisinopril at bedtime       Orders Placed This Encounter   Procedures     HM2(CBC w/o Differential)     Comprehensive Metabolic Panel     CK Total     Thyroid Stimulating Hormone (TSH)     Electrocardiogram Perform - Clinic     Medications Discontinued During This Encounter   Medication Reason     metoprolol succinate (TOPROL-XL) 50 MG 24 hr tablet Duplicate order     omeprazole (PRILOSEC) 40 MG capsule Duplicate order     ondansetron injection 4 mg (ZOFRAN)        Return in about 3 years (around 5/11/2020).    CHIEF COMPLAINT:  Chief Complaint   Patient presents with     Cough     Dizziness     Nausea     Discuss meds from [facility]        HISTORY OF PRESENT ILLNESS:  Liana is a 61 y.o. female presenting to the clinic today for dizziness, cough, and nausea. She is typically seen by Dr. Fuentes.     Dizziness: She reports since beginning the four of five medicines she was discharged with from the hospital on 4/24/17 she has begun to experience episodes of lightheadedness. When these occur she feels as if she is about to pass out and she needs to sit down quickly. A typical episode lasts about 3 minutes. She typically drinks water after an episode believing it may be related to dehydration    Cold Symptoms: She began to experience a hard cough about a week ago that has now transitioned into a wet cough. She also reports congestion and a runny nose. She endorses producing green colored mucous. She notes that she just got over a cold a few weeks ago and had been treated with prednisone. She feels her symptoms are worsening.     Nausea: She reports feeling nauseous since before her surgery on 4/24/17. She has been treating with Sucralfate and omeprazole. She did try to discontinue the Sucralfate but found her nausea worsened. She denies heart burn and reflux. She endorses pain in her chest that she states is more \"inside\". If she was to rate the pain she would give it a 2-3/10. She believes the pain may be due to her " coronary angiogram on 4/24/17.    Hypertension: She has not taken her BP at home this week but reports that the blood pressures she took last were within the normal range.    REVIEW OF SYSTEMS:   She lost her voice this morning. She has been taking aspiring and Plavix daily. Her urine is darker than usual but not by much. She has not noticed a change in the color of the whites of her eyes. She takes all of her medications in the morning. She reports feeling thyroid difficulties earlier this week but this has resolved. She reports she had half of her thyroid removed. She is not scheduled to see Dr. Biswas until August. All other systems are negative.    PFSH:  She enjoys volunteering. Reviewed as below.    TOBACCO USE:  History   Smoking Status     Current Every Day Smoker     Packs/day: 0.25     Years: 40.00     Types: Cigarettes     Last attempt to quit: 1/24/2016   Smokeless Tobacco     Not on file     Comment: Reports 1 cigarette a day now       VITALS:  Vitals:    05/11/17 1040 05/11/17 1041 05/11/17 1043 05/11/17 1112   BP: 175/69 158/62 156/63 128/56   Patient Site: Right Arm Right Arm Right Arm Left Arm   Patient Position: Lying Sitting Standing Sitting   Cuff Size:       Pulse: 87 82 95    Temp:       TempSrc:       SpO2:       Weight:         Wt Readings from Last 3 Encounters:   05/11/17 (!) 254 lb 14.4 oz (115.6 kg)   05/02/17 (!) 249 lb (112.9 kg)   04/25/17 (!) 252 lb 3.2 oz (114.4 kg)     Body mass index is 41.14 kg/(m^2).    PHYSICAL EXAM:  General: Alert. Appears ill  ENT: Sclera anicteric. Moist oral mucosa  Heart: Regular rate and rhythm. No murmur  Lungs: Clear to auscultation  Abdomen: Soft, nontender. Nondistened  Skin: No jaundice  Neuro no gross abnormalities  Psychiatric: Difficult to assess, she appears ill, slightly depressed affect    BP: 175/69 (lying down), 158/62 (sitting), 156/63 (standing), 128/56 (sitting)  Pulse: 87, 82, 95  EKG: Normal sinus rhythm. No specific T-wave changes in  the precordial leads. Similar in appearance to EKG from 4/24/17.     ADDITIONAL HISTORY SUMMARIZED (2): Reviewed med management note from today regarding dizziness. Reviewed hospital summary from 4/21-4/25/17. Reviewed Dr. Fuentes's note from 5/2/17.   DECISION TO OBTAIN EXTRA INFORMATION (1): None.   RADIOLOGY TESTS (1): None.  LABS (1): Ordered labs.  MEDICINE TESTS (1): Ordered EKG. Reviewed Coronary angiogram from 4/24/17  INDEPENDENT REVIEW (2 each): Interpreted EKG from today.    The visit lasted a total of 28 minutes face to face with the patient. Over 50% of the time was spent counseling and educating the patient about lightheadedness.    IJuanjose, am scribing for and in the presence of, Dr. Borden.    I, Dr. Borden, personally performed the services described in this documentation, as scribed by Juanjose Green in my presence, and it is both accurate and complete.    MEDICATIONS:  Current Outpatient Prescriptions   Medication Sig Dispense Refill     albuterol (PROAIR HFA;PROVENTIL HFA;VENTOLIN HFA) 90 mcg/actuation inhaler Inhale 2 puffs every 6 (six) hours as needed for wheezing. 1 Inhaler 1     aspirin 81 MG EC tablet Take 81 mg by mouth daily.       blood glucose test strips Use 1 each As Directed 3 (three) times a day. 300 strip 12     cholecalciferol, vitamin D3, (CHOLECALCIFEROL) 1,000 unit tablet Take 1,000 Units by mouth daily.       clopidogrel (PLAVIX) 75 mg tablet Take 1 tablet (75 mg total) by mouth daily. 30 tablet 11     hydroCHLOROthiazide (MICROZIDE) 12.5 mg capsule Take 1 capsule (12.5 mg total) by mouth daily. 90 capsule 1     HYDROcodone-acetaminophen 5-325 mg per tablet Take 1 tablet by mouth every 4 (four) hours as needed for pain. 60 tablet 0     insulin aspart (NOVOLOG) 100 unit/mL injection pen Inject 15 Units under the skin 3 (three) times a day with meals.       insulin degludec (TRESIBA FLEXTOUCH U-200) 200 unit/mL (3 mL) InPn Inject 124 Units under the skin daily. 18 mL 11      "ipratropium (ATROVENT) 0.06 % nasal spray 1 spray into each nostril 3 (three) times a day. 15 mL 12     lisinopril (PRINIVIL,ZESTRIL) 5 MG tablet Take 1 tablet (5 mg total) by mouth daily. 30 tablet 11     metoprolol succinate (TOPROL-XL) 50 MG 24 hr tablet TAKE 1 TABLET(50 MG) BY MOUTH DAILY 90 tablet 0     nitroglycerin (NITROSTAT) 0.4 MG SL tablet Place 1 tablet (0.4 mg total) under the tongue every 5 (five) minutes as needed for chest pain. 25 tablet 3     omega-3 acid ethyl esters (LOVAZA) 1 gram capsule Take 2 capsules (2 g total) by mouth 2 (two) times a day. 120 capsule 11     omeprazole (PRILOSEC) 40 MG capsule TAKE 1 CAPSULE BY MOUTH EVERY DAY 90 capsule 0     ondansetron (ZOFRAN, AS HYDROCHLORIDE,) 4 MG tablet Take 1 tablet (4 mg total) by mouth daily as needed for nausea. 15 tablet 0     pen needle, diabetic (BD ULTRA-FINE ZULMA PEN NEEDLES) 32 gauge x 5/32\" Ndle Inject 1 each under the skin daily. With Tresiba 100 each 3     rosuvastatin (CRESTOR) 10 MG tablet Take 1 tablet (10 mg total) by mouth daily. 30 tablet 11     traZODone (DESYREL) 50 MG tablet Take about 1.5 hours before desired sleep (Patient taking differently: Take 50 mg by mouth at bedtime as needed. ) 30 tablet 1     Current Facility-Administered Medications   Medication Dose Route Frequency Provider Last Rate Last Dose     ondansetron disintegrating tablet 4 mg (ZOFRAN-ODT)  4 mg Oral Once Maximiliano Borden,            Total data points:6    "

## 2021-06-10 NOTE — TELEPHONE ENCOUNTER
Spoke with her, she will start it when she is not so nauseaed    Josephine Gamboa CMA ([facility])

## 2021-06-11 NOTE — PROGRESS NOTES
Medication Therapy Management Follow Up Visit     ASSESSMENT AND PLAN  1. Type 2 diabetes mellitus with other specified complication, with long-term current use of insulin  Not at goal A1c less than 7% per ADA guidelines, focusing on titrating insulin due to increased insulin resistance.  Regular weekly follow-ups to ensure adherence and continued motivation.  - increase insulin degludec (TRESIBA FLEXTOUCH U-200) 200 unit/mL (3 mL) InPn; Inject 130 Units under the skin daily.  Dispense: 18 mL; Refill: 11  - increase insulin aspart (NOVOLOG FLEXPEN) 100 unit/mL injection pen; Inject 30 Units under the skin 3 (three) times a day with meals.  Dispense: 9 mL; Refill: 0      The following high BMI interventions were performed this visit: encouragement to exercise and lifestyle education regarding diet    FOLLOW-UP PLAN  Liana was advised to follow up in one week.    The following instructions were given:   Patient Instructions   Increase tresiba to 130 units once daily     Increase Novolog to 30 units three times daily with meals     Try to be more active! Go to the [facility] : )     Weekly follow ups with Michelle         SUBJECTIVE AND OBJECTIVE  Liana Briceño is a 61 y.o. female who was referred by Mimi Fuentes MD for MT services.  Liana's chief concern today is diabetes follow up.     Diabetes: 161 fasting today, and a few hours later she checked her sugar right before eating and that was 195 around 10:15am. Now, four hours later her sugars was 283, 270. She had 58 grams of carbs and coffee with sugar with breakfast and nothing since then. She is very frustrated with her blood sugars. She has been going to cardiac rehab but has not done any additional CA working out. The other night her blood sugar was 330 before bedtime, took 28 units before bedtime without eating. Woke up with blood sugar of 161 this morning. Had two parties this weekend and had some drinks.   Lab Results   Component Value  Date    HGBA1C 9.6 (H) 06/28/2017     This note has been dictated using voice recognition software. Any grammatical or context distortions are unintentional and inherent to the software.    Liana was provided follow up instructions, and this care plan was communicated via EMR with her primary care provider, Miim Fuentes MD, and is the authorizing prescriber for this visit.  Direct supervision was available by either the patient's PCP or another available physician when needed.    Time spent: 30 minutes  Level of service: 2    Kenya HenriquezD, BCACP  Medication Management (MTM) Pharmacist  [facility]    We reviewed Liana's medication list with them, discussing reason for use, directions for use, and potential side effects of each medication.  Indication, safety, efficacy, and convenience was assessed for all reviewed medications.  No environmental factors were noted currently affecting patient.    Current Outpatient Prescriptions   Medication Sig Dispense Refill     acetaminophen (TYLENOL) 500 MG tablet Take by mouth every 6 (six) hours as needed for pain.       albuterol (PROAIR HFA;PROVENTIL HFA;VENTOLIN HFA) 90 mcg/actuation inhaler Inhale 2 puffs every 6 (six) hours as needed for wheezing. 1 Inhaler 1     aspirin 81 MG EC tablet Take 81 mg by mouth daily.       blood glucose test strips Use 1 each As Directed 3 (three) times a day. 300 strip 12     cholecalciferol, vitamin D3, (CHOLECALCIFEROL) 1,000 unit tablet Take 1,000 Units by mouth daily.       clopidogrel (PLAVIX) 75 mg tablet TAKE 1 TABLET BY MOUTH DAILY 90 tablet 3     hydroCHLOROthiazide (MICROZIDE) 12.5 mg capsule Take 1 capsule (12.5 mg total) by mouth daily. 90 capsule 1     HYDROcodone-acetaminophen 5-325 mg per tablet Take 1 tablet by mouth every 4 (four) hours as needed for pain. 60 tablet 0     insulin aspart (NOVOLOG FLEXPEN) 100 unit/mL injection pen Inject 30 Units under the skin 3 (three) times a day with meals. 9 mL  "0     insulin degludec (TRESIBA FLEXTOUCH U-200) 200 unit/mL (3 mL) InPn Inject 130 Units under the skin daily. 18 mL 11     ipratropium (ATROVENT) 0.06 % nasal spray 2 sprays into each nostril as needed for rhinitis.       lisinopril (PRINIVIL,ZESTRIL) 5 MG tablet Take 1.5 tablets (7.5 mg total) by mouth daily. 145 tablet 3     melatonin (MELATIN) 3 mg Tab tablet Take 3 mg by mouth at bedtime as needed.       nitroglycerin (NITROSTAT) 0.4 MG SL tablet Place 1 tablet (0.4 mg total) under the tongue every 5 (five) minutes as needed for chest pain. 25 tablet 3     omega-3 acid ethyl esters (LOVAZA) 1 gram capsule Take 2 capsules (2 g total) by mouth 2 (two) times a day. 120 capsule 11     omeprazole (PRILOSEC) 40 MG capsule TAKE 1 CAPSULE BY MOUTH EVERY DAY 90 capsule 0     pen needle, diabetic (BD ULTRA-FINE ZULMA PEN NEEDLES) 32 gauge x 5/32\" Ndle Inject 1 each under the skin daily. With Tresiba 100 each 3     rosuvastatin (CRESTOR) 10 MG tablet TAKE 1 TABLET BY MOUTH DAILY 90 tablet 3     traZODone (DESYREL) 50 MG tablet Take 50 mg by mouth at bedtime as needed for sleep.       Current Facility-Administered Medications   Medication Dose Route Frequency Provider Last Rate Last Dose     ondansetron disintegrating tablet 4 mg (ZOFRAN-ODT)  4 mg Oral Once Maximiliano Borden, DO             "

## 2021-06-11 NOTE — TELEPHONE ENCOUNTER
----- Message from Mimi Fuentes MD sent at 9/2/2020 12:59 PM CDT -----  Want to pursue [facility]

## 2021-06-11 NOTE — TELEPHONE ENCOUNTER
Denied by Medicare part D.  Will need to use her medical benefit, in order to do so we will need to utilize [facility].  I have already sent the freestyle device to that pharmacy.  In order to complete the process, the Medicare order form and most recent clinic notes from the last 6 months need to be faxed to the [facility].  Please complete the Medicare order form and have PCP sign before faxing: [email or web address]

## 2021-06-11 NOTE — PROGRESS NOTES
[facility]/stent  Follow Up Note    Assessment/Plan:  1. Coronary artery disease due to lipid rich plaque/left carotid stenosis  Status post placement of drug-eluting stent to the mid right coronary artery.  On Plavix-doing well with no further angina .  LDL cholesterol is down to 40.  She is tolerating rosuvastatin.    2. Type 2 diabetes mellitus with hyperglycemia, with long-term current use of insulin  With poor control.  Admits to poor compliance with diet over the past week.  She is using to proceed but at 124 units daily.  Additionally, she continues to use 25 units of NovoLog with each meal.  She is not consistent with carbohydrate counting.  The data that she presents with regard to blood sugars is random.  Recommendation: Have asked her to begin with checking fasting, presupper and bedtime sugars so that we can get a basic idea of overall status.  Eventually, would like to have her do 2 hour postprandial blood sugars as well.  Today, she will increase her to receive a to 126 units.  She needs to follow-up with pharmacy to aggressively titrate insulin with direction  - Glycosylated Hemoglobin A1c  - Basic Metabolic Panel    3. Essential hypertension with goal blood pressure less than 140/90  Would like to see blood pressure slightly lower.  She discontinued metoprolol due to lightheadedness  Recommendation: Increase lisinopril to 7.5 mg and continue to monitor    4. Dysphagia  Intermittent dysphagia.  Family history of Zenker's diverticulum.  Will proceed with esophagram  - XR Esophagram; Future    5. Joint pain  Chronic back pain and joint pain  - HYDROcodone-acetaminophen 5-325 mg per tablet; Take 1 tablet by mouth every 4 (four) hours as needed for pain.  Dispense: 60 tablet; Refill: 0      Mimi Fuentes MD    Chief Complaint:  Chief Complaint   Patient presents with     Follow-up     Stent & BP       History of Present Illness:  Liana is a 61 y.o. female who is here today for  follow-up after having had a stent in the mid RCA for an atypical chest pain.  Of note, shortly after being discharged from the hospital, she developed recurrent pain.  She was taken for a repeat angiogram.  Of note, her stent was wide open.  Of note, this was felt to be a low risk vessel.    She has many issues to contend with.  She is currently undergoing cardiac rehab.  She is exercising on a regular basis with supervision.  She does state that she was experiencing some lightheadedness with metoprolol.  She did email me about this.  I had advised her to increase lisinopril and hold the metoprolol.  She did not get my message.  She has just been off metoprolol now for a few days.  Her blood pressure is borderline elevated.  With her recent stent, she realizes that she is not quite at goal.    With regard to type 2 diabetes, she has been noncompliant.  She admits that she went out to dinner with her sister and ate a lot of food.  She states that she is trying hard to keep her calories to 1200 mg.  She presents with a 1 day journal that is acceptable.  However, she is noncompliant on the other days.  She is injecting 124 units of Tressiba.  Fasting sugars have not been consistently checked.  She often checks her sugar when she gets to cardiac rehab about 1 hour after breakfast and during exercise.  Some sugars that are fasting are in the 150s.  Additionally, many of her daytime sugars are random as well.    She denies polyuria or polydipsia.  She feels that she is working hard to try to exercise and lose weight.  She seems to recognize the gravity of her illnesses    In addition to the above, she does have some difficulty swallowing.  She states she swallowed a cough drop.  He got stuck in her throat and burned for a long time.    Review of Systems:  A comprehensive review of systems was performed and was otherwise negative    PFSH:  Social History: She is in a common-law marriage.  She has 1 son.  She is currently  a non-smoker  History   Smoking Status     Former Smoker     Packs/day: 0.25     Years: 40.00     Types: Cigarettes     Quit date: 1/24/2016   Smokeless Tobacco     Never Used       Past History: Significant for type 2 diabetes, obesity, chronic back pain, coronary artery disease    Occasions reconciled  Current Outpatient Prescriptions   Medication Sig Dispense Refill     acetaminophen (TYLENOL) 500 MG tablet Take by mouth every 6 (six) hours as needed for pain.       albuterol (PROAIR HFA;PROVENTIL HFA;VENTOLIN HFA) 90 mcg/actuation inhaler Inhale 2 puffs every 6 (six) hours as needed for wheezing. 1 Inhaler 1     aspirin 81 MG EC tablet Take 81 mg by mouth daily.       blood glucose test strips Use 1 each As Directed 3 (three) times a day. 300 strip 12     cholecalciferol, vitamin D3, (CHOLECALCIFEROL) 1,000 unit tablet Take 1,000 Units by mouth daily.       clopidogrel (PLAVIX) 75 mg tablet TAKE 1 TABLET BY MOUTH DAILY 90 tablet 3     hydroCHLOROthiazide (MICROZIDE) 12.5 mg capsule Take 1 capsule (12.5 mg total) by mouth daily. 90 capsule 1     HYDROcodone-acetaminophen 5-325 mg per tablet Take 1 tablet by mouth every 4 (four) hours as needed for pain. 60 tablet 0     insulin aspart (NOVOLOG FLEXPEN) 100 unit/mL injection pen Inject 25 Units under the skin 3 (three) times a day with meals. 9 mL 0     insulin degludec (TRESIBA FLEXTOUCH U-200) 200 unit/mL (3 mL) InPn Inject 124 Units under the skin daily. 18 mL 11     ipratropium (ATROVENT) 0.06 % nasal spray 2 sprays into each nostril as needed for rhinitis.       lisinopril (PRINIVIL,ZESTRIL) 5 MG tablet Take 1.5 tablets (7.5 mg total) by mouth daily. 145 tablet 3     melatonin (MELATIN) 3 mg Tab tablet Take 3 mg by mouth at bedtime as needed.       nitroglycerin (NITROSTAT) 0.4 MG SL tablet Place 1 tablet (0.4 mg total) under the tongue every 5 (five) minutes as needed for chest pain. 25 tablet 3     omega-3 acid ethyl esters (LOVAZA) 1 gram capsule Take 2  "capsules (2 g total) by mouth 2 (two) times a day. 120 capsule 11     omeprazole (PRILOSEC) 40 MG capsule TAKE 1 CAPSULE BY MOUTH EVERY DAY 90 capsule 0     pen needle, diabetic (BD ULTRA-FINE ZULMA PEN NEEDLES) 32 gauge x 5/32\" Ndle Inject 1 each under the skin daily. With Tresiba 100 each 3     rosuvastatin (CRESTOR) 10 MG tablet TAKE 1 TABLET BY MOUTH DAILY 90 tablet 3     traZODone (DESYREL) 50 MG tablet Take 50 mg by mouth at bedtime as needed for sleep.       Current Facility-Administered Medications   Medication Dose Route Frequency Provider Last Rate Last Dose     ondansetron disintegrating tablet 4 mg (ZOFRAN-ODT)  4 mg Oral Once Maximiliano Borden DO           Family History: Significant for sudden cardiac death in her sister, chronic kidney disease, coronary artery disease, type 2 diabetes, Zenker's diverticulum    Physical Exam:  General Appearance:   She appears at baseline.  She is pleasant.  Weight is up 2 pounds from May  Vitals:    06/28/17 1350   BP: 136/52   Patient Site: Left Arm   Patient Position: Sitting   Cuff Size: Adult Large   Pulse: 78     Wt Readings from Last 3 Encounters:   06/06/17 (!) 256 lb 9.6 oz (116.4 kg)   05/24/17 (!) 255 lb 3.2 oz (115.8 kg)   05/19/17 (!) 257 lb (116.6 kg)     There is no height or weight on file to calculate BMI.        Data Review:    Analysis and Summary of Old Records (2): Reviewed hospital records, rehab records     Records Requested (1): 0      Other History Summarized (from other people in the room) (2): 0    Radiology Tests Summarized (XRAY/CT/MRI/DXA) (1): Summarized angiogram    Labs Reviewed (1): Recheck labs    Medicine Tests Reviewed (EKG/ECHO/COLONOSCOPY/EGD) (1): 0    Independent Review of EKG or X-RAY (2): 0    Time spent about 45 minutes with more than half that time spent in counseling and discussion of diabetes    "

## 2021-06-11 NOTE — PROGRESS NOTES
"Liana Briceño has participated in 7 sessions of Phase II Cardiac Rehab.    Progress Report:   Cardiac Rehab Treatment Progress Report 5/17/2017 5/24/2017 5/26/2017 5/31/2017 6/5/2017   Weight - 255 lbs 257 lbs 258 lbs 254 lbs   Pre Exercise  HR 74 80 78 74 86   Pre Exercise /68 124/70 124/60 126/60 130/60   Pre Blood Sugar (mg/dl) 246 182 348 171 172   Treadmill Peak HR - - 110 111 -   Heart Rate 80 85 83 82 81   Post Exercise /66 122/62 128/60 142/70 120/56   Post Blood Sugar (mg/dl) 244 274 308 172 131   ECG SR-ST SR SR-ST SR-ST SR   Total Exercise Minutes 35 20 30 30 24         Current Status:  Therapists Comments:  good exercise tolerance. On Recumbent bike / 94 but resting before and after BPs stable. Reports had 20 min episode of \"shooting chest pain from Left to Rt breast\" relieved with rest. Did not take Ntg. tho carries it with her.     If Physician recommends change in treatment plan, please place orders.        __________________________________________________      _____________  Signature                                                                                                  Date  "

## 2021-06-11 NOTE — TELEPHONE ENCOUNTER
Patient Returning Call  Reason for call:  Patient called back.  Information relayed to patient:  Informed of Dr Fuentes's message listed below.  Patient states understanding.  States she is feeling better.  Patient has additional questions:  No  If YES, what are your questions/concerns:    Okay to leave a detailed message?: No call back needed

## 2021-06-11 NOTE — PROGRESS NOTES
[facility] Note    Liana Briceño   62 y.o. female    Date of Visit: 7/17/2017  Chief Complaint   Patient presents with     Cyst       ASSESSMENT/PLAN  1. Abscess  Culture, Wound    Incision and drainage     ---------------------------------------------    Incision and drainage performed today, experienced some mild pain during the procedure and also some mild nausea following.  This improved with some juice.  Management instructions provided including dry sterile gauze change at least once a day.  The abscess did not need to be packed.  I recommended follow-up evaluation in about 4 days to ensure that this is improving.  Given the mild surrounding cellulitis, prescribed Bactrim.  This can be adjusted based on clinical course and wound culture results.    Return in about 1 week (around 7/24/2017).      SUBJECTIVE  Liana Briceño is a 62-year-old woman with history of diabetes, who presents for infection.  Over the last 1 week, she has noticed worsening symptoms and increasing size of a sore/cyst in the left groin.  It started off as a small pimple, which she squeezed, but continued to get worse.  She notices that some of the surrounding tissue is harder and painful.  She has noticed some drainage.      Medications, allergies, and problem list were reviewed and updated    Patient Active Problem List   Diagnosis     Carpal tunnel syndrome     Obstructive Sleep Apnea     Diffuse Nontoxic Goiter     Type 2 diabetes mellitus     Morbid obesity     Essential hypertension     Esophageal reflux     Dyslipidemia, goal LDL below 70     Hiatal Hernia     Sensorineural hearing loss     Stenosis of left carotid artery     Coronary artery disease due to lipid rich plaque     Past Medical History:   Diagnosis Date     Anemia      Breast cyst      Carpal tunnel syndrome      Coronary artery disease due to lipid rich plaque      Diabetes mellitus     Type 2     Dyslipidemia, goal LDL below 70      Dysphagia       Esophageal reflux      Essential hypertension      Goiter diffuse, nontoxic      Hiatal hernia      Morbid obesity      RADHA (obstructive sleep apnea)      Paroxysmal SVT (supraventricular tachycardia)     S/P ablation     Current Outpatient Prescriptions   Medication Sig Dispense Refill     acetaminophen (TYLENOL) 500 MG tablet Take by mouth every 6 (six) hours as needed for pain.       albuterol (PROAIR HFA;PROVENTIL HFA;VENTOLIN HFA) 90 mcg/actuation inhaler Inhale 2 puffs every 6 (six) hours as needed for wheezing. 1 Inhaler 1     aspirin 81 MG EC tablet Take 81 mg by mouth daily.       blood glucose test strips Use 1 each As Directed 3 (three) times a day. 300 strip 12     cholecalciferol, vitamin D3, (CHOLECALCIFEROL) 1,000 unit tablet Take 1,000 Units by mouth daily.       clopidogrel (PLAVIX) 75 mg tablet TAKE 1 TABLET BY MOUTH DAILY 90 tablet 3     hydroCHLOROthiazide (MICROZIDE) 12.5 mg capsule Take 1 capsule (12.5 mg total) by mouth daily. 90 capsule 1     HYDROcodone-acetaminophen 5-325 mg per tablet Take 1 tablet by mouth every 4 (four) hours as needed for pain. 60 tablet 0     insulin aspart (NOVOLOG FLEXPEN) 100 unit/mL injection pen Inject 30 Units under the skin 3 (three) times a day with meals. 9 mL 0     insulin degludec (TRESIBA FLEXTOUCH U-200) 200 unit/mL (3 mL) InPn Inject 130 Units under the skin daily. 18 mL 11     lisinopril (PRINIVIL,ZESTRIL) 5 MG tablet Take 1.5 tablets (7.5 mg total) by mouth daily. 145 tablet 3     melatonin (MELATIN) 3 mg Tab tablet Take 3 mg by mouth at bedtime as needed.       nitroglycerin (NITROSTAT) 0.4 MG SL tablet Place 1 tablet (0.4 mg total) under the tongue every 5 (five) minutes as needed for chest pain. 25 tablet 3     omega-3 acid ethyl esters (LOVAZA) 1 gram capsule Take 2 capsules (2 g total) by mouth 2 (two) times a day. 120 capsule 11     omeprazole (PRILOSEC) 40 MG capsule TAKE 1 CAPSULE BY MOUTH EVERY DAY 90 capsule 0     pen needle, diabetic (BD  "ULTRA-FINE ZULMA PEN NEEDLES) 32 gauge x 5/32\" Ndle Inject 1 each under the skin daily. With Tresiba 100 each 3     rosuvastatin (CRESTOR) 10 MG tablet TAKE 1 TABLET BY MOUTH DAILY 90 tablet 3     sulfamethoxazole-trimethoprim (SEPTRA DS) 800-160 mg per tablet Take 1 tablet by mouth 2 (two) times a day for 5 days. 10 tablet 0     Current Facility-Administered Medications   Medication Dose Route Frequency Provider Last Rate Last Dose     ondansetron disintegrating tablet 4 mg (ZOFRAN-ODT)  4 mg Oral Once Maximiliano Borden DO         Allergies   Allergen Reactions     Penicillins Other (See Comments)     Passed out.      Doxycycline Dizziness     Pneumococcal 23-Valent Polysaccharide Vaccine Other (See Comments)     pneumonia     Statins-Hmg-Coa Reductase Inhibitors Myalgia     Shaking with simvastatin and atorvastatin     Azithromycin Rash       EXAM  Vitals:    07/17/17 1135   BP: 118/62   Pulse: 90   Resp: 12   Height: 5' 6\" (1.676 m)         General: Alert, nontoxic-appearing  Skin: There is a 2 x 3 cm x 1 cm raised soft/painful lesion with central fluctuance consistent with abscess, some surrounding induration, minimal surrounding erythema.  No tracking erythema.    See separate procedure note.  Verbal consent obtained after discussing the procedure, risks/benefits.    Maximiliano Borden DO  Internal Medicine  [facility]     "

## 2021-06-11 NOTE — TELEPHONE ENCOUNTER
Central PA team  [phone]  Pool: HE PA MED ([id number])          PA has been initiated.       PA form completed and faxed insurance via Cover My Meds     Key:  [id number]     Medication:  Freestyle Maria Victoria Device    Insurance:  [employer] Part D        Response will be received via fax and may take up to 5-10 business days depending on plan

## 2021-06-11 NOTE — TELEPHONE ENCOUNTER
Will contact patient on "[employer]hart. Looks like there is an order for Maria Victoria from May 2020 on file. Called [facility] on [address] and they stated that it is not covered. Will start a PA. If not covered, may need to try Part B.     Prior Authorization Request  Who s requesting:  Pharmacy  Pharmacy Name and Location: [facility] [address]  Medication Name: FreeStyle Maria Victoria [place] [employer] [place]  Insurance Plan: [facility]  Insurance Member ID Number:  [id number]   CoverMyMeds Key: N/A  Informed patient that prior authorizations can take up to 10 business days for response:   Yes  Okay to leave a detailed message: Yes    Please keep us posted on the status of the PA. Thanks!    Liset Hightower, Pharm.D., BCACP  Medication Therapy Management Pharmacist  [facility]

## 2021-06-11 NOTE — PROGRESS NOTES
Liana Briceño has participated in 9 sessions of Phase II Cardiac Rehab.    Progress Report:   Cardiac Rehab Treatment Progress Report 5/31/2017 6/5/2017 6/9/2017 6/12/2017 6/14/2017   Weight 258 lbs 254 lbs 254 lbs 254 lbs 254 lbs   Pre Exercise  HR 74 86 80 80 77   Pre Exercise /60 130/60 122/70 138/82 124/72   Pre Blood Sugar (mg/dl) 171 172 164 187 128   Treadmill Peak HR/, 124/68 -107, 204/70 -106, 142/80 -110, 148/80 -120, 146/78   Heart Rate 82 81 80 78 -75   Post Exercise /70 120/56 122/52 120/60 -120/78   Post Blood Sugar (mg/dl) 172 131 226 201 -234   ECG SR-ST SR SR SR -SR-ST   Total Exercise Minutes 30 24 20 40 20   Blood sugars        171/172                   172/131               172/131                  164/226                128/234      Current Status:  Doing well with exercise. Denies sx with exercise, but does report episodes of dizziness other parts of the day.    If Physician recommends change in treatment plan, please place orders.        __________________________________________________      _____________  Signature                                                                                                  Date

## 2021-06-11 NOTE — PROGRESS NOTES
ITP ASSESSMENT   Assessment Day: 60 Day  Session Number: 14  Precautions: standard cardiac  Diagnosis: Stent  Risk Stratification: High  Referring Provider: Ariane Biswas MD                         Exercise Plan  Goals Next 30 days  ADL'S: Pt will tolerate summer social events with family/friends without increased fatigue.  Independent with carrying  small to medium baskets of laundry without SOB or increased fatigue.  Leisure: Pt will independently track BS with goal to achieve improved BS mgmt.  Work: Pt will participate in weightlifting program     Education Goals: All goals in this section met  Education Goals Met: Patient can state cardiac s/s and appropriate emergency response.;Has system for taking medication.;Medication review.                        Goals Met  30 day ADL'S goals met: Pt met goal of organizing and having energy to sort/clean office  30 day Leisure goals met: Pt has not attempted shore fishing due to busy schedule.  Plans to initiate activity within next 2 weeks  30 day Work goals met: Goal not met.  Pt notes she is doing more light lifting without concern  30 Day Progression: Pt continues to make progress with endurance and is very motivated by her progress.        Intial Work goals met: Pt. has resumed attending the [facility] on days off from rehab.  Initial Progression: Pt. is gaining more confidence, she as been fearful of returning to exercise and heavier household activities in fear of having chest pain. She is tolerating a 3-4 met level.    Exercise Prescription  Exercise Mode: Bike;Nustep;Arm Erg.;Hallway Walking  Frequency: 2x's/week  Duration: 40-50 min  Intensity / THR: 20-30 beats above resting heart rate  RPE 11-14  Progression / Met level: 3-4 met level  Resistive Training?: Yes    Current Exercise (mins/week): 200    Interventions  Home Exercise:  Mode: walking, equipment at [facility]  Frequency: daily, 7 days/week  Duration: 30-60 min    Education Material : Educational  "videos;Provide written material;Individual education and counseling;Offer educational classes    Education Completed  Exercise Education Completed: Cardiac Anatomy;Signs and Symptoms;Medication review;RPE;Emergency Plan;Home Exercise;End point of exercise;Warm up/cool down;BP/HR Reponse to exercise;Benefits of Exercise            Exercise Follow-up/Discharge  Follow up/Discharge: provide handout and teach proper mechanics of weightlifting NUTRITION  Nutrition Assessment: Reassessment    Nutrition Risk Factors:  Nutrition Risk Factors: Diabetes;Dyslipidemia;Overweight  HbA1c: 9.6  Monitors blood sugar at home: Yes  Frequency: 3x's/day  Cholesterol: 106  LDL: 42  HDL: 20  Triglycerides: 222    Nutrition Plan  Interventions  Nutrition Interventions: Diet consult;Provide with written material  Rate Your Plate Score: 49    Education Completed  Nutrition Education Completed: Low Saturated fat diet;Low sodium diet;Carbohydrate counting    Goals  Nutrition Goals (Next 30 days): Patient will follow a low sodium diet;Patient will follow a low saturated fat diet;Patient will lose weight;Patient knows appropriate portion size;Patient can identify their risk factors for CAD    Goals Met  Nutrition Goals Met: Patient knows appropriate portion size    Height, Weight, and  BMI  Weight: 259 lb (117.5 kg)  Height: 5' 6\" (1.676 m)  BMI: 41.82    Nutrition Follow-up  Follow-up/Discharge: Patient stated she has cut back on fast foods now and for weight reduction she is thinking about limiting her carbohydrates to 45 grams/meal instead of 60 grams of carbohydrate per meal.  She plans on cutting out her sugar based coffee dirnks.  (continue to encourage.  Pt has made progress with limiting)       Other Risk Factors  Other Risk Factor Assessment: Reassessment    HTN Risk Factor: Hypertension    Pre Exercise BP: 122/54  Post Exercise BP: 130/60    Hypertension Plan  Goals  HTN Goals: Follow low sodium diet;Take medication as " prescribed;Exercises regularly    Goals Met  HTN Goals Met: Exercises regularly;Take medication as prescribed;Follow low sodium diet    HTN Interventions  HTN Interventions: Diet consult;Therapist/patient discussion;Provide written material (continue to encourage goals of consistent ex program and die)    HTN Education Completed  HTN Education Completed: Medication review;Low sodium diet;Risk factor overview    Tobacco Risk Factor: NA    No Data Recorded        Risk Factor Follow-up   Follow-up/Discharge: Pt. is trying to make posiitive dietary changes, med compliance and exercise.     Psychosocial Risk Factor: Stress    Psychosocial Plan  Interventions  Interventions: Offer educational videos and classes;Provide written material;Individual education and counseling    Education Completed  Education Completed: Relaxation/Coping Techniques;Effects of stress on body    Goals  Goals (Next 30 days): Identify stressors;Practicing stress management skills;Identified Support system    Goals Met  Goals Met: Identified Support system;Identify stressors    Psychosocial Follow-up  Follow-up/Discharge: Doing well with balancing her life and gaining more confidence.           Patient involved in Goal setting?: Yes    Signature: _____________________________________________________________    Date: __________________    Time: __________________

## 2021-06-11 NOTE — PROGRESS NOTES
ITP ASSESSMENT   Assessment Day: 30 Day  Session Number: 10  Precautions: Standard Cardiac  Diagnosis: Stent  Risk Stratification: High  Referring Provider: Ariane Biswas MD  EXERCISE                         Exercise Plan  Goals Next 30 days  ADL'S: Pt to sort, clean and organize her computer area at home.  Leisure: Pt. to return to [employer].  Work: Pt to return to lifting 40 lbs.    Education Goals: All goals in this section met  Education Goals Met: Patient can state cardiac s/s and appropriate emergency response.;Has system for taking medication.;Medication review.                        Goals Met  Intial  goals met: Pt. has resumed attending the [facility] on days off from rehab.  Initial Progression: Pt. is gaining more confidence, she as been fearful of returning to exercise and heavier household activities in fear of having chest pain. She is tolerating a 3-4 met level.    Exercise Prescription  Exercise Mode: Bike;Nustep;Arm Erg.;Hallway Walking  Frequency: 2 x week  Duration: 30-40 min  Intensity / THR: 20-30 beats above resting heart rate  RPE 11-14  Progression / Met level: 2.5-3.5 mets  Resistive Training?: No    Current Exercise (mins/week): 160    Interventions  Home Exercise:  Mode: walking, equipment at [facility]            ( nustep, arm erg.)  Frequency: daily  Duration: 30-60 min    Education Material : Educational videos;Provide written material;Individual education and counseling;Offer educational classes    Education Completed  Exercise Education Completed: Cardiac Anatomy;Signs and Symptoms;Medication review;RPE;Emergency Plan;Home Exercise;End point of exercise;Warm up/cool down;BP/HR Reponse to exercise;Benefits of Exercise            Exercise Follow-up/Discharge  Follow up/Discharge: Pt. has recently returned to [facility], her plan is to attend 3-5x/week.         NUTRITION  Nutrition Assessment: Reassessment    Nutrition Risk Factors:  Nutrition Risk Factors: Diabetes;Dyslipidemia;Overweight  HbA1c:  "8.3  Monitors blood sugar at home: Yes  Frequency: 3x/day  Cholesterol: 106  LDL: 42  HDL: 20  Triglycerides: 222    Nutrition Plan  Interventions  Nutrition Interventions: Diet consult;Provide with written material;Educational videos;Therapist/Patient discussion;Diet class  Rate Your Plate Score: 49    Education Completed  Nutrition Education Completed: Low Saturated fat diet;Low sodium diet;Carbohydrate counting    Goals  Nutrition Goals (Next 30 days): Patient can identify their risk factors for CAD;Patient will lose weight    Goals Met  Nutrition Goals Met: Patient knows appropriate portion size;Completed Nutritional Risk Screen;Provided Rate your Plate Survey;Reviewed Dietitian schedule    Height, Weight, and  BMI  Weight: 254 lb (115.2 kg)  Height: 5' 6\" (1.676 m)  BMI: 41.02    Nutrition Follow-up  Follow-up/Discharge: Pt. has become aware of the the down fall in her dietary habits. She is working hard to make positive changes.     Other Risk Factors  Other Risk Factor Assessment: Reassessment    HTN Risk Factor: Hypertension    Pre Exercise BP: 124/72  Post Exercise BP: 120/68    Hypertension Plan  Goals  HTN Goals: Follow low sodium diet;Exercises regularly;Take medication as prescribed    Goals Met  HTN Goals Met: Take medication as prescribed;Follow low sodium diet    HTN Interventions  HTN Interventions: Diet consult;Therapist/patient discussion;Provide written material;Offer educational videos;Offer educational classes    HTN Education Completed  HTN Education Completed: Medication review;Low sodium diet    Tobacco Risk Factor: NA      Risk Factor Follow-up   Follow-up/Discharge: Pt. is trying to make posiitive dietary changes, med compliance and exercise.       PSYCHOSOCIAL  Psychosocial Assessment: Reassessment     [facility] STONE Q of L Summary Score: 33    MAXINE-D Score: 15    Psychosocial Risk Factor: Stress    Psychosocial Plan  Interventions  Interventions: Offer educational videos and " classes;Provide written material;Individual education and counseling    Education Completed  Education Completed: Relaxation/Coping Techniques    Goals  Goals (Next 30 days): Identify stressors;Practicing stress management skills    Goals Met  Goals Met: Identified Support system    Psychosocial Follow-up  Follow-up/Discharge: Doing well with balancing her life and gaining more confidence.         Patient involved in Goal setting?: Yes    Signature: _____________________________________________________________    Date: __________________    Time: __________________

## 2021-06-11 NOTE — PROGRESS NOTES
Liana Briceño has participated in 10 sessions of Phase II Cardiac Rehab.    Progress Report:   Cardiac Rehab Treatment Progress Report 6/5/2017 6/9/2017 6/12/2017 6/14/2017 6/28/2017   Weight 254 lbs 254 lbs 254 lbs 254 lbs 260 lbs   Pre Exercise  HR 86 80 80 77 93   Pre Exercise /60 122/70 138/82 124/72 120/60   Pre Blood Sugar (mg/dl) 172 164 187 128 225   Stuart elliptical bike Peak HR ,  70 ,  80 HR 98,  80 ,  78 , /80   Heart Rate 81 80 78 72 104   Post Exercise /56 122/52 120/60 120/68 130/60   Post Blood Sugar (mg/dl) 131 226 201 161 331   ECG SR SR SR SR SR-ST   Total Exercise Minutes 24 20 40 40 30         Current Status:  Therapists Comments: Pt not taking Metoprolol reporting she feels dizzy.    If Physician recommends change in treatment plan, please place orders.        __________________________________________________      _____________  Signature                                                                                                  Date

## 2021-06-11 NOTE — PROGRESS NOTES
Attempt 2: Care Guide called patient.  If this patient is returning my call, please transfer to [facility] at ext [phone].

## 2021-06-11 NOTE — PROGRESS NOTES
Attempt 3: Care Guide called patient.  If this patient is returning my call, please transfer to [facility] at ext [phone].  I have called this patient 3 times over the past two weeks and have been unsuccessful in reaching her.  This patient has also not returned any of my messages.  I will continue attempting to reach out to this patient in one month.  I will also check this patient's chart for upcoming appointments, ER reports that may contain a new phone number, or any other recent activity.

## 2021-06-11 NOTE — TELEPHONE ENCOUNTER
Please let Frank know that the CT scan of her brain as well as the pelvic ultrasound both look just fine.    Can get an update on how she is feeling?

## 2021-06-11 NOTE — PROGRESS NOTES
Incision and drainage  Date/Time: 7/17/2017 12:45 PM  Performed by: ENZO ESTRADA  Authorized by: ENZO ESTRADA   Type: abscess  Location: left groin.  Anesthesia: local infiltration    Anesthesia:  Local Anesthetic: lidocaine 1% with epinephrine (ethyl chloride spray)  Anesthetic total: 3 mL  Risk factor: underlying major vessel and  underlying major nerve  Scalpel size: 11  Needle gauge: 25.  Incision type: stab.  Complexity: simple  Drainage: purulent  Drainage amount: moderate  Wound treatment: wound left open  Patient tolerance: Patient tolerated the procedure well with no immediate complications (experienced some pain)

## 2021-06-11 NOTE — PROGRESS NOTES
Medication Therapy Management Follow Up Visit     ASSESSMENT AND PLAN  1. Type 2 diabetes mellitus with hyperglycemia, with long-term current use of insulin  Fasting and postprandial blood sugars are not at goal, however improving.  Recommend continue current dose of basal insulin, and slightly increase bolus insulin dose to 35 units with meals.  To simplify her regimen will have her only monitor blood sugars fasting and 2 hours after dinner, she was testing at random times that were hard to make insulin adjustments with.  Also discussed if she does not eat dinner recommend to administer 20 units of NovoLog to bring her sugars down.  -Increase NovoLog to 35 units 3 times daily with meals  -Decrease testing to fasting and postprandial with dinner  -If not eating dinner administer 20 units of NovoLog in the evening    2. Essential hypertension with goal blood pressure less than 140/90  At goal blood pressure less than 140/90 per JNC 8, has been monitoring pressures daily at home, finding that they are slightly elevated in the evening.  May consider splitting dose of lisinopril twice daily.  She will follow-up via my chart with Dr. Fuentes.     FOLLOW-UP PLAN  Liana was advised to follow up in one week.    SUBJECTIVE AND OBJECTIVE  Liana Briceño is a 62 y.o. female who was referred by Mimi Fuentes MD for MT services.  Liana's chief concern today is diabetes follow up. This month she is working 9am-1pm.     Diabetes: she did really well except on her birthday her sugars did increase. She does not always eat 3 meals daily, which means she is not getting her insulin 3 doses of novolog daily. She does not have much of a structured daily schedule. She is working out now more regularly, she is liking this a lot.   7 day average: 225  Fastin, 186, 205  Before breakfast: 199  Before dinner: 149, 183   Evenin, 152, 306    Lab Results   Component Value Date    HGBA1C 9.6 (H) 2017      Hypertension: pressure this morning was 129/57, with pulse of 88. She is going to send a message to Dr. Fuentes regarding this. Pressures seem to be higher in the evening. She had not tolerated metoprolol well, was having lightheadedness.     This note has been dictated using voice recognition software. Any grammatical or context distortions are unintentional and inherent to the software.    Liana was provided follow up instructions, and this care plan was communicated via EMR with her primary care provider, Mimi Fuentes MD, and is the authorizing prescriber for this visit.  Direct supervision was available by either the patient's PCP or another available physician when needed.    Time spent: 30 minutes  Level of service: 3    Kenya HenriquezD, [facility] HospitalCP  Medication Management (MTM) Pharmacist  [facility]    We reviewed Liana's medication list with them, discussing reason for use, directions for use, and potential side effects of each medication.  Indication, safety, efficacy, and convenience was assessed for all reviewed medications.  No environmental factors were noted currently affecting patient.    Current Outpatient Prescriptions   Medication Sig Dispense Refill     acetaminophen (TYLENOL) 500 MG tablet Take by mouth every 6 (six) hours as needed for pain.       albuterol (PROAIR HFA;PROVENTIL HFA;VENTOLIN HFA) 90 mcg/actuation inhaler Inhale 2 puffs every 6 (six) hours as needed for wheezing. 1 Inhaler 1     aspirin 81 MG EC tablet Take 81 mg by mouth daily.       blood glucose test strips Use 1 each As Directed 3 (three) times a day. 300 strip 12     cholecalciferol, vitamin D3, (CHOLECALCIFEROL) 1,000 unit tablet Take 1,000 Units by mouth daily.       clopidogrel (PLAVIX) 75 mg tablet TAKE 1 TABLET BY MOUTH DAILY 90 tablet 3     hydroCHLOROthiazide (MICROZIDE) 12.5 mg capsule Take 1 capsule (12.5 mg total) by mouth daily. 90 capsule 1     HYDROcodone-acetaminophen 5-325 mg per tablet  "Take 1 tablet by mouth every 4 (four) hours as needed for pain. 60 tablet 0     insulin aspart (NOVOLOG FLEXPEN) 100 unit/mL injection pen Inject 30 Units under the skin 3 (three) times a day with meals. 9 mL 0     insulin degludec (TRESIBA FLEXTOUCH U-200) 200 unit/mL (3 mL) InPn Inject 130 Units under the skin daily. 18 mL 11     ipratropium (ATROVENT) 0.06 % nasal spray 2 sprays into each nostril as needed for rhinitis.       lisinopril (PRINIVIL,ZESTRIL) 5 MG tablet Take 1.5 tablets (7.5 mg total) by mouth daily. 145 tablet 3     melatonin (MELATIN) 3 mg Tab tablet Take 3 mg by mouth at bedtime as needed.       nitroglycerin (NITROSTAT) 0.4 MG SL tablet Place 1 tablet (0.4 mg total) under the tongue every 5 (five) minutes as needed for chest pain. 25 tablet 3     omega-3 acid ethyl esters (LOVAZA) 1 gram capsule Take 2 capsules (2 g total) by mouth 2 (two) times a day. 120 capsule 11     omeprazole (PRILOSEC) 40 MG capsule TAKE 1 CAPSULE BY MOUTH EVERY DAY 90 capsule 0     pen needle, diabetic (BD ULTRA-FINE ZULMA PEN NEEDLES) 32 gauge x 5/32\" Ndle Inject 1 each under the skin daily. With Tresiba 100 each 3     rosuvastatin (CRESTOR) 10 MG tablet TAKE 1 TABLET BY MOUTH DAILY 90 tablet 3     traZODone (DESYREL) 50 MG tablet Take 50 mg by mouth at bedtime as needed for sleep.       Current Facility-Administered Medications   Medication Dose Route Frequency Provider Last Rate Last Dose     ondansetron disintegrating tablet 4 mg (ZOFRAN-ODT)  4 mg Oral Once Maximiliano Borden, DO           "

## 2021-06-11 NOTE — PROGRESS NOTES
[facility] Follow Up Note    Assessment/Plan:  1. Clinical diagnosis of COVID-19  Diagnosed July 30.  Continues to recover.  Still with congestion in the left nares and a pressure headache.  Fatigue.  Occasional temperature to 98.6- which she thinks is an elevation for her.  Vitals quite stable.    2. Type 2 diabetes mellitus with hyperglycemia, with long-term current use of insulin (H)  Of note, insulin was decreased with weight loss associated with COVID-19.  Now that appetite has restored and weight has gone up, blood sugars are back elevated.  She has titrated her Tresiba up to 110 units daily.  She is going to continue to monitor.  She will increase her Tresiba by 2 to 3 units every 3 to 4 days for a fasting sugar under 140.  Additionally, she is using 17 units of mealtime insulin.  Recommendation: Think she would be a good candidate for a continuous glucose monitor.  Will ask pharmacy to assist.  Further insulin titrations per our Pharm.D.  - Basic Metabolic Panel  - insulin lispro (HUMALOG KWIKPEN INSULIN) 100 unit/mL pen; Inject 17 Units under the skin 3 (three) times a day before meals.  Dispense: 129 mL; Refill: 0  - insulin degludec (TRESIBA FLEXTOUCH U-200) 200 unit/mL (3 mL) InPn; Inject 110 Units under the skin daily.  Dispense: 20 mL; Refill: 11    3. Essential hypertension  Under good control.  She has questions about the appropriateness of an ACE inhibitor and hydrochlorothiazide in an African-American.  Have discussed the indications in the setting of coronary artery disease and type 2 diabetes.    4. Tension headache  Persistent headache top of the head and sinus pressure since July 30 and diagnosis of COVID-19.  Improved with Tylenol.  Recommendation: We will check CT scan of the sinuses and cranium.  Additionally, will recommend Tylenol as needed.  Will give minimal dose of prednisone understanding that it may increase blood sugars.  Flonase to the left nares 2 sprays/day  - CT  Head Without Contrast; Future  - HM2(CBC w/o Differential)  - Sedimentation Rate  - predniSONE (DELTASONE) 20 MG tablet; Take 20 mg by mouth daily for 5 days.  Dispense: 5 tablet; Refill: 0    5. Encounter for preventive care  Pap smear performed.  Overdue.  Cervix somewhat friable and a great deal of blood with exam  - Gynecologic Cytology (PAP Smear)    6. Encounter for screening for malignant neoplasm of cervix     - Gynecologic Cytology (PAP Smear)    7. Vaginal bleeding  Significant vaginal bleeding with gynecologic exam.  To err on the side of safety, will check ultrasound of the pelvis as the exam is less than ideal due to elevated body habitus and obesity  - US Pelvis With Transvaginal Non OB; Future  - HM2(CBC w/o Differential)    8. Nasal obstruction  As above  - fluticasone (VERAMYST) 27.5 mcg/actuation nasal spray; 2 sprays left nostril  Dispense: 10 g; Refill: 12          Mimi Fuentes MD    Chief Complaint:  Chief Complaint   Patient presents with     Follow-up     pap     Headache     daily tension headache       History of Present Illness:  Liana is a 65 y.o. female who is here today for follow-up.  Of note, she was diagnosed with COVID-19 on July 30.  She did not require hospitalization but had an extended course of feeling poorly-lasting approximately 10 days.  Her significant other was hospitalized and required high flow oxygen.  They are both recovering at home.  She has not had a temperature greater than 100.4 in several weeks.  She states her temperature does fluctuate and occasionally is up to 98.6.  Her oxygen levels and vitals have remained stable.    She had lost 20 pounds with her illness but now has gained the majority of it back.  As a result, her blood sugars are rising again.  She was able to titrate her insulin down, but now is increasing the levels.  She is wondering about the status of the kiah glucose monitor.  The notes are reviewed from [facility].  She did cut back on  her insulin with the weight loss.  However, as above, her blood sugars are now back up.  She has raised her insulin-long-acting to 110 units daily.  She is using 17 units of mealtime insulin.  She states her appetite is improved along with her sense of smell etc.    Of note, the records from [facility] are reviewed.  The x-ray was negative for pneumonia.  She was not admitted during that visit.    She does have some complaints of a headache since her diagnosis of COVID-19.  It is an occipital headache that does improve with Tylenol.  It is associated with sinus pressure and an obstructed nares on the left.    She has chronic back pain that has not changed.  She has ongoing fatigue.    Of note, the labs from her emergency room visit are reviewed.  She had an elevated creatinine during that visit.  This will be followed up upon.    Review of Systems:  A comprehensive review of systems was performed and was otherwise negative    PFSH:  Social History: Marriage to [facility].  She has 1 son Vale.  They live together.  Social History     Tobacco Use   Smoking Status Former Smoker     Packs/day: 0.25     Years: 44.00     Pack years: 11.00     Types: Cigarettes     Last attempt to quit: 1/20/2017     Years since quitting: 3.6   Smokeless Tobacco Never Used   Tobacco Comment    age 18 to age 62 up to 1/2 ppd       Past History:   Past Medical History:   Diagnosis Date     Anemia      Breast cyst      Carpal tunnel syndrome      Coronary artery disease due to lipid rich plaque      Dyslipidemia, goal LDL below 70      Esophageal reflux      Essential hypertension      Fatty liver      Goiter diffuse, nontoxic      Hiatal hernia      History of gestational diabetes      History of MRSA infection 07/17/2017    Pilonidal cyst culture     Increased PTH level 11/4/2018     Morbid obesity with BMI of 40.0-44.9, adult (H)      RADHA on CPAP      Osteoarthritis      Paroxysmal SVT (supraventricular tachycardia) (H) 2011    ablation  by Dr. Johnson in 2011     Positive result for methicillin resistant Staphylococcus aureus (MRSA) screening 11/4/2018     S/P hip replacement      Thiamine deficiency 11/4/2018     Type 2 diabetes mellitus (H)      Vitamin D deficiency 11/4/2018       Current Outpatient Medications   Medication Sig Dispense Refill     acetaminophen (TYLENOL) 500 MG tablet Take 1,000 mg by mouth 3 (three) times a day as needed.       allopurinol (ZYLOPRIM) 100 MG tablet Take 1 tablet (100 mg total) by mouth daily. 90 tablet 3     aspirin 81 MG EC tablet Take 81 mg by mouth daily.       blood glucose meter (FREESTYLE LITE METER) Use 1 each As Directed 6 (six) times a day. Dispense glucometer brand per patient's insurance at pharmacy discretion. 1 each 0     blood glucose test strips Use 1 each As Directed 6 (six) times a day. Prodigy Meter 300 strip 3     cetirizine (ZYRTEC) 10 MG tablet Take 1 tablet (10 mg total) by mouth daily. 30 tablet 2     cholecalciferol, vitamin D3, (CHOLECALCIFEROL) 1,000 unit tablet Take 2,000 Units by mouth daily.             cinnamon bark (CINNAMON ORAL) Take by mouth.       cyanocobalamin (VITAMIN B-12) 500 MCG tablet Take 500 mcg by mouth daily.       cyanocobalamin 250 MCG tablet Take 250 mcg by mouth daily.       cyclobenzaprine (FLEXERIL) 10 MG tablet Take 1 tablet (10 mg total) by mouth at bedtime as needed for muscle spasms. 21 tablet 1     diclofenac sodium (VOLTAREN) 1 % Gel Apply one thin layer over toe joint/ affected joint 1 Tube 0     flash glucose scanning reader (FREESTYLE MIRANDA 14 DAY READER) Misc Use 1 each As Directed daily. 1 each 1     flash glucose sensor (FREESTYLE MIRANDA 14 DAY SENSOR) Kit Use 1 each As Directed every 14 (fourteen) days. 2 kit 11     generic lancets (MICROLET LANCET) Use 1 each As Directed 6 (six) times a day. 300 each 3     hydroCHLOROthiazide (MICROZIDE) 12.5 mg capsule Take 1 capsule (12.5 mg total) by mouth daily. 90 capsule 3     HYDROcodone-acetaminophen 5-325 mg  "per tablet Take 1 tablet by mouth 2 (two) times a day as needed for pain. 40 tablet 0     insulin degludec (TRESIBA FLEXTOUCH U-200) 200 unit/mL (3 mL) InPn Inject 110 Units under the skin daily. 20 mL 11     insulin lispro (HUMALOG KWIKPEN INSULIN) 100 unit/mL pen Inject 17 Units under the skin 3 (three) times a day before meals. 129 mL 0     lisinopril (PRINIVIL,ZESTRIL) 5 MG tablet Take 1.5 tablets (7.5 mg total) by mouth daily. 135 tablet 3     melatonin (MELATIN) 3 mg Tab tablet Take 3 mg by mouth at bedtime as needed.       nitroglycerin (NITROSTAT) 0.3 MG SL tablet Place 1 tablet (0.3 mg total) under the tongue every 5 (five) minutes as needed for chest pain. 100 tablet 3     nitroglycerin (NITROSTAT) 0.4 MG SL tablet Place 1 tablet (0.4 mg total) under the tongue every 5 (five) minutes as needed for chest pain. 25 tablet 3     omega-3 acid ethyl esters (LOVAZA) 1 gram capsule Take 2 capsules (2 g total) by mouth 2 (two) times a day. 360 capsule 3     omeprazole (PRILOSEC) 40 MG capsule TAKE 1 CAPSULE BY MOUTH EVERY DAY 90 capsule 3     ondansetron (ZOFRAN) 4 MG tablet Take 1 tablet (4 mg total) by mouth every 8 (eight) hours as needed for nausea. 12 tablet 1     ONETOUCH DELICA PLUS LANCET 33 gauge Misc USE TO TEST BLOOD SUGAR 6 TIMES DAILY 600 each 3     OZEMPIC 1 mg/dose (2 mg/1.5 mL) PnIj INJECT 1 MG UNDER THE SKIN EVERY 7 DAYS 3 mL 12     pen needle, diabetic (BD ULTRA-FINE ZULMA PEN NEEDLE) 32 gauge x 5/32\" Ndle Inject 4 each under the skin daily. With Tresiba and Novolog 120 each 3     rosuvastatin (CRESTOR) 10 MG tablet TAKE 1 TABLET BY MOUTH DAILY 90 tablet 3     triamcinolone (KENALOG) 0.1 % cream Apply bid 30 g 0     VITAMIN B-1 100 mg tablet Take 1 tablet (100 mg total) by mouth daily. 90 tablet 3     fluticasone (VERAMYST) 27.5 mcg/actuation nasal spray 2 sprays left nostril 10 g 12     predniSONE (DELTASONE) 20 MG tablet Take 20 mg by mouth daily for 5 days. 5 tablet 0     No current " "facility-administered medications for this visit.        Family History: Chronic kidney disease, type 2 diabetes, cancer, etc.    Physical Exam:  General Appearance:   She appears at baseline and in no acute distress.  Vital signs are stable  Vitals:    09/02/20 1230   BP: 116/66   Patient Site: Left Arm   Patient Position: Sitting   Cuff Size: Adult Large   Pulse: 88   SpO2: 96%   Weight: (!) 265 lb (120.2 kg)   Height: 5' 5\" (1.651 m)     Wt Readings from Last 3 Encounters:   09/02/20 (!) 265 lb (120.2 kg)   07/29/20 (!) 271 lb (122.9 kg)   07/28/20 (!) 270 lb (122.5 kg)     Body mass index is 44.1 kg/m .    Head neck exam is performed.  No focal neurologic deficits are noted.  Some swelling is noted in the left nares  Neck is supple  Lungs are clear to auscultation and percussion  Cardiac exam reveals regular rate and rhythm with no murmurs or gallops  Abdomen is obese.  Moisture is noted beneath the pannus but no fungus  Pelvic exam is performed.  External genitalia within normal limits.  Cervix is visualized and appears healthy.  A significant amount of blood is emitted with the exam and Pap smear.  Bimanual exam is difficult due to body habitus    Data Review:    Analysis and Summary of Old Records (2): Viewed [facility] records and my chart notes    Records Requested (1):       Other History Summarized (from other people in the room) (2):     Radiology Tests Summarized (XRAY/CT/MRI/DXA) (1): Reviewed x-ray and labs from [facility]    Labs Reviewed (1): Labs reviewed    Medicine Tests Reviewed (EKG/ECHO/COLONOSCOPY/EGD) (1):     Independent Review of EKG or X-RAY (2):     Labs ordered    "

## 2021-06-11 NOTE — PROGRESS NOTES
Attempt 1: Care Guide called patient and left message with the person who answered the phone.  If this patient is returning my call, please transfer to Kim at ext [phone].

## 2021-06-12 NOTE — PROGRESS NOTES
HPI: Liana Briceño is a 62 y.o. female referred to see me by Mimi Fuentes MD for a lump in the left medial breast region.  She notes  a several day history of the lump which is severely tender to palpation.  She denies any discharge from the site but has been placed on antibiotics recently with no resolution.  She has noted some erythema and warmth around the site of the bump.  She has had a cyst removed several years ago in the same spot which she states was benign.  Denies any nausea, vomiting, fevers, chills or any other symptoms at present.       Allergies:Penicillins; Doxycycline; Pneumococcal 23-valent polysaccharide vaccine; Statins-hmg-coa reductase inhibitors; and Azithromycin    Past Medical History:   Diagnosis Date     Anemia      Breast cyst      Carpal tunnel syndrome      Coronary artery disease due to lipid rich plaque      Diabetes mellitus     Type 2     Dyslipidemia, goal LDL below 70      Dysphagia      Esophageal reflux      Essential hypertension      Goiter diffuse, nontoxic      Hiatal hernia      Morbid obesity      RADHA (obstructive sleep apnea)      Paroxysmal SVT (supraventricular tachycardia)     S/P ablation       Past Surgical History:   Procedure Laterality Date     BREAST BIOPSY       BREAST CYST EXCISION       CARDIAC CATHETERIZATION N/A 4/24/2017    Procedure: Coronary Angiogram;  Surgeon: Ariane Biswas MD;  Location: [facility] Cath Lab;  Service:      CARDIAC CATHETERIZATION N/A 5/19/2017    Procedure: Coronary Angiogram;  Surgeon: Howard Gutierrez MD;  Location: [facility] Cath Lab;  Service:      CHOLECYSTECTOMY       CORONARY STENT PLACEMENT  04/24/2017    BO to RCA by Dr. Biswas     FL EXPLO/DRAIN BREAST ABSCESS      Description: Breast Surgery Mastotomy With Drainage Of Abscess;  Recorded: 09/08/2010;     FL L HRT CATH W/NJX L VENTRICULOGRAPHY IMG S&I N/A 4/24/2017    Procedure: Left Heart Catheterization with Left Ventriculogram;  Surgeon: Ariane Biswas MD;   Location: [facility];  Service: Cardiology     MD REMOVAL GALLBLADDER  2000     MD REVISE MEDIAN N/CARPAL TUNNEL SURG      Description: Neuroplasty Decompression Median Nerve At Carpal Tunnel;  Recorded: 05/22/2009;     MD THYROID LOBECTOMY,UNILAT         CURRENT MEDS:    Current Outpatient Prescriptions:      acetaminophen (TYLENOL) 500 MG tablet, Take by mouth every 6 (six) hours as needed for pain., Disp: , Rfl:      albuterol (PROAIR HFA;PROVENTIL HFA;VENTOLIN HFA) 90 mcg/actuation inhaler, Inhale 2 puffs every 6 (six) hours as needed for wheezing., Disp: 1 Inhaler, Rfl: 1     aspirin 81 MG EC tablet, Take 81 mg by mouth daily., Disp: , Rfl:      blood glucose test strips, Use 1 each As Directed 3 (three) times a day., Disp: 300 strip, Rfl: 12     cholecalciferol, vitamin D3, (CHOLECALCIFEROL) 1,000 unit tablet, Take 1,000 Units by mouth daily., Disp: , Rfl:      clopidogrel (PLAVIX) 75 mg tablet, TAKE 1 TABLET BY MOUTH DAILY, Disp: 90 tablet, Rfl: 3     hydroCHLOROthiazide (MICROZIDE) 12.5 mg capsule, Take 1 capsule (12.5 mg total) by mouth daily., Disp: 90 capsule, Rfl: 1     HYDROcodone-acetaminophen 5-325 mg per tablet, Take 1 tablet by mouth every 4 (four) hours as needed for pain., Disp: 60 tablet, Rfl: 0     insulin aspart (NOVOLOG FLEXPEN) 100 unit/mL injection pen, Inject 35 Units under the skin 3 (three) times a day with meals., Disp: 9 mL, Rfl: 0     insulin degludec (TRESIBA FLEXTOUCH U-200) 200 unit/mL (3 mL) InPn, Inject 130 Units under the skin daily., Disp: 18 mL, Rfl: 11     lisinopril (PRINIVIL,ZESTRIL) 5 MG tablet, Take 1.5 tablets (7.5 mg total) by mouth daily., Disp: 145 tablet, Rfl: 3     melatonin (MELATIN) 3 mg Tab tablet, Take 3 mg by mouth at bedtime as needed., Disp: , Rfl:      nitroglycerin (NITROSTAT) 0.4 MG SL tablet, Place 1 tablet (0.4 mg total) under the tongue every 5 (five) minutes as needed for chest pain., Disp: 25 tablet, Rfl: 3     omega-3 acid ethyl esters  "(LOVAZA) 1 gram capsule, Take 2 capsules (2 g total) by mouth 2 (two) times a day., Disp: 120 capsule, Rfl: 11     OMEGA-3/DHA/EPA/FISH OIL (FISH OIL-OMEGA-3 FATTY ACIDS) 300-1,000 mg capsule, Take 2 g by mouth daily., Disp: , Rfl:      omeprazole (PRILOSEC) 40 MG capsule, TAKE 1 CAPSULE BY MOUTH EVERY DAY, Disp: 90 capsule, Rfl: 0     pen needle, diabetic (BD ULTRA-FINE ZULMA PEN NEEDLES) 32 gauge x 5/32\" Ndle, Inject 1 each under the skin daily. With Tresiba, Disp: 100 each, Rfl: 3     rosuvastatin (CRESTOR) 10 MG tablet, TAKE 1 TABLET BY MOUTH DAILY, Disp: 90 tablet, Rfl: 3     sulfamethoxazole-trimethoprim (BACTRIM DS) 800-160 mg per tablet, Take 1 tablet by mouth 2 (two) times a day for 10 days., Disp: 20 tablet, Rfl: 0    Current Facility-Administered Medications:      ondansetron disintegrating tablet 4 mg (ZOFRAN-ODT), 4 mg, Oral, Once, Maximiliano Borden, DO    Family History   Problem Relation Age of Onset     Heart disease Father      Heart disease Mother      Leukemia Brother       at a young age     Kidney failure Other      over active bladder     Hypertension Other      Diabetes type II Other      Breast cancer Neg Hx         reports that she quit smoking about 18 months ago. Her smoking use included Cigarettes. She has a 10.00 pack-year smoking history. She has never used smokeless tobacco. She reports that she drinks alcohol. She reports that she does not use illicit drugs.    Review of Systems:  The 12 system review is within normal limits except for as mentioned above.  General ROS: No complaints or constitutional symptoms  Ophthalmic ROS: No complaints of visual changes  Skin: As per HPI  Endocrine: No complaints or symptoms  Hematologic/Lymphatic: No symptoms or complaints  Psychiatric: No symptoms or complaints  Respiratory ROS: no cough, shortness of breath, or wheezing  Cardiovascular ROS: no chest pain or dyspnea on exertion  Gastrointestinal ROS: No complaints of pain or other " "symptoms  Genito-Urinary ROS: no dysuria, trouble voiding, or hematuria  Musculoskeletal ROS: no joint or muscle pain  Neurological ROS: no TIA or stroke symptoms      EXAM:  /77  Pulse 80  Ht 5' 6\" (1.676 m)  Wt (!) 263 lb (119.3 kg)  BMI 42.45 kg/m2  GENERAL: Well developed female, No acute distress, pleasant and conversant   EYES: Pupils equal, round and reactive, no scleral icterus  CARDIAC: Regular rate and rhythm, no obvious murmurs noted  CHEST/LUNG: Clear to ascultation bilaterally, No ronchi, No wheezes  ABDOMEN: Soft, non tender, no masses  SKIN: Pink, warm and dry; left breast- 2 x 3 cm focus of erythematous and painful nodule in the medial breast region.  Severely indurated with no obvious fluctuance palpated  NEURO:No focal deficits, ambulatory  MUSCULOSKELETAL:No obvious deformities, no swelling, normal appearing      LABS:  Lab Results   Component Value Date    WBC 7.4 05/18/2017    WBC 8.0 06/30/2015    HGB 11.4 (L) 05/18/2017    HCT 33.8 (L) 05/18/2017    MCV 88 05/18/2017     05/18/2017     INR/Prothrombin Time      Lab Results   Component Value Date    ALT 20 05/11/2017    AST 16 05/11/2017    ALKPHOS 112 05/11/2017    BILITOT 0.5 05/11/2017           Assessment/Plan:   Liana Briceño is a 62 y.o. female with signs and symptoms consistent with breast abscess.  This would require a formal incision and drainage however, she is on aspirin and Plavix.  This puts her at a fairly high risk for postoperative bleeding.  She is noted that she has had similar bleeding in the past when an inguinal abscess was drained.  Having said that, I discussed the fact that the abscess needs to be drained or aspirated.  I will order an immediate ultrasound-guided aspiration of the left breast abscess today.  She understands everything that was discussed and will follow up if the abscess recurs.      Castillo Hutson D.O. [facility]  [phone]  [facility] Department of Surgery  "

## 2021-06-12 NOTE — TELEPHONE ENCOUNTER
Medication Question or Clarification  Who is calling: Patient  What medication are you calling about (include dose and sig)?:   Disp Refills Start End     flash glucose sensor (FREESTYLE MARIA VICTORIA 14 DAY SENSOR) Kit 2 kit 11 9/10/2020     Sig - Route: Use 1 each As Directed every 14 (fourteen) days. - Miscellaneous    Sent to pharmacy as: FreeStyle Maria Victoria 14 Day Sensor kit (flash glucose sensor)    E-Prescribing Status: Receipt confirmed by pharmacy (9/10/2020  2:15 PM CDT)        Who prescribed the medication?: Mimi Fuentes MD   What is your question/concern?: Patient stated she has been dealing with her insurance company on the Freestyle Maria Victoria machine. Patient stated Mimi Fuentes MD is supposed to provide the DME supplier with the necessary information, to get a coverage determination started. Patient stated should call the DME supplier, [employer], at the following numbers, to find out what is needed. Patient stated she's been dealing with the people listed below from [employer].    Delia's phone - [phone]    Lauren's phone - [phone]    Patient stated she would like a call back when this has all been done.  Requested Pharmacy: n/a  Okay to leave a detailed message?: No

## 2021-06-12 NOTE — TELEPHONE ENCOUNTER
Through her insurance she is not able to use [facility] for her continuous glucose monitor.  We will need to complete the care form and faxed to [employer], fax number is on this document.    [email or web address]

## 2021-06-12 NOTE — PROGRESS NOTES
[facility] Follow Up Note    Assessment/Plan:    1. Breast abscess  Her for follow-up discussion.  Reviewed Dr. Weathers's comments  Recommendations: Abscess has successfully drained.  She is off antibiotics.  If recurs, will consider excision of breast cyst in future.  Would like to wait until she is off Plavix or has been on Plavix for approximately 1 year.  Of note coronary intervention was done in April 2017.    2. Type 2 diabetes mellitus with hyperglycemia, with long-term current use of insulin  Have reviewed recommendations by pharmacy.  Minimal data reviewed here today.  She has minimal records.  Recommendation: I reinforced the concept of a regular routine and schedule.  She needs to prepare regular meals and get regular exercise.  Overall, it looks as though her fasting sugars are improving.  Would like to do surveillance glycohemoglobin.    3.  Obesity  Patient is going to an informational session regarding bariatric surgery.  She is aware that any elective surgery needs to take place 1 year following placement of her coronary stent    4.  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  Taking daily preventative medication was discussed as well.  However, she has a long history of intolerance to medications.    Follow-up in 3 months with me.  She will have interim visits with pharmacy    Mimi Fuentes MD    Chief Complaint:  Chief Complaint   Patient presents with     Follow-up       History of Present Illness:  Liana is a 62 y.o. female who has poorly controlled type 2 diabetes.  Much of this is secondary to noncompliance with diet and lifestyle.  She continues to stop at fast food restaurants.  Her schedule continues to be erratic although she does state that she is working hard to try to  change things.  She denies polyuria or polydipsia.  She denies any low blood sugars.  Limited data is produced today.  Her fasting  sugars seem to be improving overall.  They tend to run under 150.  This is progress for her.    She is attempting to get to the gym and workout on a regular basis    With regard to the breast abscess, she is followed up with Dr. Weathers.  This is reviewed with her as well.  She does not need any surgical intervention currently but may need an excision of the area if the breast abscess re-accumulates.  Our preference is to do this in April after she has been on Plavix for 1 year following her coronary intervention.    Additionally, she has some concerns regarding a recurring herpetic lesion at the base of her spine.  She is asking for treatment.  She is noticing a tingling in that area.  No overt lesions are noted    Review of Systems:  A comprehensive review of systems was performed and was otherwise negative    PFSH:  Social History: She has an adult son who is 21  History   Smoking Status     Former Smoker     Packs/day: 0.25     Years: 40.00     Types: Cigarettes     Quit date: 1/24/2016   Smokeless Tobacco     Never Used       Past History: As noted in the snapshot  Current Outpatient Prescriptions   Medication Sig Dispense Refill     acetaminophen (TYLENOL) 500 MG tablet Take by mouth every 6 (six) hours as needed for pain.       albuterol (PROAIR HFA;PROVENTIL HFA;VENTOLIN HFA) 90 mcg/actuation inhaler Inhale 2 puffs every 6 (six) hours as needed for wheezing. 1 Inhaler 1     aspirin 81 MG EC tablet Take 81 mg by mouth daily.       blood glucose test strips Use 1 each As Directed 3 (three) times a day. 300 strip 12     cholecalciferol, vitamin D3, (CHOLECALCIFEROL) 1,000 unit tablet Take 1,000 Units by mouth daily.       clopidogrel (PLAVIX) 75 mg tablet TAKE 1 TABLET BY MOUTH DAILY 90 tablet 3     hydroCHLOROthiazide (MICROZIDE) 12.5 mg capsule Take 1 capsule (12.5 mg total) by mouth daily. 90 capsule 0     HYDROcodone-acetaminophen 5-325 mg per tablet Take 1 tablet by mouth every 4 (four) hours as needed for  "pain. 60 tablet 0     insulin aspart (NOVOLOG FLEXPEN) 100 unit/mL injection pen Inject 36 Units under the skin 3 (three) times a day with meals. 9 mL 0     insulin degludec (TRESIBA FLEXTOUCH U-200) 200 unit/mL (3 mL) InPn Inject 130 Units under the skin daily. 18 mL 11     lisinopril (PRINIVIL,ZESTRIL) 5 MG tablet Take 1.5 tablets (7.5 mg total) by mouth daily. 145 tablet 3     melatonin (MELATIN) 3 mg Tab tablet Take 3 mg by mouth at bedtime as needed.       nitroglycerin (NITROSTAT) 0.4 MG SL tablet Place 1 tablet (0.4 mg total) under the tongue every 5 (five) minutes as needed for chest pain. 25 tablet 3     omega-3 acid ethyl esters (LOVAZA) 1 gram capsule TAKE 2 CAPSULES BY MOUTH TWICE DAILY 360 capsule 3     omeprazole (PRILOSEC) 40 MG capsule TAKE 1 CAPSULE BY MOUTH EVERY DAY 90 capsule 1     pen needle, diabetic (BD ULTRA-FINE ZULMA PEN NEEDLES) 32 gauge x 5/32\" Ndle Inject 1 each under the skin daily. With Tresiba 100 each 3     rosuvastatin (CRESTOR) 10 MG tablet TAKE 1 TABLET BY MOUTH DAILY 90 tablet 3     acyclovir (ZOVIRAX) 200 MG capsule Take 1 capsule (200 mg total) by mouth 5 (five) times a day for 5 days. 25 capsule 6     Current Facility-Administered Medications   Medication Dose Route Frequency Provider Last Rate Last Dose     ondansetron disintegrating tablet 4 mg (ZOFRAN-ODT)  4 mg Oral Once Maximiliano Borden,            Family History: Nothing new    Physical Exam:  General Appearance:   She appears at baseline and in no acute distress.  Weight is down about 5 pounds overall  Vitals:    08/23/17 1401   BP: 122/50   Patient Site: Left Arm   Patient Position: Sitting   Cuff Size: Adult Large   Pulse: 72   Temp: 97  F (36.1  C)   TempSrc: Tympanic   SpO2: 96%     Wt Readings from Last 3 Encounters:   08/21/17 (!) 259 lb 12.8 oz (117.8 kg)   08/07/17 (!) 263 lb (119.3 kg)   08/04/17 (!) 264 lb 14.4 oz (120.2 kg)     There is no height or weight on file to calculate BMI.        Data " Review:    Analysis and Summary of Old Records (2): Reviewed pharmacy and surgery notes0    Records Requested (1): 0      Other History Summarized (from other people in the room) (2): 0    Radiology Tests Summarized (XRAY/CT/MRI/DXA) (1): 0    Labs Reviewed (1): Reviewed glucose data    Medicine Tests Reviewed (EKG/ECHO/COLONOSCOPY/EGD) (1): 0    Independent Review of EKG or X-RAY (2):

## 2021-06-12 NOTE — PROGRESS NOTES
Chief Complaint   Patient presents with     Mass     under left breast x one day       HPI    Patient is here for one day moderate pain at left breast with a swollen area. Patient reported hx of recurrent similar issues.  No injury. No fever, chills.     ROS: Pertinent ROS noted in HPI.     Allergies   Allergen Reactions     Penicillins Other (See Comments)     Passed out.      Doxycycline Dizziness     Pneumococcal 23-Valent Polysaccharide Vaccine Other (See Comments)     pneumonia     Statins-Hmg-Coa Reductase Inhibitors Myalgia     Shaking with simvastatin and atorvastatin     Azithromycin Rash       Patient Active Problem List   Diagnosis     Carpal tunnel syndrome     Obstructive Sleep Apnea     Diffuse Nontoxic Goiter     Type 2 diabetes mellitus     Morbid obesity     Essential hypertension     Esophageal reflux     Dyslipidemia, goal LDL below 70     Hiatal Hernia     Sensorineural hearing loss     Stenosis of left carotid artery     Coronary artery disease due to lipid rich plaque       Family History   Problem Relation Age of Onset     Heart disease Father      Heart disease Mother      Leukemia Brother       at a young age     Kidney failure Other      over active bladder     Hypertension Other      Diabetes type II Other      Breast cancer Neg Hx        Social History     Social History     Marital status: Domestic Partner     Spouse name: Panchito Mclean     Number of children: 1     Years of education: N/A     Occupational History      [employer]     Social History Main Topics     Smoking status: Former Smoker     Packs/day: 0.25     Years: 40.00     Types: Cigarettes     Quit date: 2016     Smokeless tobacco: Never Used     Alcohol use 0.0 oz/week     0 Standard drinks or equivalent per week      Comment: < 1 drink a week     Drug use: No     Sexual activity: Not on file     Other Topics Concern     Not on file     Social History Narrative    Single but lives with her significant  other, has one son, she works with the [employer] -- and is one herself.  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         Objective:    Vitals:    08/04/17 1702   BP: 130/70   Pulse: 80   Temp: 98  F (36.7  C)   SpO2: 97%       Gen:NAD  Skin: Exam: 3 cm mild eryhtema, with induration, with pain to palpation medial aspect chest wall adjacent medial aspect of left breast. Exam done in presence of Mimi.       Cellulitis - cannot rule out infected cyst.   -     sulfamethoxazole-trimethoprim (BACTRIM DS) 800-160 mg per tablet; Take 1 tablet by mouth 2 (two) times a day for 10 days.      Supportive cares and f/u as directed.

## 2021-06-12 NOTE — PROGRESS NOTES
Liana Briceño has participated in 15 sessions of Phase II Cardiac Rehab.    Progress Report:   Cardiac Rehab Treatment Progress Report 6/30/2017 7/5/2017 7/10/2017 7/13/2017 7/24/2017   Weight 262 lbs 257 lbs 2 oz 259 lbs 259 lbs 260 lbs   Pre Exercise  HR 86 86 87 82 98   Pre Exercise /78 140/62 156/64 122/54 166/72   Pre Blood Sugar (mg/dl) 192 219 253 162 287   Treadmill Peak HR - - - - -   Nustep Peak Heart Rate 102 - - - -   Heart Rate 88 98 101 88 85   Post Exercise /74 130/72 138/68 130/60 136/60   Post Blood Sugar (mg/dl) 252 151 193 110 246   ECG SR-ST SR ST SR-ST SR-ST SR ST   Total Exercise Minutes 32 40 40 40 25     RECUMBENT ELIPTICAL: Random Level 3 24-40 walker 3-4 MET level.  Low back pain much improved while  Working on non weight bearing machines.  , /76 (but drank Expresso for drive home from [place] today)  Pt is motivated to keep working on managing her weight and diabetes thru healthier diet and exercise.    Current Status  Denies anginal symptoms but occasional  Hypoglycemic light headedness    If Physician recommends change in treatment plan, please place orders.        __________________________________________________      _____________  Signature                                                                                                  Date

## 2021-06-12 NOTE — PROGRESS NOTES
[facility] Follow Up Note    Liana Briceño   62 y.o. female    Date of Visit: 8/7/2017    No chief complaint on file.    Subjective  Liana is a patient of Dr. Salazar.  She has uncontrolled diabetes and also suffers from recurrent cysts and abscesses.  She had one in her groin that was lanced in July.  She now has one in her left breast.  She had this previously operated on as well.  She was in the walk-in clinic and was placed on Bactrim within the last week but it does not seem to be resolving.    ROS A comprehensive review of systems was performed and was otherwise negative    Social History:   Social History     Social History Narrative    Single but lives with her significant other, has one son, she works with the [employer] -- and is one herself.  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       Medications were reconciled.  Allergies, social and family history, and the problem list were all reviewed and updated.    Old records reviewed: In clinic records    Exam  General Appearance: Pleasant and alert  Vitals:    08/07/17 0830   BP: 138/56   Patient Site: Left Arm   Patient Position: Sitting   Cuff Size: Adult Large   Pulse: 78      There is no height or weight on file to calculate BMI.  Wt Readings from Last 3 Encounters:   08/04/17 (!) 264 lb 14.4 oz (120.2 kg)   06/06/17 (!) 256 lb 9.6 oz (116.4 kg)   05/24/17 (!) 255 lb 3.2 oz (115.8 kg)     HEENT: Sclera are clear.   Lungs: Normal respirations  Breasts: Induration and redness with pain to palpation over the left inner breast at about 4:00  Abdomen: Soft and nondistended  Extremities: No edema  Skin: No rashes  Neuro: Moves all extremities and has facial symmetry  Gait: Ambulates with a normal gait    Assessment/Plan  1. Breast abscess  We will have her see Dr. Weathers.  I suspect this will need to be surgically removed.  - Ambulatory referral to General Surgery          Maribeth Wheatley MD  8/7/2017    Much or all of the text in this note was  generated through the use of Dragon Dictate voice-to-text software. Errors in spelling or words which seem out of context are unintentional. Sound alike errors, in particular, may have escaped editing.

## 2021-06-12 NOTE — PROGRESS NOTES
" ITP ASSESSMENT   Assessment Day: 120 Day  Session Number: 17  Precautions: cardiac  Diagnosis: Stent  Risk Stratification: High  Referring Provider: Ariane Biswas MD  EXERCISE  Exercise Assessment: Discharge     6 Minute Walk Test   Pre   Pre Exercise HR: 98                  Pre Exercise BP: 124/70    Peak  Peak HR: 125                 Peak BP: 179/77  Peak feet: 800  Peak O2 SAT: 96  Peak RPE: 17  Peak MPH: 1.52    Symptoms:  Peak Symptoms: 8/10 bilateral arthritic ankle pain    5 mins. Post  5 Min Post HR: 94  5 Min Post BP: 152/71                         Exercise Plan  Goals Next 30 days  ADL'S: Pt will tolerate walking around track at gym with less ankle discomfort.  Leisure: Pt will tolerate using stairs on multi--level boat while socializing with friends.  Work: Pt will lose 4 lbs or more in the the next month.    Education Goals: All goals in this section met  Education Goals Met: Patient can state cardiac s/s and appropriate emergency response.;Has system for taking medication.;Medication review.                        Goals Met     60 day ADL'S goals met: Pt has tolerated attending, planning and cooking for birthday and graduation parties this summer without sxs. Pt has tolerated carrying small but not medium loads of laundry due to back pain. \"I can't carry anything bigger than a small load because of my back.\"  60 day Leisure goals met: Pt has independently checked BS 3 x daily at home and discussed readings, sxs, food choices.  60 day Work goals met: Pt has regularly participated in strength training at gym 3 x weekly.  60 Day Progression: Pt has met all 4 goals this month.    30 day ADL'S goals met: Pt met goal of organizing and having energy to sort/clean office  30 day Leisure goals met: Pt has not attempted shore fishing due to busy schedule.  Plans to initiate activity within next 2 weeks  30 day Work goals met: Goal not met.  Pt notes she is doing more light lifting without concern  30 Day " Progression: Pt continues to make progress with endurance and is very motivated by her progress.         Intial Work goals met: Pt. has resumed attending the [place] on days off from rehab.  Initial Progression: Pt. is gaining more confidence, she as been fearful of returning to exercise and heavier household activities in fear of having chest pain. She is tolerating a 3-4 met level.    Exercise Prescription  Exercise Mode: Bike;Nustep;Arm Erg.;Hallway Walking  Frequency: 2 x weekly  Duration: 45-55 min  Intensity / THR: 20-30 beats above resting heart rate  RPE 11-14  Progression / Met level: 3.5-4.5 METS  Resistive Training?: Yes   Will be sending Home Exercise program to patient  As she is unable to come into Cardiac Rehab for her final session.8/23/2017    Current Exercise (mins/week): 155    Interventions  Home Exercise:  Mode: recumbent bike, TM, swimming, walking  Frequency: 5-6 x week  Duration: 40-60 min    Education Material : Educational videos;Provide written material;Individual education and counseling;Offer educational classes    Education Completed  Exercise Education Completed: Cardiac Anatomy;Signs and Symptoms;Medication review;RPE;Emergency Plan;Home Exercise;Warm up/cool down;BP/HR Reponse to exercise;Benefits of Exercise;End point of exercise            Exercise Follow-up/Discharge  Follow up/Discharge: Pt called to request Home Program info be sent to her as she is unable to attend last session. NUTRITION  Nutrition Assessment: Discharge    Nutrition Risk Factors:  Nutrition Risk Factors: Diabetes;Dyslipidemia;Overweight  HbA1c: 9.6  Monitors blood sugar at home: Yes  Frequency: 3xd  Cholesterol: 106  LDL: 42  HDL: 20  Triglycerides: 222    Nutrition Plan  Interventions  Nutrition Interventions: Diet consult;Diet class;Therapist/Patient discussion;Educational videos;Provide with written material;Referral to DM education  Rate Your Plate Score: 49    Education Completed  Nutrition Education  "Completed: Low Saturated fat diet;Low sodium diet;Carbohydrate counting    Goals  Nutrition Goals (Next 30 days): Patient will lose weight    Goals Met  Nutrition Goals Met: Patient can identify their risk factors for CAD;Patient follows a low sodium diet;Patient able to demonstrate carbohydrate counting;Completed Nutritional Risk Screen;Provided Rate your Plate Survey;Reviewed Dietitian schedule;Patient states following a low saturated fat diet;Patient knows appropriate portion size    Height, Weight, and  BMI  Weight: 257 lb 4.8 oz (116.7 kg)  Height: 5' 6\" (1.676 m)  BMI: 41.55    Nutrition Follow-up  Follow-up/Discharge: Pt discussed several low fat / cholesterol, low carb meals that she recently cooked for herself and her family.        Other Risk Factors  Other Risk Factor Assessment: Discharge    HTN Risk Factor: Hypertension    Pre Exercise BP: 124/70  Post Exercise BP: 110/58    Hypertension Plan  Goals  HTN Goals: Exercises regularly    Goals Met  HTN Goals Met: Follow low sodium diet;Take medication as prescribed    HTN Interventions  HTN Interventions: Diet consult;Therapist/patient discussion;Provide written material;Offer educational videos;Offer educational classes    HTN Education Completed  HTN Education Completed: Low sodium diet;Medication review;Risk factor overview;Low sodium class    Tobacco Risk Factor: NA        Risk Factor Follow-up   Follow-up/Discharge: Pt still trying to manage high BP thru diet, exercise and medication   PSYCHOSOCIAL  Psychosocial Assessment: Discharge     Leeanne RITTER Q of L Summary Score: 18    MAXINE-D Score: 8    Psychosocial Risk Factor: Stress    Psychosocial Plan  Interventions  Interventions: Offer educational videos and classes;Provide written material;Individual education and counseling    Education Completed  Education Completed: Relaxation/Coping Techniques;Effects of stress on body    Goals  Goals (Next 30 days): Practicing stress management skills    Goals " Met  Goals Met: Identified Support system;Oriented to stress management classes;Identify stressors;Practicing stress management skills    Psychosocial Follow-up  Follow-up/Discharge: Pt trying to manage her stress with discussion w/ family and friends, and exercise.           Patient involved in Goal setting?: Yes    Signature: _____________________________________________________________    Date: __________________    Time: __________________

## 2021-06-12 NOTE — PROGRESS NOTES
[facility] Note    Liana Briceño   62 y.o. female    Date of Visit: 7/20/2017  Chief Complaint   Patient presents with     Follow-up     procedure       ASSESSMENT/PLAN  1. Abscess       ---------------------------------------------    Successful incision and drainage and resolution of abscess and surrounding cellulitis following incision and drainage performed 3 days ago.  Advised to finish Bactrim course.  Has follow-up with Dr. Fuentes for her other medical problems later this month.  Unclear what caused this, though she does not have a convincing history of hidradenitis suppurativa.  She recalls having spontaneous abscesses on other locations of her body.    Return if symptoms worsen or fail to improve.      SUBJECTIVE  Liana Briceño is a 62-year-old woman here for follow-up after incision and drainage of abscess in left groin.  This was performed by myself on 7/17.  She had some lingering pain afterwards on that day, took a hydrocodone, and the next day felt much better.  She did have some immediate relief also after the abscess was lanced and drained.  The procedure was tolerated well.  She has been taking the Bactrim, and will finish this course.  She has had a scant amount of drainage on the bandage which she has been instructed to change regularly.      Medications, allergies, and problem list were reviewed and updated    Patient Active Problem List   Diagnosis     Carpal tunnel syndrome     Obstructive Sleep Apnea     Diffuse Nontoxic Goiter     Type 2 diabetes mellitus     Morbid obesity     Essential hypertension     Esophageal reflux     Dyslipidemia, goal LDL below 70     Hiatal Hernia     Sensorineural hearing loss     Stenosis of left carotid artery     Coronary artery disease due to lipid rich plaque       Current Outpatient Prescriptions   Medication Sig Dispense Refill     acetaminophen (TYLENOL) 500 MG tablet Take by mouth every 6 (six) hours as needed for pain.       albuterol  "(PROAIR HFA;PROVENTIL HFA;VENTOLIN HFA) 90 mcg/actuation inhaler Inhale 2 puffs every 6 (six) hours as needed for wheezing. 1 Inhaler 1     aspirin 81 MG EC tablet Take 81 mg by mouth daily.       blood glucose test strips Use 1 each As Directed 3 (three) times a day. 300 strip 12     cholecalciferol, vitamin D3, (CHOLECALCIFEROL) 1,000 unit tablet Take 1,000 Units by mouth daily.       clopidogrel (PLAVIX) 75 mg tablet TAKE 1 TABLET BY MOUTH DAILY 90 tablet 3     hydroCHLOROthiazide (MICROZIDE) 12.5 mg capsule Take 1 capsule (12.5 mg total) by mouth daily. 90 capsule 1     HYDROcodone-acetaminophen 5-325 mg per tablet Take 1 tablet by mouth every 4 (four) hours as needed for pain. 60 tablet 0     insulin aspart (NOVOLOG FLEXPEN) 100 unit/mL injection pen Inject 30 Units under the skin 3 (three) times a day with meals. 9 mL 0     insulin degludec (TRESIBA FLEXTOUCH U-200) 200 unit/mL (3 mL) InPn Inject 130 Units under the skin daily. 18 mL 11     lisinopril (PRINIVIL,ZESTRIL) 5 MG tablet Take 1.5 tablets (7.5 mg total) by mouth daily. 145 tablet 3     melatonin (MELATIN) 3 mg Tab tablet Take 3 mg by mouth at bedtime as needed.       nitroglycerin (NITROSTAT) 0.4 MG SL tablet Place 1 tablet (0.4 mg total) under the tongue every 5 (five) minutes as needed for chest pain. 25 tablet 3     omega-3 acid ethyl esters (LOVAZA) 1 gram capsule Take 2 capsules (2 g total) by mouth 2 (two) times a day. 120 capsule 11     omeprazole (PRILOSEC) 40 MG capsule TAKE 1 CAPSULE BY MOUTH EVERY DAY 90 capsule 0     pen needle, diabetic (BD ULTRA-FINE ZULMA PEN NEEDLES) 32 gauge x 5/32\" Ndle Inject 1 each under the skin daily. With Tresiba 100 each 3     rosuvastatin (CRESTOR) 10 MG tablet TAKE 1 TABLET BY MOUTH DAILY 90 tablet 3     sulfamethoxazole-trimethoprim (SEPTRA DS) 800-160 mg per tablet Take 1 tablet by mouth 2 (two) times a day for 5 days. 10 tablet 0     Current Facility-Administered Medications   Medication Dose Route " Frequency Provider Last Rate Last Dose     ondansetron disintegrating tablet 4 mg (ZOFRAN-ODT)  4 mg Oral Once Maximiliano Borden DO         Allergies   Allergen Reactions     Penicillins Other (See Comments)     Passed out.      Doxycycline Dizziness     Pneumococcal 23-Valent Polysaccharide Vaccine Other (See Comments)     pneumonia     Statins-Hmg-Coa Reductase Inhibitors Myalgia     Shaking with simvastatin and atorvastatin     Azithromycin Rash       EXAM  Vitals:    07/20/17 1424   BP: 114/66   Pulse: 80   SpO2: 95%     General: Alert, nontoxic-appearing  Skin: Interval resolution of abscess in the left groin, 3 mm area of denuded skin corresponding to area of incision, granulation tissue present, appears to be healing well.  No drainage or fluctuance surrounding the area.  The area of induration surrounding the abscess which was tender and red has since resolved.  There is a small amount of thin brown discharge (now dry) on bandage.    Maximiliano Borden DO  Internal Medicine  [facility]

## 2021-06-12 NOTE — PROGRESS NOTES
[id number]    Navarro Lianajake Arenasla    Yes, 8/11/17    Due for assessment, haven't had any contact back.    PCP feeling she may not need CCC at this time.    Action  Due Date       Action  Due Date       CG will reach out to discuss goals healthier eating and avoiding fast food, exercising moderately, graduate if pt is unresponsive.

## 2021-06-12 NOTE — PROGRESS NOTES
[facility] Follow Up Note    Assessment/Plan:  1. Chronic back pain  Encourage stretching.  Medications renewed.  - HYDROcodone-acetaminophen 5-325 mg per tablet; Take 1 tablet by mouth every 4 (four) hours as needed for pain.  Dispense: 60 tablet; Refill: 0    2. Type 2 diabetes mellitus  With suboptimal control.  She is currently on Tressiba by insulin at 130 units per day as directed by Dr. Jose Raul Coy Pharm-D.  She is using short acting insulin at 35 units per meal.  She presents with very limited data today.  Fasting blood sugars are slowly improving.  Recommendation: Encourage her to continue to monitor blood glucose so that appropriate adjustments can be made in insulin therapy.  Of note, she is advised to check blood sugars 2 hours postprandially and not immediately thereafter.  Also, have discussed the importance of weight loss in her case.  She would like to pursue acquiring information with regard to bariatric surgery and bariatric care.  The consult was placed.  Of note, she is status post placement of drug-eluting coronary artery stent.  I have advised her that I would like to see her on Plavix for approximately 1 year postprocedure before pursuing elective surgery.  Nonetheless, it would be very helpful for her to gather information.  Of note also, she may want to have a snack of complex carbohydrate and protein for post workout lightheadedness.  Encourage hydration  - Ambulatory referral to Bariatric Care: Surgical and Non-Surgical    3. Obesity  As above  - Ambulatory referral to Bariatric Care: Surgical and Non-Surgical    4. Coronary artery disease due to lipid rich plaque  She is tolerating rosuvastatin.  Her latest LDL cholesterol was quite low.  Blood pressure is at goal.  She is on an aspirin.    5. Essential hypertension with goal blood pressure less than 140/90  Beta-blocker was discontinued due to low heart rate and lightheadedness.  She is now tolerating lisinopril-low dose  at 7.5 mg.  Blood pressure at goal.    6.  Recent incision and drainage of sebaceous cyst in the groin  She has recovered fully.  I have advised not shaving that area.    Mimi Fuentes MD    Chief Complaint:  Chief Complaint   Patient presents with     Follow-up       History of Present Illness:  Liana is a 62 y.o. female with a history of poorly controlled type 2 diabetes, coronary artery disease, moderate carotid plaque but no stenosis, labile blood pressure and chronic back pain.  Of note, she is here today as we are following her quite closely to try to get her back on track.  She is working with our pharmacist on a regular basis to do insulin titration.  She is proud to say that her fasting blood sugars are slowly improving.  She is injecting 130 units of Tressiba insulin daily.  She is trying to be compliant with medications.  Her biggest difficulty is staying on task over the weekends.  She states that she is often with a social engagement, a girls weekend, etc.  It is hard for her to follow a diabetic diet during these times.  She is recently returned from a girls weekend where she had cocktails in a variety of restaurant foods.    She does believe that she is having an occasional low blood sugar.  She states that she was working out at her health club for an hour and 45 minutes.  When she left she felt somewhat lightheaded.  Her blood sugar upon returning home was around 120.  She states her symptoms improved with eating food.  She denies polyuria or polydipsia    With regard to coronary artery disease, she continues to exercise and workout.  The records from X her care are reviewed.  She is having no current chest pain    Of note also, she is status post incision and drainage of sebaceous cyst in the groin.  This is healed nicely    Review of Systems:  A comprehensive review of systems was performed and was otherwise negative    PFSH:  Social History: She lives with a significant other.  They have a  common-law marriage.  She states that they currently get along but did not really interact.  She has 1 son who is 20 years old  History   Smoking Status     Former Smoker     Packs/day: 0.25     Years: 40.00     Types: Cigarettes     Quit date: 1/24/2016   Smokeless Tobacco     Never Used       Past History: As outlined in the chart  Current Outpatient Prescriptions   Medication Sig Dispense Refill     acetaminophen (TYLENOL) 500 MG tablet Take by mouth every 6 (six) hours as needed for pain.       albuterol (PROAIR HFA;PROVENTIL HFA;VENTOLIN HFA) 90 mcg/actuation inhaler Inhale 2 puffs every 6 (six) hours as needed for wheezing. 1 Inhaler 1     aspirin 81 MG EC tablet Take 81 mg by mouth daily.       blood glucose test strips Use 1 each As Directed 3 (three) times a day. 300 strip 12     cholecalciferol, vitamin D3, (CHOLECALCIFEROL) 1,000 unit tablet Take 1,000 Units by mouth daily.       clopidogrel (PLAVIX) 75 mg tablet TAKE 1 TABLET BY MOUTH DAILY 90 tablet 3     hydroCHLOROthiazide (MICROZIDE) 12.5 mg capsule Take 1 capsule (12.5 mg total) by mouth daily. 90 capsule 1     HYDROcodone-acetaminophen 5-325 mg per tablet Take 1 tablet by mouth every 4 (four) hours as needed for pain. 60 tablet 0     insulin aspart (NOVOLOG FLEXPEN) 100 unit/mL injection pen Inject 35 Units under the skin 3 (three) times a day with meals. 9 mL 0     insulin degludec (TRESIBA FLEXTOUCH U-200) 200 unit/mL (3 mL) InPn Inject 130 Units under the skin daily. 18 mL 11     lisinopril (PRINIVIL,ZESTRIL) 5 MG tablet Take 1.5 tablets (7.5 mg total) by mouth daily. 145 tablet 3     melatonin (MELATIN) 3 mg Tab tablet Take 3 mg by mouth at bedtime as needed.       nitroglycerin (NITROSTAT) 0.4 MG SL tablet Place 1 tablet (0.4 mg total) under the tongue every 5 (five) minutes as needed for chest pain. 25 tablet 3     omega-3 acid ethyl esters (LOVAZA) 1 gram capsule Take 2 capsules (2 g total) by mouth 2 (two) times a day. 120 capsule 11      "omeprazole (PRILOSEC) 40 MG capsule TAKE 1 CAPSULE BY MOUTH EVERY DAY 90 capsule 0     pen needle, diabetic (BD ULTRA-FINE ZULMA PEN NEEDLES) 32 gauge x 5/32\" Ndle Inject 1 each under the skin daily. With Tresiba 100 each 3     rosuvastatin (CRESTOR) 10 MG tablet TAKE 1 TABLET BY MOUTH DAILY 90 tablet 3     Current Facility-Administered Medications   Medication Dose Route Frequency Provider Last Rate Last Dose     ondansetron disintegrating tablet 4 mg (ZOFRAN-ODT)  4 mg Oral Once Maximiliano Borden,            Family History: Significant for many of the same problems that she is experiencing.  Her mother had coronary artery disease, type 2 diabetes that was poorly controlled, chronic kidney disease.  Her sister recently  of sudden cardiac death    Physical Exam:  General Appearance:   She appears at baseline and in no acute distress  Vitals:    17 1403   BP: 116/50   Patient Site: Left Arm   Patient Position: Sitting   Cuff Size: Adult Large   Pulse: 78     Wt Readings from Last 3 Encounters:   17 (!) 256 lb 9.6 oz (116.4 kg)   17 (!) 255 lb 3.2 oz (115.8 kg)   17 (!) 257 lb (116.6 kg)     There is no height or weight on file to calculate BMI.    The area examined.  It is completely healed    Data Review:    Analysis and Summary of Old Records (2): Viewed Dr. Suarez and Zbigniew's notes    Records Requested (1): 0      Other History Summarized (from other people in the room) (2): 0    Radiology Tests Summarized (XRAY/CT/MRI/DXA) (1): 0    Labs Reviewed (1): 0    Medicine Tests Reviewed (EKG/ECHO/COLONOSCOPY/EGD) (1): 0    Independent Review of EKG or X-RAY (2): 0        "

## 2021-06-12 NOTE — PROGRESS NOTES
"ITP ASSESSMENT   Assessment Day: 90 Day  Session Number: 16  Precautions: standard cardiac  Diagnosis: Stent  Risk Stratification: High  Referring Provider: Ariane Biswas MD  EXERCISE  Exercise Assessment: Reassessment     6 Minute Walk NA                                     Exercise Plan  Goals Next 30 days  ADL'S: Pt will tolerate walking around track at gym with less ankle discomfort.  Leisure: Pt will tolerate using stairs on multi--level boat while socializing with friends.  Work: Pt will lose 4 lbs or more in the the next month.    Education Goals: All goals in this section met  Education Goals Met: Patient can state cardiac s/s and appropriate emergency response.;Has system for taking medication.;Medication review.                        Goals Met  60 day ADL'S goals met: Pt has tolerated attending, planning and cooking for birthday and graduation parties this summer without sxs. Pt has tolerated carrying small but not medium loads of laundry due to back pain. \"I can't carry anything bigger than a small load because of my back.\"  60 day Leisure goals met: Pt has independently checked BS 3 x daily at home and discussed readings, sxs, food choices.  60 day Work goals met: Pt has regularly participated in strength training at gym 3 x weekly.  60 Day Progression: Pt has met all 4 goals this month.    30 day ADL'S goals met: Pt met goal of organizing and having energy to sort/clean office  30 day Leisure goals met: Pt has not attempted shore fishing due to busy schedule.  Plans to initiate activity within next 2 weeks  30 day Work goals met: Goal not met.  Pt notes she is doing more light lifting without concern  30 Day Progression: Pt continues to make progress with endurance and is very motivated by her progress.        Intial Work goals met: Pt. has resumed attending the [place] on days off from rehab.  Initial Progression: Pt. is gaining more confidence, she as been fearful of returning to exercise and heavier " household activities in fear of having chest pain. She is tolerating a 3-4 met level.    Exercise Prescription  Exercise Mode: Bike;Nustep;Arm Erg.;Hallway Walking  Frequency: 2 x weekly  Duration: 45-55 min  Intensity / THR: 20-30 beats above resting heart rate  RPE 11-14  Progression / Met level: 3.5-4.5 METS  Resistive Training?: Yes    Current Exercise (mins/week): 155    Interventions  Home Exercise:  Mode: recumbent bikes at [facility]  Frequency: 4-5 days weekly  Duration:  40-60 min    Education Material : Educational videos;Provide written material;Individual education and counseling;Offer educational classes    Education Completed  Exercise Education Completed: Cardiac Anatomy;Signs and Symptoms;Medication review;RPE;Emergency Plan;Home Exercise;Warm up/cool down;BP/HR Reponse to exercise;Benefits of Exercise;End point of exercise            Exercise Follow-up/Discharge  Follow up/Discharge: Pt has not been able to exercise in the last two weeks due to cysts on breasts and groin that were sensitive to touch or motion. However, pt has made progress this month by using the recumbent bike for 35 min  x 3 weekly and using weight machines for strengthening at gym. NUTRITION  Nutrition Assessment: Reassessment    Nutrition Risk Factors:  Nutrition Risk Factors: Diabetes;Dyslipidemia;Overweight  HbA1c: 9.6  Monitors blood sugar at home: Yes  Frequency: 3 x daily  Cholesterol: 106  LDL: 42  HDL: 20  Triglycerides: 222    Nutrition Plan  Interventions  Nutrition Interventions: Diet consult;Diet class;Therapist/Patient discussion;Educational videos;Provide with written material;Referral to DM education  Rate Your Plate Score: 49    Education Completed  Nutrition Education Completed: Low Saturated fat diet;Low sodium diet;Carbohydrate counting    Goals  Nutrition Goals (Next 30 days): Patient will lose weight    Goals Met  Nutrition Goals Met: Patient can identify their risk factors for CAD;Patient follows a low sodium  "diet;Patient able to demonstrate carbohydrate counting;Completed Nutritional Risk Screen;Provided Rate your Plate Survey;Reviewed Dietitian schedule;Patient states following a low saturated fat diet;Patient knows appropriate portion size    Height, Weight, and  BMI  Weight: 257 lb (116.6 kg)  Height: 5' 6\" (1.676 m)  BMI: 41.5    Nutrition Follow-up  Follow-up/Discharge: Pt discussed several low fat / cholesterol, low carb meals that she recently cooked for herself and her family.        Other Risk Factors  Other Risk Factor Assessment: Reassessment    HTN Risk Factor: Hypertension    Pre Exercise BP: 128/56  Post Exercise BP: 138/68    Hypertension Plan  Goals  HTN Goals: Exercises regularly    Goals Met  HTN Goals Met: Follow low sodium diet;Take medication as prescribed    HTN Interventions  HTN Interventions: Diet consult;Therapist/patient discussion;Provide written material;Offer educational videos;Offer educational classes    HTN Education Completed  HTN Education Completed: Low sodium diet;Medication review;Risk factor overview;Low sodium class    Tobacco Risk Factor: NA    Risk Factor Follow-up   Follow-up/Discharge: Pt is actively trying to lose weight and lower sodium in diet.   PSYCHOSOCIAL  Psychosocial Assessment: Reassessment       Psychosocial Risk Factor: Stress    Psychosocial Plan  Interventions  Interventions: Offer educational videos and classes;Provide written material;Individual education and counseling    Education Completed  Education Completed: Relaxation/Coping Techniques;Effects of stress on body    Goals  Goals (Next 30 days): Practicing stress management skills    Goals Met  Goals Met: Identified Support system;Oriented to stress management classes;Identify stressors;Practicing stress management skills    Psychosocial Follow-up  Follow-up/Discharge: Pt appears to have a wide variety of social outlets such as family gatherings, social outings with friends. She has identified new stressors " recently such as an aunt with poor health and new health problems for herself.           Patient involved in Goal setting?: Yes    Signature: _____________________________________________________________    Date: __________________    Time: __________________

## 2021-06-12 NOTE — PROGRESS NOTES
[facility] Follow Up Note    Assessment/Plan:  1. Breast abscess  Status post aspiration-ultrasound-guided 2 afternoons ago.  She is tolerating Bactrim.  Worried that it is not improving as there is a small pocket of induration at the base of the breasts.  No fever, chills or constitutional symptoms.  Recommendation: Continue to monitor.  Of note, there may be a hematoma given that the aspiration was performed with patient on Plavix and aspirin.  Recommend warm compresses.  Would await results of culture before changing medication as she is allergic or and sensitive to many medications.  Would like to have her  schedule a follow-up office visit with a breast surgeon to determine whether she would need definitive excision and drainage surgery.  Of note, there is a remote history of the same.  - Ambulatory referral to General Surgery      Mimi Fuentes MD    Chief Complaint:  Chief Complaint   Patient presents with     Breast Problem       History of Present Illness:  Liana is a 62 y.o. female who is seen as a semiurgent add-on this afternoon for evaluation of breast abscess.  Of note, Frank presented on the morning of the seventh with a fluctuant breast mass.  It was diagnosed as an abscess.  She was referred to general surgery and the area was aspirated with ultrasound guidance.  Of note, she states a substantial amount of purulent fluid was expressed from the breast.  She is experienced significant relief of pressure and pain.  She is here today for evaluation.  There is still a small area of induration.  She is worried that she may need repeat drainage or surgery.  Of note, she continues to apply warm compresses.  The area is not warm to touch.  She denies fever or chills.  She has not had any worsening of her blood sugars with this infection.  Upon review of her history, it seems that she had a history of a breast abscess probably greater than 10 years ago.  It required a surgical procedure  back then.  Of note, she is also recently had an inguinal cyst aspirated as well.  Of note, she recently has had poorly controlled type 2 diabetes.  She is doing better with management.  She does report that her blood sugars last night were quite elevated.  She does confess that she ate dinner at [employer] and had burgers and fries.  She continues to have moments of noncompliance with her overall treatment plan.    Mammogram and follow-up 2016 was within normal limits.  Breasts are almost entirely fatty replaced.  Results of the breast ultrasound were also reviewed.  Complex fluid collection was noted      Review of Systems:  A comprehensive review of systems was performed and was otherwise negative    PFSH:  Social History: She has an adult son.  She is not I a common-law relationship.  She does not smoke cigarettes currently  History   Smoking Status     Former Smoker     Packs/day: 0.25     Years: 40.00     Types: Cigarettes     Quit date: 1/24/2016   Smokeless Tobacco     Never Used       Past History: Significant for poorly controlled type 2 diabetes and elevated body mass index.  Current Outpatient Prescriptions   Medication Sig Dispense Refill     acetaminophen (TYLENOL) 500 MG tablet Take by mouth every 6 (six) hours as needed for pain.       albuterol (PROAIR HFA;PROVENTIL HFA;VENTOLIN HFA) 90 mcg/actuation inhaler Inhale 2 puffs every 6 (six) hours as needed for wheezing. 1 Inhaler 1     aspirin 81 MG EC tablet Take 81 mg by mouth daily.       blood glucose test strips Use 1 each As Directed 3 (three) times a day. 300 strip 12     cholecalciferol, vitamin D3, (CHOLECALCIFEROL) 1,000 unit tablet Take 1,000 Units by mouth daily.       clopidogrel (PLAVIX) 75 mg tablet TAKE 1 TABLET BY MOUTH DAILY 90 tablet 3     HYDROcodone-acetaminophen 5-325 mg per tablet Take 1 tablet by mouth every 4 (four) hours as needed for pain. 60 tablet 0     insulin aspart (NOVOLOG FLEXPEN) 100 unit/mL injection pen Inject 35  "Units under the skin 3 (three) times a day with meals. 9 mL 0     insulin degludec (TRESIBA FLEXTOUCH U-200) 200 unit/mL (3 mL) InPn Inject 130 Units under the skin daily. 18 mL 11     lisinopril (PRINIVIL,ZESTRIL) 5 MG tablet Take 1.5 tablets (7.5 mg total) by mouth daily. 145 tablet 3     melatonin (MELATIN) 3 mg Tab tablet Take 3 mg by mouth at bedtime as needed.       nitroglycerin (NITROSTAT) 0.4 MG SL tablet Place 1 tablet (0.4 mg total) under the tongue every 5 (five) minutes as needed for chest pain. 25 tablet 3     omega-3 acid ethyl esters (LOVAZA) 1 gram capsule Take 2 capsules (2 g total) by mouth 2 (two) times a day. 120 capsule 11     OMEGA-3/DHA/EPA/FISH OIL (FISH OIL-OMEGA-3 FATTY ACIDS) 300-1,000 mg capsule Take 2 g by mouth daily.       omeprazole (PRILOSEC) 40 MG capsule TAKE 1 CAPSULE BY MOUTH EVERY DAY 90 capsule 1     pen needle, diabetic (BD ULTRA-FINE ZULMA PEN NEEDLES) 32 gauge x 5/32\" Ndle Inject 1 each under the skin daily. With Tresiba 100 each 3     rosuvastatin (CRESTOR) 10 MG tablet TAKE 1 TABLET BY MOUTH DAILY 90 tablet 3     sulfamethoxazole-trimethoprim (BACTRIM DS) 800-160 mg per tablet Take 1 tablet by mouth 2 (two) times a day for 10 days. 20 tablet 0     fluconazole (DIFLUCAN) 150 MG tablet Take 1 tablet (150 mg total) by mouth once for 1 dose. Repeat in one week 2 tablet 1     hydroCHLOROthiazide (MICROZIDE) 12.5 mg capsule Take 1 capsule (12.5 mg total) by mouth daily. 90 capsule 0     Current Facility-Administered Medications   Medication Dose Route Frequency Provider Last Rate Last Dose     ondansetron disintegrating tablet 4 mg (ZOFRAN-ODT)  4 mg Oral Once Maximiliano Borden DO           Family History: Noncontributory    Physical Exam:  General Appearance:   She appears at baseline  Vitals:    08/09/17 1451   BP: 118/52   Patient Site: Left Arm   Patient Position: Sitting   Cuff Size: Adult Large   Pulse: 72     Wt Readings from Last 3 Encounters:   08/07/17 (!) 263 lb " (119.3 kg)   08/04/17 (!) 264 lb 14.4 oz (120.2 kg)   06/06/17 (!) 256 lb 9.6 oz (116.4 kg)     There is no height or weight on file to calculate BMI.    Left breast is examined.  There is a small area of induration on the inferior portion of the left breast.  It is not warm to palpation.  There is no purulent drainage.  There is no streaking or redness    Data Review:    Analysis and Summary of Old Records (2): Viewed Dr. Wheatley and surgery notes    Records Requested (1): 0      Other History Summarized (from other people in the room) (2): 0    Radiology Tests Summarized (XRAY/CT/MRI/DXA) (1): Viewed breast ultrasound    Labs Reviewed (1): 0    Medicine Tests Reviewed (EKG/ECHO/COLONOSCOPY/EGD) (1): 0    Independent Review of EKG or X-RAY (2): 0

## 2021-06-12 NOTE — PROGRESS NOTES
This is a 62-year-old woman who recently saw  for left breast abscess.  She underwent a ultrasound-guided aspiration.  She has been continuing on antibiotics and is overall feeling better but the area still feels hard so she is worried about that.    Please see chart review for past history.  It is quite extensive.  Of note she is on Plavix for recent coronary stent.    Physical exam:  Morbidly obese woman in no acute distress.  Breasts: In the extreme medial left breast is a scar and just above the scar is an area that somewhat firm but no erythema or fluctuance.    Impression: I suspect that this is not truly a breast abscess as it was just an infected sebaceous cyst.  It makes sense from the standpoint that she had something excised there before and it is in a location where they are very common.  Because she is on Plavix however I think we should try to see if we cannot get her through until she is off her Plavix so that I can do a wide excision.  Right now I think the infections under control so I think there is a good chance that will build to do this.  I told her that if it flares up at all before then that she should just call me and I will get a random will probably just do an I&D just to get her by until we can remove it completely.  The patient also did ask about bariatric surgery and I have offered her to the person in our office who can ensure she gets set up for an informational meeting.

## 2021-06-12 NOTE — PROGRESS NOTES
Medication Therapy Management Follow Up Visit     ASSESSMENT AND PLAN  1. Type 2 diabetes mellitus with other specified complication, with long-term current use of insulin  Not at goal A1c less than 7% per ADA guidelines, largely due to diet and lack of exercise.  However she has had some additional weight loss, and applauded her on this.  When she is more active and follows a more careful diet her blood sugars are consistently much better on her current doses of insulin.  Fasting blood sugars have dropped significantly therefore recommend continue current basal dose.  As we have previously discussed she does not maintain a normal routine at home, and frequently misses her lunchtime NovoLog.  I will take this at bedtime without food.  Encouraged her to maintain more regular schedule and eating breakfast, lunch, dinner and taking her insulin with that meal.  Also discussed examples when her blood sugars have gone extremely high when she has alcoholic beverages or eating at parties.  She demonstrates understanding and will continue to monitor blood sugars, to decrease burden of checking sugars will have her only monitor fasting sugars in the morning and 2 hours after dinner.  Because she has not carbohydrate counting or using a sliding scale, she has not had any low blood sugars or risk of that, therefore actual risk of hypoglycemia without monitoring throughout the day is very low.  Stable on ACE inhibitor, aspirin, statin.  -Educated to check blood sugars fasting and 2 hours after dinner  -Educated on importance of consistent daily schedule, 3 meals = 3 insulin injections    The following high BMI interventions were performed this visit: encouragement to exercise and lifestyle education regarding diet    FOLLOW-UP PLAN  Liana was advised to follow up with Dr. Fuentes in to days.    The following instructions were given:   Patient Instructions   Decrease testing to every morning and two hours  "after dinner   Be more consistent, with meal times and giving insulin with food   Keep up the good work with weight loss and exercise!       SUBJECTIVE AND OBJECTIVE  Liana Briceño is a 62 y.o. female who was referred by Mimi Fuentes MD for [facility] services.  Liana's chief concern today is diabetes follow-up.  She presents today with a blood glucose log.  Notes that she has not been as good at monitoring her blood sugars and writing them down.  She does have many more glucoses in her meter than what she records on paper.  Her Aunt passed away, and went to [place] for the  this weekend, had some drinks and food that she knows was the result of higher blood sugars. She realizes the great benefits of working out but she is \"lazy about it.\" She also has some exercise equipment in the basement but she is scared of spiders down there. 7 day average is 249. If she has a blood sugar that is like 170 at bedtime she will take half of her 36 unit dose of NovoLog, if her sugars are around 200 she will take the full dose of insulin. She drinks a lot of water during the day, however will find that she is drinking soda and some coffee with sugar. She is worrying about drinking alcohol and would like to cut down, this makes her sugars go up significantly.  She is now generally taking her NovoLog at breakfast, dinner, bedtime, even though she is not eating at bedtime.  She commonly misses her noon dose and tries to squeeze in all 3 doses throughout the day.  She does not have much of a daily routine which makes it difficult for her to stay on top of eating, exercise, taking her insulin.  Fasting sugars: 119-258    This note has been dictated using voice recognition software. Any grammatical or context distortions are unintentional and inherent to the software.    Liana was provided follow up instructions, and this care plan was communicated via EMR with her primary care provider, Mimi Fuentes MD, and is the " authorizing prescriber for this visit.  Direct supervision was available by either the patient's PCP or another available physician when needed.    Time spent: 30 minutes  Level of service: 2    Jose Raul Coy, PharmD, BCACP  Medication Management (MTM) Pharmacist  [facility]    We reviewed Liana's medication list with them, discussing reason for use, directions for use, and potential side effects of each medication.  Indication, safety, efficacy, and convenience was assessed for all reviewed medications.  No environmental factors were noted currently affecting patient.    Current Outpatient Prescriptions   Medication Sig Dispense Refill     acetaminophen (TYLENOL) 500 MG tablet Take by mouth every 6 (six) hours as needed for pain.       albuterol (PROAIR HFA;PROVENTIL HFA;VENTOLIN HFA) 90 mcg/actuation inhaler Inhale 2 puffs every 6 (six) hours as needed for wheezing. 1 Inhaler 1     aspirin 81 MG EC tablet Take 81 mg by mouth daily.       blood glucose test strips Use 1 each As Directed 3 (three) times a day. 300 strip 12     cholecalciferol, vitamin D3, (CHOLECALCIFEROL) 1,000 unit tablet Take 1,000 Units by mouth daily.       clopidogrel (PLAVIX) 75 mg tablet TAKE 1 TABLET BY MOUTH DAILY 90 tablet 3     hydroCHLOROthiazide (MICROZIDE) 12.5 mg capsule Take 1 capsule (12.5 mg total) by mouth daily. 90 capsule 0     HYDROcodone-acetaminophen 5-325 mg per tablet Take 1 tablet by mouth every 4 (four) hours as needed for pain. 60 tablet 0     insulin aspart (NOVOLOG FLEXPEN) 100 unit/mL injection pen Inject 36 Units under the skin 3 (three) times a day with meals. 9 mL 0     insulin degludec (TRESIBA FLEXTOUCH U-200) 200 unit/mL (3 mL) InPn Inject 130 Units under the skin daily. 18 mL 11     lisinopril (PRINIVIL,ZESTRIL) 5 MG tablet Take 1.5 tablets (7.5 mg total) by mouth daily. 145 tablet 3     melatonin (MELATIN) 3 mg Tab tablet Take 3 mg by mouth at bedtime as needed.       nitroglycerin (NITROSTAT)  "0.4 MG SL tablet Place 1 tablet (0.4 mg total) under the tongue every 5 (five) minutes as needed for chest pain. 25 tablet 3     omega-3 acid ethyl esters (LOVAZA) 1 gram capsule TAKE 2 CAPSULES BY MOUTH TWICE DAILY 360 capsule 3     OMEGA-3/DHA/EPA/FISH OIL (FISH OIL-OMEGA-3 FATTY ACIDS) 300-1,000 mg capsule Take 2 g by mouth daily.       omeprazole (PRILOSEC) 40 MG capsule TAKE 1 CAPSULE BY MOUTH EVERY DAY 90 capsule 1     pen needle, diabetic (BD ULTRA-FINE ZULMA PEN NEEDLES) 32 gauge x 5/32\" Ndle Inject 1 each under the skin daily. With Tresiba 100 each 3     rosuvastatin (CRESTOR) 10 MG tablet TAKE 1 TABLET BY MOUTH DAILY 90 tablet 3     Current Facility-Administered Medications   Medication Dose Route Frequency Provider Last Rate Last Dose     ondansetron disintegrating tablet 4 mg (ZOFRAN-ODT)  4 mg Oral Once Maixmiliano Borden,                "

## 2021-06-12 NOTE — PROGRESS NOTES
Medication Therapy Management Follow Up Visit     ASSESSMENT AND PLAN  1. Type 2 diabetes mellitus with hyperglycemia, with long-term current use of insulin  Blood sugars have continued to improve on slightly higher dose of bolus insulin, however was unable to exercise this week.  I would like to continue the current dose of insulin is when she re-institutes exercise her sugars will likely continue to improve more.  She will be seeing Dr. Fuentes next week and me the following week.  She would greatly benefit from weight loss through means such as bariatric surgery, however she does have a hard time with adherence and this may very difficult for her.  She is motivated to learn more about this and I encouraged her to do so.  She will start to log her blood sugars and notes about her diet or missed insulin doses over the next 2 weeks, if she is consistently missing evening doses of bolus insulin may consider switching to NPH which she can dose twice daily and will cover her for a greater percentage of the day.  -Log of blood sugars and notes about diet  -Consider switching NovoLog to NPH    2. Essential hypertension with goal blood pressure less than 140/90  At goal blood pressure less than 140/90 per JNC 8, tolerating medication without dizziness, recommend continue current regimen.    FOLLOW-UP PLAN  Liana was advised to follow up in one week.    SUBJECTIVE AND OBJECTIVE  Liana Briceño is a 62 y.o. female who was referred by Mimi Fuentes MD for MTM services.  Liana's chief concern today is diabetes follow up. Had a cyst on her buttocks, addressed today by Dr. Borden, not able to exercise this week.     Diabetes: At last visit she had increased her mealtime insulin to 35 units, and continued on the same long-acting 130 units once daily.  She continues to have issues with taking bolus insulin 3 times daily, due to her schedule.  At night if she is not to eat dinner she will take 20 units  with a snack and this has been going well.  Unfortunately this week due to healing abscess that was drained she was not exercising.  Her blood sugars have improved since last week, and will likely continue to improve with the addition of exercise over the next week.  She is no longer tracking the food that she eats because it is a lot of work between checking her blood sugars, and taking her insulin.  Now that her blood sugars are greatly improved she is much more optimistic about where her diabetes control is going.  She is also looking into bariatric surgery.  7 day average: 195  Fastin, 135, 176, 175, 151, 128  Before dinner: 216, 111, 128  Evenin, 199, 265 (after going out to eat)   Lab Results   Component Value Date    HGBA1C 9.6 (H) 2017     Hypertension: less dizziness on BID dosing of lisinopril.     This note has been dictated using voice recognition software. Any grammatical or context distortions are unintentional and inherent to the software.    Liana was provided follow up instructions, and this care plan was communicated via EMR with her primary care provider, Mimi Fuentes MD, and is the authorizing prescriber for this visit.  Direct supervision was available by either the patient's PCP or another available physician when needed.    Time spent: 30 minutes  Level of service: 2    Jose Raul Coy, PharmD, [facility] Center[facility]  Medication Management (MTM) Pharmacist  [facility]    We reviewed Liana's medication list with them, discussing reason for use, directions for use, and potential side effects of each medication.  Indication, safety, efficacy, and convenience was assessed for all reviewed medications.  No environmental factors were noted currently affecting patient.    Current Outpatient Prescriptions   Medication Sig Dispense Refill     acetaminophen (TYLENOL) 500 MG tablet Take by mouth every 6 (six) hours as needed for pain.       albuterol (PROAIR HFA;PROVENTIL HFA;VENTOLIN  "HFA) 90 mcg/actuation inhaler Inhale 2 puffs every 6 (six) hours as needed for wheezing. 1 Inhaler 1     aspirin 81 MG EC tablet Take 81 mg by mouth daily.       blood glucose test strips Use 1 each As Directed 3 (three) times a day. 300 strip 12     cholecalciferol, vitamin D3, (CHOLECALCIFEROL) 1,000 unit tablet Take 1,000 Units by mouth daily.       clopidogrel (PLAVIX) 75 mg tablet TAKE 1 TABLET BY MOUTH DAILY 90 tablet 3     hydroCHLOROthiazide (MICROZIDE) 12.5 mg capsule Take 1 capsule (12.5 mg total) by mouth daily. 90 capsule 1     HYDROcodone-acetaminophen 5-325 mg per tablet Take 1 tablet by mouth every 4 (four) hours as needed for pain. 60 tablet 0     insulin aspart (NOVOLOG FLEXPEN) 100 unit/mL injection pen Inject 35 Units under the skin 3 (three) times a day with meals. 9 mL 0     insulin degludec (TRESIBA FLEXTOUCH U-200) 200 unit/mL (3 mL) InPn Inject 130 Units under the skin daily. 18 mL 11     lisinopril (PRINIVIL,ZESTRIL) 5 MG tablet Take 1.5 tablets (7.5 mg total) by mouth daily. 145 tablet 3     melatonin (MELATIN) 3 mg Tab tablet Take 3 mg by mouth at bedtime as needed.       nitroglycerin (NITROSTAT) 0.4 MG SL tablet Place 1 tablet (0.4 mg total) under the tongue every 5 (five) minutes as needed for chest pain. 25 tablet 3     omega-3 acid ethyl esters (LOVAZA) 1 gram capsule Take 2 capsules (2 g total) by mouth 2 (two) times a day. 120 capsule 11     omeprazole (PRILOSEC) 40 MG capsule TAKE 1 CAPSULE BY MOUTH EVERY DAY 90 capsule 0     pen needle, diabetic (BD ULTRA-FINE ZULMA PEN NEEDLES) 32 gauge x 5/32\" Ndle Inject 1 each under the skin daily. With Tresiba 100 each 3     rosuvastatin (CRESTOR) 10 MG tablet TAKE 1 TABLET BY MOUTH DAILY 90 tablet 3     sulfamethoxazole-trimethoprim (SEPTRA DS) 800-160 mg per tablet Take 1 tablet by mouth 2 (two) times a day for 5 days. 10 tablet 0     Current Facility-Administered Medications   Medication Dose Route Frequency Provider Last Rate Last Dose     " ondansetron disintegrating tablet 4 mg (ZOFRAN-ODT)  4 mg Oral Once Maximiliano Borden DO

## 2021-06-13 NOTE — PROGRESS NOTES
Received call from patient. She states she is experiencing throbbing pain in the fingers and feet. She described her pain at a 9. Pt explained her A1C was actually down last time and is unsure why all the pain. She was unsure if related to diabetes or not. CG sent message to pool asking for advice. Pt will be contacted at later time for scheduling of meet and greet.

## 2021-06-13 NOTE — PROGRESS NOTES
"                        Medication Therapy Management Follow Up Visit     ASSESSMENT AND PLAN  1. Type 2 diabetes mellitus with hyperglycemia, with long-term current use of insulin  Not at goal A1c less than 7% per ADA guidelines, however with increased dose of bolus insulin it seems as though her blood sugars are somewhat improving.  This is very closely related to her dietary intake.  Continue to educate on ideal time to check blood sugars to ensure that there was blood sugars are helpful with regards to adjusting doses of insulin.  We will have her check fasting sugars and only postprandial blood sugars after that.  May also consider a continuous glucose monitor which is administered by the diabetes educators and worn for 2 weeks straight.  I believe that this would show us very significant patterns with regards to postprandial hyperglycemia.  Liana may understand her blood sugars better she is able to see exactly what is happening with her body.  I will discuss with PCP.  She was provided with a new glucose meter and supplies were ordered.  - pen needle, diabetic (BD ULTRA-FINE ZULMA PEN NEEDLES) 32 gauge x 5/32\" Ndle; Inject 4 each under the skin daily. With Tresiba and Novolog  Dispense: 120 each; Refill: 3  - generic lancets (MICROLET LANCET); Use 1 each As Directed 4 (four) times a day.  Dispense: 300 each; Refill: 3  - blood glucose test (CONTOUR NEXT STRIPS) strips; Use 1 each As Directed 4 (four) times a day.  Dispense: 300 strip; Refill: 3  - insulin aspart (NOVOLOG FLEXPEN) 100 unit/mL injection pen; Inject 40 Units under the skin 2 (two) times a day. And 42 units with dinner  Dispense: 36 mL; Refill: 2  D  - educate on ideal testing times, bring sugars to follow-up in 2 weeks  -Consider continuous glucose monitor, ipro sensor    2. Neuropathy  Neuropathy is improved with addition of gabapentin, will slightly increase the dose for 600 mg 3 times daily, as she is tolerating this without adverse effects.  " "We will follow-up with PCP in 2 weeks.  -Increase gabapentin (NEURONTIN) 600 MG tablet; Take 1 tablet (600 mg total) by mouth 3 (three) times a day.  Dispense: 90 tablet; Refill: 11      FOLLOW-UP PLAN  Liana was advised to follow-up in 2 weeks.    The following instructions were given:   Patient Instructions   Increase gabapentin to 600mg three times daily     New meter and supplies    Check sugars right away in the morning and two hours after meals (do not check outside of these time frames)     Consider ipro sensor for sugars, Michelle will talk to Dr. Valenzuela           SUBJECTIVE AND OBJECTIVE  Liana Briceño is a 62 y.o. female who was referred by Mimi Fuentes MD for MTM services.  Liana's chief concern today is medication management follow-up. She did take her medications this morning.  She did bring in her glucose meter and a little log of her blood sugars that she started writing down.  She is very frustrated because she forgets to do regimented things, this is not consistent with her lifestyle.    Diabetes: This morning her sugar was 140. Feels that she is still \"messing up\" when she is checking her blood sugars. She keeps forgetting to check her sugars before eating.  Because she forgets to check before she eats she will check 1-3 hours after, which makes it difficult to interpret.  Her 3 fingers and toes are still numb. Gabapentin does help a little, and feels that this kept her out of the ER. She is taking 2 caps twice daily, and 1 capsule at night. She is sleeping well, this does not make her tired during the day.  She has been using a [employer]dig[employer] meter for a long time now, would likely benefit from a new glucose meter.  Her insurance prefers contour meters.  She continues on the same dosing of NovoLog 40 units in the morning 40 units in the afternoon and 42 units with dinner, and feels that this is going pretty well for her.  Lab Results   Component Value Date    HGBA1C 8.9 (H) 10/24/2017     Hip pain: " She is requesting a refill of her Vicodin from her PCP, is requesting a hip x-ray prior to seeing PCP on November 14.    This note has been dictated using voice recognition software. Any grammatical or context distortions are unintentional and inherent to the software.    Liana was provided follow up instructions, and this care plan was communicated via EMR with her primary care provider, Mimi Fuentes MD, and is the authorizing prescriber for this visit.  Direct supervision was available by either the patient's PCP or another available physician when needed.    Time spent: 45 minutes  Level of service: 3    Jose Raul Coy, PharmD, BCACP  Medication Management (MTM) Pharmacist  [facility]    We reviewed Liana's medication list with them, discussing reason for use, directions for use, and potential side effects of each medication.  Indication, safety, efficacy, and convenience was assessed for all reviewed medications.  No environmental factors were noted currently affecting patient.    Current Outpatient Prescriptions   Medication Sig Dispense Refill     acetaminophen (TYLENOL) 500 MG tablet Take by mouth every 6 (six) hours as needed for pain.       albuterol (PROAIR HFA;PROVENTIL HFA;VENTOLIN HFA) 90 mcg/actuation inhaler Inhale 2 puffs every 6 (six) hours as needed for wheezing. 1 Inhaler 1     aspirin 81 MG EC tablet Take 81 mg by mouth daily.       blood glucose test (CONTOUR NEXT STRIPS) strips Use 1 each As Directed 4 (four) times a day. 300 strip 3     cholecalciferol, vitamin D3, (CHOLECALCIFEROL) 1,000 unit tablet Take 1,000 Units by mouth daily.       clopidogrel (PLAVIX) 75 mg tablet TAKE 1 TABLET BY MOUTH DAILY 90 tablet 3     gabapentin (NEURONTIN) 600 MG tablet Take 1 tablet (600 mg total) by mouth 3 (three) times a day. 90 tablet 11     generic lancets (MICROLET LANCET) Use 1 each As Directed 4 (four) times a day. 300 each 3     hydroCHLOROthiazide (MICROZIDE) 12.5 mg capsule Take 1  "capsule (12.5 mg total) by mouth daily. 90 capsule 3     HYDROcodone-acetaminophen 5-325 mg per tablet Take 1 tablet by mouth every 4 (four) hours as needed for pain. 60 tablet 0     insulin aspart (NOVOLOG FLEXPEN) 100 unit/mL injection pen Inject 40 Units under the skin 2 (two) times a day. And 42 units with dinner 36 mL 2     insulin degludec (TRESIBA FLEXTOUCH U-200) 200 unit/mL (3 mL) InPn Inject 132 Units under the skin daily. 18 mL 11     lisinopril (PRINIVIL,ZESTRIL) 5 MG tablet Take 1.5 tablets (7.5 mg total) by mouth daily. 135 tablet 3     melatonin (MELATIN) 3 mg Tab tablet Take 3 mg by mouth at bedtime as needed.       nitroglycerin (NITROSTAT) 0.4 MG SL tablet Place 1 tablet (0.4 mg total) under the tongue every 5 (five) minutes as needed for chest pain. 25 tablet 3     omega-3 acid ethyl esters (LOVAZA) 1 gram capsule TAKE 2 CAPSULES BY MOUTH TWICE DAILY 360 capsule 3     omeprazole (PRILOSEC) 40 MG capsule TAKE 1 CAPSULE BY MOUTH EVERY DAY 90 capsule 1     pen needle, diabetic (BD ULTRA-FINE ZULMA PEN NEEDLES) 32 gauge x 5/32\" Ndle Inject 4 each under the skin daily. With Tresiba and Novolog 120 each 3     rosuvastatin (CRESTOR) 10 MG tablet TAKE 1 TABLET BY MOUTH DAILY 90 tablet 3     Current Facility-Administered Medications   Medication Dose Route Frequency Provider Last Rate Last Dose     ondansetron disintegrating tablet 4 mg (ZOFRAN-ODT)  4 mg Oral Once Maximiliano Borden, DO             "

## 2021-06-13 NOTE — PROGRESS NOTES
Received call from patient explaining some left sided hip pain. Pt has been taking her gabapentin which has been helping. Pt rated her pain at a 9. Pt is requesting an xray. CG sent message to pool asking for order. A future appt was scheduled for 11/14. PCP had nothing sooner.

## 2021-06-13 NOTE — TELEPHONE ENCOUNTER
RN cannot approve Refill Request    RN can NOT refill this medication Protocol failed and NO refill given. Last office visit: 9/2/2020 Mimi Fuentes MD Last Physical: 7/28/2020 Last MTM visit: Visit date not found Last visit same specialty: 9/2/2020 Mimi Fuentes MD.  Next visit within 3 mo: Visit date not found  Next physical within 3 mo: Visit date not found      Amy Velez, [facility] Connection Triage/Med Refill 11/20/2020    Requested Prescriptions   Pending Prescriptions Disp Refills     omega-3 acid ethyl esters (LOVAZA) 1 gram capsule [Pharmacy Med Name: OMEGA-3-ACID 1GM CAPSULES (RX)] 360 capsule 3     Sig: TAKE 2 CAPSULES BY MOUTH TWICE DAILY       Lovaza/Ezetimibe-Combination Refill Protocol Failed - 11/18/2020  2:01 PM        Failed - Fasting lipid cascade in last 12 months     Cholesterol   Date Value Ref Range Status   03/13/2018 134 <=199 mg/dL Final     Triglycerides   Date Value Ref Range Status   03/13/2018 276 (H) <=149 mg/dL Final     HDL Cholesterol   Date Value Ref Range Status   03/13/2018 28 (L) >=50 mg/dL Final     LDL Calculated   Date Value Ref Range Status   03/13/2018 51 <=129 mg/dL Final     Patient Fasting > 8hrs?   Date Value Ref Range Status   03/13/2018 Yes  Final             Passed - PCP or prescribing provider visit in past 12 months       Last office visit with prescriber/PCP: 9/2/2020 Mimi Fuentes MD OR same dept: 1/28/2020 Mimi Fuentes MD OR same specialty: 9/2/2020 Mimi Fuentes MD  Last physical: 7/28/2020 Last MTM visit: Visit date not found   Next visit within 3 mo: Visit date not found  Next physical within 3 mo: Visit date not found  Prescriber OR PCP: Mimi Fuentes MD  Last diagnosis associated with med order: 1. Hyperlipemia  - omega-3 acid ethyl esters (LOVAZA) 1 gram capsule [Pharmacy Med Name: OMEGA-3-ACID 1GM CAPSULES (RX)]; TAKE 2 CAPSULES BY MOUTH TWICE DAILY  Dispense: 360 capsule; Refill: 3    2. Acute gouty arthropathy  -  allopurinoL (ZYLOPRIM) 100 MG tablet; TAKE 1 TABLET(100 MG) BY MOUTH DAILY  Dispense: 90 tablet; Refill: 0    If protocol passes may refill for 12 months if within 3 months of last provider visit (or a total of 15 months).              Signed Prescriptions Disp Refills    allopurinoL (ZYLOPRIM) 100 MG tablet 90 tablet 0     Sig: TAKE 1 TABLET(100 MG) BY MOUTH DAILY       Allopurinol/Febuxostat Refill Protocol  Passed - 11/18/2020  2:01 PM        Passed - LFT or AST or ALT in last 12 months     Albumin   Date Value Ref Range Status   04/20/2020 4.2 3.5 - 5.0 g/dL Final     Bilirubin, Total   Date Value Ref Range Status   04/20/2020 0.3 0.0 - 1.0 mg/dL Final     Bilirubin, Direct   Date Value Ref Range Status   03/08/2019 0.1 <=0.5 mg/dL Final     Alkaline Phosphatase   Date Value Ref Range Status   04/20/2020 100 45 - 120 U/L Final     AST   Date Value Ref Range Status   04/20/2020 21 0 - 40 U/L Final     ALT   Date Value Ref Range Status   04/20/2020 25 0 - 45 U/L Final     Protein, Total   Date Value Ref Range Status   04/20/2020 7.4 6.0 - 8.0 g/dL Final                Passed - Visit with PCP or prescribing provider visit in past 12 months     Last office visit with prescriber/PCP: 9/2/2020 Mimi Fuentes MD OR same dept: 1/28/2020 Mimi Fuentes MD OR same specialty: 9/2/2020 Mimi Fuentes MD  Last physical: 7/28/2020 Last MTM visit: Visit date not found   Next visit within 3 mo: Visit date not found  Next physical within 3 mo: Visit date not found  Prescriber OR PCP: Mimi Fuentes MD  Last diagnosis associated with med order: 1. Hyperlipemia  - omega-3 acid ethyl esters (LOVAZA) 1 gram capsule [Pharmacy Med Name: OMEGA-3-ACID 1GM CAPSULES (RX)]; TAKE 2 CAPSULES BY MOUTH TWICE DAILY  Dispense: 360 capsule; Refill: 3    2. Acute gouty arthropathy  - allopurinoL (ZYLOPRIM) 100 MG tablet; TAKE 1 TABLET(100 MG) BY MOUTH DAILY  Dispense: 90 tablet; Refill: 0    If protocol passes may refill for 12  months if within 3 months of last provider visit (or a total of 15 months).

## 2021-06-13 NOTE — TELEPHONE ENCOUNTER
Talked with pharmacist Jose and cancelled the scripts through [employer]     Rx's re-pended to go to [facility]

## 2021-06-13 NOTE — PROGRESS NOTES
Spoke with male who answered. I told him I will call her back at a later time to introduce myself.

## 2021-06-13 NOTE — PROGRESS NOTES
Spoke with pt and introduced myself as new Care Guide. Pt had a few requests. One being to ask PCP about having some dental work post angiogram. Pt thought she may have to wait 6 months after this procedure. Pt is needing to have a tooth extracted. CG will send message to [facility] support Butter. Pt talked about working with our pharmacist Michelle. CG asked pt if she needed another appt for medication management. She would like to discuss having her Rx's synced for less trips to the pharmacy. She is currently going to [facility] every two weeks.  Pt agreed and will be coming on Friday Oct. 20th. CG to meet with patient after. Pt states her diabetes is better. Her counts are in the 140 range. She would like to have these numbers be more consistent.     Next Outreach:10/20/17

## 2021-06-13 NOTE — TELEPHONE ENCOUNTER
RN cannot approve Refill Request    RN can NOT refill this medication Protocol failed and NO refill given. Last office visit: 9/2/2020 Mimi Fuentes MD Last Physical: 7/28/2020 Last MTM visit: Visit date not found Last visit same specialty: 9/2/2020 Mimi Fuentes MD.  Next visit within 3 mo: Visit date not found  Next physical within 3 mo: Visit date not found      Gwen Win, [facility] Triage/Med Refill 11/29/2020    Requested Prescriptions   Pending Prescriptions Disp Refills     VITAMIN B-1 100 mg tablet [Pharmacy Med Name: VITAMIN B-1 100MG TABLETS] 90 tablet 3     Sig: TAKE 1 TABLET(100 MG) BY MOUTH DAILY       There is no refill protocol information for this order

## 2021-06-13 NOTE — TELEPHONE ENCOUNTER
Refill Approved    Rx renewed per Medication Renewal Policy. Medication was last renewed on 11/11/19.    Amy Velez, [facility] Triage/Med Refill 11/10/2020     Requested Prescriptions   Pending Prescriptions Disp Refills     gabapentin (NEURONTIN) 300 MG capsule [Pharmacy Med Name: GABAPENTIN 300MG CAPSULES] 90 capsule 1     Sig: TAKE 1 CAPSULE BY MOUTH AT BEDTIME       Gabapentin/Levetiracetam/Tiagabine Refill Protocol  Passed - 11/8/2020 10:56 AM        Passed - PCP or prescribing provider visit in past 12 months or next 3 months     Last office visit with prescriber/PCP: 9/2/2020 Mimi Fuentes MD OR same dept: 1/28/2020 Mimi Fuentes MD OR same specialty: 9/2/2020 Mimi Fuentes MD  Last physical: 7/28/2020 Last MTM visit: Visit date not found   Next visit within 3 mo: Visit date not found  Next physical within 3 mo: Visit date not found  Prescriber OR PCP: Mimi Fuentes MD  Last diagnosis associated with med order: 1. Chronic low back pain  - gabapentin (NEURONTIN) 300 MG capsule [Pharmacy Med Name: GABAPENTIN 300MG CAPSULES]; TAKE 1 CAPSULE BY MOUTH AT BEDTIME  Dispense: 90 capsule; Refill: 1    2. Essential hypertension with goal blood pressure less than 140/90  - hydroCHLOROthiazide (MICROZIDE) 12.5 mg capsule [Pharmacy Med Name: HYDROCHLOROTHIAZIDE 12.5MG CAPSULES]; TAKE 1 CAPSULE(12.5 MG) BY MOUTH DAILY  Dispense: 90 capsule; Refill: 3    If protocol passes may refill for 12 months if within 3 months of last provider visit (or a total of 15 months).                hydroCHLOROthiazide (MICROZIDE) 12.5 mg capsule [Pharmacy Med Name: HYDROCHLOROTHIAZIDE 12.5MG CAPSULES] 90 capsule 3     Sig: TAKE 1 CAPSULE(12.5 MG) BY MOUTH DAILY       Diuretics/Combination Diuretics Refill Protocol  Passed - 11/8/2020 10:56 AM        Passed - Visit with PCP or prescribing provider visit in past 12 months     Last office visit with prescriber/PCP: 9/2/2020 Mimi Fuentes MD OR same dept:  1/28/2020 Mimi Fuentes MD OR same specialty: 9/2/2020 Mimi Fuentes MD  Last physical: 7/28/2020 Last MTM visit: Visit date not found   Next visit within 3 mo: Visit date not found  Next physical within 3 mo: Visit date not found  Prescriber OR PCP: Mimi Fuentes MD  Last diagnosis associated with med order: 1. Chronic low back pain  - gabapentin (NEURONTIN) 300 MG capsule [Pharmacy Med Name: GABAPENTIN 300MG CAPSULES]; TAKE 1 CAPSULE BY MOUTH AT BEDTIME  Dispense: 90 capsule; Refill: 1    2. Essential hypertension with goal blood pressure less than 140/90  - hydroCHLOROthiazide (MICROZIDE) 12.5 mg capsule [Pharmacy Med Name: HYDROCHLOROTHIAZIDE 12.5MG CAPSULES]; TAKE 1 CAPSULE(12.5 MG) BY MOUTH DAILY  Dispense: 90 capsule; Refill: 3    If protocol passes may refill for 12 months if within 3 months of last provider visit (or a total of 15 months).             Passed - Serum Potassium in past 12 months      Lab Results   Component Value Date    Potassium 4.2 09/02/2020             Passed - Serum Sodium in past 12 months      Lab Results   Component Value Date    Sodium 140 09/02/2020             Passed - Blood pressure on file in past 12 months     BP Readings from Last 1 Encounters:   09/02/20 116/66             Passed - Serum Creatinine in past 12 months      Creatinine   Date Value Ref Range Status   09/02/2020 1.05 0.60 - 1.10 mg/dL Final                gabapentin (NEURONTIN) 300 MG capsule [[id number]]    Electronically signed by: Mimi Fuentes MD on 06/21/19 1413 Status: Discontinued   Ordering user: Mimi Fuentes MD 06/21/19 1413 Authorized by: Mimi Fuentes MD   Frequency:  06/21/19 - 08/19/19  Discontinued by: Jose Raul Coy, PharmD 08/19/19 1467 [Therapy completed]

## 2021-06-13 NOTE — TELEPHONE ENCOUNTER
Refill Approved    Rx renewed per Medication Renewal Policy. Medication was last renewed on 10/21/19.    Amy Velez, [facility] Triage/Med Refill 12/15/2020     Requested Prescriptions   Pending Prescriptions Disp Refills     lisinopriL (PRINIVIL,ZESTRIL) 5 MG tablet 135 tablet 3     Sig: Take 1.5 tablets (7.5 mg total) by mouth daily.       Ace Inhibitors Refill Protocol Passed - 12/15/2020 10:41 AM        Passed - PCP or prescribing provider visit in past 12 months       Last office visit with prescriber/PCP: 9/2/2020 Mimi Fuentes MD OR same dept: Visit date not found OR same specialty: Visit date not found  Last physical: 7/28/2020 Last MTM visit: 4/11/2018 Jose Raul Coy, PharmD   Next visit within 3 mo: Visit date not found  Next physical within 3 mo: Visit date not found  Prescriber OR PCP: Mimi Fuentes MD  Last diagnosis associated with med order: 1. Essential hypertension with goal blood pressure less than 140/90  - lisinopriL (PRINIVIL,ZESTRIL) 5 MG tablet; Take 1.5 tablets (7.5 mg total) by mouth daily.  Dispense: 135 tablet; Refill: 3    If protocol passes may refill for 12 months if within 3 months of last provider visit (or a total of 15 months).             Passed - Serum Potassium in past 12 months     Lab Results   Component Value Date    Potassium 4.2 09/02/2020             Passed - Blood pressure filed in past 12 months     BP Readings from Last 1 Encounters:   09/02/20 116/66             Passed - Serum Creatinine in past 12 months     Creatinine   Date Value Ref Range Status   09/02/2020 1.05 0.60 - 1.10 mg/dL Final

## 2021-06-13 NOTE — PROGRESS NOTES
CG had a couple of vm's from this pt stating that they needed some appts re-scheduled.  CG called this pt back and was able to have them re-scheduled to meet with Esthela Martinez tomorrow on Tues 10/24/17 at 9AM; pt also wanted to see a Dr about their fingers.  Pt stated that their fingers have been feeling tingly the last couple of days; pt's PCP is not available for quite some time, pt was open to seeing a different Dr.  CG was able to schedule pt to see Dr. Moe on 11/02/17 at 10:40AM.  CG reminded pt that Lisa is pt's CG, and gave pt Lisa's contact information; pt confirmed having this information.  ULISES Kaminski will send Lisa a message about meeting with this pt in person.

## 2021-06-13 NOTE — PROGRESS NOTES
Received in box message regarding pt needing to reschedule a couple appts. Per chart, pt has already rescheduled the MTM visit. Pt was arrived and seen for that today 10/24. Attempted to call pt to reschedule Meet and Greet. Pt was unavailable and CG will call later today.

## 2021-06-13 NOTE — TELEPHONE ENCOUNTER
Medication Question or Clarification  Who is calling: patient  What medication are you calling about (include dose and sig)?:     gabapentin (NEURONTIN) 300 MG capsule 90 capsule 1 12/1/2020     Sig: Take one tablet hs      And    HYDROcodone-acetaminophen 5-325 mg per tablet 40 tablet 0 12/1/2020     Sig - Route: Take 1 tablet by mouth 2 (two) times a day as needed for pain. - Oral      And     diclofenac sodium (VOLTAREN) 1 % Gel 1 Tube 0 12/1/2020     Sig: Apply one thin layer over toe joint/ affected joint      Who prescribed the medication?: Mimi Fuentes MD  What is your question/concern?: Patient reports all of these medications have been sent to the wrong pharmacy. Patient does not use [facility]. Pharmacies were updated. Please re-send all prescriptions to [facility] on [address]. Patient reports she is out of medication. Please advise and call patient asap.  Requested Pharmacy: [facility]  Okay to leave a detailed message?: Yes

## 2021-06-13 NOTE — PROGRESS NOTES
Attempt 2: Care Guide called patient.  If this patient is returning my call, please transfer to [facility] at ext [phone].    Pt was unavailable. Male who answered the phone did not want to take a message. CG will call pt tomorrow 10/25/17.

## 2021-06-13 NOTE — TELEPHONE ENCOUNTER
Refill Approved    Rx renewed per Medication Renewal Policy. Medication was last renewed on 11/25/19.    Amy Velez, [facility] Triage/Med Refill 11/20/2020     Requested Prescriptions   Pending Prescriptions Disp Refills     omega-3 acid ethyl esters (LOVAZA) 1 gram capsule [Pharmacy Med Name: OMEGA-3-ACID 1GM CAPSULES (RX)] 360 capsule 3     Sig: TAKE 2 CAPSULES BY MOUTH TWICE DAILY       Lovaza/Ezetimibe-Combination Refill Protocol Failed - 11/18/2020  2:01 PM        Failed - Fasting lipid cascade in last 12 months     Cholesterol   Date Value Ref Range Status   03/13/2018 134 <=199 mg/dL Final     Triglycerides   Date Value Ref Range Status   03/13/2018 276 (H) <=149 mg/dL Final     HDL Cholesterol   Date Value Ref Range Status   03/13/2018 28 (L) >=50 mg/dL Final     LDL Calculated   Date Value Ref Range Status   03/13/2018 51 <=129 mg/dL Final     Patient Fasting > 8hrs?   Date Value Ref Range Status   03/13/2018 Yes  Final             Passed - PCP or prescribing provider visit in past 12 months       Last office visit with prescriber/PCP: 9/2/2020 Mimi Fuentes MD OR same dept: 1/28/2020 Mimi Fuentes MD OR same specialty: 9/2/2020 Mimi Fuentes MD  Last physical: 7/28/2020 Last MTM visit: Visit date not found   Next visit within 3 mo: Visit date not found  Next physical within 3 mo: Visit date not found  Prescriber OR PCP: Mimi Fuentes MD  Last diagnosis associated with med order: 1. Hyperlipemia  - omega-3 acid ethyl esters (LOVAZA) 1 gram capsule [Pharmacy Med Name: OMEGA-3-ACID 1GM CAPSULES (RX)]; TAKE 2 CAPSULES BY MOUTH TWICE DAILY  Dispense: 360 capsule; Refill: 3    2. Acute gouty arthropathy  - allopurinoL (ZYLOPRIM) 100 MG tablet [Pharmacy Med Name: ALLOPURINOL 100MG TABLETS]; TAKE 1 TABLET(100 MG) BY MOUTH DAILY  Dispense: 90 tablet; Refill: 3    If protocol passes may refill for 12 months if within 3 months of last provider visit (or a total of 15 months).                 allopurinoL (ZYLOPRIM) 100 MG tablet [Pharmacy Med Name: ALLOPURINOL 100MG TABLETS] 90 tablet 3     Sig: TAKE 1 TABLET(100 MG) BY MOUTH DAILY       Allopurinol/Febuxostat Refill Protocol  Passed - 11/18/2020  2:01 PM        Passed - LFT or AST or ALT in last 12 months     Albumin   Date Value Ref Range Status   04/20/2020 4.2 3.5 - 5.0 g/dL Final     Bilirubin, Total   Date Value Ref Range Status   04/20/2020 0.3 0.0 - 1.0 mg/dL Final     Bilirubin, Direct   Date Value Ref Range Status   03/08/2019 0.1 <=0.5 mg/dL Final     Alkaline Phosphatase   Date Value Ref Range Status   04/20/2020 100 45 - 120 U/L Final     AST   Date Value Ref Range Status   04/20/2020 21 0 - 40 U/L Final     ALT   Date Value Ref Range Status   04/20/2020 25 0 - 45 U/L Final     Protein, Total   Date Value Ref Range Status   04/20/2020 7.4 6.0 - 8.0 g/dL Final                Passed - Visit with PCP or prescribing provider visit in past 12 months     Last office visit with prescriber/PCP: 9/2/2020 Mimi Fuentes MD OR same dept: 1/28/2020 Mimi Fuentes MD OR same specialty: 9/2/2020 Mimi Fuentes MD  Last physical: 7/28/2020 Last MTM visit: Visit date not found   Next visit within 3 mo: Visit date not found  Next physical within 3 mo: Visit date not found  Prescriber OR PCP: Mimi Fuentes MD  Last diagnosis associated with med order: 1. Hyperlipemia  - omega-3 acid ethyl esters (LOVAZA) 1 gram capsule [Pharmacy Med Name: OMEGA-3-ACID 1GM CAPSULES (RX)]; TAKE 2 CAPSULES BY MOUTH TWICE DAILY  Dispense: 360 capsule; Refill: 3    2. Acute gouty arthropathy  - allopurinoL (ZYLOPRIM) 100 MG tablet [Pharmacy Med Name: ALLOPURINOL 100MG TABLETS]; TAKE 1 TABLET(100 MG) BY MOUTH DAILY  Dispense: 90 tablet; Refill: 3    If protocol passes may refill for 12 months if within 3 months of last provider visit (or a total of 15 months).

## 2021-06-14 NOTE — PROGRESS NOTES
MTM Consult Encounter    Liana Briceño is a 65 y.o. female referred for a clinical pharmacist consult from Dr. Fuentes for education on how to utilize freestyle kiah device    Discussion: She presented today with her freestyle kiah device and would like to learn how to use it.  The duration of this visit was spent providing education on how to place the sensor, and utilize the reader.  She does have a smart phone however we are not able to find application.  She will call [employer] and see if they are able to help her identify whether she is able to get the mitesh or not, as this would greatly improve our access to her blood sugar numbers through remote monitoring.  She demonstrates understanding, and will follow back up in clinic in 2 weeks when she is due to change her sensor, and follow-up on blood sugars.  Historically she has been sensitive to adhesives, provide education to utilize skin TAC if needed for barrier.    Plan:  1.  Educate how to utilize freestyle kiah continuous glucose monitor.    Follow up:   2 week follow up     Jose Raul Coy, PharmD, BCACP  Medication Management (MTM) Pharmacist  [facility]                                                                                                                               Current Outpatient Medications   Medication Sig Dispense Refill     flash glucose scanning reader (FREESTYLE KIAH 14 DAY READER) Misc Use 1 each As Directed daily. 1 each 1     flash glucose sensor (FREESTYLE KIAH 14 DAY SENSOR) Kit Use 1 each As Directed every 14 (fourteen) days. 2 kit 11     insulin degludec (TRESIBA FLEXTOUCH U-200) 200 unit/mL (3 mL) InPn Inject 120 unit marking on U-100 syringe under the skin daily. 63 mL 12     insulin lispro (HUMALOG KWIKPEN) 100 unit/mL pen 20 Units before each meal 45 mL 0     OZEMPIC 1 mg/dose (2 mg/1.5 mL) PnIj INJECT 1 MG UNDER THE SKIN EVERY 7 DAYS 3 mL 12     acetaminophen (TYLENOL) 500 MG tablet Take 1,000 mg by  mouth 3 (three) times a day as needed.       allopurinoL (ZYLOPRIM) 100 MG tablet TAKE 1 TABLET(100 MG) BY MOUTH DAILY 90 tablet 0     aspirin 81 MG EC tablet Take 81 mg by mouth daily.       blood glucose meter (FREESTYLE LITE METER) Use 1 each As Directed 6 (six) times a day. Dispense glucometer brand per patient's insurance at pharmacy discretion. 1 each 0     blood glucose test strips Use 1 each As Directed 6 (six) times a day. Prodigy Meter 300 strip 3     cetirizine (ZYRTEC) 10 MG tablet Take 1 tablet (10 mg total) by mouth daily. 30 tablet 2     cholecalciferol, vitamin D3, (CHOLECALCIFEROL) 1,000 unit tablet Take 2,000 Units by mouth daily.             cinnamon bark (CINNAMON ORAL) Take by mouth.       cyanocobalamin (VITAMIN B-12) 500 MCG tablet Take 500 mcg by mouth daily.       cyanocobalamin 250 MCG tablet Take 250 mcg by mouth daily.       cyclobenzaprine (FLEXERIL) 10 MG tablet Take 1 tablet (10 mg total) by mouth at bedtime as needed for muscle spasms. 21 tablet 1     diclofenac sodium (VOLTAREN) 1 % Gel Apply one thin layer over toe joint/ affected joint 1 Tube 5     gabapentin (NEURONTIN) 300 MG capsule Take one tablet hs 90 capsule 3     generic lancets (MICROLET LANCET) Use 1 each As Directed 6 (six) times a day. 300 each 3     hydroCHLOROthiazide (MICROZIDE) 12.5 mg capsule TAKE 1 CAPSULE(12.5 MG) BY MOUTH DAILY 90 capsule 3     HYDROcodone-acetaminophen 5-325 mg per tablet Take 1 tablet by mouth 2 (two) times a day as needed for pain. 40 tablet 0     lisinopriL (PRINIVIL,ZESTRIL) 5 MG tablet Take 1.5 tablets (7.5 mg total) by mouth daily. 135 tablet 2     melatonin (MELATIN) 3 mg Tab tablet Take 3 mg by mouth at bedtime as needed.       nitroglycerin (NITROSTAT) 0.3 MG SL tablet Place 1 tablet (0.3 mg total) under the tongue every 5 (five) minutes as needed for chest pain. 100 tablet 3     omega-3 acid ethyl esters (LOVAZA) 1 gram capsule TAKE 2 CAPSULES BY MOUTH TWICE DAILY 360 capsule 3      "omeprazole (PRILOSEC) 40 MG capsule TAKE 1 CAPSULE BY MOUTH EVERY DAY 90 capsule 3     ONETOUCH DELICA PLUS LANCET 33 gauge Misc USE TO TEST BLOOD SUGAR 6 TIMES DAILY 600 each 3     pen needle, diabetic (BD ULTRA-FINE ZULMA PEN NEEDLE) 32 gauge x 5/32\" Ndle Inject 4 each under the skin daily. With Tresiba and Novolog 120 each 3     rosuvastatin (CRESTOR) 10 MG tablet TAKE 1 TABLET BY MOUTH DAILY 90 tablet 3     triamcinolone (KENALOG) 0.1 % cream Apply bid 30 g 0     valACYclovir (VALTREX) 500 MG tablet Take 1 tablet (500 mg total) by mouth daily. 30 tablet 6     VITAMIN B-1 100 mg tablet TAKE 1 TABLET(100 MG) BY MOUTH DAILY 90 tablet 3     No current facility-administered medications for this visit.                         "

## 2021-06-14 NOTE — PROGRESS NOTES
[facility] Follow Up Note    Assessment/Plan:  1. Carpal tunnel syndrome  Of the right hand.  She is scheduled for hand surgery evaluation tomorrow.  In the meantime, she will continue to wear splint.  She is using gabapentin for nerve pain along with Vicodin in the evening.    2. Neuropathy  As above, medications are renewed  - gabapentin (NEURONTIN) 600 MG tablet; Take 1 tablet (600 mg total) by mouth 3 (three) times a day. And 600mg at bedtime  Dispense: 90 tablet; Refill: 11    3. Type 2 diabetes mellitus with hyperglycemia, with long-term current use of insulin  Detailed review of 24 hour glucose monitor with her.  Agree with diabetic educator.  She is going to focus on taking insulin before meals as opposed to after eating.  Additionally, we have once again, reinforced that she needs to check postprandial sugars approximately 2 hours after eating.  Encourage regular exercise        Mimi Fuentes MD    Chief Complaint:  Chief Complaint   Patient presents with     Follow-up       History of Present Illness:  Liana is a 62 y.o. female who has a multitude of medical problems that include poorly controlled type 2 diabetes, multiple musculoskeletal problems such as advanced arthritis of the lower back, left hip arthritis and now carpal tunnel syndrome.  Of note, she has a remote history of a carpal tunnel repair.  This was done in the 1990s.  More recently, she has noted numbness in her fingers.  EMG evaluation corroborated a diagnosis of carpal tunnel syndrome.  She is scheduled to see him to surgery tomorrow.  Of note, she is wearing the brace/carpal tunnel splint regularly.  She states that the pain is quite intense.  She is taking 600 mg of gabapentin up to 4 times per day.  In the evening, because of this pain along with her lower back pain, she is using a Vicodin at bedtime.  Of note, she has had difficulty with left hip pain secondary to arthritis.  She has chronic, intermittent back pain.   This is the primary reason for her disability many years ago.    With regard to her type 2 diabetes, it has been very poorly controlled largely due to noncompliance and compliance issues.  More recently, she has been working harder to eat more balanced food.  Her schedule consists of eating breakfast at noon, lunch at 4 and dinner at 9 PM.  She is very nocturnal and stays up late at night.  She states her morning sugar was 123.  Overall things are coming nicely under control.  She is using to receive a insulin and NovoLog before meals.  In doing a detailed history, it seems that is her insulin is often taken after meals.  This is resulted in some low blood sugar events.  Of note she denies polyuria or polydipsia    Review of Systems:  A comprehensive review of systems was performed and was otherwise negative    PFSH:  Social History: One son.  She is in a common-law marriage.  She does not smoke cigarettes.  History   Smoking Status     Former Smoker     Packs/day: 0.25     Years: 40.00     Types: Cigarettes     Quit date: 1/24/2016   Smokeless Tobacco     Never Used       Past History: As noted in the chart.  Of note, she has had a stent placed in April of this year.  She is currently on Plavix and will be for 1 year  Current Outpatient Prescriptions   Medication Sig Dispense Refill     acetaminophen (TYLENOL) 500 MG tablet Take by mouth every 6 (six) hours as needed for pain.       albuterol (PROAIR HFA;PROVENTIL HFA;VENTOLIN HFA) 90 mcg/actuation inhaler Inhale 2 puffs every 6 (six) hours as needed for wheezing. 1 Inhaler 1     aspirin 81 MG EC tablet Take 81 mg by mouth daily.       blood glucose test (CONTOUR NEXT STRIPS) strips Use 1 each As Directed 4 (four) times a day. 300 strip 3     cholecalciferol, vitamin D3, (CHOLECALCIFEROL) 1,000 unit tablet Take 1,000 Units by mouth daily.       clopidogrel (PLAVIX) 75 mg tablet TAKE 1 TABLET BY MOUTH DAILY 90 tablet 3     gabapentin (NEURONTIN) 600 MG tablet Take 1  "tablet (600 mg total) by mouth 3 (three) times a day. And 600mg at bedtime 90 tablet 11     generic lancets (MICROLET LANCET) Use 1 each As Directed 4 (four) times a day. 300 each 3     hydroCHLOROthiazide (MICROZIDE) 12.5 mg capsule Take 1 capsule (12.5 mg total) by mouth daily. 90 capsule 3     HYDROcodone-acetaminophen 5-325 mg per tablet Take 1 tablet by mouth every 4 (four) hours as needed for pain. 60 tablet 0     ibuprofen (ADVIL,MOTRIN) 800 MG tablet Take 1 tablet (800 mg total) by mouth 3 (three) times a day as needed for pain. 21 tablet 0     insulin aspart (NOVOLOG FLEXPEN) 100 unit/mL injection pen Inject 42 Units under the skin 3 (three) times a day before meals. 36 mL 2     insulin degludec (TRESIBA FLEXTOUCH U-200) 200 unit/mL (3 mL) InPn Inject 132 Units under the skin daily. 18 mL 11     lisinopril (PRINIVIL,ZESTRIL) 5 MG tablet Take 1.5 tablets (7.5 mg total) by mouth daily. 135 tablet 3     melatonin (MELATIN) 3 mg Tab tablet Take 3 mg by mouth at bedtime as needed.       nitroglycerin (NITROSTAT) 0.4 MG SL tablet Place 1 tablet (0.4 mg total) under the tongue every 5 (five) minutes as needed for chest pain. 25 tablet 3     omega-3 acid ethyl esters (LOVAZA) 1 gram capsule TAKE 2 CAPSULES BY MOUTH TWICE DAILY 360 capsule 3     omeprazole (PRILOSEC) 40 MG capsule TAKE 1 CAPSULE BY MOUTH EVERY DAY 90 capsule 1     pen needle, diabetic (BD ULTRA-FINE ZULMA PEN NEEDLES) 32 gauge x 5/32\" Ndle Inject 4 each under the skin daily. With Tresiba and Novolog 120 each 3     rosuvastatin (CRESTOR) 10 MG tablet TAKE 1 TABLET BY MOUTH DAILY 90 tablet 3     tiZANidine (ZANAFLEX) 2 MG tablet TAKE 1 TABLET(2 MG) BY MOUTH THREE TIMES DAILY AS NEEDED 270 tablet 0     Current Facility-Administered Medications   Medication Dose Route Frequency Provider Last Rate Last Dose     ondansetron disintegrating tablet 4 mg (ZOFRAN-ODT)  4 mg Oral Once Maximiliano Borden,            Family History: Nothing new    Physical " Exam:  General Appearance:   He appears somewhat groggy.  She attributes her sensorium to fatigue  Vitals:    12/12/17 1408   BP: 134/54   Patient Site: Left Arm   Patient Position: Sitting   Cuff Size: Adult Large   Pulse: (!) 102     Wt Readings from Last 3 Encounters:   11/14/17 (!) 271 lb (122.9 kg)   10/31/17 (!) 268 lb (121.6 kg)   10/24/17 (!) 268 lb 8 oz (121.8 kg)     There is no height or weight on file to calculate BMI.    On exam today.  Here for review of tests

## 2021-06-14 NOTE — PROGRESS NOTES
Attempt 1: Care Guide called patient.  If this patient is returning my call, please transfer to [facility] at ext [phone].

## 2021-06-14 NOTE — PROGRESS NOTES
[facility] Follow Up Note    Assessment/Plan:  1. Type 2 diabetes mellitus with hyperglycemia, with long-term current use of insulin (H)  Glycohemoglobin equals 6.8!  Insulin renewed today.  She is having some difficulty with her glucose monitor and will follow up with Jose Raul Coy with regard to this.  Currently using approximately 20 units of short acting insulin with each meal.  Recommendation: Continue the same.  Would like to see her add exercise to her routine continue long-acting insulin as you are doing.  - HM2(CBC w/o Differential)  - Comprehensive Metabolic Panel  - Glycosylated Hemoglobin A1c  - Ambulatory referral to Medication Management    2. Diffuse Nontoxic Goiter  Update labs  - Thyroid Cascade    3. Essential hypertension  Reasonably controlled with current medication    4. Encounter for laboratory testing for COVID-19 virus  Severe Covid recovered.  Very reluctant to take vaccine when available.  Have had discussion today regarding clinical utility of vaccines in the setting  - COVID-19 Virus (Coronavirus) Antibody & Titer Reflex    5. Coital urinary incontinence  Recommend weight loss/precolloidal bladder emptying.  Will check UA and refer to urology  - Urinalysis-UC if Indicated  - Ambulatory referral to Urology - [facility] [place]  - MR Lumbar Spine Without Contrast; Future    6. HSV (herpes simplex virus) infection  Chest V2 with recurrence-more active than usual-likely secondary to stress.  We will begin preventative Valtrex  - valACYclovir (VALTREX) 500 MG tablet; Take 1 tablet (500 mg total) by mouth daily.  Dispense: 30 tablet; Refill: 6    7. Chronic bilateral low back pain with left-sided sciatica  Continues with intermittent sciatica.  Weight gain likely contributing factor.  Recommendation: We will update MRI of the spine in anticipation of possible epidurals in the near future.  Could consider spine referral.  Also, recommend walking and avoiding exercises that have  lateral and twisting motion  - MR Lumbar Spine Without Contrast; Future    8. Morbid obesity with BMI of 40.0-44.9, adult (H)  Encourage weight loss    9. Stenosis of left carotid artery  Due for CTA of the neck in July  - CTA Neck; Future    10. Coronary artery disease due to lipid rich plaque  Stable          Mimi Fuentes MD    Chief Complaint:  Chief Complaint   Patient presents with     Follow-up     Diabetes     Medication Management       History of Present Illness:  Liana is a 65 y.o. female who is here today for discussion of her usual medical problems and with a multitude of concerns.    First, she is having some difficulty with glucose monitor.  She would like to see Jose Raul to discuss placement/monitoring/etc.  She states she has been able to get her regular insulin down to 20 units.  She is injecting her long-acting insulin as well.  She denies any lightheadedness or dizziness.  She denies any polyuria or polydipsia.    She does have ongoing issues with chronic lower back pain and left-sided sciatica.  It is occurring more frequently.  She states that she is unable to exercise and this is exacerbated her pain.  Currently, she is with some left-sided back pain but is able to ambulate.  She and her significant other purchased exercise equipment.  It is involved with lateral and twisting movements against weight.  She is not able to walk due to weather and inability to get to her health club.  She denies any worsening of the sciatica compared to baseline although it is occurring more frequently.  She does admit to weight gain.  She does have some postcoital incontinence which is new for her.    Additionally, she describes postcoital incontinence.  This is distressing to her.  It is hard to be intimate.  She does empty her bladder ahead of sex.  Nonetheless, she has leakage.  She would like to visit with a urologist if possible.    She denies any chest pain or shortness of breath.    She would like to  review the pros and cons of the coronavirus vaccine and this was performed today.    Have reviewed health maintenance as well    Review of Systems:  A comprehensive review of systems was performed and was otherwise negative    PFSH:  Social History: She is in a common-law marriage for greater than 7 years with [facility].  She has 1 adult son.  Social History     Tobacco Use   Smoking Status Former Smoker     Packs/day: 0.25     Years: 44.00     Pack years: 11.00     Types: Cigarettes     Quit date: 2017     Years since quittin.0   Smokeless Tobacco Never Used   Tobacco Comment    age 18 to age 62 up to 1/2 ppd       Past History: Is recovered from Covid and has multiple medical comorbidities  Current Outpatient Medications   Medication Sig Dispense Refill     acetaminophen (TYLENOL) 500 MG tablet Take 1,000 mg by mouth 3 (three) times a day as needed.       allopurinoL (ZYLOPRIM) 100 MG tablet TAKE 1 TABLET(100 MG) BY MOUTH DAILY 90 tablet 0     aspirin 81 MG EC tablet Take 81 mg by mouth daily.       blood glucose meter (FREESTYLE LITE METER) Use 1 each As Directed 6 (six) times a day. Dispense glucometer brand per patient's insurance at pharmacy discretion. 1 each 0     blood glucose test strips Use 1 each As Directed 6 (six) times a day. Prodigy Meter 300 strip 3     cetirizine (ZYRTEC) 10 MG tablet Take 1 tablet (10 mg total) by mouth daily. 30 tablet 2     cholecalciferol, vitamin D3, (CHOLECALCIFEROL) 1,000 unit tablet Take 2,000 Units by mouth daily.             cinnamon bark (CINNAMON ORAL) Take by mouth.       cyanocobalamin (VITAMIN B-12) 500 MCG tablet Take 500 mcg by mouth daily.       cyanocobalamin 250 MCG tablet Take 250 mcg by mouth daily.       cyclobenzaprine (FLEXERIL) 10 MG tablet Take 1 tablet (10 mg total) by mouth at bedtime as needed for muscle spasms. 21 tablet 1     diclofenac sodium (VOLTAREN) 1 % Gel Apply one thin layer over toe joint/ affected joint 1 Tube 5     flash glucose  "scanning reader (FREESTYLE MIRANDA 14 DAY READER) Misc Use 1 each As Directed daily. 1 each 1     flash glucose sensor (FREESTYLE MIRANDA 14 DAY SENSOR) Kit Use 1 each As Directed every 14 (fourteen) days. 2 kit 11     gabapentin (NEURONTIN) 300 MG capsule Take one tablet hs 90 capsule 3     generic lancets (MICROLET LANCET) Use 1 each As Directed 6 (six) times a day. 300 each 3     hydroCHLOROthiazide (MICROZIDE) 12.5 mg capsule TAKE 1 CAPSULE(12.5 MG) BY MOUTH DAILY 90 capsule 3     HYDROcodone-acetaminophen 5-325 mg per tablet Take 1 tablet by mouth 2 (two) times a day as needed for pain. 40 tablet 0     insulin degludec (TRESIBA FLEXTOUCH U-200) 200 unit/mL (3 mL) InPn Inject 120 unit marking on U-100 syringe under the skin daily. 63 mL 12     insulin lispro (HUMALOG KWIKPEN) 100 unit/mL pen INJECT 45 UNITS UNDER THE SKIN THREE TIMES DAILY (Patient taking differently: 20 Units. ) 45 mL 0     lisinopriL (PRINIVIL,ZESTRIL) 5 MG tablet Take 1.5 tablets (7.5 mg total) by mouth daily. 135 tablet 2     melatonin (MELATIN) 3 mg Tab tablet Take 3 mg by mouth at bedtime as needed.       nitroglycerin (NITROSTAT) 0.3 MG SL tablet Place 1 tablet (0.3 mg total) under the tongue every 5 (five) minutes as needed for chest pain. 100 tablet 3     omega-3 acid ethyl esters (LOVAZA) 1 gram capsule TAKE 2 CAPSULES BY MOUTH TWICE DAILY 360 capsule 3     omeprazole (PRILOSEC) 40 MG capsule TAKE 1 CAPSULE BY MOUTH EVERY DAY 90 capsule 3     ONETOUCH DELICA PLUS LANCET 33 gauge Misc USE TO TEST BLOOD SUGAR 6 TIMES DAILY 600 each 3     OZEMPIC 1 mg/dose (2 mg/1.5 mL) PnIj INJECT 1 MG UNDER THE SKIN EVERY 7 DAYS 3 mL 12     pen needle, diabetic (BD ULTRA-FINE ZULMA PEN NEEDLE) 32 gauge x 5/32\" Ndle Inject 4 each under the skin daily. With Tresiba and Novolog 120 each 3     rosuvastatin (CRESTOR) 10 MG tablet TAKE 1 TABLET BY MOUTH DAILY 90 tablet 3     triamcinolone (KENALOG) 0.1 % cream Apply bid 30 g 0     VITAMIN B-1 100 mg tablet TAKE 1 " "TABLET(100 MG) BY MOUTH DAILY 90 tablet 3     valACYclovir (VALTREX) 500 MG tablet Take 1 tablet (500 mg total) by mouth daily. 30 tablet 6     No current facility-administered medications for this visit.        Family History:   Family History   Problem Relation Age of Onset     Heart disease Father         unknown type     Valvular heart disease Mother      Kidney disease Mother      Hypertension Mother      Diabetes Mother      Leukemia Brother          at a young age     No Medical Problems Son      Diabetes Paternal Aunt      Kidney disease Sister      Heart disease Sister      Other Other 25        victim of a homicide     Breast cancer Neg Hx          Physical Exam:  General Appearance:   Appears at baseline and in no acute distress  Vitals:    21 0912   BP: 138/66   Patient Site: Left Arm   Patient Position: Sitting   Cuff Size: Adult Large   Pulse: 88   Temp: 96.6  F (35.9  C)   TempSrc: Tympanic   SpO2: 97%   Weight: (!) 272 lb (123.4 kg)   Height: 5' 5\" (1.651 m)     Wt Readings from Last 3 Encounters:   21 (!) 272 lb (123.4 kg)   20 (!) 265 lb (120.2 kg)   20 (!) 271 lb (122.9 kg)     Body mass index is 45.26 kg/m .    Neck exam is negative  Lungs are clear to auscultation and percussion  Cardiac exam reveals regular rate and rhythm with no murmurs or gallops  Abdomen soft and obese  She ambulates with a slight limp    Data Review:    Analysis and Summary of Old Records (2): Reviewed CTA of the neck and Dr. Acosta Waggoner's records.  Reviewed my chart messages    Records Requested (1):       Other History Summarized (from other people in the room) (2):     Radiology Tests Summarized (XRAY/CT/MRI/DXA) (1): Ordered CT of the neck/ordered MRI of the spine    Labs Reviewed (1): You diabetic labs of many labs are ordered    Medicine Tests Reviewed (EKG/ECHO/COLONOSCOPY/EGD) (1):     Independent Review of EKG or X-RAY (2):     Spent greater than 40 minutes    "

## 2021-06-14 NOTE — PROGRESS NOTES
"[facility] Note    Liana Briceño   62 y.o. female    Date of Visit: 11/20/2017  Chief Complaint   Patient presents with     Back Pain     since Sat, very painful        ASSESSMENT/PLAN  1. Acute right-sided low back pain without sciatica  Ambulatory referral to Physical Therapy     ---------------------------------------------    2 days of low back pain without radiculopathy.  Mobility has been problematic for her as a result.  There was no trauma involved, no red flag, I do not feel that x-ray of the lumbar spine is warranted.  I recommended a very short course of ibuprofen, limited to only 1 week (given history of GI upset with this), tizanidine as a muscle relaxant, referral to physical therapy.  Heat or ice can be used as well.  I would not recommend an escalation in the hydrocodone, at least as a first-line management    Return in about 1 month (around 12/20/2017), or if symptoms worsen or fail to improve.      SUBJECTIVE  Liana Briceño is a 62-year-old woman and sister of Alphonso \"Betzaida\" Navarro, who presents because of recent onset low back pain.  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  She went to sleep, and when she woke up, her back was so painful that it was hard to get out of bed.  She denies any numbness or tingling in the legs or weakness.  She just has 1 spot in her back which hurts      Medications, allergies, and problem list were reviewed and updated    Patient Active Problem List   Diagnosis     Carpal tunnel syndrome     Obstructive Sleep Apnea     Diffuse Nontoxic Goiter     Type 2 diabetes mellitus     Morbid obesity     Essential hypertension     Esophageal reflux     Dyslipidemia, goal LDL below 70     Hiatal Hernia     Sensorineural hearing loss     Stenosis of left carotid artery     Coronary artery disease due to lipid rich plaque     Past " Medical History:   Diagnosis Date     Anemia      Breast cyst      Carpal tunnel syndrome      Coronary artery disease due to lipid rich plaque      Diabetes mellitus     Type 2     Dyslipidemia, goal LDL below 70      Dysphagia      Esophageal reflux      Essential hypertension      Goiter diffuse, nontoxic      Hiatal hernia      Morbid obesity      RADHA (obstructive sleep apnea)      Paroxysmal SVT (supraventricular tachycardia)     S/P ablation     Current Outpatient Prescriptions   Medication Sig Dispense Refill     acetaminophen (TYLENOL) 500 MG tablet Take by mouth every 6 (six) hours as needed for pain.       albuterol (PROAIR HFA;PROVENTIL HFA;VENTOLIN HFA) 90 mcg/actuation inhaler Inhale 2 puffs every 6 (six) hours as needed for wheezing. 1 Inhaler 1     aspirin 81 MG EC tablet Take 81 mg by mouth daily.       blood glucose test (CONTOUR NEXT STRIPS) strips Use 1 each As Directed 4 (four) times a day. 300 strip 3     cholecalciferol, vitamin D3, (CHOLECALCIFEROL) 1,000 unit tablet Take 1,000 Units by mouth daily.       clopidogrel (PLAVIX) 75 mg tablet TAKE 1 TABLET BY MOUTH DAILY 90 tablet 3     gabapentin (NEURONTIN) 600 MG tablet Take 0.5 tablets (300 mg total) by mouth every morning. And 600mg at bedtime 90 tablet 11     generic lancets (MICROLET LANCET) Use 1 each As Directed 4 (four) times a day. 300 each 3     hydroCHLOROthiazide (MICROZIDE) 12.5 mg capsule Take 1 capsule (12.5 mg total) by mouth daily. 90 capsule 3     HYDROcodone-acetaminophen 5-325 mg per tablet Take 1 tablet by mouth every 4 (four) hours as needed for pain. 60 tablet 0     insulin aspart (NOVOLOG FLEXPEN) 100 unit/mL injection pen Inject 42 Units under the skin 3 (three) times a day before meals. 36 mL 2     insulin degludec (TRESIBA FLEXTOUCH U-200) 200 unit/mL (3 mL) InPn Inject 132 Units under the skin daily. 18 mL 11     lisinopril (PRINIVIL,ZESTRIL) 5 MG tablet Take 1.5 tablets (7.5 mg total) by mouth daily. 135 tablet 3      "melatonin (MELATIN) 3 mg Tab tablet Take 3 mg by mouth at bedtime as needed.       nitroglycerin (NITROSTAT) 0.4 MG SL tablet Place 1 tablet (0.4 mg total) under the tongue every 5 (five) minutes as needed for chest pain. 25 tablet 3     omega-3 acid ethyl esters (LOVAZA) 1 gram capsule TAKE 2 CAPSULES BY MOUTH TWICE DAILY 360 capsule 3     omeprazole (PRILOSEC) 40 MG capsule TAKE 1 CAPSULE BY MOUTH EVERY DAY 90 capsule 1     pen needle, diabetic (BD ULTRA-FINE ZULMA PEN NEEDLES) 32 gauge x 5/32\" Ndle Inject 4 each under the skin daily. With Tresiba and Novolog 120 each 3     rosuvastatin (CRESTOR) 10 MG tablet TAKE 1 TABLET BY MOUTH DAILY 90 tablet 3     ibuprofen (ADVIL,MOTRIN) 800 MG tablet Take 1 tablet (800 mg total) by mouth 3 (three) times a day as needed for pain. 21 tablet 0     tiZANidine (ZANAFLEX) 2 MG tablet Take 1 tablet (2 mg total) by mouth 3 (three) times a day as needed. 30 tablet 0     Current Facility-Administered Medications   Medication Dose Route Frequency Provider Last Rate Last Dose     ondansetron disintegrating tablet 4 mg (ZOFRAN-ODT)  4 mg Oral Once Maximiliano Borden, DO         Allergies   Allergen Reactions     Penicillins Other (See Comments)     Passed out.      Doxycycline Dizziness     Pneumococcal 23-Valent Polysaccharide Vaccine Other (See Comments)     pneumonia     Statins-Hmg-Coa Reductase Inhibitors Myalgia     Shaking with simvastatin and atorvastatin     Azithromycin Rash       EXAM  Vitals:    11/20/17 1135   BP: 124/80   Patient Site: Left Arm   Patient Position: Sitting   Cuff Size: Adult Regular   Pulse: 91         General: Alert, no distress  Musculoskeletal: Pain with palpation of the right sacroiliac joint, no pain over the spinous processes, pain with leaning forward or reaching with her right hand.  Neurologic: Able to stand under her own power    This visit lasted a total of 25 minutes.  Over 50% of the time was spent counseling regarding he management of low " back pain.       Maximiliano Borden,   Internal Medicine  [facility]

## 2021-06-14 NOTE — PROGRESS NOTES
[facility] Follow Up Note    Assessment/Plan:  1. Hip pain, chronic, left  Frank declines intervention today.  Have reviewed the x-rays with her and they do show moderate arthritis.  Recommendation: Water exercises for arthritis and weight loss    2. Numbness and tingling in right hand  3 sensation in third and fourth fingers.  Not quite consistent with carpal tunnel syndrome.  Some improvement with wrist support  Recommendation: Wear wrist splint.  Will check EMG to elucidate etiology of this  - EMG- Right Arm; Future    3.  Type 2 diabetes with suboptimal control  Agree with [facility] pharmacy note with regard to continuous glucose monitor.  This may help Frank to see how diet impacts her blood sugars and allow more engagement in this disease process.      Mimi Fuentes MD    Chief Complaint:  Chief Complaint   Patient presents with     Hip Pain     Hand Problem       History of Present Illness:  Liana is a 62 y.o. female who is here today for follow-up of her variety of issues.  Of note, she comes approximately 10 minutes late for her appointment.  She does state that she was initially here to discuss her left hip pain.  She states it is much better recently.  When the x-ray was performed, she was having difficulty with weightbearing.  She does not want to have a steroid injection as it would negatively impact her blood sugars.  She states it is doing better now.    She does have expressed concern regarding numbness and tingling in the third and fourth fingers of the right hand.  This is not associated with any neck discomfort.  It seems to be associated with some wrist discomfort as applying pressure to the risk decreases the symptoms.  She does work at a [employer] but her job will be ending soon.  She does do some fine motor movements there.  She does not do any fine motor movements at home.  Of note, she believes her strength is decreased in these areas.  Gabapentin does help with the decreased  sensation.  She finds herself dropping things.    With regard to poorly controlled diabetes, she is doing better with regard to fasting sugars.  Her insulin has been adjusted by pharmacy.  She will visit with them later today.    Review of Systems:  A comprehensive review of systems was performed and was otherwise negative    PFSH:  Social History: He is in a common-law marriage with Panchito, her significant other of many years.  She is a non-smoker  History   Smoking Status     Former Smoker     Packs/day: 0.25     Years: 40.00     Types: Cigarettes     Quit date: 1/24/2016   Smokeless Tobacco     Never Used       Past History:   Current Outpatient Prescriptions   Medication Sig Dispense Refill     acetaminophen (TYLENOL) 500 MG tablet Take by mouth every 6 (six) hours as needed for pain.       albuterol (PROAIR HFA;PROVENTIL HFA;VENTOLIN HFA) 90 mcg/actuation inhaler Inhale 2 puffs every 6 (six) hours as needed for wheezing. 1 Inhaler 1     aspirin 81 MG EC tablet Take 81 mg by mouth daily.       blood glucose test (CONTOUR NEXT STRIPS) strips Use 1 each As Directed 4 (four) times a day. 300 strip 3     cholecalciferol, vitamin D3, (CHOLECALCIFEROL) 1,000 unit tablet Take 1,000 Units by mouth daily.       clopidogrel (PLAVIX) 75 mg tablet TAKE 1 TABLET BY MOUTH DAILY 90 tablet 3     gabapentin (NEURONTIN) 600 MG tablet Take 1 tablet (600 mg total) by mouth 3 (three) times a day. 90 tablet 11     generic lancets (MICROLET LANCET) Use 1 each As Directed 4 (four) times a day. 300 each 3     hydroCHLOROthiazide (MICROZIDE) 12.5 mg capsule Take 1 capsule (12.5 mg total) by mouth daily. 90 capsule 3     HYDROcodone-acetaminophen 5-325 mg per tablet Take 1 tablet by mouth every 4 (four) hours as needed for pain. 60 tablet 0     insulin aspart (NOVOLOG FLEXPEN) 100 unit/mL injection pen Inject 40 Units under the skin 2 (two) times a day. And 42 units with dinner 36 mL 2     insulin degludec (TRESIBA FLEXTOUCH U-200) 200  "unit/mL (3 mL) InPn Inject 132 Units under the skin daily. 18 mL 11     lisinopril (PRINIVIL,ZESTRIL) 5 MG tablet Take 1.5 tablets (7.5 mg total) by mouth daily. 135 tablet 3     melatonin (MELATIN) 3 mg Tab tablet Take 3 mg by mouth at bedtime as needed.       nitroglycerin (NITROSTAT) 0.4 MG SL tablet Place 1 tablet (0.4 mg total) under the tongue every 5 (five) minutes as needed for chest pain. 25 tablet 3     omega-3 acid ethyl esters (LOVAZA) 1 gram capsule TAKE 2 CAPSULES BY MOUTH TWICE DAILY 360 capsule 3     omeprazole (PRILOSEC) 40 MG capsule TAKE 1 CAPSULE BY MOUTH EVERY DAY 90 capsule 1     pen needle, diabetic (BD ULTRA-FINE ZULMA PEN NEEDLES) 32 gauge x 5/32\" Ndle Inject 4 each under the skin daily. With Tresiba and Novolog 120 each 3     rosuvastatin (CRESTOR) 10 MG tablet TAKE 1 TABLET BY MOUTH DAILY 90 tablet 3     Current Facility-Administered Medications   Medication Dose Route Frequency Provider Last Rate Last Dose     ondansetron disintegrating tablet 4 mg (ZOFRAN-ODT)  4 mg Oral Once Maximiliano Borden,            Family History:     Physical Exam:  General Appearance:   She appears well and in no acute distress.  Her weight is up 3 pounds  Vitals:    11/14/17 0734   BP: 112/52   Patient Site: Left Arm   Patient Position: Sitting   Cuff Size: Adult Large   Pulse: (!) 102   Weight: (!) 271 lb (122.9 kg)     Wt Readings from Last 3 Encounters:   11/14/17 (!) 271 lb (122.9 kg)   10/31/17 (!) 268 lb (121.6 kg)   10/24/17 (!) 268 lb 8 oz (121.8 kg)     Body mass index is 43.74 kg/(m^2).    Right hand is examined.  There is decreased sensation in the third and fourth fingers.  Strength in those fingers seems slightly decreased and compared to the right but there is not a great disparity seen        "

## 2021-06-14 NOTE — PROGRESS NOTES
Assessment: pt seen today for 2 week CGM scan. Downloaded and scanned into computer. Results are similar to week 1. Which is reassuring that we have consistentcy. Est A1c 7.2%. There is another trend of lower readings during the early morning, that pt does not feel. Therefore, do not want to increase Tresiba at all. Pt feels she has been doing better at taking the Novolog before eating, rather than after. We can see fewer/smaller elevations, so I believe she is doing this.   No changes, no other concerns. Pt will f/u with PCP after 1/24/18 for DM f/u and A1c.     Plan: continue to try to take Novolog before, rather than after eating. Continue to try to choose healthy foods and work on increasing activity.     Subjective and Objective:      Liana Briceño is referred by SIERRA Henriquez for Diabetes Education.     Lab Results   Component Value Date    HGBA1C 8.9 (H) 10/24/2017       Current diabetes medications:  Tresiba 132 units/day and Novolog 42 units/meals       Follow up:   PRN       Education:     Monitoring   Meter (per above goals): Discussed  Monitoring: Assessed and Discussed  BG goals: Assessed    Nutrition Management  Nutrition Management: Assessed and Discussed  Weight: Discussed  Portions/Balance: Assessed and Discussed  Carb ID/Count: Assessed and Discussed  Label Reading: Assessed and Discussed  Heart Healthy Fats: Assessed and Discussed  Menu Planning: Assessed and Discussed  Dining Out: Assessed and Discussed  Physical Activity: Assessed and Discussed  Medications: Discussed  Orals: Discussed  Injected Medications: Discussed and Competent   Storage/Exp:Discussed and Competent   Site Rotation: Competent   Sites Assessed: no    Diabetes Disease Process: Discussed    Acute Complications: Prevent, Detect, Treat:  Hypoglycemia: Assessed and Discussed  Hyperglycemia: Assessed and Discussed  Sick Days: Needs instruction/review at follow-up  Driving: Discussed    Chronic Complications  Foot Care:Needs  instruction/review at follow-up  Skin Care: Needs instruction/review at follow-up  Eye: Needs instruction/review at follow-up  ABC: Discussed  Teeth:Needs instruction/review at follow-up  Goal Setting and Problem Solving: Assessed and Discussed  Barriers: Assessed and Discussed  Psychosocial Adjustments: Assessed and Discussed      Time spent with the patient: 30 minutes for diabetes education and counseling.   Previous Education: yes  Visit Type:JOSE Joseph  12/18/2017

## 2021-06-14 NOTE — TELEPHONE ENCOUNTER
RN cannot approve Refill Request    RN can NOT refill this medication Protocol failed and NO refill given. Last office visit: 4/17/2017 Tim Moe MD Last Physical: Visit date not found Last MTM visit: Visit date not found Last visit same specialty: 9/2/2020 Mimi Fuentes MD.  Next visit within 3 mo: Visit date not found  Next physical within 3 mo: Visit date not found      Linda New, [facility] Triage/Med Refill 1/12/2021    Requested Prescriptions   Pending Prescriptions Disp Refills     TRESIBA FLEXTOUCH U-200 200 unit/mL (3 mL) InPn [Pharmacy Med Name: TRESIBA FLEXTOUCH PEN(U-200)INJ 3ML] 63 mL 0     Sig: INJECT 136 UNIT UNDER THE SKIN DAILY       Insulin/GLP-1 Refill Protocol Failed - 1/12/2021  9:52 PM        Failed - Microalbumin in last year     Microalbumin, Random Urine   Date Value Ref Range Status   06/18/2018 3.04 (H) 0.00 - 1.99 mg/dL Final                  Passed - Visit with PCP or prescribing provider visit in last 6 months     Last office visit with prescriber/PCP: Visit date not found OR same dept: 1/28/2020 Mimi Fuentes MD OR same specialty: 9/2/2020 Mimi Fuentes MD Last physical: Visit date not found Last MTM visit: Visit date not found     Next appt within 3 mo: Visit date not found  Next physical within 3 mo: Visit date not found  Prescriber OR PCP: Tim Moe MD  Last diagnosis associated with med order: 1. Type 2 diabetes mellitus with hyperglycemia, with long-term current use of insulin (H)  - TRESIBA FLEXTOUCH U-200 200 unit/mL (3 mL) InPn [Pharmacy Med Name: TRESIBA FLEXTOUCH PEN(U-200)INJ 3ML]; INJECT 136 UNIT UNDER THE SKIN DAILY  Dispense: 63 mL; Refill: 0    If protocol passes may refill for 6 months if within 3 months of last provider visit (or a total of 9 months).              Passed - A1C in last 6 months     Hemoglobin A1c   Date Value Ref Range Status   07/21/2020 7.2 (H) 3.5 - 6.0 % Final               Passed - Blood pressure in last  year     BP Readings from Last 1 Encounters:   09/02/20 116/66             Passed - Creatinine done in last year     Creatinine   Date Value Ref Range Status   09/02/2020 1.05 0.60 - 1.10 mg/dL Final

## 2021-06-14 NOTE — TELEPHONE ENCOUNTER
Liana left a voicemail with my coordinators requesting me to call her back.  Attempt #1, left voicemail I will try back later today.

## 2021-06-14 NOTE — PROGRESS NOTES
Received call from pt explaining terrible back pain. Pt says it is difficult to even toilet herself. CG scheduled pt with covering MD. PCP is out of the office today. Pt had no other needs. Moved outreach to one month.

## 2021-06-14 NOTE — TELEPHONE ENCOUNTER
Received MTM referral from clinic     Patient declined due to cost. Due to insurance, patient is private pay. Patient would like to know what other options there are to speak to someone about her diabetes concerns/questions.     Thank you for the referral,    Petr Gan MTM coordinator

## 2021-06-14 NOTE — TELEPHONE ENCOUNTER
Refill Approved    Rx renewed per Medication Renewal Policy. Medication was last renewed on 1/13/20.    Leander Billingsley, [facility] Triage/Med Refill 2/3/2021     Requested Prescriptions   Pending Prescriptions Disp Refills     omeprazole (PRILOSEC) 40 MG capsule 90 capsule 3     Sig: TAKE 1 CAPSULE BY MOUTH EVERY DAY       GI Medications Refill Protocol Passed - 2/3/2021  7:20 AM        Passed - PCP or prescribing provider visit in last 12 or next 3 months.     Last office visit with prescriber/PCP: 1/19/2021 Mimi Fuentes MD OR same dept: 1/19/2021 Mimi Fuentes MD OR same specialty: 1/19/2021 Mimi Fuentes MD  Last physical: 7/28/2020 Last MTM visit: Visit date not found   Next visit within 3 mo: Visit date not found  Next physical within 3 mo: Visit date not found  Prescriber OR PCP: Mimi Fuentes MD  Last diagnosis associated with med order: 1. Gastroesophageal reflux disease with esophagitis  - omeprazole (PRILOSEC) 40 MG capsule; TAKE 1 CAPSULE BY MOUTH EVERY DAY  Dispense: 90 capsule; Refill: 3    If protocol passes may refill for 12 months if within 3 months of last provider visit (or a total of 15 months).

## 2021-06-14 NOTE — PROGRESS NOTES
"Assessment: pt seen today for 1 week CGM scan. Reports were scanned into chart.   Avg , estimated A1c 7.3%. Pt states she did not change how she normally does anything while wearing the sensor. This is good as we wanted to see how \"real life is.\"   She is running overall a little high. However, there is one day noted on Wednesday were she was floating around 50-60 in the early morning, while still sleeping. Therefore, I do not want to increase Tresiba.   She is not always taking her Novolog before eating, sometimes up to 30 mins after eating. This could make a big difference and we discussed this today.   Otherwise, no other changes. Pt will return next week again for 2 week sensor scan and we will see if things remain consistent or if there are new patterns.     Plan: take Novolog BEFORE eating. Try to work on increasing activity.     Subjective and Objective:      Liana Briceño is referred by SIERRA Henriquez for Diabetes Education.     Lab Results   Component Value Date    HGBA1C 8.9 (H) 10/24/2017       Current diabetes medications:  Tresiba 132 units/day, Novolog 42 units TID with meals       Follow up:   CDE (certified diabetic educator)  1 week as scheduled     Education:     Monitoring   Meter (per above goals): Assessed and Discussed  Monitoring: Assessed and Discussed  BG goals: Discussed    Nutrition Management  Nutrition Management: Assessed and Discussed  Weight: Discussed  Portions/Balance: Assessed and Discussed  Carb ID/Count: Assessed and Discussed  Label Reading: Discussed  Heart Healthy Fats: Assessed and Discussed  Menu Planning: Assessed and Discussed  Dining Out: Assessed and Discussed  Physical Activity: Assessed and Discussed  Medications: Discussed  Orals: Discussed  Injected Medications: Assessed and Discussed   Storage/Exp:Competent   Site Rotation: Competent   Sites Assessed: no    Diabetes Disease Process: Discussed    Acute Complications: Prevent, Detect, Treat:  Hypoglycemia: " Assessed and Discussed  Hyperglycemia: Assessed and Discussed  Sick Days: Not addressed  Driving: Not addressed    Chronic Complications  Foot Care:Discussed  Skin Care: Not addressed  Eye: Not addressed  ABC: Discussed  Teeth:Not addressed  Goal Setting and Problem Solving: Assessed and Discussed  Barriers: Assessed and Discussed  Psychosocial Adjustments: Assessed and Discussed      Time spent with the patient: 30 minutes for diabetes education and counseling.   Previous Education: yes  Visit Type:JOSE Joseph  12/11/2017

## 2021-06-14 NOTE — PROGRESS NOTES
Medication Therapy Management Follow Up Visit     ASSESSMENT AND PLAN  1. Type 2 diabetes mellitus with hyperglycemia, with long-term current use of insulin  Not at goal A1c less than 7% per ADA guidelines, however with increased dose of bolus insulin it seems as though her blood sugars are somewhat improving. Recommend to continue current insulin doses, and will refer to diabetes educator for continuous glucose sensor. She also would benefit from further diabetes education with regards to diet, however I feel that she needs additional motivation and I believe that she may benefit from seeing how high her sugars are getting on this sensor, largely due to poor diet, lack of exercise.   -Order continuous glucose monitor, ipro sensor    FOLLOW-UP PLAN  Liana was advised to follow-up after glucose sensor.    SUBJECTIVE AND OBJECTIVE  Liana Briceño is a 62 y.o. female who was referred by Mimi Fuentes MD for MTM services.  Liana's chief concern today is medication management follow-up. She met with Dr. Fuentes today as well.     Diabetes: Has been doing her best to check her sugars, however continues to struggle with remembering the correct times to check her sugars. She is agreeable to a glucose sensor.   Lab Results   Component Value Date    HGBA1C 8.9 (H) 10/24/2017     This note has been dictated using voice recognition software. Any grammatical or context distortions are unintentional and inherent to the software.    Liana was provided follow up instructions, and this care plan was communicated via EMR with her primary care provider, Mimi Fuentes MD, and is the authorizing prescriber for this visit.  Direct supervision was available by either the patient's PCP or another available physician when needed.    Time spent: 15 minutes  Level of service: 2    Jose Raul Coy, PharmD, BCACP  Medication Management (MTM) Pharmacist  [facility]    We reviewed Liana's medication  list with them, discussing reason for use, directions for use, and potential side effects of each medication.  Indication, safety, efficacy, and convenience was assessed for all reviewed medications.  No environmental factors were noted currently affecting patient.    Current Outpatient Prescriptions   Medication Sig Dispense Refill     acetaminophen (TYLENOL) 500 MG tablet Take by mouth every 6 (six) hours as needed for pain.       albuterol (PROAIR HFA;PROVENTIL HFA;VENTOLIN HFA) 90 mcg/actuation inhaler Inhale 2 puffs every 6 (six) hours as needed for wheezing. 1 Inhaler 1     aspirin 81 MG EC tablet Take 81 mg by mouth daily.       blood glucose test (CONTOUR NEXT STRIPS) strips Use 1 each As Directed 4 (four) times a day. 300 strip 3     cholecalciferol, vitamin D3, (CHOLECALCIFEROL) 1,000 unit tablet Take 1,000 Units by mouth daily.       clopidogrel (PLAVIX) 75 mg tablet TAKE 1 TABLET BY MOUTH DAILY 90 tablet 3     gabapentin (NEURONTIN) 600 MG tablet Take 0.5 tablets (300 mg total) by mouth every morning. And 600mg at bedtime 90 tablet 11     generic lancets (MICROLET LANCET) Use 1 each As Directed 4 (four) times a day. 300 each 3     hydroCHLOROthiazide (MICROZIDE) 12.5 mg capsule Take 1 capsule (12.5 mg total) by mouth daily. 90 capsule 3     HYDROcodone-acetaminophen 5-325 mg per tablet Take 1 tablet by mouth every 4 (four) hours as needed for pain. 60 tablet 0     insulin aspart (NOVOLOG FLEXPEN) 100 unit/mL injection pen Inject 42 Units under the skin 3 (three) times a day before meals. 36 mL 2     insulin degludec (TRESIBA FLEXTOUCH U-200) 200 unit/mL (3 mL) InPn Inject 132 Units under the skin daily. 18 mL 11     lisinopril (PRINIVIL,ZESTRIL) 5 MG tablet Take 1.5 tablets (7.5 mg total) by mouth daily. 135 tablet 3     melatonin (MELATIN) 3 mg Tab tablet Take 3 mg by mouth at bedtime as needed.       nitroglycerin (NITROSTAT) 0.4 MG SL tablet Place 1 tablet (0.4 mg total) under the tongue every 5  "(five) minutes as needed for chest pain. 25 tablet 3     omega-3 acid ethyl esters (LOVAZA) 1 gram capsule TAKE 2 CAPSULES BY MOUTH TWICE DAILY 360 capsule 3     omeprazole (PRILOSEC) 40 MG capsule TAKE 1 CAPSULE BY MOUTH EVERY DAY 90 capsule 1     pen needle, diabetic (BD ULTRA-FINE ZULMA PEN NEEDLES) 32 gauge x 5/32\" Ndle Inject 4 each under the skin daily. With Tresiba and Novolog 120 each 3     rosuvastatin (CRESTOR) 10 MG tablet TAKE 1 TABLET BY MOUTH DAILY 90 tablet 3     Current Facility-Administered Medications   Medication Dose Route Frequency Provider Last Rate Last Dose     ondansetron disintegrating tablet 4 mg (ZOFRAN-ODT)  4 mg Oral Once Maximiliano Borden, DO             "

## 2021-06-14 NOTE — PROGRESS NOTES
"Assessment: pt seen today for DM education. She has been diabetic for about 10 years. Referred by MTM for CGM. CGM was placed on left arm. Pt will f/u next week for scan.   She brings in BG meter and log.   FB, 156, 213, 118, 179, 133, 110, 131, 168, 145, 181, 210, 115  Before lunch: 243, 196  After lunch: 168, 141, 103  Before dinner: 179, 154, 151, 106  After dinner: 151, 267,169, 129, 103  She does not have troubles with low BG. She feels she has been having better readings in the past couple weeks.   Pt admits her elevations are mostly due to diet choices. She is aware of her \"weaknessess.\" Pt states she knows everything she should be doing, its just a matter of actually doing it.   She admits she is not very active. But has started going to the [employer] again, she would like to loose weight. We discussed the benefits of exercise today and strongly encouraged her to continue.     Plan: f/u next week for CGM scan and review     Subjective and Objective:      Liana Briceño is referred by SIERRA Henriquez for Diabetes Education.     Lab Results   Component Value Date    HGBA1C 8.9 (H) 10/24/2017       Current diabetes medications:  Tresiba 132 units/day. Novolog 42 units TID with meals       Follow up:   1 week as scheduled for CGM scan       Education:     Monitoring   Meter (per above goals): Assessed and Discussed  Monitoring: Assessed and Discussed  BG goals: Discussed    Nutrition Management  Nutrition Management: Assessed and Discussed  Weight: Discussed  Portions/Balance: Assessed and Discussed  Carb ID/Count: Assessed and Discussed  Label Reading: Discussed and Needs instruction/review at follow-up  Heart Healthy Fats: Needs instruction/review at follow-up  Menu Planning: Assessed and Discussed  Dining Out: Assessed and Discussed  Physical Activity: Assessed and Discussed  Medications: Discussed  Orals: Discussed  Injected Medications: Discussed   Storage/Exp:Discussed   Site Rotation: Discussed   Sites " Assessed: no    Diabetes Disease Process: Discussed    Acute Complications: Prevent, Detect, Treat:  Hypoglycemia: Assessed and Discussed  Hyperglycemia: Assessed and Discussed  Sick Days: Needs instruction/review at follow-up  Driving: Needs instruction/review at follow-up    Chronic Complications  Foot Care:Needs instruction/review at follow-up  Skin Care: Needs instruction/review at follow-up  Eye: Discussed  ABC: Discussed  Teeth:Needs instruction/review at follow-up  Goal Setting and Problem Solving: Assessed and Discussed  Barriers: Assessed and Discussed  Psychosocial Adjustments: Assessed and Discussed      Time spent with the patient: 60 minutes for diabetes education and counseling.   Previous Education: no  Visit Type:JOSE Joseph  12/4/2017

## 2021-06-14 NOTE — TELEPHONE ENCOUNTER
Unfortunately when Liana came in to learn how to use the freestyle kiah continuous glucose monitor last week I did not realize that she had an MRI the next day.  They did need her to remove the sensor.  After that she subsequently had difficulties with 2 more sensors.  She just got off the phone today with the , and they will be sending her replacement supplies as there may have been additional malfunctioning pieces.  This has been frustrating however she is willing to eat try to use this device again.  In addition she was confused with differences between blood sugars from the interstitial fluid with a CGM device data blood sugars with a fingerstick.  We have readjusted her follow-up appointment to February 17, and we will follow-up after she places her next sensors for CGM download to evaluate blood sugars.  All questions were addressed.

## 2021-06-14 NOTE — PATIENT INSTRUCTIONS - HE
Skin tac if needed for barrier to sensitive skin with sensors     Call company to try to get help with downloading the mitesh for Freestyle [place]

## 2021-06-14 NOTE — PROGRESS NOTES
Patient presents at the request of Dr. Valenzuela for a right upper extremity EMG.  She has right hand numbness and tingling digit 1 through 4 with severe pain.  Present for the past 6-8 weeks.  Wakes her up at night.  Feels weakness in her right hand.  No muscle atrophy on examination.    EMG/NCS  results: Please see scanned full report.    Comment NCS: Abnormal study:    1.  Absent right median SNAP and transcarpal study.  2.  Prolonged right median CMAP latency    3.  Normal right ulnar sensory and motor studies and radial sensory study.    Comment EMG: Normal study.  1.  Normal needle EMG right upper extremity.    Interpretation: Abnormal study:    1.  Right median neuropathy at the wrist consistent with entrapment in the carpal tunnel, moderate in severity.    2. There is no electrodiagnostic evidence of cervical radiculopathy, brachial plexopathy, or ulnar neuropathy in the right upper extremity.    The testing was completed in its entirety by the physician.      It was our pleasure caring for your patient today, if there any questions or concerns please do not hesitate to contact us.

## 2021-06-14 NOTE — PROGRESS NOTES
CG received returned call from pt. CG called and spoke with patient about her goals. Pt's A1C is on track. Pt wore a [employer] monitor for 2 weeks and it improved. Pt will be having hand surgery 1/5/18. Pt would like to see PharmD when she is in for her pre-op. CG found an appt adjacent to her PCP visit on 12/27. Pt stated she filled out applications to be on waiting lists for better housing. CG will be available to meet with if needed on 12/27.

## 2021-06-14 NOTE — TELEPHONE ENCOUNTER
RN cannot approve Refill Request    RN can NOT refill this medication PCP messaged that patient is overdue for Labs. Last office visit: Visit date not found Last Physical: 12/30/2019 Last MTM visit: Visit date not found Last visit same specialty: 9/2/2020 Mimi Fuentes MD.  Next visit within 3 mo: Visit date not found  Next physical within 3 mo: Visit date not found      Gwen Win, [facility] Triage/Med Refill 1/16/2021    Requested Prescriptions   Pending Prescriptions Disp Refills     insulin lispro (HUMALOG KWIKPEN) 100 unit/mL pen [Pharmacy Med Name: INSULIN LISPRO 100U/ML KWIKPEN 3ML] 45 mL 0     Sig: INJECT 45 UNITS UNDER THE SKIN THREE TIMES DAILY       Insulin/GLP-1 Refill Protocol Failed - 1/16/2021 12:41 PM        Failed - Microalbumin in last year     Microalbumin, Random Urine   Date Value Ref Range Status   06/18/2018 3.04 (H) 0.00 - 1.99 mg/dL Final                  Passed - Visit with PCP or prescribing provider visit in last 6 months     Last office visit with prescriber/PCP: Visit date not found OR same dept: 1/28/2020 Mimi Fuentes MD OR same specialty: 9/2/2020 Mimi Fuentes MD Last physical: Visit date not found Last MTM visit: Visit date not found     Next appt within 3 mo: Visit date not found  Next physical within 3 mo: Visit date not found  Prescriber OR PCP: Irene Bagley MD  Last diagnosis associated with med order: 1. Type 2 diabetes mellitus with hyperglycemia, with long-term current use of insulin (H)  - insulin lispro (HUMALOG KWIKPEN) 100 unit/mL pen [Pharmacy Med Name: INSULIN LISPRO 100U/ML KWIKPEN 3ML]; INJECT 45 UNITS UNDER THE SKIN THREE TIMES DAILY  Dispense: 45 mL; Refill: 0    If protocol passes may refill for 6 months if within 3 months of last provider visit (or a total of 9 months).              Passed - A1C in last 6 months     Hemoglobin A1c   Date Value Ref Range Status   07/21/2020 7.2 (H) 3.5 - 6.0 % Final               Passed - Blood  pressure in last year     BP Readings from Last 1 Encounters:   09/02/20 116/66             Passed - Creatinine done in last year     Creatinine   Date Value Ref Range Status   09/02/2020 1.05 0.60 - 1.10 mg/dL Final

## 2021-06-14 NOTE — TELEPHONE ENCOUNTER
I spoke with Liana, she has received the freestyle kiah components, and would like to learn how to put this on.  We have scheduled a consult appointment for next Wednesday to teach her how to do so.    She is concerned about an exposure to the coronavirus last Tuesday.  She did go and have a coronavirus test done, anticipates hearing back on Monday.  Reviewed that she does have antibodies from her previous infection and the likelihood of her being positive is very low.

## 2021-06-15 ENCOUNTER — OFFICE VISIT - HEALTHEAST (OUTPATIENT)
Dept: INTERNAL MEDICINE | Facility: CLINIC | Age: 66
End: 2021-06-15

## 2021-06-15 DIAGNOSIS — Z79.4 TYPE 2 DIABETES MELLITUS WITH HYPERGLYCEMIA, WITH LONG-TERM CURRENT USE OF INSULIN (H): ICD-10-CM

## 2021-06-15 DIAGNOSIS — I10 ESSENTIAL HYPERTENSION: ICD-10-CM

## 2021-06-15 DIAGNOSIS — E11.65 TYPE 2 DIABETES MELLITUS WITH HYPERGLYCEMIA, WITH LONG-TERM CURRENT USE OF INSULIN (H): ICD-10-CM

## 2021-06-15 DIAGNOSIS — R10.13 DYSPEPSIA: ICD-10-CM

## 2021-06-15 DIAGNOSIS — N18.31 STAGE 3A CHRONIC KIDNEY DISEASE (H): ICD-10-CM

## 2021-06-15 PROBLEM — I25.10 CORONARY ARTERY DISEASE DUE TO LIPID RICH PLAQUE: Chronic | Status: ACTIVE | Noted: 2017-05-12

## 2021-06-15 PROBLEM — I65.22 STENOSIS OF LEFT CAROTID ARTERY: Chronic | Status: ACTIVE | Noted: 2017-05-02

## 2021-06-15 PROBLEM — I25.83 CORONARY ARTERY DISEASE DUE TO LIPID RICH PLAQUE: Chronic | Status: ACTIVE | Noted: 2017-05-12

## 2021-06-15 RX ORDER — VALACYCLOVIR HYDROCHLORIDE 500 MG/1
500 TABLET, FILM COATED ORAL DAILY
Status: SHIPPED | COMMUNITY
Start: 2021-06-15 | End: 2021-09-03

## 2021-06-15 ASSESSMENT — MIFFLIN-ST. JEOR: SCORE: 1787.85

## 2021-06-15 NOTE — TELEPHONE ENCOUNTER
Refill Approved    Rx renewed per Medication Renewal Policy. Medication was last renewed on 11/20/20.    Stephanie Garcia, [facility] Triage/Med Refill 2/5/2021     Requested Prescriptions   Pending Prescriptions Disp Refills     allopurinoL (ZYLOPRIM) 100 MG tablet 90 tablet 3     Sig: Take 1 tablet (100 mg total) by mouth daily.       Allopurinol/Febuxostat Refill Protocol  Passed - 2/5/2021  4:01 PM        Passed - LFT or AST or ALT in last 12 months     Albumin   Date Value Ref Range Status   01/19/2021 4.1 3.5 - 5.0 g/dL Final     Bilirubin, Total   Date Value Ref Range Status   01/19/2021 0.3 0.0 - 1.0 mg/dL Final     Bilirubin, Direct   Date Value Ref Range Status   03/08/2019 0.1 <=0.5 mg/dL Final     Alkaline Phosphatase   Date Value Ref Range Status   01/19/2021 96 45 - 120 U/L Final     AST   Date Value Ref Range Status   01/19/2021 20 0 - 40 U/L Final     ALT   Date Value Ref Range Status   01/19/2021 20 0 - 45 U/L Final     Protein, Total   Date Value Ref Range Status   01/19/2021 7.1 6.0 - 8.0 g/dL Final                Passed - Visit with PCP or prescribing provider visit in past 12 months     Last office visit with prescriber/PCP: 1/19/2021 Mimi Fuentes MD OR same dept: 1/19/2021 Mimi Fuentes MD OR same specialty: 1/19/2021 Mimi Fuentes MD  Last physical: 7/28/2020 Last MTM visit: Visit date not found   Next visit within 3 mo: Visit date not found  Next physical within 3 mo: Visit date not found  Prescriber OR PCP: Mimi Fuentes MD  Last diagnosis associated with med order: 1. Acute gouty arthropathy  - allopurinoL (ZYLOPRIM) 100 MG tablet; Take 1 tablet (100 mg total) by mouth daily.  Dispense: 90 tablet; Refill: 3    If protocol passes may refill for 12 months if within 3 months of last provider visit (or a total of 15 months).

## 2021-06-15 NOTE — TELEPHONE ENCOUNTER
Liana declined an appt. She would like you to call her back and discuss her blood sugar. She feels like her kiah is not working well and not giving her the correct readings.  Call back number is .    Thank you,   SIERRA Benavidez coordinator

## 2021-06-15 NOTE — PROGRESS NOTES
"CG called pt. Pt states her surgery went \"crazy\". She had some medication/prescribing issues with the surgeon. She explained (to Dr. Lott) that she has been taking Vicodin for pain by the half tab and they prescribed only this for post-op. Pt was told to take tylenol or ibuprofen. Pt is aware that ibuprofen is not good for her given her stomach ulcer. Pt was also given a dose pack of prednisone. This made her blood sugar raise. Pt has completed the dose pack and her blood sugar is back to normal. Pt explained that she may need to see her PCP sooner than April. CG explained that no appt had been made yet. Pt was scheduled for 3/27/18 with PCP. CG will be available as needed.     Pt is finding PT at [facility] very beneficial but finds it is hard to drive that far. Pt is requesting PT at [facility]. CG has sent message to this pool as well as PCP for advise. There was a referral put in November 2017. Pt also finds it hard to pay her copays. [facility] referral requested to apply for MA.     CG will follow up on PT and [facility] referral if needed.  "

## 2021-06-15 NOTE — PROGRESS NOTES
Preoperative Consultation    Liana Briceño   62 y.o.  female    Date of visit: 12/27/2017    This is a preoperative consultation requested by Dr. Lott in preparation for carpal tunnel release on the right hand on January 5, 2018 at [facility].    Assessment and Plan:  Liana Briceño was seen in preoperative consultation in preparation for carpal tunnel release.  This is a low risk surgery and the patient has no increased risk for major cardiac complications based on exam and history and appears to be medically stable for the proposed surgery/procedure.    1. Preop exam for internal medicine  Frank has a complex medical history but is medically stable to have carpal tunnel release surgery.  She is eagerly looking forward to resolution of her severe right hand pain.  Of note, she is currently recovering from a viral upper respiratory tract infection.  By the time of surgery, she should be recovered.  Her lungs are clear and there is no evidence of respiratory compromise.  Her history is significant for coronary artery disease and she is status post placement of a cardiac stent on April 24, 2017.  She has been on Plavix for greater than 6 months.  She is able to be off Plavix for 3-5 days prior to this procedure.    Medication recommendations: On the a.m. of surgery, she will reduce her long-acting insulin to 100 units.  She will hold short acting or novel log insulin.  She will discontinue fish oil from this point forward.  She will hold her aspirin and Plavix after January 1.  (She will restart as soon as able by surgery.  Hopefully this is on postoperative day #1).  Additionally, she will withhold hydrochlorothiazide and lisinopril along with Crestor on the morning of surgery.  She will resume NovoLog insulin upon normal eating    - HM2(CBC w/o Differential)  - Basic Metabolic Panel  - Platelet Function Test  - Electrocardiogram Perform and Read    2. Carpal tunnel syndrome  She is eagerly anticipating  correction of this problem    3. Type 2 diabetes mellitus with hyperglycemia, with long-term current use of insulin  Glycohemoglobin at 7.7.  This represents an improvement for her.  We will continue current insulin doses.  - Glycosylated Hemoglobin A1c    4. Essential hypertension  Controlled    5. Coronary artery disease due to lipid rich plaque  Status post stent placements    6. Viral URI with cough-resolving  Normal lung exam.  Continue with fluids and rest.  No antibiotics needed.  She is to contact me if she develops any secondary fever, purulent mucus, etc.  She is aware that she should cancel elective surgery if she is ill with a respiratory tract infection.      Patient Active Problem List   Diagnosis     Carpal tunnel syndrome     Obstructive Sleep Apnea     Diffuse Nontoxic Goiter     Type 2 diabetes mellitus     Morbid obesity     Essential hypertension     Esophageal reflux     Dyslipidemia, goal LDL below 70     Hiatal Hernia     Sensorineural hearing loss     Stenosis of left carotid artery     Coronary artery disease due to lipid rich plaque       HPI:   Frank is a delightful 62-year-old female who is seen here today for preoperative physical examination prior to carpal tunnel release.  Of note, her medical history is complex.  She has had poorly controlled type 2 diabetes.  However, she has been working hard over the past couple of months to be more compliant with her diabetic diet and insulin use.  She does state that things are going much better.  She has had 1 or 2 blood sugars that are in the 70s and 80s.  She otherwise denies any hypoglycemia.  Of note, she is aware of these lower blood sugar events.  She is always prepared with a snack.    She denies any problems with chest pain, shortness of breath, any new bowel or bladder problems.    Pertinent anesthesia history is reviewed.  She denies any untoward reactions to general or local anesthetic agents.  She does not have any bleeding disorder  but is on a platelet inhibitor.  There is no history of postoperative DVT.  Of note, she has primary coronary artery disease, type 2 diabetes.  She does not have chronic lung disease.  She is relatively sedentary in terms of functional capacity.    Review of systems:  A comprehensive review of systems was performed and was otherwise negative    Current Outpatient Prescriptions   Medication Sig Dispense Refill     acetaminophen (TYLENOL) 500 MG tablet Take by mouth every 6 (six) hours as needed for pain.       albuterol (PROAIR HFA;PROVENTIL HFA;VENTOLIN HFA) 90 mcg/actuation inhaler Inhale 2 puffs every 6 (six) hours as needed for wheezing. 1 Inhaler 1     aspirin 81 MG EC tablet Take 81 mg by mouth daily.       blood glucose test (CONTOUR NEXT STRIPS) strips Use 1 each As Directed 4 (four) times a day. 300 strip 3     cholecalciferol, vitamin D3, (CHOLECALCIFEROL) 1,000 unit tablet Take 1,000 Units by mouth daily.       clopidogrel (PLAVIX) 75 mg tablet TAKE 1 TABLET BY MOUTH DAILY 90 tablet 3     generic lancets (MICROLET LANCET) Use 1 each As Directed 4 (four) times a day. 300 each 3     hydroCHLOROthiazide (MICROZIDE) 12.5 mg capsule Take 1 capsule (12.5 mg total) by mouth daily. 90 capsule 3     HYDROcodone-acetaminophen 5-325 mg per tablet Take 1 tablet by mouth every 4 (four) hours as needed for pain. 60 tablet 0     ibuprofen (ADVIL,MOTRIN) 800 MG tablet Take 1 tablet (800 mg total) by mouth 3 (three) times a day as needed for pain. 21 tablet 0     insulin aspart (NOVOLOG FLEXPEN) 100 unit/mL injection pen Inject 42 Units under the skin 3 (three) times a day before meals. 36 mL 2     insulin degludec (TRESIBA FLEXTOUCH U-200) 200 unit/mL (3 mL) InPn Inject 132 Units under the skin daily. 18 mL 11     lisinopril (PRINIVIL,ZESTRIL) 5 MG tablet Take 1.5 tablets (7.5 mg total) by mouth daily. 135 tablet 3     melatonin (MELATIN) 3 mg Tab tablet Take 3 mg by mouth at bedtime as needed.       nitroglycerin  "(NITROSTAT) 0.4 MG SL tablet Place 1 tablet (0.4 mg total) under the tongue every 5 (five) minutes as needed for chest pain. 25 tablet 3     omega-3 acid ethyl esters (LOVAZA) 1 gram capsule TAKE 2 CAPSULES BY MOUTH TWICE DAILY 360 capsule 3     omeprazole (PRILOSEC) 40 MG capsule TAKE 1 CAPSULE BY MOUTH EVERY DAY 90 capsule 1     pen needle, diabetic (BD ULTRA-FINE ZULMA PEN NEEDLES) 32 gauge x 5/32\" Ndle Inject 4 each under the skin daily. With Tresiba and Novolog 120 each 3     rosuvastatin (CRESTOR) 10 MG tablet TAKE 1 TABLET BY MOUTH DAILY 90 tablet 3     tiZANidine (ZANAFLEX) 2 MG tablet TAKE 1 TABLET(2 MG) BY MOUTH THREE TIMES DAILY AS NEEDED 270 tablet 0     gabapentin (NEURONTIN) 600 MG tablet Take 0.5 tablets (300 mg total) by mouth 3 (three) times a day. 90 tablet 11     Current Facility-Administered Medications   Medication Dose Route Frequency Provider Last Rate Last Dose     ondansetron disintegrating tablet 4 mg (ZOFRAN-ODT)  4 mg Oral Once Maximiliano Borden DO           Allergies   Allergen Reactions     Penicillins Other (See Comments)     Passed out.      Doxycycline Dizziness     Pneumococcal 23-Valent Polysaccharide Vaccine Other (See Comments)     pneumonia     Statins-Hmg-Coa Reductase Inhibitors Myalgia     Shaking with simvastatin and atorvastatin     Azithromycin Rash       Recent antiplatelet use: yes Plavix and aspirin for stent deployment in April 2017    Steroid use in the past year: no    Past difficulty with anesthesia:  no    Personal or family history of difficulty with anesthesia: no    Current cardiopulmonary symptoms: no        Past Surgical History:   Procedure Laterality Date     BREAST BIOPSY       BREAST CYST EXCISION       CARDIAC CATHETERIZATION N/A 4/24/2017    Procedure: Coronary Angiogram;  Surgeon: Ariane Biswas MD;  Location: [facility] Cath Lab;  Service:      CARDIAC CATHETERIZATION N/A 5/19/2017    Procedure: Coronary Angiogram;  Surgeon: Howard Gutierrez MD;  " "Location: [facility] Cath Lab;  Service:      CHOLECYSTECTOMY       CORONARY STENT PLACEMENT  2017    BO to RCA by Dr. Biswas     NH EXPLO/DRAIN BREAST ABSCESS      Description: Breast Surgery Mastotomy With Drainage Of Abscess;  Recorded: 2010;     NH L HRT CATH W/NJX L VENTRICULOGRAPHY IMG S&I N/A 2017    Procedure: Left Heart Catheterization with Left Ventriculogram;  Surgeon: Ariane Biswas MD;  Location: [facility] Cath Lab;  Service: Cardiology     NH REMOVAL GALLBLADDER  2000     NH REVISE MEDIAN N/CARPAL TUNNEL SURG      Description: Neuroplasty Decompression Median Nerve At Carpal Tunnel;  Recorded: 2009;     NH THYROID LOBECTOMY,UNILAT       Family History   Problem Relation Age of Onset     Heart disease Father      Heart disease Mother      Leukemia Brother       at a young age     Kidney failure Other      over active bladder     Hypertension Other      Diabetes type II Other      Breast cancer Neg Hx      Social History   Substance Use Topics     Smoking status: Former Smoker     Packs/day: 0.25     Years: 40.00     Types: Cigarettes     Quit date: 2016     Smokeless tobacco: Never Used     Alcohol use 0.0 oz/week     0 Standard drinks or equivalent per week      Comment: < 1 drink a week        Physical Exam:    General Appearance:   She is pleasant and well-appearing.  She clearly has pain in the right wrist    Vitals:    17 1249   BP: 104/60   Patient Site: Left Arm   Patient Position: Sitting   Cuff Size: Adult Large   Pulse: 87   SpO2: 96%   Weight: (!) 270 lb (122.5 kg)   Height: 5' 6\" (1.676 m)     Wt Readings from Last 3 Encounters:   17 (!) 270 lb (122.5 kg)   17 (!) 271 lb (122.9 kg)   10/31/17 (!) 268 lb (121.6 kg)       EYES: Eyelids, conjunctiva, and sclera were normal. Pupils were normal. Cornea, iris, and lens were normal bilaterally.  HEAD, EARS, NOSE, MOUTH, AND THROAT: Head and face were normal. Hearing was normal to voice and " the ears were normal to external exam. Nose appearance was normal and there was no discharge. Oropharynx was normal.  TMs were normal.  NECK: Neck appearance was normal. There were no neck masses and the thyroid was not enlarged.  RESPIRATORY: Breathing pattern was normal and the chest moved symmetrically.   Lung sounds were equal bilaterally.  CARDIOVASCULAR: Heart rate and rhythm were normal.  S1 and S2 were normal and there were no extra sounds or murmurs. Peripheral pulses in arms and legs were normal.  Jugular venous pressure was normal.  There was no peripheral edema.  GASTROINTESTINAL: The abdomen was normal in contour.  Bowel sounds were present.   Palpation detected no tenderness, mass, or enlarged organs.   MUSCULOSKELETAL: Skeletal configuration was normal and muscle mass was normal for age. Joint appearance was overall normal.  LYMPHATIC: There were no enlarged nodes.  SKIN/HAIR/NAILS: Skin color was normal.  There were no abnormal skin lesions.  Hair and nails were normal.  NEUROLOGIC: The patient was alert and oriented to person, place, time, and circumstance. Speech was normal. Cranial nerves were normal. Motor strength was normal for age. The patient was normally coordinated.  PSYCHIATRIC:  Mood and affect were normal and the patient had normal recent and remote memory. The patient's judgment and insight were normal.    EKG normal sinus rhythm with no acute abnormalities      Mimi Fuentes MD  Internal Medicine  [facility]  Contact me at [phone]

## 2021-06-15 NOTE — TELEPHONE ENCOUNTER
FYI - Status Update  Who is Calling: Patient  Update: Requests call back from Jose Raul ESQUIVEL. Has a question regarding blood sugars.  Okay to leave a detailed message?:  Yes

## 2021-06-15 NOTE — TELEPHONE ENCOUNTER
Patient calling to get Vaccine , she states she has many underlying illnesses, and would like to get vaccinated.    She states she feels she fits into the category of undeerlying illness, even though she is not yet 67.    Please advise.    Carissa Proctor RN  [facility] Triage/refill nurse        Reason for Disposition    Caller has URGENT medication question about med that PCP prescribed and triager unable to answer question    Protocols used: MEDICATION QUESTION CALL-A-[place]

## 2021-06-15 NOTE — PROGRESS NOTES
MTM Consult Encounter    Liana Briceño is a 65 y.o. female referred for a clinical pharmacist consult from Dr. Fuentes for CGM education and download.    Discussion: Although Liana had significant difficulties with her device initially, now with her new reader and sensors she is having no problems with using CGM.  She does have questions about why her blood sugars are consistently different on her blood glucose meter compared with a CGM.  She also was able to get a new smart phone, but has not been able to get this to read the blood sugars.  We reviewed her questions, at the time of visit her blood sugars were 101 and stable, however her glucose meter read 143.  Her test if so not  in her meter does not seem to have any dysfunction, there is such a difference between her sensors and her blood glucose meter.  She will try to her CGM device consistently, and we will connect remotely to monitor blood sugars.  Due to low fasting sugars she will decrease her Tresiba to 110 units daily.  On her own she had decreased her Tresiba prior to this appointment to 114 units daily, and Humalog 14 units before meals.  Additionally she is having significant stomach discomfort since the Super Bowl, she continues on daily PPI however is needing to take Tums frequently.  She is wondering if there is something else that she should be doing.    Plan:  1.  Educated on how to use CGM with her phone mitesh, we will connect remotely.  2.  Educate to decrease Tresiba to 110 units daily  3.  Educated to increase PPI to twice daily for 1 week then resume her previous dosing    Follow up:   1 week via Diana Coy, PharmD, BCACP  Medication Management (MTM) Pharmacist  [facility]    Current Outpatient Medications   Medication Sig Dispense Refill     flash glucose scanning reader (FREESTYLE MIRANDA 14 DAY READER) Misc Use 1 each As Directed daily. 1 each 1     flash glucose sensor (FREESTYLE MIRANDA 14 DAY SENSOR)  Kit Use 1 each As Directed every 14 (fourteen) days. 2 kit 11     acetaminophen (TYLENOL) 500 MG tablet Take 1,000 mg by mouth 3 (three) times a day as needed.       allopurinoL (ZYLOPRIM) 100 MG tablet Take 1 tablet (100 mg total) by mouth daily. 90 tablet 2     aspirin 81 MG EC tablet Take 81 mg by mouth daily.       blood glucose meter (FREESTYLE LITE METER) Use 1 each As Directed 6 (six) times a day. Dispense glucometer brand per patient's insurance at pharmacy discretion. 1 each 0     blood glucose test strips Use 1 each As Directed 6 (six) times a day. Prodigy Meter 300 strip 3     cetirizine (ZYRTEC) 10 MG tablet Take 1 tablet (10 mg total) by mouth daily. 30 tablet 2     cholecalciferol, vitamin D3, (CHOLECALCIFEROL) 1,000 unit tablet Take 2,000 Units by mouth daily.             cinnamon bark (CINNAMON ORAL) Take 1 each by mouth 2 (two) times a day.       cyanocobalamin (VITAMIN B-12) 500 MCG tablet Take 500 mcg by mouth daily.       cyanocobalamin 250 MCG tablet Take 250 mcg by mouth daily.       cyclobenzaprine (FLEXERIL) 10 MG tablet Take 1 tablet (10 mg total) by mouth at bedtime as needed for muscle spasms. 21 tablet 1     diclofenac sodium (VOLTAREN) 1 % Gel Apply one thin layer over toe joint/ affected joint 1 Tube 5     gabapentin (NEURONTIN) 300 MG capsule Take one tablet hs 90 capsule 3     generic lancets (MICROLET LANCET) Use 1 each As Directed 6 (six) times a day. 300 each 3     hydroCHLOROthiazide (MICROZIDE) 12.5 mg capsule TAKE 1 CAPSULE(12.5 MG) BY MOUTH DAILY 90 capsule 3     HYDROcodone-acetaminophen 5-325 mg per tablet Take 1 tablet by mouth 2 (two) times a day as needed for pain. 40 tablet 0     insulin degludec (TRESIBA FLEXTOUCH U-200) 200 unit/mL (3 mL) InPn Inject 110 Units under the skin daily. 63 mL 12     insulin lispro (HUMALOG KWIKPEN) 100 unit/mL pen Inject 14 Units under the skin 3 (three) times a day with meals. 45 mL 0     lisinopriL (PRINIVIL,ZESTRIL) 5 MG tablet Take 1.5  "tablets (7.5 mg total) by mouth daily. 135 tablet 2     melatonin (MELATIN) 3 mg Tab tablet Take 3 mg by mouth at bedtime as needed.       nitroglycerin (NITROSTAT) 0.3 MG SL tablet Place 1 tablet (0.3 mg total) under the tongue every 5 (five) minutes as needed for chest pain. 100 tablet 3     omega-3 acid ethyl esters (LOVAZA) 1 gram capsule TAKE 2 CAPSULES BY MOUTH TWICE DAILY 360 capsule 3     omeprazole (PRILOSEC) 40 MG capsule TAKE 1 CAPSULE BY MOUTH EVERY DAY 90 capsule 3     ONETOUCH DELICA PLUS LANCET 33 gauge Misc USE TO TEST BLOOD SUGAR 6 TIMES DAILY 600 each 3     OZEMPIC 1 mg/dose (2 mg/1.5 mL) PnIj INJECT 1 MG UNDER THE SKIN EVERY 7 DAYS 3 mL 12     pen needle, diabetic (BD ULTRA-FINE ZULMA PEN NEEDLE) 32 gauge x 5/32\" Ndle Inject 4 each under the skin daily. With Tresiba and Novolog 120 each 3     rosuvastatin (CRESTOR) 10 MG tablet TAKE 1 TABLET BY MOUTH DAILY 90 tablet 3     triamcinolone (KENALOG) 0.1 % cream Apply bid 30 g 0     valACYclovir (VALTREX) 500 MG tablet Take 1 tablet (500 mg total) by mouth daily. 30 tablet 6     VITAMIN B-1 100 mg tablet TAKE 1 TABLET(100 MG) BY MOUTH DAILY 90 tablet 3     No current facility-administered medications for this visit.                         "

## 2021-06-15 NOTE — PROGRESS NOTES
Pt called stating her surgery is tomorrow and the surgeon gave her different instructions than what PCP told her for taking medications. Pt was advised to go with what the surgeon recommended. Pt agreed. Pt had no other needs.

## 2021-06-15 NOTE — TELEPHONE ENCOUNTER
Reason for Call:  Other call back      Detailed comments: PT STATING NEEDS PRODIGY TEST STRIPS   PH[facility]:  [facility] [address]  [facility] HAS TRIED TO NOTIFY US     Phone Number Patient can be reached at: Cell number on file:    Telephone Information:   Mobile [phone]       Best Time: ANYTIME    Can we leave a detailed message on this number?: Yes    Call taken on 2/5/2021 at 4:07 PM by Raquel Blackwell

## 2021-06-15 NOTE — PROGRESS NOTES
MTM Follow Up Encounter  ASSESSMENT AND PLAN  Type 2 Diabetes:  Improving. A1C not at goal of <7%, but significantly improved since last A1c check.  A1c checked today was slightly early, therefore will wait 3 months for next A1c check.  Also will be due for microalbuminuria.  Reviewed to only give NovoLog before meal, ideal to be given before meal, but taking during or immediately after would be better than skipping entirely.  She will work on having her evening meal earlier in the day.  Encouraged her to bring snacks with her to avoid lows in the middle of the day.  Recommended alternating 2 hour postprandial blood sugar checks, alternating occurs after breakfast 1 day, 2 hours after lunch 1 day, 2 hours and Tresiba sample today.    PLAN:   1. Continue current regimen.   2. BG log and Tresiba U200 sample provided today.  3. A1c and microalbuminuria in 3 months (future order placed)    Neuropathy: Reviewed that gabapentin should be tapered, to decrease the risk of seizures.  I would recommend that we wait to further taper after her hand surgery.  We will communicate via my chart.  PLAN:   1.  Continue current gabapentin dose.  Consider tapering after hand surgery.    MTM FOLLOW UP  3 weeks my chart  3 months in person    SUBJECTIVE AND OBJECTIVE  Liana Briceño is a 62 y.o. female here for a follow-up medication therapy management (MTM) appointment.     Type 2 Diabetes: Currently taking Tresiba 132 units daily and NovoLog 42 units 3 times daily.  Had a preop today and was recommended to not change insulin doses at all.  Reports that she is having a hard time getting Tresiba from [place].  Since last MTM appointment she had a continuous glucose monitor, and it was recommended that she work on taking NovoLog before meals.  Frank today also thinks that her morning hypoglycemia is due to her eating late at night-last night she ate at 10 PM and therefore gave NovoLog soon before bedtime.  Is going to try to work on  checking 2 hours postprandial.  Last A1c checked today = 7.7%, last checked 10/24/17 = 8.9%  Hypoglycemia when she is 86, 76, or 96.  She does not carry snacks with her, except candy if she is very low.  Hypoglycemia usually occurs midday due to her busy schedule.  Today she has not eaten at all and she feels low in the room today.  Blood sugar at 2 PM = 123.  Microalbumin checked 2014     Neuropathy: Currently taking gabapentin 300 mg 3 times daily.  Frank admits that she weaned herself down from 600 mg 3 times daily because it made her feel loopy.  Does not have any neuropathy in her feet, only very occasionally.  Does not think it helps her hand pain, only numbs it.  She would like to be on less gabapentin.  Will be having hand surgery on 1/5/2018.        Liana's medication list was reviewed with them, discussing reason for use, directions for use, and potential side effects of each medication as needed. Indication, safety, efficacy, and convenience was assessed for all medications addressed above.  No environmental factors were noted currently affecting patient.  This care plan was communicated via EMR with her primary care provider, Mimi Fuentes MD, who is the authorizing prescriber for this visit.  Direct supervision was available by either the patient's PCP or other available provider.    Time and complexity billing metrics are included in the docflowsheet linked to this visit    Time spent: 30 min  Liset Hightower, Pharm.D., BCACP   MTM Pharmacist at [facility]

## 2021-06-15 NOTE — PATIENT INSTRUCTIONS - HE
1. Accept email invite from [facility] to share data with [facility]   2. Scan sugars at least every 8 hours   3. Try increasing omeprazole to twice daily for 1 week to see if this improves your stomach upset

## 2021-06-15 NOTE — TELEPHONE ENCOUNTER
"Now her meter and CGM have \"caught up to each other.\" She just talked to the company [employer] and it started to work. No further questions at this time.     Reviewed CGM. Plan to decrease tresiba to 100 units daily and humalog 12 units three times a day. Follow up next week.   " negative Alert & oriented; no sensory, motor or coordination deficits, normal reflexes

## 2021-06-15 NOTE — PROGRESS NOTES
Unable to meet with patient today due to All Staff Meeting. [place] staff informed. Outreach moved to one month out from last.

## 2021-06-15 NOTE — TELEPHONE ENCOUNTER
Patient notified [employer] is only doing 75+ year olds at this time.  Patient encouraged to watch for age limit to be dropped.

## 2021-06-16 ENCOUNTER — COMMUNICATION - HEALTHEAST (OUTPATIENT)
Dept: INTERNAL MEDICINE | Facility: CLINIC | Age: 66
End: 2021-06-16

## 2021-06-16 DIAGNOSIS — R10.13 DYSPEPSIA: ICD-10-CM

## 2021-06-16 PROBLEM — E53.8 LOW SERUM VITAMIN B12: Chronic | Status: ACTIVE | Noted: 2018-11-04

## 2021-06-16 PROBLEM — R79.89 ELEVATED FERRITIN LEVEL: Status: ACTIVE | Noted: 2018-11-04

## 2021-06-16 RX ORDER — FAMOTIDINE 40 MG/1
TABLET, FILM COATED ORAL
Qty: 90 TABLET | Refills: 0 | Status: SHIPPED | OUTPATIENT
Start: 2021-06-16 | End: 2022-04-05

## 2021-06-16 NOTE — PROGRESS NOTES
[facility] Daily Progress     Patient Name: Liana Briceño  Date: 2/22/2018  Visit #: 2  Referral Diagnosis: carpal tunnel   Referring provider: Mimi Fuentes MD  Visit Diagnosis:     ICD-10-CM    1. Wrist pain, right M25.531    2. Weakness of right hand R29.898    3. Incoordination R27.9    4. Decreased activities of daily living (ADL) Z78.9        Assessment:     Patient is appropriate to continue with skilled occupational therapy intervention, as indicated by initial plan of care.    Goal Status:  Patient will be independent with home exercise program in: 4 weeks  Patient will be able to: lift;carry;reach;for housework;for dressing;for grooming/hygiene;for grocery shopping;with less pain;with less difficulty;in 12 weeks  Patient will be able to  & pinch: ;hold;pinch;for dressing;for hygiene;for meal prep;for grooming;for household chores;for writing;with less pain;with less difficulty;in 12 weeks  Patient will be able to: lift;carry;for household chores;for grocery shopping;with less pain;with less difficulty;in 12 weeks  Patient will improve hand/finger coordination for: fasteners;writing;typing;meal prep;dressing;for grooming/hygiene;with less pain;with less difficulty;in 12 weeks    Plan / Patient Education:     Continue with initial plan of care.  Progress with home program as tolerated.    Subjective:     Pain rating at rest: 5 tingling not pain  Pain rating with activity: 7      Objective:     Treatment Today: Patient reviewed AROM of right wrist in flexion/extension, finger abduction/adduction and PROM to right fingers in flexion. Patient instructed on scar massage and median nerve glides.  TREATMENT MINUTES COMMENTS   Evaluation     Self-care/ Home management     Manual therapy 10    Neuromuscular Re-education 15    Therapeutic Exercises     Iontophoresis     Orthotic Fitting     Total 25    Blank areas are intentional and mean the treatment did not include these items.        Sunshine Bates  2/22/2018  2:02 PM

## 2021-06-16 NOTE — TELEPHONE ENCOUNTER
Telephone Encounter by Alissa Wong at 1/16/2019  1:22 PM     Author: Alissa Wong Service: -- Author Type: --    Filed: 1/16/2019  1:25 PM Encounter Date: 1/14/2019 Status: Signed    : Alissa Wong PA team  [phone]    PA has been initiated.

## 2021-06-16 NOTE — TELEPHONE ENCOUNTER
Telephone Encounter by Alissa Wong at 1/16/2019  3:35 PM     Author: Alissa Wong Service: -- Author Type: --    Filed: 1/16/2019  3:37 PM Encounter Date: 1/14/2019 Status: Signed    : Alissa Wong       Per insurance a PA is not needed for this medication and is covered.  Called and spoke to the pharmacy and they have a paid claim and patient has already picked up medication.

## 2021-06-16 NOTE — TELEPHONE ENCOUNTER
Telephone Encounter by Laverne Diaz CMA at 9/13/2019  8:44 AM     Author: Laverne Diaz CMA Service: -- Author Type: Certified Medical Assistant    Filed: 9/13/2019 12:29 PM Encounter Date: 9/12/2019 Status: Signed    : Laverne Diaz CMA (Certified Medical Assistant)       Medication: Hydrocodone   Last Date Filled 8/2/19   pulled: YES    Only PCP Prescribing? : YES  Taken as prescribed from physician notes? YES    CSA in last year: YES  Random Utox in last year: YES  (if any of the above answer NO - schedule with PCP)     Opioids + benzodiazepines? NO  (if the above answer YES - schedule with PCP every 6 months)     Is patient on the Executive Review Team? NO      All responses suggest: Refilling prescription    5. Injury of left shoulder, initial encounter  Likely rotator cuff injury.  Unable to attend physical therapy due to insurance constraints.  Recommendation: Ice, limited prescription for Vicodin provided.  Rest shoulder.  See orthopedic surgery  - Ambulatory referral to Orthopedics  - HYDROcodone-acetaminophen 5-325 mg per tablet; Take 1 tablet by mouth 2 (two) times a day as needed for pain.  Dispense: 42 tablet; Refill: 0

## 2021-06-16 NOTE — TELEPHONE ENCOUNTER
Patient calling - says she had the second COVID19 vaccine at 11:00 am today.  Says she has the chills and a low grade fever.  Says she had similar symptoms with the first vaccine.  She took 1000 mg of acetaminophen at 8:00 pm.      No difficulty breathing or swallowing.    Triaged to disposition of Home Care.  Reviewed common side effects with patient.  Advised symptoms usually last 1 to 2 days.  Advised to call back if fever over 104 occurs, fever lasts over 3 days or symptoms worsen.     Linad New RN  Triage Nurse Advisor      Reason for Disposition    COVID-19 vaccine, systemic reactions (e.g., fatigue, fever, muscle aches), questions about    Additional Information    Negative: [1] Difficulty breathing or swallowing AND [2] starts within 2 hours after injection    Negative: Sounds like a life-threatening emergency to the triager    Negative: [1] COVID-19 exposure AND [2] no symptoms    Negative: [1] Typical COVID-19 symptoms AND [2] symptoms that are NOT expected from vaccine (e.g., cough, difficulty breathing, loss of taste or smell, runny nose, sore throat)    Negative: [1] Typical COVID-19 symptoms AND [2] started > 3 days after getting vaccine    Negative: Fever > 104 F (40 C)    Negative: Sounds like a severe, unusual reaction to the triager    Negative: [1] Redness or red streak around the injection site AND [2] started > 48 hours after getting vaccine AND [3] fever    Negative: [1] Fever > 101 F (38.3 C) AND [2] age > 60 AND [3] started > 48 hours after getting vaccine    Negative: [1] Fever > 100.0 F (37.8 C) AND [2] bedridden (e.g., nursing home patient, CVA, chronic illness, recovering from surgery) AND [3] started > 48 hours after getting vaccine    Negative: [1] Fever > 100.0 F (37.8 C) AND [2] diabetes mellitus or weak immune system (e.g., HIV positive, cancer chemo, splenectomy, organ transplant, chronic steroids) AND [3] started > 48 hours after getting vaccine    Negative: [1] Redness or red  streak around the injection site AND [2] started > 48 hours after getting vaccine AND [3] no fever  (Exception: red area < 1 inch or 2.5 cm wide)    Negative: [1] Pain, tenderness, or swelling at the injection site AND [2] over 3 days (72 hours) since vaccine AND [3] getting worse    Negative: Fever > 100.0 F (37.8 C) present > 3 days (72 hours)    Negative: [1] Fever > 100.0 F (37.8 C) AND [2] healthcare worker    Negative: [1] Pain, tenderness, or swelling at the injection site AND [2] lasts > 7 days    Negative: [1] Requesting COVID-19 vaccine AND [2] healthcare worker (e.g., EMS first responders, doctors, nurses)    Negative: [1] Requesting COVID-19 vaccine AND [2] resident of a long-term care facility (e.g., nursing home)    Negative: [1] Requesting COVID-19 vaccine AND [2] vaccine available in the community for this patient group    Protocols used: CORONAVIRUS (COVID-19) VACCINE QUESTIONS AND [id number] 1.3.21

## 2021-06-16 NOTE — TELEPHONE ENCOUNTER
Telephone Encounter by Lauren Hua at 1/30/2019 10:18 AM     Author: Lauren Hua Service: -- Author Type: --    Filed: 1/30/2019 10:18 AM Encounter Date: 1/25/2019 Status: Signed    : Lauren Hua APPROVED:    Approval start date: 1/29/19  Approval end date: 12/31/19    Pharmacy has been notified of approval and will contact patient when medication is ready for pickup.

## 2021-06-16 NOTE — TELEPHONE ENCOUNTER
Telephone Encounter by Lauren Hua at 1/18/2019  2:54 PM     Author: Lauren Hua Service: -- Author Type: --    Filed: 1/18/2019  2:56 PM Encounter Date: 1/17/2019 Status: Signed    : Lauren Hua       PRIOR AUTHORIZATION DENIED    Denial Rational: Patient must try/fail Humalog Kwikpen or cartridge or have contraindications to this medication.       Appeal Information:  This medication was denied. If physician would like to appeal because patient has contraindication or allergy to covered medication please write letter of medical necessity and route back to PA team to initiate.  If no further action is needed please close encounter thank you.

## 2021-06-16 NOTE — TELEPHONE ENCOUNTER
Telephone Encounter by Nohemi Pierce LPN at 11/1/2019  2:48 PM     Author: Nohemi Pierce LPN Service: -- Author Type: Licensed Nurse    Filed: 11/1/2019  2:53 PM Encounter Date: 11/1/2019 Status: Signed    : Nohemi Pierce LPN (Licensed Nurse)       Medication: Hydrocodone-Acetaminophen  Last Date Filled 9/13/19   pulled: YES      Only PCP Prescribing? : YES  Taken as prescribed from physician notes? YES OV 8/2/19    5. Injury of left shoulder, initial encounter  Likely rotator cuff injury.  Unable to attend physical therapy due to insurance constraints.  Recommendation: Ice, limited prescription for Vicodin provided.  Rest shoulder.  See orthopedic surgery  - Ambulatory referral to Orthopedics  - HYDROcodone-acetaminophen 5-325 mg per tablet; Take 1 tablet by mouth 2 (two) times a day as needed for pain.  Dispense: 42 tablet; Refill: 0    CSA in last year: YES  Random Utox in last year: YES  (if any of the above answer NO - schedule with PCP)     Opioids + benzodiazepines? NO  (if the above answer YES - schedule with PCP every 6 months)     Is patient on the Executive Review Team? no      All responses suggest: Routing to pcp to advise

## 2021-06-16 NOTE — PROGRESS NOTES
[facility] Daily Progress     Patient Name: Liana Briceño  Date: 3/1/2018  Visit #: 3  Referral Diagnosis: carpal tunnel   Referring provider: Mimi Fuentes MD  Visit Diagnosis:     ICD-10-CM    1. Wrist pain, right M25.531    2. Weakness of right hand R29.898    3. Incoordination R27.9    4. Decreased activities of daily living (ADL) Z78.9        Assessment:     Patient is appropriate to continue with skilled occupational therapy intervention, as indicated by initial plan of care. Patient reports that her pain and tingling was severe about 2 days ago. It has not improved much but she's taking Vicodin. She has an appointment with her surgeon today to discuss.    Goal Status:  Patient will be independent with home exercise program in: 4 weeks  Patient will be able to: lift;carry;reach;for housework;for dressing;for grooming/hygiene;for grocery shopping;with less pain;with less difficulty;in 12 weeks  Patient will be able to  & pinch: ;hold;pinch;for dressing;for hygiene;for meal prep;for grooming;for household chores;for writing;with less pain;with less difficulty;in 12 weeks  Patient will be able to: lift;carry;for household chores;for grocery shopping;with less pain;with less difficulty;in 12 weeks  Patient will improve hand/finger coordination for: fasteners;writing;typing;meal prep;dressing;for grooming/hygiene;with less pain;with less difficulty;in 12 weeks    Plan / Patient Education:     Continue with initial plan of care.  Progress with home program as tolerated.    Subjective:     Pain rating at rest: 5 tingling not pain  Pain rating with activity: 7      Objective:     Treatment Today: She discontinued the nerve glides because it makes her very lightheaded. She will discuss this with Dr. Lott today. Applied and instructed patient on kinesiotape from palm to elbow. Soft tissue massage to right forearm muscles.  TREATMENT MINUTES COMMENTS   Evaluation     Self-care/ Home management      Manual therapy 15    Neuromuscular Re-education 8    Therapeutic Exercises     Iontophoresis     Orthotic Fitting     Total 23    Blank areas are intentional and mean the treatment did not include these items.       Sunshine Bates  3/1/2018  1:33-1:56 PM

## 2021-06-16 NOTE — TELEPHONE ENCOUNTER
Telephone Encounter by Laverne Diaz CMA at 1/22/2020 10:48 AM     Author: Laverne Diaz CMA Service: -- Author Type: Certified Medical Assistant    Filed: 1/22/2020 10:55 AM Encounter Date: 1/20/2020 Status: Signed    : Laverne Diaz CMA (Certified Medical Assistant)       Medication: Hydrocodone   Last Date Filled 11/1/19   pulled: YES    Only PCP Prescribing? : YES  Taken as prescribed from physician notes? YES    CSA in last year: NO  Random Utox in last year: NO  (if any of the above answer NO - schedule with PCP)     Opioids + benzodiazepines? NO  (if the above answer YES - schedule with PCP every 6 months)     Is patient on the Executive Review Team? NO      All responses suggest: Scheduling with PCP for further intervention   Seeing pcp 1/28/2020

## 2021-06-16 NOTE — TELEPHONE ENCOUNTER
Telephone Encounter by Lauren Hua at 1/18/2019  9:09 AM     Author: Lauren Hua Service: -- Author Type: --    Filed: 1/18/2019  9:10 AM Encounter Date: 1/17/2019 Status: Signed    : Lauren Hua       [facility] team  [phone]    PA has been initiated.

## 2021-06-16 NOTE — PROGRESS NOTES
[facility] Daily Progress     Patient Name: Liana Briceño  Date: 3/9/2018  Visit #: 4  Referral Diagnosis: carpal tunnel   Referring provider: Mimi Fuentes MD  Visit Diagnosis:     ICD-10-CM    1. Wrist pain, right M25.531    2. Weakness of right hand R29.898    3. Incoordination R27.9    4. Decreased activities of daily living (ADL) Z78.9        Assessment:     Patient is appropriate to continue with skilled occupational therapy intervention, as indicated by initial plan of care.     Goal Status:  Patient will be independent with home exercise program in: 4 weeks  Patient will be able to: lift;carry;reach;for housework;for dressing;for grooming/hygiene;for grocery shopping;with less pain;with less difficulty;in 12 weeks  Patient will be able to  & pinch: ;hold;pinch;for dressing;for hygiene;for meal prep;for grooming;for household chores;for writing;with less pain;with less difficulty;in 12 weeks  Patient will be able to: lift;carry;for household chores;for grocery shopping;with less pain;with less difficulty;in 12 weeks  Patient will improve hand/finger coordination for: fasteners;writing;typing;meal prep;dressing;for grooming/hygiene;with less pain;with less difficulty;in 12 weeks    Plan / Patient Education:     Continue with initial plan of care.  Progress with home program as tolerated.    Subjective:     Pain rating at rest: 6 tingling not pain  Pain rating with activity: 7      Objective:     Treatment Today: Applied and instructed patient on kinesiotape from palm to elbow. Instructed on isometric strengthening with 2# in pronation/supination, wrist flexion/extension and  strength.  TREATMENT MINUTES COMMENTS   Evaluation     Self-care/ Home management     Manual therapy     Neuromuscular Re-education 4    Therapeutic Exercises 24    Iontophoresis     Orthotic Fitting     Total 28    Blank areas are intentional and mean the treatment did not include these items.       Sunshine  RUCHI Bates  3/9/2018  1:06-1:34 PM

## 2021-06-16 NOTE — PROGRESS NOTES
[facility] Follow Up Note    Assessment/Plan:  1. Left arm pain/pulsation  Likely secondary to decreased use of gabapentin which was suppressing nerve pain.  Nonetheless, with history of coronary disease, will check EKG.  Recommendation: Continue gabapentin at 300 mg 3 times daily.  Work with occupational therapist on strengthening of the left hand as it is doing more work since her right carpal tunnel surgery.  - Electrocardiogram Perform and Read    2. Coronary artery disease due to lipid rich plaque  As above  - Electrocardiogram Perform and Read-12-lead EKG is reviewed.  There are no signs of acute coronary ischemia    3. Medication monitoring encounter  For high risk med use will update labs  - Comprehensive Metabolic Panel  - HM2(CBC w/o Differential)    4. Essential hypertension  Under good control, will update labs    5. Hypothyroidism  Due for labs  - Thyroid Stimulating Hormone (TSH)    6. Dyslipidemia, goal LDL below 70  Due for labs  - Lipid [place] FASTING    7.  Type 2 diabetes  Will hold on checking an A1c today as she has had a recent Medrol Dosepak.  Would like to see her in 6 weeks for reassessment of type 2 diabetes.    8.  Status post carpal tunnel surgery right  She is with ongoing pain.  Ice the affected areas.  Continue to work with PT.  Avoid any weightbearing strategies and wear brace.  Follow-up with hand surgeon in 1 month      Mimi Fuentes MD    Chief Complaint:  Chief Complaint   Patient presents with     Follow-up       History of Present Illness:  Liana is a 62 y.o. female who is here today for follow-up.  Of note, nursing triage notes are reviewed.  She has had some difficulty recently with pain in the right arm following her carpal tunnel surgery.  She has been following with hand surgery as a result.  They did try a Medrol Dosepak without improvement.  The increased pain is felt to be secondary to inflammation.  Additionally, she had been working to reduce her  gabapentin.  She notes that the current symptoms seem to occur during this reduction of that medication.  She called the nurse triage line when she was experiencing pulsations in the left lateral forearm.  She denies any associated chest pain.  She states she was quite alarmed, however, given her history of coronary artery disease and atypical chest pain.  She states that she was told to monitor vitals during these episodes.  Her heart rate during these times was in the low 100s.  Her blood pressure was never escalated.  Since restoration of her usual dose of gabapentin to 3 times daily, the symptoms seem to have dissipated.    Nonetheless, she continues to complain of significant right wrist pain.  Her surgery was in January.  She is worried about the extended duration of pain.  Of note, she had a Medrol Dosepak that did not improve her situation.  It merely exacerbated her blood sugars.    Review of Systems:  A comprehensive review of systems was performed and was otherwise negative    PFSH:  Social History: She is in a common-law relationship with a significant other.  She has a 21-year-old son.  She is currently not smoking  History   Smoking Status     Former Smoker     Packs/day: 0.25     Years: 40.00     Types: Cigarettes     Quit date: 1/24/2016   Smokeless Tobacco     Never Used       Past History: Type 2 diabetes, hypertension, coronary and carotid artery disease  Current Outpatient Prescriptions   Medication Sig Dispense Refill     acetaminophen (TYLENOL) 500 MG tablet Take by mouth every 6 (six) hours as needed for pain.       albuterol (PROAIR HFA;PROVENTIL HFA;VENTOLIN HFA) 90 mcg/actuation inhaler Inhale 2 puffs every 6 (six) hours as needed for wheezing. 1 Inhaler 1     aspirin 81 MG EC tablet Take 81 mg by mouth daily.       blood glucose test (CONTOUR NEXT STRIPS) strips Use 1 each As Directed 4 (four) times a day. 300 strip 3     cholecalciferol, vitamin D3, (CHOLECALCIFEROL) 1,000 unit tablet  "Take 1,000 Units by mouth daily.       clopidogrel (PLAVIX) 75 mg tablet TAKE 1 TABLET BY MOUTH DAILY 90 tablet 3     gabapentin (NEURONTIN) 300 MG capsule Take 1 capsule (300 mg total) by mouth 3 (three) times a day. 90 capsule 9     generic lancets (MICROLET LANCET) Use 1 each As Directed 4 (four) times a day. 300 each 3     hydroCHLOROthiazide (MICROZIDE) 12.5 mg capsule Take 1 capsule (12.5 mg total) by mouth daily. 90 capsule 3     HYDROcodone-acetaminophen 5-325 mg per tablet Take 1 tablet by mouth every 4 (four) hours as needed for pain. 60 tablet 0     ibuprofen (ADVIL,MOTRIN) 800 MG tablet Take 1 tablet (800 mg total) by mouth 3 (three) times a day as needed for pain. 21 tablet 0     insulin aspart U-100 (NOVOLOG FLEXPEN U-100 INSULIN) 100 unit/mL injection pen Inject 42 Units under the skin 3 (three) times a day before meals. 36 mL 2     insulin degludec (TRESIBA FLEXTOUCH U-200) 200 unit/mL (3 mL) InPn Inject 132 Units under the skin daily. 18 mL 11     lisinopril (PRINIVIL,ZESTRIL) 5 MG tablet Take 1.5 tablets (7.5 mg total) by mouth daily. 135 tablet 3     melatonin (MELATIN) 3 mg Tab tablet Take 3 mg by mouth at bedtime as needed.       nitroglycerin (NITROSTAT) 0.4 MG SL tablet Place 1 tablet (0.4 mg total) under the tongue every 5 (five) minutes as needed for chest pain. 25 tablet 3     omega-3 acid ethyl esters (LOVAZA) 1 gram capsule TAKE 2 CAPSULES BY MOUTH TWICE DAILY 360 capsule 3     omeprazole (PRILOSEC) 40 MG capsule TAKE 1 CAPSULE BY MOUTH EVERY DAY 90 capsule 1     pen needle, diabetic (BD ULTRA-FINE ZULMA PEN NEEDLES) 32 gauge x 5/32\" Ndle Inject 4 each under the skin daily. With Tresiba and Novolog 120 each 3     rosuvastatin (CRESTOR) 10 MG tablet TAKE 1 TABLET BY MOUTH DAILY 90 tablet 3     tiZANidine (ZANAFLEX) 2 MG tablet TAKE 1 TABLET(2 MG) BY MOUTH THREE TIMES DAILY AS NEEDED 270 tablet 0     No current facility-administered medications for this visit.        Family History: Nothing " new    Physical Exam:  General Appearance:   She appears at baseline and in no acute distress  Vitals:    03/13/18 0907   BP: 128/62   Patient Site: Left Arm   Patient Position: Sitting   Cuff Size: Adult Large   Pulse: 80   Weight: (!) 273 lb (123.8 kg)     Wt Readings from Last 3 Encounters:   03/13/18 (!) 273 lb (123.8 kg)   12/27/17 (!) 270 lb (122.5 kg)   11/14/17 (!) 271 lb (122.9 kg)     Body mass index is 44.06 kg/(m^2).    Affect is bright.  Currently she is asymptomatic.  Pulses regular.  Cardiac exam reveals a regular rate and rhythm with no murmurs or gallops  Lungs are clear to auscultation and percussion    Data Review:    Analysis and Summary of Old Records (2): Reviewed hand surgery and nurse triage records    Records Requested (1): 0      Other History Summarized (from other people in the room) (2): 0    Radiology Tests Summarized (XRAY/CT/MRI/DXA) (1): 0    Labs Reviewed (1): Ordered    Medicine Tests Reviewed (EKG/ECHO/COLONOSCOPY/EGD) (1): Reviewed EKG    Independent Review of EKG or X-RAY (2): 0    4 data points

## 2021-06-16 NOTE — TELEPHONE ENCOUNTER
Telephone Encounter by Lauren Hua at 1/30/2019  9:26 AM     Author: Lauren Hua Service: -- Author Type: --    Filed: 1/30/2019  9:30 AM Encounter Date: 1/25/2019 Status: Signed    : Lauren Hua       [facility] team  [phone]    PA has been initiated.

## 2021-06-16 NOTE — PROGRESS NOTES
Pt stopped into clinic for drop in visit. Pt needed to discuss a health concern. CG thought Nurse Triage would be appropriate. CG and pt called together. CG will get a CA or RN to address pulse per nurse triage. Laverne MANCUSO was able to take vitals and report to SALOME Ordoñez. See notes from Smita Bajwa. Pt needed sooner appt per RN. Pt was rescheduled to 3/13/18 with PCP. Pt was provided instructions on when to go to ER.    Pt would like to see CG when she sees PCP on 3/13.

## 2021-06-16 NOTE — TELEPHONE ENCOUNTER
Telephone Encounter by Lauren Hua at 5/3/2019 10:32 AM     Author: Lauren Hua Service: -- Author Type: --    Filed: 5/3/2019 10:36 AM Encounter Date: 4/29/2019 Status: Addendum    : Lauren Hua    Related Notes: Original Note by Lauren Hua filed at 5/3/2019 10:32 AM       Medicare Part D does not cover this medication.  This may be covered under patients Medicare Part B insurance.  The PA team does not handle Part B claims.    More information, including forms, for part B coverage can be found by going to this website:    [email or web address]    Click on the coverage and Reimbursement tab on the top of the page.   Select the product.       Scroll down to the Medicare Patients portion and select the Medicare Order Form. This can be filled out and submitted for Part B coverage.

## 2021-06-16 NOTE — PROGRESS NOTES
[facility] Certification Request    February 15, 2018      Patient: Liana Briceño  MR Number: [id number]  YOB: 1955  Date of Visit: 2/15/2018      Dear Dr. Mimi Fuentes:    Thank you for this referral.   We are seeing Liana Briceño in Occupational Therapy for carpal tunnel.    Medicare and/or Medicaid requires physician review and approval of the treatment plan. Please review the plan of care and verify that you agree with the therapy plan of care by co-signing this note.      Plan of Care  Authorization / Certification Start Date: 02/15/18  Authorization / Certification End Date: 05/16/18  Authorization / Certification Number of Visits: 12  Communication with: Referral Source  Patient Related Instruction: Nature of Condition;Treatment plan and rationale;Basis of treatment;Expected outcome  Times per Week: 1  Number of Weeks: 12  Number of Visits: 12  Therapeutic Exercise: Stretching;ROM;Strengthening  Neuromuscular Reeducation: kinesio tape;other  Neuromuscular Re-education: nerve glide  Manual Therapy: soft tissue mobilization  Functional Training (ADL's): self care;ADL's;ergonomics  Orthotic Fitting: OTC;custom    Goals:  Patient will be independent with home exercise program in: 4 weeks  Patient will be able to: lift;carry;reach;for housework;for dressing;for grooming/hygiene;for grocery shopping;with less pain;with less difficulty;in 12 weeks  Patient will be able to  & pinch: ;hold;pinch;for dressing;for hygiene;for meal prep;for grooming;for household chores;for writing;with less pain;with less difficulty;in 12 weeks  Patient will be able to: lift;carry;for household chores;for grocery shopping;with less pain;with less difficulty;in 12 weeks  Patient will improve hand/finger coordination for: fasteners;writing;typing;meal prep;dressing;for grooming/hygiene;with less pain;with less difficulty;in 12 weeks      If you have any questions or concerns, please don't hesitate to  call.    Sincerely,      Sunshine Bates, OT        Physician recommendation:     ___ Follow therapist's recommendation        ___ Modify therapy      *Physician co-signature indicates they certify the need for these services furnished within this plan and while under their care.      [facility]   Upper Extremity Initial Evaluation    Patient Name: Liana Briceño  Date of evaluation: 2/15/2018  Referral Diagnosis: carpal tunnel  Referring provider: Mimi Fuentes MD  Visit Diagnosis:     ICD-10-CM    1. Wrist pain, right M25.531    2. Weakness of right hand R29.898    3. Incoordination R27.9    4. Decreased activities of daily living (ADL) Z78.9        Assessment:      Pt. is appropriate for skilled OT intervention as outlined in the Plan of Care (POC).    Goals:  Patient will be independent with home exercise program in: 4 weeks  Patient will be able to: lift;carry;reach;for housework;for dressing;for grooming/hygiene;for grocery shopping;with less pain;with less difficulty;in 12 weeks  Patient will be able to  & pinch: ;hold;pinch;for dressing;for hygiene;for meal prep;for grooming;for household chores;for writing;with less pain;with less difficulty;in 12 weeks  Patient will be able to: lift;carry;for household chores;for grocery shopping;with less pain;with less difficulty;in 12 weeks  Patient will improve hand/finger coordination for: fasteners;writing;typing;meal prep;dressing;for grooming/hygiene;with less pain;with less difficulty;in 12 weeks    Patient's expectations/goals are realistic.    Barriers to Learning or Achieving Goals:  No Barriers.       Plan / Patient Instructions:      Plan of Care:   Authorization / Certification Start Date: 02/15/18  Authorization / Certification End Date: 05/16/18  Authorization / Certification Number of Visits: 12  Communication with: Referral Source  Patient Related Instruction: Nature of Condition;Treatment plan and rationale;Basis of  "treatment;Expected outcome  Times per Week: 1  Number of Weeks: 12  Number of Visits: 12  Therapeutic Exercise: Stretching;ROM;Strengthening  Neuromuscular Reeducation: kinesio tape;other  Neuromuscular Re-education: nerve glide  Manual Therapy: soft tissue mobilization  Functional Training (ADL's): self care;ADL's;ergonomics  Orthotic Fitting: OTC;custom    Plan for next visit: Progress HEP     Subjective:        Social information:   Occupation: retired   Work Status: NA     History of Present Illness:    Liana is a 62 y.o. female who presents to therapy today with complaints of right hand weakness, pain, numbness and incoordination following her right carpal tunnel release on January 5th, 2018. Symptoms are getting better and not improving. She reports a history of carpal tunnel symptoms since September 2017. She reports that she had left carpal tunnel release in the 1990\"s and it has no symptoms.  She describes their previous level of function as limited with numbness and pain.. Functional limitations are described as occurring with gripping, pinching, lifting, carrying and coordination for ADL's and IADL's.    Pain rating at rest: 5  Pain rating with activity: 10  Pain description: numbness, pain, sharp, tingling and weakness         Objective:      Note: Items left blank indicates the item was not performed or not indicated at the time of the evaluation.    Patient Outcome Measures :    QuickDASH Score: 81.8    Upper Extremity Examination:  1. Wrist pain, right     2. Weakness of right hand     3. Incoordination     4. Decreased activities of daily living (ADL)       Precautions/Restrictions: None  Involved side: Right  Atrophy:  Absent  Color: Normal  Temperature: Normal  Edema: Absent  Palpation: Minimal tenderness in incision area.  Scar: Non-adhered  Hypersensitive  Guarded extremity: Minimal.  Sensory: Patient reports abnormal  numbness, tingling    Coordination  Right   Left     NT WNL Impaired NT WNL " Impaired   Gross Motor   X  X    Fine Motor   X  X    9 hole peg X   X       Hand AROM  Right   Left     NT WNL Impaired NT WNL Impaired   Full Fist   X  X    Flat Fist   X  X    Claw Fist   X  X      ROM/STRENGTH RIGHT LEFT   Note: * indicates pain AROM AROM   Shoulder Flexion - 180? WNL WNL   Shoulder Ext - 60?  WNL WNL   Shoulder Abd - 180? WNL WNL   Elbow Flexion - 150? WNL WNL   Elbow Extension - 0?    WNL WNL   Forearm Supination - 80? WNL WNL   Forearm Pronation - 80? WNL WNL   Wrist Flexion - 65-80?  55 WNL   Wrist Extension - 65-80? 46 WNL   Ulnar Deviation - 20-35? 38 WNL   Radial Deviation - 10-20? 1 WNL    Strength 16# 65#   3 Point Pinch 6# 19#   Lateral Pinch 7# 19#     May benefit from PT evaluation: No    U/E orthosis currently:   Yes:  OTC    Treatment Today: Patient instructed on activity modification and proper ergonomics with ADL's.  Patient instructed on AROM of right wrist in flexion/extension, finger abduction/adduction and PROM to right fingers in flexion.  TREATMENT MINUTES COMMENTS   Evaluation 30    Self-care/ Home management 10    Manual therapy     Neuromuscular Re-education     Orthotic fitting     Therapeutic Exercises 15    Iontophoresis           Total 55    Blank areas are intentional and mean the treatment did not include these items.     GOALS AND PLAN OF CARE WERE ESTABLISHED IN COOPERATION WITH THE PATIENT    OT Evaluation Code: (Please list factors)   Comorbidities: SVT, HTN, DM, CAD  Profile/History Review: Brief    Need for eval modification: No    # Treatment options:  Several   Clinical Decision Making:  Low     Occupational Profile/ Medical and Therapy History and Comorbidities Occupational Performance Clinical Decision Making   (Complexity)   brief history with review of medical/therapy records related to the presenting problem.  No comorbidities 1-3 Performance deficits that result in activity limitations and/or participation restrictions.    No Assessment  Modification  Low complexity, which includes  problem-focused assessments, and consideration of a limited number of treatment options.      expanded review of medical/therapy records and additional review of physical, cognitive and psychosocial history.    May have comorbidities 3-5 Performance deficits that result in activity limitations and/or participation restrictions.    Minimal to moderate modification of assessment Moderate complexity, which includes analysis of data from detailed assessments, and consideration of several treatment options.         Review of medical/therapy records and extensive additional review of physical, cognitive and psychosocial history.  Comorbidities affect occupational performance 5 or more Performance deficits that result in activity limitations and/or participation restrictions.    Significant modification of assessment High complexity, analysis of  Occupational profile and data,  Comprehensive assessments, multiple treatment options.            Sunshine Bates  2/15/2018  12:29-1:24 PM

## 2021-06-16 NOTE — TELEPHONE ENCOUNTER
Telephone Encounter by Lauren Hua at 4/19/2019  4:47 PM     Author: Lauren Hua Service: -- Author Type: --    Filed: 4/19/2019  4:48 PM Encounter Date: 4/19/2019 Status: Signed    : Lauren Hua APPROVED:    Approval start date: 4/19/2019  Approval end date: 12/31/2019    Pharmacy has been notified of approval and will contact patient when medication is ready for pickup.

## 2021-06-16 NOTE — PROGRESS NOTES
Spoke with patient. Pt will be going to Physical therapy today for her post-op carpal tunnel surgery. Pt explained that is doing pretty well considering. Pt was meeting all goals at least outreach. Pt has been trying to control her diabetes since the prednisone can affect those numbers. Pt has everything she needs. Pt will be called in one month.    Next outreach: 4/9/18

## 2021-06-16 NOTE — PROGRESS NOTES
Attempt 1: Care Guide called patient.  If this patient is returning my call, please transfer to [facility] at ext [phone].

## 2021-06-17 NOTE — PROGRESS NOTES
Medication Therapy Management Follow Up Visit     ASSESSMENT AND PLAN  1. Type 2 diabetes mellitus with hyperglycemia, with long-term current use of insulin  Not at goal A1c less than 7% per ADA guidelines, likely due to recent steroid use which has greatly increased appetite.  She is also not been active and has fallen off of her portion control.  Provided her educational materials on the common foods and amount of carbs per serving.  Discussed that she should keep her carbohydrates down to 45-60 g of carbs per meal, in no more than 15 g of carbs per snack.  Also encouraged her to go to the gym 5 days per week.  When she has done this in the past her blood sugars have greatly improved and she has lost weight.  I have challenged her to try and get her A1c back down to where it was in December, under 8% by the time she comes back for an A1c with Dr. Fuentes at the end of April, early May as previously discussed with PCP.  Recommend continue current insulin regimen, discussed the benefits versus downsides of insulin regimen, however she is previously poorly tolerated alternative agents.  She is continued to struggle with consistent lifestyle changes, however this would likely be the most beneficial adjustment for her.  -Educated on carbohydrate counting and increasing exercise      The following high BMI interventions were performed this visit: encouragement to exercise and lifestyle education regarding diet    FOLLOW-UP PLAN  Liana was advised to follow-up with PCP and labs at the end of April/early May.    SUBJECTIVE AND OBJECTIVE  Liana Briceño is a 62 y.o. female who was referred by Mimi Fuentes MD for MTM services.  Liana's chief concern today is follow up.     She had been doing really well, A1C in December was down to 7.7%. She had a glucose sensor done, which showed that her current regimen was likely effective. She has recently been getting fasting sugars up into the 200's. She is  trying to not eat anything after 8pm, she also would like to get back into the gym and decrease portions. Her brother also has diabetes, yet his sugars are very well controlled with humulin sliding scale. She is no longer working now, so her schedule if off and she may not get all three doses of novolog in during the day. She does have intermittent neuropathy in her feet, also recovering from hand surgery with neuropathy symptoms, taking gabapentin with relief.     Adherence: Liana misses a few doses of medications per week.     This note has been dictated using voice recognition software. Any grammatical or context distortions are unintentional and inherent to the software.    Liana was provided follow up instructions, and this care plan was communicated via EMR with her primary care provider, Mimi Fuentes MD, and is the authorizing prescriber for this visit.  Direct supervision was available by either the patient's PCP or another available physician when needed.    Time spent: 30 minutes  Level of service: 1    Jose Raul Coy PharmD, BCACP  Medication Management (MTM) Pharmacist  [facility]    We reviewed Liana's medication list with them, discussing reason for use, directions for use, and potential side effects of each medication.  Indication, safety, efficacy, and convenience was assessed for all reviewed medications.  No environmental factors were noted currently affecting patient.    Current Outpatient Prescriptions   Medication Sig Dispense Refill     acetaminophen (TYLENOL) 500 MG tablet Take by mouth every 6 (six) hours as needed for pain.       albuterol (PROAIR HFA;PROVENTIL HFA;VENTOLIN HFA) 90 mcg/actuation inhaler Inhale 2 puffs every 6 (six) hours as needed for wheezing. 1 Inhaler 1     aspirin 81 MG EC tablet Take 81 mg by mouth daily.       blood glucose test (CONTOUR NEXT STRIPS) strips Use 1 each As Directed 4 (four) times a day. 300 strip 3     cholecalciferol, vitamin D3,  "(CHOLECALCIFEROL) 1,000 unit tablet Take 1,000 Units by mouth daily.       clopidogrel (PLAVIX) 75 mg tablet TAKE 1 TABLET BY MOUTH DAILY 90 tablet 3     gabapentin (NEURONTIN) 300 MG capsule Take 1 capsule (300 mg total) by mouth 3 (three) times a day. 90 capsule 9     generic lancets (MICROLET LANCET) Use 1 each As Directed 4 (four) times a day. 300 each 3     hydroCHLOROthiazide (MICROZIDE) 12.5 mg capsule Take 1 capsule (12.5 mg total) by mouth daily. 90 capsule 3     HYDROcodone-acetaminophen 5-325 mg per tablet Take 1 tablet by mouth every 4 (four) hours as needed for pain. 60 tablet 0     ibuprofen (ADVIL,MOTRIN) 800 MG tablet Take 1 tablet (800 mg total) by mouth 3 (three) times a day as needed for pain. 21 tablet 0     insulin aspart U-100 (NOVOLOG FLEXPEN U-100 INSULIN) 100 unit/mL injection pen Inject 42 Units under the skin 3 (three) times a day before meals. 36 mL 2     insulin degludec (TRESIBA FLEXTOUCH U-200) 200 unit/mL (3 mL) InPn Inject 132 Units under the skin daily. 18 mL 11     lisinopril (PRINIVIL,ZESTRIL) 5 MG tablet Take 1.5 tablets (7.5 mg total) by mouth daily. 135 tablet 3     melatonin (MELATIN) 3 mg Tab tablet Take 3 mg by mouth at bedtime as needed.       nitroglycerin (NITROSTAT) 0.4 MG SL tablet Place 1 tablet (0.4 mg total) under the tongue every 5 (five) minutes as needed for chest pain. 25 tablet 3     omega-3 acid ethyl esters (LOVAZA) 1 gram capsule TAKE 2 CAPSULES BY MOUTH TWICE DAILY 360 capsule 3     omeprazole (PRILOSEC) 40 MG capsule TAKE 1 CAPSULE BY MOUTH EVERY DAY 90 capsule 1     oxyCODONE-acetaminophen (PERCOCET) 5-325 mg per tablet TK 1 T PO Q 4 TO 6 H PRN P  0     pen needle, diabetic (BD ULTRA-FINE ZULMA PEN NEEDLES) 32 gauge x 5/32\" Ndle Inject 4 each under the skin daily. With Tresiba and Novolog 120 each 3     rosuvastatin (CRESTOR) 10 MG tablet TAKE 1 TABLET BY MOUTH DAILY 90 tablet 3     tiZANidine (ZANAFLEX) 2 MG tablet TAKE 1 TABLET(2 MG) BY MOUTH THREE TIMES " DAILY AS NEEDED 270 tablet 0     No current facility-administered medications for this visit.

## 2021-06-17 NOTE — TELEPHONE ENCOUNTER
Telephone Encounter by Lauren Hua at 9/10/2020 11:28 AM     Author: Lauren Hua Service: -- Author Type: --    Filed: 9/10/2020 11:30 AM Encounter Date: 9/4/2020 Status: Signed    : Lauren Hua       PRIOR AUTHORIZATION DENIED    Denial Rational: Per medicare rules not covered under Part D pharmacy benefits.  Per insurance coverage may be available as Part B DME benefit.  Phone numbers for DME vendors are supplied by insurance below.  Please refer back to clinic staff for any Part B medical benefit inquiries.

## 2021-06-17 NOTE — PROGRESS NOTES
Liana is a 65 y.o. female being evaluated via a billable phone visit, and would like to be contacted via the following   Cell number on file:    Telephone Information:   Mobile [phone]     Advised to have pill bottles ready and pen for instructions?  Yes     ASSESSMENT:    1. Type 2 diabetes mellitus with hyperglycemia, with long-term current use of insulin (H)  Fasting sugars have typically been under 120.  Some question as to whether her glucose monitor is accurate.  Of note, per [facility] pharmacy, vitamin C and high doses of aspirin can decrease accuracy.  Recommendations: Update labs-orders placed.  Review ingredients and new supplements that she is taking that has many vitamins.  Reduce aspirin to 81 mg.  - Glycosylated Hemoglobin A1c; Future  - Basic Metabolic Panel; Future    2. Coronary artery disease due to lipid rich plaque  No angina reported.  We will update lipids  - Lipid Cascade FASTING; Future    3. Essential hypertension  Noted history.  We will update labs on medications    4. Low serum vitamin B12  We will update labs    5. Elevated ferritin level  We will update labs  - HM2(CBC w/o Differential); Future  - Ferritin; Future    6. Gastroesophageal reflux disease with esophagitis without hemorrhage  Denies any current reflux.    7. Stenosis of left carotid artery  She will follow up with Dr. Acosta Berger in July.  She needs CTA before that visit of the carotid arteries.  Intervention will occur if stenosis increases to greater than 75%  - aspirin 81 MG EC tablet; Take 1 tablet (81 mg total) by mouth daily.; Refill: 0    8. Chronic midline low back pain with left-sided sciatica  Exacerbation of chronic back pain due to recent moving and packing boxes.  Recommendation: Renewal for pain medications and muscle relaxants provided.  She has home stretches.  She will let me know if symptoms do not improve    - cyclobenzaprine (FLEXERIL) 10 MG tablet; Take 1 tablet (10 mg total) by mouth at bedtime as  needed for muscle spasms.  Dispense: 21 tablet; Refill: 1  - HYDROcodone-acetaminophen 5-325 mg per tablet; Take 1 tablet by mouth 2 (two) times a day as needed for pain.  Dispense: 40 tablet; Refill: 0            Preventive Health Care: Labs updated-has had coronavirus vaccine.  We will see for follow-up and update on preventative care in 3 months    PLAN:  There are no Patient Instructions on file for this visit.  No follow-ups on file.      CHIEF COMPLAINT:  Chief Complaint   Patient presents with     Medication Management     Back Pain     chronic; radiating down leg; flaring up x 1 month       HISTORY OF PRESENT ILLNESS:  Liana is a 65 y.o. female contacting the clinic today via phone for follow-up with regard to her usual medical problems.  Of note, Frank has a complex medical history but has been quite stable.  Her diabetes has come under very good control over the last year or so.  Her medications are reviewed and reconciled in the chart.  She has some concerns regarding the accuracy of her glucose monitor in comparison to the fingersticks.  She states that there is sometimes a 40 point discrepancy.  The notes from pharmacy are reviewed.  The things that can impact this are high doses of vitamin C and aspirin.  She has been on 3 small aspirins per day.  This was originally ordered for a variety of issues such as rapid heart rate, coronary artery disease and carotid stenosis.  However, these medical problems have been quite stable.  She does have history of chronic anemia and vitamin B 12 deficiency.    She reports that her sciatica is exacerbated.  It travels down the left.  She has been packing to move into a duplex.  She states that she has been loading boxes.  She denies any unusual new symptoms associated with this.  This is an acute exacerbation of her chronic pain.    REVIEW OF SYSTEMS:   Nuys any syncopal events or low blood sugar events    PFSH:  Social History     Social History Narrative    Single,  leaving significant other and moving into duplex, she is a .  She is a PCA.     Disability for LBP. Work injury.     As above.  She is moving into her a duplex-next to her sister.  She is leaving her life partner of greater than 25 years.    TOBACCO USE:  Social History     Tobacco Use   Smoking Status Former Smoker     Packs/day: 0.25     Years: 44.00     Pack years: 11.00     Types: Cigarettes     Quit date: 2017     Years since quittin.2   Smokeless Tobacco Never Used   Tobacco Comment    age 18 to age 62 up to  ppd       VITALS:  There were no vitals filed for this visit.  Wt Readings from Last 3 Encounters:   21 (!) 272 lb (123.4 kg)   20 (!) 265 lb (120.2 kg)   20 (!) 271 lb (122.9 kg)       PHYSICAL EXAM:  (observations via Phone)  Voice sounds clear and in good humor    MEDICATIONS  Current Outpatient Medications   Medication Sig Dispense Refill     acetaminophen (TYLENOL) 500 MG tablet Take 1,000 mg by mouth 3 (three) times a day as needed.       allopurinoL (ZYLOPRIM) 100 MG tablet Take 1 tablet (100 mg total) by mouth daily. 90 tablet 2     aspirin 81 MG EC tablet Take 243 mg by mouth daily.        blood glucose meter (FREESTYLE LITE METER) Use 1 each As Directed 6 (six) times a day. Dispense glucometer brand per patient's insurance at pharmacy discretion. 1 each 0     blood glucose test strips Use 1 each As Directed 6 (six) times a day. Prodigy Meter 300 strip 3     cetirizine (ZYRTEC) 10 MG tablet Take 1 tablet (10 mg total) by mouth daily. 30 tablet 2     cholecalciferol, vitamin D3, (CHOLECALCIFEROL) 1,000 unit tablet Take 3,000 Units by mouth daily.        cinnamon bark (CINNAMON ORAL) Take 1 each by mouth 2 (two) times a day.       cyanocobalamin (VITAMIN B-12) 500 MCG tablet Take 500 mcg by mouth daily.       cyclobenzaprine (FLEXERIL) 10 MG tablet Take 1 tablet (10 mg total) by mouth at bedtime as needed for muscle spasms. 21 tablet 1     diclofenac sodium  "(VOLTAREN) 1 % Gel Apply one thin layer over toe joint/ affected joint 1 Tube 5     flash glucose scanning reader (FREESTYLE MIRANDA 14 DAY READER) Misc Use 1 each As Directed daily. 1 each 1     flash glucose sensor (FREESTYLE MIRANDA 14 DAY SENSOR) Kit Use 1 each As Directed every 14 (fourteen) days. 2 kit 11     gabapentin (NEURONTIN) 300 MG capsule Take one tablet hs (Patient taking differently: 2 (two) times a day. Take one tablet hs) 90 capsule 3     generic lancets (MICROLET LANCET) Use 1 each As Directed 6 (six) times a day. 300 each 3     hydroCHLOROthiazide (MICROZIDE) 12.5 mg capsule TAKE 1 CAPSULE(12.5 MG) BY MOUTH DAILY 90 capsule 3     HYDROcodone-acetaminophen 5-325 mg per tablet Take 1 tablet by mouth 2 (two) times a day as needed for pain. 40 tablet 0     insulin degludec (TRESIBA FLEXTOUCH U-200) 200 unit/mL (3 mL) InPn Inject 100 Units under the skin daily. (Patient taking differently: Inject 92 Units under the skin daily. ) 63 mL 12     insulin lispro (HUMALOG KWIKPEN) 100 unit/mL pen Inject 12 Units under the skin 3 (three) times a day with meals. 45 mL 0     lisinopriL (PRINIVIL,ZESTRIL) 5 MG tablet Take 1.5 tablets (7.5 mg total) by mouth daily. 135 tablet 2     melatonin (MELATIN) 3 mg Tab tablet Take 3 mg by mouth at bedtime as needed.       nitroglycerin (NITROSTAT) 0.3 MG SL tablet Place 1 tablet (0.3 mg total) under the tongue every 5 (five) minutes as needed for chest pain. 100 tablet 3     omega-3 acid ethyl esters (LOVAZA) 1 gram capsule TAKE 2 CAPSULES BY MOUTH TWICE DAILY 360 capsule 3     omeprazole (PRILOSEC) 40 MG capsule TAKE 1 CAPSULE BY MOUTH EVERY DAY 90 capsule 3     ONETOUCH DELICA PLUS LANCET 33 gauge Misc USE TO TEST BLOOD SUGAR 6 TIMES DAILY 600 each 3     OZEMPIC 1 mg/dose (2 mg/1.5 mL) PnIj INJECT 1 MG UNDER THE SKIN EVERY 7 DAYS 3 mL 12     pen needle, diabetic (BD ULTRA-FINE ZULMA PEN NEEDLE) 32 gauge x 5/32\" Ndle Inject 4 each under the skin daily. With Tresiba and " Novolog 120 each 3     rosuvastatin (CRESTOR) 10 MG tablet TAKE 1 TABLET BY MOUTH DAILY 90 tablet 3     triamcinolone (KENALOG) 0.1 % cream Apply bid 30 g 0     VITAMIN B-1 100 mg tablet TAKE 1 TABLET(100 MG) BY MOUTH DAILY 90 tablet 3     cyanocobalamin 250 MCG tablet Take 250 mcg by mouth daily.       No current facility-administered medications for this visit.        Notes summarized: From [facility] pharmacy  Labs, x-rays, cardiology, GI tests reviewed:   New orders: No orders of the defined types were placed in this encounter.      Independent review of:  Supplemental history by:      Phone Start Time: 651 AM  Phone End time:  7:09 AM  Conversation plus orders: 18 minutes  Dictation time: 4 minutes    The visit lasted a total of 23 minutes     Patient would like to receive their AVS by AVS Preference: Diana.    Nohemi Pierce LPN

## 2021-06-17 NOTE — TELEPHONE ENCOUNTER
I was able to chat with her . She was not at home. He did not know the issue, but did think it could wait until Monday.     Will leave for Michelle to call patient on Monday.

## 2021-06-17 NOTE — PATIENT INSTRUCTIONS - HE
Patient Instructions by Mimi Fuentes MD at 8/2/2019  1:20 PM     Author: Mimi Fuentes MD Service: -- Author Type: Physician    Filed: 8/2/2019  2:09 PM Encounter Date: 8/2/2019 Status: Signed    : Mimi Fuentes MD (Physician)         Patient Education     Your Health Risk Assessment indicates you feel you are not in good physical health.    A healthy lifestyle helps keep the body fit and the mind alert. It helps protect you from disease, helps you fight disease, and helps prevent chronic disease (disease that doesn't go away) from getting worse. This is important as you get older and begin to notice twinges in muscles and joints and a decline in the strength and stamina you once took for granted. A healthy lifestyle includes good healthcare, good nutrition, weight control, recreation, and regular exercise. Avoid harmful substances and do what you can to keep safe. Another part of a healthy lifestyle is stay mentally active and socially involved.    Good healthcare     Have a wellness visit every year.     If you have new symptoms, let us know right away. Don't wait until the next checkup.     Take medicines exactly as prescribed and keep your medicines in a safe place. Tell us if your medicine causes problems.   Healthy diet and weight control     Eat 3 or 4 small, nutritious, low-fat, high-fiber meals a day. Include a variety of fruits, vegetables, and whole-grain foods.     Make sure you get enough calcium in your diet. Calcium, vitamin D, and exercise help prevent osteoporosis (bone thinning).     If you live alone, try eating with others when you can. That way you get a good meal and have company while you eat it.     Try to keep a healthy weight. If you eat more calories than your body uses for energy, it will be stored as fat and you will gain weight.     Recreation   Recreation is not limited to sports and team events. It includes any activity that provides relaxation, interest,  enjoyment, and exercise. Recreation provides an outlet for physical, mental, and social energy. It can give a sense of worth and achievement. It can help you stay healthy.       Patient Education     Exercise for a Healthier Heart  You may wonder how you can improve the health of your heart. If youre thinking about exercise, youre on the right track. You dont need to become an athlete, but you do need a certain amount of brisk exercise to help strengthen your heart. If you have been diagnosed with a heart condition, your doctor may recommend exercise to help stabilize your condition. To help make exercise a habit, choose safe, fun activities.       Be sure to check with your health care provider before starting an exercise program.    Why exercise?  Exercising regularly offers many healthy rewards. It can help you do all of the following:    Improve your blood cholesterol levels to help prevent further heart trouble    Lower your blood pressure to help prevent a stroke or heart attack    Control diabetes, or reduce your risk of getting this disease    Improve your heart and lung function    Reach and maintain a healthy weight    Make your muscles stronger and more limber so you can stay active    Prevent falls and fractures by slowing the loss of bone mass (osteoporosis)    Manage stress better  Exercise tips  Ease into your routine. Set small goals. Then build on them.  Exercise on most days. Aim for a total of 150 or more minutes of moderate to  vigorous intensity activity each week. Consider 40 minutes, 3 to 4 times a week. For best results, activity should last for 40 minutes on average. It is OK to work up to the 40 minute period over time. Examples of moderate-intensity activity is walking one mile in 15 minutes or 30 to 45 minutes of yard work.  Step up your daily activity level. Along with your exercise program, try being more active throughout the day. Walk instead of drive. Do more household tasks or yard  work.  Choose one or more activities you enjoy. Walking is one of the easiest things you can do. You can also try swimming, riding a bike, or taking an exercise class.  Stop exercising and call your doctor if you:    Have chest pain or feel dizzy or lightheaded    Feel burning, tightness, pressure, or heaviness in your chest, neck, shoulders, back, or arms    Have unusual shortness of breath    Have increased joint or muscle pain    Have palpitations or an irregular heartbeat      9244-5831 [employer]. [address] [place] [address]. All rights reserved. This information is not intended as a substitute for professional medical care. Always follow your healthcare professional's instructions.         Patient Education   Urinary Incontinence, Female (Adult)  Urinary incontinence means loss of control of the bladder. This problem affects many women, especially as they get older. If you have incontinence, you may be embarrassed to ask for help. But know that this problem can be treated.  Types of Incontinence  There are different types of incontinence. Two of the main types are described here. You can have more than one type.    Stress incontinence. With this type, urine leaks when pressure (stress) is put on the bladder. This may happen when you cough, sneeze, or laugh. Stress incontinence most often occurs because the pelvic floor muscles that support the bladder and urethra are weak. This can happen after pregnancy and vaginal childbirth or a hysterectomy. It can also be due to excess body weight or hormone changes.    Urge incontinence (also called overactive bladder). With this type, a sudden urge to urinate is felt often. This may happen even though there may not be much urine in the bladder. The need to urinate often during the night is common. Urge incontinence most often occurs because of bladder spasms. This may be due to bladder irritation or infection. Damage to bladder nerves or  pelvic muscles, constipation, and certain medicines can also lead to urge incontinence.  Treatment of urinary incontinence depends on the cause. Further evaluation is needed to find the type you have. This will likely include an exam and certain tests. Based on the results, you and your healthcare provider can then plan treatment. Until a diagnosis is made, the home care tips below can help relieve symptoms.  Home care    Do pelvic floor muscle exercises, if they are prescribed. The pelvic floor muscles help support the bladder and urethra. Many women find that their symptoms improve when doing special exercises that strengthen these muscles. To do the exercises contract the muscles you would use to stop your stream of urine, but do this when youre not urinating. Hold for 10 seconds, then relax. Repeat 10 to 20 times in a row, at least 3 times a day. Your provider may give you other instructions for how to do the exercises and how often.    Keep a bladder diary. This helps track how often and how much you urinate over a set period of time. Bring this diary with you to your next visit with the provider. The information can help your provider learn more about your bladder problem.    Lose weight, if advised to by your provider. Excess weight puts pressure on the bladder. Your provider can help you create a weight-loss plan thats right for you. This may include exercising more and making certain diet changes.    Don't consume foods and drinks that may irritate the bladder. These can include alcohol and caffeinated drinks.    Quit smoking. Smoking and other tobacco use can lead to chronic cough that strains the pelvic floor muscles. Smoking may also damage the bladder and urethra. Talk with your provider about treatments or methods you can use to quit smoking.    If drinking large amounts of fluid causes you to have symptoms, you may be advised to limit your fluid intake. You may also be advised to drink most of your  fluids during the day and to limit fluids at night.    If youre worried about urine leakage or accidents, you may wear absorbent pads to catch urine. Change the pads often. This helps reduce discomfort. It may also reduce the risk of skin or bladder infections.  Follow-up care  Follow up with your healthcare provider, or as directed. It may take some to find the right treatment for your problem. Your treatment plan may include special therapies or medicines. Certain procedures or surgery may also be options. Be sure to discuss any questions you have with your provider.  When to seek medical advice  Call the healthcare provider right away if any of these occur:    Fever of 100.4 F (38 C) or higher, or as directed by your provider    Bladder pain or fullness    Abdominal swelling    Nausea or vomiting    Back pain    Weakness, dizziness or fainting  Date Last Reviewed: 10/1/2017    2080-9080 [employer]. [address] [place] [address]. All rights reserved. This information is not intended as a substitute for professional medical care. Always follow your healthcare professional's instructions.     Patient Education   Depression and Suicide in Older Adults  Nearly 2 million older Americans have some type of depression. Sadly, some of them even take their own lives. Yet depression among older adults is often ignored. Learn the warning signs. You may help spare a loved one needless pain. You may also save a life.       What Is Depression?  Depression is a mood disorder that affects the way you think and feel. The most common symptom is a feeling of deep sadness. People who are depressed also may seem tired and listless. And nothing seems to give them pleasure. Its normal to grieve or be sad sometimes. But sadness lessens or passes with time. Depression rarely goes away or improves on its own. Other symptoms of depression are:    Sleeping more or less than normal    Eating more or less than  normal    Having headaches, stomachaches, or other pains that dont go away    Feeling nervous, empty, or worthless    Crying a great deal    Thinking or talking about suicide or death    Feeling confused or forgetful  What Causes It?  The causes of depression arent fully known. Certain chemicals in the brain play a role. Depression does run in families. And life stresses can also trigger depression in some people. That may be the case with older adults. They often face great burdens, such as the death of friends or a spouse. They may have failing health. And they are more likely to be alone, lonely, or poor.  How You Can Help  Often, depressed people may not want to ask for help. When they do, they may be ignored. Or, they may receive the wrong treatment. You can help by showing parents and older friends love and support. If they seem depressed, help them find the right treatment. Talk to your doctor. Or contact a local mental health center, social service agency, or hospital. With modern treatment, no one has to suffer from depression.  Resources:    National [place] of Mental Health  135.459.7714  www.nimh.nih.gov    National [place] on Mental Illness  202.429.2692  www.[facility]a.org    [facility]  568.289.2657  www.[facility].org    National Suicide Hotline  815.326.5276 (800-SUICIDE)      0741-4129 [employer]. [address] [place], [place] [address]. All rights reserved. This information is not intended as a substitute for professional medical care. Always follow your healthcare professional's instructions.         Patient Education   Understanding Advance Care Planning  Advance care planning is the process of deciding ones own future medical care. It helps ensure that if you cant speak for yourself, your wishes can still be carried out. The plan is a series of legal documents that note a persons wishes. The documents vary by state. Advance care planning may be done when a person has a  serious illness that is expected to get worse. It may be done before major surgery. And it can help you and your family be prepared in case of a major illness or injury. Advance care planning helps with making decisions at these times.       A health care proxy is a person who acts as the voice of a patient when the patient cant speak for himself or herself. The name of this role varies by state. It may be called a Durable Medical Power of  or Durable Power of  for Healthcare. It may be called an agent, surrogate, or advocate. Or it may be called a representative or decision maker. It is an official duty that is identified by a legal document. The document also varies by state.    Why Is Advance Care Planning Important?  If a person communicates their healthcare wishes:    They will be given medical care that matches their values and goals.    Their family members will not be forced to make decisions in a crisis with no guidance.  Creating a Plan  Making an advance care plan is often done in 3 steps:    Thinking about ones wishes. To create an advance care plan, you should think about what kind of medical treatment you would want if you lose the ability to communicate. Are there any situations in which you would refuse or stop treatment? Are there therapies you would want or not want? And whom do you want to make decisions for you? There are many places to learn more about how to plan for your care. Ask your doctor or  for resources.    Picking a health care proxy. This means choosing a trusted person to speak for you only when you cant speak for yourself. When you cannot make medical decisions, your proxy makes sure the instructions in your advance care plan are followed. A proxy does not make decisions based on his or her own opinions. They must put aside those opinions and values if needed, and carry out your wishes.    Filling out the legal documents. There are several kinds of legal  documents for advance care planning. Each one tells health care providers your wishes. The documents may vary by state. They must be signed and may need to be witnessed or notarized. You can cancel or change them whenever you wish. Depending on your state, the documents may include a Healthcare Proxy form, Living Will, Durable Medical Power of , Advance Directive, or others.  The Familys Role  The best help a family can give is to support their loved ones wishes. Open and honest communication is vital. Family should express any concerns they have about the patients choices while the patient can still make decisions.    4856-1645 [employer]. [address] [place] [address]. All rights reserved. This information is not intended as a substitute for professional medical care. Always follow your healthcare professional's instructions.         Also, [facility] offers a free, downloadable health care directive that allows you to share your treatment choices and personal preferences if you cannot communicate your wishes. It also allows you to appoint another person (called a health care agent) to make health care decisions if you are unable to do so. You can download an advance directive by going here: [email or web address]     Patient Education   Personalized Prevention Plan  You are due for the preventive services outlined below.  Your care team is available to assist you in scheduling these services.  If you have already completed any of these items, please share that information with your care team to update in your medical record.  Health Maintenance   Topic Date Due   ? HEPATITIS C SCREENING  1955   ? HIV SCREENING  07/07/1972   ? MEDICARE ANNUAL WELLNESS VISIT  07/07/1975   ? ZOSTER VACCINES (2 of 3) 10/23/2012   ? DIABETES OPHTHALMOLOGY EXAM  09/09/2017   ? COLONOSCOPY  05/16/2019   ? PAP SMEAR  06/17/2019   ? MAMMOGRAM  08/07/2019   ?  DIABETES FOLLOW-UP  12/21/2019   ? DIABETES HEMOGLOBIN A1C  01/29/2020   ? DIABETES FOOT EXAM  06/21/2020   ? ADVANCE CARE PLANNING  10/12/2021   ? TD 18+ HE  03/08/2029   ? TDAP ADULT ONE TIME DOSE  Completed

## 2021-06-17 NOTE — TELEPHONE ENCOUNTER
Telephone Encounter by Laverne Diaz CMA at 4/14/2020  4:05 PM     Author: Laverne Diaz CMA Service: -- Author Type: Certified Medical Assistant    Filed: 4/14/2020  4:07 PM Encounter Date: 4/14/2020 Status: Signed    : Laverne Diaz CMA (Certified Medical Assistant)       Medication: Hydrocodone   Last Date Filled 1/28/2020   pulled: YES    Only PCP Prescribing? : YES  Taken as prescribed from physician notes? YES    CSA in last year: YES  Random Utox in last year: YES  (if any of the above answer NO - schedule with PCP)     Opioids + benzodiazepines? NO  (if the above answer YES - schedule with PCP every 6 months)     Is patient on the Executive Review Team? NO      All responses suggest: Scheduling with PCP for further intervention      Return in about 4 weeks (around 2/25/2020).           Statement Selected

## 2021-06-17 NOTE — PROGRESS NOTES
[facility] Daily Progress     Patient Name: Liana Briceño  Date: 4/12/2018  Visit #: 6  Referral Diagnosis: carpal tunnel   Referring provider: Mimi Fuentes MD  Visit Diagnosis:     ICD-10-CM    1. Wrist pain, right M25.531    2. Weakness of right hand R29.898    3. Incoordination R27.9    4. Decreased activities of daily living (ADL) Z78.9        Assessment:     Patient demonstrates understanding/independence with home program.     Goal Status:  Patient will be independent with home exercise program in: 4 weeks  Patient will be able to: lift;carry;reach;for housework;for dressing;for grooming/hygiene;for grocery shopping;with less pain;with less difficulty;in 12 weeks  Patient will be able to  & pinch: ;hold;pinch;for dressing;for hygiene;for meal prep;for grooming;for household chores;for writing;with less pain;with less difficulty;in 12 weeks  Patient will be able to: lift;carry;for household chores;for grocery shopping;with less pain;with less difficulty;in 12 weeks  Patient will improve hand/finger coordination for: fasteners;writing;typing;meal prep;dressing;for grooming/hygiene;with less pain;with less difficulty;in 12 weeks    Plan / Patient Education:     Continue with initial plan of care.  Progress with home program as tolerated.    Subjective:     Pain rating at rest: 3 tingling not pain  Pain rating with activity: 6      Objective:     Treatment Today: Applied kinesiotape from palm to elbow. Verbally reviewed isometric strengthening with 2# in pronation/supination, wrist flexion/extension,  strength, scar massage to right incision area. Patient has moderate edema pocket on dorsal right wrist. Instructed and applied size D tubigrip to the distal forearm. Patient reports increased tingling in fingertips just in the morning. I asked if she is wearing her wrist brace at night and she reported that she has not been. Recommended that she wear the brace at night and see if the  tingling that she is getting in those morning hours is decreased.  TREATMENT MINUTES COMMENTS   Evaluation     Self-care/ Home management     Manual therapy 10    Neuromuscular Re-education 5    Therapeutic Exercises     Iontophoresis     Orthotic Fitting     Total 15    Blank areas are intentional and mean the treatment did not include these items.       Sunshine Bates  4/12/2018  11:34-11:49 AM

## 2021-06-17 NOTE — TELEPHONE ENCOUNTER
Liana would like a call back regarding her Maria Victoria meter. She said she is very frustrated with it and would like someone to call her back for help.    Thanks,  SIERRA Cantu Coordinator

## 2021-06-17 NOTE — TELEPHONE ENCOUNTER
"FYI - Status Update  Who is Calling: Patient  Update: Was returning call. After reviewing notes, patient agreed she is able to wait for call back from Michelle on Monday. Patient reason for call is that she is \"frustrated\" with her Maria Victoria machine, having issues.  Okay to leave a detailed message?:  Yes    "

## 2021-06-17 NOTE — TELEPHONE ENCOUNTER
"Telephone Encounter by Jose Raul Coy, PharmD at 5/3/2021  3:35 PM     Author: Jose Raul Coy, PharmD Service: -- Author Type: Pharmacist    Filed: 5/3/2021  3:46 PM Encounter Date: 4/30/2021 Status: Signed    : Jose Raul Coy, PharmD (Pharmacist)        I spoke with Liana, she has continued to be frustrated by some ongoing discrepancies between her fingerstick and the freestyle kiah device.  Of note she is not taking high levels of vitamin C, but she is taking 3 low-dose aspirin per day as previously directed.  Per freestyle kiah, \"Patients with high levels of ascorbic acid (Vitamin C) or salicylic acid (used in Aspirin) or severe dehydration or excessive water loss may experience inaccurate results with this system.\"  I have encouraged her to discuss her aspirin dose with PCP tomorrow.  Based on her blood sugars predicted by freestyle kiah, this is consistent with her previous A1c.           "

## 2021-06-17 NOTE — TELEPHONE ENCOUNTER
Telephone Encounter by Laverne Diaz CMA at 1/13/2021 12:49 PM     Author: Laverne Diaz CMA Service: -- Author Type: Certified Medical Assistant    Filed: 1/13/2021 12:52 PM Encounter Date: 1/12/2021 Status: Signed    : Laverne Diaz CMA (Certified Medical Assistant)       Medication: Hydrocodone  Last Date Filled 12/4/2020   pulled: YES    Only PCP Prescribing? : YES  Taken as prescribed from physician notes? YES    CSA in last year: YES  Random Utox in last year: YES  (if any of the above answer NO - schedule with PCP)     Opioids + benzodiazepines? NO  (if the above answer YES - schedule with PCP every 6 months)     Is patient on the Executive Review Team? NO      All responses suggest: Pt will schedule face to face

## 2021-06-17 NOTE — TELEPHONE ENCOUNTER
Pt having sugars in the 50s and 60s. Happening for the last three days. Her stomach starts to cramp when she gets low. Happening throughout the day. Was at 98 this morning.     Decreased her dose of Tresiba. Was using 92 units, but this morning decreased to 90 units.      Continue with 90 units. Will have Michelle follow-up with patient in a few days.       Pineda Manning PharmD  Medication Therapy Management (MTM) Pharmacist  [facility]

## 2021-06-17 NOTE — PROGRESS NOTES
[facility] [place], Patient Position: Semi-Devlin'Scheduled Follow Up Call: Attempt 1  Care Guide called and left a message for the patient.  If the patient is returning my call, please transfer them to me, Azar / Lisa  at [phone].    Next outreach: 5/9/18   Miriam Mcclain MD

## 2021-06-17 NOTE — PROGRESS NOTES
Spoke with patient for scheduled outreach. Pt was needing to reschedule a couple appts due to caring for her ill brother. Pt will meet with PharmD 4/11 to address her ongoing diabetes goal. Pt was transferred to [facility] to reschedule therapy appt. Pt had no other needs at this time.    Next outreach: 5/9/18

## 2021-06-17 NOTE — TELEPHONE ENCOUNTER
Would like a call back as soon as possible. Blood sugar keeps dropping very low to 59-55. Call back number is .

## 2021-06-17 NOTE — TELEPHONE ENCOUNTER
Telephone Encounter by Nohemi Pierce LPN at 11/30/2020  3:24 PM     Author: Nohemi Pierce LPN Service: -- Author Type: Licensed Nurse    Filed: 11/30/2020  3:28 PM Encounter Date: 11/30/2020 Status: Signed    : Nohemi Pierce LPN (Licensed Nurse)       Medication: Hydrocodone-Acetaminophen  Last Date Filled 7/10/20   pulled: YES         Only PCP Prescribing? : NO  Taken as prescribed from physician notes? YES    CSA in last year: YES  Random Utox in last year: NO  (if any of the above answer NO - schedule with PCP)     Opioids + benzodiazepines? NO  (if the above answer YES - schedule with PCP every 6 months)     Is patient on the Executive Review Team? no      All responses suggest: Refilling prescription

## 2021-06-17 NOTE — TELEPHONE ENCOUNTER
Telephone Encounter by Lauren Hua at 9/10/2020 11:30 AM     Author: Lauren Hua Service: -- Author Type: --    Filed: 9/10/2020 11:30 AM Encounter Date: 9/10/2020 Status: Signed    : Lauren Hua       PRIOR AUTHORIZATION DENIED     Denial Rational: Per medicare rules not covered under Part D pharmacy benefits.  Per insurance coverage may be available as Part B DME benefit.  Phone numbers for DME vendors are supplied by insurance below.  Please refer back to clinic staff for any Part B medical benefit inquiries.

## 2021-06-17 NOTE — TELEPHONE ENCOUNTER
Telephone Encounter by Lauren Hua at 9/10/2020 11:02 AM     Author: Lauren Hua Service: -- Author Type: --    Filed: 9/10/2020 11:02 AM Encounter Date: 9/10/2020 Status: Signed    : Lauren Hua

## 2021-06-18 NOTE — PROGRESS NOTES
Attempt 1: Care Guide called patient.  If this patient is returning my call, please transfer to the care guide at ext [phone].    Next outreach 5/23/18

## 2021-06-18 NOTE — PROGRESS NOTES
MTM Follow Up Encounter  Assessment & Plan                                                     1. Chronic back pain  After further discussion about her current pain regimen and her concerns about naltrexone and what she has read in the package insert about its use with chronic narcotics, notes that her pain is better.  She is actually been able to decrease her amount of Vicodin use per day.  Discussed mechanism of interaction between naltrexone and opioids, recommend continue naltrexone and Vicodin daily at bedtime to help with sleep and pain.  Encouraged her to stop muscle relaxers this is likely not contributing more at bedtime, rather than making her too tired.  Also encouraged her to start melatonin, is the Vicodin and naltrexone are making her sleepy enough.  Follow-up with PCP this Friday.  -Hold tizanidine  -Hold melatonin    2. Type 2 diabetes mellitus with hyperglycemia, with long-term current use of insulin  Greatly improved with strict diabetes diet.  Congratulated her on her efforts.  She has not yet done the bariatric surgery class, hopefully she will get the motivation to do this soon.    Medication Adherence/Access: stable.     Follow Up  Return in about 3 days (around 6/29/2018) for with PCP .    Subjective & Objective                                                       Liana Briceño is a 62 y.o. female coming in for a follow up visit for Medication Therapy Management. She was referred to me from Mimi Fuentes MD. Liana with a physical therapy and had questions about her medications and stopped up for a quick visit.    Chief Complaint: medication questions     Chronic Pain: She can not tell yet how well the naltrexone is working. She is taking tizanidine, melatonin, vicodin, and naltrexone all together at night, all of which make her sleepy. She is having fogginess the next morning. She just came from physical therapy. Currently she is sleepy and is not really in pain and sleeping through the  night. This depends on her activity throughout the day. The naltrexone gave her a little stomach upset. She feels that she needs the vicodin for sure, but not sure about the tizanidine. She is getting adequate pain control with this. Only taking 1.5 tabs of vicodin daily. She feels that naltrexone does help with pain control.     Diabetes: has been working hard on diet and sugars are improving.   Glucoses: 133, 144, 159, 202, 162, 157, 258, 120, 114, 140, 183, 118, 145, 227, 146, 237    Medication Adherence/Access: stable.     PMH: reviewed in EPIC   Allergies/ADRs: reviewed in EPIC   Alcohol: reviewed in EPIC   Tobacco:   History   Smoking Status     Former Smoker     Packs/day: 0.25     Years: 40.00     Types: Cigarettes     Quit date: 1/24/2016   Smokeless Tobacco     Never Used     Today's Vitals:   Vitals:    06/26/18 1348   BP: 120/64   Pulse: 83   Weight: (!) 278 lb (126.1 kg)     ----------------    Much or all of the text in this note was generated through the use of Dragon Dictate voice-to-text software. Errors in spelling or words which seem out of context are unintentional. Sound alike errors, in particular, may have escaped editing.    The patient was given a summary of these recommendations as an after visit summary    I spent 15 minutes with this patient today; .. All changes were made via collaborative practice agreement with Mimi Fuentes MD. A copy of the visit note was provided to the patient's provider.     Jose Raul Coy, PharmD, BCACP  Medication Management (MTM) Pharmacist  [facility]         Current Outpatient Prescriptions   Medication Sig Dispense Refill     acetaminophen (TYLENOL) 500 MG tablet Take by mouth every 6 (six) hours as needed for pain.       albuterol (PROAIR HFA;PROVENTIL HFA;VENTOLIN HFA) 90 mcg/actuation inhaler Inhale 2 puffs every 6 (six) hours as needed for wheezing. 1 Inhaler 1     aspirin 81 MG EC tablet Take 81 mg by mouth daily.       blood glucose  "test (CONTOUR NEXT STRIPS) strips Use 1 each As Directed 4 (four) times a day. 300 strip 3     cholecalciferol, vitamin D3, (CHOLECALCIFEROL) 1,000 unit tablet Take 1,000 Units by mouth daily.       gabapentin (NEURONTIN) 300 MG capsule Take 1 capsule (300 mg total) by mouth 3 (three) times a day. 90 capsule 9     generic lancets (MICROLET LANCET) Use 1 each As Directed 4 (four) times a day. 300 each 3     hydroCHLOROthiazide (MICROZIDE) 12.5 mg capsule Take 1 capsule (12.5 mg total) by mouth daily. 90 capsule 3     HYDROcodone-acetaminophen 5-325 mg per tablet Take 1 tablet by mouth 2 (two) times a day as needed for pain. 30 tablet 0     insulin aspart U-100 (NOVOLOG FLEXPEN U-100 INSULIN) 100 unit/mL injection pen Inject 42 Units under the skin 3 (three) times a day before meals. 36 mL 2     insulin degludec (TRESIBA FLEXTOUCH U-200) 200 unit/mL (3 mL) InPn Inject 132 Units under the skin daily. 18 mL 11     lisinopril (PRINIVIL,ZESTRIL) 5 MG tablet Take 1.5 tablets (7.5 mg total) by mouth daily. 135 tablet 3     melatonin (MELATIN) 3 mg Tab tablet Take 3 mg by mouth at bedtime as needed.       naltrexone (DEPADE) 50 mg tablet Take 1 tablet (50 mg total) by mouth at bedtime. 30 tablet 2     nitroglycerin (NITROSTAT) 0.4 MG SL tablet Place 1 tablet (0.4 mg total) under the tongue every 5 (five) minutes as needed for chest pain. 25 tablet 3     omega-3 acid ethyl esters (LOVAZA) 1 gram capsule TAKE 2 CAPSULES BY MOUTH TWICE DAILY 360 capsule 3     omeprazole (PRILOSEC) 40 MG capsule TAKE 1 CAPSULE BY MOUTH EVERY DAY 90 capsule 1     pen needle, diabetic (BD ULTRA-FINE ZULMA PEN NEEDLES) 32 gauge x 5/32\" Ndle Inject 4 each under the skin daily. With Tresiba and Novolog 120 each 3     rosuvastatin (CRESTOR) 10 MG tablet TAKE 1 TABLET BY MOUTH DAILY 90 tablet 3     tiZANidine (ZANAFLEX) 2 MG tablet One to two tabs at bedtime 30 tablet 1     No current facility-administered medications for this visit.  "

## 2021-06-18 NOTE — PROGRESS NOTES
"[facility] Daily Progress     Patient Name: Liana Briceño  Date: 2018  Visit #: 2  PTA visit #:  1  Referral Diagnosis: lumbalgia, lumbar radiculitis , myofascial pain  Referring provider: Jaz Matute   Visit Diagnosis:     ICD-10-CM    1. Lumbar radiculopathy M54.16    2. Myofascial pain M79.1    3. Chronic back pain, unspecified back location, unspecified back pain laterality M54.9     G89.29    4. Decreased ROM of lumbar spine M25.60    5. Muscle weakness (generalized) M62.81    6. Poor posture R29.3          Assessment:     Cues for posture needed  Pt would benefit with continuing skilled PT visits    Goal Status: Ongoing  Pt. will demonstrate/verbalize independence in self-management of condition in : 6 weeks  Pt. will be independent with home exercise program in : 6 weeks  Pt. will have improved quality of sleep: with less pain;waking less times/night;in 6 weeks;Comment  Comment:: decrease interruptionsto 1-2x/night and improve tolerate SL 30 minutes and supine with less sharp \"taking breath away\" pain  Pt. will be able to walk : 30 minutes;around the house;for household mobility;for exercise/recreation;for community mobility;in 6 weeks  Other Activity:: Discontinue  Patient will stand : other time;with less difficultty;with less pain;in 6 weeks  Other Time: 20 minutes  Patient will decrease : ERIS score;by _ points;for improved quality of function;for improved quality of life;in 6 weeks  by ___ points: 30    Plan / Patient Education:     Continue POC  Progress ex as tolerated    Subjective:     Pain Ratin/10 right mid-back pain  Reports several hours with US, but pain came back  Medications as needed   Haven,t found TENS unit yet      Objective:     Pt arrived 9 mins late  Reviewed sitting posture with support, discussed using TENS at home  Added seated HS stretch and standing calf stretch  Continued US      Treatment Today   2018    TREATMENT MINUTES COMMENTS   Evaluation   "   Self-care/ Home management     Manual therapy     Neuromuscular Re-education     Therapeutic Activity     Therapeutic Exercises 11    Gait training     Modality_____US sidelying on left  To right QL and lumbar paraspinals____________ 9- 3mins set-up US to right mid-back 1MHZ 100% at 1.0wts/cm2              Total 23    Blank areas are intentional and mean the treatment did not include these items.       Aubree Zamarripa  6/12/2018

## 2021-06-18 NOTE — PROGRESS NOTES
"[facility] Daily Progress     Patient Name: Liana Briceño  Date: 2018  Visit #:   PTA visit #:  3  PRECAUTIONS/RESTRICTIONS:  Right BAR s/p 8133-1141, DM2, Morbid Obesity, HX: MRSA  Referral Diagnosis: lumbalgia, lumbar radiculitis , myofascial pain  Referring provider: Jaz Matute PA-C  Visit Diagnosis:     ICD-10-CM    1. Lumbar radiculopathy M54.16    2. Myofascial pain M79.1    3. Chronic back pain, unspecified back location, unspecified back pain laterality M54.9     G89.29    4. Decreased ROM of lumbar spine M25.60    5. Muscle weakness (generalized) M62.81    6. Poor posture R29.3          Assessment:     Compliant with HEP  Beginning to report some improvement as was able to stand and iron a couple of clothing items with better tolerance.  Pt would benefit with continuing skilled PT visits  Slow but steady progress.    Goal Status: Ongoing  Pt. will demonstrate/verbalize independence in self-management of condition in : Progressing toward  Pt. will be independent with home exercise program in : Progressing toward  Pt. will have improved quality of sleep: Progressing toward  Comment:: decrease interruptionsto 1-2x/night and improve tolerate SL 30 minutes and supine with less sharp \"taking breath away\" pain  Pt. will be able to walk : 20 minutes  Other Activity:: Discontinue  Patient will stand : Progressing toward  Other Time: 20 minutes  Patient will decrease : ERIS score;by _ points;for improved quality of function;for improved quality of life;in 6 weeks  by ___ points: 30    Plan / Patient Education:     Continue POC  Progress ex as tolerated adding  double knees to chest, QL stretch,  SL core.  Continue US, KT and MFR    Subjective:     Patient arrived 8 minutes late.  Pain Ratin/10 (decreased from 7/10 at initial visit).  Able to stand to iron a couple of items with better tolerance (blouse and slacks)  In general feeling better, US, MFR and exercises helpful.reports relief with " KT for two days    Objective:     Bed transfers with greater ease  Palpation:  Right>left L3-inferior iliac crest with skin bound down to soft tissue  Standing trunk ext  Reports relief following US and MT      Treatment Today   6/26/2018    TREATMENT MINUTES COMMENTS   Evaluation     Self-care/ Home management     Manual therapy 9 MFR at left SL MFR LS decompression   Neuromuscular Re-education     Therapeutic Activity     Therapeutic Exercises 5 Brief  review of HEP   Gait training     Modality_____US sidelying on left  To right QL and lumbar paraspinals____________ 8-+3mins set-up US to right mid-back 1MHZ 100% at 1.5wts/cm2              Total 25    Blank areas are intentional and mean the treatment did not include these items.       Aubree Zamarripa  6/26/2018

## 2021-06-18 NOTE — PROGRESS NOTES
"[facility] Daily Progress     Patient Name: Liana Briceño  Date: 2018  Visit #: 3/12  PTA visit #:  2  PRECAUTIONS/RESTRICTIONS:  Right BAR s/p [id number], DM2, Morbid Obesity, HX: MRSA  Referral Diagnosis: lumbalgia, lumbar radiculitis , myofascial pain  Referring provider: Jaz Matute   Visit Diagnosis:     ICD-10-CM    1. Lumbar radiculopathy M54.16    2. Myofascial pain M79.1    3. Chronic back pain, unspecified back location, unspecified back pain laterality M54.9     G89.29    4. Decreased ROM of lumbar spine M25.60    5. Muscle weakness (generalized) M62.81    6. Poor posture R29.3          Assessment:     Cues for posture needed  Pt would benefit with continuing skilled PT visits    Goal Status: Ongoing  Pt. will demonstrate/verbalize independence in self-management of condition in : 6 weeks  Pt. will be independent with home exercise program in : 6 weeks  Pt. will have improved quality of sleep: with less pain;waking less times/night;in 6 weeks;Comment  Comment:: decrease interruptionsto 1-2x/night and improve tolerate SL 30 minutes and supine with less sharp \"taking breath away\" pain  Pt. will be able to walk : 30 minutes;around the house;for household mobility;for exercise/recreation;for community mobility;in 6 weeks  Other Activity:: Discontinue  Patient will stand : other time;with less difficultty;with less pain;in 6 weeks  Other Time: 20 minutes  Patient will decrease : ERIS score;by _ points;for improved quality of function;for improved quality of life;in 6 weeks  by ___ points: 30    Plan / Patient Education:     Continue POC  Progress ex as tolerated    Subjective:     Pain Ratin/10 right mid-back pain wraps around to front  Used TENS other day but sore that evening  Seeing MD today and tomorrow    Objective:     Reviewed TENS setting and applications  Reviewed  seated HS stretch and standing calf stretch  Continued US and MFR per flow sheet  Reports relief following US and " MT      Treatment Today   6/14/2018    TREATMENT MINUTES COMMENTS   Evaluation     Self-care/ Home management     Manual therapy 12 STM /MFR at left SL MFR LS decompression and right QL release   Neuromuscular Re-education     Therapeutic Activity     Therapeutic Exercises  Brief  review of HEP   Gait training     Modality_____US sidelying on left  To right QL and lumbar paraspinals____________ 9- 3mins set-up US to right mid-back 1MHZ 100% at 1.0wts/cm2              Total 23    Blank areas are intentional and mean the treatment did not include these items.       Aubree Zamarripa  6/14/2018

## 2021-06-18 NOTE — PROGRESS NOTES
[facility] Discharge Summary  Patient Name: Liana Briceño  Date: 5/15/2018  Referral Diagnosis: carpal tunnel  Referring provider: Mimi Fuentes MD  Visit Diagnosis:   1. Wrist pain, right     2. Weakness of right hand     3. Incoordination     4. Decreased activities of daily living (ADL)         Goal status: partially met  Patient will be independent with home exercise program in: 4 weeks  Patient will be able to: lift;carry;reach;for housework;for dressing;for grooming/hygiene;for grocery shopping;with less pain;with less difficulty;in 12 weeks  Patient will be able to  & pinch: ;hold;pinch;for dressing;for hygiene;for meal prep;for grooming;for household chores;for writing;with less pain;with less difficulty;in 12 weeks  Patient will be able to: lift;carry;for household chores;for grocery shopping;with less pain;with less difficulty;in 12 weeks  Patient will improve hand/finger coordination for: fasteners;writing;typing;meal prep;dressing;for grooming/hygiene;with less pain;with less difficulty;in 12 weeks    Patient was seen for 6 visits between 2-15-18 and 4-12-18.    Therapy will be discontinued at this time.  The patient will need a new referral to resume.    Thank you for your referral.  Sunshine Bates  5/15/2018  10:26 AM

## 2021-06-18 NOTE — PROGRESS NOTES
"[facility] Daily Progress     Patient Name: Liana Briceño  Date: 6/21/2018  Visit #: 5/12  PTA visit #:  2  PRECAUTIONS/RESTRICTIONS:  Right BAR s/p 5379-0286, DM2, Morbid Obesity, HX: MRSA  Referral Diagnosis: lumbalgia, lumbar radiculitis , myofascial pain  Referring provider: Jaz Matute PA-C  Visit Diagnosis:     ICD-10-CM    1. Lumbar radiculopathy M54.16    2. Myofascial pain M79.1    3. Chronic back pain, unspecified back location, unspecified back pain laterality M54.9     G89.29    4. Decreased ROM of lumbar spine M25.60    5. Muscle weakness (generalized) M62.81    6. Poor posture R29.3          Assessment:     Compliant with HEP  Beginning to report some improvement as was able to stand and iron a couple of clothing items with better tolerance.  Pt would benefit with continuing skilled PT visits  Slow but steady progress.    Goal Status: Ongoing  Pt. will demonstrate/verbalize independence in self-management of condition in : 6 weeks  Pt. will be independent with home exercise program in : 6 weeks  Pt. will have improved quality of sleep: with less pain;waking less times/night;in 6 weeks;Comment  Comment:: decrease interruptionsto 1-2x/night and improve tolerate SL 30 minutes and supine with less sharp \"taking breath away\" pain  Pt. will be able to walk : 30 minutes;around the house;for household mobility;for exercise/recreation;for community mobility;in 6 weeks  Other Activity:: Discontinue  Patient will stand : other time;with less difficultty;with less pain;in 6 weeks  Other Time: 20 minutes  Patient will decrease : ERIS score;by _ points;for improved quality of function;for improved quality of life;in 6 weeks  by ___ points: 30    Plan / Patient Education:     Continue POC  Progress ex as tolerated adding lumbar stretching in standing or double knees to chest, QL stretch,  SL core.  Continue US and MFR. Assess response to  Lumbar decompression strip    Subjective:     Patient arrived 5 " "minutes late.  Pain Ratin/10 (decreased from 7/10 at initial visit).  Able to stand to iron a couple of items with better tolerance  In general feeling better, US, MFR and exercises helpful.  Try performing row for core with a towel but not successful.    Objective:     Bed transfers with greater ease  Palpation:  Right>left L3-inferior iliac crest with skin bound down to soft tissue  Core weakness.  [facility] EXERCISES 2018   Exercise #4    Comment #4 seated ipsilateral hip flex isometric for core 5x5\" then contralateral resistance with right UE to left Hip flex, 3x5\"-H   Exercise #5 Seated hip abduction isometric with glut/abdominal set, 5x5\"-H     Reports relief following US and MT      Treatment Today   2018    TREATMENT MINUTES COMMENTS   Evaluation     Self-care/ Home management     Manual therapy 5 MFR at left SL MFR LS decompression   Neuromuscular Re-education     Therapeutic Activity     Therapeutic Exercises 15 Brief  review of HEP   Gait training     Modality_____US sidelying on left  To right QL and lumbar paraspinals____________ 8-+3mins set-up US to right mid-back 1MHZ 100% at 1.5wts/cm2              Total 31    Blank areas are intentional and mean the treatment did not include these items.       April Newell  2018    "

## 2021-06-18 NOTE — PROGRESS NOTES
Assessment / Plan:     Diagnoses and all orders for this visit:    Lumbalgia    Lumbar radiculitis    Myofascial pain    Sleep disturbance    Chronic back pain          Liana Briceño is a 62 y.o. y.o. female with past medical history significant for sleep apnea, type 2 diabetes, obesity, esophageal reflux disease, dyslipidemia, he had a hernia, coronary artery disease, who presents today for follow-up regarding bilateral low back pain, with intermittent occasional radiation to the right gluteal, right hip, and sometimes to the right ankle.  Patient symptoms of the radicular pain to the right lower extremity could be due to the mild to moderate right neural foraminal stenosis at the L4-L5 seen on the lumbar MRI.  Patient also has severe facet arthropathy at the L4-L5, L5-S1 bilaterally that is probably contributing to her bilateral low back pain.  No red flags.    A shared decision making plan was used.  The patient's values and choices were respected.  The following represents what was discussed and decided upon by the physician and the patient.      1.  DIAGNOSTIC TESTS:  lumbar MRI that was done on May 23 of 2018 shows severe lower lumbar spine facet arthropathy at the L4-L5, L5-S1.  There is mild to moderate right foraminal stenosis at the L4-L5 that could be contributing to the intermittent radicular pain to the right lower extremity.  I reviewed the lumbar MRI imaging and the report with the patient today.  The patient verbalizes understanding.  2.  PHYSICAL THERAPY: [facility] will [facility] for [facility] strengthening, improving range of motion.  3.  MEDICATIONS: Patient will continue on hydrocodone-acetaminophen 5-325 mg since she is unable to take NSAIDs due to her history of gastric ulcer.   4.  INTERVENTIONS: Patient's bilateral low back pain is worse than the intermittent occasional radicular pain to the right lower extremity.  I recommend to proceed with bilateral L4-L5, L5-S1  facet steroid injection if she does not improve with with physical therapy and medication.  She verbalizes understanding and agrees with the plan.  5.  PATIENT EDUCATION: I thoroughly discussed the plan with the patient today.  She verbalizes understanding and agrees with the plan.  6.  FOLLOW-UP: Patient will follow up with me in 6  weeks.  All questions were answered.            Subjective:     Liana Briceño is a 62 y.o. female who presents today for follow-up regarding chronic bilateral low back pain, and sensation of pain in the right gluteal right hip, and sometimes to the right ankle.  Today patient states that her main concern is the bilateral low back pain and she rates it at 6-7 out of 10 in intensity.  Her symptoms are severely aggravated by standing, sitting for too long, and rates it at 8 out of 10 intensity.  Patient has been taking hydrocodone acetaminophen 5-3 25 mg as needed for pain.  Patient cannot take NSAIDs due to history of gastric ulcer.  Patient stated that she underwent physical therapy in the past that did help with her symptoms.  The lumbar MRI that was done on May 23 of 2018 shows severe lower lumbar spine facet arthropathy at the L4-L5, L5-S1.  There is mild to moderate right foraminal stenosis at the L4-L5 that could be contributing to the intermittent radicular pain to the right lower extremity.  Patient's most concern today is the lower back.Patient denies urinary or bowel incontinence, unintentional weight loss, saddle anesthesia, fever or chills, balance difficulties.      Review of Systems:  Bilateral low back pain, intermittent radicular pain to the right lower extremity. Patient denies urinary or bowel incontinence, unintentional weight loss, saddle anesthesia, fever or chills, balance difficulties.       Objective:   CONSTITUTIONAL:  Vital signs as above.  No acute distress.  The patient is well nourished and well groomed.    PSYCHIATRIC:  The patient is awake, alert, oriented  to person, place and time.  The patient is answering questions appropriately with clear speech.  Normal affect.  SKIN:  Skin over the face, posterior torso, bilateral upper and lower extremities is clean, dry, intact without rashes.  MUSCULOSKELETAL:  Gait is mildly antalgic.   The patient is able to heel and toe walk without any difficulty.  Mild tenderness over the L4-L5, L5-S1 lumbar paraspinal muscles bilaterally.      The patient has 5/5 strength for the bilateral hip flexors, knee flexors/extensors, ankle dorsiflexors/plantar flexors, ankle evertors/invertors.    NEUROLOGICAL:  2/4 patellar, medial hamstring, achilles reflexes which are symmetric bilaterally.  No ankle clonus bilaterally.  Sensation to light touch is intact in the bilateral L4, L5, and S1 dermatomes.   Positive right straight leg raise.  Negative Howard test and hip impingement bilaterally.  negative left straight leg raise.  Positive facet loading maneuver bilaterally at the L4-L5, L5-S1.     RESULTS:      [facility]  MR LUMBAR SPINE WO CONTRAST  5/23/2018 6:41 PM      INDICATION: Lumbalgia, right lumbar radicular pain to the le bilaterally, with r>l.  TECHNIQUE: Without IV contrast.  CONTRAST: None.  COMPARISON: None.     FINDINGS: Nomenclature is based on 5 lumbar type vertebral bodies. Normal vertebral body heights, alignment and marrow signal. No pars defect. The conus tip is identified at L1. Grossly normal paraspinal soft tissues. No abdominal aortic aneurysm changes   of right total hip replacement.     T12-L1: Normal disc height and signal. No herniation. No facet arthropathy. No spinal canal stenosis. No right neural foraminal stenosis. No left neural foraminal stenosis.     L1-L2: Normal disc height and signal. No herniation. No facet arthropathy. No spinal canal stenosis. No right neural foraminal stenosis. No left neural foraminal stenosis.     L2-L3: Normal disc height and signal. No herniation. Mild facet arthropathy.  No spinal canal stenosis. No right neural foraminal stenosis. No left neural foraminal stenosis.     L3-L4: Very shallow left paracentral/foraminal protrusion. Moderate facet arthropathy. Mild spinal canal stenosis. No right neural foraminal stenosis. Mild left neural foraminal stenosis.     L4-L5: Normal disc height and signal. No herniation. Severe facet arthropathy. No spinal canal stenosis. Mild to moderate right neural foraminal stenosis. No left neural foraminal stenosis.     L5-S1: Normal disc height and signal. No herniation. Severe facet arthropathy. No spinal canal stenosis. No right neural foraminal stenosis. No left neural foraminal stenosis.     IMPRESSION:   CONCLUSION:  1.  Severe lower lumbar spine facet arthropathy.     2.  Mild spinal canal stenosis at L3/L4.     3.  No high-grade foraminal stenosis. Mild to moderate right foraminal stenosis at L4/L5.

## 2021-06-18 NOTE — PROGRESS NOTES
Attempt 1: Care Guide called patient.  If this patient is returning my call, please transfer to [facility] at ext [phone].

## 2021-06-18 NOTE — PROGRESS NOTES
ASSESSMENT:     Diagnoses and all orders for this visit:    Lumbalgia  -     MR Lumbar Spine Without Contrast; Future; Expected date: 5/21/18  -     HYDROcodone-acetaminophen 5-325 mg per tablet; Take 1 tablet by mouth every 8 (eight) hours as needed for pain.  Dispense: 30 tablet; Refill: 0    Lumbar radiculitis  -     MR Lumbar Spine Without Contrast; Future; Expected date: 5/21/18  -     HYDROcodone-acetaminophen 5-325 mg per tablet; Take 1 tablet by mouth every 8 (eight) hours as needed for pain.  Dispense: 30 tablet; Refill: 0    Myofascial pain  -     MR Lumbar Spine Without Contrast; Future; Expected date: 5/21/18  -     HYDROcodone-acetaminophen 5-325 mg per tablet; Take 1 tablet by mouth every 8 (eight) hours as needed for pain.  Dispense: 30 tablet; Refill: 0    Sleep disturbance  -     MR Lumbar Spine Without Contrast; Future; Expected date: 5/21/18  -     HYDROcodone-acetaminophen 5-325 mg per tablet; Take 1 tablet by mouth every 8 (eight) hours as needed for pain.  Dispense: 30 tablet; Refill: 0    Chronic back pain  -     MR Lumbar Spine Without Contrast; Future; Expected date: 5/21/18  -     HYDROcodone-acetaminophen 5-325 mg per tablet; Take 1 tablet by mouth every 8 (eight) hours as needed for pain.  Dispense: 30 tablet; Refill: 0             Liana Briceño is a 62 y.o. female  with a BMI of Body mass index is 44.66 kg/(m^2). who presents today in consultation at the request of Aneesh Cloud MD  for new patient evaluation of acute bilateral low back pain with the right side being worse than the left side with occasional radiation to the right flank right groin region.  Patient with no history of kidney disease.  Patient also reports intermittent radicular pain to the lower extremity bilaterally and occasional right ankle pain.  Patient symptoms could be due to the disc herniation at the lower lumbar spine.  No red flags..      ERIS:  62  WHO-5:  4    PLAN:  A shared decision making model was  used.  The patient's values and choices were respected.  The following represents what was discussed and decided upon by the physician and the patient.      1.  DIAGNOSTIC TESTS: I ordered lumbar MRI to further evaluate for possible disc herniation at the lower lumbar spine that could be causing patient current symptoms.  2.  PHYSICAL THERAPY: Patient would benefit from physical therapy program.   3.  MEDICATIONS: Patient will continue on hydrocodone-acetaminophen 5-325 mg as needed for pain.  4.  INTERVENTIONS: No therapeutic steroid injections at this time.   5.  PATIENT EDUCATION: I thoroughly discussed the plan with the patient today.  She verbalizes understanding and agrees with the plan.      FOLLOW-UP:   Patient will follow up with me in 1 weeks.  All questions were answered.      HENRY Degroot, MPA-C          SUBJECTIVE:  Liana Briceño  Is a 62 y.o. female who [id number] today for new patient evaluation of low back pain.  Patient reports bilateral low back pain, with the right side being worse than the left side.  Patient reports intermittent radicular pain to the lower extremity in nondermatomal fashion, and occasional right ankle pain for the last 2-3 weeks.  She also reports occasional radiation of the pain of the right lower back to the right flank, right groin region.  Patient denies kidney issues, burning sensation on urination, difficulty urinating.  Patient stated that her symptoms started approximately 2-3 weeks ago after lifting a basket full of close and going down the basement.  She also reports history of intermittent low back pain since 2006 when she had a work-related injury.  At that time she was lifting heavy cardboard load and pulled her back.  Since that time she has been having intermittent low back pain.  She underwent therapeutic steroid injection.  Patient is unsure where she had these injections.  At this time she reports 6-7 out of 10 in intensity pain in the lower back with  occasional radiation to the lower extremity.  Her symptoms are aggravated by sitting for too long, standing for too long and rates it at 10 out of 10 intensity on its worse.  Patient has been taking hydrocodone-acetaminophen 5-3 25 mg to 2-3 tablets daily as needed for pain.  Tizanidine 2 mg as needed for muscle spasm.  Patient states that the hydrocodone is not helping with the pain.  She denies history of recent MRI or imaging to the lower back.  Patient has history of right total knee replacement.  Patient is on Plavix since she had stents placed. Patient denies urinary or bowel incontinence, unintentional weight loss, saddle anesthesia, fever or chills, balance difficulties.      Medications:    Current Outpatient Prescriptions   Medication Sig Dispense Refill     acetaminophen (TYLENOL) 500 MG tablet Take by mouth every 6 (six) hours as needed for pain.       albuterol (PROAIR HFA;PROVENTIL HFA;VENTOLIN HFA) 90 mcg/actuation inhaler Inhale 2 puffs every 6 (six) hours as needed for wheezing. 1 Inhaler 1     aspirin 81 MG EC tablet Take 81 mg by mouth daily.       blood glucose test (CONTOUR NEXT STRIPS) strips Use 1 each As Directed 4 (four) times a day. 300 strip 3     cholecalciferol, vitamin D3, (CHOLECALCIFEROL) 1,000 unit tablet Take 1,000 Units by mouth daily.       clopidogrel (PLAVIX) 75 mg tablet TAKE 1 TABLET BY MOUTH DAILY 90 tablet 3     gabapentin (NEURONTIN) 300 MG capsule Take 1 capsule (300 mg total) by mouth 3 (three) times a day. 90 capsule 9     generic lancets (MICROLET LANCET) Use 1 each As Directed 4 (four) times a day. 300 each 3     hydroCHLOROthiazide (MICROZIDE) 12.5 mg capsule Take 1 capsule (12.5 mg total) by mouth daily. 90 capsule 3     HYDROcodone-acetaminophen 5-325 mg per tablet Take 1 tablet by mouth every 8 (eight) hours as needed for pain. 30 tablet 0     insulin aspart U-100 (NOVOLOG FLEXPEN U-100 INSULIN) 100 unit/mL injection pen Inject 42 Units under the skin 3 (three)  "times a day before meals. 36 mL 2     insulin degludec (TRESIBA FLEXTOUCH U-200) 200 unit/mL (3 mL) InPn Inject 132 Units under the skin daily. 18 mL 11     lisinopril (PRINIVIL,ZESTRIL) 5 MG tablet Take 1.5 tablets (7.5 mg total) by mouth daily. 135 tablet 3     melatonin (MELATIN) 3 mg Tab tablet Take 3 mg by mouth at bedtime as needed.       nitroglycerin (NITROSTAT) 0.4 MG SL tablet Place 1 tablet (0.4 mg total) under the tongue every 5 (five) minutes as needed for chest pain. 25 tablet 3     omega-3 acid ethyl esters (LOVAZA) 1 gram capsule TAKE 2 CAPSULES BY MOUTH TWICE DAILY 360 capsule 3     omeprazole (PRILOSEC) 40 MG capsule TAKE 1 CAPSULE BY MOUTH EVERY DAY 90 capsule 1     pen needle, diabetic (BD ULTRA-FINE ZULMA PEN NEEDLES) 32 gauge x 5/32\" Ndle Inject 4 each under the skin daily. With Tresiba and Novolog 120 each 3     rosuvastatin (CRESTOR) 10 MG tablet TAKE 1 TABLET BY MOUTH DAILY 90 tablet 3     tiZANidine (ZANAFLEX) 2 MG tablet TAKE 1 TABLET(2 MG) BY MOUTH THREE TIMES DAILY AS NEEDED 270 tablet 0     No current facility-administered medications for this encounter.        Allergies:   Allergies   Allergen Reactions     Penicillins Other (See Comments)     Passed out.      Doxycycline Dizziness     Pneumococcal 23-Valent Polysaccharide Vaccine Other (See Comments)     pneumonia     Statins-Hmg-Coa Reductase Inhibitors Myalgia     Shaking with simvastatin and atorvastatin     Azithromycin Rash       PMH:    Past Medical History:   Diagnosis Date     Anemia      Breast cyst      Carpal tunnel syndrome      Coronary artery disease due to lipid rich plaque      Diabetes mellitus     Type 2     Dyslipidemia, goal LDL below 70      Dysphagia      Esophageal reflux      Essential hypertension      Goiter diffuse, nontoxic      Hiatal hernia      Morbid obesity      RADHA (obstructive sleep apnea)      Paroxysmal SVT (supraventricular tachycardia)     S/P ablation       PSH:    Past Surgical History: "   Procedure Laterality Date     BREAST BIOPSY       BREAST CYST EXCISION       CARDIAC CATHETERIZATION N/A 2017    Procedure: Coronary Angiogram;  Surgeon: Ariane Biswas MD;  Location: [facility];  Service:      CARDIAC CATHETERIZATION N/A 2017    Procedure: Coronary Angiogram;  Surgeon: Howard Gutierrez MD;  Location: [facility];  Service:      CHOLECYSTECTOMY       CORONARY STENT PLACEMENT  2017    BO to RCA by Dr. Biswas     MO EXPLO/DRAIN BREAST ABSCESS      Description: Breast Surgery Mastotomy With Drainage Of Abscess;  Recorded: 2010;     MO L HRT CATH W/NJX L VENTRICULOGRAPHY IMG S&I N/A 2017    Procedure: Left Heart Catheterization with Left Ventriculogram;  Surgeon: Ariane Biswas MD;  Location: [facility];  Service: Cardiology     MO REMOVAL GALLBLADDER  2000     MO REVISE MEDIAN N/CARPAL TUNNEL SURG      Description: Neuroplasty Decompression Median Nerve At Carpal Tunnel;  Recorded: 2009;     MO THYROID LOBECTOMY,UNILAT         Family History:    Family History   Problem Relation Age of Onset     Heart disease Father      Heart disease Mother      Leukemia Brother       at a young age     Kidney failure Other      over active bladder     Hypertension Other      Diabetes type II Other      Breast cancer Neg Hx        Social History:   Social History     Social History     Marital status: Domestic Partner     Spouse name: Panchito Mclean     Number of children: 1     Years of education: N/A     Occupational History      [employer]     Social History Main Topics     Smoking status: Former Smoker     Packs/day: 0.25     Years: 40.00     Types: Cigarettes     Quit date: 2016     Smokeless tobacco: Never Used     Alcohol use 0.0 oz/week     0 Standard drinks or equivalent per week      Comment: < 1 drink a week     Drug use: No     Sexual activity: Not on file     Other Topics Concern     Not on file     Social History  Narrative    Single but lives with her significant other, has one son, she works with the [employer] -- and is one herself.  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       ROS: Bilateral low back pain with occasional radiation to the right flank, right groin region.  Intermittent radicular pain to the lower extremity.  Specifically negative for bowel/bladder dysfunction, fevers,chills, appetite changes, unexplained weight loss.   Otherwise 13 systems reviewed are negative.  Please see the patient's intake questionnaire from today for details.      OBJECTIVE:  PHYSICAL EXAMINATION:    CONSTITUTIONAL:  Vital signs as above.  No acute distress.  The patient is well nourished and well groomed.  PSYCHIATRIC:  The patient is awake, alert, oriented to person, place, time and answering questions appropriately with clear speech.    SKIN:  Skin over the face, bilateral lower extremities, and posterior torso is clean, dry, intact without rashes.    GAIT:  Gait is non-antalgic.  The patient is able to heel and toe walk without significant difficulty.    STANDING EXAMINATION: Tenderness presents at the L4-L5, L5-S1 bilaterally with worse on the right L4-L5, L5-S1.  MUSCLE STRENGTH:  The patient has 5/5 strength for the bilateral hip flexors, knee flexors/extensors, ankle dorsiflexors/plantar flexors, great toe extensors, ankle evertors/invertors.  NEUROLOGICAL:  2/4 patellar, medial hamstring, and achilles reflexes bilaterally.  Negative Babinski's bilaterally.  No ankle clonus bilaterally. Sensation to light touch is intact in the bilateral L4, L5, and S1 dermatomes.  VASCULAR:  2/4  pulses bilaterally.  Bilateral lower extremities are warm.  There is no pitting edema of the bilateral lower extremities.  ABDOMINAL:  Soft, non-distended, non-tender throughout all quadrants.  No pulsatile mass palpated in the left lower quadrant.  LYMPH NODES:  No palpable or tender inguinal/popliteal lymph nodes.  MUSCULOSKELETAL: Positive straight leg  raise bilaterally.  Negative Howard test and hip impingement bilaterally.      RESULTS: No imaging to review today.

## 2021-06-18 NOTE — PROGRESS NOTES
Subjective findings: The patient return to the clinic today for continued diabetic foot care complaining of pain in the arches of both feet.      Objective findings: Nails bilateral feet are normal length and color.  Skin bilateral feet warm and intact.  DP and PT pulses +2 over 4 bilateral feet.  Capillary refill less than 2 seconds bilateral feet.  Negative clonus, negative Babinski bilateral feet.  Range of motion within normal limits bilateral feet.  Muscle power +5 over 5 bilaterally within all compartments.  There is severe flattening of the medial longitudinal arch noted bilaterally.  There is no pain on palpation plantar aspect both feet.     Assessment: Pronation deformity, diabetes mellitus     Plan: I recommended extra-depth diabetic shoes with molded insoles.

## 2021-06-18 NOTE — PROGRESS NOTES
"MTM Follow Up Encounter  Assessment & Plan                                                     1. Type 2 diabetes mellitus with hyperglycemia, with long-term current use of insulin  Worsening diabetes control, largely due to dietary indiscretion.  Again discussed diabetes diet, limiting carbohydrates.  She is also looking into bariatric surgery.  Discussed current labs and how these reflect her poorly controlled blood sugars.  She is slightly increased her insulin as well which is likely contributing to increase in weight and appetite.  I would recommend against increasing her insulin doses, however she needs to be very strict at resuming a diabetes diet.  Hopefully with the support from her family who all would benefit from a diet, she will be able to stick to this regimen.  Particularly if she is looking to pursue bariatric surgery she will have to be very regimented in her diet.  I will try to keep close tabs to follow-up to make sure that she is following this diet.  She is generally very well controlled blood pressure, however will order microalbumin is it has been years since this is been done, this may guide regimen of whether we should push her ACE inhibitor for renal protection.  -Educate on diabetes diet  -Microalbumin, urine     2. Arthropathy of spinal facet joint  Undergoing workup for worsening back pain.      Medication Adherence/Access: stable.     Follow Up  Return in about 1 week (around 6/25/2018) for by phone .    Subjective & Objective                                                       Liana Briceño is a 62 y.o. female coming in for a follow up visit for Medication Therapy Management. She was referred to me from Mimi Fuentes MD    Chief Complaint: Diabetes    Diabetes: has not been focusing on this, \"far too much going on.\" She has gotten a referral to bariatric surgery. She will be starting this process. She had increased her insulin on her own to: 44, 44, 42 units of novolog, and " continues on 132 units of Tresiba. Not going to the gym now due to pain, she had previously tried to go back to the gym but only lasted one to two weeks. She has been intolerant to GLP1 agonists in the past. She notes that everyone in her family is overweight, they recently got rid of all the soda in her house, they are trying to all go on a diet.   Sugars: yesterday 145 fasting, piece of pizza midday (no insulin), 830pm eating chris rojas, a few potato chips, regular Sprite soda; 268 this morning   Lab Results   Component Value Date    HGBA1C 9.6 (H) 06/15/2018     Pain: most difficult at night, she has need to increase her vicodin use. Starting about 6-7 weeks ago. Had some imaging to look at this, and some additional imaging later today.     Medication Adherence/Access: stable.     PMH: reviewed in EPIC   Allergies/ADRs: reviewed in EPIC   Alcohol: reviewed in EPIC   Tobacco:   History   Smoking Status     Former Smoker     Packs/day: 0.25     Years: 40.00     Types: Cigarettes     Quit date: 1/24/2016   Smokeless Tobacco     Never Used     Today's Vitals:   Vitals:    06/18/18 1345   BP: 120/64   Pulse: 83   Weight: (!) 278 lb (126.1 kg)     ----------------    Much or all of the text in this note was generated through the use of Dragon Dictate voice-to-text software. Errors in spelling or words which seem out of context are unintentional. Sound alike errors, in particular, may have escaped editing.    The patient was given a summary of these recommendations as an after visit summary    I spent 30 minutes with this patient today; an extra 15 minutes was spent creating the Medication Action Plan. All changes were made via collaborative practice agreement with Mimi Fuentes MD. A copy of the visit note was provided to the patient's provider.     Jose Raul Coy, PharmD, BCACP  Medication Management (MTM) Pharmacist  [facility]         Current Outpatient Prescriptions   Medication Sig Dispense  "Refill     acetaminophen (TYLENOL) 500 MG tablet Take by mouth every 6 (six) hours as needed for pain.       albuterol (PROAIR HFA;PROVENTIL HFA;VENTOLIN HFA) 90 mcg/actuation inhaler Inhale 2 puffs every 6 (six) hours as needed for wheezing. 1 Inhaler 1     aspirin 81 MG EC tablet Take 81 mg by mouth daily.       blood glucose test (CONTOUR NEXT STRIPS) strips Use 1 each As Directed 4 (four) times a day. 300 strip 3     cholecalciferol, vitamin D3, (CHOLECALCIFEROL) 1,000 unit tablet Take 1,000 Units by mouth daily.       gabapentin (NEURONTIN) 300 MG capsule Take 1 capsule (300 mg total) by mouth 3 (three) times a day. 90 capsule 9     generic lancets (MICROLET LANCET) Use 1 each As Directed 4 (four) times a day. 300 each 3     hydroCHLOROthiazide (MICROZIDE) 12.5 mg capsule Take 1 capsule (12.5 mg total) by mouth daily. 90 capsule 3     HYDROcodone-acetaminophen 5-325 mg per tablet Take 1 tablet by mouth 2 (two) times a day as needed for pain. 30 tablet 0     insulin aspart U-100 (NOVOLOG FLEXPEN U-100 INSULIN) 100 unit/mL injection pen Inject 42 Units under the skin 3 (three) times a day before meals. 36 mL 2     insulin degludec (TRESIBA FLEXTOUCH U-200) 200 unit/mL (3 mL) InPn Inject 132 Units under the skin daily. 18 mL 11     lisinopril (PRINIVIL,ZESTRIL) 5 MG tablet Take 1.5 tablets (7.5 mg total) by mouth daily. 135 tablet 3     melatonin (MELATIN) 3 mg Tab tablet Take 3 mg by mouth at bedtime as needed.       nitroglycerin (NITROSTAT) 0.4 MG SL tablet Place 1 tablet (0.4 mg total) under the tongue every 5 (five) minutes as needed for chest pain. 25 tablet 3     omega-3 acid ethyl esters (LOVAZA) 1 gram capsule TAKE 2 CAPSULES BY MOUTH TWICE DAILY 360 capsule 3     omeprazole (PRILOSEC) 40 MG capsule TAKE 1 CAPSULE BY MOUTH EVERY DAY 90 capsule 1     pen needle, diabetic (BD ULTRA-FINE ZULMA PEN NEEDLES) 32 gauge x 5/32\" Ndle Inject 4 each under the skin daily. With Tresiba and Novolog 120 each 3     " rosuvastatin (CRESTOR) 10 MG tablet TAKE 1 TABLET BY MOUTH DAILY 90 tablet 3     tiZANidine (ZANAFLEX) 2 MG tablet One to two tabs at bedtime 30 tablet 1     No current facility-administered medications for this visit.

## 2021-06-18 NOTE — PROGRESS NOTES
"    [facility] Certification Request    June 7, 2018      Patient: Liana Briceño  MR Number: [id number]  YOB: 1955  Date of Visit: 6/7/2018      Dear Levon Matute PA-C:    Thank you for this referral.   We are seeing Liana Briceño for Physical Therapy of Lumbalgia, L Radiculitis/MFP/chronic back pain with decrease ROM, flexibility, strength and function.    Medicare and/or Medicaid requires physician review and approval of the treatment plan. Please review the plan of care and verify that you agree with the therapy plan of care by co-signing this note.      Plan of Care  Authorization / Certification Start Date: 06/07/18  Authorization / Certification End Date: 06/28/18  Authorization / Certification Number of Visits: 6  Communication with: Referral Source  Patient Related Instruction: Nature of Condition;Treatment plan and rationale;Self Care instruction;Basis of treatment;Body mechanics;Posture;Precautions;Next steps;Expected outcome  Times per Week: 2  Number of Weeks: 3  Number of Visits: 6  Discharge Planning: Independent HEP and self-management of symptoms  Precautions / Restrictions : Right BAR s/p 7445-7710, DM2, Morbid Obesity  Therapeutic Exercise: ROM;Stretching;Strengthening  Neuromuscular Reeducation: kinesio tape;posture;core  Manual Therapy: soft tissue mobilization;myofascial release  Modalities: electrical stimulation;ultrasound;traction  Equipment: theraband;other  Equipment: kinesiotape    Goals:  Pt. will demonstrate/verbalize independence in self-management of condition in : 6 weeks  Pt. will be independent with home exercise program in : 6 weeks  Pt. will have improved quality of sleep: with less pain;waking less times/night;in 6 weeks;Comment  Comment:: decrease interruptionsto 1-2x/night and improve tolerate SL 30 minutes and supine with less sharp \"taking breath away\" pain  Pt. will be able to walk : 30 minutes;around the house;for household mobility;for " exercise/recreation;for community mobility;in 6 weeks  Other Activity:: Discontinue  Patient will stand : other time;with less difficultty;with less pain;in 6 weeks  Other Time: 20 minutes  Patient will decrease : ERIS score;by _ points;for improved quality of function;for improved quality of life;in 6 weeks  by ___ points: 30      If you have any questions or concerns, please don't hesitate to call.    Sincerely,      April Newell, PT        Physician recommendation:     __x_ Follow therapist's recommendation        ___ Modify therapy      *Physician co-signature indicates they certify the need for these services furnished within this plan and while under their care.          [facility]   Lumbo-Pelvic Initial Evaluation    Patient Name: Liana Briceño  PRECAUTIONS/RESTRICTIONS:  Right BAR s/p 4055-8073, DM2, Morbid Obesity, HX: MRSA  IMPRESSION:   CONCLUSION:  1.  Severe lower lumbar spine facet arthropathy.     2.  Mild spinal canal stenosis at L3/L4.     3.  No high-grade foraminal stenosis. Mild to moderate right foraminal stenosis at L4/L5.     Date of evaluation: 6/7/2018 (med cert6/7/2018-7/19/2018 6x)  Referral Diagnosis: Lumbalgia, L Radiculitis/MFP/chronic back pain  Referring provider: Levon Matute PA-C  Visit Diagnosis:     ICD-10-CM    1. Lumbar radiculopathy M54.16    2. Myofascial pain M79.1    3. Chronic back pain, unspecified back location, unspecified back pain laterality M54.9     G89.29    4. Decreased ROM of lumbar spine M25.60    5. Muscle weakness (generalized) M62.81    6. Poor posture R29.3        Assessment:      Skilled PT is required to modulate pain, increase ROM, flexibility, strength and fuction through modalities, manual therapy, exercise and education  Pt. is appropriate for skilled PT intervention as outlined in the Plan of Care (POC).  Pt. is a good candidate for skilled PT services to improve pain levels and function.    Goals:  Pt. will demonstrate/verbalize  "independence in self-management of condition in : 6 weeks  Pt. will be independent with home exercise program in : 6 weeks  Pt. will have improved quality of sleep: with less pain;waking less times/night;in 6 weeks;Comment  Comment:: decrease interruptionsto 1-2x/night and improve tolerate SL 30 minutes and supine with less sharp \"taking breath away\" pain  Pt. will be able to walk : 30 minutes;around the house;for household mobility;for exercise/recreation;for community mobility;in 6 weeks  Other Activity:: Discontinue  Patient will stand : other time;with less difficultty;with less pain;in 6 weeks  Other Time: 20 minutes  Patient will decrease : ERIS score;by _ points;for improved quality of function;for improved quality of life;in 6 weeks  by ___ points: 30    Patient's expectations/goals are fair to guarded due to chronic LBP    Barriers to Learning or Achieving Goals:  Chronicity of the problem.  Co-morbidities or other medical factors.  DM2, right BAR, morbid obesity       Plan / Patient Instructions:        Plan of Care:   Authorization / Certification Start Date: 06/07/18  Authorization / Certification End Date: 06/28/18  Authorization / Certification Number of Visits: 6  Communication with: Referral Source  Patient Related Instruction: Nature of Condition;Treatment plan and rationale;Self Care instruction;Basis of treatment;Body mechanics;Posture;Precautions;Next steps;Expected outcome  Times per Week: 2  Number of Weeks: 3  Number of Visits: 6  Discharge Planning: Independent HEP and self-management of symptoms  Precautions / Restrictions : Right BAR s/p 8701-6331, DM2, Morbid Obesity  Therapeutic Exercise: ROM;Stretching;Strengthening  Neuromuscular Reeducation: kinesio tape;posture;core  Manual Therapy: soft tissue mobilization;myofascial release  Modalities: electrical stimulation;ultrasound;traction  Equipment: theraband;other  Equipment: kinesiotape  POC and pathology of condition were reviewed with " patient.  Pt. is in agreement with the Plan of Care  A Home Exercise Program (HEP) was initiated today.  Pt. was instructed in exercises by PT and patient was given a handout with detailed instructions.    The goals and plan of care were established in collaboration with the patient.    Plan for next visit: see 2x/week for 5 more visits then reassess progress and needs.  Next:  Assess response to US and MFR.  Instruct in HS/calf stretch, lumbar stretching in all 4's or standing, QL stretch, abdominal/glut sets/ bent knee lift, SL core     Subjective:         Social information:      Occupation:   Retired   Work Status:  Retired   Equipment Available: walker with 4 wheels, bench seat and brakes    History of Present Illness:    Liana Briceño is a 62 y.o. female who presents to therapy today with chief complaints of right lumbar pain gradual onset approximately 5/1/2018.  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  She states the work as a PCA did not involve any heavy lifting or work.  She reports having back pain since a work injury when a load fell on her but also states she has had back pain all of her life.  She reports this episode is higher and different than previous episodes of her back pain.  She has received PT in the past, last in 2014   Pt demo's signs and sx consistent with muscle strain.     Pain Rating current:  7  Pain rating at best: 4  Pain rating at worst: 10  Pain description: constant pressure/tightness like a fist in back    Functional limitations are described as occurring with:   stand tolerance 10 minutes, walking 20 minutes, sleep interruptions 3x/night,and with bed mobility    Patient reports benefit from:  heat/cold/vicodin/gabapentin and use of walker         Objective:         Patient arrived 10 minutes late then required 10 additional minutes to complete intake paperwork.  Patient states that she forgot  to take pain medication before coming so her pain was at it's worst.    Note: Items left blank indicates the item was not performed or not indicated at the time of the evaluation.    Patient Outcome Measures :    Modified Oswestry Low Back Pain Disablity Questionnaire  in %: 64   Scores range from 0-100%, where a score of 0% represents minimal pain and maximal function. The minimal clinically important difference is a score reduction of 12%.    Examination  1. Lumbar radiculopathy     2. Myofascial pain     3. Chronic back pain, unspecified back location, unspecified back pain laterality     4. Decreased ROM of lumbar spine     5. Muscle weakness (generalized)     6. Poor posture         Involved side: bilateral, right>left    Posture Observation: Standing: c-protraction, high cerv ext, left shoulder/scap/right iliac crest high, normal L lordosis, tight  tissue    Palpation:  Severe tenderness right Lumbar paraspinals with tightness    Repeated Motion Testing:  Not tested    Passive Mobility - Joint Integrity:  Not tested    LE Screen:  Gastroc Soleus bilat mod tightness with right LB tightness with bilat knee extension and worse with ankle DF and with left>right testing.    Lumbar ROM:    Date: 6/7/2018     *Indicate scale AROM AROM AROM   Lumbar Flexion Mod loss to knees, pain     Lumbar Extension Not tested      Right Left Right Left Right Left   Lumbar Sidebending Nil-min loss stretch/pain Nil loss, stretch/pain       Lumbar Rotation Mod loss, stretch/pain Mod loss, stretch/pain       Thoracic Flexion      Thoracic Extension      Thoracic Sidebending         Thoracic Rotation           Lower Extremity Strength:     Date:      LE strength/5 Right Left Right Left Right Left   Hip Flexion (L1-3)         Hip Extension (L5-S1)         Hip Abduction (L4-5)         Hip Adduction (L2-3)         Hip External Rotation         Hip Internal Rotation         Knee Extension (L3-4)         Knee Flexion         Ankle  Dorsiflexion (L4-5)         Great Toe Extension (L5)         Ankle Plantar flexion (S1)         Abdominals Poor abdominal bracing       Sensation           Reflex Testing  Lumbar Dermatomes Right Left UE Reflexes Right Left   Iliac Crest and Groin (L1)   Biceps (C5-6)     Anterior Medial Thigh (L2)   Brachioradialis (C5-6)     Anterior Thigh, Medial Epicondyle Femur (L3)   Triceps (C7-8)     Lateral Thigh, Anterior Knee, Medial Leg/Malleolus (L4)   Antione s test     Lateral Leg, Dorsal Foot (L5)   LE Reflexes     Lateral Foot (S1)   Patellar (L3-4)     Posterior Leg (S2)   Achilles (S1-2)     Other:   Babinski Response           Lumbar Special Tests:    Lumbar Special Tests Right Left SI Tests Right  Left   Quadrant test   SI Compression     Straight leg raise + for tightness on right ++ for tightness on right SI Distraction     Crossover response  + POSH Test     Slump   Sacral Thrust     Sit-up test  FADIR     Trunk extensor endurance test  Resisted Abduction     Prone instability test  Other:     Pubic shotgun  Other:       Today's HEP:  Exercises:  Exercise #1: Instructed in proper sitting posture, issued HO for posture/body mechanics      Treatment Today    TREATMENT MINUTES COMMENTS   Evaluation 30 lumbopelvic   Self-care/ Home management     Manual therapy 8 At left SL:  MFR-LS decompression and right QL release   Neuromuscular Re-education 5 Instructed in proper sitting posture/likely cause of pain condition   Therapeutic Activity     Therapeutic Exercises     Gait training     Modality____US at left SL to right lumbar paraspinal and QL______________ 9+3 set up 1 MHz,100%, 1.0 w/c2              Total 55    Blank areas are intentional and mean the treatment did not include these items.     PT Evaluation Code: (Please list factors)  Patient History/Comorbidities: see above  Examination: see above  Clinical Presentation: stable  Clinical Decision Making: low    Patient History/  Comorbidities  Examination  (body structures and functions, activity limitations, and/or participation restrictions) Clinical Presentation Clinical Decision Making (Complexity)   No documented Comorbidities or personal factors 1-2 Elements Stable and/or uncomplicated Low   1-2 documented comorbidities or personal factor 3 Elements Evolving clinical presentation with changing characteristics Moderate   3-4 documented comorbidities or personal factors 4 or more Unstable and unpredictable High              April Newell  6/7/2018  11:30 AM

## 2021-06-18 NOTE — PROGRESS NOTES
[facility] Follow Up Note    Assessment/Plan:  1. Chronic back pain  Severe flank pain on the right.  Spine care assessment reviewed.  She has severe facet arthropathy.  Recommendation: We will rule out secondary causes of flank pain such as kidney stone with urine analysis and CT scan of the abdomen.  We will contact spine center to see if they can arrange facet check injection sooner rather than later.  Vicodin prescribed as well.  She will take 1-2 tizanidine at bedtime as well.  Continue with physical therapy.  - Urinalysis-UC if Indicated  - HYDROcodone-acetaminophen 5-325 mg per tablet; Take 1 tablet by mouth 2 (two) times a day as needed for pain.  Dispense: 30 tablet; Refill: 0    2. Flank pain  Feels worse and different than usual.  No hematuria.  Will rule out kidney stone.  Pain medications as above  - CT Abdomen Pelvis Without Oral Without IV Contrast; Future    3. Essential hypertension  At goal.    4. Type 2 diabetes mellitus with hyperglycemia, with long-term current use of insulin  Glycohemoglobin at 9.6.  This lab result returned after the visit.  Medications: Return to pharmacy for titration of insulin and review of blood sugar data.  Proceed with ambulatory referral to bariatric surgery.  I think she would be a good option for surgical consideration.  - Glycosylated Hemoglobin A1c  - Basic Metabolic Panel  - Ambulatory referral to Bariatric Care: Surgical and Non-Surgical    5. Diffuse Nontoxic Goiter  History of goiter.  Will update labs  - Thyroid Stimulating Hormone (TSH)  - T4, Free  - US Thyroid; Future    6. Morbid obesity (H)  As above  - Ambulatory referral to Bariatric Care: Surgical and Non-Surgical    7.  Known coronary artery disease  Plavix discontinued as it has been 6 months since stent placement      Additionally, parking forms were filled out for handicap sticker.    Mimi Fuentes MD    Chief Complaint:  Chief Complaint   Patient presents with     Follow-up      f/u MRI results, pt c/o having to take a lot more of Vicodin than she used to. 2 to 3 tabs a day.       History of Present Illness:  Liana is a 62 y.o. female who is here today for follow of a multitude of issues.  First, she has been struggling with severe right flank and back pain.  She states it feels much worse than her usual pain.  She was seen by the spine clinic where an MRI was performed and revealed severe facet arthropathy.  She has been undergoing physical therapy.  She states the Vicodin is of limited use.  She does not want to take a great deal of drugs as she is driving a car and trying to be functionally active during the day.  Of note, the pain occasionally radiates down the right leg.  It is fairly severe in intensity.  Possible precipitating factors include carrying laundry down the stairs.    With regard to diabetes, she thinks she is doing well.  She states her blood sugars have been better controlled than in the past.  She does not have any hard data with her today.  She does request a check on her thyroid as she states that she is having marked difficulty losing weight despite changing her eating habits.    She is recently followed up with Dr. Waltno.  Her Plavix for her coronary stent has been discontinued    She requests a parking sticker for handicapped parking.    Review of Systems:  A comprehensive review of systems was performed and was otherwise negative    PFSH:  Social History: She is in a common-law marriage.  Does state that everyone in her household has become chubby.  Even her 20-year-old son has gained weight recently.  History   Smoking Status     Former Smoker     Packs/day: 0.25     Years: 40.00     Types: Cigarettes     Quit date: 1/24/2016   Smokeless Tobacco     Never Used       Past History: Significant for multiple problems including coronary artery disease, type 2 diabetes, chronic kidney disease, chronic back pain  Current Outpatient Prescriptions   Medication Sig  "Dispense Refill     acetaminophen (TYLENOL) 500 MG tablet Take by mouth every 6 (six) hours as needed for pain.       albuterol (PROAIR HFA;PROVENTIL HFA;VENTOLIN HFA) 90 mcg/actuation inhaler Inhale 2 puffs every 6 (six) hours as needed for wheezing. 1 Inhaler 1     aspirin 81 MG EC tablet Take 81 mg by mouth daily.       blood glucose test (CONTOUR NEXT STRIPS) strips Use 1 each As Directed 4 (four) times a day. 300 strip 3     cholecalciferol, vitamin D3, (CHOLECALCIFEROL) 1,000 unit tablet Take 1,000 Units by mouth daily.       gabapentin (NEURONTIN) 300 MG capsule Take 1 capsule (300 mg total) by mouth 3 (three) times a day. 90 capsule 9     generic lancets (MICROLET LANCET) Use 1 each As Directed 4 (four) times a day. 300 each 3     hydroCHLOROthiazide (MICROZIDE) 12.5 mg capsule Take 1 capsule (12.5 mg total) by mouth daily. 90 capsule 3     HYDROcodone-acetaminophen 5-325 mg per tablet Take 1 tablet by mouth 2 (two) times a day as needed for pain. 30 tablet 0     insulin aspart U-100 (NOVOLOG FLEXPEN U-100 INSULIN) 100 unit/mL injection pen Inject 42 Units under the skin 3 (three) times a day before meals. 36 mL 2     insulin degludec (TRESIBA FLEXTOUCH U-200) 200 unit/mL (3 mL) InPn Inject 132 Units under the skin daily. 18 mL 11     lisinopril (PRINIVIL,ZESTRIL) 5 MG tablet Take 1.5 tablets (7.5 mg total) by mouth daily. 135 tablet 3     melatonin (MELATIN) 3 mg Tab tablet Take 3 mg by mouth at bedtime as needed.       nitroglycerin (NITROSTAT) 0.4 MG SL tablet Place 1 tablet (0.4 mg total) under the tongue every 5 (five) minutes as needed for chest pain. 25 tablet 3     omega-3 acid ethyl esters (LOVAZA) 1 gram capsule TAKE 2 CAPSULES BY MOUTH TWICE DAILY 360 capsule 3     omeprazole (PRILOSEC) 40 MG capsule TAKE 1 CAPSULE BY MOUTH EVERY DAY 90 capsule 1     pen needle, diabetic (BD ULTRA-FINE ZULMA PEN NEEDLES) 32 gauge x 5/32\" Ndle Inject 4 each under the skin daily. With Tresiba and Novolog 120 each 3 " "    rosuvastatin (CRESTOR) 10 MG tablet TAKE 1 TABLET BY MOUTH DAILY 90 tablet 3     tiZANidine (ZANAFLEX) 2 MG tablet One to two tabs at bedtime 30 tablet 1     No current facility-administered medications for this visit.        Family History: Nothing new    Physical Exam:  General Appearance:   She is neatly groomed and pleasant in demeanor.  She has difficulty staying seated due to back pain  Vitals:    06/15/18 1418   BP: 120/64   Pulse: 83   Resp: 18   SpO2: 95%   Weight: (!) 278 lb (126.1 kg)   Height: 5' 6\" (1.676 m)     Wt Readings from Last 3 Encounters:   06/15/18 (!) 278 lb (126.1 kg)   06/04/18 (!) 279 lb (126.6 kg)   05/25/18 (!) 276 lb (125.2 kg)     Body mass index is 44.87 kg/(m^2).    Back is examined.  There is severe tenderness in the right flank area.    Data Review:    Analysis and Summary of Old Records (2): Reviewed spine center notes    Records Requested (1): 0      Other History Summarized (from other people in the room) (2):     Radiology Tests Summarized (XRAY/CT/MRI/DXA) (1): Reviewed MRI results    Labs Reviewed (1): Ordered and reviewed labs    Medicine Tests Reviewed (EKG/ECHO/COLONOSCOPY/EGD) (1): 0    Independent Review of EKG or X-RAY (2): 0        "

## 2021-06-18 NOTE — PROGRESS NOTES
Patient reports things are going well. Sh is working on getting all of her appointments in and has multiple appts next week. Follow up with Dr Walton 6/4, PT start 6/7 and Dr Fuentes 6/15.     Patient had to go quickly so goals were not discussed but states she has no needs and will talk to care guide in a few weeks.     Next outreach 6/22/18

## 2021-06-18 NOTE — PROGRESS NOTES
"[facility] Daily Progress     Patient Name: Liana Briceño  Date: 6/19/2018  Visit #: 4/12  PTA visit #:  2  PRECAUTIONS/RESTRICTIONS:  Right BAR s/p [id number], DM2, Morbid Obesity, HX: MRSA  Referral Diagnosis: lumbalgia, lumbar radiculitis , myofascial pain  Referring provider: Jaz Matute PA-C  Visit Diagnosis:     ICD-10-CM    1. Lumbar radiculopathy M54.16    2. Myofascial pain M79.1    3. Chronic back pain, unspecified back location, unspecified back pain laterality M54.9     G89.29    4. Decreased ROM of lumbar spine M25.60    5. Muscle weakness (generalized) M62.81    6. Poor posture R29.3          Assessment:     Compliant with HEP  Cues for posture needed.  Beginning to report some improvement as was able to stand and iron a couple of clothing items with better tolerance.  Pt would benefit with continuing skilled PT visits  Limited progress.    Goal Status: Ongoing  Pt. will demonstrate/verbalize independence in self-management of condition in : 6 weeks  Pt. will be independent with home exercise program in : 6 weeks  Pt. will have improved quality of sleep: with less pain;waking less times/night;in 6 weeks;Comment  Comment:: decrease interruptionsto 1-2x/night and improve tolerate SL 30 minutes and supine with less sharp \"taking breath away\" pain  Pt. will be able to walk : 30 minutes;around the house;for household mobility;for exercise/recreation;for community mobility;in 6 weeks  Other Activity:: Discontinue  Patient will stand : other time;with less difficultty;with less pain;in 6 weeks  Other Time: 20 minutes  Patient will decrease : ERIS score;by _ points;for improved quality of function;for improved quality of life;in 6 weeks  by ___ points: 30    Plan / Patient Education:     Continue POC  Progress ex as tolerated addinglumbar stretching in standing or double knees to chest, QL stretch,  bent knee lift, SL core.  Continue US and MFR.  Consider Lumbar decompression strip    Subjective: " "    Pain Ratin/10 (decreased from 7/10 at initial visit).  Able to stand to iron a couple of items with better tolerance  Used TENS other day but sore that evening    Objective:     Reviewed  seated HS stretch and standing calf stretch  Palpation:  Right>left L3-inferior iliac crest  Core weakness with pain with bent knee lift due to .poor abdominal set/stabilization.    OPT EXERCISES 2018   Exercise #2    Comment #2 abdominal/glut sets with slight tailbone lift, 10x-H   Exercise #3 Standing door frame isometrics, 5\"x10-H     Reports relief following US and MT      Treatment Today   2018    TREATMENT MINUTES COMMENTS   Evaluation     Self-care/ Home management     Manual therapy 5 MFR at left SL MFR LS decompression   Neuromuscular Re-education     Therapeutic Activity     Therapeutic Exercises 12 Brief  review of HEP   Gait training     Modality_____US sidelying on left  To right>left QL and lumbar paraspinals____________ 10- 3mins set-up US to right mid-back 1MHZ 100% at 1.5wts/cm2              Total 30    Blank areas are intentional and mean the treatment did not include these items.       April Newell  2018    "

## 2021-06-18 NOTE — PROGRESS NOTES
Office Visit - Follow Up   Liana Briceño   62 y.o. female    Date of Visit: 5/15/2018    Chief Complaint   Patient presents with     Back Pain     1 week ago, injured back with carrying clothes down stairs, states previous back injury        Assessment and Plan   1. Chronic back pain  Has chronic back pains due to previous back injury.  Previously back pains come off and on but do not last more than 2 days.  Her current acute flare of her back pains started one half weeks ago.  Pains did not resolve after 2 days so she came to see me.  Scales her pains at its worst 7 to 8 out of 10.  Pains are persisting, even occurring daily.  No leg weakness, no leg tingling or numbness.  No urinary or bowel symptoms.  Does not want to take additional medications for her back pains.  Continue Tylenol, gabapentin and Vicodin.  Discussed other modalities of treatments.  Agrees to get these treatments.  Will refer to spine care clinic  - Ambulatory referral to Spine Care    2. Type 2 diabetes mellitus  Not ideally controlled.  Reports of having increased blood sugars.  Has not seen her primary doctor Dr. Fuentes for her diabetes.  Takes to Tresiba and NovoLog combination.  Advised to see Dr. Fuentes for her diabetes.     3. Essential hypertension  Controlled.  Continue hydrochlorothiazide.    4. Morbid obesity   Aware she is obese and her extra weight exacerbates or aggravates her back problems.      Follow up with Dr. Fuentes.       History of Present Illness   This 62 y.o. old female, patient of [facility], complains of acute flare of her back pains.  Suffers from back pain chronically.  But when she has flare of her back pains they only last for 2 days.  Currently her back pains are going on since one and a half weeks ago.  Apparently her pains  started after lifting or carrying a basket full of clothes to wash and going down to the basement.  After doing this chore she started to have back pains.  Did not mind it first because   she thought her back pains will resolve in 1-2 days.  But her back pain still persisted.  Scales her back pains to be 7 to 8 out of 10.  Pains are persisting and occurring daily.  Took her usual Tylenol, gabapentin and Vicodin without relief of her back pains.  Denies urinary and bowel symptoms  with her back pains.  Denies numbness, tinglin and  weakness of her legs.  Has diabetes but not ideally controlled despite her insulin.  Has hypertension controlled by just hydrochlorothiazide.  Morbidly obese.    Review of Systems   A 12 point comprehensive review of systems was negative except as noted..     Medications, Allergies and Problem List   Reviewed and updated             Chief Complaint   Back Pain (1 week ago, injured back with carrying clothes down stairs, states previous back injury)       Patient Profile   Social History     Social History Narrative    Single but lives with her significant other, has one son, she works with the [employer] -- and is one herself.  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        Past Medical History   Patient Active Problem List   Diagnosis     Carpal tunnel syndrome     Obstructive Sleep Apnea     Diffuse Nontoxic Goiter     Type 2 diabetes mellitus     Morbid obesity     Essential hypertension     Esophageal reflux     Dyslipidemia, goal LDL below 70     Hiatal Hernia     Sensorineural hearing loss     Stenosis of left carotid artery     Coronary artery disease due to lipid rich plaque       Past Surgical History  She has a past surgical history that includes pr revise median n/carpal tunnel surg; pr removal gallbladder (2000); pr explo/drain breast abscess; pr thyroid lobectomy,unilat; Breast cyst excision; Breast biopsy; pr l hrt cath w/njx l ventriculography img s&i (N/A, 4/24/2017); Cholecystectomy; Cardiac catheterization (N/A, 4/24/2017); Cardiac catheterization (N/A, 5/19/2017); and Coronary stent placement (04/24/2017).       Medications and Allergies   Current Outpatient  "Prescriptions   Medication Sig     acetaminophen (TYLENOL) 500 MG tablet Take by mouth every 6 (six) hours as needed for pain.     aspirin 81 MG EC tablet Take 81 mg by mouth daily.     blood glucose test (CONTOUR NEXT STRIPS) strips Use 1 each As Directed 4 (four) times a day.     cholecalciferol, vitamin D3, (CHOLECALCIFEROL) 1,000 unit tablet Take 1,000 Units by mouth daily.     clopidogrel (PLAVIX) 75 mg tablet TAKE 1 TABLET BY MOUTH DAILY     gabapentin (NEURONTIN) 300 MG capsule Take 1 capsule (300 mg total) by mouth 3 (three) times a day.     generic lancets (MICROLET LANCET) Use 1 each As Directed 4 (four) times a day.     hydroCHLOROthiazide (MICROZIDE) 12.5 mg capsule Take 1 capsule (12.5 mg total) by mouth daily.     HYDROcodone-acetaminophen 5-325 mg per tablet Take 1 tablet by mouth every 4 (four) hours as needed for pain.     insulin aspart U-100 (NOVOLOG FLEXPEN U-100 INSULIN) 100 unit/mL injection pen Inject 42 Units under the skin 3 (three) times a day before meals.     insulin degludec (TRESIBA FLEXTOUCH U-200) 200 unit/mL (3 mL) InPn Inject 132 Units under the skin daily.     lisinopril (PRINIVIL,ZESTRIL) 5 MG tablet Take 1.5 tablets (7.5 mg total) by mouth daily.     melatonin (MELATIN) 3 mg Tab tablet Take 3 mg by mouth at bedtime as needed.     nitroglycerin (NITROSTAT) 0.4 MG SL tablet Place 1 tablet (0.4 mg total) under the tongue every 5 (five) minutes as needed for chest pain.     omega-3 acid ethyl esters (LOVAZA) 1 gram capsule TAKE 2 CAPSULES BY MOUTH TWICE DAILY     omeprazole (PRILOSEC) 40 MG capsule TAKE 1 CAPSULE BY MOUTH EVERY DAY     pen needle, diabetic (BD ULTRA-FINE ZULMA PEN NEEDLES) 32 gauge x 5/32\" Ndle Inject 4 each under the skin daily. With Tresiba and Novolog     rosuvastatin (CRESTOR) 10 MG tablet TAKE 1 TABLET BY MOUTH DAILY     albuterol (PROAIR HFA;PROVENTIL HFA;VENTOLIN HFA) 90 mcg/actuation inhaler Inhale 2 puffs every 6 (six) hours as needed for wheezing.     " "tiZANidine (ZANAFLEX) 2 MG tablet TAKE 1 TABLET(2 MG) BY MOUTH THREE TIMES DAILY AS NEEDED     Allergies   Allergen Reactions     Penicillins Other (See Comments)     Passed out.      Doxycycline Dizziness     Pneumococcal 23-Valent Polysaccharide Vaccine Other (See Comments)     pneumonia     Statins-Hmg-Coa Reductase Inhibitors Myalgia     Shaking with simvastatin and atorvastatin     Azithromycin Rash        Family and Social History   Family History   Problem Relation Age of Onset     Heart disease Father      Heart disease Mother      Leukemia Brother       at a young age     Kidney failure Other      over active bladder     Hypertension Other      Diabetes type II Other      Breast cancer Neg Hx         Social History   Substance Use Topics     Smoking status: Former Smoker     Packs/day: 0.25     Years: 40.00     Types: Cigarettes     Quit date: 2016     Smokeless tobacco: Never Used     Alcohol use 0.0 oz/week     0 Standard drinks or equivalent per week      Comment: < 1 drink a week        Physical Exam       Physical Exam  /80  Pulse 79  Ht 5' 6\" (1.676 m)  SpO2 96%  General appearance: alert, appears stated age, cooperative, morbidly obese and complains of back pains  Head: Normocephalic, without obvious abnormality, atraumatic  Throat: lips, mucosa, and tongue normal; teeth and gums normal  Neck: no adenopathy, no carotid bruit, no JVD, supple, symmetrical, trachea midline and thyroid not enlarged, symmetric, no tenderness/mass/nodules  Lungs: clear to auscultation bilaterally  Heart: regular rate and rhythm, S1, S2 normal, no murmur, click, rub or gallop  Abdomen: soft, non-tender; bowel sounds normal; no masses,  no organomegaly  Extremities: extremities normal, atraumatic, no cyanosis or edema  Skin: Skin color, texture, turgor normal. No rashes or lesions  Neurologic: Grossly normal  Musculoskeletal: Tender right lower and mid back, limited range of motion     Additional " "Information        Aneesh Montes MD  Internal Medicine  Contact me at [phone]     Additional Information   Current Outpatient Prescriptions   Medication Sig     acetaminophen (TYLENOL) 500 MG tablet Take by mouth every 6 (six) hours as needed for pain.     aspirin 81 MG EC tablet Take 81 mg by mouth daily.     blood glucose test (CONTOUR NEXT STRIPS) strips Use 1 each As Directed 4 (four) times a day.     cholecalciferol, vitamin D3, (CHOLECALCIFEROL) 1,000 unit tablet Take 1,000 Units by mouth daily.     clopidogrel (PLAVIX) 75 mg tablet TAKE 1 TABLET BY MOUTH DAILY     gabapentin (NEURONTIN) 300 MG capsule Take 1 capsule (300 mg total) by mouth 3 (three) times a day.     generic lancets (MICROLET LANCET) Use 1 each As Directed 4 (four) times a day.     hydroCHLOROthiazide (MICROZIDE) 12.5 mg capsule Take 1 capsule (12.5 mg total) by mouth daily.     HYDROcodone-acetaminophen 5-325 mg per tablet Take 1 tablet by mouth every 4 (four) hours as needed for pain.     insulin aspart U-100 (NOVOLOG FLEXPEN U-100 INSULIN) 100 unit/mL injection pen Inject 42 Units under the skin 3 (three) times a day before meals.     insulin degludec (TRESIBA FLEXTOUCH U-200) 200 unit/mL (3 mL) InPn Inject 132 Units under the skin daily.     lisinopril (PRINIVIL,ZESTRIL) 5 MG tablet Take 1.5 tablets (7.5 mg total) by mouth daily.     melatonin (MELATIN) 3 mg Tab tablet Take 3 mg by mouth at bedtime as needed.     nitroglycerin (NITROSTAT) 0.4 MG SL tablet Place 1 tablet (0.4 mg total) under the tongue every 5 (five) minutes as needed for chest pain.     omega-3 acid ethyl esters (LOVAZA) 1 gram capsule TAKE 2 CAPSULES BY MOUTH TWICE DAILY     omeprazole (PRILOSEC) 40 MG capsule TAKE 1 CAPSULE BY MOUTH EVERY DAY     pen needle, diabetic (BD ULTRA-FINE ZULMA PEN NEEDLES) 32 gauge x 5/32\" Ndle Inject 4 each under the skin daily. With Tresiba and Novolog     rosuvastatin (CRESTOR) 10 MG tablet TAKE 1 TABLET BY MOUTH DAILY     " albuterol (PROAIR HFA;PROVENTIL HFA;VENTOLIN HFA) 90 mcg/actuation inhaler Inhale 2 puffs every 6 (six) hours as needed for wheezing.     tiZANidine (ZANAFLEX) 2 MG tablet TAKE 1 TABLET(2 MG) BY MOUTH THREE TIMES DAILY AS NEEDED     Allergies   Allergen Reactions     Penicillins Other (See Comments)     Passed out.      Doxycycline Dizziness     Pneumococcal 23-Valent Polysaccharide Vaccine Other (See Comments)     pneumonia     Statins-Hmg-Coa Reductase Inhibitors Myalgia     Shaking with simvastatin and atorvastatin     Azithromycin Rash     Social History   Substance Use Topics     Smoking status: Former Smoker     Packs/day: 0.25     Years: 40.00     Types: Cigarettes     Quit date: 1/24/2016     Smokeless tobacco: Never Used     Alcohol use 0.0 oz/week     0 Standard drinks or equivalent per week      Comment: < 1 drink a week         Time: total time spent with the patient was 25 minutes of which >50% was spent in counseling and coordination of care

## 2021-06-19 ENCOUNTER — HEALTH MAINTENANCE LETTER (OUTPATIENT)
Age: 66
End: 2021-06-19

## 2021-06-19 NOTE — PROGRESS NOTES
Assessment / Plan:     Diagnoses and all orders for this visit:    Lumbalgia  -     OPS Paravertebral Facet Joint Inj Lbr Un; Future; Expected date: 7/12/18    Facet arthropathy, lumbosacral  -     OPS Paravertebral Facet Joint Inj Lbr Un; Future; Expected date: 7/12/18    Myofascial pain  -     OPS Paravertebral Facet Joint Inj Lbr Un; Future; Expected date: 7/12/18          Liana Briceño is a 63 y.o. y.o. female with past medical history significant for sleep apnea, type 2 diabetes, obesity, esophageal reflux disease, dyslipidemia,coronary artery disease, who presents today for follow-up regarding bilateral low back pain with intermittent,  occasional radiation to the right gluteal, right hip, and sometimes to the right ankle.  Her most concern today is the right-sided low back pain at the right L4-L5 where she has severe facet arthropathy that is probably contributing to her current symptoms.  No red flags.    A shared decision making plan was used.  The patient's values and choices were respected.  The following represents what was discussed and decided upon by the physician and the patient.      1.  DIAGNOSTIC TESTS: lumbar MRI that was done on May 23 of 2018 shows severe lower lumbar spine facet arthropathy at the L4-L5, L5-S1.  Patient symptoms of the right L4-L5 are likely due to the severe facet arthropathy at the L4-L5.    2.  PHYSICAL THERAPY: Patient will continue on physical therapy program for core strengthening.    3.  MEDICATIONS: Patient will continue on gabapentin 3 mg at nighttime, hydrocodone-acetaminophen 5-3 25 mg as needed for pain prescribed by her primary care provider.    4.  INTERVENTIONS: I recommend patient to proceed with right L4-L5 facet steroid injection targeting the severe facet arthropathy at the right L4-L5.    5.  PATIENT EDUCATION: I thoroughly discussed the plan with the patient today.  She verbalizes understanding and agrees with the plan.  6.  FOLLOW-UP: Patient will follow  up with me in 2  weeks.  All questions were answered.            Subjective:     Liana Briceño is a 63 y.o. female who presents today for follow-up regarding chronic bilateral low back pain.  Today patient returns to the clinic bilateral low back pain however the pain is more pronounced in the right mid to lower back.  At this time she reports 5-6 out of 10 intensity pain in the lower back.  Her symptoms are aggravated by standing for too long, walking for too long and rates it at 8 out of 10 intensity.  She has been taking gabapentin 300 mg daily and that seems to help a little bit with her symptoms.  Hydrocodone-acetaminophen 5-325 mg 2 tablets daily for pain.  Lumbar MRI showed severe facet arthropathy at the L4-L5, and L5-S1.  Patient is interested in injection to help with her pain.  She states that the physical therapy helped with her pain however she still has the right-sided mid to lower back pain.Patient denies urinary or bowel incontinence, unintentional weight loss, saddle anesthesia, fever or chills, balance difficulties.      Review of Systems:  Bilateral neck -low back pain with the right side being worse than the left side.Patient denies urinary or bowel incontinence, unintentional weight loss, saddle anesthesia, fever or chills, balance difficulties.       Objective:   CONSTITUTIONAL:  Vital signs as above.  No acute distress.  The patient is well nourished and well groomed.    PSYCHIATRIC:  The patient is awake, alert, oriented to person, place and time.  The patient is answering questions appropriately with clear speech.  Normal affect.  SKIN:  Skin over the face, posterior torso, bilateral upper and lower extremities is clean, dry, intact without rashes.  MUSCULOSKELETAL:   Gait is non-antalgic.   The patient is able to heel and toe walk without any difficulty.  Mild tenderness over the right L4-L5 lumbar paraspinal muscles.      The patient has 5/5 strength for the bilateral hip flexors, knee  flexors/extensors, ankle dorsiflexors/plantar flexors, ankle evertors/invertors.    NEUROLOGICAL:  1/4 patellar, medial hamstring, achilles reflexes which are symmetric bilaterally.  No ankle clonus bilaterally.  Sensation to light touch is intact in the bilateral L4, L5, and S1 dermatomes.    Positive right straight leg raise.  Negative Howard test and hip impingement bilaterally.  negative left straight leg raise.  Positive facet loading maneuver at the L4-L5.      RESULTS:      [facility]  MR LUMBAR SPINE WO CONTRAST  5/23/2018 6:41 PM       INDICATION: Lumbalgia, right lumbar radicular pain to the le bilaterally, with r>l.  TECHNIQUE: Without IV contrast.  CONTRAST: None.  COMPARISON: None.      FINDINGS: Nomenclature is based on 5 lumbar type vertebral bodies. Normal vertebral body heights, alignment and marrow signal. No pars defect. The conus tip is identified at L1. Grossly normal paraspinal soft tissues. No abdominal aortic aneurysm changes   of right total hip replacement.      T12-L1: Normal disc height and signal. No herniation. No facet arthropathy. No spinal canal stenosis. No right neural foraminal stenosis. No left neural foraminal stenosis.      L1-L2: Normal disc height and signal. No herniation. No facet arthropathy. No spinal canal stenosis. No right neural foraminal stenosis. No left neural foraminal stenosis.      L2-L3: Normal disc height and signal. No herniation. Mild facet arthropathy. No spinal canal stenosis. No right neural foraminal stenosis. No left neural foraminal stenosis.      L3-L4: Very shallow left paracentral/foraminal protrusion. Moderate facet arthropathy. Mild spinal canal stenosis. No right neural foraminal stenosis. Mild left neural foraminal stenosis.      L4-L5: Normal disc height and signal. No herniation. Severe facet arthropathy. No spinal canal stenosis. Mild to moderate right neural foraminal stenosis. No left neural foraminal stenosis.      L5-S1: Normal  disc height and signal. No herniation. Severe facet arthropathy. No spinal canal stenosis. No right neural foraminal stenosis. No left neural foraminal stenosis.      IMPRESSION:   CONCLUSION:  1.  Severe lower lumbar spine facet arthropathy.      2.  Mild spinal canal stenosis at L3/L4.      3.  No high-grade foraminal stenosis. Mild to moderate right foraminal stenosis at L4/L5.

## 2021-06-19 NOTE — PROGRESS NOTES
Attempt 2: Care Guide called patient.  If this patient is returning my call, please transfer to [facility] at ext [phone].

## 2021-06-19 NOTE — PROGRESS NOTES
"6-27-18 [facility] [phone]  DX: Type 2 diabetes  S: Patient was seen at [facility] with Rx to fill for diabetic shoes and diabetic foot orthotics. O: I see the Rx from the MD for the Diabetic shoes and FOs. A: Impressions were taken for blue trilam and cork FOs with 4mm wide met pads. Dr Benson \"Tevin\" in [place] were ordered. 10 1/5 M. P: We will see her when ready for delivery. G: The goal is to support and protect her diabetic feet. Laverne Nichols [employer], LO     "

## 2021-06-19 NOTE — PROGRESS NOTES
Dear  Mimi Fuentes Md  [address] [place] [address]    Thank you for the opportunity to participate in the care of  Liana Briceño.    She is a 63 y.o. y/o who comes to the clinic in order to reestablish care for her sleep apnea.    Ms. Briceño was diagnosed with obstructive sleep apnea approximately 7 years ago. She completed an overnight polysomnography that showed mild obstructive sleep apnea and was advised to start CPAP therapy. The patient has a ResMed S9 CPAP device that is set at a pressure of 6 cwp but she has not used her device consistently. She mentions having difficulties to tolerate her mask as the main reason for not using CPAP therapy.    The patient would like to restart CPAP therapy because she continues to snore at night.    Epworths Sleepiness Scale 7/25/2018   Sitting and reading 3   Watching TV 3   Sitting, inactive in a public place (e.g. a theatre or a meeting) 0   As a passenger in a car for an hour without a break 0   Lying down to rest in the afternoon when circumstances permit 2   Sitting and talking to someone 1   Sitting quietly after a lunch without alcohol 2   In a car, while stopped for a few minutes in traffic 0   Total score 11   Rooming 7/25/2018   Usual bedtime 11pm   Sleep Latency 2-3 hours   Awakenings 5 times   Wake Up Time 6:30am   Weekends varies   Energy Drinks no   Coffee 2 cups qd   Cola no   Difficulty falling asleep Yes   Difficulty staying asleep Yes   Excessive daytime tiredness Yes   Excessive daytime sleepiness No   Dozing off while driving No   Shift Worker No   Sleep Walking? No   Sleep Talking? Yes   Kicking or punching? No   Restless legs symptoms Yes       Past Medical History  Past Medical History:   Diagnosis Date     Anemia      Breast cyst      Carpal tunnel syndrome      Coronary artery disease due to lipid rich plaque      Dyslipidemia, goal LDL below 70      Dysphagia      Esophageal reflux      Essential hypertension      Goiter  diffuse, nontoxic      Hiatal hernia      Morbid obesity (H)      RADHA (obstructive sleep apnea)      Paroxysmal SVT (supraventricular tachycardia) (H)     S/P ablation     Type 2 diabetes mellitus (H)         Past Surgical History  Past Surgical History:   Procedure Laterality Date     BREAST BIOPSY       BREAST CYST EXCISION       CARDIAC CATHETERIZATION N/A 4/24/2017    Procedure: Coronary Angiogram;  Surgeon: Ariane Biswas MD;  Location: [facility];  Service:      CARDIAC CATHETERIZATION N/A 5/19/2017    Procedure: Coronary Angiogram;  Surgeon: Howard Gutierrez MD;  Location: [facility];  Service:      CHOLECYSTECTOMY  2000     CORONARY STENT PLACEMENT  04/24/2017    BO to RCA by Dr. Biswas     MA EXPLO/DRAIN BREAST ABSCESS      Description: Breast Surgery Mastotomy With Drainage Of Abscess     MA L HRT CATH W/NJX L VENTRICULOGRAPHY IMG S&I N/A 4/24/2017    Procedure: Left Heart Catheterization with Left Ventriculogram;  Surgeon: Ariane Biswas MD;  Location: [facility];  Service: Cardiology     MA REVISE MEDIAN N/CARPAL TUNNEL SURG      Description: Neuroplasty Decompression Median Nerve At Carpal Tunnel;  Recorded: 05/22/2009;     MA THYROID LOBECTOMY,UNILAT          Meds  Current Outpatient Prescriptions   Medication Sig Dispense Refill     acetaminophen (TYLENOL) 500 MG tablet Take by mouth every 6 (six) hours as needed for pain.       aspirin 81 MG EC tablet Take 81 mg by mouth daily.       blood glucose test (CONTOUR NEXT STRIPS) strips Use 1 each As Directed 4 (four) times a day. 300 strip 3     cholecalciferol, vitamin D3, (CHOLECALCIFEROL) 1,000 unit tablet Take 1,000 Units by mouth daily.       gabapentin (NEURONTIN) 300 MG capsule Take 1 capsule (300 mg total) by mouth 3 (three) times a day. 90 capsule 9     generic lancets (MICROLET LANCET) Use 1 each As Directed 4 (four) times a day. 300 each 3     hydroCHLOROthiazide (MICROZIDE) 12.5 mg capsule Take 1 capsule  "(12.5 mg total) by mouth daily. 90 capsule 3     HYDROcodone-acetaminophen 5-325 mg per tablet Take 1 tablet by mouth 2 (two) times a day as needed for pain. 30 tablet 0     insulin degludec (TRESIBA FLEXTOUCH U-200) 200 unit/mL (3 mL) InPn Inject 132 Units under the skin daily. 18 mL 11     lisinopril (PRINIVIL,ZESTRIL) 5 MG tablet Take 1.5 tablets (7.5 mg total) by mouth daily. 135 tablet 3     melatonin (MELATIN) 3 mg Tab tablet Take 3 mg by mouth at bedtime as needed.       naltrexone (DEPADE) 50 mg tablet Take 1 tablet (50 mg total) by mouth at bedtime. 30 tablet 2     nitroglycerin (NITROSTAT) 0.4 MG SL tablet Place 1 tablet (0.4 mg total) under the tongue every 5 (five) minutes as needed for chest pain. 25 tablet 3     NOVOLOG FLEXPEN U-100 INSULIN 100 unit/mL injection pen INJECT 42 UNITS UNDER THE SKIN THREE TIMES DAILY BEFORE MEALS 45 mL 0     omega-3 acid ethyl esters (LOVAZA) 1 gram capsule TAKE 2 CAPSULES BY MOUTH TWICE DAILY 360 capsule 3     omeprazole (PRILOSEC) 40 MG capsule TAKE 1 CAPSULE BY MOUTH EVERY DAY 90 capsule 1     pen needle, diabetic (BD ULTRA-FINE ZULMA PEN NEEDLES) 32 gauge x 5/32\" Ndle Inject 4 each under the skin daily. With Tresiba and Novolog 120 each 3     rosuvastatin (CRESTOR) 10 MG tablet TAKE 1 TABLET BY MOUTH DAILY 90 tablet 3     No current facility-administered medications for this visit.         Allergies  Penicillins; Doxycycline; Pneumococcal 23-valent polysaccharide vaccine; Statins-hmg-coa reductase inhibitors; and Azithromycin     Social History  Social History     Social History     Marital status: Domestic Partner     Spouse name: Panchito Mclean     Number of children: 1     Years of education: N/A     Occupational History     no longer there as they closed the center [employer]     PCA      four hours a day     Social History Main Topics     Smoking status: Former Smoker     Packs/day: 0.25     Years: 40.00     Types: Cigarettes     Quit date: 1/24/2016 " "    Smokeless tobacco: Never Used     Alcohol use 0.0 oz/week     0 Standard drinks or equivalent per week      Comment: < 1 drink a week     Drug use: No     Sexual activity: Not on file     Other Topics Concern     Not on file     Social History Narrative    Single but lives with her significant other, has one son, she is a .  She is a PCA.         Family History  Family History   Problem Relation Age of Onset     Heart disease Father      unknown type     Valvular heart disease Mother      Leukemia Brother       at a young age     No Medical Problems Son      Breast cancer Neg Hx            Review of Systems:  Constitutional: Negative except as noted in HPI.   Eyes: Negative except as noted in HPI.   ENT: Negative except as noted in HPI.   Cardiovascular: Negative except as noted in HPI.   Respiratory: Negative except as noted in HPI.   Gastrointestinal: Negative except as noted in HPI.   Genitourinary: Negative except as noted in HPI.   Musculoskeletal: Negative except as noted in HPI.   Integumentary: Negative except as noted in HPI.   Neurological: Negative except as noted in HPI.   Psychiatric: Negative except as noted in HPI.   Endocrine: Negative except as noted in HPI.   Hematologic/Lymphatic: Negative except as noted in HPI.      Physical Exam:  /64  Pulse 77  Ht 5' 6\" (1.676 m)  Wt (!) 278 lb (126.1 kg)  SpO2 96%  BMI 44.87 kg/m2  BMI:Body mass index is 44.87 kg/(m^2).   GEN: NAD, obese  Head: Normocephalic.  EYES: PERRLA, EOMI  ENT: Oropharynx is clear, Mallampatti class IV airway. Uvula is edematous  Nasal mucosa is pink  Neurological: Alert, oriented to time, place, and person.  Psych:  normal mood, normal affect     Labs/Studies:     Lab Results   Component Value Date    WBC 6.9 2018    HGB 12.2 2018    HCT 36.1 2018    MCV 85 2018     2018         Chemistry        Component Value Date/Time     06/15/2018 1447    K 4.6 06/15/2018 1447 "     06/15/2018 1447    CO2 24 06/15/2018 1447    BUN 16 06/15/2018 1447    CREATININE 1.17 (H) 06/15/2018 1447     (H) 06/15/2018 1447        Component Value Date/Time    CALCIUM 10.3 06/15/2018 1447    ALKPHOS 109 03/13/2018 0943    AST 25 03/13/2018 0943    ALT 27 03/13/2018 0943    BILITOT 0.5 03/13/2018 0943            Lab Results   Component Value Date    FERRITIN 326 (H) 03/08/2011     Lab Results   Component Value Date    TSH 2.12 06/15/2018     Lab Results   Component Value Date    HGBA1C 9.6 (H) 06/15/2018       PSG report from 2011 reviewed.  AHI=14      Assessment and Plan:  In summary Liana Briceño is a 63 y.o. year old female here for consultation.    1. Obstructive sleep apnea  We reviewed the results of her previous PSG.  Her CPAP device seems to be in good working condition and the patient mentions that she does plan to change device.s  We will order supplies  We will reevaluate in 8 weeks.  I explained to the patient that we may have some difficulties with insurance coverage given the gap in usage.     Patient verbalized understanding of these issues, agrees with the plan and all questions were answered today. Patient was given an opportuntity to voice any other symptoms or concerns not listed above. Patient did not have any other symptoms or concerns.      Patient told to return in 8 weeks.     MD KARO Akers Board Certified in Internal Medicine and Sleep Medicine  [facility].

## 2021-06-19 NOTE — PROGRESS NOTES
"          [facility] Daily Progress for LBP and right knee    Patient Name: Liana Briceño  Date: 7/24/2018  Visit #: 11/12 (low back) ; right knee3/6  PTA visit #:  6  PRECAUTIONS/RESTRICTIONS:  Right BAR s/p [id number], DM2, Morbid Obesity, HX: MRSA  Referral Diagnosis: lumbalgia, lumbar radiculitis , myofascial pain; Right Knee Pain  Referring provider: Jaz Matute PA-C (Back); Mimi Fuentes MD (right knee pain)  Visit Diagnosis:     ICD-10-CM    1. Right knee pain, unspecified chronicity M25.561    2. Lumbar radiculopathy M54.16    3. Myofascial pain M79.1    4. Chronic back pain, unspecified back location, unspecified back pain laterality M54.9     G89.29    5. Decreased ROM of lumbar spine M25.60    6. Muscle weakness (generalized) M62.81    7. Poor posture R29.3          Assessment:     Compliant with HEP for LBP  Reports decreased knee pain 0/10 today  Had reported some improvement as was able to stand and iron a couple of clothing items with better tolerance until exacerbation yesterday making some exercises more difficulty.  Pt would benefit with continuing skilled PT visits for LBP  US has been helpful     Goal Status: progressing towards  Pt. will demonstrate/verbalize independence in self-management of condition in : 6 weeks  Pt. will be independent with home exercise program in : 6 weeks  Pt. will have improved quality of sleep: Progressing toward  Comment:: decrease interruptionsto 1-2x/night and improve tolerate SL 30 minutes and supine with less sharp \"taking breath away\" pain  Pt. will be able to walk : with less pain;with less difficulty;for community mobility;in 6 weeks;2 blocks;20 minutes  Other Activity:: Discontinue  Patient will stand : 30 minutes;with less difficultty;with less pain;for home chores;in 6 weeks  Other Time: 20 minutes  Patient will ascend / descend: stairs;with less pain;with less difficulty;in 6 weeks  Patient will transfer: sit/stand;for car;with less pain;with " less difficulty;in 6 weeks  Patient will increase : LEFS score;by _ points;for improved quality of function;for improved quality of life;in 6 weeks  by ___ points: 9  Patient will decrease : ERIS score;by _ points;for improved quality of function;for improved quality of life;in 6 weeks  by ___ points: 30    Plan / Patient Education:     Plan to see 1x/week for 4more visits to progress strengthening exercises to right knee progressively adding, closed chain HS, wall slide, toe raises, bridge  Continue US,  and MFR to LB    Subjective:       Pain Rating: LBP 6-7 /10 denies knee pain today.   Pt reports she has been traveling with lots of walking at airport increased LBP.  More difficulty performing DKTC and full hooklying rotation.  Had question regarding standing core exercises.  Pt was to have an injection from spine clinic but wasn't scheduled so she is upset with lack of follow through from clinic     Objective:       SKTC 2x buddy, supine hooklying trunk rotation  pt reports increased pain from traveling poor tolerance to ex due to back pain  Continued US and MT per flow sheet  Reported decreased pain following treatment  Cues for HEP needed    Treatment Today   7/24/2018    TREATMENT MINUTES COMMENTS   Evaluation  Knee   Self-care/ Home management     Manual therapy 8 MFR at left SL MFR LS decompression   Neuromuscular Re-education     Therapeutic Activity     Therapeutic Exercises 4 Brief  review of HEP   Gait training     Modality_____US sidelying on left  To right QL and lumbar paraspinals____________ 8-+3mins set-up US to right mid-back 1MHZ 100% at 1.5wts/cm2              Total 23    Blank areas are intentional and mean the treatment did not include these items.     Aubree Zamarripa  7/24/2018

## 2021-06-19 NOTE — PROGRESS NOTES
"          [facility] Daily Progress for LBP and right knee    Patient Name: Liana Birceño  Date: 7/26/2018  Visit #: 12/12 (low back) ; right knee4/6  PTA visit #:  6  PRECAUTIONS/RESTRICTIONS:  Right BAR s/p [id number], DM2, Morbid Obesity, HX: MRSA  Referral Diagnosis: lumbalgia, lumbar radiculitis , myofascial pain; Right Knee Pain  Referring provider: Jaz Matute PA-C (Back); Mimi Fuentes MD (right knee pain)  Visit Diagnosis:     ICD-10-CM    1. Right knee pain, unspecified chronicity M25.561    2. Lumbar radiculopathy M54.16    3. Myofascial pain M79.1    4. Chronic back pain, unspecified back location, unspecified back pain laterality M54.9     G89.29    5. Decreased ROM of lumbar spine M25.60    6. Muscle weakness (generalized) M62.81    7. Poor posture R29.3          Assessment:     Compliant with HEP for LBP  Reports decreased knee pain 0/10 today  Had reported some improvement as was able to stand and iron a couple of clothing items with better tolerance until exacerbation yesterday making some exercises more difficulty.  Pt would benefit with continuing skilled PT visits for LBP  US has been helpful     Goal Status: progressing towards  Pt. will demonstrate/verbalize independence in self-management of condition in : 6 weeks  Pt. will be independent with home exercise program in : 6 weeks  Pt. will have improved quality of sleep: Progressing toward  Comment:: decrease interruptionsto 1-2x/night and improve tolerate SL 30 minutes and supine with less sharp \"taking breath away\" pain  Pt. will be able to walk : with less pain;with less difficulty;for community mobility;in 6 weeks;2 blocks;20 minutes  Other Activity:: Discontinue  Patient will stand : 30 minutes;with less difficultty;with less pain;for home chores;in 6 weeks  Other Time: 20 minutes  Patient will ascend / descend: stairs;with less pain;with less difficulty;in 6 weeks  Patient will transfer: sit/stand;for car;with less pain;with " less difficulty;in 6 weeks  Patient will increase : LEFS score;by _ points;for improved quality of function;for improved quality of life;in 6 weeks  by ___ points: 9  Patient will decrease : ERIS score;by _ points;for improved quality of function;for improved quality of life;in 6 weeks  by ___ points: 30    Plan / Patient Education:     Plan to see 1x/week for 2 more visits to progress strengthening exercises to right knee progressively adding, closed chain HS, wall slide, , bridge  Assess out comes and goals     Subjective:       Pain Rating: LBP 4 /10 denies knee pain today.   Pt states she has visit at spine clinic today possible injection.  Reported relief with US and MT     Objective:       pt arrived 5 mins late  Continued US and MT per flow sheet  Reported decreased pain following treatment      Treatment Today   7/26/2018    TREATMENT MINUTES COMMENTS   Evaluation  Knee   Self-care/ Home management     Manual therapy 10 MFR at left SL MFR LS decompression   Neuromuscular Re-education     Therapeutic Activity     Therapeutic Exercises 4 Brief  review of HEP   Gait training     Modality_____US sidelying on left  To right QL and lumbar paraspinals____________ 10-+3mins set-up US to right mid-back 1MHZ 100% at 1.5wts/cm2              Total 27    Blank areas are intentional and mean the treatment did not include these items.     Aubree Zamarripa  7/26/2018

## 2021-06-19 NOTE — PROGRESS NOTES
"          [facility] Daily Progress for LBP and right knee    Patient Name: Liana Briceño  Date: 7/12/2018  Visit #: 10/12 (low back) ; right knee2/6  PTA visit #:  6  PRECAUTIONS/RESTRICTIONS:  Right BAR s/p [id number], DM2, Morbid Obesity, HX: MRSA  Referral Diagnosis: lumbalgia, lumbar radiculitis , myofascial pain; Right Knee Pain  Referring provider: Jaz Matute PA-C (Back); Mimi Fuentes MD (right knee pain)  Visit Diagnosis:     ICD-10-CM    1. Right knee pain, unspecified chronicity M25.561    2. Lumbar radiculopathy M54.16    3. Myofascial pain M79.1    4. Chronic back pain, unspecified back location, unspecified back pain laterality M54.9     G89.29          Assessment:     Compliant with HEP for LBP  Reports decreased knee pain 0/10 today  Had reported some improvement as was able to stand and iron a couple of clothing items with better tolerance until exacerbation yesterday making some exercises more difficulty.  Pt would benefit with continuing skilled PT visits for LBP  US has been helpful     Goal Status: progressing towards  Pt. will demonstrate/verbalize independence in self-management of condition in : 6 weeks  Pt. will be independent with home exercise program in : 6 weeks  Pt. will have improved quality of sleep: Progressing toward  Comment:: decrease interruptionsto 1-2x/night and improve tolerate SL 30 minutes and supine with less sharp \"taking breath away\" pain  Pt. will be able to walk : with less pain;with less difficulty;for community mobility;in 6 weeks;2 blocks;20 minutes  Other Activity:: Discontinue  Patient will stand : 30 minutes;with less difficultty;with less pain;for home chores;in 6 weeks  Other Time: 20 minutes  Patient will ascend / descend: stairs;with less pain;with less difficulty;in 6 weeks  Patient will transfer: sit/stand;for car;with less pain;with less difficulty;in 6 weeks  Patient will increase : LEFS score;by _ points;for improved quality of function;for " "improved quality of life;in 6 weeks  by ___ points: 9  Patient will decrease : ERIS score;by _ points;for improved quality of function;for improved quality of life;in 6 weeks  by ___ points: 30    Plan / Patient Education:     Plan to see 1x/week for 4more visits to progress strengthening exercises to right knee progressively adding, closed chain HS, wall slide, toe raises, bridge  Continue US,  and MFR to LB    Subjective:       Pain Rating: LBP 6 /10 ( 7/10 at initial visit) denies knee pain today.   Able to stand to iron  three  items with better tolerance prior to exacerbation of LBP from prolonged standing while cooking yesterday.  More difficulty performing DKTC and full hooklying rotation.  Had question regarding standing core exercises.                Objective:      Reviewed knee ex added heel/toe raises   DKTC 3x, SKTC 3x buddy  Continued US and MT reported relief               OPT EXERCISES 7/5/2018   Exercise #3 Standing door frame isometric, reinstructed 5x5\"-H   Comment #3    Exercise #4    Comment #4    Exercise #5 Seated SLR 5x5\" right-H   Comment #5 Supine SLR 2x with pain in LB   Exercise #6    Comment #6    Exercise #7 DKTC-3x   Comment #7 Supine hooklying trunk rotation-reinstructed to perform through limited, painfree ROMx5   Exercise #8 Single knee to chest, 3x5\" each   Comment #8 Quad set with towel roll, 5x5\"-H     Tolerated revised program for back and knee exercises well.  Decrease right lumbar pain after US.      Treatment Today   7/12/2018    TREATMENT MINUTES COMMENTS   Evaluation 20  Knee   Self-care/ Home management     Manual therapy  MFR at left SL MFR LS decompression   Neuromuscular Re-education     Therapeutic Activity     Therapeutic Exercises 20 Brief  review of HEP   Gait training     Modality_____US sidelying on left  To right QL and lumbar paraspinals____________ 8-+3mins set-up US to right mid-back 1MHZ 100% at 1.5wts/cm2              Total 51    Blank areas are intentional and " mean the treatment did not include these items.     PT Evaluation Code: (Please list factors)  Patient History/Comorbidities: see above  Examination: see above  Clinical Presentation: stable  Clinical Decision Making: low    Patient History/  Comorbidities Examination  (body structures and functions, activity limitations, and/or participation restrictions) Clinical Presentation Clinical Decision Making (Complexity)   No documented Comorbidities or personal factors 1-2 Elements Stable and/or uncomplicated Low   1-2 documented comorbidities or personal factor 3 Elements Evolving clinical presentation with changing characteristics Moderate   3-4 documented comorbidities or personal factors 4 or more Unstable and unpredictable High       Aubree Zamarripa  7/12/2018

## 2021-06-19 NOTE — PROGRESS NOTES
"[facility] Daily Progress     Patient Name: Liana Briceño  Date: 7/3/2018  Visit #: 8/12  PTA visit #:  5  PRECAUTIONS/RESTRICTIONS:  Right BAR s/p [id number], DM2, Morbid Obesity, HX: MRSA  Referral Diagnosis: lumbalgia, lumbar radiculitis , myofascial pain  Referring provider: Jaz Matute PA-C  Visit Diagnosis:     ICD-10-CM    1. Lumbar radiculopathy M54.16    2. Myofascial pain M79.1    3. Chronic back pain, unspecified back location, unspecified back pain laterality M54.9     G89.29    4. Decreased ROM of lumbar spine M25.60    5. Muscle weakness (generalized) M62.81    6. Poor posture R29.3          Assessment:     Compliant with HEP  Beginning to report some improvement as was able to stand and iron a couple of clothing items with better tolerance.  Pt would benefit with continuing skilled PT visits  Slow but steady progress.    Goal Status: progressing towards  Pt. will demonstrate/verbalize independence in self-management of condition in : Progressing toward  Pt. will be independent with home exercise program in : Progressing toward  Pt. will have improved quality of sleep: Progressing toward  Comment:: decrease interruptionsto 1-2x/night and improve tolerate SL 30 minutes and supine with less sharp \"taking breath away\" pain  Pt. will be able to walk : 20 minutes  Other Activity:: Discontinue  Patient will stand : Progressing toward  Other Time: 20 minutes  Patient will decrease : ERIS score;by _ points;for improved quality of function;for improved quality of life;in 6 weeks  by ___ points: 30    Plan / Patient Education:     Continue POC  Progress ex as tolerated  Continue US,  and MFR    Subjective:     Patient arrived 7 minutes late.  Pain Rating: 3/10 (decreased from 7/10 at initial visit). Right knee pain  Able to stand to iron  three  items with better tolerance   Ex daily    Objective:     Bed transfers with greater ease  Palpation:  Right>left L3-inferior iliac crest with skin bound down " to soft tissue  Added double knee to chest x3, and supine trunk rotation  Reports relief following US and MT      Treatment Today   7/3/2018    TREATMENT MINUTES COMMENTS   Evaluation     Self-care/ Home management     Manual therapy 9 MFR at left SL MFR LS decompression   Neuromuscular Re-education     Therapeutic Activity     Therapeutic Exercises 5 Brief  review of HEP   Gait training     Modality_____US sidelying on left  To right QL and lumbar paraspinals____________ 8-+3mins set-up US to right mid-back 1MHZ 100% at 1.5wts/cm2              Total 25    Blank areas are intentional and mean the treatment did not include these items.       Aubree Zamarripa  7/3/2018

## 2021-06-19 NOTE — PROGRESS NOTES
[facility] Follow Up Note    Assessment/Plan:  1. Chronic back pain  With severe facet arthropathy.  Of note, her appointment for the spine center is in the next 10 days.  Of note, they were supposed to bring her back sooner for facet injections but this did not happen.  In the interim, continue with physical therapy.  She has been able to reduce the amount of Vicodin taken with the use of nighttime naltrexone.  Will continue the same  - HYDROcodone-acetaminophen 5-325 mg per tablet; Take 1 tablet by mouth 2 (two) times a day as needed for pain.  Dispense: 30 tablet; Refill: 0    2. Type 2 diabetes mellitus with hyperglycemia, with long-term current use of insulin  Poorly controlled.  She is now back on track with regard to diet and medication compliance.  Would like her to check back in with [facility] pharmacy in 1 month to review her insulin log.  Follow-up with me in 3 months    3. Diffuse Nontoxic Goiter  With dominant nodule.  Recent thyroid biopsy negative    4. Right knee pain  Likely secondary to arthritis.  She requests an x-ray.  - XR Knee Right 1 or 2 VWS; Future    X-ray reviewed.  Degenerative changes are noted.    5. RADHA (obstructive sleep apnea)  She is reporting some daytime somnolence.  She is not using her sleep apnea machine as she has had some difficulty with it.  Her last sleep study was several years ago.  Because she is contemplating bariatric surgery, would like to assess sleep apnea status  - Ambulatory referral to Sleep Medicine    6.  Morbid obesity  She is going to be doing the online course with regard to the bariatric surgery center and will follow up with them when able      Mimi Fuentes MD    Chief Complaint:  No chief complaint on file.      History of Present Illness:  Liana is a 62 y.o. female who is here today for follow-up of her more recent active medical problems.    First, with regard to the low back pain secondary to severe facet arthropathy, she is doing well  with physical therapy.  She was started on naltrexone which is helped modulate pain perception.  She is using 1-2 Vicodin during the day and naltrexone at bedtime.  She thinks her pain is becoming more manageable.  Additionally, with this, her cravings for food have been decreased.  She has lost 7 pounds since last visit and is feeling more upbeat.    She continues to complain of persistent persistent right knee pain.  This was reported at last visit.  It continues to be of relatively high acuity event now that her back pain is improved.    With regard to dominant thyroid nodule, the results of her thyroid biopsy was reviewed today.    Also, with regard to morbid obesity, she will be following through with that bariatric center once she completes their online readmission video.  Additionally, she is feeling encouraged with the naltrexone as her excessive appetite has been curved.    Review of Systems:  A comprehensive review of systems was performed and was otherwise negative    PFSH:  Social History: She is in a common-law marriage but is not legally .  He is attempting to separate and move into her own home with her sister.  History   Smoking Status     Former Smoker     Packs/day: 0.25     Years: 40.00     Types: Cigarettes     Quit date: 1/24/2016   Smokeless Tobacco     Never Used       Past History: Coronary artery disease, type 2 diabetes, chronic kidney disease, obesity  Current Outpatient Prescriptions   Medication Sig Dispense Refill     acetaminophen (TYLENOL) 500 MG tablet Take by mouth every 6 (six) hours as needed for pain.       albuterol (PROAIR HFA;PROVENTIL HFA;VENTOLIN HFA) 90 mcg/actuation inhaler Inhale 2 puffs every 6 (six) hours as needed for wheezing. 1 Inhaler 1     aspirin 81 MG EC tablet Take 81 mg by mouth daily.       blood glucose test (CONTOUR NEXT STRIPS) strips Use 1 each As Directed 4 (four) times a day. 300 strip 3     cholecalciferol, vitamin D3, (CHOLECALCIFEROL) 1,000  "unit tablet Take 1,000 Units by mouth daily.       gabapentin (NEURONTIN) 300 MG capsule Take 1 capsule (300 mg total) by mouth 3 (three) times a day. 90 capsule 9     generic lancets (MICROLET LANCET) Use 1 each As Directed 4 (four) times a day. 300 each 3     hydroCHLOROthiazide (MICROZIDE) 12.5 mg capsule Take 1 capsule (12.5 mg total) by mouth daily. 90 capsule 3     HYDROcodone-acetaminophen 5-325 mg per tablet Take 1 tablet by mouth 2 (two) times a day as needed for pain. 30 tablet 0     insulin aspart U-100 (NOVOLOG FLEXPEN U-100 INSULIN) 100 unit/mL injection pen Inject 42 Units under the skin 3 (three) times a day before meals. 36 mL 2     insulin degludec (TRESIBA FLEXTOUCH U-200) 200 unit/mL (3 mL) InPn Inject 132 Units under the skin daily. 18 mL 11     lisinopril (PRINIVIL,ZESTRIL) 5 MG tablet Take 1.5 tablets (7.5 mg total) by mouth daily. 135 tablet 3     melatonin (MELATIN) 3 mg Tab tablet Take 3 mg by mouth at bedtime as needed.       naltrexone (DEPADE) 50 mg tablet Take 1 tablet (50 mg total) by mouth at bedtime. 30 tablet 2     nitroglycerin (NITROSTAT) 0.4 MG SL tablet Place 1 tablet (0.4 mg total) under the tongue every 5 (five) minutes as needed for chest pain. 25 tablet 3     omega-3 acid ethyl esters (LOVAZA) 1 gram capsule TAKE 2 CAPSULES BY MOUTH TWICE DAILY 360 capsule 3     omeprazole (PRILOSEC) 40 MG capsule TAKE 1 CAPSULE BY MOUTH EVERY DAY 90 capsule 1     pen needle, diabetic (BD ULTRA-FINE ZULMA PEN NEEDLES) 32 gauge x 5/32\" Ndle Inject 4 each under the skin daily. With Tresiba and Novolog 120 each 3     rosuvastatin (CRESTOR) 10 MG tablet TAKE 1 TABLET BY MOUTH DAILY 90 tablet 3     No current facility-administered medications for this visit.        Family History: Chronic kidney disease, sudden cardiac arrest, type 2 diabetes, hypertension    Physical Exam:  General Appearance:   She is pleasant and well-appearing and in no acute distress  Vitals:    06/29/18 1441   BP: (!) 136/8 "   Patient Site: Left Arm   Patient Position: Sitting   Cuff Size: Adult Large   Pulse: 76   SpO2: 98%   Weight: (!) 271 lb 12.8 oz (123.3 kg)     Wt Readings from Last 3 Encounters:   06/29/18 (!) 271 lb 12.8 oz (123.3 kg)   06/26/18 (!) 278 lb (126.1 kg)   06/18/18 (!) 278 lb (126.1 kg)     Body mass index is 43.87 kg/(m^2).

## 2021-06-19 NOTE — PROGRESS NOTES
Sent telephone encounter with patient concern regarding the Spine Center to [facility]. Pt requested her PCP call her today to discuss. See telephone encounter dated 7/24/18.    See updated telephone encounter note.

## 2021-06-19 NOTE — PROGRESS NOTES
Received a call from patient. She had stopped in last night as a walk in. I was unable to meet with her as I was leaving for the day. See encounter created 7/31. Pt did not read her [employer] message from Dr. Fuentes with suggestions of other places to go for injections. Pt was read this message and instructed that referrals will be entered and a specialty  will call her to help schedule. Pt thanked for the call.

## 2021-06-19 NOTE — PROGRESS NOTES
Assessment / Plan:     Diagnoses and all orders for this visit:    Lumbalgia    Facet arthropathy, lumbosacral    Myofascial pain    Sleep disturbance          Liana Briceño is a 63 y.o. y.o. female with past medical history significant for sleep apnea, type 2 diabetes, obesity, esophageal reflux disease, dyslipidemia,coronary artery disease, who presents today for follow-up regarding bilateral low back pain, with right side being worse than the left side, that is likely due to the facet arthropathy at the L4-L5 seen on her lumbar MRI.  Patient reports complete resolution of the radicular pain to the lower extremity with physical therapy and medication.  No red flags.       A shared decision making plan was used.  The patient's values and choices were respected.  The following represents what was discussed and decided upon by the physician and the patient.      1.  DIAGNOSTIC TESTS: lumbar MRI that was done on May 23 of 2018 shows severe lower lumbar spine facet arthropathy at the L4-L5, L5-S1.  Patient symptoms of the right L4-L5 are likely due to the severe facet arthropathy at the L4-L5.  No new imaging to review today.  2.  PHYSICAL THERAPY: Patient will continue on physical therapy program.  3.  MEDICATIONS: Patient will continue on gabapentin 300mg, 2 tablets daily.  Vicodin 5-325 mg as needed for pain prescribed by her primary care provider.  4.  INTERVENTIONS: Patient will proceed with the right L4-L5 facet steroid injection due to the severe facet arthropathy at the L4-L5.  5.  PATIENT EDUCATION: I thoroughly discussed the plan with the patient today.  I discussed with the patient the importance of physical therapy, the gabapentin and her treatment plan.  I would like her to continue with the physical therapy after the procedure.  She verbalizes understanding and agrees with the plan.  6.  FOLLOW-UP: Patient will follow up with me in 2  weeks.  All questions were answered.            Subjective:     Liana CORRALES  Navarro is a 63 y.o. female who presents today for follow-up regarding chronic right-sided low back pain.  Patient denies any radicular pain to the lower extremity anymore.  She is states that she still has ongoing 5-6 out of 10 intensity pain in the right lower back.  Unfortunately she was waiting for somebody to call her to schedule the injection and there was misunderstanding.  At this time she is taking Vicodin 5-325 mg 1-2 tablets daily as needed for pain.  Patient states that physical therapy does help with her symptoms.  She recently returned from [facility], and lots of walking.  Her symptoms are 8 out of 10 intensity on its worse.  Her lumbar MRI shows severe facet arthropathy at the L4-L5.  Patient would like to proceed with the facet steroid injection that we discussed on her last visit. Patient denies urinary or bowel incontinence, unintentional weight loss, saddle anesthesia, fever or chills, balance difficulties.      Review of Systems:  Bilateral low back pain with the right side being worse than the left side. Patient denies urinary or bowel incontinence, unintentional weight loss, saddle anesthesia, fever or chills, balance difficulties.       Objective:   CONSTITUTIONAL:  Vital signs as above.  No acute distress.  The patient is well nourished and well groomed.    PSYCHIATRIC:  The patient is awake, alert, oriented to person, place and time.  The patient is answering questions appropriately with clear speech.  Normal affect.  SKIN:  Skin over the face, posterior torso, bilateral upper and lower extremities is clean, dry, intact without rashes.  MUSCULOSKELETAL:   Gait is non-antalgic.   The patient is able to heel and toe walk without  difficulty.  Mild tenderness over the right L4-L5 lumbar paraspinal muscles.  Patient also has mild tenderness at the right L3-L4 paraspinal muscle that is likely myofascial in nature.      The patient has 5/5 strength for the bilateral hip flexors, knee  flexors/extensors, ankle dorsiflexors/plantar flexors, ankle evertors/invertors.    NEUROLOGICAL:  1/4 patellar, medial hamstring, achilles reflexes which are symmetric bilaterally.  No ankle clonus bilaterally.  Sensation to light touch is intact in the bilateral L4, L5, and S1 dermatomes.    Positive right straight leg raise.  Negative Howard test and hip impingement bilaterally.  negative left straight leg raise.  Positive facet loading maneuver at the L4-L5.     RESULTS:      [facility]  MR LUMBAR SPINE WO CONTRAST  5/23/2018 6:41 PM       INDICATION: Lumbalgia, right lumbar radicular pain to the le bilaterally, with r>l.  TECHNIQUE: Without IV contrast.  CONTRAST: None.  COMPARISON: None.      FINDINGS: Nomenclature is based on 5 lumbar type vertebral bodies. Normal vertebral body heights, alignment and marrow signal. No pars defect. The conus tip is identified at L1. Grossly normal paraspinal soft tissues. No abdominal aortic aneurysm changes   of right total hip replacement.      T12-L1: Normal disc height and signal. No herniation. No facet arthropathy. No spinal canal stenosis. No right neural foraminal stenosis. No left neural foraminal stenosis.      L1-L2: Normal disc height and signal. No herniation. No facet arthropathy. No spinal canal stenosis. No right neural foraminal stenosis. No left neural foraminal stenosis.      L2-L3: Normal disc height and signal. No herniation. Mild facet arthropathy. No spinal canal stenosis. No right neural foraminal stenosis. No left neural foraminal stenosis.      L3-L4: Very shallow left paracentral/foraminal protrusion. Moderate facet arthropathy. Mild spinal canal stenosis. No right neural foraminal stenosis. Mild left neural foraminal stenosis.      L4-L5: Normal disc height and signal. No herniation. Severe facet arthropathy. No spinal canal stenosis. Mild to moderate right neural foraminal stenosis. No left neural foraminal stenosis.      L5-S1: Normal  disc height and signal. No herniation. Severe facet arthropathy. No spinal canal stenosis. No right neural foraminal stenosis. No left neural foraminal stenosis.      IMPRESSION:   CONCLUSION:  1.  Severe lower lumbar spine facet arthropathy.      2.  Mild spinal canal stenosis at L3/L4.      3.  No high-grade foraminal stenosis. Mild to moderate right foraminal stenosis at L4/L5.

## 2021-06-19 NOTE — PROGRESS NOTES
Met with pt briefly when she was in to see PCP. Pt was weighed in CG's office. She has lost another 7 lbs. CG congratulated pt on her success. CG informed LINA Jacobson to record new weight. Pt has refused at time of rooming. Pt states things are going better for her. Her medications are being managed as directed by PCP and MTM.    Next outreach: 7/31/18

## 2021-06-19 NOTE — PROGRESS NOTES
Order for Durable Medical Equipment was processed and equipment ordered.     DME provider: [facility]    Date Faxed: 7/26/18    Ordering Provider: Dr. Ledesma    Equipment ordered: CPAP Mask fitting

## 2021-06-19 NOTE — LETTER
Letter by Humble Monsivais MD at      Author: Humble Monsivais MD Service: -- Author Type: --    Filed:  Encounter Date: 5/1/2019 Status: (Other)         May 1, 2019     Patient: Liana Briceño   YOB: 1955   Date of Visit: 5/1/2019       To Whom It May Concern:     It is my medical opinion that Liana Briceño should remain out of work until Tuesday, May 7, 2019.    If you have any questions or concerns, please don't hesitate to call.    Sincerely,        Electronically signed by Humble Monsivais MD

## 2021-06-19 NOTE — PROGRESS NOTES
"[facility] Daily Progress     Patient Name: Liana Briceño  Date: 6/28/2018  Visit #: 7/12  PTA visit #:  4  PRECAUTIONS/RESTRICTIONS:  Right BAR s/p [id number], DM2, Morbid Obesity, HX: MRSA  Referral Diagnosis: lumbalgia, lumbar radiculitis , myofascial pain  Referring provider: Jaz Matute PA-C  Visit Diagnosis:     ICD-10-CM    1. Lumbar radiculopathy M54.16    2. Myofascial pain M79.1    3. Chronic back pain, unspecified back location, unspecified back pain laterality M54.9     G89.29    4. Decreased ROM of lumbar spine M25.60    5. Muscle weakness (generalized) M62.81    6. Poor posture R29.3          Assessment:     Compliant with HEP  Beginning to report some improvement as was able to stand and iron a couple of clothing items with better tolerance.  Pt would benefit with continuing skilled PT visits  Slow but steady progress.    Goal Status: progressing towards  Pt. will demonstrate/verbalize independence in self-management of condition in : Progressing toward  Pt. will be independent with home exercise program in : Progressing toward  Pt. will have improved quality of sleep: Progressing toward  Comment:: decrease interruptionsto 1-2x/night and improve tolerate SL 30 minutes and supine with less sharp \"taking breath away\" pain  Pt. will be able to walk : 20 minutes  Other Activity:: Discontinue  Patient will stand : Progressing toward  Other Time: 20 minutes  Patient will decrease : ERIS score;by _ points;for improved quality of function;for improved quality of life;in 6 weeks  by ___ points: 30    Plan / Patient Education:     Continue POC  Progress ex as tolerated adding  double knees to chest, QL stretch  Continue US, KT and MFR    Subjective:     Patient arrived 5 minutes late.  Pain Rating: 3/10 (decreased from 7/10 at initial visit). Right knee pain  Able to stand to iron a couple of items with better tolerance (blouse and slacks)  In general feeling better, US, MFR and exercises "     Objective:     Bed transfers with greater ease  Palpation:  Right>left L3-inferior iliac crest with skin bound down to soft tissue  Standing trunk ext and SL core ex   Reports relief following US and MT      Treatment Today   6/28/2018    TREATMENT MINUTES COMMENTS   Evaluation     Self-care/ Home management     Manual therapy 9 MFR at left SL MFR LS decompression   Neuromuscular Re-education     Therapeutic Activity     Therapeutic Exercises 5 Brief  review of HEP   Gait training     Modality_____US sidelying on left  To right QL and lumbar paraspinals____________ 8-+3mins set-up US to right mid-back 1MHZ 100% at 1.5wts/cm2              Total 25    Blank areas are intentional and mean the treatment did not include these items.       Aubree Zamarripa  6/28/2018

## 2021-06-19 NOTE — PROGRESS NOTES
[facility] Certification Request    July 5, 2018      Patient: Liana Briceño  MR Number: [id number]  YOB: 1955  Date of Visit: 7/5/2018      Dear Dr. Mimi Fuentes:    Thank you for this referral.   We are seeing Liana Briceño for Physical Therapy of Right Knee pain.    Medicare and/or Medicaid requires physician review and approval of the treatment plan. Please review the plan of care and verify that you agree with the therapy plan of care by co-signing this note.      Plan of Care  Authorization / Certification Start Date: 07/05/18  Authorization / Certification End Date: 08/31/18  Authorization / Certification Number of Visits: 6  Communication with: Referral Source  Patient Related Instruction: Nature of Condition;Treatment plan and rationale;Self Care instruction;Basis of treatment;Precautions;Next steps;Expected outcome  Times per Week: 1  Number of Weeks: 6  Number of Visits: 6  Discharge Planning: INdependent HEP and self-management of symptoms  Precautions / Restrictions : Right BAR s/p [id number], DM2, Morbid Obesity, HX: MRSA  Therapeutic Exercise: Strengthening;ROM  Neuromuscular Reeducation: kinesio tape;balance/proprioception  Equipment: theraband;other  Equipment: kinesiotape    Goals:  Pt. will demonstrate/verbalize independence in self-management of condition in : 6 weeks  Pt. will be independent with home exercise program in : 6 weeks  Pt. will be able to walk : with less pain;with less difficulty;for community mobility;in 6 weeks;2 blocks;20 minutes  Other Activity:: Discontinue  Patient will stand : 30 minutes;with less difficultty;with less pain;for home chores;in 6 weeks  Other Time: 20 minutes  Patient will ascend / descend: stairs;with less pain;with less difficulty;in 6 weeks  Patient will transfer: sit/stand;for car;with less pain;with less difficulty;in 6 weeks  Patient will increase : LEFS score;by _ points;for improved quality of function;for improved quality of  life;in 6 weeks  by ___ points: 9        If you have any questions or concerns, please don't hesitate to call.    Sincerely,      April Newell, PT        Physician recommendation:     ___ Follow therapist's recommendation        ___ Modify therapy      *Physician co-signature indicates they certify the need for these services furnished within this plan and while under their care.        Initial evaluation Right Knee Pain  [facility] Daily Progress for LBP    Patient Name: Liana Briceño  Date: 7/5/2018  Visit #: 9/12 (low back) ; right knee 1/6  PTA visit #:  5  PRECAUTIONS/RESTRICTIONS:  Right BAR s/p 1557-7395, DM2, Morbid Obesity, HX: MRSA  Referral Diagnosis: lumbalgia, lumbar radiculitis , myofascial pain; Right Knee Pain  Referring provider: Jaz Matute PA-C (Back); Mimi Fuentes MD (right knee pain)  Visit Diagnosis:     ICD-10-CM    1. Right knee pain, unspecified chronicity M25.561    2. Lumbar radiculopathy M54.16    3. Myofascial pain M79.1    4. Chronic back pain, unspecified back location, unspecified back pain laterality M54.9     G89.29    5. Decreased ROM of lumbar spine M25.60    6. Muscle weakness (generalized) M62.81    7. Poor posture R29.3          Assessment:     Compliant with HEP for LBP with fair tolerance but slow progression; having some regression due to prolonged standing and cooking on July 4th.  Had reported some improvement as was able to stand and iron a couple of clothing items with better tolerance until exacerbation yesterday making some exercises more difficulty.  Pt would benefit with continuing skilled PT visits for LBP  US has been helpful     Goal Status: progressing towards  Pt. will demonstrate/verbalize independence in self-management of condition in : 6 weeks  Pt. will be independent with home exercise program in : 6 weeks  Pt. will have improved quality of sleep: Progressing toward  Comment:: decrease interruptionsto 1-2x/night and improve tolerate SL  "30 minutes and supine with less sharp \"taking breath away\" pain  Pt. will be able to walk : with less pain;with less difficulty;for community mobility;in 6 weeks;2 blocks;20 minutes  Other Activity:: Discontinue  Patient will stand : 30 minutes;with less difficultty;with less pain;for home chores;in 6 weeks  Other Time: 20 minutes  Patient will ascend / descend: stairs;with less pain;with less difficulty;in 6 weeks  Patient will transfer: sit/stand;for car;with less pain;with less difficulty;in 6 weeks  Patient will increase : LEFS score;by _ points;for improved quality of function;for improved quality of life;in 6 weeks  by ___ points: 9  Patient will decrease : ERIS score;by _ points;for improved quality of function;for improved quality of life;in 6 weeks  by ___ points: 30    Plan / Patient Education:     Plan to see 1x/week for 5 more visits to progress strengthening exercises to right knee progressively adding, closed chain HS, wall slide, toe raises, bridge  Try to resume DKTC and full hooklying trunk rotation  Continue US,  and MFR to LB    Subjective:       Pain Rating: LBP 7-8/10 ( 7/10 at initial visit). Right knee pain 5/10  Able to stand to iron  three  items with better tolerance prior to exacerbation of LBP from prolonged standing while cooking yesterday.  More difficulty performing DKTC and full hooklying rotation.  Had question regarding standing core exercises.                Equipment Available: None and copper knee brace    History of Present Illness:    Liana is a 62 y.o. female who presents to therapy today with complaints of right knee pain. Date of onset/duration of symptoms is 2018 that correlated with onset of LBP. Onset was gradual, attributed to lifting a basket that exacerbated LBP. Symptoms are constant and worse at night. She denies history of similar symptoms. She describes their previous level of function as limited with LBP issues.    Pain Ratin  Pain rating at best: 2  Pain " "rating at worst: 8  Pain description:constant burning ache    Functional limitations are described as occurring with:   walking/standing 15 minutes, car transfer, stair climbing, sleep interruptions 2-3x/night    Patient reports benefit from:  OTC Brace, copper knee brace, Vicadin       Objective:      Note: Items left blank indicates the item was not performed or not indicated at the time of the evaluation.    Patient Outcome Measures :    Lower Extremity Functional Scale (_/80): 29   Scores range from 0-80, where a score of 80 represents maximum function. The minimal clinically important difference is a positive change of 9 points.    Precautions/Restrictions:  Right BAR s/p 2487-5690, DM2, Morbid Obesity, HX: MRSA; Chronic LBP  Involved Side: Right  Assistive Device: None  Gait Observation: slow, antalgic                Palpation:  Severe tenderness right pes anserine and right lumbar paraspinals with tightness.  Pain with posterior draw test    Lower Extremity Strength:  Date: 7/5/2018     LE strength/5 Right Left Right Left Right Left   Hip Flexion (L1-3)         Hip Extension (L5-S1)         Hip Abduction (L4-5)         Hip Adduction (L2-3)         Hip External Rotation         Hip Internal Rotation         Knee Extension (L3-4) 5- pain 5       Knee Flexion 5- 5       Ankle Dorsiflexion (L4-5)         Great Toe Extension (L5)         Ankle Plantar flexion (S1)         Abdominals          Knee ROM:    Date: 7/5/2018      Knee ROM ( ) AROM in degrees AROM in degrees AROM in degrees    Right Left Right Left Right Left   Knee Flexion (0-130 )         Knee extension (0 ) 3 pain 0        PROM in degrees PROM in degrees PROM in degrees    Right Left Right Left Right Left   Knee Flexion (0-130 ) 110 pain 110       Knee extension (0 ) 0 pain 5           Todays exercises:  OPT EXERCISES 7/5/2018   Exercise #3 Standing door frame isometric, reinstructed 5x5\"-H   Comment #3    Exercise #4    Comment #4    Exercise #5 " "Seated SLR 5x5\" right-H   Comment #5 Supine SLR 2x with pain in LB   Exercise #6    Comment #6    Exercise #7 DKTC-hold due to increased LBP   Comment #7 Supine hooklying trunk rotation-reinstructed to perform through limited, painfree ROMx5   Exercise #8 Single knee to chest, 5x5\" each   Comment #8 Quad set with towel roll, 5x5\"-H     Tolerated revised program for back and knee exercises well.  Decrease right lumbar pain after US.      Treatment Today   7/5/2018    TREATMENT MINUTES COMMENTS   Evaluation 20  Knee   Self-care/ Home management     Manual therapy  MFR at left SL MFR LS decompression   Neuromuscular Re-education     Therapeutic Activity     Therapeutic Exercises 20 Brief  review of HEP   Gait training     Modality_____US sidelying on left  To right QL and lumbar paraspinals____________ 8-+3mins set-up US to right mid-back 1MHZ 100% at 1.5wts/cm2              Total 51    Blank areas are intentional and mean the treatment did not include these items.     PT Evaluation Code: (Please list factors)  Patient History/Comorbidities: see above  Examination: see above  Clinical Presentation: stable  Clinical Decision Making: low    Patient History/  Comorbidities Examination  (body structures and functions, activity limitations, and/or participation restrictions) Clinical Presentation Clinical Decision Making (Complexity)   No documented Comorbidities or personal factors 1-2 Elements Stable and/or uncomplicated Low   1-2 documented comorbidities or personal factor 3 Elements Evolving clinical presentation with changing characteristics Moderate   3-4 documented comorbidities or personal factors 4 or more Unstable and unpredictable High       April Newell  7/5/2018    "

## 2021-06-19 NOTE — PROGRESS NOTES
[facility] Re-Certification Request    July 5, 2018      Patient: Liana Briceño  MR Number: [id number]  YOB: 1955  Date of Visit: 6/28/2018  Onset Date:  5/1/2018  Date of Eval: 6/7/2018    Dear Levon Matute PA-C:    As you may recall, we have been seeing Liana Briceño for Physical Therapy of Chronic Back Pain, Lumbalgia, L Radiculitis, Myofascial Pain.    For therapy to continue, Medicare and/or Medicaid requires periodic physician review of the treatment plan. Please review the summary of the patient's progress and our plan for continued therapy, and verify  that you agree therapy should continue by entering certification dates at the bottom of this note and co-signing this note.    Plan of Care  Authorization / Certification Start Date: 6/28/2018  Authorization / Certification End Date: 08/31/18  Authorization / Certification Number of Visits: 6  Communication with: Referral Source  Patient Related Instruction: Nature of Condition;Treatment plan and rationale;Self Care instruction;Basis of treatment;Precautions;Next steps;Expected outcome  Times per Week: 1  Number of Weeks: 6  Number of Visits: 6  Discharge Planning: INdependent HEP and self-management of symptoms  Precautions / Restrictions : Right BAR s/p [id number], DM2, Morbid Obesity, HX: MRSA  Therapeutic Exercise: Strengthening;ROM  Neuromuscular Reeducation: kinesio tape;balance/proprioception  Manual Therapy: soft tissue mobilization;myofascial release  Modalities: electrical stimulation;ultrasound;traction  Equipment: theraband;other  Equipment: kinesiotape    Goal  Pt. will demonstrate/verbalize independence in self-management of condition in : 6 weeks  Pt. will be independent with home exercise program in : 6 weeks  Pt. will have improved quality of sleep: with less pain;waking less times/night;in 6 weeks;Comment  Comment:: decrease interruptionsto 1-2x/night and improve tolerate SL 30 minutes and supine with less sharp  "\"taking breath away\" pain  Pt. will be able to walk : 30 minutes;around the house;for household mobility;for exercise/recreation;for community mobility;in 6 weeks  Other Activity:: Discontinue  Patient will stand : other time;with less difficultty;with less pain;in 6 weeks  Other Time: 20 minutes  Patient will decrease : ERIS score;by _ points;for improved quality of function;for improved quality of life;in 6 weeks  by ___ points: 30    If you have any questions or concerns, please don't hesitate to call.    Sincerely,      April Newell, PT        Physician recommendation:     ___ Follow therapist's recommendation        ___ Modify therapy      *Physician co-signature indicates they certify the need for these services furnished within this plan and while under their care.      "

## 2021-06-19 NOTE — PROGRESS NOTES
Liana Briceño is here today for a right L4-5 facet joint steroid injection.  The patient is not able to have the injection today because her blood sugar exceeded the upper limit in the [facility] safety protocol.  The reading was HHH meaning her blood sugar did not register as it was found to be in excess of 444.  The patient stated that she had just had lunch at [employer] and was frustrated that her injection was cancelled.  Dr. Delgado was made aware of the patient's elevated blood glucose level and the patient was informed that she could schedule her injection for a later date.  The patient was made aware that to safely proceed with the injection, her blood glucose level the day of the procedure would need to be below 300.  The patient verbalized that due to previous scheduling difficulties, that she would likely be looking to seek treatment for her back pain elsewhere.  This author informed her that the clinic manager would reach out to her to further discuss her concerns.  The patient verbalized understanding of the plan.      No charge for today s visit.

## 2021-06-20 NOTE — PROGRESS NOTES
[facility] Daily Progress for LBP and right knee    Patient Name: Liana Briceño  Date: 10/2/2018  Visit #: 12/12 (low back) ; right knee4/6; New orders for back 5/12; Re-cert 8/31/2018-11/15/2018 and G codes  PTA visit #:  6 +3  PRECAUTIONS/RESTRICTIONS:  Right BAR s/p 0754-5015, DM2, Morbid Obesity, HX: MRSA  Referral Diagnosis: lumbalgia, lumbar radiculitis , myofascial pain; Right Knee Pain  Referring provider: Jaz Matute PA-C (Back); Mimi Fuentes MD (right knee pain);David Razo DO  Visit Diagnosis:     ICD-10-CM    1. Lumbar radiculopathy M54.16    2. Myofascial pain M79.18    3. Chronic back pain, unspecified back location, unspecified back pain laterality M54.9     G89.29    4. Decreased ROM of lumbar spine M53.86    5. Muscle weakness (generalized) M62.81    6. Poor posture R29.3          Assessment:     Compliance with HEP for LBP fair but was instructed to hold any exercise for three days following injection on 9/26/2018.  She hasn't been compliant since injection and weekend exacerbation.  Reports decreased knee pain 0/10 but increased back pain over the weekend, especially in left inferior iliac crest despite injections on 9/26/2018.  Pt would benefit with continuing skilled PT visits for LBP to advance core strength.  US/MT has been helpful.  Needs encouragement to be compliant with HEP.    Goal Status: progressing toward goals  Pt. will demonstrate/verbalize independence in self-management of condition in : Partially Met  Pt. will be independent with home exercise program in : Partially met  Pt. will have improved quality of sleep: Partially met  Pt. will be able to walk : Partially met  Patient will stand : Partially met  Patient will ascend / descend: stairs;with less pain;with less difficulty;in 6 weeks  Patient will transfer: sit/stand;for car;with less pain;with less difficulty;in 6 weeks  Patient will increase : LEFS score;by _ points;for improved quality of function;for  "improved quality of life;in 6 weeks  by ___ points: 9    Plan / Patient Education:     Plan to see up ro 4 more visits for LBP then reassess needs/progress.  Continue with US/MT and core exercises toe dip from 90o, unilateal hip ER motion.    Subjective:     Pain Rating: LBP 4-5 /10 still low level; keft inferior iliac crest 5-6/10denies knee pain. Some knee pain at night.  Using TENS for pain management of knee.  Had severe pain, unable to put weight through right LE with pain in glut which lasted through Sunday, relieved with Vicaden.  Feels like pain is returning after injection on 9/26/2018 but pain is returning-patient frustrated.  Persistent increased blood sugars noted since injection.  New bed is helpful.  US very helpful with longer duration without needing to take pain med.  Able to get through more of her day with less pain.  Moving rooms around for carpet layers  New bed using towel to support lumbar spine in neutral.  Reported relief with US and MT.  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     Objective:       Patient arrived 9 min late-forgot about appointment.   Palpation:  Tender right upper lumbar paraspinals inferior iliac crest.    OPT EXERCISES 10/2/2018   Comment #10 Bridging, reinstructed to perform segmentally, x10   Exercise #11 ABdominal set, 10x5\"-H   Comment #11 Heel slide with abdominal set, 5x each-H     Tolerated treatment well and reported less pain after treatment and feels core exercises will be helpful.  Treatment Today   10/2/2018    TREATMENT MINUTES COMMENTS   Evaluation  Knee   Self-care/ Home management  Discussed status since last visit and response to injection.  Discussed reason to limit activity and avoid US/manual therapy to location of injections on right LB   Manual therapy 12 STM " right lumbar at left SL   Neuromuscular Re-education     Therapeutic Activity     Therapeutic Exercises 15    Gait training     Modality_____US sidelying on left  To right QL and lumbar paraspinals____________ 13+3set up US to right mid-back 1MHZ 100% at 1.8wts/cm2              Total 43    Blank areas are intentional and mean the treatment did not include these items.     April Newell  10/2/2018

## 2021-06-20 NOTE — PROGRESS NOTES
[facility] Daily Progress for LBP and right knee    Patient Name: Liana Briceño  Date: 10/9/2018  Visit #: 12/12 (low back) ; right knee5/6; New orders for back 7/12; Re-cert 8/31/2018-11/15/2018 and G codes  PTA visit #:  6 +3  PRECAUTIONS/RESTRICTIONS:  Right BAR s/p [phone], DM2, Morbid Obesity, HX: MRSA  Referral Diagnosis: lumbalgia, lumbar radiculitis , myofascial pain; Right Knee Pain  Referring provider: Jaz Matute PA-C (Back); Mimi Fuentes MD (right knee pain);David Razo DO  Visit Diagnosis:     ICD-10-CM    1. Lumbar radiculopathy M54.16    2. Myofascial pain M79.18    3. Chronic back pain, unspecified back location, unspecified back pain laterality M54.9     G89.29    4. Decreased ROM of lumbar spine M53.86    5. Muscle weakness (generalized) M62.81    6. Poor posture R29.3          Assessment:     Poor compliance with HEP for LBP that were added last session, reporting a busy weekend.  Encouraged  Denies knee pain except at night.  Resolved right inferior iliac crest pain with pain localized to right upper lumbar paraspinals.  Pt would benefit with continuing skilled PT visits for LBP to advance core strength.  US/MT has been helpful.  Needs encouragement to be compliant with HEP.  Patient reports she is able to walk longer duration, straighter and will less pain.    Goal Status: progressing toward goals  Pt. will demonstrate/verbalize independence in self-management of condition in : Partially Met  Pt. will be independent with home exercise program in : Partially met  Pt. will have improved quality of sleep: Partially met  Pt. will be able to walk : Partially met  Patient will stand : Partially met  No Data Recorded    Plan / Patient Education:     Plan to see up ro 5 more visits for LBP then reassess needs/progress.  Continue with US/MT and core exercises toe dip from 90o, unilateal hip ER motion.    Subjective:     Pain Rating: LBP 4-5 /10 still low level; denies knee pain.  "Some knee pain at night intensity 5/10 using medication or knee pillow which subsides pain.  Using TENS for pain management of knee.  Has a new bed which is firmer.   Not able to be compliant with most recently instructed exercises due to a busy weekend.  It feels like fluid on right following injection is resolving.  When she feels better, she does more which can set her back    US very helpful with longer duration, 3-4 hours, without needing to take pain med.  Able to get through more of her day with less pain.  Patient reports she is able to walk longer duration, straighter and will less pain.  New bed using towel to support lumbar spine in neutral.  Reported relief with US and MT      ERIS Outcome Measure:  Initial 64%, current 44%.    Objective:       Treatment delayed due to computer malfunction.  Palpation:  Tender right upper lumbar paraspinals.    Lumbar AROM:  Flex mod loss to knees with pain, Ext min loss, \"feels good\", Rot right mod loss with pain, rot leeft mod loss, BS right min loss with pain, SB left min loss.    No new exercises.  Tolerated treatment well and reported less pain after treatment. Encouraged compliance with HEP.    Treatment Today   10/9/2018    TREATMENT MINUTES COMMENTS   Evaluation  Knee   Self-care/ Home management  Discussed status since last visit and response to injection.  Discussed reason to limit activity and avoid US/manual therapy to location of injections on right LB   Manual therapy 15 STM right lumbar at left SL   Neuromuscular Re-education     Therapeutic Activity     Therapeutic Exercises     Gait training     Modality_____US sidelying on left  To right QL and lumbar paraspinals____________ 10+3set up US to right mid-back 1MHZ 100% at 1.8wts/cm2   ROM assessment 8 Lunmbar         Total 36    Blank areas are intentional and mean the treatment did not include these items.     April Newell  10/9/2018    "

## 2021-06-20 NOTE — LETTER
Letter by Mimi Fuentes MD at      Author: Mimi Fuentes MD Service: -- Author Type: --    Filed:  Encounter Date: 1/28/2020 Status: (Other)         [facility] INTERNAL MEDICINE  01/28/20    Patient: Liana Briceño  YOB: 1955  Medical Record Number: [id number]                                                                  Opioid / Opioid Plus Controlled Substance Agreement    I understand that my care provider has prescribed an opioid (narcotic) controlled substance to help manage my condition(s). I am taking this medicine to help me function or work. I know this is strong medicine, and that it can cause serious side effects. Opioid medicine can be sedating, addicting and may cause a dependency on the drug. They can affect my ability to drive or think, and cause depression. They need to be taken exactly as prescribed. Combining opioids with certain medicines or chemicals (such as cocaine, sedatives and tranquilizers, sleeping pills, meth) can be dangerous or even fatal. Also, if I stop opioids suddenly, I may have severe withdrawal symptoms. Last, I understand that opioids do not work for all types of pain nor for all patients. If not helpful, I may be asked to stop them.        The risks, benefits, and side effects of these medicine(s) were explained to me. I agree that:    1. I will take part in other treatments as advised by my care team. This may be psychiatry or counseling, physical therapy, behavioral therapy, group treatment or a referral to a pain clinic. I will reduce or stop my medicine when my care team tells me to do so.  2. I will take my medicines as prescribed. I will not change the dose or schedule unless my care team tells me to. There will be no refills if I run out early.  I may be contactedwithout warning and asked to complete a urine drug test or pill count at any time.   3. I will keep all my appointments, and understand this is part of the monitoring of opioids. My care team  may require an office visit for EVERY opioid/controlled substance refill. If I miss appointments or dont follow instructions, my care team may stop my medicine.  4. I will not ask other providers to prescribe controlled substances, and I will not accept controlled substances from other people. If I need another prescribed controlled substance for a new reason, I will tell my care team within 1 business day.  5. I will use one pharmacy to fill all of my controlled substance prescriptions, and it is up to me to make sure that I do not run out of my medicines on weekends or holidays. If my care team is willing to refill my opioid prescription without a visit, I must request refills only during office hours, refills may take up to 3 days to process, and it may take up to 5 to 7 days for my medicine to be mailed and ready at my pharmacy. Prescriptions will not be mailed anywhere except my pharmacy.        [id number]  Rev 12/18         Registration to scan to EHR                             Page 1 of 2               Controlled Substance Agreement Opioid        [facility]  01/28/20  Patient: Liana Briceño  YOB: 1955  Medical Record Number: [id number]                                                                  6. I am responsible for my prescriptions. If the medicine/prescription is lost or stolen, it will not be replaced. I also agree not to share controlled substance medicines with anyone.  7. I agree to not use ANY illegal or recreational drugs. This includes marijuana, cocaine, bath salts or other drugs. I agree not to use alcohol unless my care team says I may.          I agree to give urine samples whenever asked. If I dont give a urine sample, the care team may stop my medicine.    8. If I enroll in the [place] Medical Marijuana program, I will tell my care team. I will also sign an agreement to share my medical records with my care team.   9. I will bring in my list of medicines (or my  medicine bottles) each time I come to the clinic.   10. I will tell my care team right away if I become pregnant or have a new medical problem treated outside of my regular clinic.  11. I understand that this medicine can affect my thinking and judgment. It may be unsafe for me to drive, use machinery and do dangerous tasks. I will not do any of these things until I know how the medicine affects me. If my dose changes, I will wait to see how it affects me. I will contact my care team if I have concerns about medicine side effects.    I understand that if I do not follow any of the conditions above, my prescriptions or treatment may be stopped.      I agree that my provider, clinic care team, and pharmacy may work with any city, state or federal law enforcement agency that investigates the misuse, sale, or other diversion of my controlled medicine. I will allow my provider to discuss my care with or share a copy of this agreement with any other treating provider, pharmacy or emergency room where I receive care. I agree to give up (waive) any right of privacy or confidentiality with respect to these consents.     I have read this agreement and have asked questions about anything I did not understand.      ________________________________________________________________________  Patient signature - Date/Time -  Liana Briceño                                      ________________________________________________________________________  Witness signature                                                            ________________________________________________________________________  Provider signature - Mimi Fuentes MD      [id number]  Rev 12/18         Registration to scan to EHR                         Page 2 of 2                   Controlled Substance Agreement Opioid           Page 1 of 2  Opioid Pain Medicines (also known as Narcotics)  What You Need to Know    What are opioids?   Opioids are pain medicines that  must be prescribed by a doctor.  They are also known as narcotics.    Examples are:     morphine (MS Contin, Cara)    oxycodone (Oxycontin)    oxycodone and acetaminophen (Percocet)    hydrocodone and acetaminophen (Vicodin, Norco)     fentanyl patch (Duragesic)     hydromorphone (Dilaudid)     methadone     What do opioids do well?   Opioids are best for short-term pain after a surgery or injury. They also work well for cancer pain. Unlike other pain medicines, they do not cause liver or kidney failure or ulcers. They may help some people with long-lasting (chronic) pain.     What do opioids NOT do well?   Opioids never get rid of pain entirely, and they do not work well for most patients with chronic pain. Opioids do not reduce swelling, one of the causes of pain. They also dont work well for nerve pain.                           For informational purposes only.  Not to replace the advice of your care provider.  Copyright [address] [facility]. All right reserved. [employer] [id number] 02/18.      Page 2 of 2    Risks and side effects   Talk to your doctor before you start or decide to keep taking one of these medicines. Side effects include:    Lowering your breathing rate enough to cause death    Overdose, including death, especially if taking higher than prescribed doses    Long-term opioid use    Worse depression symptoms; less pleasure in things you usually enjoy    Feeling tired or sluggish    Slower thoughts or cloudy thinking    Being more sensitive to pain over time; pain is harder to control    Trouble sleeping or restless sleep    Changes in hormone levels (for example, less testosterone)    Changes in sex drive or ability to have sex    Constipation    Unsafe driving    Itching and sweating    Feeling dizzy    Nausea, vomiting and dry mouth    What else should I know about opioids?  When someone takes opioids for too long or too often, they become dependent. This means that if you stop or  reduce the medicine too quickly, you will have withdrawal symptoms.    Dependence is not the same as addiction. Addiction is when people keep using a substance that harms their body, their mind or their relations with others. If you have a history of drug or alcohol abuse, taking opioids can cause a relapse.    Over time, opioids dont work as well. Most people will need higher and higher doses. The higher the dose, the more serious the side effects. We dont know the long-term effects of opioids.      Prescribed opioids aren't the best way to manage chronic pain    Other ways to manage pain include:      Ibuprofen or acetaminophen.  You should always try this first.      Treat health problems that may be causing pain.      acupuncture or massage, deep breathing, meditation, visual imagery, aromatherapy.      Use heat or ice at the pain site      Physical therapy and exercise      Stop smoking      See a counselor or therapist                                                  People who have used opioids for a long time may have a lower quality of life, worse depression, higher levels of pain and more visits to doctors.    Never share your opioids with others. Be sure to store opioids in a secure place, locked if possible.Young children can easily swallow them and overdose.     You can overdose on opioids.  Signs of overdose include decrease or loss of consciousness, slowed breathing, trouble waking and blue lips.  If someone is worried about overdose, they should call 911.    If you are at risk for overdose, you may get naloxone (Narcan, a medicine that reverses the effects of opioids.  If you overdose, a friend or family member can give you Narcan while waiting for the ambulance.  They need to know the signs of overdose and how to give Narcan.    While you're taking opioids:    Don't use alcohol or street drugs. Taking them together can cause death.    Don't take any of these medicines unless your doctor says its  okay.  Taking these with opioids can cause death.    Benzodiazepines (such as lorazepam         or diazepam)    Muscle relaxers (such as cyclobenzaprine)    sleeping pills    other opioids    Safe disposal of opioids  Find your area drug take-back program, your pharmacy mail-back program, buy a special disposal bag (such as Deterra) from your pharmacy or flush them down the toilet.  Use the guidelines at:  www.fda.gov/drugs/resourcesforyou

## 2021-06-20 NOTE — LETTER
Letter by Mimi Fuentes MD at      Author: Mimi Fuentes MD Service: -- Author Type: --    Filed:  Encounter Date: 9/14/2020 Status: (Other)         Liana Briceño  [address]  [place] [address]             September 14, 2020         Dear Ms. Briceño,    Below are the results from your recent visit:    Resulted Orders   US Pelvis With Transvaginal Non OB    Narrative    EXAM: US PELVIS WITH TRANSVAGINAL NON OB  LOCATION: [facility]  DATE/TIME: 9/13/2020 10:06 AM    INDICATION: Abnormal uterine and vaginal bleeding, unspecified  COMPARISON: CT 04/28/2019  TECHNIQUE: Transabdominal scans were performed. Endovaginal ultrasound was performed to better visualize the adnexa.    FINDINGS:    UTERUS: 6.9 x 4.2 x 2.8 cm. Heterogeneous echotexture without mass.    ENDOMETRIUM: 4 mm. This is within normal postmenopausal.    RIGHT OVARY: 2.2 x 1.4 x 1.2 cm. Normal with flow demonstrated.    LEFT OVARY: Obscured by gas.    No significant free fluid.      Impression    1.  Normal thickness endometrium.  2.  Normal-appearing right ovary.  3.  Left ovary not visualized.         CT Head Without Contrast    Narrative    EXAM: CT HEAD WO CONTRAST  LOCATION: [facility]  DATE/TIME: 9/13/2020 10:52 AM    INDICATION: Headache, acute, normal neuro exam  COMPARISON: Head CT 07/22/2020.  TECHNIQUE: Routine without IV contrast. Multiplanar reformats. Dose reduction techniques were used.    FINDINGS:  No evidence of acute intracranial hemorrhage or mass effect. Scattered foci of decreased attenuation within the cerebral hemispheric white matter which are nonspecific, though most commonly ascribed to chronic small vessel ischemic disease. The   ventricles and sulci are normal for age. Normal gray-white matter differentiation. The basilar cisterns are patent.    The globes are unremarkable. The partially imaged paranasal sinuses, mastoid air cells and middle ear cavities are unremarkable. The visualized skull  base and calvarium are unremarkable.      Impression    1.  No evidence of acute intracranial hemorrhage or mass effect.  2.  Mild nonspecific white matter changes.       Normal scans!!    Please call with questions or contact us using [facility].    Sincerely,        Electronically signed by Mimi Fuentes MD

## 2021-06-20 NOTE — PROGRESS NOTES
"8-1-18 [facility] [phone]  DX: Type 2 diabetes  S: Patient was seen at our [facility] for the fitting of her for diabetic shoes and custom diabetic foot orthotics. O: I see the Rx from the MD for the Diabetic shoes and FOs. A: I have fit and supplied patient with her custom foot orthotic and diabetics shoes. I ground the custom diabetic foot orthotics to fit into her Dr. Benson shoes, size 10 1/2 M, Breeze Lt. Gold. Patient walked in her shoes with the diabetic custom foot orthotics and she stated that they felt great. The shoes and her inserts deemed to be a good fit. Her diabetic custom foot orthotics were fabricated out of blue trilam and cork FOs with 4mm wide met pads full length.  P: Patient was asked to call with any questions or concerns. G: The goal is to support and protect her diabetic feet.DX: Type 2 diabetes  S: Patient was seen at our [facility] with Rx to fill for diabetic shoes and diabetic foot orthotics. O: I see the Rx from the MD for the Diabetic shoes and FOs. A: Impressions were taken for blue trilam and cork FOs with 4mm wide met pads. Dr Benson \"Tevin\" in Lt. Gold were ordered. 10 1/5 M. P: We will see her when ready for delivery. G: The goal is to support and protect her diabetic feet.     This note has been electronically signed by PATITO Beckett     "

## 2021-06-20 NOTE — PROGRESS NOTES
[facility] Follow Up Note    Assessment/Plan:    1. Type 2 diabetes mellitus with hyperglycemia, with long-term current use of insulin  She did not bring data with her today.  Thought she was having a preop but this was counseled as her procedure was canceled.  Thanks fasting sugars are running around 180.  Was on a river cruise and drank cranberry vodkas, etc.  Recommendation: We will need titration of both short acting and long-acting insulin given glycohemoglobin of 10.3.  Have encouraged to return to regular exercise as she is working with [facility] and back pain.  Will also increase naltrexone to 25 mg late afternoon and 1 tablet at bedtime.  We will see if this helps with late afternoon cravings.  Will refer to Pharm.D. for titration of insulin.    - Glycosylated Hemoglobin A1c  - Ambulatory referral to Bariatric Care: Surgical and Non-Surgical    2. Essential hypertension  At goal    3. Coronary artery disease due to lipid rich plaque  Currently at goal with regard to blood pressure and statin use.    4. Morbid obesity (H)  As above, will increase naltrexone.  Would like her to meet with bariatric care to discuss potential options both surgical and nonsurgical  - naltrexone (DEPADE) 50 mg tablet; Half tab at 3pm and one tab hs  Dispense: 45 tablet; Refill: 2  - Ambulatory referral to Bariatric Care: Surgical and Non-Surgical    5. Other skin changes  Skin tags  - Ambulatory referral to Dermatology      Follow-up in 2-3 months    Mimi Fuentes MD    Chief Complaint:  Chief Complaint   Patient presents with     Follow-up       History of Present Illness:  Liana is a 63 y.o. female who was originally scheduled for a preoperative examination today.  Of note, she is working with [facility] with regard to facet arthropathy and chronic back pain.  Of note, she was originally going to have a radiofrequency ablation, however this procedure was canceled.   Therefore, she is just here for a follow-up.    She states that she has been working hard at losing weight.  However, over the weekend, she went on a river cruise.  She drank cranberry vodkas and had unlimited food items.  She is shocked to see that her weight is up about 5 or 6 pounds.  She states that she has been trying to be compliant with food and diabetic eating.  However, she continues to have cravings in the late afternoon into the evening.  She describes herself as a night eater.  Her insulin is reviewed with her and she maintains compliance.    She denies polyuria, polydipsia.  Of note, she is not getting to the gym much because of the worsening of her back pain.    Review of Systems:  A comprehensive review of systems was performed and was otherwise negative    PFSH:  Social History: She is in a common-law relationship.  She lives with her partner and son  History   Smoking Status     Former Smoker     Packs/day: 0.25     Years: 40.00     Types: Cigarettes     Quit date: 1/24/2016   Smokeless Tobacco     Never Used       Past History: Significant for coronary artery disease, hypertension, hyperlipidemia, poorly controlled type 2 diabetes  Current Outpatient Prescriptions   Medication Sig Dispense Refill     acetaminophen (TYLENOL) 500 MG tablet Take by mouth every 6 (six) hours as needed for pain.       aspirin 81 MG EC tablet Take 81 mg by mouth daily.       blood glucose test (CONTOUR NEXT STRIPS) strips Use 1 each As Directed 4 (four) times a day. 300 strip 3     cholecalciferol, vitamin D3, (CHOLECALCIFEROL) 1,000 unit tablet Take 1,000 Units by mouth daily.       gabapentin (NEURONTIN) 300 MG capsule Take 1 capsule (300 mg total) by mouth 3 (three) times a day. 90 capsule 9     generic lancets (MICROLET LANCET) Use 1 each As Directed 4 (four) times a day. 300 each 3     hydroCHLOROthiazide (MICROZIDE) 12.5 mg capsule Take 1 capsule (12.5 mg total) by mouth daily. 90 capsule 3      "HYDROcodone-acetaminophen 5-325 mg per tablet Take 1 tablet by mouth 2 (two) times a day as needed for pain. 30 tablet 0     insulin aspart U-100 (NOVOLOG FLEXPEN U-100 INSULIN) 100 unit/mL injection pen 44 units before each meal.  With largest meal 46 units 45 mL 0     insulin degludec (TRESIBA FLEXTOUCH U-200) 200 unit/mL (3 mL) InPn Inject 132 Units under the skin daily. 18 mL 11     lisinopril (PRINIVIL,ZESTRIL) 5 MG tablet Take 1.5 tablets (7.5 mg total) by mouth daily. 135 tablet 3     melatonin (MELATIN) 3 mg Tab tablet Take 3 mg by mouth at bedtime as needed.       naltrexone (DEPADE) 50 mg tablet Half tab at 3pm and one tab hs 45 tablet 2     nitroglycerin (NITROSTAT) 0.4 MG SL tablet Place 1 tablet (0.4 mg total) under the tongue every 5 (five) minutes as needed for chest pain. 25 tablet 3     omega-3 acid ethyl esters (LOVAZA) 1 gram capsule TAKE 2 CAPSULES BY MOUTH TWICE DAILY 360 capsule 3     omeprazole (PRILOSEC) 40 MG capsule TAKE 1 CAPSULE BY MOUTH EVERY DAY 90 capsule 1     pen needle, diabetic (BD ULTRA-FINE ZULMA PEN NEEDLES) 32 gauge x 5/32\" Ndle Inject 4 each under the skin daily. With Tresiba and Novolog 120 each 3     rosuvastatin (CRESTOR) 10 MG tablet TAKE 1 TABLET BY MOUTH DAILY 90 tablet 3     mupirocin (BACTROBAN) 2 % ointment Apply to affected area 3 times daily 22 g 0     No current facility-administered medications for this visit.        Family History: Hypertension, diabetes, chronic kidney disease,: Cancer    Physical Exam:  General Appearance:   She is neatly groomed and appears well.  Vitals:    09/04/18 1336   BP: 118/62   Patient Site: Left Arm   Patient Position: Sitting   Cuff Size: Adult Large   Pulse: 79   SpO2: 96%   Weight: (!) 276 lb 11.2 oz (125.5 kg)   Height: 5' 5.59\" (1.666 m)     Wt Readings from Last 3 Encounters:   09/04/18 (!) 276 lb 11.2 oz (125.5 kg)   07/26/18 (!) 278 lb 6.4 oz (126.3 kg)   07/25/18 (!) 278 lb (126.1 kg)     Body mass index is 45.22 " kg/(m^2).        Data Review:    Analysis and Summary of Old Records (2): Reviewed spine records    Records Requested (1): 0      Other History Summarized (from other people in the room) (2): 0    Radiology Tests Summarized (XRAY/CT/MRI/DXA) (1): 0    Labs Reviewed (1): Ordered    Medicine Tests Reviewed (EKG/ECHO/COLONOSCOPY/EGD) (1): 0    Independent Review of EKG or X-RAY (2): 0

## 2021-06-20 NOTE — LETTER
Letter by Mimi Fuentes MD at      Author: Mimi Fuentes MD Service: -- Author Type: --    Filed:  Encounter Date: 9/14/2020 Status: (Other)         Liana Briceño  [address]  [place] [address]             September 14, 2020         Dear Ms. Briceño,    Below are the results from your recent visit:    Resulted Orders   US Pelvis With Transvaginal Non OB    Narrative    EXAM: US PELVIS WITH TRANSVAGINAL NON OB  LOCATION: [facility]  DATE/TIME: 9/13/2020 10:06 AM    INDICATION: Abnormal uterine and vaginal bleeding, unspecified  COMPARISON: CT 04/28/2019  TECHNIQUE: Transabdominal scans were performed. Endovaginal ultrasound was performed to better visualize the adnexa.    FINDINGS:    UTERUS: 6.9 x 4.2 x 2.8 cm. Heterogeneous echotexture without mass.    ENDOMETRIUM: 4 mm. This is within normal postmenopausal.    RIGHT OVARY: 2.2 x 1.4 x 1.2 cm. Normal with flow demonstrated.    LEFT OVARY: Obscured by gas.    No significant free fluid.      Impression    1.  Normal thickness endometrium.  2.  Normal-appearing right ovary.  3.  Left ovary not visualized.           This is normal!    Please call with questions or contact us using [email or web address].    Sincerely,        Electronically signed by Mimi Fuentes MD

## 2021-06-20 NOTE — LETTER
Letter by Megan Chamorro CHW at      Author: Megan Chamorro CHW Service: -- Author Type: --    Filed:  Encounter Date: 8/7/2020 Status: (Other)       August 10, 2020      Dear Liana,                                                                         You were recently referred to the [facility]'s Clinic Care Coordination service.  This is a service offered through your Primary Care Clinic which can help you access resources, services in regard to your health and well-being goals. The clinic Community Health Worker has placed two calls to you to discuss the nature of this service and to offer enrollment.  If you are interested in learning more about Clinic Care Coordination, please call your Primary Care Clinic's Community Health Worker, Lili, at [phone].                                                    Sincerely,                                                                              AJ Pearson                                                                                          Clinic Care Coordination                                                  [facility]

## 2021-06-20 NOTE — PROGRESS NOTES
September 13, 2018    [facility].  [facility]   [address] [place] [address]  [address]    Patient: Liana Briceño   MR Number: [id number]   YOB: 1955   Date of Visit: 9/13/2018       Dear Dr. David Razo, DO:    As you may recall, we have been seeing Liana Briceño for Physical Therapy of Low Back Pain.    For therapy to continue, Medicare and/or Medicaid requires periodic physician review of the treatment plan. Please review the attached summary of the patient's progress and our plan for continued therapy, and verify  that you agree therapy should continue by signing this document and sending it back to our office.    Plan of Care  Authorization / Certification Start Date: 08/31/18  Authorization / Certification End Date: 11/15/18  Authorization / Certification Number of Visits: 12  Communication with: Referral Source  Patient Related Instruction: Nature of Condition;Treatment plan and rationale;Self Care instruction;Basis of treatment;Body mechanics;Posture;Precautions;Next steps;Expected outcome  Times per Week: 1  Number of Weeks: 12  Number of Visits: 12  Discharge Planning: INdependent HEP and self-management of symptoms  Precautions / Restrictions : Right BAR s/p 0511-4577, DM2, Morbid Obesity, HX: MRSA  Therapeutic Exercise: ROM;Strengthening;Stretching  Neuromuscular Reeducation: kinesio tape;core  Manual Therapy: soft tissue mobilization;myofascial release  Modalities: ultrasound;traction;electrical stimulation    Goals  Pt. will demonstrate/verbalize independence in self-management of condition in : Partially Met  Pt. will be independent with home exercise program in : Partially met  Pt. will have improved quality of sleep: Partially met  Pt. will be able to walk : Partially met  Patient will stand : Partially met  Patient will ascend / descend: stairs;with less pain;with less difficulty;in 6 weeks  Patient will transfer: sit/stand;for car;with less pain;with less difficulty;in  6 weeks  Patient will increase : LEFS score;by _ points;for improved quality of function;for improved quality of life;in 6 weeks  by ___ points: 9          If you have any questions or concerns, please don't hesitate to call.    Sincerely,      April Newell, PT      [facility]  [address] [place]  [address]  Phone:  [phone]  Fax:  [phone]        For Medicare/MA patients:      Physician recommendation:     ___ Follow therapist's recommendation        ___ Modify therapy      I certify the need for these services furnished within this plan and while under my care.    Referring Provider's  Printed Name:   _____________________________________________    Referring Provider's signature:  __________________________________________________  Date/time:    ________________        After signing this form, please return to the fax number.  Thank you.          [facility] Daily Progress for LBP and right knee    Patient Name: Liana Briceño  Date: 9/13/2018  Visit #: 12/12 (low back) ; right knee4/6; New orders for back 2/12; Re-cert 8/31/2018-11/15/2018 and G codes  PTA visit #:  6  PRECAUTIONS/RESTRICTIONS:  Right BAR s/p 4571-2589, DM2, Morbid Obesity, HX: MRSA  Referral Diagnosis: lumbalgia, lumbar radiculitis , myofascial pain; Right Knee Pain  Referring provider: Jaz Matute PA-C (Back); Mimi Fuentes MD (right knee pain);David Razo DO  Visit Diagnosis:     ICD-10-CM    1. Lumbar radiculopathy M54.16    2. Myofascial pain M79.1    3. Chronic back pain, unspecified back location, unspecified back pain laterality M54.9     G89.29    4. Decreased ROM of lumbar spine M25.60    5. Muscle weakness (generalized) M62.81    6. Poor posture R29.3          Assessment:     Partial compliance with HEP for LBP  Reports decreased knee pain 0/10 today but regression in LBP since 8/29/2018 with attempt at Medial Branch Block/Radiofrequency.  Patient is not able to stand and  iron with as much comfort as prior to Medial Branch block.  Pt would benefit with continuing skilled PT visits for LBP  US/MT has been helpful     Goal Status: progressing towards but recently has regressed with more pain.  Pt. will demonstrate/verbalize independence in self-management of condition in : Partially Met  Pt. will be independent with home exercise program in : Partially met  Pt. will have improved quality of sleep: Partially met  Pt. will be able to walk : Partially met  Patient will stand : Partially met  Patient will ascend / descend: stairs;with less pain;with less difficulty;in 6 weeks  Patient will transfer: sit/stand;for car;with less pain;with less difficulty;in 6 weeks  Patient will increase : LEFS score;by _ points;for improved quality of function;for improved quality of life;in 6 weeks  by ___ points: 9    Plan / Patient Education:     Plan to see up ro 10 more visits for LBP.  No further treatment for knee pain due to no c/o pain the past month.   Complete ERIS/LEFS prior to  next visit.  New order 8/16/2018 to continue PT for LBPx12 visits.  Will have patient schedule 2x/week for 4 more visits then reassess needs/progress.  Continue with US/MT and core exercises. Try bridge for core.   [facility] appointment with Cecy on 9/19/2018.    Subjective:     Pain Rating: LBP 6-7 /10; denies knee pain today.   Had medial branch block but unable to have the radiofrequency due to increasing pain after one hour into procedure.  Mostly compliant with 4 of the exercises, especially performing bed exercises before getting up but they are helpful especially the hooklying trunk rotation and less with single knee to chest.  Considering bariatric surgery in efforts to decrease stress on joints.  LBP is sharp, dull, heavy.  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  Pain noted with rolling over in bed.and when just lying in bed.  Pain localized to right lateral trunk above iliac crest,  especially in lower posterolateral ribs.  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     Objective:      Pt arrived 8 mins late  Palpation:  Tender right mid to upper lumbar paraspinals> to PSIS/QL region.    Reported decreased pain following treatment and feels pillow under left lateral trunk at waist seems helpful.      Treatment Today   9/13/2018    TREATMENT MINUTES COMMENTS   Evaluation  Knee   Self-care/ Home management 10 Discussed status since last visit on 8/24/2018   Manual therapy 15 STM right lumbar at left SL   Neuromuscular Re-education     Therapeutic Activity     Therapeutic Exercises  See flow sheet; reissued new exercise sheets per patient request.   Gait training     Modality_____US sidelying on left  To right QL and lumbar paraspinals____________ 10-+3mins set-up US to right mid-back 1MHZ 100% at 1.5wts/cm2              Total 38    Blank areas are intentional and mean the treatment did not include these items.     April Newell  9/13/2018

## 2021-06-20 NOTE — PROGRESS NOTES
MTM Follow Up Encounter  Assessment & Plan                                                     1. Type 2 diabetes mellitus with other specified complication, with long-term current use of insulin (H)  Significant hyperglycemia due to significant insulin resistance, dietary indiscretion, and recent steroid injection.  Verbally yesterday being increased her basal insulin to 150 units daily, and her bolus insulin will be 50 units in addition to resistance sliding scale.  She demonstrated understanding through practice calculations how to add a sliding scale insulin into her bolus insulin with meals.  She will call me on Monday to follow-up on how this is going.  Sliding scale insulin will allow for more specificity in case her dietary choices improve, as well as the addition of exercise.  She will be following up with bariatrics on Thursday as well.  As previously discussed a few days ago, we are looking into coverage for a once weekly GLP-1 agonist, to improve sugars and decrease food cravings.  -Educate how to calculate and administer sliding scale insulin  -IncreaseTresiba 150mg daily   -Increase novolog 50 units three times a day with meals + resistant sliding scale      Follow Up  Return in about 3 days (around 10/1/2018) for By phone.    Subjective & Objective                                                       Liana Briceño is a 63 y.o. female coming in for a follow up visit for Medication Therapy Management. She was referred to me from Mimi Fuentes MD    Chief Complaint: Hyperglycemia    Medication Adherence/Access: Significant history of nonadherence/difficulties with consistent motivation    Diabetes: She has not had to take any pain meds, since her back injection earlier this week. She is feeling significantly better, however her sugars have been very high.  She called into nurse triage yesterday due to significant hyperglycemia.  She is here today to discuss increasing her insulin, due to her  steroid injection.  Fastin's, and higher after food    PMH: reviewed in EPIC   Allergies/ADRs: reviewed in EPIC   Alcohol: reviewed in EPIC   Tobacco:   History   Smoking Status     Former Smoker     Packs/day: 0.25     Years: 40.00     Types: Cigarettes     Quit date: 2016   Smokeless Tobacco     Never Used     Today's Vitals:   Vitals:    18 1610   BP: 118/62   Pulse: 79   Weight: (!) 276 lb (125.2 kg)     ----------------    Much or all of the text in this note was generated through the use of Dragon Dictate voice-to-text software. Errors in spelling or words which seem out of context are unintentional. Sound alike errors, in particular, may have escaped editing.    The patient declined an after visit summary    I spent 30 minutes with this patient today;   All changes were made via collaborative practice agreement with Mimi Fuentes MD. A copy of the visit note was provided to the patient's provider.     Jose Raul Coy, PharmD, BCACP  Medication Management (MTM) Pharmacist  [facility]         Current Outpatient Prescriptions   Medication Sig Dispense Refill     acetaminophen (TYLENOL) 500 MG tablet Take by mouth every 6 (six) hours as needed for pain.       aspirin 81 MG EC tablet Take 81 mg by mouth daily.       blood glucose test (CONTOUR NEXT STRIPS) strips Use 1 each As Directed 4 (four) times a day. 300 strip 3     cholecalciferol, vitamin D3, (CHOLECALCIFEROL) 1,000 unit tablet Take 1,000 Units by mouth daily.       gabapentin (NEURONTIN) 300 MG capsule Take 1 capsule (300 mg total) by mouth 3 (three) times a day. 90 capsule 9     generic lancets (MICROLET LANCET) Use 1 each As Directed 4 (four) times a day. 300 each 3     hydroCHLOROthiazide (MICROZIDE) 12.5 mg capsule Take 1 capsule (12.5 mg total) by mouth daily. 90 capsule 3     HYDROcodone-acetaminophen 5-325 mg per tablet Take 1 tablet by mouth 2 (two) times a day as needed for pain. 30 tablet 0     insulin aspart  "U-100 (NOVOLOG FLEXPEN U-100 INSULIN) 100 unit/mL injection pen Inject 50 Units under the skin 3 (three) times a day before meals. 45 mL 0     insulin degludec (TRESIBA FLEXTOUCH U-200) 200 unit/mL (3 mL) InPn Inject 150 Units under the skin daily. 18 mL 11     lisinopril (PRINIVIL,ZESTRIL) 5 MG tablet Take 1.5 tablets (7.5 mg total) by mouth daily. 135 tablet 3     melatonin (MELATIN) 3 mg Tab tablet Take 3 mg by mouth at bedtime as needed.       nitroglycerin (NITROSTAT) 0.4 MG SL tablet Place 1 tablet (0.4 mg total) under the tongue every 5 (five) minutes as needed for chest pain. 25 tablet 3     omega-3 acid ethyl esters (LOVAZA) 1 gram capsule TAKE 2 CAPSULES BY MOUTH TWICE DAILY 360 capsule 1     omeprazole (PRILOSEC) 40 MG capsule TAKE 1 CAPSULE BY MOUTH EVERY DAY 90 capsule 1     pen needle, diabetic (BD ULTRA-FINE ZULMA PEN NEEDLES) 32 gauge x 5/32\" Ndle Inject 4 each under the skin daily. With Tresiba and Novolog 120 each 3     rosuvastatin (CRESTOR) 10 MG tablet TAKE 1 TABLET BY MOUTH DAILY 90 tablet 2     semaglutide (OZEMPIC) 0.25 mg or 0.5 mg(2 mg/1.5 mL) PnIj Inject 0.25 mg under the skin every 7 days. For four weeks, then increase to 0.5mg weekly for four weeks. 1.5 mL 1     No current facility-administered medications for this visit.                              "

## 2021-06-20 NOTE — PROGRESS NOTES
MTM Follow Up Encounter  Assessment & Plan                                                     1. Type 2 diabetes mellitus with hyperglycemia, with long-term current use of insulin (H)  Sugars improved with addition of GLP1 agonist, decreasing intensity of sliding scale. Post prandial sugars are at goal <180, however fasting sugars continue to be elevated, largely due to diet. Recommend to continue current regimen and titrate insulin down as able, will likely be able to titrate down further with increased doses of GLP1 agonist. Close follow up, focus on weight loss as she works with bariatrics.     2. Essential hypertension  At goal blood pressure less than 140/90 per JNC 8, recommend continue current regimen.    3. Dyslipidemia  At goal LDL less than 70 per ACC/AHA lipid guidelines.  Would likely  benefit from increased dose of fish oil to 2 capsules twice daily to improve triglycerides, this is also likely a function of her diet and lack of activity with elevated blood sugars.  -Educated to increase dose of fish oil to 2 capsules twice daily    4. Low back pain, unspecified back pain laterality, unspecified chronicity, with sciatica presence unspecified  Encouraged her to continue to work with PT, will discuss vicodin refill with PCP as needed for severe pain.   -refill vicodin     Follow Up  Return in about 2 weeks (around 10/26/2018) for for diabetes and weight loss. .    Subjective & Objective                                                       Liana Briceño is a 63 y.o. female coming in for a follow up visit for Medication Therapy Management. She was referred to me from Mimi Fuentes MD    Chief Complaint: follow up      Medication Adherence/Access: stable.     Diabetes: post prandials in the 140-150's on intermediate sliding scale. She has been working with the bariatrics team and has many appointments coming up. She has noticed that she is getting a stomach ache the morning after injecting. She is  down 3lbs since starting ozempic.   Fastin, 176, 156  Evenin, 208, 139  Lab Results   Component Value Date    HGBA1C 10.3 (H) 2018     Hypertension: Blood pressure elevated at weight loss clinic, pain has been worse and she she was anxious about this. Pressure normalized today.     Hyperlipidemia: identified that she is only taking 2 capsules of fish oil daily, ordered as four caps daily.   Lab Results   Component Value Date    LDLCALC 51 2018     Back pain: has been working with physical therapy. Feels that her back injection only worked for about one week. She was very hopeful as the first week worked really well. She finds that PT is helping her more than the injection. She did not like gabapentin because it affected her memory. She weaned herself off of this.     PMH: reviewed in EPIC   Allergies/ADRs: reviewed in EPIC   Alcohol: reviewed in EPIC   Tobacco:   History   Smoking Status     Former Smoker     Packs/day: 0.25     Years: 44.00     Types: Cigarettes     Quit date: 2016   Smokeless Tobacco     Never Used     Comment: age 18 to age 62 up to 1/2 ppd     Today's Vitals:   Vitals:    10/14/18 1453   Weight: (!) 273 lb (123.8 kg)     ----------------    Much or all of the text in this note was generated through the use of Dragon Dictate voice-to-text software. Errors in spelling or words which seem out of context are unintentional. Sound alike errors, in particular, may have escaped editing.    The patient was given a summary of these recommendations as an after visit summary    I spent 30 minutes with this patient today;   All changes were made via collaborative practice agreement with Mimi Fuentes MD. A copy of the visit note was provided to the patient's provider.     Jose Raul Coy, PharmD, BCACP  Medication Management (MTM) Pharmacist  [facility]         Current Outpatient Prescriptions   Medication Sig Dispense Refill     acetaminophen (TYLENOL) 500 MG  "tablet Take 1,000 mg by mouth 3 (three) times a day as needed.       aspirin 81 MG EC tablet Take 81 mg by mouth daily.       blood glucose test strips Use 1 each As Directed 3 (three) times a day. Dispense brand per patient's insurance at pharmacy discretion. (E11.9) 300 strip 1     cholecalciferol, vitamin D3, (CHOLECALCIFEROL) 1,000 unit tablet Take 1,000 Units by mouth daily.       generic lancets (MICROLET LANCET) Use 1 each As Directed 4 (four) times a day. 300 each 3     hydroCHLOROthiazide (MICROZIDE) 12.5 mg capsule Take 1 capsule (12.5 mg total) by mouth daily. 90 capsule 3     insulin aspart U-100 (NOVOLOG FLEXPEN U-100 INSULIN) 100 unit/mL injection pen Inject 50 Units under the skin 3 (three) times a day before meals. 45 mL 0     insulin degludec (TRESIBA FLEXTOUCH U-200) 200 unit/mL (3 mL) InPn Inject 150 Units under the skin daily. 18 mL 11     lisinopril (PRINIVIL,ZESTRIL) 5 MG tablet Take 1.5 tablets (7.5 mg total) by mouth daily. 135 tablet 3     melatonin (MELATIN) 3 mg Tab tablet Take 3 mg by mouth at bedtime as needed.       nitroglycerin (NITROSTAT) 0.4 MG SL tablet Place 1 tablet (0.4 mg total) under the tongue every 5 (five) minutes as needed for chest pain. 25 tablet 3     omega-3 acid ethyl esters (LOVAZA) 1 gram capsule TAKE 2 CAPSULES BY MOUTH TWICE DAILY 360 capsule 1     omeprazole (PRILOSEC) 40 MG capsule TAKE 1 CAPSULE BY MOUTH EVERY DAY 90 capsule 1     pen needle, diabetic (BD ULTRA-FINE ZULMA PEN NEEDLES) 32 gauge x 5/32\" Ndle Inject 4 each under the skin daily. With Tresiba and Novolog 120 each 3     rosuvastatin (CRESTOR) 10 MG tablet TAKE 1 TABLET BY MOUTH DAILY 90 tablet 2     semaglutide (OZEMPIC) 0.25 mg or 0.5 mg(2 mg/1.5 mL) PnIj Inject 0.25 mg under the skin every 7 days. For four weeks, then increase to 0.5mg weekly for four weeks. 1.5 mL 1     HYDROcodone-acetaminophen 5-325 mg per tablet Take 1 tablet by mouth 2 (two) times a day as needed for pain. 30 tablet 0     No " current facility-administered medications for this visit.

## 2021-06-20 NOTE — PROGRESS NOTES
"          [facility] Daily Progress for LBP and right knee    Patient Name: Liana Briceño  Date: 8/24/2018  Visit #: 12/12 (low back) ; right knee4/6; New orders for back 1/12; Re-cert 8/31/2018 and G codes  PTA visit #:  6  PRECAUTIONS/RESTRICTIONS:  Right BAR s/p [id number], DM2, Morbid Obesity, HX: MRSA  Referral Diagnosis: lumbalgia, lumbar radiculitis , myofascial pain; Right Knee Pain  Referring provider: Jaz Matute PA-C (Back); Mimi Fuentes MD (right knee pain)  Visit Diagnosis:     ICD-10-CM    1. Lumbar radiculopathy M54.16    2. Myofascial pain M79.1    3. Chronic back pain, unspecified back location, unspecified back pain laterality M54.9     G89.29    4. Decreased ROM of lumbar spine M25.60    5. Muscle weakness (generalized) M62.81    6. Poor posture R29.3          Assessment:     Partial compliance with HEP for LBP  Reports decreased knee pain 0/10 today  More painful today requiring a lot of medication and not much benefit.  Pt would benefit with continuing skilled PT visits for LBP  US/MT has been helpful     Goal Status: progressing towards but recently has regressed with more pain.  Pt. will demonstrate/verbalize independence in self-management of condition in : Progressing toward  Pt. will be independent with home exercise program in : Progressing toward  Pt. will have improved quality of sleep: Progressing toward  Comment:: decrease interruptionsto 1-2x/night and improve tolerate SL 30 minutes and supine with less sharp \"taking breath away\" pain  Pt. will be able to walk : Progressing toward  Other Activity:: Discontinue  Patient will stand : Progressing toward  Other Time: 20 minutes  Patient will ascend / descend: stairs;with less pain;with less difficulty;in 6 weeks  Patient will transfer: sit/stand;for car;with less pain;with less difficulty;in 6 weeks  Patient will increase : LEFS score;by _ points;for improved quality of function;for improved quality of life;in 6 weeks  by " ___ points: 9  Patient will decrease : ERIS score;by _ points;for improved quality of function;for improved quality of life;in 6 weeks  by ___ points: 30    Plan / Patient Education:     Plan to see 1x/week for 2 more visits to progress strengthening exercises to right knee progressively adding, closed chain HS, wall slide, , bridge  Complete ERIS next visit.  New order 8/16/2018 to continue PT for LBPx12 visits.  Will have patient schedule 2x/week for 5 more visits then reassess needs/progress.  Continue with US/MT and core exercises.  She will be having Lumbar medial branch block for facet and radiofrequency for nerve on 8/29/2018.  Patient will inquired as to length of time to be quiet following procedures.    Subjective:     Pain Rating: LBP 8-9 /10; denies knee pain today.   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     Objective:      Pt arrived 4 mins late  Palpation:  Tender right mid to upper lumbar paraspinals> to PSIS.    Reported decreased pain following treatment to 5-6/10.  Reviewed exercises as in flow sheet and re-issued exercise sheets for all exercises.    Treatment Today   8/24/2018    TREATMENT MINUTES COMMENTS   Evaluation  Knee   Self-care/ Home management     Manual therapy 8 STM right lumbar at left SL   Neuromuscular Re-education     Therapeutic Activity     Therapeutic Exercises 20 See flow sheet; reissued new exercise sheets per patient request.   Gait training     Modality_____US sidelying on left  To right QL and lumbar paraspinals____________ 10-+3mins set-up US to right mid-back 1MHZ 100% at 1.5wts/cm2              Total 41    Blank areas are intentional and mean the treatment did not include these items.     April Newell  8/24/2018

## 2021-06-20 NOTE — PROGRESS NOTES
Attempt 1: Care Guide called patient.  If this patient is returning my call, please transfer to [facility] at ext [phone].

## 2021-06-20 NOTE — LETTER
Letter by Mimi Fuentes MD at      Author: Mimi Fuentes MD Service: -- Author Type: --    Filed:  Encounter Date: 12/17/2019 Status: Signed       Liana Briceño  [address] [place] [address]    December 17, 2019    Dear Liana    In reviewing your records, we have determined a gap in your preventive services. Based on your age and health history, we recommend the follow service.     ? General Physical  ? Physical with a Pap Smear  ? Colon cancer screening  ? Mammogram  ? Immunization  ? Diabetic check  ? Blood pressure/cardiovascular check  ? Asthma check  ? Cholesterol test  ? Lab work  ? Med check    If you have had the service elsewhere, please contact us so we can update our records. Please let us know if you have transferred your care to another clinic.    Please call [phone] to schedule this appointment.    We believe that a strong preventive care program, including regular physicals and follow-up care is an important part of a healthy lifestyle and we are committed to helping you maintain your health.    Thank you for choosing us as your health care provider.    Sincerely,   [facility]  [address] [facility] [address] [facility] [address]  Phone Number:  [phone]

## 2021-06-20 NOTE — PROGRESS NOTES
[facility] Daily Progress for LBP and right knee    Patient Name: Liana Briceño  Date: 9/18/2018  Visit #: 12/12 (low back) ; right knee4/6; New orders for back 3/12; Re-cert 8/31/2018-11/15/2018 and G codes  PTA visit #:  6 +1  PRECAUTIONS/RESTRICTIONS:  Right BAR s/p [id number], DM2, Morbid Obesity, HX: MRSA  Referral Diagnosis: lumbalgia, lumbar radiculitis , myofascial pain; Right Knee Pain  Referring provider: Jaz Matute PA-C (Back); Mimi Fuentes MD (right knee pain);David Razo DO  Visit Diagnosis:     ICD-10-CM    1. Lumbar radiculopathy M54.16    2. Myofascial pain M79.1    3. Chronic back pain, unspecified back location, unspecified back pain laterality M54.9     G89.29    4. Decreased ROM of lumbar spine M25.60    5. Muscle weakness (generalized) M62.81    6. Poor posture R29.3    7. Right knee pain, unspecified chronicity M25.561          Assessment:     Partial compliance with HEP for LBP  Reports decreased knee pain 0/10 today but regression in LBP since 8/29/2018 with attempt at Medial Branch Block/Radiofrequency.  Patient is not able to stand and iron with as much comfort as prior to Medial Branch block.  Pt would benefit with continuing skilled PT visits for LBP  US/MT has been helpful     Goal Status: progressing towards but recently has regressed with more pain.  Pt. will demonstrate/verbalize independence in self-management of condition in : Partially Met  Pt. will be independent with home exercise program in : Partially met  Pt. will have improved quality of sleep: Partially met  Pt. will be able to walk : Partially met  Patient will stand : Partially met  Patient will ascend / descend: stairs;with less pain;with less difficulty;in 6 weeks  Patient will transfer: sit/stand;for car;with less pain;with less difficulty;in 6 weeks  Patient will increase : LEFS score;by _ points;for improved quality of function;for improved quality of life;in 6 weeks  by ___ points:  9    Plan / Patient Education:     Plan to see up ro 9 more visits for LBP.     Complete ERIS/LEFS prior to  next visit.  .  Will have patient schedule 2x/week for 4 more visits then reassess needs/progress.  Continue with US/MT and core exercises.   [facility] appointment with Cecy on 9/19/2018.    Subjective:     Pain Rating: LBP 4 /10; denies knee pain today.   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     Objective:      Reviewed HEP added bridging, sidelying stretching arm over head  Palpation:  Tender right mid to upper lumbar paraspinals> to PSIS/QL region.    Reported decreased pain following treatment and feels pillow under left lateral trunk at waist seems helpful.      Treatment Today   9/18/2018    TREATMENT MINUTES COMMENTS   Evaluation  Knee   Self-care/ Home management  Discussed status since last visit on 8/24/2018   Manual therapy 15 STM right lumbar at left SL   Neuromuscular Re-education     Therapeutic Activity     Therapeutic Exercises 12 See flow sheet; reissued new exercise sheets per patient request/ reviewed TENS application/use.   Gait training     Modality_____US sidelying on left  To right QL and lumbar paraspinals____________ 10-+3mins set-up US to right mid-back 1MHZ 100% at 1.5wts/cm2              Total 40    Blank areas are intentional and mean the treatment did not include these items.     Aubree Zamarripa  9/18/2018

## 2021-06-20 NOTE — PROGRESS NOTES
[facility] Daily Progress for LBP and right knee    Patient Name: Liana Briceño  Date: 10/4/2018  Visit #: 12/12 (low back) ; right knee5/6; New orders for back 6/12; Re-cert 8/31/2018-11/15/2018 and G codes  PTA visit #:  6 +3  PRECAUTIONS/RESTRICTIONS:  Right BAR s/p [id number], DM2, Morbid Obesity, HX: MRSA  Referral Diagnosis: lumbalgia, lumbar radiculitis , myofascial pain; Right Knee Pain  Referring provider: Jaz Matute PA-C (Back); Mimi Fuentes MD (right knee pain);David Razo DO  Visit Diagnosis:     ICD-10-CM    1. Lumbar radiculopathy M54.16    2. Myofascial pain M79.18    3. Chronic back pain, unspecified back location, unspecified back pain laterality M54.9     G89.29    4. Decreased ROM of lumbar spine M53.86    5. Muscle weakness (generalized) M62.81    6. Poor posture R29.3    7. Right knee pain, unspecified chronicity M25.561          Assessment:     Compliance with HEP for LBP fair but was instructed to hold any exercise for three days following injection on 9/26/2018.  She hasn't been compliant since injection and weekend exacerbation.  Reports decreased knee pain 0/10 but increased back pain over the weekend, especially in left inferior iliac crest despite injections on 9/26/2018.  Pt would benefit with continuing skilled PT visits for LBP to advance core strength.  US/MT has been helpful.  Needs encouragement to be compliant with HEP.    Goal Status: progressing toward goals  Pt. will demonstrate/verbalize independence in self-management of condition in : Partially Met  Pt. will be independent with home exercise program in : Partially met  Pt. will have improved quality of sleep: Partially met  Pt. will be able to walk : Partially met  Patient will stand : Partially met  No Data Recorded    Plan / Patient Education:     Plan to see up ro 3 more visits for LBP then reassess needs/progress.  Continue with US/MT and core exercises toe dip from 90o, unilateal hip ER  "motion.    Subjective:     Pain Rating: LBP 6 /10 still low level; keft inferior iliac crest 4-5/10denies knee pain. Some knee pain at night.  Using TENS for pain management of knee.  Feels like pain is returning after injection on 9/26/2018 but pain is returning-patient frustrated  Pt states she had a terrible night sore today  Thinking her new bed is too soft  US very helpful with longer duration without needing to take pain med.  Able to get through more of her day with less pain  New bed using towel to support lumbar spine in neutral.  Reported relief with US and MT   Objective:       Reviewed HEP cues needed  Palpation:  Tender right upper lumbar paraspinals inferior iliac crest.  Prone lying for US   Added press-ups x5    OPT EXERCISES 10/2/2018   Comment #10 Bridging, reinstructed to perform segmentally, x10   Exercise #11 ABdominal set, 10x5\"-H   Comment #11 Heel slide with abdominal set, 5x each-H     Tolerated treatment well and reported less pain after treatment   Treatment Today   10/4/2018    TREATMENT MINUTES COMMENTS   Evaluation  Knee   Self-care/ Home management  Discussed status since last visit and response to injection.  Discussed reason to limit activity and avoid US/manual therapy to location of injections on right LB   Manual therapy 15 STM right lumbar at left SL   Neuromuscular Re-education     Therapeutic Activity     Therapeutic Exercises 15    Gait training     Modality_____US sidelying on left  To right QL and lumbar paraspinals____________ 13+3set up US to right mid-back 1MHZ 100% at 1.8wts/cm2              Total 46    Blank areas are intentional and mean the treatment did not include these items.     Aubree Zamarripa  10/4/2018    "

## 2021-06-20 NOTE — PROGRESS NOTES
I met with Liana this afternoon while she was in the clinic for her initial bariatric consult with Dr. Bonilla.  Liana has Medicare for her insurance and has no required SWL.  She does understand that she will need to be cleared by our psych and nutrition teams before having her surgical consutl.  She is very motivated to move forward.  She is making her appointments for her mammo and pap, and contacting her cardiologist Dr. Walton to get her clearance from him  She plans to attend the support group meeting next month,.  She is having her labs drawn today

## 2021-06-20 NOTE — PROGRESS NOTES
"[facility] Daily Progress for LBP and right knee    Patient Name: Liana Briceño  Date: 9/28/2018  Visit #: 12/12 (low back) ; right knee4/6; New orders for back 4/12; Re-cert 8/31/2018-11/15/2018 and G codes  PTA visit #:  6 +1  PRECAUTIONS/RESTRICTIONS:  Right BAR s/p [id number], DM2, Morbid Obesity, HX: MRSA  Referral Diagnosis: lumbalgia, lumbar radiculitis , myofascial pain; Right Knee Pain  Referring provider: Jaz Matute PA-C (Back); Mimi Fuentes MD (right knee pain);David Razo DO  Visit Diagnosis:     ICD-10-CM    1. Lumbar radiculopathy M54.16    2. Myofascial pain M79.1    3. Chronic back pain, unspecified back location, unspecified back pain laterality M54.9     G89.29    4. Decreased ROM of lumbar spine M25.60    5. Muscle weakness (generalized) M62.81    6. Poor posture R29.3          Assessment:     Compliance with HEP for LBP but was instructed to hold any exercise for three days following injection on 9/26/2018  Reports decreased knee pain 0/10 and significant decrease in back pain with injections on 9/26/2018.  Using less pain medication since injection but pain seems to have settled in right inferior iliac crest \"hip\"  Pt would benefit with continuing skilled PT visits for LBP to advance core strength.  US/MT has been helpful     Goal Status: progressing toward goals  Pt. will demonstrate/verbalize independence in self-management of condition in : Partially Met  Pt. will be independent with home exercise program in : Partially met  Pt. will have improved quality of sleep: Partially met  Pt. will be able to walk : Partially met  Patient will stand : Partially met  Patient will ascend / descend: stairs;with less pain;with less difficulty;in 6 weeks  Patient will transfer: sit/stand;for car;with less pain;with less difficulty;in 6 weeks  Patient will increase : LEFS score;by _ points;for improved quality of function;for improved quality of life;in 6 weeks  by ___ points: 9    Plan / " "Patient Education:     Plan to see up ro 8 more visits for LBP.     Will have patient schedule 2x/week for 3 more visits then reassess needs/progress.  Continue with US/MT and core exercises toe dip from 90o, unilateal hip ER motion..     Subjective:     Pain Rating: LBP 3-4 /10 still low level; denies knee pain. Some knee pain at night.  Had cortisone injection on 9/26/2018 with 90-95% improvement but settling mild pain into right hip.  Also increased blood sugars noted since injection.  No vicaden or Tylenol since before injection.  New bed is helpful.  US very helpful with longer duration without needing to take pain med.  Able to get through more of her day with less pain.  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     Objective:       Patient arriDiscussed status since last visit and response to injection.  Discussed reason to limit activity and avoid US/manual therapy to location of injections on right LBved 15 minutes late then, she later relayed she had an injection on 9/26 and was suppose to limit physical activity for 3 days, until 9/29.   Palpation:  Tender right inferior iliac crest.    Treatment Today   9/28/2018    TREATMENT MINUTES COMMENTS   Evaluation  Knee   Self-care/ Home management 15 Discussed status since last visit and response to injection.  Discussed reason to limit activity and avoid US/manual therapy to location of injections on right LB   Manual therapy  STM right lumbar at left SL   Neuromuscular Re-education     Therapeutic Activity     Therapeutic Exercises  See flow sheet; reissued new exercise sheets per patient request/ reviewed TENS application/use.   Gait training     Modality_____US sidelying on left  " To right QL and lumbar paraspinals____________  US to right mid-back 1MHZ 100% at 1.5wts/cm2              Total 15    Blank areas are intentional and mean the treatment did not include these items.     pAril Newell  9/28/2018

## 2021-06-20 NOTE — PROGRESS NOTES
Attempt 2: Care Guide called patient.  If this patient is returning my call, please transfer to [facility] at ext [phone].    Left message regarding seeing MTM as well.

## 2021-06-20 NOTE — PROGRESS NOTES
Dear Dr. Mimi Fuentes MD  [address] [place] [address],    Thank you for the opportunity to participate in the care of Liana Briceño.     She is a 63 y.o. y/o female patient who comes to the sleep medicine clinic for a CPAP follow up visit.    She was diagnosed with mild obstructive sleep apnea 7 years ago (AHI= 14). We restarted CPAP therapy with P= 6 cwp after her last visit.    She has been trying to using her machine intermittently but she admits that her usage is not optimal because she had other health problems. The few days that she has used her device, she felt refreshed.     30-Days Efficacy and Compliance Data  Residual AHI: 1.4  Leak: 5 L/min  Average usage per night: 2.1 hours        Past Medical History:   Diagnosis Date     Anemia      Breast cyst      Carpal tunnel syndrome      Coronary artery disease due to lipid rich plaque      Dyslipidemia, goal LDL below 70      Dysphagia      Esophageal reflux      Essential hypertension      Goiter diffuse, nontoxic      Hiatal hernia      Morbid obesity (H)      RADHA (obstructive sleep apnea)      Paroxysmal SVT (supraventricular tachycardia) (H)     S/P ablation     Type 2 diabetes mellitus (H)        Past Surgical History:   Procedure Laterality Date     BREAST BIOPSY       BREAST CYST EXCISION       CARDIAC CATHETERIZATION N/A 4/24/2017    Procedure: Coronary Angiogram;  Surgeon: Ariane Biswas MD;  Location: [facility];  Service:      CARDIAC CATHETERIZATION N/A 5/19/2017    Procedure: Coronary Angiogram;  Surgeon: Howard Gutierrez MD;  Location: [facility];  Service:      CHOLECYSTECTOMY  2000     CORONARY STENT PLACEMENT  04/24/2017    BO to RCA by Dr. Biswas     HI EXPLO/DRAIN BREAST ABSCESS      Description: Breast Surgery Mastotomy With Drainage Of Abscess     HI L HRT CATH W/NJX L VENTRICULOGRAPHY IMG S&I N/A 4/24/2017    Procedure: Left Heart Catheterization with Left Ventriculogram;  Surgeon: Ariane  MD True;  Location: [facility] Lab;  Service: Cardiology     MI REVISE MEDIAN N/CARPAL TUNNEL SURG      Description: Neuroplasty Decompression Median Nerve At Carpal Tunnel;  Recorded: 05/22/2009;     MI THYROID LOBECTOMY,UNILAT         Social History     Social History     Marital status: Domestic Partner     Spouse name: Panchito Mclean     Number of children: 1     Years of education: N/A     Occupational History     no longer there as they closed the center [employer]     PCA      four hours a day     Social History Main Topics     Smoking status: Former Smoker     Packs/day: 0.25     Years: 40.00     Types: Cigarettes     Quit date: 1/24/2016     Smokeless tobacco: Never Used     Alcohol use 0.0 oz/week     0 Standard drinks or equivalent per week      Comment: < 1 drink a week     Drug use: No     Sexual activity: Not on file     Other Topics Concern     Not on file     Social History Narrative    Single but lives with her significant other, has one son, she is a .  She is a PCA.        Review of Systems:  General: No weight gain, no weight loss  Eyes: No vision changes  ENT: No hearing changes  Cardio: No chest pain, no nocturnal dyspnea  Respiratory: No shortness of breath, no cough  Gastrointestinal: No diarrhea, no constipation  Genitourinary: No excessive urination  Tegumentary: No rashes  Neurological: No seizures, no loss of consciousness  Endo: No heat or cold intolerance.    Current Outpatient Prescriptions   Medication Sig Dispense Refill     acetaminophen (TYLENOL) 500 MG tablet Take by mouth every 6 (six) hours as needed for pain.       aspirin 81 MG EC tablet Take 81 mg by mouth daily.       blood glucose test (CONTOUR NEXT STRIPS) strips Use 1 each As Directed 4 (four) times a day. 300 strip 3     cholecalciferol, vitamin D3, (CHOLECALCIFEROL) 1,000 unit tablet Take 1,000 Units by mouth daily.       gabapentin (NEURONTIN) 300 MG capsule Take 1 capsule (300 mg total)  "by mouth 3 (three) times a day. 90 capsule 9     generic lancets (MICROLET LANCET) Use 1 each As Directed 4 (four) times a day. 300 each 3     hydroCHLOROthiazide (MICROZIDE) 12.5 mg capsule Take 1 capsule (12.5 mg total) by mouth daily. 90 capsule 3     HYDROcodone-acetaminophen 5-325 mg per tablet Take 1 tablet by mouth 2 (two) times a day as needed for pain. 30 tablet 0     insulin aspart U-100 (NOVOLOG FLEXPEN U-100 INSULIN) 100 unit/mL injection pen Inject 50 Units under the skin 3 (three) times a day before meals. 45 mL 0     insulin degludec (TRESIBA FLEXTOUCH U-200) 200 unit/mL (3 mL) InPn Inject 150 Units under the skin daily. 18 mL 11     lisinopril (PRINIVIL,ZESTRIL) 5 MG tablet Take 1.5 tablets (7.5 mg total) by mouth daily. 135 tablet 3     melatonin (MELATIN) 3 mg Tab tablet Take 3 mg by mouth at bedtime as needed.       nitroglycerin (NITROSTAT) 0.4 MG SL tablet Place 1 tablet (0.4 mg total) under the tongue every 5 (five) minutes as needed for chest pain. 25 tablet 3     omega-3 acid ethyl esters (LOVAZA) 1 gram capsule TAKE 2 CAPSULES BY MOUTH TWICE DAILY 360 capsule 1     omeprazole (PRILOSEC) 40 MG capsule TAKE 1 CAPSULE BY MOUTH EVERY DAY 90 capsule 1     pen needle, diabetic (BD ULTRA-FINE ZULMA PEN NEEDLES) 32 gauge x 5/32\" Ndle Inject 4 each under the skin daily. With Tresiba and Novolog 120 each 3     rosuvastatin (CRESTOR) 10 MG tablet TAKE 1 TABLET BY MOUTH DAILY 90 tablet 2     semaglutide (OZEMPIC) 0.25 mg or 0.5 mg(2 mg/1.5 mL) PnIj Inject 0.25 mg under the skin every 7 days. For four weeks, then increase to 0.5mg weekly for four weeks. 1.5 mL 1     No current facility-administered medications for this visit.        Allergies   Allergen Reactions     Penicillins Other (See Comments)     Passed out.      Doxycycline Dizziness     Naloxone      itching     Pneumococcal 23-Valent Polysaccharide Vaccine Other (See Comments)     pneumonia     Statins-Hmg-Coa Reductase Inhibitors Myalgia     " "Shaking with simvastatin and atorvastatin     Azithromycin Rash       Physical Exam:  Pulse 75  Ht 5' 5.5\" (1.664 m)  Wt (!) 276 lb (125.2 kg)  SpO2 97%  BMI 45.23 kg/m2  BMI:Body mass index is 45.23 kg/(m^2).   GEN: NAD, obese  Neurological: Alert, oriented to time, place, and person.  Psych: normal mood, normal affect     Labs/Studies:     I reviewed the efficacy and compliance report from his device. Data summarized on the HPI.     Lab Results   Component Value Date    WBC 6.3 09/04/2018    HGB 11.9 (L) 09/04/2018    HCT 35.3 09/04/2018    MCV 87 09/04/2018     09/04/2018         Chemistry        Component Value Date/Time     09/04/2018 1324    K 4.7 09/04/2018 1324     09/04/2018 1324    CO2 26 09/04/2018 1324    BUN 16 09/04/2018 1324    CREATININE 1.00 09/04/2018 1324     (H) 09/04/2018 1324        Component Value Date/Time    CALCIUM 9.6 09/04/2018 1324    ALKPHOS 109 03/13/2018 0943    AST 25 03/13/2018 0943    ALT 27 03/13/2018 0943    BILITOT 0.5 03/13/2018 0943            Lab Results   Component Value Date    FERRITIN 326 (H) 03/08/2011        Assessment and Plan:  In summary Liana Briceño is a 63 y.o. year old female who is here for follow up.    1. Obstructive Sleep Apnea.  Residual AHI is excellent but her usage has been limited.  She feels that she can improve her compliance and just needs some time.  Her baseline test showed mild obstructive sleep apnea and while using her device is important, the CV associated with low compliance would not be extremely high.  Continue with P= 6 cwp.    We counseled the patient on the importannce of using her PAP device every night and the risks of not treating sleep apnea.      Patient verbalized understanding of these issues, agrees with the plan and all questions were answered today. Patient was given an opportuntity to voice any other symptoms or concerns not listed above. Patient did not have any other symptoms or concerns.    "   Patient told to return in 3 months.     I explained to the patient that I will be transitioning to a different job soon. I reassured her that this transition should not cause any significant disruptions to her care. I provided some information on the process and encouraged her to contact our main number if she has any questions or needs any help.    MD KARO Akers Board Certified in Internal Medicine and Sleep Medicine  [facility].

## 2021-06-20 NOTE — PROGRESS NOTES
Reviewed appts. Pt will be in for MTM visit tomorrow. I have had trouble reaching pt by phone. Sent pt Mood[employer] message asking if she can meeting after MTM visit. Will await response.

## 2021-06-20 NOTE — PROGRESS NOTES
MTM Follow Up Encounter  Assessment & Plan                                                     1. Type 2 diabetes mellitus with other specified complication, with long-term current use of insulin (H)  Continues to have worsening control, over the last several months she has not been exercising due to back pain which has historically had a large impact on her blood sugars.  Unfortunately diabetes diet continues to be very difficult for her, she has it large degree of insulin resistance.  She has a follow-up soon with the bariatric clinic, therefore I would recommend not increasing her insulin at this time, as it would will make it more difficult for her to lose weight.  We have set several parameters, she will not eat after 8 PM, she will start going back to the gym, focusing on a pool therapy due to back pain.  Discussed previous history of intolerance to GLP-1 agonists, however she may tolerate Ozempic.  This once weekly injection has the best data for weight loss and A1c lowering out of this class of agents.  Ideally we will be able to get this covered for her through her insurance, realistically it may require a prior authorization.  Provided education on how to administer the injection, and potential adverse effects.  - initiate semaglutide (OZEMPIC) 0.25 mg or 0.5 mg(2 mg/1.5 mL) PnIj; Inject 0.25 mg under the skin every 7 days. For four weeks, then increase to 0.5mg weekly for four weeks.  Dispense: 1.5 mL; Refill: 1  -Lifestyle modification education    2. Low back pain, unspecified back pain laterality, unspecified chronicity, with sciatica presence unspecified  Continues to suffer from a back pain which has limited her activity levels.  She does have a follow-up with [facility] next week for a cortisone injection which she hopes will help significantly.  Continues physical therapy.      Follow Up  Return in about 2 weeks (around 10/5/2018) for Medication Management Pharmacist.    Subjective &  This note has been dictated.   Objective                                                       Liana Briceño is a 63 y.o. female coming in for a follow up visit for Medication Therapy Management. She was referred to me from Mimi Fuentes MD. She is working part time now, as a PCA.     Chief Complaint: Follow up     Medication Adherence/Access: stable, taking medications however poor diabetes diet     Diabetes: Going to be following up with bariatrics in early October. She felt that naltrexone worked well however she had terrible itching of her hands and feet as well as horse voice. Once she stopped this, these symptoms resolved. If she can stop eating around 8pm, her morning sugars are significantly better. She is very proud of quitting smoking, however frustrated that she is now eating more and gained weight. She Is not sure why, but finds that over the last three months her appetite increased significantly. She does have a gym with a pool and whirlpool.   Fastings: 140's-200's.   Lab Results   Component Value Date    HGBA1C 10.3 (H) 09/04/2018    HGBA1C 9.6 (H) 06/15/2018    HGBA1C 7.7 (H) 12/27/2017     Lab Results   Component Value Date    MICROALBUR 3.04 (H) 06/18/2018    LDLCALC 51 03/13/2018    CREATININE 1.00 09/04/2018     Back pain: next week she will be getting a cortisone injection in her back, at [facility] [place]. She feels that PT is going well, finds this to be effective. She has been having sciatic pain down her right leg. A nerve block seemed to aggravate her symptoms.     PMH: reviewed in EPIC   Allergies/ADRs: reviewed in EPIC   Alcohol: reviewed in EPIC   Tobacco:   History   Smoking Status     Former Smoker     Packs/day: 0.25     Years: 40.00     Types: Cigarettes     Quit date: 1/24/2016   Smokeless Tobacco     Never Used     Today's Vitals:   Vitals:    09/21/18 1613   BP: 118/62   Pulse: 79   Weight: (!) 276 lb (125.2 kg)     ----------------    Much or all of the text in this note was generated through the use of Dragon  Dictate voice-to-text software. Errors in spelling or words which seem out of context are unintentional. Sound alike errors, in particular, may have escaped editing.    The patient was given a summary of these recommendations as an after visit summary    I spent 45 minutes with this patient today;   All changes were made via collaborative practice agreement with Mimi Fuentes MD. A copy of the visit note was provided to the patient's provider.     Jose Raul Coy, PharmD, BCACP  Medication Management (MTM) Pharmacist  [facility]     Current Outpatient Prescriptions   Medication Sig Dispense Refill     blood glucose test (CONTOUR NEXT STRIPS) strips Use 1 each As Directed 4 (four) times a day. 300 strip 3     acetaminophen (TYLENOL) 500 MG tablet Take by mouth every 6 (six) hours as needed for pain.       aspirin 81 MG EC tablet Take 81 mg by mouth daily.       cholecalciferol, vitamin D3, (CHOLECALCIFEROL) 1,000 unit tablet Take 1,000 Units by mouth daily.       gabapentin (NEURONTIN) 300 MG capsule Take 1 capsule (300 mg total) by mouth 3 (three) times a day. 90 capsule 9     generic lancets (MICROLET LANCET) Use 1 each As Directed 4 (four) times a day. 300 each 3     hydroCHLOROthiazide (MICROZIDE) 12.5 mg capsule Take 1 capsule (12.5 mg total) by mouth daily. 90 capsule 3     HYDROcodone-acetaminophen 5-325 mg per tablet Take 1 tablet by mouth 2 (two) times a day as needed for pain. 30 tablet 0     insulin aspart U-100 (NOVOLOG FLEXPEN U-100 INSULIN) 100 unit/mL injection pen 44 units before each meal.  With largest meal 46 units 45 mL 0     insulin degludec (TRESIBA FLEXTOUCH U-200) 200 unit/mL (3 mL) InPn Inject 136 Units under the skin daily. 18 mL 11     lisinopril (PRINIVIL,ZESTRIL) 5 MG tablet Take 1.5 tablets (7.5 mg total) by mouth daily. 135 tablet 3     melatonin (MELATIN) 3 mg Tab tablet Take 3 mg by mouth at bedtime as needed.       nitroglycerin (NITROSTAT) 0.4 MG SL tablet Place 1  "tablet (0.4 mg total) under the tongue every 5 (five) minutes as needed for chest pain. 25 tablet 3     omega-3 acid ethyl esters (LOVAZA) 1 gram capsule TAKE 2 CAPSULES BY MOUTH TWICE DAILY 360 capsule 1     omeprazole (PRILOSEC) 40 MG capsule TAKE 1 CAPSULE BY MOUTH EVERY DAY 90 capsule 1     pen needle, diabetic (BD ULTRA-FINE ZULMA PEN NEEDLES) 32 gauge x 5/32\" Ndle Inject 4 each under the skin daily. With Tresiba and Novolog 120 each 3     rosuvastatin (CRESTOR) 10 MG tablet TAKE 1 TABLET BY MOUTH DAILY 90 tablet 2     semaglutide (OZEMPIC) 0.25 mg or 0.5 mg(2 mg/1.5 mL) PnIj Inject 0.25 mg under the skin every 7 days. For four weeks, then increase to 0.5mg weekly for four weeks. 1.5 mL 1     No current facility-administered medications for this visit.                            "

## 2021-06-20 NOTE — PROGRESS NOTES
Met with patient in clinic and discussed all goals. Pt just met with MTM. Pt had some questions regarding a medication that was just prescribed to her. CG assisted with this immediate need today. Pt was informed by Shaye that this medication was not covered and will need prior authorization. Michelle informed. Pt educated on the PA process and how long it can take sometimes. Pt understanding. Pt will be seeing PharmD. For her DM care since Orin is booked out several weeks. CG will inform pt via [employer].     Next outreach: 10/22/18

## 2021-06-21 ENCOUNTER — RECORDS - HEALTHEAST (OUTPATIENT)
Dept: ADMINISTRATIVE | Facility: OTHER | Age: 66
End: 2021-06-21

## 2021-06-21 NOTE — PROGRESS NOTES
"[facility] Daily Progress for LBP and right knee    Patient Name: Liana Briceño  Date: 10/25/2018  Visit #: 12/12 (low back) ; right knee5/6; New orders for back 10/12; Re-cert 8/31/2018-11/15/2018 and G codes  PTA visit #:  6 +3  PRECAUTIONS/RESTRICTIONS:  Right BAR s/p 5080-5063, DM2, Morbid Obesity, HX: MRSA  Referral Diagnosis: lumbalgia, lumbar radiculitis , myofascial pain; Right Knee Pain  Referring provider: Jaz Matute PA-C (Back); Mimi Fuentes MD (right knee pain);David Razo DO  Visit Diagnosis:     ICD-10-CM    1. Lumbar radiculopathy M54.16    2. Myofascial pain M79.18    3. Chronic back pain, unspecified back location, unspecified back pain laterality M54.9     G89.29    4. Decreased ROM of lumbar spine M53.86    5. Muscle weakness (generalized) M62.81    6. Poor posture R29.3          Assessment:     Resumed HEP and performed without difficulty but some discomfort with right hip ER.  Denies knee pain except at night but not last night due to sound sleep  Pt would benefit with continuing skilled PT visits for LBP  To decrease exacerbation of pain and to advance core strength.  US/MT has been helpful.    Just more stiff today.    Goal Status:  Improvement since exacerbation  Pt. will demonstrate/verbalize independence in self-management of condition in : Partially Met  Pt. will be independent with home exercise program in : Partially met  Pt. will have improved quality of sleep: Partially met  Pt. will be able to walk : Partially met  Patient will stand : Partially met  Patient will decrease ERIS Outcome measure by 30% 12 weeks.    Plan / Patient Education:     Plan to see up to 2 more visits for LBP then reassess needs/progress.  Continue with US/MT and core exercises toe dip from 90o,   row for core.  Will cancel appt on 11/13/2018.    Subjective:     Pain Rating: LBP: 4/10 ; pain is less and no longer has \"heat\" pain down right LE; denies knee pain last night due to sound " sleep.  Slept really well with new bed and C-pap and no sleep interruptions last night but feels more stiff today and   Didn't use TENS past 2 days or remembered to take all medications.  No longer walking with cane since 10/19/2018.  Has been performing exercises but felt more stiff this morning.  Able to wipe self after toilet stand now and put weight through leg but not stand as long as in the past/as would like.    Has a new bed which is better.        Objective:       Patient arrived 22 min late.  Ambulatory without SE cane now.  Palpation:  Tight/Tender right mid lumbar, L4-5 spinal/PSIS and SI border/inferior iliac crest.  Transitional movements:  Chair/bed transfers and bed mobility with greater ease.    Added Multifidus strength with orange Band with fair tolerance for biased position  Tolerated new ex well/ treatment well and reported greater freedom of movement after treatment. Encouraged use of TENS unit for pain management.    Treatment Today   10/25/2018    TREATMENT MINUTES COMMENTS   Evaluation  Knee   Self-care/ Home management  Discussed status since last visit and response to injection.  Discussed reason to limit activity and avoid US/manual therapy to location of injections on right LB   Manual therapy 12 MFR:  bilat spine decompression/lumbar paraspinals at left SL   Neuromuscular Re-education     Therapeutic Activity     Therapeutic Exercises 13 See flow sheet. Discussed to be less vigorous with exercise before rising in the morning due to stiffness.   Gait training     Modality_____US sidelying on left  To right QL and lumbar paraspinals____________ 10 +3 set up US to right mid-back 1 % at 1.5 w/cm2   ROM assessment  Lumbar    TENS Cross IFC pattern bordering pain bilat L3-S1  Low back mode, intensity 4.5   Total 38    Blank areas are intentional and mean the treatment did not include these items.     April Newell  10/25/2018

## 2021-06-21 NOTE — PROGRESS NOTES
Health and Behavior Assessment with Intervention, Follow up (60 minutes): Met with patient 1:1 to review support system, psychological testing and mindful eating strategies. Liana has made some changes in her eating and lifestyle but is still eating out at least 9 times per week and needs to be much more mindful about her eating.  She will follow-up with mindful eating strategies.  Diagnoses: F 54; E 66.01

## 2021-06-21 NOTE — PROGRESS NOTES
MRSA screening positive.  No more testing is needed.  Will need the Bactroban prior to surgery and Hibiclens scrub.  Janki Segundo RN

## 2021-06-21 NOTE — PROGRESS NOTES
Attempt 2: Care Guide called patient.  If this patient is returning my call, please transfer to [facility] at ext [phone].

## 2021-06-21 NOTE — PROGRESS NOTES
Initial Structured Weight Loss Supervised Diet Evaluation     Assessment:  Pt. is a 63 y.o. female is being seen today for initial RD nutritional evaluation. Pt. has been unsuccessful with non-surgical weight loss methods and is interested in bariatric surgery. Today we reviewed current eating habits and level of physical activity, and instructed on the changes that are required for successful bariatric outcomes.    +frequently thought about having surgery but back pain and low drive for exercise started. Weight gain and insulin along with quitting smoking added lbs. + feels out of control with eating habits  Workflow review: Pt has completed labs, has not been to support group and still working with psych   Weight goal: At or below initial weight  -1 year smoke free 1/24    +one year PO pt would like to have muscle tone and be around 140-160lbs; more energy; less back pain      Pt Active Problem List Diagnosis:  Patient Active Problem List   Diagnosis     Obstructive Sleep Apnea     Diffuse Nontoxic Goiter     Type 2 diabetes mellitus (H)     Essential hypertension     Esophageal reflux     Dyslipidemia, goal LDL below 70     Hiatal Hernia     Sensorineural hearing loss     Stenosis of left carotid artery     Coronary artery disease due to lipid rich plaque     Morbid obesity with BMI of 40.0-44.9, adult (H)     RADHA on CPAP     Thiamine deficiency     Vitamin D deficiency     Increased PTH level     Low serum vitamin B12     Elevated ferritin level     Positive result for methicillin resistant Staphylococcus aureus (MRSA) screening     Pt's Initial Weight: 273 lbs  Weight: (!) 276 lb (125.2 kg)  Weight loss from initial: -3  % Weight loss: -1.1 %    BMI: Body mass index is 44.55 kg/m .    Estimated RMR ([place]-St Jeor equation): 1839 calories    Food allergies, intolerances, and Mandaen customs: eggs     Diabetes  Testing Blood Sugars: 120 at last meal; AM BS around 120-130   Last A1C, (per pt. report): 8.7% -  "11/12  DM Meds currently taking: in chart    Vitamins   Educated on post-op vitamin regimen: Multi Vit + iron 2x/day, calcium citrate 500-600 mg 2x/day, 3736-4003 mcg of Sublingual B-12 daily, and 5000 IU Vitamin D3 daily.    Biggest struggle with weight loss: poor food choices; eating 2 meal daily and frequent fast food/restaurant food  +prepping food, lack of sleep and on the go foods     Who does the grocery shopping for your household? Self  Who prepares your meals at home? Self  -lives with tom (significant other) and son (both dont want her to have surgery)     -continues to express sleep is very poor   Diet Recall/Time: wakes at 4am; lays in bed until 7am   Breakfast: 1/2 rice w/ butter and sugar OR oatmeal w/ bearden or sausage   Am Snack: occasional \"anything\" (cookies/chips)  Lunch: none  Pm snack:2/230pm - pizza or burgers - no structure   Dinner: italian soup OR collared greens with protein   HS Snack: orange, lunch meat and crackers OR \"sweets\"    Typical Snacks: above   Fats used at home: olive oil     Fried Foods: 1-2 times per week    Meals per week away from home: 3X/week - working towards 1-2    Recommended limiting eating out to no more than 2x/week.  Patient and I reviewed the importance of eating three consistent meals per day; as well as meal timing to be spaced 4-5 hours apart.  Snack choices: 100-150 calories (1-2x/day if physically hungry), incorporating a fruit/vegetable w/ protein source.    Portion Sizes problematic? Yes per patient/diet recall  Encouraged slowing meal times down, 20-30 minutes, chewing to applesauce consistency.   To aid in proper portion control and slow meal time down discussed consuming meals off smaller plates, use toddler/children utensils and set utensils down after each bite.    Protein, vegetables/fruits, carbohydrates:   Reviewed lean protein sources today. Recommended consuming 15-20gm protein at 3 meals daily    Beverages (Type/Oz. per day)  Water: " 64oz  Coffee: 1 cup daily   Tea: none  Milk: none  Regular soda: 3X/week  Diet soda: none  Juice: none  Leonardo-Aid/lemonade/etc: none  Alcohol: 3-4X/year    Discussed the importance of adequate hydration after surgery and the goal of 64+ oz. of fluid daily.   The patient understands the importance of avoiding all carbonated, caffeinated and sweetened drinks; instead choosing 64 oz. plain water.    Fluid-meal separation:  The patient and I reviewed the anatomy of the bypass and why  fluids from a meal is so important.    Exercise  Chronic back - working with PT     Pt. s understands that 30-60 minutes of daily activity is an important part of bariatric surgery success.   Encouraged pt. to incorporate upper body strength training exercise, even if its lifting soup cans while watching TV at night, doing pushups/sit-ups, and abdominal work.    PES statement:   1. (NI-1.3)Excessive energy intake related to Food and nutrition related knowledge deficit concerning excessive energy/oral intake as evidenced by Intake of high caloric density foods/beverages (juice, soda, alcohol) at meals and/or snacks; large portions; frequent grazing; Estimated intake that exceeds estimated daily energy intake;  Frequent excessive fast food or restaurant intake; and BMI 44.55    2. (NC-3.3.5) Obese, class III, BMI ?40 related to physical inactivity as evidenced by Infrequent, low-duration and or low intensity physical activity; and Large amounts of sedentary activities, no structured exercise regimen.     Intervention  Discussion:    1. Educated pt on the [place] to Bariatric Success handout.  2. Reviewed lean protein sources today. Recommended consuming 15-20gm protein at 3 meals daily    Instructions/Goals:     1. Include 15-20gm lean protein at each meal.  2. Increase vegetable/fruit intake, by having a vegetable or fruit with each meal daily. Recommended pt to increase vegetable/fruit intake to 4-5 servings daily.  3. Increase fluid  intake to 64oz daily: choose plain or calorie/carbonation-free beverages.  4. Incorporate daily structured activity, 30-60 minutes most days of the week  5. Practice plate method: 1/2 plate lean/low fat protein source, vegetable/fruit, <25% of plate complex carbohydrates.  6. Read food labels more consistently: keeping total fat grams <10, total sugar grams <10, fiber >3gm per serving.  7. Separate fluids 30 minutes before/after meal times.  8. Practice eating off of smaller plates/bowls, chewing to applesauce consistency, taking 20-30 minutes to eat in a calm/relaxed environment without distractions of tv/email/cell phone.    Handouts Provided:  Pt. [facility] Patient Handbook  [place] to Bariatric Success  Protein Supplement List     Monitor/Evaluation:  Pt. s target weight: no gain from initial visit, pt. verbalized understanding.     Has realistic expectations for weight loss: Yes  Verbalizes understanding of dietary changes post procedure: Yes  Verbalizes understanding of supplement needs: No  Verbalizes willingness to participate in physical activity: Yes  Motivation for change: average  RD s prediction for client success and compliance on a scale of 1 (low) to 10 (high):  5    Plan for next visit:   Bariatric plate. Give food journal homework.    Time In: 10:30a  Time Out: 11:15a    ABN signed: Yes

## 2021-06-21 NOTE — PROGRESS NOTES
[facility] Follow Up Note    Assessment/Plan:  1. Diabetes mellitus, type 2 (H)  Blood sugars are reviewed today.  They are markedly improved with 1 month of those empiric use.  She states this is helping control cravings.  She will meet with the bariatric dietitian within the next week.  Recommendation: Continue with current insulin dose and 07 PICC.  Will check glycohemoglobin.  If she begins to have low blood sugar events, she needs to contact me or Sukhdeep for an insulin adjustment.  - Drugs of Abuse 1,Urine  - Glycosylated Hemoglobin A1c    2. Essential hypertension with goal blood pressure less than 140/90  At goal on 7.5 mg of lisinopril.  If blood pressures remain nicely controlled, will consider a reduction of lisinopril to 5 mg    3. Chronic low back pain, unspecified back pain laterality, with sciatica presence unspecified/hip pain  Back examined.  Tenderness to palpation over the paraspinous muscles on the right.  Some tenderness with abduction of the hip.  Recommendation: Chronic back and hip pain.  Will refer to physical therapy.  Will prescribe limited Vicodin.  Ice followed by heat.  Continue with TENS unit.  Image hip if no improvement    - Ambulatory referral to Physical Therapy  - HYDROcodone-acetaminophen 5-325 mg per tablet; Take 1 tablet by mouth 2 (two) times a day as needed for pain.  Dispense: 30 tablet; Refill:   -- Drugs of Abuse 1,Urine    4. Preventative health care  Ordered  - Mammo Screening Bilateral; Future      Follow up 4 weeks    Mimi Fuentes MD    Chief Complaint:  Chief Complaint   Patient presents with     Follow-up     Diabetes     Medication Management     Utox pended for PCP to sign       History of Present Illness:  Liana is a 63 y.o. female who is here today for follow-up of her usual medical problems which are many.  Of note, she suffers from type 2 diabetes with hyperglycemia, morbid obesity, coronary artery disease-status post stent, chronic  "musculoskeletal pain including back and hip pain that have gone on for years.    Currently, she is working with the bariatric center.  She is finally decided it may be time to proceed with bariatric surgery.  She has seen cardiology in the consult is reviewed.  She is working with psychologist and that consult is reviewed.  Of interest, she is eating out at fast food places 9-10 times per week.  She admits that she knows that this is the wrong thing to do.    Today, she states that she \"threw my back out \"lifting something at home.  She states that she has right buttock pain that is quite intense.  She requests referral to physical therapy and a renewal of the Vicodin.  She continues to work and is busy in her home with this pain.    Review of Systems:  A comprehensive review of systems was performed and was otherwise negative    PFSH:  Social History: She is in a common-law marriage with Panchito.  She has 1 son who is 22  History   Smoking Status     Former Smoker     Packs/day: 0.25     Years: 44.00     Types: Cigarettes     Quit date: 1/24/2016   Smokeless Tobacco     Never Used     Comment: age 18 to age 62 up to 1/2 ppd       Past History:   Past Medical History:   Diagnosis Date     Anemia      Arthritis      Breast cyst      Carpal tunnel syndrome      Coronary artery disease due to lipid rich plaque      Dyslipidemia, goal LDL below 70      Esophageal reflux      Essential hypertension      Fatty liver      Goiter diffuse, nontoxic      Hiatal hernia      History of gestational diabetes      History of MRSA infection 07/17/2017    Pilonidal cyst culture     Morbid obesity with BMI of 40.0-44.9, adult (H)      RADHA on CPAP      Paroxysmal SVT (supraventricular tachycardia) (H)     S/P ablation     S/P hip replacement      Type 2 diabetes mellitus (H)        Current Outpatient Prescriptions   Medication Sig Dispense Refill     acetaminophen (TYLENOL) 500 MG tablet Take 1,000 mg by mouth 3 (three) times a day as " "needed.       aspirin 81 MG EC tablet Take 81 mg by mouth daily.       blood glucose test strips Use 1 each As Directed 3 (three) times a day. Dispense brand per patient's insurance at pharmacy discretion. (E11.9) 300 strip 1     cholecalciferol, vitamin D3, (CHOLECALCIFEROL) 1,000 unit tablet Take 1,000 Units by mouth daily.       generic lancets (MICROLET LANCET) Use 1 each As Directed 4 (four) times a day. 300 each 3     hydroCHLOROthiazide (MICROZIDE) 12.5 mg capsule Take 1 capsule (12.5 mg total) by mouth daily. 90 capsule 3     HYDROcodone-acetaminophen 5-325 mg per tablet Take 1 tablet by mouth 2 (two) times a day as needed for pain. 30 tablet 0     insulin aspart U-100 (NOVOLOG FLEXPEN U-100 INSULIN) 100 unit/mL injection pen Inject 50 Units under the skin 3 (three) times a day before meals. 45 mL 0     insulin degludec (TRESIBA FLEXTOUCH U-200) 200 unit/mL (3 mL) InPn Inject 146 Units under the skin daily. 18 mL 11     lisinopril (PRINIVIL,ZESTRIL) 5 MG tablet Take 1.5 tablets (7.5 mg total) by mouth daily. 135 tablet 3     melatonin (MELATIN) 3 mg Tab tablet Take 3 mg by mouth at bedtime as needed.       nitroglycerin (NITROSTAT) 0.4 MG SL tablet Place 1 tablet (0.4 mg total) under the tongue every 5 (five) minutes as needed for chest pain. 25 tablet 3     omega-3 acid ethyl esters (LOVAZA) 1 gram capsule TAKE 2 CAPSULES BY MOUTH TWICE DAILY 360 capsule 1     omeprazole (PRILOSEC) 40 MG capsule TAKE 1 CAPSULE BY MOUTH EVERY DAY 90 capsule 1     pen needle, diabetic (BD ULTRA-FINE ZULMA PEN NEEDLES) 32 gauge x 5/32\" Ndle Inject 4 each under the skin daily. With Tresiba and Novolog 120 each 3     rosuvastatin (CRESTOR) 10 MG tablet TAKE 1 TABLET BY MOUTH DAILY 90 tablet 2     semaglutide (OZEMPIC) 0.25 mg or 0.5 mg(2 mg/1.5 mL) PnIj Inject 0.25 mg under the skin every 7 days. For four weeks, then increase to 0.5mg weekly for four weeks. 1.5 mL 1     thiamine 100 MG tablet Take 1 tablet (100 mg total) by mouth " daily. 90 tablet prn     No current facility-administered medications for this visit.        Family History: Nothing new    Physical Exam:  General Appearance:   She is neatly groomed and pleasant in no acute distress  Gait is altered as she is limping.  She left her cane in the car  Vitals:    11/09/18 1312   BP: 106/52   Patient Site: Left Arm   Patient Position: Sitting   Cuff Size: Adult Large   Pulse: 88   SpO2: 98%     Wt Readings from Last 3 Encounters:   10/30/18 (!) 275 lb (124.7 kg)   10/14/18 (!) 273 lb (123.8 kg)   10/11/18 (!) 273 lb (123.8 kg)     There is no height or weight on file to calculate BMI.    Back is palpated.  There is pain along the right paraspinous muscle.  Straight leg raising reveals some buttock pain.  There is pain with abduction of the hip.  There is some groin pain with abduction of the hip as well.

## 2021-06-21 NOTE — PROGRESS NOTES
Diabetes: Sick-Day Plan  Infections, the flu, and even a cold, can cause your blood sugar to rise. And, eating less, nausea, and vomiting may cause your blood glucose to fall (hypoglycemia). Ask your health care provider to help you develop a sick-day plan. The following information can help.    Call your health care provider if:    You vomit or have diarrhea for more than 6 hours.    Your blood glucose level is higher than usual or over 250 mg/dL after you have taken extra insulin (if recommended in your sick-day plan).    You take oral medicine for diabetes, and your blood sugar is higher than usual or over 250 mg/dL, before a meal and stays that high for more than 24 hours.    Your blood glucose is lower than usual or less than 70 mg/dL    You have moderate to large amounts of ketones in your blood or urine.    You aren t better after 2 days.  Emergency Plan Recommendation:    When to Use the Emergency Department (ED)  An emergency means you could die if you don t get care quickly. Or you could be hurt permanently (disabled). Read below to know when to use -- and when not to use -- an emergency department (also called ED).    Dangers to your life  Here are examples of emergencies. These need immediate care:  A hard time breathing  Severe chest pain  Choking  Severe bleeding  Suddenly not able to move or speak  Blacking out (fainting)`  Poisoning    Dangers of permanent injuries  Here are other emergencies. These also need immediate care:  Deep cuts or severe burns  Broken bones, or sudden severe pain and swelling in a joint    When it s an emergency  If you have an emergency, follow these steps:    1. Go to the nearest emergency department  If you can, go to the hospital ED closest to you right away.  If you cannot get there right away, or if it is not safe to take yourself, call 911 or your police emergency number.  2. Call your primary care doctor  Tell your doctor about the emergency. Call within 24 hours of  going to the ED.  If you cannot call, have someone call for you.  Go to your doctor (not the ED) for any follow-up care.    When it s not an emergency  If a problem is not an emergency, follow these steps:    1. Call your primary care doctor  If you don t know the name of your doctor, call your health plan.  If you cannot call, have someone call for you.  2. Follow instructions  Your doctor will tell you what you should do.  You may be told to see your doctor right away. You may be told to go to the ED. Or you may be told to go to an urgent care center.  Follow your doctor s advice.

## 2021-06-21 NOTE — PROGRESS NOTES
[facility] Daily Progress for LBP and right knee    Patient Name: Liana Briceño  Date: 10/23/2018  Visit #: 12/12 (low back) ; right knee5/6; New orders for back 9/12; Re-cert 8/31/2018-11/15/2018 and G codes  PTA visit #:  6 +3  PRECAUTIONS/RESTRICTIONS:  Right BAR s/p [id number], DM2, Morbid Obesity, HX: MRSA  Referral Diagnosis: lumbalgia, lumbar radiculitis , myofascial pain; Right Knee Pain  Referring provider: Jaz Matute PA-C (Back); Mimi Fuentes MD (right knee pain);David Razo DO  Visit Diagnosis:     ICD-10-CM    1. Lumbar radiculopathy M54.16    2. Myofascial pain M79.18    3. Chronic back pain, unspecified back location, unspecified back pain laterality M54.9     G89.29    4. Decreased ROM of lumbar spine M53.86    5. Muscle weakness (generalized) M62.81    6. Poor posture R29.3          Assessment:     Hasn't resumed HEP since exacerbation.  Denies knee pain except at night.  Pt would benefit with continuing skilled PT visits for LBP  To decrease exacerbation of pain and to advance core strength.  US/MT has been helpful.    Decreased exacerbated pain and is walking without cane but not able to stand as long as in the past but able to put weight on leg now.    Goal Status:  Improvement since exacerbation  Pt. will demonstrate/verbalize independence in self-management of condition in : Partially Met  Pt. will be independent with home exercise program in : Partially met  Pt. will have improved quality of sleep: Partially met  Pt. will be able to walk : Partially met  Patient will stand : Partially met  Patient will decrease ERIS Outcome measure by 30% 12 weeks.    Plan / Patient Education:     Plan to see up ro3 more visits for LBP then reassess needs/progress.  Continue with US/MT and core exercises toe dip from 90o,  multifidus with theraband, row for core.  Will cancel appt on 11/13/2018.  Encouraged patient connect with MD if exacerbated pain continues without  "resolution.    Subjective:     Pain Rating: LBP: 5/10 ; pain is less and no longer has \"heat\" pain down right LE; denies knee pain except at night.  Better since last visit with using CP, and TENS more diligently and rest which allowed decreased pain.  No longer walking with cane since 10/19/2018.  Hasn't done any exercise since last seen.  Able to wipe self after toilet stand now and put weight through leg but not stand as long as in the past/as would like.    Has a new bed which is better.  Non-compliant with HEP since exacerbation.      Objective:       Ambulatory without SE cane now.  Palpation:  Tender right mid lumbar, L4-5 spinal and SI border/inferior iliac crest.  Transitional movements:  Chair/bed transfers and bed mobility with greater ease.    Added Hooklying unilateral hip ER for core with abdominal set and instructed to incorporate better breathing for relaxation during exercise.  Tolerated EX/ treatment well and reported less pain after treatment.     Treatment Today   10/23/2018    TREATMENT MINUTES COMMENTS   Evaluation  Knee   Self-care/ Home management  Discussed status since last visit and response to injection.  Discussed reason to limit activity and avoid US/manual therapy to location of injections on right LB   Manual therapy 12 MFR:  bilat spine decompression/lumbar paraspinals at left SL   Neuromuscular Re-education     Therapeutic Activity     Therapeutic Exercises 30 See flow sheet   Gait training     Modality_____US sidelying on left  To right QL and lumbar paraspinals____________  US to right mid-back 3 MHZ 20% at 0.8 w/cm2   ROM assessment  Lumbar    TENS Cross IFC pattern bordering pain bilat L3-S1  Low back mode, intensity 4.5   Total 42    Blank areas are intentional and mean the treatment did not include these items.     April Newell  10/23/2018    "

## 2021-06-21 NOTE — PROGRESS NOTES
Reviewed chart for patient PA status.    Attempt 1: Care Guide called patient.  If this patient is returning my call, please transfer to [facility] at ext [phone].

## 2021-06-21 NOTE — PROGRESS NOTES
Health and Behavior Assessment with Intervention, Initial (60 minutes): Met with patient 1:1 to obtain demographics and background information, reasons for surgery, typical eating pattern/fluid intake as well as psychiatric history. Liana is a 63-year-old female who would like to follow through with bariatric surgery for health reasons.  She has struggled with her weight since her late 20s and now is concerned about various comorbidities.  She denied any history of psychopathology or problematic eating behaviors.  She has good knowledge of the surgery and good support.  She will follow-up and complete psychological testing.  Diagnoses: F 54; E 66.01

## 2021-06-21 NOTE — PROGRESS NOTES
"[facility] Daily Progress for LBP and right knee    Patient Name: Liana Briceño  Date: 11/6/2018  Visit #: 12/12 (low back) ; right knee5/6; New orders for back 12/12; Re-cert 8/31/2018-11/15/2018 and G codes  PTA visit #:  6 +3  PRECAUTIONS/RESTRICTIONS:  Right BAR s/p [id number], DM2, Morbid Obesity, HX: MRSA  Referral Diagnosis: lumbalgia, lumbar radiculitis , myofascial pain; Right Knee Pain  Referring provider: Jaz Matute PA-C (Back); Mimi Fuentes MD (right knee pain);David Razo DO  Visit Diagnosis:     ICD-10-CM    1. Lumbar radiculopathy M54.16    2. Myofascial pain M79.18    3. Chronic back pain, unspecified back location, unspecified back pain laterality M54.9     G89.29    4. Decreased ROM of lumbar spine M53.86    5. Muscle weakness (generalized) M62.81    6. Poor posture R29.3          Assessment:     A 3rd exacerbation on right lumbar area and \"heat\" into  Right leg began evening on 11/4/2018 which she attributes to lifting a 30# laundry basket in front of her.  Pain increased into the next day with difficulty walking  Denies knee pain except at night.  Up until this exacerbation her ERIS Outcome Measure has demonstrated a statistically significant change.     Goal Status:  Improvement up until recent exacerbation on 11/4/2018  Pt. will demonstrate/verbalize independence in self-management of condition in : Partially Met  Pt. will be independent with home exercise program in : Partially met  Pt. will have improved quality of sleep: Partially met  Pt. will be able to walk : Partially met  Patient will stand : Partially met  Patient will decrease ERIS Outcome measure by 30% 12 weeks-met (Initial score 64%  Current score 38%)    Plan / Patient Education:     F/u with MD.    Will send LEFS Outcome Measure.    Subjective:     Pain Rating: LBP: 7/10, which patient attributes to lifting a 30# laundry basket up to stairs.  Worst pain 10/10. Almost went to ER due to pain intensity. Pain is " noted when shifting hips/weight in chair.  Using Vicadin and cold pack for pain management.  Used TENS for pain management.  Using cane for walking.  Noting some heat feeling in right LE buttock to calf.  Up until recent exacerbation, overall improvement rated at 75%.  Bed is still not as comfortable as she would like.    Unable to exercise since recent exacerbation.        ERIS Outcome Measure:  Initial 64%  Before exacerbation :38%      Objective:       Ambulatory with SE cane.  Palpation:  Tight/Tender right mid lumbar, tender L4-5 spinal/PSIS   Transitional movements:  Chair/bed transfers and bed mobility painful again/guarded  OPT EXERCISES 11/6/2018   Comment #5 Standing lateral truck stretch bilat 3x each-H     Some relief with US/MT with patient being able to transfer off plinth with less pain.    Treatment Today   11/6/2018    TREATMENT MINUTES COMMENTS   Evaluation  Knee   Self-care/ Home management 15 Discussed status since last visit and reason for exacerbation.  Advised attention to body mechanics, use of CP for 24-36 hours if further incidents occur and to see MD if symptoms don't improve and contact them about using antiinflammatory.  Issued signed order for TENS electrodes which she will purchase from [email or web address].   Manual therapy 12 STM right lumbar/sacral and MFR:  Right lumbar paraspinals at left SL   Neuromuscular Re-education     Therapeutic Activity     Therapeutic Exercises  See flow sheet. .   Gait training     Modality_____US sidelying on left  To right QL and lumbar paraspinals____________ 10 +3 set up US to right mid-back 1 % at 1.5 w/cm2   ROM assessment  Lumbar    TENS Cross IFC pattern bordering pain bilat L3-S1  Low back mode, intensity 4.5   Total 40    Blank areas are intentional and mean the treatment did not include these items.     April Newell  11/6/2018

## 2021-06-21 NOTE — PROGRESS NOTES
Workflow updated with A1C drawn 11/9/2018 per pt's PCP which is 8.7.  Pt also had her thiamine level rechecked yesterday and those results are still pending.    Priti Montgomery RN, N  [facility]  P [phone]  F [phone]

## 2021-06-21 NOTE — PROGRESS NOTES
I met with Liana Briceño at the request of PCP to recheck her blood pressure.  Blood pressure medications on the MAR were reviewed with patient.    Patient has taken all medications as per usual regimen: Yes  Patient reports tolerating them without any issues or concerns: Yes    Vitals:    10/29/18 1048   BP: 116/66   Patient Site: Left Arm   Patient Position: Sitting   Cuff Size: Adult Large       Blood pressure was taken, previous encounter was reviewed, recorded blood pressure below 140/90.  Patient was discharged and the note will be sent to the provider for final review.

## 2021-06-21 NOTE — PROGRESS NOTES
[facility] Daily Progress for LBP and right knee    Patient Name: Liana Briceño  Date: 10/16/2018  Visit #: 12/12 (low back) ; right knee5/6; New orders for back 8/12; Re-cert 8/31/2018-11/15/2018 and G codes  PTA visit #:  6 +3  PRECAUTIONS/RESTRICTIONS:  Right BAR s/p [id number], DM2, Morbid Obesity, HX: MRSA  Referral Diagnosis: lumbalgia, lumbar radiculitis , myofascial pain; Right Knee Pain  Referring provider: Jaz Matute PA-C (Back); Mimi Fuentes MD (right knee pain);David Razo,   Visit Diagnosis:     ICD-10-CM    1. Lumbar radiculopathy M54.16    2. Myofascial pain M79.18    3. Chronic back pain, unspecified back location, unspecified back pain laterality M54.9     G89.29    4. Decreased ROM of lumbar spine M53.86    5. Muscle weakness (generalized) M62.81    6. Poor posture R29.3          Assessment:     Compliant with HEP since last visit, 2 sets of 2-3 reps except past 2 days due to significant pain exacerbation for no apparent reason.  Denies knee pain except at night.  Pt would benefit with continuing skilled PT visits for LBP  To decrease exacerbation of pain and to advance core strength.  US/MT has been helpful.    Patient reports she is able to walk longer duration, straighter and will less pain.    Goal Status:regression since last visit due to recent exacerbation since 10/14/2018  Pt. will demonstrate/verbalize independence in self-management of condition in : Partially Met  Pt. will be independent with home exercise program in : Partially met  Pt. will have improved quality of sleep: Partially met  Pt. will be able to walk : Partially met  Patient will stand : Partially met  Patient will decrease ERIS Outcome measure by 30% 12 weeks.    Plan / Patient Education:     Plan to see up ro4 more visits for LBP then reassess needs/progress.  Continue with US/MT and core exercises toe dip from 90o, unilateal hip ER motion, multifidus with theraband, row for core.  Will cancel appt on  "11/13/2018.  Encouraged patient connect with MD if exacerbated pain continues without resolution.    Subjective:     Pain Rating: LBP: 7/10 ; pain is severe and radiates into right hip and some \"heat\" sensation down into lateral thigh; denies knee pain except at night.  Had a significant increase in pain of sudden onset 10/14/2018.  Now has difficulty bending forward/wiping after toilet, or even putting weight on right LE.  Went to [place] on 10/13/2018 for 3 hours, with 40 min car ride to [place] as well as shopping.  The last time she had this much pain was 4 days after the cortisone injection.  Using 1000  MG for pain management.  Using some vicaden for sleep.  States she uses her new bed in contour position and wonders if that caused the increased pain   States she was compliant daily with her exercises 2-3 reps/x2 sets but reported no pain with exercise.        Objective:       Ambulatory with SE cane today upon arrival due to increased pain.  Palpation:  Tender right mid and especially lower lumbar paraspinals to SI.  Transitional movements:  Chair/bed transfers and bed mobility painful/guarded.  Lumbar AROM:  Flex not tested due to pain, Ext not tested due to pain, Rot right mod loss with stretch, rot left mod loss, SB right min loss with mild/stretch pain, SB left min loss stretch.    No new exercises due to exacerbation of pain.  Tolerated treatment well and reported less pain after treatment. Encouraged walking.    Treatment Today   10/16/2018    TREATMENT MINUTES COMMENTS   Evaluation  Knee   Self-care/ Home management  Discussed status since last visit and response to injection.  Discussed reason to limit activity and avoid US/manual therapy to location of injections on right LB   Manual therapy 10 MFR:  bilat spine decompression/lumbar paraspinals at left SL   Neuromuscular Re-education     Therapeutic Activity     Therapeutic Exercises     Gait training     Modality_____US sidelying on left  To right QL " and lumbar paraspinals____________ 10+3set up US to right mid-back 3 MHZ 20% at 0.8 w/cm2   ROM assessment  Lumbar    TENS Cross IFC pattern bordering pain bilat L3-S1 15 Low back mode, intensity 4.5   Total 38    Blank areas are intentional and mean the treatment did not include these items.     April Newell  10/16/2018

## 2021-06-21 NOTE — PROGRESS NOTES
This Maintenance Wellness Plan provides private information in regards to the work I have done with my Care Team from my Primary Care Clinic.  This document provides insight on the goals I have worked hard to achieve.  My Care Team congratulates me on my journey to become well.  With the assistance of my Clinic Care Guide and Primary Care Physician,  I have succeeded in improving areas of my health that I identified as barriers to becoming well.  I will continue to seek wellness and use the skills I have obtained to further my journey.  My Care Guide will follow up with me in 3 months.  In the meantime, if I should have any questions or concerns I will contact my Care Guide.     My Clinic Care Coordination Wellness Plan    [facility]  [address] [place]  [address]  [phone]    My Preferred Method of Contact:  Phone: [phone]    My Primary/Preferred Language:  English    Preferred Learning Style:  Reading information    Emergency Contact: Extended Emergency Contact Information  Primary Emergency Contact: Azar Mcleanmy  Address: [address] [place] [address] [facility]  Home Phone: [phone]  Relation: Spouse     PCP:  Mimi Fuentes MD  Specialists:    Care Team            Mimi Fuentes MD   [phone] PCP - General    [facility] Care Coordination Care Guide, Clinic Care Coordination    [facility]: Ph: [phone] Fax: [phone]    Jose Raul Coy, PharmD Pharmacist, Pharmacist        Accomplishments:  Goals       COMPLETED: I need support with keeping my diabetes in check. (pt-stated)      Action steps to achieve this goal  1.  I need support with being more active. Physical therapy started 1/29/18  2. Changing my eating habits to eat more healthy, and stop eating fast food as much. Pt has a weight loss of 7 lbs  3. Need assistance care coordination with appointments to see MD, PharmD and diabetic educator for more  control. Discussed 2/2/18 Scheduled with DM Ed     Continually working on goals 5/17/17  Date goal set:  1/6/2015   Updated: 2/2/18 6/29/18        COMPLETED: I will work on getting into a better, less expensive living sitituation (pt-stated)      Action steps to achieve this goal  1.  I will fill out applications to get on waiting list.-Will stay where she is at right now.  2.  I am saving money to be able to live on my own.      Date goal set:  3/15/17  Updated 9/21/18            Advanced Directive/Living Will: The patient was given information regarding Adanced Directives/Living Will    Clinical Emergency Plan  Diabetes: Sick-Day Plan  Infections, the flu, and even a cold, can cause your blood sugar to rise. And, eating less, nausea, and vomiting may cause your blood glucose to fall (hypoglycemia). Ask your health care provider to help you develop a sick-day plan. The following information can help.     Call your health care provider if:    You vomit or have diarrhea for more than 6 hours.    Your blood glucose level is higher than usual or over 250 mg/dL after you have taken extra insulin (if recommended in your sick-day plan).    You take oral medicine for diabetes, and your blood sugar is higher than usual or over 250 mg/dL, before a meal and stays that high for more than 24 hours.    Your blood glucose is lower than usual or less than 70 mg/dL    You have moderate to large amounts of ketones in your blood or urine.    You aren t better after 2 days.  Emergency Plan Recommendation:     When to Use the Emergency Department (ED)  An emergency means you could die if you don t get care quickly. Or you could be hurt permanently (disabled). Read below to know when to use -- and when not to use -- an emergency department (also called ED).     Dangers to your life  Here are examples of emergencies. These need immediate care:  A hard time breathing  Severe chest pain  Choking  Severe bleeding  Suddenly not able to move  or speak  Blacking out (fainting)`  Poisoning     Dangers of permanent injuries  Here are other emergencies. These also need immediate care:  Deep cuts or severe burns  Broken bones, or sudden severe pain and swelling in a joint     When it s an emergency  If you have an emergency, follow these steps:     1. Go to the nearest emergency department  If you can, go to the hospital ED closest to you right away.  If you cannot get there right away, or if it is not safe to take yourself, call 911 or your police emergency number.  2. Call your primary care doctor  Tell your doctor about the emergency. Call within 24 hours of going to the ED.  If you cannot call, have someone call for you.  Go to your doctor (not the ED) for any follow-up care.     When it s not an emergency  If a problem is not an emergency, follow these steps:     1. Call your primary care doctor  If you don t know the name of your doctor, call your health plan.  If you cannot call, have someone call for you.  2. Follow instructions  Your doctor will tell you what you should do.  You may be told to see your doctor right away. You may be told to go to the ED. Or you may be told to go to an urgent care center.  Follow your doctor s advice.     All [facility] clinic patients have access to a Nurse 24 hours a day, 7 days a week.  If you have questions or want advice from a Nurse, please know [facility] is here for you.  You can call your clinic and they will connect you or you can call Care [facility] at [phone].  [facility] also has Walk In Care clinics in multiple locations.  Call the number listed above for more information about our Walk In Care clinics or visit the [facility] website at [email or web address].

## 2021-06-21 NOTE — PROGRESS NOTES
"[facility] Daily Progress for LBP and right knee    Patient Name: Liana Briceño  Date: 11/20/2018  Visit #: 12/12 (low back) ; right knee5/6; New orders for back 12/12; Re-cert 8/31/2018-11/15/2018 and G codes  PTA visit #:  6 +3  PRECAUTIONS/RESTRICTIONS:  Right BAR s/p [id number], DM2, Morbid Obesity, HX: MRSA  Referral Diagnosis: lumbalgia, lumbar radiculitis , myofascial pain; Right Knee Pain  Referring provider: Jaz Matute PA-C (Back); Mimi Fuentes MD (right knee pain);David Razo DO  Visit Diagnosis:     ICD-10-CM    1. Lumbar radiculopathy M54.16    2. Myofascial pain M79.18    3. Chronic back pain, unspecified back location, unspecified back pain laterality M54.9     G89.29    4. Decreased ROM of lumbar spine M53.86    5. Muscle weakness (generalized) M62.81    6. Poor posture R29.3          Assessment:     A 3rd exacerbation on right lumbar area and \"heat\" into  Right leg began evening on 11/4/2018 which she attributes to lifting a 30# laundry basket in front of her.  Pain increased into the next day with difficulty walking  Denies knee pain except at night.  Up until this exacerbation her ERIS Outcome Measure has demonstrated a statistically significant change.     Goal Status:  Improvement up until recent exacerbation on 11/4/2018  Pt. will demonstrate/verbalize independence in self-management of condition in : Partially Met  Pt. will be independent with home exercise program in : Partially met  Pt. will have improved quality of sleep: Partially met  Pt. will be able to walk : Partially met  Patient will stand : Partially met    Patient will decrease ERIS Outcome measure by 30% 12 weeks-met (Initial score 64%  Current score 38%)    Plan / Patient Education:   Continue POC  See once more    Subjective:     Pain Rating: LBP: 4/10,  .  Using .  Used TENS for pain management.  Sleeping better but not back to normal  Standing is hard  Ex 1-2x/day            Objective:     Pt arrived 10 mins " late  Ambulatory with SE cane.  Reports feeling better cooking has been better  Palpation:  Tight/Tender right mid lumbar, tender L4-5 spinal/PSIS  Reviewed trunk lateral flex and rotation  Reports relief with US and MT      Treatment Today   11/20/2018    TREATMENT MINUTES COMMENTS   Evaluation  Knee   Self-care/ Home management 15 Discussed status since last visit and reason for exacerbation.  Advised attention to body mechanics, use of CP for 24-36 hours if further incidents occur and to see MD if symptoms don't improve and contact them about using antiinflammatory.  Issued signed order for TENS electrodes which she will purchase from [employer].   Manual therapy 20 STM right lumbar/sacral and MFR:  Right lumbar paraspinals at left SL   Neuromuscular Re-education     Therapeutic Activity     Therapeutic Exercises 5 See flow sheet. .   Gait training     Modality_____US sidelying on left  To right QL and lumbar paraspinals____________ 10 +3 set up US to right mid-back 1 % at 1.5 w/cm2   ROM assessment  Lumbar    TENS Cross IFC pattern bordering pain bilat L3-S1  Low back mode, intensity 4.5   Total 38    Blank areas are intentional and mean the treatment did not include these items.     Aubree Zamarripa  11/20/2018

## 2021-06-21 NOTE — PROGRESS NOTES
[facility] Daily Progress for LBP and right knee    Patient Name: Liana Briceño  Date: 10/30/2018  Visit #: 12/12 (low back) ; right knee5/6; New orders for back 11/12; Re-cert 8/31/2018-11/15/2018 and G codes  PTA visit #:  6 +3  PRECAUTIONS/RESTRICTIONS:  Right BAR s/p [id number], DM2, Morbid Obesity, HX: MRSA  Referral Diagnosis: lumbalgia, lumbar radiculitis , myofascial pain; Right Knee Pain  Referring provider: Jaz Matute PA-C (Back); Mimi Fuentes MD (right knee pain);David Razo DO  Visit Diagnosis:     ICD-10-CM    1. Lumbar radiculopathy M54.16    2. Myofascial pain M79.18    3. Chronic back pain, unspecified back location, unspecified back pain laterality M54.9     G89.29    4. Decreased ROM of lumbar spine M53.86    5. Muscle weakness (generalized) M62.81    6. Poor posture R29.3          Assessment:     Resumed HEP and performed without difficulty but flexibility has improved.  Denies knee pain except at night.  Pt would benefit with continuing skilled PT visits for to formalize HEP.  ERIS Outcome Measure has demonstrated a statistically significant change.    Goal Status:  Improvement since exacerbation  Pt. will demonstrate/verbalize independence in self-management of condition in : Partially Met  Pt. will be independent with home exercise program in : Partially met  Pt. will have improved quality of sleep: Partially met  Pt. will be able to walk : Partially met  Patient will stand : Partially met  Patient will decrease ERIS Outcome measure by 30% 12 weeks-met (Initial score 64%  Current score 38%)    Plan / Patient Education:     Plan to see for 1 more visit for LBP then DC on HEP and f/u with MD.  Continue with US/MT and core exercises toe dip from 90o.  LEFS Outcome Measure sent with patient for completion for next visit.    Subjective:     Pain Rating: LBP: 4-5/10, some twinges and some difficulty standing yesterday; Stand tolerance 20 min since exacerbation 2 weeks ago   but able  "to stand to iron without difficulty.  Overall improvement rated at 75%.  Bed is still not as comfortable as she would like.  Didn't use TENS but taking only Vicaden..  No longer walking with cane since 10/19/2018.  Has been performing exercises.     Able to wipe self after toilet stand now.    ERIS Outcome Measure:  Initial 64%  Current 38%      Objective:       Patient arrived 6 min late.  Ambulatory without SE cane.  Palpation:  Tight/Tender right mid lumbar, tender L4-5 spinal/PSIS   Transitional movements:  Chair/bed transfers and bed mobility with greater ease.    Added Row with orange Band with fair tolerance but felt some stress in back with all theraband exercises performed all at one time.  OPT EXERCISES 10/30/2018   Exercise #5 Row for core with orange theraband, 5x5\" with some stress  after performing it immediately after multifudus.       Treatment Today   10/30/2018    TREATMENT MINUTES COMMENTS   Evaluation  Knee   Self-care/ Home management  Discussed status since last visit and response to injection.  Discussed reason to limit activity and avoid US/manual therapy to location of injections on right LB   Manual therapy 10 STM right lumbar/sacral and MFR:  Right lumbar paraspinals at left SL   Neuromuscular Re-education     Therapeutic Activity     Therapeutic Exercises 30 See flow sheet. .   Gait training     Modality_____US sidelying on left  To right QL and lumbar paraspinals____________ 10 +3 set up US to right mid-back 1 % at 1.5 w/cm2   ROM assessment  Lumbar    TENS Cross IFC pattern bordering pain bilat L3-S1  Low back mode, intensity 4.5   Total 53    Blank areas are intentional and mean the treatment did not include these items.     April Newell  10/30/2018    "

## 2021-06-21 NOTE — LETTER
Letter by Mimi Fuentes MD at      Author: Mimi Fuentes MD Service: -- Author Type: --    Filed:  Encounter Date: 1/20/2021 Status: (Other)         Liana Briceño  [address] [place] [address]             January 20, 2021         Dear Ms. Briceño,    Below are the results from your recent visit:    Resulted Orders   HM2(CBC w/o Differential)   Result Value Ref Range    WBC 7.0 4.0 - 11.0 thou/uL    RBC 3.87 3.80 - 5.40 mill/uL    Hemoglobin 11.5 (L) 12.0 - 16.0 g/dL    Hematocrit 33.5 (L) 35.0 - 47.0 %    MCV 87 80 - 100 fL    MCH 29.8 27.0 - 34.0 pg    MCHC 34.4 32.0 - 36.0 g/dL    RDW 14.1 11.0 - 14.5 %    Platelets 262 140 - 440 thou/uL    MPV 7.3 7.0 - 10.0 fL   Comprehensive Metabolic Panel   Result Value Ref Range    Sodium 141 136 - 145 mmol/L    Potassium 4.3 3.5 - 5.0 mmol/L    Chloride 106 98 - 107 mmol/L    CO2 27 22 - 31 mmol/L    Anion Gap, Calculation 8 5 - 18 mmol/L    Glucose 121 70 - 125 mg/dL    BUN 15 8 - 22 mg/dL    Creatinine 0.88 0.60 - 1.10 mg/dL    GFR MDRD Af Amer >60 >60 mL/min/1.73m2    GFR MDRD Non Af Amer >60 >60 mL/min/1.73m2    Bilirubin, Total 0.3 0.0 - 1.0 mg/dL    Calcium 9.5 8.5 - 10.5 mg/dL    Protein, Total 7.1 6.0 - 8.0 g/dL    Albumin 4.1 3.5 - 5.0 g/dL    Alkaline Phosphatase 96 45 - 120 U/L    AST 20 0 - 40 U/L    ALT 20 0 - 45 U/L    Narrative    Fasting Glucose reference range is 70-99 mg/dL per  American Diabetes Association (ADA) guidelines.   Glycosylated Hemoglobin A1c   Result Value Ref Range    Hemoglobin A1c 6.8 (H) <=5.6 %   Thyroid Cascade   Result Value Ref Range    TSH 3.78 0.30 - 5.00 uIU/mL   Urinalysis-UC if Indicated   Result Value Ref Range    Color, UA Yellow Colorless, Yellow, Straw, Light Yellow    Clarity, UA Clear Clear    Glucose, UA Negative Negative    Bilirubin, UA Negative Negative    Ketones, UA Negative Negative    Specific Gravity, UA 1.025 1.005 - 1.030    Blood, UA Negative Negative    pH, UA 5.5 5.0 - 8.0    Protein, UA Trace  (!) Negative mg/dL    Urobilinogen, UA 0.2 E.U./dL 0.2 E.U./dL, 1.0 E.U./dL    Nitrite, UA Negative Negative    Leukocytes, UA Trace (!) Negative    Bacteria, UA None Seen None Seen hpf    RBC, UA 3-5 (!) None Seen, 0-2 hpf    WBC, UA 0-5 None Seen, 0-5 hpf    Squam Epithel, UA 5-10 (!) None Seen, 0-5 lpf    Narrative    Urine Culture ordered based on [facility]' criteria       Liana, a urine culture is pending, but your other labs are stable!!!!    Please call with questions or contact us using [employer].    Sincerely,        Electronically signed by Mimi Fuentes MD

## 2021-06-22 NOTE — PROGRESS NOTES
1. Diverticulitis of colon     2. Screen for colon cancer          Medications Ordered   Medications     ciprofloxacin HCl (CIPRO) 500 MG tablet     Sig: Take 1 tablet (500 mg total) by mouth 2 (two) times a day for 10 days.     Dispense:  20 tablet     Refill:  0     fluconazole (DIFLUCAN) 150 MG tablet     Sig: Take 1 tablet (150 mg total) by mouth once for 1 dose.     Dispense:  2 tablet     Refill:  0        Plan: I would give her some Cipro as she has a clinical diagnosis of diverticulitis.  I think that this will help her pain and cramping resolving in the lower abdomen it still not clear to me what relevance the meal that she had 3 days ago is to this whole thing it could be completely coincidental.  We will see how she does and follow-up with her if she is not getting completely better.    Subjective: 63-year-old female who is here today with abdominal pain and cramping for the last couple of days.  She states that she was at a friend's, 3 days ago, and she ate 1 of her Meals on Wheels meals and right after that she did not feel good.  She had some bloating and abdominal discomfort and felt pain down into the left lower quadrant.  She denies any fevers or chills.  She denies any nausea but just feels distended and has early satiety.  She has not had any vomiting.  No bowel changes, no blood in the stool no melena.  She has not had diverticulitis in the past.    Objective: Well-appearing female in no acute distress.  Vital signs as noted.  Chest clear to auscultation.  Heart regular rate and rhythm.  Abdomen shows no obvious distention.  She has bowel sounds present in all quadrants.  She does have some tenderness in the left lower quadrant, but no guarding or rebound tenderness present.

## 2021-06-22 NOTE — PATIENT INSTRUCTIONS - HE
Whole Foods, Plant-Based Diet    Eat abundant vegetables.    Only eat whole grains.    2-4 fruits/day.    Enjoy at least 2 servings of legumes (beans) or tofu daily.      Avoid or greatly diminish animal products such as dairy, eggs and meat.     Supplement with 1,000mcg vitamin B12 and possibly a calcium/vitamin D supplement such as Caltrate+D3 daily.    Avoid processed foods, sugars and oils.    Cook your own food as much as possible.    [place] [employer] Documentary - watch online.    Nutritionstudies.org - information    Avoid tight fitting clothing.

## 2021-06-22 NOTE — PROGRESS NOTES
[facility] Daily Progress for LBP and right knee    Patient Name: Liana Briceño  Date: 1/8/2019  Visit #: 12/12 (low back) ; right knee5/6; New orders for back 12/12; Re-cert 8/31/2018-11/15/2018 and G codes; 5/12 New Orders for back on 11/9/2018 Recert:  11/20/2018-1/25/2019  PTA visit #:  6 +6  PRECAUTIONS/RESTRICTIONS:  Right BAR s/p 5604-1429, DM2, Morbid Obesity, HX: MRSA  Referral Diagnosis: lumbalgia, lumbar radiculitis , myofascial pain; Right Knee Pain  Referring provider: Jaz Matute PA-C (Back); Mimi Fuentes MD (right knee pain);David Razo DO  Visit Diagnosis:     ICD-10-CM    1. Lumbar radiculopathy M54.16    2. Myofascial pain M79.18    3. Chronic back pain, unspecified back location, unspecified back pain laterality M54.9     G89.29    4. Decreased ROM of lumbar spine M53.86    5. Muscle weakness (generalized) M62.81    6. Poor posture R29.3    7. Right knee pain, unspecified chronicity M25.561          Assessment:     Resolution of 3rd exacerbation.   Poor compliance with HEP due to clod this weekend  Less tight/tender upper Lumbar paraspinals now which patient attributes to new use of Tumeric.  Walking without cane for the most part/slight antalgia but bed still isn't comfortable.  Continues need to work on core strengthening.    Goal Status:    Pt. will demonstrate/verbalize independence in self-management of condition in : Progressing toward  Pt. will be independent with home exercise program in : Progressing toward  Pt. will have improved quality of sleep: Partially met;Comment (bed still isn't comfortable)  Pt. will be able to walk : 1 block;Progressing toward (without SE cane)  Patient will stand : other time;Progressing toward; Comment:: 15-20 minute tolerance    Patient will decrease ERIS Outcome measure by 30% 12 weeks-met (Initial score 64%  Current score 48%)    Plan / Patient Education:     Continue POC focus on core strengthening in standing. recert until  1/25/19        Subjective:     Pain Rating: LBP: 2/10, denies any LE pain/numbness tingling.  Pt states she is scheduled for injection due to thigh pain this week  Sleeping is better  On antibiotic and feeling better.  Patient reports a burning pain that is tender to touch at adductor region left of unknown origin.  Reports haven't done ex due to cold      Objective:      Pt arrived 9 mins late  Palpation:  Tight/Tender right mid lumbar, tender L3-4 but less and with less muscle tightness and at PSIS  Ex per flow sheet    Treatment Today   1/8/2019    TREATMENT MINUTES COMMENTS   Evaluation  Knee   Self-care/ Home management  Discussed status, obtained info on purchase of electrodes on [employer], and PT progression to focus on core strengthening   Manual therapy  STM right lumbar/sacral and MFR:  Right lumbar paraspinals/spinal decompression at left SL   Neuromuscular Re-education     Therapeutic Activity     Therapeutic Exercises 23 See flow sheet. .   Gait training     Modality_____US sidelying on left  To right QL and lumbar paraspinals____________  US to right mid-back 1 % at 1.5 w/cm2   ROM assessment  Lumbar    TENS Cross IFC pattern bordering pain bilat L3-S1  Low back mode, intensity 4.5   Total 23    Blank areas are intentional and mean the treatment did not include these items.     Aubree Zamarripa  1/8/2019

## 2021-06-22 NOTE — PROGRESS NOTES
[facility] Follow Up Note    Assessment/Plan:  1. Numbness and tingling of left lower extremity/question meralgia paresthetica  Hyperesthesia and numbness of the anterior thigh.  Seems consistent with meralgia paresthetica.  Some additional symptoms in the lower extremity which would not quite fit.  She is with underlying back problems and sciatica.  Recommendation: She has been intolerant of multiple medications in the past including gabapentin which is resulted in significant mental status changes.  We will treat with Tylenol or Vicodin as needed.  Muscle relaxant at bedtime.  Referral to physical medicine at [facility] for evaluation.  If symptoms do not improve, would consider lateral cutaneous femoral nerve block.  Additionally, recommend weight loss and control of diabetes  - Ambulatory referral to Orthopedics    2. Type 2 diabetes mellitus with hyperglycemia, with long-term current use of insulin (H)  Last 6 weeks, with those empiric, blood sugars have been generally in the 120s-130s.  Weight was stable through the holidays.  Recommendation: We will check labs today  - Glycosylated Hemoglobin A1c    3.  Morbid obesity-with BMI greater than 43  Working with bariatric center.  As soon as behaviors are controlled, she would like to proceed with surgery      Mimi Fuentes MD    Chief Complaint:  Chief Complaint   Patient presents with     Ankle Pain     Lt     Leg Pain     Lt     Abdominal Pain     Lt       History of Present Illness:  Liana is a 63 y.o. female who is here today for evaluation of left anterior thigh numbness.  It is been present for 3-4 weeks.  It is significantly uncomfortable for her.  She states Tylenol does improve the pain.  The pain is made worse with palpation.  Putting a heat pad or even touching the area is exquisitely uncomfortable.  She has intolerance of close rubbing on her thigh.  In addition, her lower back is activated.  She has some numbness and  tingling in the lower extremities.  She is not sure if this is necessarily related to the thigh pain.  She does have chronic back pain and is on medical disability for this.  She denies any specific weakness with it.  The symptom is relatively sensory in nature.  She has no skin rashes.  Of note, she was seen here yesterday and wanted to be reevaluated.  Those notes were reviewed.    In addition to the above, she is working with the bariatric surgery center with regard to her elevated body mass index.  She is learning to slow down eating, chew food, and make better food choices.  She feels that she is becoming more mindful of satiety.  She is eager to proceed with surgery when deemed able by the bariatric surgery center.    With regard to her diabetes, since she initiated it was then pick, her sugars have been markedly improved.  She states she now has a sense of feeling full.  She has lost about 6 pounds over the past couple of months.  Her weight loss has slowed over the holidays.  However, she is eager to get back on track    Review of Systems:  A comprehensive review of systems was performed and was otherwise negative    PFSH:  Social History: She is in a common-law marriage.  However, her significant other has recently proposed to her.  They are considering having a legal wedding in marriage  Social History     Tobacco Use   Smoking Status Former Smoker     Packs/day: 0.25     Years: 44.00     Pack years: 11.00     Types: Cigarettes     Last attempt to quit: 2016     Years since quittin.9   Smokeless Tobacco Never Used   Tobacco Comment    age 18 to age 62 up to 1/2 ppd       Past History:   Past Medical History:   Diagnosis Date     Anemia      Arthritis      Breast cyst      Carpal tunnel syndrome      Coronary artery disease due to lipid rich plaque      Dyslipidemia, goal LDL below 70      Esophageal reflux      Essential hypertension      Fatty liver      Goiter diffuse, nontoxic      Hiatal  hernia      History of gestational diabetes      History of MRSA infection 07/17/2017    Pilonidal cyst culture     Morbid obesity with BMI of 40.0-44.9, adult (H)      RADHA on CPAP      Paroxysmal SVT (supraventricular tachycardia) (H)     S/P ablation     S/P hip replacement      Type 2 diabetes mellitus (H)        Current Outpatient Medications   Medication Sig Dispense Refill     acetaminophen (TYLENOL) 500 MG tablet Take 1,000 mg by mouth 3 (three) times a day as needed.       aspirin 81 MG EC tablet Take 81 mg by mouth daily.       blood glucose test strips Use 1 each As Directed 3 (three) times a day. Dispense brand per patient's insurance at pharmacy discretion. (E11.9) 300 strip 1     cholecalciferol, vitamin D3, (CHOLECALCIFEROL) 1,000 unit tablet Take 1,000 Units by mouth daily.       generic lancets (MICROLET LANCET) Use 1 each As Directed 4 (four) times a day. 300 each 3     hydroCHLOROthiazide (MICROZIDE) 12.5 mg capsule Take 1 capsule (12.5 mg total) by mouth daily. 90 capsule 3     HYDROcodone-acetaminophen 5-325 mg per tablet Take 1 tablet by mouth 2 (two) times a day as needed for pain. 30 tablet 0     insulin aspart U-100 (NOVOLOG FLEXPEN U-100 INSULIN) 100 unit/mL injection pen Inject 45 Units under the skin 3 (three) times a day before meals. 45 mL 3     insulin degludec (TRESIBA FLEXTOUCH U-200) 200 unit/mL (3 mL) InPn Inject 136 Units under the skin daily. 20 mL 11     lisinopril (PRINIVIL,ZESTRIL) 5 MG tablet Take 1.5 tablets (7.5 mg total) by mouth daily. 135 tablet 3     melatonin (MELATIN) 3 mg Tab tablet Take 3 mg by mouth at bedtime as needed.       nitroglycerin (NITROSTAT) 0.4 MG SL tablet Place 1 tablet (0.4 mg total) under the tongue every 5 (five) minutes as needed for chest pain. 25 tablet 3     omega-3 acid ethyl esters (LOVAZA) 1 gram capsule TAKE 2 CAPSULES BY MOUTH TWICE DAILY 360 capsule 1     omeprazole (PRILOSEC) 40 MG capsule TAKE 1 CAPSULE BY MOUTH EVERY DAY 90 capsule 1  "    pen needle, diabetic (BD ULTRA-FINE ZULMA PEN NEEDLES) 32 gauge x 5/32\" Ndle Inject 4 each under the skin daily. With Tresiba and Novolog 120 each 3     rosuvastatin (CRESTOR) 10 MG tablet TAKE 1 TABLET BY MOUTH DAILY 90 tablet 2     semaglutide (OZEMPIC) 1 mg/0.75 mL (2 mg/1.5 mL) PnIj Inject 1 mg under the skin every 7 days. 3 mL 11     thiamine 100 MG tablet Take 1 tablet (100 mg total) by mouth daily. 90 tablet prn     cyclobenzaprine (FLEXERIL) 10 MG tablet Take 1 tablet (10 mg total) by mouth at bedtime as needed for muscle spasms. 21 tablet 1     fluconazole (DIFLUCAN) 150 MG tablet Use once per week times three 3 tablet 0     No current facility-administered medications for this visit.        Family History: Significant for diabetes, kidney failure and multiple medical maladies    Physical Exam:  General Appearance:   She is pleasant and well-appearing.  She walks with a limp  Vitals:    01/04/19 1110   BP: 120/58   Patient Site: Left Arm   Patient Position: Sitting   Cuff Size: Adult Large   Pulse: 80   SpO2: 98%   Weight: (!) 270 lb 4.8 oz (122.6 kg)     Wt Readings from Last 3 Encounters:   01/04/19 (!) 270 lb 4.8 oz (122.6 kg)   01/03/19 (!) 270 lb 6 oz (122.6 kg)   12/11/18 (!) 270 lb (122.5 kg)     Body mass index is 43.63 kg/m .    There is a defined area of numbness of the anterior thigh with slightly decreased sensation.  Reflexes are intact.  Muscle strength seems intact.  There is significant tenderness to palpation over the muscle anteriorly.    "

## 2021-06-22 NOTE — PROGRESS NOTES
[facility] Re-Certification Request    November 29, 2018      Patient: Liana Briceño  MR Number: [id number]  YOB: 1955  Date of Visit: 11/20/2018  Onset Date:  5/1/2018  Date of Eval: 6/7/2018    Dear Dr. Mimi Fuentes:    As you may recall, we have been seeing Liana Briceño for Physical Therapy of her chronic lumbar back pain with radiculopathy and knee pain.    For therapy to continue, Medicare and/or Medicaid requires periodic physician review of the treatment plan. Please review the summary of the patient's progress and our plan for continued therapy, and verify  that you agree therapy should continue by entering certification dates at the bottom of this note and co-signing this note.    Plan of Care  Authorization / Certification Start Date: 11/20/18  Authorization / Certification End Date: 01/25/19  Authorization / Certification Number of Visits: 12  Communication with: Referral Source  Patient Related Instruction: Nature of Condition;Treatment plan and rationale;Self Care instruction;Basis of treatment;Body mechanics;Posture;Precautions;Next steps;Expected outcome  Times per Week: 1  Number of Weeks: 12  Number of Visits: 12  Discharge Planning: INdependent HEP and self-management of symptoms  Precautions / Restrictions : Right BAR s/p 5928-8895, DM2, Morbid Obesity, HX: MRSA  Therapeutic Exercise: ROM;Stretching;Strengthening;Other (medx program)  Neuromuscular Reeducation: kinesio tape;core  Manual Therapy: soft tissue mobilization;myofascial release  Modalities: ultrasound;traction;electrical stimulation      Goal  Pt. will demonstrate/verbalize independence in self-management of condition in : Partially Met  Pt. will be independent with home exercise program in : Partially met  Pt. will have improved quality of sleep: Partially met  Pt. will be able to walk : Partially met  Patient will stand : Partially met    Decrease ERIS Outcome Measure by 30% from 38% score today.      If you  have any questions or concerns, please don't hesitate to call.    Sincerely,      April Newell PT        Physician recommendation:     ___ Follow therapist's recommendation        ___ Modify therapy      *Physician co-signature indicates they certify the need for these services furnished within this plan and while under their care.

## 2021-06-22 NOTE — PROGRESS NOTES
[facility] Daily Progress for LBP and right knee    Patient Name: Liana Briceño  Date: 12/18/2018  Visit #: 12/12 (low back) ; right knee5/6; New orders for back 12/12; Re-cert 8/31/2018-11/15/2018 and G codes; 4/12 New Orders for back on 11/9/2018 Recert:  11/20/2018-1/25/2019  PTA visit #:  6 +5  PRECAUTIONS/RESTRICTIONS:  Right BAR s/p 4457-7821, DM2, Morbid Obesity, HX: MRSA  Referral Diagnosis: lumbalgia, lumbar radiculitis , myofascial pain; Right Knee Pain  Referring provider: Jaz Matute PA-C (Back); Mimi Fuentes MD (right knee pain);David Razo DO  Visit Diagnosis:     ICD-10-CM    1. Lumbar radiculopathy M54.16    2. Myofascial pain M79.18    3. Chronic back pain, unspecified back location, unspecified back pain laterality M54.9     G89.29    4. Decreased ROM of lumbar spine M53.86    5. Muscle weakness (generalized) M62.81    6. Poor posture R29.3          Assessment:     Resolution of 3rd exacerbation.   Poor compliance with HEP since 12/8/2018 due to GI issue.-resolving  Less tight/tender upper Lumbar paraspinals now which patient attributes to new use of Tumeric.  Walking without cane for the most part/slight antalgia but bed still isn't comfortable.  Continues need to work on core strengthening.    Goal Status:    Pt. will demonstrate/verbalize independence in self-management of condition in : Progressing toward  Pt. will be independent with home exercise program in : Progressing toward  Pt. will have improved quality of sleep: Partially met;Comment (bed still isn't comfortable)  Pt. will be able to walk : 1 block;Progressing toward (without SE cane)  Patient will stand : other time;Progressing toward; Comment:: 15-20 minute tolerance    Patient will decrease ERIS Outcome measure by 30% 12 weeks-met (Initial score 64%  Current score 48%)    Plan / Patient Education:     Continue POC focus on core strengthening in standing.  Hold US STM if no further exacerbations.      Subjective:  "    Pain Rating: LBP: 2/10, denies any LE pain/numbness tingling.  UseingTENS for pain management but needs electrodes. reviewed TENS electrode care and placement; unable to get electrodes covered by health insurance .  Standing is hard after 15-20 minutes; walking limited to 1 block but using cart at grocery; Ambulatory without SE cane unless standing in line.  Was able to back 25 pies over Thanksgiving.    Bed still isn't comfortable. Most comfortable in a contour position.    Unable to exercise since 12/8/2018 due to GI issues since then with cramping/bloating and not feeling well when eating since eating partial Meals-on-wheels.  On antibiotic and feeling better.  ERIS Outcome Measure:  Initial 64%  Current 48%  Patient reports a burning pain that is tender to touch at adductor region left of unknown origin.      Objective:        Palpation:  Tight/Tender right mid lumbar, tender L3-4 but less and with less muscle tightness and at PSIS    OPT EXERCISES 12/18/2018   Exercise #14 prayer stretch with/without bias, 10\"x10-H   Comment #14 Abdominal crunch at incline to decrease effort, forward and diagonals, 3x2\"  each and forward 5x2\"-H   Exercise #15 Treadmill, 7', 2 MPH         Treatment Today   12/18/2018    TREATMENT MINUTES COMMENTS   Evaluation  Knee   Self-care/ Home management  Discussed status, obtained info on purchase of electrodes on [employer], and PT progression to focus on core strengthening   Manual therapy  STM right lumbar/sacral and MFR:  Right lumbar paraspinals/spinal decompression at left SL   Neuromuscular Re-education     Therapeutic Activity     Therapeutic Exercises 25 See flow sheet. .   Gait training     Modality_____US sidelying on left  To right QL and lumbar paraspinals____________  US to right mid-back 1 % at 1.5 w/cm2   ROM assessment  Lumbar    TENS Cross IFC pattern bordering pain bilat L3-S1  Low back mode, intensity 4.5   Total 25    Blank areas are intentional and mean the " treatment did not include these items.     April Newell  12/18/2018

## 2021-06-22 NOTE — PROGRESS NOTES
[facility] Clinic Note    Patient Name: Liana Briceño  Patient Age: 63 y.o.  YOB: 1955  MRN: [id number]    Date of visit: 1/3/2019    Patient Active Problem List   Diagnosis     Obstructive Sleep Apnea     Diffuse Nontoxic Goiter     Type 2 diabetes mellitus (H)     Essential hypertension     Esophageal reflux     Dyslipidemia, goal LDL below 70     Hiatal Hernia     Sensorineural hearing loss     Stenosis of left carotid artery     Coronary artery disease due to lipid rich plaque     Morbid obesity with BMI of 40.0-44.9, adult (H)     RADHA on CPAP     Thiamine deficiency     Vitamin D deficiency     Increased PTH level     Low serum vitamin B12     Elevated ferritin level     Positive result for methicillin resistant Staphylococcus aureus (MRSA) screening     Social History     Social History Narrative    Single but lives with her significant other, has one son, she is a .  She is a PCA.     Disability for LBP. Work injury.     Family History   Problem Relation Age of Onset     Heart disease Father         unknown type     Valvular heart disease Mother      Kidney disease Mother      Hypertension Mother      Diabetes Mother      Leukemia Brother          at a young age     No Medical Problems Son      Diabetes Paternal Aunt      Kidney disease Sister      Heart disease Sister      Breast cancer Neg Hx      Outpatient Encounter Medications as of 1/3/2019   Medication Sig Dispense Refill     acetaminophen (TYLENOL) 500 MG tablet Take 1,000 mg by mouth 3 (three) times a day as needed.       aspirin 81 MG EC tablet Take 81 mg by mouth daily.       blood glucose test strips Use 1 each As Directed 3 (three) times a day. Dispense brand per patient's insurance at pharmacy discretion. (E11.9) 300 strip 1     cholecalciferol, vitamin D3, (CHOLECALCIFEROL) 1,000 unit tablet Take 1,000 Units by mouth daily.       generic lancets (MICROLET LANCET) Use 1 each As Directed 4 (four) times a day. 300 each 3  "    hydroCHLOROthiazide (MICROZIDE) 12.5 mg capsule Take 1 capsule (12.5 mg total) by mouth daily. 90 capsule 3     insulin aspart U-100 (NOVOLOG FLEXPEN U-100 INSULIN) 100 unit/mL injection pen Inject 45 Units under the skin 3 (three) times a day before meals. 45 mL 3     insulin degludec (TRESIBA FLEXTOUCH U-200) 200 unit/mL (3 mL) InPn Inject 136 Units under the skin daily. 20 mL 11     lisinopril (PRINIVIL,ZESTRIL) 5 MG tablet Take 1.5 tablets (7.5 mg total) by mouth daily. 135 tablet 3     melatonin (MELATIN) 3 mg Tab tablet Take 3 mg by mouth at bedtime as needed.       nitroglycerin (NITROSTAT) 0.4 MG SL tablet Place 1 tablet (0.4 mg total) under the tongue every 5 (five) minutes as needed for chest pain. 25 tablet 3     omega-3 acid ethyl esters (LOVAZA) 1 gram capsule TAKE 2 CAPSULES BY MOUTH TWICE DAILY 360 capsule 1     omeprazole (PRILOSEC) 40 MG capsule TAKE 1 CAPSULE BY MOUTH EVERY DAY 90 capsule 1     pen needle, diabetic (BD ULTRA-FINE ZULMA PEN NEEDLES) 32 gauge x 5/32\" Ndle Inject 4 each under the skin daily. With Tresiba and Novolog 120 each 3     rosuvastatin (CRESTOR) 10 MG tablet TAKE 1 TABLET BY MOUTH DAILY 90 tablet 2     semaglutide (OZEMPIC) 1 mg/0.75 mL (2 mg/1.5 mL) PnIj Inject 1 mg under the skin every 7 days. 3 mL 11     thiamine 100 MG tablet Take 1 tablet (100 mg total) by mouth daily. 90 tablet prn     [DISCONTINUED] HYDROcodone-acetaminophen 5-325 mg per tablet Take 1 tablet by mouth 2 (two) times a day as needed for pain. 30 tablet 0     HYDROcodone-acetaminophen 5-325 mg per tablet Take 1 tablet by mouth 2 (two) times a day as needed for pain. 30 tablet 0     No facility-administered encounter medications on file as of 1/3/2019.        Chief Complaint:   Chief Complaint   Patient presents with     Leg Pain     left leg, upper thigh     Joint Pain     joint stiffness, bilateral ankles     Colon Cancer Screening     patient due, orders pending       /58 (Patient Site: Left Arm, " "Patient Position: Sitting, Cuff Size: Adult Large)   Pulse 76   Ht 5' 6\" (1.676 m)   Wt (!) 270 lb 6 oz (122.6 kg)   SpO2 98%   BMI 43.64 kg/m    HPI:   3 weeks having pain upper thigh.  Pain is constant.  Moving around does not make worse but rubbing on it does make pain worse.  Now starting to have right upper leg also.  No rash.  Feels like \"nerve pain\".  Somewhat like heat.  No distal numbness or weakness.  No new back pain.      ROS: Pertinent ros findings in hpi, all other systems negative.    Objective/Physical Exam:     /58 (Patient Site: Left Arm, Patient Position: Sitting, Cuff Size: Adult Large)   Pulse 76   Ht 5' 6\" (1.676 m)   Wt (!) 270 lb 6 oz (122.6 kg)   SpO2 98%   BMI 43.64 kg/m      Gen: NAD, appears age  Skin: warm, dry  HENT: normocephalic atraumatic, MMM  Eyes: non-icteric, no proptosis  CV: NRRR no m/r/g, no peripheral edema  Resp: CTAB no w/r/r, normal respiratory effort  Abd: non-distended, soft  Hematologic: No petechiae or purpura  MSK: no muscle or joint swelling  Neuro: no dysarthria or gross asymmetry  Psych: Cooperative, full affect      Left anterior lateral upper thigh there is decreased sensation mildly and pain with increased tactile stimulation.  Absolutely no rash.  Abdomen is soft nontender to palpation.  There is no pain with hip range of motion.  Distal strength is 5 out of 5.  No distal cyanosis.  Right side has similar findings except for last.  The sensations are not present inner thigh or on the back.    Assessment/Plan:  No results found for this or any previous visit (from the past 24 hour(s)).  Medications Ordered   Medications     HYDROcodone-acetaminophen 5-325 mg per tablet     Sig: Take 1 tablet by mouth 2 (two) times a day as needed for pain.     Dispense:  30 tablet     Refill:  0       ICD-10-CM    1. Screen for colon cancer Z12.11 Ambulatory referral for Colonoscopy   2. Chronic low back pain, unspecified back pain laterality, with sciatica " presence unspecified M54.5 HYDROcodone-acetaminophen 5-325 mg per tablet    G89.29    3. Meralgia paresthetica of left side G57.12        Due for colonoscopy in May, I do them ordering this.  I am refilling her narcotic,  is okay.  I recommend Tylenol for this pain specifically since it is working.  We discussed that tight fitting clothing and obesity are the main contributors.  We discussed diet at length.    Patient Instructions   Whole Foods, Plant-Based Diet    Eat abundant vegetables.    Only eat whole grains.    2-4 fruits/day.    Enjoy at least 2 servings of legumes (beans) or tofu daily.      Avoid or greatly diminish animal products such as dairy, eggs and meat.     Supplement with 1,000mcg vitamin B12 and possibly a calcium/vitamin D supplement such as Caltrate+D3 daily.    Avoid processed foods, sugars and oils.    Cook your own food as much as possible.    [employer] Documentary - watch online.    Nutritionstudies.org - information    Avoid tight fitting clothing.       Counseled patient regarding treatments, treatment options, risks and benefits and diagnosis.  The patient was interactive, attentive, verbalized understanding, and we discussed plan.     Jeff Louis MD

## 2021-06-22 NOTE — PROGRESS NOTES
"[facility] Daily Progress for LBP and right knee    Patient Name: Liana Briceño  Date: 11/28/2018  Visit #: 12/12 (low back) ; right knee5/6; New orders for back 12/12; Re-cert 8/31/2018-11/15/2018 and G codes  PTA visit #:  6 +5  PRECAUTIONS/RESTRICTIONS:  Right BAR s/p 8275-7758, DM2, Morbid Obesity, HX: MRSA  Referral Diagnosis: lumbalgia, lumbar radiculitis , myofascial pain; Right Knee Pain  Referring provider: Jaz Matute PA-C (Back); Mimi Fuentes MD (right knee pain);David Razo DO  Visit Diagnosis:     ICD-10-CM    1. Lumbar radiculopathy M54.16    2. Myofascial pain M79.18    3. Chronic back pain, unspecified back location, unspecified back pain laterality M54.9     G89.29    4. Decreased ROM of lumbar spine M53.86    5. Muscle weakness (generalized) M62.81          Assessment:     A 3rd exacerbation on right lumbar area. Reports relief with US and MT  Reports able to stand for cooking longer periods   Difficulty with sleeping some nights\" think its the bed    Goal Status:  Improvement up until recent exacerbation on 11/4/2018  Pt. will demonstrate/verbalize independence in self-management of condition in : Partially Met  Pt. will be independent with home exercise program in : Partially met  Pt. will have improved quality of sleep: Partially met  Pt. will be able to walk : Partially met  Patient will stand : Partially met    Patient will decrease ERIS Outcome measure by 30% 12 weeks-met (Initial score 64%  Current score 38%)    Plan / Patient Education:   Continue POC      Subjective:     Pain Rating: LBP: 4/10,  ..  Used TENS for pain management.  Sleeping better but not back to normal, problems with the bed   Standing is hard  Ex 1-2x/day      Objective:      Ambulatory with SE cane.  Reports feeling better cooking has been better  Palpation:  Tight/Tender right mid lumbar, tender L4-5 spinal/PSIS  Pulleys, UBE, standing multifidus with OTB, seated trunk rotation and lateral trunk " rotation   reviewed TENS electrode care and placement  Reports relief with US and MT      Treatment Today   11/28/2018    TREATMENT MINUTES COMMENTS   Evaluation  Knee   Self-care/ Home management 5 Discussed status since last visit and reason for exacerbation.  Advised attention to body mechanics, use of CP for 24-36 hours if further incidents occur and to see MD if symptoms don't improve and contact them about using antiinflammatory.  Issued signed order for TENS electrodes which she will purchase from [employer].   Manual therapy 23 STM right lumbar/sacral and MFR:  Right lumbar paraspinals at left SL   Neuromuscular Re-education     Therapeutic Activity     Therapeutic Exercises 15 See flow sheet. .   Gait training     Modality_____US sidelying on left  To right QL and lumbar paraspinals____________ 10 +3 set up US to right mid-back 1 % at 1.5 w/cm2   ROM assessment  Lumbar    TENS Cross IFC pattern bordering pain bilat L3-S1  Low back mode, intensity 4.5   Total 56    Blank areas are intentional and mean the treatment did not include these items.     Aubree Zamarripa  11/28/2018

## 2021-06-22 NOTE — TELEPHONE ENCOUNTER
New Appointment Needed  What is the reason for the visit:    Pain in legs  Provider Preference: PCP only. Patient stated that she seen a different doctor and was not satisfied and wants to be seen by her PCP.  How soon do you need to be seen?: as soon as possible.  Waitlist offered?: No  Okay to leave a detailed message:  Yes

## 2021-06-22 NOTE — PROGRESS NOTES
Health and Behavior Assessment with Intervention, 3rd Follow up (30 minutes): Met with patient 1:1 to assess goal setting exercise.  Weight today was 270.9 pounds.  Although she has lost some weight still struggling with the stress of the end of her relationship and potentially having to move out.  She has been eating out much less than she was in the past though.  Her goals for next month will include documenting her eating, measuring her food, planning out her meals, going to the gym and consuming more water.  F 54; E 66.01

## 2021-06-22 NOTE — PROGRESS NOTES
Follow Up Surgical Weight Loss Supervised Diet Evaluation  Assessment:  Pt presents for follow up supervised diet visit with TONO.    This patient is a 63 y.o.  She is being seen today for follow-up nutritional evaluation. Liana Briceño has been unsuccessful with non-surgical weight loss methods and is interested in bariatric surgery. Today we reviewed the patients current eating habits and level of physical activity, and instructed on the changes that are required for successful bariatric outcomes.    Workflow review: Pt is still working with psych, has not attended support group but has completed initial labs  Weight goal: At or below initial weight     +pt moving out and is getting out of a 20 year relationship      Pt Active Problem List Diagnosis:  Patient Active Problem List   Diagnosis     Obstructive Sleep Apnea     Diffuse Nontoxic Goiter     Type 2 diabetes mellitus (H)     Essential hypertension     Esophageal reflux     Dyslipidemia, goal LDL below 70     Hiatal Hernia     Sensorineural hearing loss     Stenosis of left carotid artery     Coronary artery disease due to lipid rich plaque     Morbid obesity with BMI of 40.0-44.9, adult (H)     RADHA on CPAP     Thiamine deficiency     Vitamin D deficiency     Increased PTH level     Low serum vitamin B12     Elevated ferritin level     Positive result for methicillin resistant Staphylococcus aureus (MRSA) screening     Pt's Initial Weight: 273 lbs  Weight: (!) 270 lb (122.5 kg)  Weight loss from initial: 3  % Weight loss: 1.1 %    Body mass index is 43.58 kg/m .    Calculated RMR (Swanzey-St Jeor equation): 1839 calories    Progress made since last visit: Pt is reading food labels   Concerns: Pt is not exercising currently,     +changed up DM meds - low appetite  Diabetes  Testing Blood Sugars 70-120s  Medications: 10% down to 8%    Diet Recall/Time:   Breakfast: 9/10am - cereal w/ sausage or bearden and toast (10g)   2/3pm - sushi w/ salmon and fried rice  1X/week  OR  6in subway w/ meat and veggies  5/6pm -  Pro/CHO (21g)     Protein: 50-70g    Typical Snacks or snack times: none  Meals per week away from home: down to 2X/week      Recommended limiting eating out to no more than 2x/week.  Patient and I reviewed the importance of eating three consistent meals per day; as well as meal timing to be spaced 4-5 hours apart.  Snack choices: 100-150 calories (1-2x/day if physically hungry), incorporating a fruit/vegetable w/ protein source.    Meal Duration:20 minutes  Encouraged slowing meal times down, 20-30 minutes, chewing to applesauce consistency.     Portion Sizes problematic? Yes Per patient/diet recall   To aid in proper portion control and slow meal time down discussed consuming meals off smaller plates, use toddler/children utensils and set utensils down after each bite.    Protein, vegetables/fruits, carbohydrates:   The patient and I discussed the importance of including lean/low fat protein at each meal and limiting carbohydrate intake to less than 25% of plate volume.     Vitamins   Post-op vitamin regimen: Multi Vit + iron 2x/day, calcium citrate 500-600 mg 2x/day, 6635-5245 mcg of Sublingual B-12 daily, and 5000 IU Vitamin D3 daily.    Beverages (Type/Oz. per day)  Water: low; 32oz  Coffee: 1 cup decaf  Tea: none  Milk: with cereal 1 bowl daily   Regular soda: none  Diet soda: none  Juice: none  Leonardo-Aid/lemonade/etc: none  Alcohol: none    Discussed the importance of adequate hydration after surgery and the goal of 64+ oz of fluid daily.   The patient understands the importance of avoiding all carbonated, caffeinated and sweetened drinks; and instead choose 64 oz plain water.    Fluid-meal separation:   Pt is working on  fluids 30min before and 30 minutes after meals.  Fluids are  30min before and 30 minutes after meals. - 50-75%    Exercise  ADL     Pt's understands that 30-60 minutes of daily activity is an important part of bariatric  surgery success.   Encouraged pt to incorporate upper body strength training exercise, even if its lifting soup cans while watching TV at night, doing pushups/sit-ups, and abdominal work.    PES statement:   1. (NC-3.3.5) Obese, class III, BMI ?40 related to physical inactivity as evidenced by Infrequent, low-duration and or low intensity physical activity; and Large amounts of sedentary activities; no structured physical activity regimen    Intervention:  Discussion:   1. Reviewed the Keys to Bariatric Success handout.  2. Reviewed lean protein sources and recommended to consume 15-20gm protein at 3 meals daily.  3. Reviewed Bariatric plate and food journal homework.  4. Gave food journal homework, to be completed for f/u appointment.  5. Bariatric Plate: The patient and I discussed the importance of including lean/low  fat protein at each meal and limiting carbohydrate intake to less  than 25% of plate volume.  Instructions/Goals:   1. Include 15-20gm protein at each meal.  2. Increase vegetable/fruit intake, by having a vegetable or fruit with each meal daily. Recommended pt to increase vegetable/fruit intake to 4-5 servings daily.  3. Increase fluid intake to 64oz daily: choose plain or calorie/carbonation-free beverages.  4. Incorporate daily structured activity, 30-60 minutes most days of the week  5. Fill out food journal and bring to next month's visit.  6. Practice plate method: 1/2 plate lean/low fat protein source, vegetable/fruit, <25% of plate complex carbohydrates.  7. Read food labels more consistently: keeping total fat grams <10, total sugar grams <10, fiber >3gm per serving.  8. Separate fluids 30 minutes before/after meal times.  9. Practice eating off of smaller plates/bowls, chewing to applesauce consistency, taking 20-30 minutes to eat in a calm/relaxed environment without distractions of tv/email/cell phone.    Handouts provided:  Bariatric Plate  Food journal    Monitor/Evaluation:     Pt  understands the importance of not gaining any weight from initial recorded weight.    Has realistic expectations for weight loss: Yes  Verbalizes understanding of dietary changes post procedure: Yes  Verbalizes understanding of supplement needs: No  Verbalizes willingness to participate in physical activity: Yes  Motivation for change: average  RD s prediction for client success and compliance on a scale of 1 (low) to 10 (high):  6/7    Plan for next visit:   Review Bariatric plate and food journal homework.  Educate on dumping syndrome and reading food labels.  (Final Supervised Diet visit with RD) pre/post-op  diet progression, give review of surgery process.  Review [place] to Bariatric Success  Check Understanding Quiz    Time In: 8:30a  Time Out: 9:00a    ABN signed: Yes

## 2021-06-22 NOTE — PROGRESS NOTES
[facility] Daily Progress for LBP and right knee    Patient Name: Liana Briceño  Date: 12/11/2018  Visit #: 12/12 (low back) ; right knee5/6; New orders for back 12/12; Re-cert 8/31/2018-11/15/2018 and G codes; 3/12 New Orders for back on 11/9/2018 Recert:  11/20/2018-1/25/2019  PTA visit #:  6 +5  PRECAUTIONS/RESTRICTIONS:  Right BAR s/p 6315-2060, DM2, Morbid Obesity, HX: MRSA  Referral Diagnosis: lumbalgia, lumbar radiculitis , myofascial pain; Right Knee Pain  Referring provider: Jaz Matute PA-C (Back); Mimi Fuentes MD (right knee pain);David Razo DO  Visit Diagnosis:     ICD-10-CM    1. Lumbar radiculopathy M54.16    2. Myofascial pain M79.18    3. Chronic back pain, unspecified back location, unspecified back pain laterality M54.9     G89.29    4. Decreased ROM of lumbar spine M53.86    5. Muscle weakness (generalized) M62.81    6. Poor posture R29.3          Assessment:     Resolution of 3rd exacerbation.   Poor compliance with HEP since 12/8/2018 due to GI issue.  Less tight/tender upper Lumbar paraspinals now which patient attributes to new use of Tumeric.  Walking without cane for the most part but bed still isn't comfortable.  Continues need to work on core strengthening.    Goal Status:    Pt. will demonstrate/verbalize independence in self-management of condition in : Progressing toward  Pt. will be independent with home exercise program in : Progressing toward  Pt. will have improved quality of sleep: Partially met;Comment (bed still isn't comfortable)  Pt. will be able to walk : 1 block;Progressing toward (without SE cane)  Patient will stand : other time;Progressing toward; Comment:: 15-20 minute tolerance    Patient will decrease ERIS Outcome measure by 30% 12 weeks-met (Initial score 64%  Current score 48%)    Plan / Patient Education:     Continue POC focus on core strengthening in standing.  Hold US STM if no further exacerbations.      Subjective:     Pain Rating: LBP: 2/10,  denies any LE pain/numbness tingling.  UseingTENS for pain management but needs electrodes. reviewed TENS electrode care and placement; unable to get electrodes covered by health insurance .  Standing is hard after 15-20 minutes; walking limited to 1 block but using cart at grocery; Ambulatory without SE cane unless standing in line.  Was able to back 25 pies over Thanksgiving.    Bed still isn't comfortable. Most comfortable in a contour position.    Unable to exercise since 12/8/2018 due to GI issues since then with cramping/bloating and not feeling well when eating since eating partial Meals-on-wheels.  Going to see PCP due to ongoing symptoms.    ERIS Outcome Measure:  Initial 64%  Current 48%      Objective:        Palpation:  Tight/Tender right mid lumbar, tender L3-4 but less and with less muscle tightness and at PSIS    Declined exercise due to GI issue.      Treatment Today   12/11/2018    TREATMENT MINUTES COMMENTS   Evaluation  Knee   Self-care/ Home management 10 Discussed status, obtained info on purchase of electrodes on [employer], and PT progression to focus on core strengthening   Manual therapy 20 STM right lumbar/sacral and MFR:  Right lumbar paraspinals/spinal decompression at left SL   Neuromuscular Re-education     Therapeutic Activity     Therapeutic Exercises  See flow sheet. .   Gait training     Modality_____US sidelying on left  To right QL and lumbar paraspinals____________  US to right mid-back 1 % at 1.5 w/cm2   ROM assessment  Lumbar    TENS Cross IFC pattern bordering pain bilat L3-S1  Low back mode, intensity 4.5   Total 30    Blank areas are intentional and mean the treatment did not include these items.     April Newell  12/11/2018

## 2021-06-23 NOTE — PATIENT INSTRUCTIONS - HE
Call your supply company and tell them you want a new over the nose mask. You don't want the same type you have now.

## 2021-06-23 NOTE — TELEPHONE ENCOUNTER
Prior Authorization Request  Who s requesting:  Patient  Pharmacy Name and Location: [facility]  Medication Name: Ozempic 1mg weekly   Informed patient that prior authorizations can take up to 10 business days for response:   Yes  Okay to leave a detailed message: Yes    Rationale: has been very successful on ozempic, this is the first GLP1 agonist that has allowed her to lose weight, improve A1C, decrease insulin doses. Previously treatment failure on Trulicity.     Component      Latest Ref Rng & Units 9/4/2018 11/9/2018 1/4/2019   Hemoglobin A1c      3.5 - 6.0 % 10.3 (H) 8.7 (H) 6.9 (H)

## 2021-06-23 NOTE — TELEPHONE ENCOUNTER
Prior Authorization Request  Who s requesting:  Pharmacy  Pharmacy Name and Location: [facility] [address]  Medication Name: Hydrocodone-Ace 5/325  Insurance Plan:   Insurance Member ID Number:  [id number]  Informed patient that prior authorizations can take up to 10 business days for response:   No  Okay to leave a detailed message: Yes   Pharmacy sent request

## 2021-06-23 NOTE — TELEPHONE ENCOUNTER
Prior Authorization Request  Who s requesting:  Patient  Pharmacy Name and Location: [facility]  Medication Name: Novolog  Insurance Plan: Medicare  [employer] [phone]  Insurance Member ID Number: [id number]   Informed patient that prior authorizations can take up to 10 business days for response:   Yes  Okay to leave a detailed message: Yes

## 2021-06-23 NOTE — PROGRESS NOTES
Health and Behavior Assessment with Intervention, 3rd Follow up (30 minutes): Met with patient 1:1 to assess goal setting exercise.  Weight today was 266.4 pounds.  Although she has lost weight, feels that her eating behaviors have not been where she would like them to be.  She also does not feel that she is being as active as she needs to.  Her sister-in-law just passed away in the past week and this has been quite upsetting to her.  Her boyfriend with whom she was planning to leave just proposed to her and she accepted.  Yet that relationship will need to be monitored closely.  She will follow-up with the same goals as the last session.  Diagnoses: F 54; E 66.01

## 2021-06-23 NOTE — TELEPHONE ENCOUNTER
Refill Approved    Rx renewed per Medication Renewal Policy. Medication was last renewed on 8/9/18.    Amy Velez, [facility] Triage/Med Refill 2/6/2019     Requested Prescriptions   Pending Prescriptions Disp Refills     omeprazole (PRILOSEC) 40 MG capsule 90 capsule 1     Sig: TAKE 1 CAPSULE BY MOUTH EVERY DAY    GI Medications Refill Protocol Passed - 2/6/2019  3:41 PM       Passed - PCP or prescribing provider visit in last 12 or next 3 months.    Last office visit with prescriber/PCP: 1/4/2019 Mimi Fuentes MD OR same dept: 1/4/2019 Mimi Fuentes MD OR same specialty: 1/4/2019 Mimi Fuentes MD  Last physical: 9/4/2018 Last MTM visit: Visit date not found   Next visit within 3 mo: Visit date not found  Next physical within 3 mo: Visit date not found  Prescriber OR PCP: Mimi Fuentes MD  Last diagnosis associated with med order: 1. Gastroesophageal reflux disease with esophagitis  - omeprazole (PRILOSEC) 40 MG capsule; TAKE 1 CAPSULE BY MOUTH EVERY DAY  Dispense: 90 capsule; Refill: 1    If protocol passes may refill for 12 months if within 3 months of last provider visit (or a total of 15 months).

## 2021-06-23 NOTE — PROGRESS NOTES
Health and Behavior Assessment with Intervention, 3rd Follow up (30 minutes): Met with patient 1:1 to assess goal setting exercise.  Weight today was 263.8 pounds.  She stated that her brother-in-law  in a car accident.  Otherwise she has been doing much better job with eating behaviors.  She noted that she is not 100% certain that she wants to have surgery but is close.  She will follow-up next month to determine if she wants to proceed.  Diagnoses: F 54; E 66.01

## 2021-06-23 NOTE — PROGRESS NOTES
Dear Dr. Fuentes, Mimi Johnson MD  [address] [place] [address],    Thank you for the opportunity to participate in the care of Liana Briceño.     She is a 63 y.o. y/o female patient who comes to the sleep medicine clinic for CPAP follow up.    She was diagnosed with RADHA in 2011 and has been using nPAP therapy.  Current PAP settings: 6 fixed.    Has been having lots of trouble with CPAP mask.  She has to keep adjusting mask.  Currently using dreamwear nasal pillows.    Averaging < 1 hour per night.    Hasn't felt benefit with CPAP at current levels of use as she's having trouble getting comfortable with current mask.  Has to hold mask over her nose.      However, the nights she's able to wear CPAP all night long she does find she isn't snoring and she is feeling better.      30-Days Efficacy and Compliance Data  Residual AHI: 6  Leak: 15.5 (95%)  Days used > 4 hours: 1/30  Average usage per night: 50 minutes    Past Medical History:   Diagnosis Date     Anemia      Arthritis      Breast cyst      Carpal tunnel syndrome      Coronary artery disease due to lipid rich plaque      Dyslipidemia, goal LDL below 70      Esophageal reflux      Essential hypertension      Fatty liver      Goiter diffuse, nontoxic      Hiatal hernia      History of gestational diabetes      History of MRSA infection 07/17/2017    Pilonidal cyst culture     Morbid obesity with BMI of 40.0-44.9, adult (H)      RADHA on CPAP      Paroxysmal SVT (supraventricular tachycardia) (H)     S/P ablation     S/P hip replacement      Type 2 diabetes mellitus (H)        Past Surgical History:   Procedure Laterality Date     BREAST BIOPSY       BREAST CYST EXCISION       CARDIAC CATHETERIZATION N/A 4/24/2017    Procedure: Coronary Angiogram;  Surgeon: Ariane Biswas MD;  Location: [facility] Cath Lab;  Service:      CARDIAC CATHETERIZATION N/A 5/19/2017    Procedure: Coronary Angiogram;  Surgeon: Howard Gutierrez MD;  Location: [facility]  Lab;  Service:      CHOLECYSTECTOMY  2000     CORONARY STENT PLACEMENT  2017    BO to RCA by Dr. Biswas     OR EXPLO/DRAIN BREAST ABSCESS      Description: Breast Surgery Mastotomy With Drainage Of Abscess     OR L HRT CATH W/NJX L VENTRICULOGRAPHY IMG S&I N/A 2017    Procedure: Left Heart Catheterization with Left Ventriculogram;  Surgeon: Ariane Biswas MD;  Location: [facility] Cath Lab;  Service: Cardiology     OR REVISE MEDIAN N/CARPAL TUNNEL SURG      Description: Neuroplasty Decompression Median Nerve At Carpal Tunnel;  Recorded: 2009;     OR THYROID LOBECTOMY,UNILAT       TOTAL HIP ARTHROPLASTY Right        Social History     Socioeconomic History     Marital status: Domestic Partner     Spouse name: Panchito Mclean     Number of children: 1     Years of education: Not on file     Highest education level: Not on file   Social Needs     Financial resource strain: Not on file     Food insecurity - worry: Not on file     Food insecurity - inability: Not on file     Transportation needs - medical: Not on file     Transportation needs - non-medical: Not on file   Occupational History     Occupation: no longer there as they closed the center     Employer: [employer]     Occupation: PCA     Comment: four hours a day   Tobacco Use     Smoking status: Former Smoker     Packs/day: 0.25     Years: 44.00     Pack years: 11.00     Types: Cigarettes     Last attempt to quit: 2016     Years since quittin.9     Smokeless tobacco: Never Used     Tobacco comment: age 18 to age 62 up to 1/2 ppd   Substance and Sexual Activity     Alcohol use: Yes     Alcohol/week: 0.0 oz     Comment: < 1 drink a week     Drug use: No     Sexual activity: Not on file   Other Topics Concern     Not on file   Social History Narrative    Single but lives with her significant other, has one son, she is a .  She is a PCA.     Disability for LBP. Work injury.       Review of Systems:  General: No  weight gain, no weight loss  Eyes: No vision changes  ENT: No hearing changes  Cardio: No chest pain, no nocturnal dyspnea  Respiratory: No shortness of breath, no cough  Gastrointestinal: No diarrhea, no constipation  Genitourinary: No excessive urination  Tegumentary: No rashes  Neurological: No seizures, no loss of consciousness  Endo: No heat or cold intolerance.    Current Outpatient Medications   Medication Sig Dispense Refill     acetaminophen (TYLENOL) 500 MG tablet Take 1,000 mg by mouth 3 (three) times a day as needed.       aspirin 81 MG EC tablet Take 81 mg by mouth daily.       blood glucose test strips Use 1 each As Directed 3 (three) times a day. Dispense brand per patient's insurance at pharmacy discretion. (E11.9) 300 strip 1     cholecalciferol, vitamin D3, (CHOLECALCIFEROL) 1,000 unit tablet Take 1,000 Units by mouth daily.       cyclobenzaprine (FLEXERIL) 10 MG tablet Take 1 tablet (10 mg total) by mouth at bedtime as needed for muscle spasms. 21 tablet 1     fluconazole (DIFLUCAN) 150 MG tablet Use once per week times three 3 tablet 0     generic lancets (MICROLET LANCET) Use 1 each As Directed 4 (four) times a day. 300 each 3     hydroCHLOROthiazide (MICROZIDE) 12.5 mg capsule Take 1 capsule (12.5 mg total) by mouth daily. 90 capsule 3     HYDROcodone-acetaminophen 5-325 mg per tablet Take 1 tablet by mouth 2 (two) times a day as needed for pain. 30 tablet 0     insulin aspart U-100 (NOVOLOG FLEXPEN U-100 INSULIN) 100 unit/mL injection pen Inject 45 Units under the skin 3 (three) times a day before meals. 45 mL 3     insulin degludec (TRESIBA FLEXTOUCH U-200) 200 unit/mL (3 mL) InPn Inject 136 Units under the skin daily. 20 mL 11     lisinopril (PRINIVIL,ZESTRIL) 5 MG tablet Take 1.5 tablets (7.5 mg total) by mouth daily. 135 tablet 3     melatonin (MELATIN) 3 mg Tab tablet Take 3 mg by mouth at bedtime as needed.       nitroglycerin (NITROSTAT) 0.4 MG SL tablet Place 1 tablet (0.4 mg total)  "under the tongue every 5 (five) minutes as needed for chest pain. 25 tablet 3     omega-3 acid ethyl esters (LOVAZA) 1 gram capsule TAKE 2 CAPSULES BY MOUTH TWICE DAILY 360 capsule 1     omeprazole (PRILOSEC) 40 MG capsule TAKE 1 CAPSULE BY MOUTH EVERY DAY 90 capsule 1     pen needle, diabetic (BD ULTRA-FINE ZULMA PEN NEEDLES) 32 gauge x 5/32\" Ndle Inject 4 each under the skin daily. With Tresiba and Novolog 120 each 3     rosuvastatin (CRESTOR) 10 MG tablet TAKE 1 TABLET BY MOUTH DAILY 90 tablet 2     semaglutide (OZEMPIC) 1 mg/0.75 mL (2 mg/1.5 mL) PnIj Inject 1 mg under the skin every 7 days. 3 mL 11     thiamine 100 MG tablet Take 1 tablet (100 mg total) by mouth daily. 90 tablet prn     No current facility-administered medications for this visit.        Allergies   Allergen Reactions     Penicillins Other (See Comments)     Passed out.      Doxycycline Dizziness     Eggshell Membrane      Naloxone      itching     Pneumococcal 23-Valent Polysaccharide Vaccine Other (See Comments)     pneumonia     Statins-Hmg-Coa Reductase Inhibitors Myalgia     Shaking with simvastatin and atorvastatin     Azithromycin Rash       Physical Exam:  /70   Pulse 90   Ht 5' 6\" (1.676 m)   Wt (!) 266 lb 6.4 oz (120.8 kg)   SpO2 98%   BMI 43.00 kg/m    BMI:Body mass index is 43 kg/m .   GEN: NAD, obese     Labs/Studies:  I reviewed the efficacy and compliance report from his device. Data summarized on the HPI.     Lab Results   Component Value Date    WBC 6.8 01/04/2019    HGB 11.4 (L) 01/04/2019    HCT 34.7 (L) 01/04/2019    MCV 87 01/04/2019     01/04/2019         Chemistry        Component Value Date/Time     09/04/2018 1324    K 4.7 09/04/2018 1324     09/04/2018 1324    CO2 26 09/04/2018 1324    BUN 16 09/04/2018 1324    CREATININE 1.00 09/04/2018 1324     (H) 09/04/2018 1324        Component Value Date/Time    CALCIUM 9.6 09/04/2018 1324    ALKPHOS 107 10/11/2018 1631    AST 24 10/11/2018 1631 "    ALT 33 10/11/2018 1631    BILITOT 0.4 10/11/2018 1631            Lab Results   Component Value Date    FERRITIN 657 (H) 10/11/2018           Assessment and Plan:  In summary Liana Briceño is a 63 y.o. year old female who is here for CPAP f/u.    1. Obstructive Sleep Apnea.  Residual AHI is improved  Compliance is poor  Patient is benefiting from using PAP therapy.    We will change the pressure settings to: auto 5 - 9 cmH2O    We counseled the patient on the importannce of using her PAP device every night and the risks of not treating sleep apnea.      Patient verbalized understanding of these issues, agrees with the plan and all questions were answered today. Patient was given an opportuntity to voice any other symptoms or concerns not listed above. Patient did not have any other symptoms or concerns.      Patient told to return in 3 months.     Zhen HUDDLESTON Board Certified in Internal Medicine and Sleep Medicine  [facility].    We spent a total of 25 minutes of face-to-face encounter and more than 50% of the encounter was used for counseling or coordination of care.

## 2021-06-23 NOTE — PROGRESS NOTES
Follow Up Surgical Weight Loss Supervised Diet Evaluation  Assessment:  Pt presents for follow up supervised diet visit with TONO.    This patient is a 63 y.o.  She is being seen today for follow-up nutritional evaluation. Linaa Briceño has been unsuccessful with non-surgical weight loss methods and is interested in bariatric surgery. Today we reviewed the patients current eating habits and level of physical activity, and instructed on the changes that are required for successful bariatric outcomes.    +Pt on fence about surgery   Workflow review: Pt has completed labs, still working with Mauro and has not attended support group  Weight goal: At or below initial weight  -stress really reeking havoc on nutrition changes     Pt Active Problem List Diagnosis:  Patient Active Problem List   Diagnosis     Obstructive Sleep Apnea     Diffuse Nontoxic Goiter     Type 2 diabetes mellitus (H)     Essential hypertension     Esophageal reflux     Dyslipidemia, goal LDL below 70     Hiatal Hernia     Sensorineural hearing loss     Stenosis of left carotid artery     Coronary artery disease due to lipid rich plaque     Morbid obesity with BMI of 40.0-44.9, adult (H)     RADHA on CPAP     Thiamine deficiency     Vitamin D deficiency     Increased PTH level     Low serum vitamin B12     Elevated ferritin level     Positive result for methicillin resistant Staphylococcus aureus (MRSA) screening     Pt's Initial Weight: 273 lbs  Weight: (!) 263 lb 12.8 oz (119.7 kg)  Weight loss from initial: 9.2  % Weight loss: 3.37 %    Body mass index is 42.58 kg/m .    Calculated RMR (Hamel-St Jeor equation): 1839 calories    Progress made since last visit: Pt has been tracking her food and reducing portions; lowered fast food intake   Concerns: CHO intake high at bfast, skipping meal occasionally and low water intake. She is drinking coffee daily    Diabetes  A1C - down to 6.9%    -sleep apnea  Diet Recall/Time:   looked over food log and  discussed    Meals per week away from home: 1-2X/week     Recommended limiting eating out to no more than 2x/week.  Patient and I reviewed the importance of eating three consistent meals per day; as well as meal timing to be spaced 4-5 hours apart.  Snack choices: 100-150 calories (1-2x/day if physically hungry), incorporating a fruit/vegetable w/ protein source.    Meal Duration:20 minutes  Encouraged slowing meal times down, 20-30 minutes, chewing to applesauce consistency.     Portion Sizes problematic? Yes Per patient/diet recall   To aid in proper portion control and slow meal time down discussed consuming meals off smaller plates, use toddler/children utensils and set utensils down after each bite.    Protein, vegetables/fruits, carbohydrates:   The patient and I discussed the importance of including lean/low fat protein at each meal and limiting carbohydrate intake to less than 25% of plate volume.     Vitamins   Post-op vitamin regimen: Multi Vit + iron 2x/day, calcium citrate 500-600 mg 2x/day, 4060-2417 mcg of Sublingual B-12 daily, and 5000 IU Vitamin D3 daily.    Beverages (Type/Oz. per day)  Water: 2 days 48oz; other days 32oz   Coffee: 1 cup daily     Discussed the importance of adequate hydration after surgery and the goal of 64+ oz of fluid daily.   The patient understands the importance of avoiding all carbonated, caffeinated and sweetened drinks; and instead choose 64 oz plain water.    Fluid-meal separation:   Pt is working on  fluids 30min before and 30 minutes after meals.  Fluids are  30min before and 30 minutes after meals.    Exercise  2X/week - timing    Pt's understands that 30-60 minutes of daily activity is an important part of bariatric surgery success.   Encouraged pt to incorporate upper body strength training exercise, even if its lifting soup cans while watching TV at night, doing pushups/sit-ups, and abdominal work.    PES statement:   1. (NC-3.3.5) Obese, class  III, BMI ?40 related to physical inactivity as evidenced by Infrequent, low-duration and or low intensity physical activity; and Large amounts of sedentary activities; no structured physical activity regimen    Intervention:  Discussion:   1. Reviewed the Keys to Bariatric Success handout.  2. Reviewed lean protein sources and recommended to consume 15-20gm protein at 3 meals daily.  3. Reviewed Bariatric plate and food journal homework.  Instructions/Goals:   1. Include 15-20gm protein at each meal.  2. Increase vegetable/fruit intake, by having a vegetable or fruit with each meal daily. Recommended pt to increase vegetable/fruit intake to 4-5 servings daily.  3. Increase fluid intake to 64oz daily: choose plain or calorie/carbonation-free beverages.  4. Incorporate daily structured activity, 30-60 minutes most days of the week  5. Practice plate method: 1/2 plate lean/low fat protein source, vegetable/fruit, <25% of plate complex carbohydrates.  6. Read food labels more consistently: keeping total fat grams <10, total sugar grams <10, fiber >3gm per serving.  7. Separate fluids 30 minutes before/after meal times.  8. Practice eating off of smaller plates/bowls, chewing to applesauce consistency, taking 20-30 minutes to eat in a calm/relaxed environment without distractions of tv/email/cell phone.    Monitor/Evaluation:     Pt understands the importance of not gaining any weight from initial recorded weight.    Has realistic expectations for weight loss: Yes  Verbalizes understanding of dietary changes post procedure: Yes  Verbalizes understanding of supplement needs: No  Verbalizes willingness to participate in physical activity: Yes  Motivation for change: low  RD s prediction for client success and compliance on a scale of 1 (low) to 10 (high):  5    Plan for next visit:   Educate on dumping syndrome and reading food labels.  (Final Supervised Diet visit with RD) pre/post-op  diet progression, give review of  surgery process.  Review [place] to Bariatric Success  Check Understanding Quiz    Time In: 1:30p  Time Out: 2:00p    ABN signed: Yes

## 2021-06-23 NOTE — PROGRESS NOTES
[facility] Discharge Summary  Patient Name: Liana Briceño  Date: 2/28/2019  Referral Diagnosis: lumbalgia, lumbar radiculitis , myofascial pain; Right Knee Pain    Referring provider: Mimi Fuentes MD  Visit Diagnosis:   1. Lumbar radiculopathy     2. Myofascial pain     3. Chronic back pain, unspecified back location, unspecified back pain laterality     4. Decreased ROM of lumbar spine     5. Muscle weakness (generalized)     6. Poor posture         Goals:  Pt. will demonstrate/verbalize independence in self-management of condition in : Progressing toward  Pt. will be independent with home exercise program in : Progressing toward  Pt. will have improved quality of sleep: Partially met;Comment (bed still isn't comfortable)  Pt. will be able to walk : 1 block;Progressing toward (without SE cane)  Patient will stand : other time;Progressing toward;Comment (20-30 minutes tolerance)  Comment:: 15-20 minute tolerance  ERIS Outcome Measure demonstrated as statistically significant change; improving 30% (initial score 64%  Current score 38%)    Patient was seen for Knee 4 visits and LBP 30  visits from 6/7/2018 to 1/22/2019 with 6 cancelled and 4 No Show appointments.  The patient met goals and has demonstrated understanding of and independence in the home program for self-care, and progression to next steps.  She will initiate contact if questions or concerns arise.  The patient was instructed to follow up with physician's clinic.  Patient received a home program lumbar ROM/stretches, core/pelvic/knee strengthening  The patient was issued theraband and kinesiotape and instructed in proper usage.    Therapy will be discontinued at this time.  The patient will need a new referral to resume.    Thank you for your referral.  April Newell  2/28/2019  2:06 PM      [facility] Re-Certification Request    January 22, 2019      Patient: Liana Briceño  MR Number: [id number]  Date of  Birth: 1955  Date of Visit: 1/22/2019  Onset Date:  5/1/2018  Date of Eval: 6/7/2018    Dear Dr. Mimi Fuentes:    As you may recall, we have been seeing Liana Briceño for Physical Therapy of Back pain.    For therapy to continue, Medicare and/or Medicaid requires periodic physician review of the treatment plan. Please review the summary of the patient's progress and our plan for continued therapy, and verify  that you agree therapy should continue by entering certification dates at the bottom of this note and co-signing this note.    Plan of Care  Authorization / Certification Start Date: 01/22/19  Authorization / Certification End Date: 02/28/19  Authorization / Certification Number of Visits: 6  Communication with: Referral Source  Patient Related Instruction: Nature of Condition;Treatment plan and rationale;Self Care instruction;Basis of treatment;Body mechanics;Posture;Precautions;Next steps;Expected outcome  Times per Week: 1  Number of Weeks: 6  Number of Visits: 6  Discharge Planning: INdependent HEP and self-management of symptoms  Precautions / Restrictions : Right BAR s/p 5480-8844, DM2, Morbid Obesity, HX: MRSA  Therapeutic Exercise: ROM;Stretching;Strengthening;Other (medx program)  Neuromuscular Reeducation: core  Manual Therapy: soft tissue mobilization;myofascial release      Goal  Pt. will demonstrate/verbalize independence in self-management of condition in : Progressing toward  Pt. will be independent with home exercise program in : Progressing toward  Pt. will have improved quality of sleep: Partially met;Comment (bed still isn't comfortable)  Pt. will be able to walk : 1.5 block;Progressing toward (without SE cane)  Patient will stand : other time;Progressing toward;Comment (20-30 minutes tolerance)  Comment::-20-25 minute tolerance    Patient will decrease ERIS Outcome measure by 30% 12 weeks-met (Initial score 64%  Current score 38%)          If you have any questions or concerns, please don't  hesitate to call.    Sincerely,      April Newell, PT        Physician recommendation:     ___ Follow therapist's recommendation        ___ Modify therapy      *Physician co-signature indicates they certify the need for these services furnished within this plan and while under their care.        [facility] Daily Progress for LBP and right knee    Patient Name: Liana Briceño  Date: 1/22/2019  Visit #: 12/12 (low back) ; right knee5/6; New orders for back 12/12; Re-cert 8/31/2018-11/15/2018 and G codes; 6/12 New Orders for back on 11/9/2018 Recert:  11/20/2018-1/25/2019  PTA visit #:  6 +6  PRECAUTIONS/RESTRICTIONS:  Right BAR s/p 2056-8929, DM2, Morbid Obesity, HX: MRSA  Referral Diagnosis: lumbalgia, lumbar radiculitis , myofascial pain; Right Knee Pain  Referring provider: Jaz Matute PA-C (Back); Mimi Fuentes MD (right knee pain);David Razo DO  Visit Diagnosis:     ICD-10-CM    1. Lumbar radiculopathy M54.16    2. Myofascial pain M79.18    3. Chronic back pain, unspecified back location, unspecified back pain laterality M54.9     G89.29    4. Decreased ROM of lumbar spine M53.86    5. Muscle weakness (generalized) M62.81    6. Poor posture R29.3          Assessment:     Reportedly compliant with HEP up until 1/18/2019  Less tight/tender upper Lumbar paraspinals now which patient attributes to new use of Tumeric.  Walking without cane for the most part/slight antalgia but bed still isn't comfortable.  Continues need to work on core strengthening.  ERIS Outcome Measure has demonstrated an overall statistically significant change since beginning PT 6/7/2018.    Goal Status:    Pt. will demonstrate/verbalize independence in self-management of condition in : Progressing toward  Pt. will be independent with home exercise program in : Progressing toward  Pt. will have improved quality of sleep: Partially met;Comment (bed still isn't comfortable)  Pt. will be able to walk : 1.5 block;Progressing  toward (without SE cane)  atient will stand : other time;Progressing toward; Comment:: 20-25 minute tolerance    Patient will decrease ERIS Outcome measure by 30% 12 weeks-met (Initial score 64%  Current score 38%)    Plan / Patient Education:     Continue POC focus on core strengthening in standing.  Check trunk ROM next.  Recommend continuing independently after next/last appointment.    Subjective:     Pain Rating: LBP: 4-5/10 deep ache right upper to mid lumbar, denies any LE pain/numbness tingling.  Thigh/anterior burning  hip pain is resolving.  Sleeping is better.  Lost 12# over the past 2 months attributed to new diabetes medication.    ERIS Outcome measure by 30% 12 weeks-met Initial score 64%  Current score 38%      Objective:      Pt arrived 10 mins late due snow.  Palpation:  Tight/Tender right mid lumbar, tender L3-4 but less and with less muscle tightness and at PSIS  Ex per flow sheet.    Walking with less limping.  Tolerated exercise well with perspiration.    Treatment Today   1/22/2019    TREATMENT MINUTES COMMENTS   Evaluation  Knee   Self-care/ Home management  Discussed status, obtained info on purchase of electrodes on [employer], and PT progression to focus on core strengthening   Manual therapy  STM right lumbar/sacral and MFR:  Right lumbar paraspinals/spinal decompression at left SL   Neuromuscular Re-education     Therapeutic Activity     Therapeutic Exercises 28 See flow sheet. .   Gait training     Modality_____US sidelying on left  To right QL and lumbar paraspinals____________  US to right mid-back 1 % at 1.5 w/cm2   ROM assessment  Lumbar    TENS Cross IFC pattern bordering pain bilat L3-S1  Low back mode, intensity 4.5   Total 28    Blank areas are intentional and mean the treatment did not include these items.     April Newell  1/22/2019

## 2021-06-24 NOTE — PROGRESS NOTES
Health and Behavior Assessment with Intervention for  Bariatric Surgery Candidates    Name: Liana Briceño   YOB: 1955  Dates of Service: 10/29/2018, 11/5/2018, 12/17/2018, 1/14/2019, 1/28/2019 and 3/4/2019  Psychological Testing: 10/29/2018  Report Date: 3/4/2019    Height: 5 feet 6 inches reported Weight: 273 pounds  BMI: 44.06  Anticipated Weight: Less than 200 pounds    Identifying Data: This is a 63 y.o. single, -American mother of one child (age 22). She was referred by [facility] to determine readiness for bariatric surgery from a psychosocial perspective. She learned about the surgery by attending the informational meeting and seeing a couple friends go through the process.    Reason for Pursuing Surgery: She would like to follow through with bariatric surgery for health reasons.  She is concerned about getting her diabetes in control and struggles with back pain.    Diagnostic Impressions:  Principal Diagnosis: F 56 psychological factors affecting medical condition  Secondary diagnoses: Morbid obesity, high blood pressure, edema, heart disease, shortness of breath, rash, urinary incontinence, sleep apnea on CPAP, diabetes mellitus, heartburn, GERD, gallbladder removed, hiatal hernia, degenerative arthritis, degenerative disc disease, fatty liver disease and pain in her hip, knee, ankle, back and feet    Conclusions    Recommendations: This patient noted that for the time being she wants to go through the nonsurgical weight management program.  She feels that she is losing some weight and before making the decision to have surgery, she wants to continue to make changes in her behaviors.  The final decision to follow through with bariatric surgery should be done in collaboration with [facility] clinical staff.    Current Treatment Plan and Aftercare Plan: This patient will continue to work with the bariatric dietitian and this  "clinician if necessary.    Special Needs or Precautions: Monitor this patient's ability to avoid mindless eating.    Compliance, Motivation and Expectations (Change in Behavior): This patient has made significant changes in her eating and lifestyle. She is highly motivated to continue to make these changes in this program. She has realistic expectations about her weight loss.     Self-Perception of Readiness for Surgery: This patient rated her readiness for surgery at a 5, where 10 means \"extremely well prepared.\"    The following history supports the above conclusions and treatment recommendations.    History of Presenting Illness: This patient has struggled with her weight since she was 31 years old.  She reached 200 pounds at age 42.  Her highest weight is her current weight. She believes morbid obesity has affected her daily life through difficulty getting up from the floor, sitting in a painter at a restaurant, sitting in an airplane or theater seat, getting in and out of a small car as well as low endurance.    Previous Attempts at Disease Management: This patient has never been through structured weight loss.  The most weight she ever lost was 60 pounds on her own with increased activity.  She believes she gained weight over time because of quitting smoking and the fact that \"I love to cook.\"    Self-Care Behaviors  Day and Night Routine: This patient goes to sleep between 1 and 4 AM and wakes up between 7 and 9 AM.  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    Self-Care and Treatment Adherence: This patient does a better job of taking care of herself.  Her hygiene is good, she complies with medical advice given to her and gets regular physical, gynecological, mammogram and dental exams.    Stress Management and Coping Mechanisms: This " "patient carol with stress by \"flying off the handle and getting angry.\"    Other Impulsive and Compulsive Tendencies  Gambling: This patient goes to the [employer] less than once per week between 60 and $80 each time.  Compulsive Spending: Denied  Credit Card Debt: $3000  Bankruptcy: 20 years ago    Legal History: Denied    Physical and Leisure Activities: This patient has not been going to the gym or getting in the pool frequently as she had in the past.    Substance Use  OTC and Prescription Medications: This patient denied a history of medication abuse and reportedly takes her medications as directed.  Nicotine: This patient started smoking cigarettes at age 18, at most was up to 1/2 pack/day and quit January 29, 2018.  Alcohol: This patient started drinking alcohol as a teenager and now may have 3 drinks on occasion.  She denied a history of alcohol-related problems and understands the increased risk of intoxication following a bariatric surgical procedure.  Illicit Substances: This patient tried cannabis as a teenager, did so up to a daily basis and last did so at age 27.    Typical Eating Pattern and Fluid Intake  Eating Disorder-Related Behavior: This patient denied a history of misusing diuretics or laxatives, of making herself vomit to lose weight, of starving herself, of over-exercising, of taking illegal substances or of smoking cigarettes for weight control purposes.  She has a history of overeating.  Historically she tended to eat her first meal between 9 and 9:30 AM consisting of oatmeal, sausage, toast, bearden, cream corn and rice.  Later she would snack on potato chips.  Lunch was between 230 and 3 PM consisting of cheese burgers and fries.  She also might have a sandwich and cabbage.  Later she would have cookies and chips.  Dinner was at 8 PM consisting of collared greens or going out to eat.  She was having 1 cup of coffee, 8 ounces of 7-Up and 72 ounces of water on a daily basis.    Since starting the " health and behavior assessment she was eating breakfast at 9 AM consisting of [employer] 3 times per week, oatmeal, turkey bearden and toast.  Later she would have potato chips.  Lunch was at 3 PM consisting of a Subway sandwich, hamburger, steak and nuts.  Later she would have a boost drink.  Dinner was between 630 and 7 PM consisting of chicken, collared greens and a cornbread muffin.  She was having cranberry juice 3 times a week, 1 cup of coffee and 48 ounces of water on a daily basis.  She tended to lose control of her eating when she was food deprived and her comfort food included chow mein.    Psychiatric History  Traumatic Life Events and Abuse/Neglect History: This patient denied a history of trauma as well as physical, emotional or sexual abuse.  She noted that she is disappointed with her weight.  She denied being put down or teased because of her physical condition.  She denied any history of depression, anxiety, panic or obsessive-compulsive symptoms.  She denied being in psychotherapy.  She did feel more anxiety after her heart was ablated.    Medical History (by patient report and without consulting with her primary care physician). This patient is followed medically by Mimi Fuentes MD at [facility] who reportedly supports the patient's candidacy for bariatric surgery.    Allergies/Sensitivities: Penicillin, doxycycline, pneumococcal vaccine, naloxone, azithromycin and statins  Prenatal Complications, Birth Trauma, Unusual Birth Weight: Denied  Head Trauma, Concussion, Extremely High Fevers, Seizures: Denied  Thyroid Function: Reportedly normal.  Hospitalizations and Surgeries: Hip replacement, half thyroid removed, gallbladder removed, ablation, stent, 5 miscarriages and normal labor and delivery  Current Medications:   Current Outpatient Medications:      acetaminophen (TYLENOL) 500 MG tablet, Take 1,000 mg by mouth 3 (three) times a day as needed., Disp: , Rfl:      aspirin 81 MG EC  "tablet, Take 81 mg by mouth daily., Disp: , Rfl:      blood glucose test strips, Use 1 each As Directed 3 (three) times a day. Dispense brand per patient's insurance at pharmacy discretion. (E11.9), Disp: 300 strip, Rfl: 1     cholecalciferol, vitamin D3, (CHOLECALCIFEROL) 1,000 unit tablet, Take 1,000 Units by mouth daily., Disp: , Rfl:      cyclobenzaprine (FLEXERIL) 10 MG tablet, Take 1 tablet (10 mg total) by mouth at bedtime as needed for muscle spasms., Disp: 21 tablet, Rfl: 1     fluconazole (DIFLUCAN) 150 MG tablet, Use once per week times three, Disp: 3 tablet, Rfl: 0     generic lancets (MICROLET LANCET), Use 1 each As Directed 4 (four) times a day., Disp: 300 each, Rfl: 3     hydroCHLOROthiazide (MICROZIDE) 12.5 mg capsule, Take 1 capsule (12.5 mg total) by mouth daily., Disp: 90 capsule, Rfl: 3     HYDROcodone-acetaminophen 5-325 mg per tablet, Take 1 tablet by mouth 2 (two) times a day as needed for pain., Disp: 30 tablet, Rfl: 0     insulin degludec (TRESIBA FLEXTOUCH U-200) 200 unit/mL (3 mL) InPn, Inject 132 Units under the skin daily., Disp: 20 mL, Rfl: 11     insulin lispro (HUMALOG KWIKPEN INSULIN) 100 unit/mL pen, Inject 30 Units under the skin 3 (three) times a day., Disp: 15 adj dose pen, Rfl: 3     lisinopril (PRINIVIL,ZESTRIL) 5 MG tablet, Take 1.5 tablets (7.5 mg total) by mouth daily., Disp: 135 tablet, Rfl: 3     melatonin (MELATIN) 3 mg Tab tablet, Take 3 mg by mouth at bedtime as needed., Disp: , Rfl:      nitroglycerin (NITROSTAT) 0.4 MG SL tablet, Place 1 tablet (0.4 mg total) under the tongue every 5 (five) minutes as needed for chest pain., Disp: 25 tablet, Rfl: 3     omega-3 acid ethyl esters (LOVAZA) 1 gram capsule, TAKE 2 CAPSULES BY MOUTH TWICE DAILY, Disp: 360 capsule, Rfl: 1     omeprazole (PRILOSEC) 40 MG capsule, TAKE 1 CAPSULE BY MOUTH EVERY DAY, Disp: 90 capsule, Rfl: 3     pen needle, diabetic (BD ULTRA-FINE ZULMA PEN NEEDLES) 32 gauge x 5/32\" Ndle, Inject 4 each under the " "skin daily. With Tresiba and Novolog, Disp: 120 each, Rfl: 3     rosuvastatin (CRESTOR) 10 MG tablet, TAKE 1 TABLET BY MOUTH DAILY, Disp: 90 tablet, Rfl: 2     semaglutide (OZEMPIC) 1 mg/0.75 mL (2 mg/1.5 mL) PnIj, Inject 1 mg under the skin every 7 days., Disp: 3 mL, Rfl: 11     thiamine 100 MG tablet, Take 1 tablet (100 mg total) by mouth daily., Disp: 90 tablet, Rfl: prn  Vitamins/Supplements: Vitamin D and fish oil  Activities of Daily Living (ADLs): This patient denied any difficulty meeting her hygiene needs.  Attitudes, Fears, or Concerns Regarding this Surgery: This patient noted that she wonders if her current medications will allow her to lose weight.    Family History  This patient has a family history that is positive for obesity, high blood pressure, high cholesterol, heart disease, diabetes mellitus, arthritis, cancer and depression.    Social and Developmental History:  This patient was born and raised in [place], [place].  Her mother  in  at age 70 and her father  2 years ago at age 94.  They  in  and her father remarried.  She has 6 brothers although one  and 3 sisters although 2  and is seventh in the sibship.  She has a very good relationship with each.  She noted during her upbringing that she came from \"a very strict discipline and as well as spiritual family.  We learned how to make ends meet.\"  She did live in a large house.    Educational History: This patient graduated from [employer] in .  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  Current Living Situation, Partner, and Children: This patient is currently in a relationship, has been doing so for 23 years and now they plan to get  in a couple months.  She has a 22-year-old son.  She lives with her fiancé and son.  Her support includes her friends, fiancé and siblings.  Adventism Orientation/Spiritual Support: Muslim   History: [employer]  Two Year Goals: This patient would like to " feel healthier, be at a lower weight and be more financially secure.    Psychological Testing: The Alcohol Use Disorders Identification Test (AUDIT) is a measure to determine how alcohol affects overall functioning and treatment. Her score was 2 which is at a subclinical level suggesting that alcohol likely will not affect her functioning in the least.    This patient scored a 1 on the adverse childhood experience (ACE) questionnaire suggesting that she only had to cope with her parents  and was never abused.    The [place] Multiphasic Personality Prmjchdkt-5-Oilmfjnaqbbm Form (MMPI-2-RF) was responded to in an open and honest manner and the profile is valid and interpretable.  Individuals with profiles similar to hers tend to have somatic based complaints and worry about their health.  They may have been in trouble with the law or suspended from school.    This patient scored at a clinically significant level for weight concerns and body shape concerns on the eating disorder examination questionnaire.    This patient did not score at a clinically significant level for night eating on the night eating questionnaire or binge eating on the binge eating scale.    Mental Status: This patient came to the evaluation setting dressed casually, although appropriately. She was generally cooperative and responded appropriately to this clinician's questions. Her affect was generally bright and Her mood was consist with her affect. There is no evidence of obsessions, compulsions, suicidal ideation or homicidal ideation. There is no evidence of hallucinations, delusions, paranoid ideation, grossly inappropriate affect or other pedro manifestation of psychotic disorder. She was oriented to person, place and time, and there is no evidence of any problems with impulse control.

## 2021-06-24 NOTE — TELEPHONE ENCOUNTER
Who is calling:  Patient  Reason for Call:  Patient states that she had gone to the pharmacy and they had informed her that they don't have Magnesium Glycenak 100mg.  Patient was unable to find it over the counter.  Patient states that she had looked at over the counter magnesium vitamins and it was $28 a bottle and still did not have 100mg of it.  Please advise patient on what she should do.  Date of last appointment with primary care: 3/8/19  Okay to leave a detailed message: Yes    Medication Request  Medication name: Recommendation for foot pain  Pharmacy Name and Location: [id number] - [place], [place] - [address] AT [facility]   Reason for request: Patient states that she has been having foot pain all night last night and into today.  Feels like a stinging sensation.  Will like to know if there's anything Dr. Fuentes can prescribe to help with it.  Please advise.  When did you use medication last?:  N/A  Okay to leave a detailed message: yes

## 2021-06-24 NOTE — PROGRESS NOTES
[facility] Follow Up Note    Assessment/Plan:  1. Chronic low back pain, unspecified back pain laterality, with sciatica presence unspecified  Patient continues to have exacerbation of chronic right buttock and lower back pain.  This stems from a long-standing chronic back injury.  Of note, she is requesting a renewal of her Vicodin.  A prescription was provided.  Additional recommendations: She had a very bad experience at the spine center at [facility].  Would like to refer her back to [facility] to see 1 of their back physicians.  Additionally, would like her to resume physical therapy.  - HYDROcodone-acetaminophen 5-325 mg per tablet; Take 1 tablet by mouth 2 (two) times a day as needed for pain.  Dispense: 40 tablet; Refill: 0  - Ambulatory referral to Physical Therapy  - Ambulatory referral to Orthopedics    2. Type 2 diabetes mellitus with other specified complication, with long-term current use of insulin (H)  Medications reviewed and updated.  Will check basic metabolic profile  - insulin degludec (TRESIBA FLEXTOUCH U-200) 200 unit/mL (3 mL) InPn; Inject 115 Units under the skin daily.  Dispense: 20 mL; Refill: 11  - Basic Metabolic Panel    3. Green stool  New onset of green stool with no abdominal pain, diarrhea etc.  Has started cinnamon supplements.  Recommendation: Hold cinnamon supplements for now.  If this causes resolution of problem, then she can decide whether this is beneficial for her.  Additionally, will check liver enzymes  - Hepatic Profile    4. Idiopathic peripheral neuropathy  Bilateral numbness and tingling-worse at night.  This occurs in the setting of chronic lumbar disc disease as well as type 2 diabetes.  Recommendation: Would like to proceed with an EMG to see if there is diabetic neuropathy.  In the interim, she will take magnesium in the evening  - EMG- Both Legs; Future      Follow-up in 3 months    Mimi Fuentes MD    Chief Complaint:  Chief  Complaint   Patient presents with     Back Pain       History of Present Illness:  Liana is a 63 y.o. female who is here today for a renewal of medication for treatment of chronic back pain.  Of note, She is on a medical disability dating back several years for chronic back injury.  She has intermittent exacerbations.  More recently, she has had pain in the right buttock and lower back area.  This has prevented her from going to the gym and working out.  Additionally, because of weather, etc. she has not been attending physical therapy.  She states the pain is severe.  She has had injections in the past that have been somewhat helpful.  She has not had any reevaluation in some time.    In addition, she has numbness and tingling which is worse at night in her lower extremities bilaterally.  It is in a stocking glove distribution.  She states that she recalls having similar symptoms when her diabetes has been poorly controlled.  However, with the it was then pick and proceed by as well as a change in her diet she is doing much better with regard to blood sugars.  She is here today with a blood sugar log.  Her blood sugars average about 117.  There are no blood sugars greater than 125.  Her fasting sugars are around 89-90.  She denies any low blood sugar events.    She describes a very stressful time.  Her ex-brother-in-law passed away tragically.  She has been helping her sister and nephews deal with this.    She has been working with Jose Raul Coy our pharmacist with regard to a decrease in her to receive a based on blood sugars.    Review of Systems:  A comprehensive review of systems was performed and was otherwise negative    PFSH:  Social History: She is single but lives with her significant other for greater than 20 years.  You are going to be legally  in May.  Social History     Tobacco Use   Smoking Status Former Smoker     Packs/day: 0.25     Years: 44.00     Pack years: 11.00     Types: Cigarettes      Last attempt to quit: 1/24/2016     Years since quitting: 3.1   Smokeless Tobacco Never Used   Tobacco Comment    age 18 to age 62 up to 1/2 ppd       Past History:   Past Medical History:   Diagnosis Date     Anemia      Arthritis      Breast cyst      Carpal tunnel syndrome      Coronary artery disease due to lipid rich plaque      Dyslipidemia, goal LDL below 70      Esophageal reflux      Essential hypertension      Fatty liver      Goiter diffuse, nontoxic      Hiatal hernia      History of gestational diabetes      History of MRSA infection 07/17/2017    Pilonidal cyst culture     Morbid obesity with BMI of 40.0-44.9, adult (H)      RADHA on CPAP      Paroxysmal SVT (supraventricular tachycardia) (H)     S/P ablation     S/P hip replacement      Type 2 diabetes mellitus (H)        Current Outpatient Medications   Medication Sig Dispense Refill     acetaminophen (TYLENOL) 500 MG tablet Take 1,000 mg by mouth 3 (three) times a day as needed.       aspirin 81 MG EC tablet Take 81 mg by mouth daily.       blood glucose test strips Use 1 each As Directed 3 (three) times a day. Dispense brand per patient's insurance at pharmacy discretion. (E11.9) 300 strip 1     cholecalciferol, vitamin D3, (CHOLECALCIFEROL) 1,000 unit tablet Take 1,000 Units by mouth daily.       cyclobenzaprine (FLEXERIL) 10 MG tablet Take 1 tablet (10 mg total) by mouth at bedtime as needed for muscle spasms. 21 tablet 1     fluconazole (DIFLUCAN) 150 MG tablet Use once per week times three 3 tablet 0     generic lancets (MICROLET LANCET) Use 1 each As Directed 4 (four) times a day. 300 each 3     hydroCHLOROthiazide (MICROZIDE) 12.5 mg capsule Take 1 capsule (12.5 mg total) by mouth daily. 90 capsule 3     HYDROcodone-acetaminophen 5-325 mg per tablet Take 1 tablet by mouth 2 (two) times a day as needed for pain. 40 tablet 0     insulin degludec (TRESIBA FLEXTOUCH U-200) 200 unit/mL (3 mL) InPn Inject 115 Units under the skin daily. 20 mL 11      "insulin lispro (HUMALOG KWIKPEN INSULIN) 100 unit/mL pen Inject 30 Units under the skin 3 (three) times a day. 15 adj dose pen 3     lisinopril (PRINIVIL,ZESTRIL) 5 MG tablet Take 1.5 tablets (7.5 mg total) by mouth daily. 135 tablet 3     melatonin (MELATIN) 3 mg Tab tablet Take 3 mg by mouth at bedtime as needed.       nitroglycerin (NITROSTAT) 0.4 MG SL tablet Place 1 tablet (0.4 mg total) under the tongue every 5 (five) minutes as needed for chest pain. 25 tablet 3     omega-3 acid ethyl esters (LOVAZA) 1 gram capsule TAKE 2 CAPSULES BY MOUTH TWICE DAILY 360 capsule 1     omeprazole (PRILOSEC) 40 MG capsule TAKE 1 CAPSULE BY MOUTH EVERY DAY 90 capsule 3     pen needle, diabetic (BD ULTRA-FINE ZULMA PEN NEEDLES) 32 gauge x 5/32\" Ndle Inject 4 each under the skin daily. With Tresiba and Novolog 120 each 3     rosuvastatin (CRESTOR) 10 MG tablet TAKE 1 TABLET BY MOUTH DAILY 90 tablet 2     semaglutide (OZEMPIC) 1 mg/0.75 mL (2 mg/1.5 mL) PnIj Inject 1 mg under the skin every 7 days. 3 mL 11     No current facility-administered medications for this visit.        Family History: Nothing new    Physical Exam:  General Appearance:   She appears at baseline.  She does not appear to be in a great deal of stress.  Vitals:    03/08/19 1341   BP: 112/56   Patient Site: Left Arm   Patient Position: Sitting   Cuff Size: Adult Large   Pulse: 80   Resp: 14   Weight: (!) 263 lb (119.3 kg)   Height: 5' 5\" (1.651 m)     Wt Readings from Last 3 Encounters:   03/08/19 (!) 263 lb (119.3 kg)   03/04/19 (!) 260 lb (117.9 kg)   01/28/19 (!) 263 lb 12.8 oz (119.7 kg)     Body mass index is 43.77 kg/m .    Ambulates about the room with a limp.  She moves very slowly.        "

## 2021-06-24 NOTE — PROGRESS NOTES
This Graduation Wellness Plan provides private information in regards to the work I have done with my Care Team from my Primary Care Clinic.  This document provides insight on the goals I have accomplished.  My Care Team congratulates me on my journey to maintain wellness.  This document will help guide me on my journey to maintain a healthy lifestyle.  I will use this to help me overcome any barriers I may encounter.  If I should have any questions or concerns, I will continue to contact the members of my Care Team or contact my Primary Care Clinic for assistance.      My Clinic Care Coordination Wellness Plan    [facility]  [address] [place]  [address]  [phone]    My Preferred Method of Contact:  Phone: [phone]    My Primary/Preferred Language:  English    Preferred Learning Style:  Reading information, Face to face discussion, Pictures/Diagrams and Hands on teaching    Emergency Contact: Extended Emergency Contact Information  Primary Emergency Contact: Panchito Mclean  Address: [address] [place], [place] [id number] [place] [address]  Home Phone: [phone]  Relation: Spouse     PCP:  Mimi Fuentes MD  Specialists:    Care Team            Mimi Fuentes MD   [phone] PCP - [facility] Care Coordination Care Guide, Clinic Care Coordination    [facility]: Ph: [phone] Fax: [phone]    Jose Raul Coy, PharmD Pharmacist, Pharmacist        Accomplishments:  Goals       COMPLETED: I need support with keeping my diabetes in check. (pt-stated)      Action steps to achieve this goal  1.  I need support with being more active. Physical therapy started 1/29/18  2. Changing my eating habits to eat more healthy, and stop eating fast food as much. Pt has a weight loss of 7 lbs  3. Need assistance care coordination with appointments to see MD, PharmD and diabetic educator for more control. Discussed 2/2/18 Scheduled with DM Ed      Continually working on goals 5/17/17  Date goal set:  1/6/2015   Updated: 2/2/18 6/29/18        COMPLETED: I will work on getting into a better, less expensive living sitituation (pt-stated)      Action steps to achieve this goal  1.  I will fill out applications to get on waiting list.-Will stay where she is at right now.  2.  I am saving money to be able to live on my own.      Date goal set:  3/15/17  Updated 9/21/18            Advanced Directive/Living Will: The patient was given information regarding Adanced Directives/Living Will    Clinical Emergency Plan  Diabetes: Sick-Day Plan  Infections, the flu, and even a cold, can cause your blood sugar to rise. And, eating less, nausea, and vomiting may cause your blood glucose to fall (hypoglycemia). Ask your health care provider to help you develop a sick-day plan. The following information can help.     Call your health care provider if:    You vomit or have diarrhea for more than 6 hours.    Your blood glucose level is higher than usual or over 250 mg/dL after you have taken extra insulin (if recommended in your sick-day plan).    You take oral medicine for diabetes, and your blood sugar is higher than usual or over 250 mg/dL, before a meal and stays that high for more than 24 hours.    Your blood glucose is lower than usual or less than 70 mg/dL    You have moderate to large amounts of ketones in your blood or urine.    You aren t better after 2 days.  Emergency Plan Recommendation:     When to Use the Emergency Department (ED)  An emergency means you could die if you don t get care quickly. Or you could be hurt permanently (disabled). Read below to know when to use -- and when not to use -- an emergency department (also called ED).     Dangers to your life  Here are examples of emergencies. These need immediate care:  A hard time breathing  Severe chest pain  Choking  Severe bleeding  Suddenly not able to move or speak  Blacking out  (fainting)`  Poisoning     Dangers of permanent injuries  Here are other emergencies. These also need immediate care:  Deep cuts or severe burns  Broken bones, or sudden severe pain and swelling in a joint     When it s an emergency  If you have an emergency, follow these steps:     1. Go to the nearest emergency department  If you can, go to the hospital ED closest to you right away.  If you cannot get there right away, or if it is not safe to take yourself, call 911 or your police emergency number.  2. Call your primary care doctor  Tell your doctor about the emergency. Call within 24 hours of going to the ED.  If you cannot call, have someone call for you.  Go to your doctor (not the ED) for any follow-up care.     When it s not an emergency  If a problem is not an emergency, follow these steps:     1. Call your primary care doctor  If you don t know the name of your doctor, call your health plan.  If you cannot call, have someone call for you.  2. Follow instructions  Your doctor will tell you what you should do.  You may be told to see your doctor right away. You may be told to go to the ED. Or you may be told to go to an urgent care center.  Follow your doctor s advice.     All [facility] clinic patients have access to a Nurse 24 hours a day, 7 days a week.  If you have questions or want advice from a Nurse, please know [facility] is here for you.  You can call your clinic and they will connect you or you can call [facility] at [phone].  [facility] also has Walk In Care clinics in multiple locations.  Call the number listed above for more information about our Walk In Care clinics or visit the [facility] website at [email or web address].

## 2021-06-24 NOTE — TELEPHONE ENCOUNTER
She should go to [employer] or [employer] and see if they have a magnesium oxide supplement.  She should get the lowest dose available i.e. 200 mg and try that.  Hopefully it is less than $28.  Let me know

## 2021-06-24 NOTE — PROGRESS NOTES
Non-surgical Weight Loss Follow Up Diet Evaluation    Assessment:  This patient is a 63 y.o. female is being seen today for follow-up non-surgical nutritional evaluation. Today we reviewed the patients current eating habits and level of physical activity, and instructed on the changes that are required for successful weight loss outcomes.    Pt's Initial Weight: 273 lbs  Weight: (!) 260 lb (117.9 kg)  Weight loss from initial: 13  % Weight loss: 4.76 %    BMI: Body mass index is 41.97 kg/m .  -purchased keto diet   ++getting  on 5/15/2019     Pt Active Problem List Diagnosis:  Patient Active Problem List   Diagnosis     Obstructive Sleep Apnea     Diffuse Nontoxic Goiter     Type 2 diabetes mellitus (H)     Essential hypertension     Esophageal reflux     Dyslipidemia, goal LDL below 70     Hiatal Hernia     Sensorineural hearing loss     Stenosis of left carotid artery     Coronary artery disease due to lipid rich plaque     Morbid obesity with BMI of 40.0-44.9, adult (H)     RADHA on CPAP     Thiamine deficiency     Vitamin D deficiency     Increased PTH level     Low serum vitamin B12     Elevated ferritin level     Positive result for methicillin resistant Staphylococcus aureus (MRSA) screening     Estimated RMR (Howell-St Jeor equation): 1839 calories  Protein requirements (.5grams to .9grams per pound IBW, 20-30% of calories, minimum of 60-80gm per day):   grams    Progress made since last visit: Pt is tracking nutrition and trying to increase protein   Concerns: Pt would like to go to the gym more frequently - talked about at home vs gym     ++discussed keto diet and plan moving forward    Diabetes  Testing Blood Sugars: ; just two 187 and 200    Diet Recall/Time:   Reviewed tracking mitesh    Protein: low    Meals per week away from home: down to maybe 1X/week!!    Recommended limiting eating out to no more than 2x/week.  Patient and I reviewed the importance of eating three consistent meals  per day; as well as meal timing to be spaced 4-5 hours apart.  Snack choices: 100-150 calories (1-2x/day if physically hungry), incorporating a fruit/vegetable w/ protein source.      Portion Sizes problematic? NO per patient/diet recall  Encouraged slowing meal times down, 20-30 minutes, chewing to applesauce consistency.   To aid in proper portion control and slow meal time down discussed consuming meals off smaller plates, use toddler/children utensils and set utensils down after each bite.    Protein, vegetables/fruits, carbohydrates:   The patient and I discussed the importance of including lean/low fat protein at each meal and limiting carbohydrate intake to less than 25% of plate volume.     Beverages (Type/Oz. per day)    Discussed the importance of adequate hydration and the goal of 64+ oz of fluid daily.   The patient understands the importance of  avoiding all sweetened and alcoholic drinks, and instead choosing 64 oz plain water.    Exercise  Infrequent     Pt's understands that 45-60 minutes of daily activity is an important part of weight loss success.   Encouraged pt to incorporate  strength training exercise in addition to cardiovascular exercise most days of the week.    PES statement:     1. (NC-3.3.5) Obese, class III, BMI ?40 related to physical inactivity as evidenced by Infrequent, low-duration and or low intensity physical activity; and Large amounts of sedentary activities; no structured physical activity regimen    Intervention:  Discussion:  1. Reviewed lean protein sources.  20-25gm protein at 3 meals daily.  grams daily total.  2. Carbohydrate Countin carbohydrate choice/serving = 15-20gm total carbohydrate   3. Plate Method: The patient and I discussed the importance of including lean/low  fat protein at each meal and limiting carbohydrate intake to less  than 25% of plate volume.  Instructions/Goals:   1. Include 20-25gm protein at each meal.  2. Increase vegetable/fruit  intake, by having a vegetable or fruit with each meal daily. Recommended pt to increase vegetable/fruit intake to 4-5 servings daily.  3. Increase fluid intake to 64oz daily: choose plain or calorie/alcohol-free beverages.  4. Incorporate daily structured activity, 45-60 minutes most days of the week  5. Read food labels more consistently: keeping total fat grams <10, total sugar grams <10, fiber >3gm per serving.   6. Practice plate method: 1/2 plate lean/low fat protein source, vegetable/fruit, <25% of plate complex carbohydrates.  7. Carbohydrates from grain sources at meal times to be no more than 1 Carb Choice, ie: 15-20 gm total carbohydrate per serving  8. Practice eating off of smaller plates/bowls, chewing to applesauce consistency, taking 20-30 minutes to eat in a calm/relaxed environment without distractions of tv/email/cell phone.    Handouts Provided:  Plate Method  Protein Supplement List  Lean Protein List    Monitor/Evaluation:    Pt will f/u in one month with bariatrician, and f/u in two months with RD.    Plan for next visit with RD:  GOALS:  1) 2X/week at gym (t,w,th) [employer]     Time In: 1:20p  Time Out: 1:40p    ABN signed: Yes

## 2021-06-25 NOTE — TELEPHONE ENCOUNTER
Pt informed and states that she is taking it fine now she does like this supplement   BS was 68 this AM and 59 yesterday AM     Pt will keep us updated

## 2021-06-25 NOTE — TELEPHONE ENCOUNTER
Let Frank know that her Crestor has been filled.  The whole system has been having trouble with [facility].  Please let her know that I would prefer that she did not take any additional supplements as she has had trouble in the past tolerating them.  Neurjordana helps with dementia prevention.  The best way to do this is to keep blood pressure and diabetes managed.  Plus, she is quite sharp in that department!

## 2021-06-25 NOTE — PROGRESS NOTES
Progress Notes by Beltran Ramírez, Pharmacy Student at 10/24/2017  9:00 AM     Author: Beltran Ramírez, Pharmacy Student Service: -- Author Type: Pharmacy Student    Filed: 10/24/2017 11:16 AM Encounter Date: 10/24/2017 Status: Attested    : Jose Ralu Coy PharmD (Pharmacist) Cosigner: Jose Raul Coy PharmCARLA at 10/24/2017 12:52 PM    Attestation signed by Jose Raul Coy PharmD at 10/24/2017 12:52 PM    I spent 100% of the time in direct supervision of the PharmCARLA IV student. I have reviewed the note and agree with the assessment/plan as it is now written.   Jose Raul Coy, PharmD, [facility]                                          Medication Therapy Management Follow Up Visit       ASSESSMENT AND PLAN  Type II Diabetes Mellitus  BG has improved slightly but still has been elevated above goals for fasting and post-prandial.  Post-prandial have been significantly elevated and may benefit from an additional increase in dose to 40 units with each meal. Patient has only experienced hypoglycemia after working out, where she would not eat prior and took her novolog before going to the gym. Numbness in right fingertips may be due to elevated BG and would like to monitor if this improves as insulin dose is increased.   -Increase Novolog to 40 units BID, 42 units at dinner   -Test Hemoglobin A1C today  -Recommended patient eat a small snack prior to exercise and use morning novolog after exercise    Addendum: Patient is still above goal A1C of 7% per ADA and will continue with current plan.   Lab Results   Component Value Date    HGBA1C 8.9 (H) 10/24/2017     Hypertension/CAD  BP is below goal of 140/90 per ADA. Elevated BP at night may be due to poor adherence to medications or pain from exercise. She has not smoked for at least 6 months.   -Encouraged patient to take lisinopril and HCTZ daily  -Encouraged patient to continue to avoid smoking  -Recommended to continue current regimen    Unstable Angina  Stable. On aspirin,  clopidogrel, and rosuvastatin post-BO placement in April 2017. Expect another 6 months of anti-platelet treatment for post-BO placement.  -Recommended to continue current regimen    Pain  Uncontrolled hip pain due to exercise and patient refusing to try using medication therapy. May benefit from tylenol or ibuprofen for PRN relief if willing to try medications. Patient may also have benefit from heat compresses or stretching after exercise to prevent pain.    FOLLOW-UP PLAN  Liana was advised to follow up in one week with Michelle.      SUBJECTIVE AND OBJECTIVE  Liana Briceño is a 62 y.o. female who was referred by Mimi Fuentes MD for MT services.  Liana's chief concern today is diabetes management.     Feels like her diet is getting better but could be more consistent. The tips of her right hand have been feeling numb. She thinks this may be neuropathy. Patient has been trying to exercise at the gym 2-3 times per week with her sister. She has had episodes of hypoglycemia and lightheaded feelings at the gym. Often exercises prior to breakfast and does use insulin before morning workout. She feels that she is more motivated to exercise during the winter because she doesn't want to be at home.  Her job will end on 10/28 and feels like she will have a lot of time. Patient was not experiencing hypoglycemia after lows of 90-100s in the morning.     BG:  AM:110, 141, 186, 156, 142, 173, 131,   PM:176, 193, 328, 291, 227, 267, 229    She was concerned with her high blood pressure she had yesterday. She noted that her blood pressure is usually high at night. systollic BP of >150 after 6pm is common. She usually measures BP twice, trying to sit and relax while doing it. No use of nitroglycerin reported lately.  For the last 6 months, she has continued to avoid smoking.   BP Readings from Last 3 Encounters:   10/24/17 120/58   08/23/17 122/50   08/15/17 136/51       Hip pain has been worsening and trying to ignore this  "pain, not a major concern at the moment. Feels like it may be increasing due to the gym. \"Old habits\" of overdoing the exercise is causing some pain.    Current Outpatient Prescriptions   Medication Sig Dispense Refill   ? acetaminophen (TYLENOL) 500 MG tablet Take by mouth every 6 (six) hours as needed for pain.     ? albuterol (PROAIR HFA;PROVENTIL HFA;VENTOLIN HFA) 90 mcg/actuation inhaler Inhale 2 puffs every 6 (six) hours as needed for wheezing. 1 Inhaler 1   ? aspirin 81 MG EC tablet Take 81 mg by mouth daily.     ? blood glucose test strips Use 1 each As Directed 3 (three) times a day. 300 strip 12   ? cholecalciferol, vitamin D3, (CHOLECALCIFEROL) 1,000 unit tablet Take 1,000 Units by mouth daily.     ? clopidogrel (PLAVIX) 75 mg tablet TAKE 1 TABLET BY MOUTH DAILY 90 tablet 3   ? hydroCHLOROthiazide (MICROZIDE) 12.5 mg capsule Take 1 capsule (12.5 mg total) by mouth daily. 90 capsule 0   ? HYDROcodone-acetaminophen 5-325 mg per tablet Take 1 tablet by mouth every 4 (four) hours as needed for pain. 60 tablet 0   ? insulin aspart (NOVOLOG FLEXPEN) 100 unit/mL injection pen Inject 40 Units under the skin 2 (two) times a day. And 42 units with dinner 9 mL 1   ? insulin degludec (TRESIBA FLEXTOUCH U-200) 200 unit/mL (3 mL) InPn Inject 132 Units under the skin daily. 18 mL 11   ? lisinopril (PRINIVIL,ZESTRIL) 5 MG tablet Take 1.5 tablets (7.5 mg total) by mouth daily. 145 tablet 3   ? melatonin (MELATIN) 3 mg Tab tablet Take 3 mg by mouth at bedtime as needed.     ? nitroglycerin (NITROSTAT) 0.4 MG SL tablet Place 1 tablet (0.4 mg total) under the tongue every 5 (five) minutes as needed for chest pain. 25 tablet 3   ? omega-3 acid ethyl esters (LOVAZA) 1 gram capsule TAKE 2 CAPSULES BY MOUTH TWICE DAILY 360 capsule 3   ? omeprazole (PRILOSEC) 40 MG capsule TAKE 1 CAPSULE BY MOUTH EVERY DAY 90 capsule 1   ? pen needle, diabetic (BD ULTRA-FINE ZULMA PEN NEEDLES) 32 gauge x 5/32\" Ndle Inject 1 each under the skin " daily. With Tresiba 100 each 3   ? rosuvastatin (CRESTOR) 10 MG tablet TAKE 1 TABLET BY MOUTH DAILY 90 tablet 3     Current Facility-Administered Medications   Medication Dose Route Frequency Provider Last Rate Last Dose   ? ondansetron disintegrating tablet 4 mg (ZOFRAN-ODT)  4 mg Oral Once Maximiliano Borden, DO           We reviewed Liana's medication list with them, discussing reason for use, directions for use, and potential side effects of each medication.  Indication, safety, efficacy, and convenience was assessed for all reviewed medications.     Liana was provided with a printed AVS, and this care plan was communicated via EMR with her primary care provider, Mimi Fuentes MD, and is the authorizing prescriber for this visit.  Direct supervision was available by either the patient's PCP or another available physician when needed.    This note has been dictated using voice recognition software. Any grammatical or context distortions are unintentional and inherent to the software.       Time spent: 30 minutes    Beltran Ramírez [facility] Student  [facility]

## 2021-06-25 NOTE — PATIENT INSTRUCTIONS - HE
"Exercises:  Exercise #1: Patient was re-issued back exercises from prior episode; did not issue exercises for knee pain ; instructed to resume stationary bike/treadmill before beginning any weight machines.  Exercise #12: Hooklying rotation bilat 5x 10\"with greater ease in going to left with legs with deep breathing  Comment #13: Multifidus with OTB, central to pelvis 10x5\" with 2 bands then bias right and left  with single band with cues to avoid rotation 10x5\"-H  Exercise #14: prayer stretch without bias, 10\"x10-H  Comment #14: Abdominal crunch at incline to decrease effort, forward and diagonals, 3x2\"  each and forward 5x3\"-H  Exercise #15: Treadmill, 8 mins 1.2 MPH  Comment #15: standing UBE level 1.5 2 mins forward/backwards each    he will consider connecting with hip surgeon to assess if that is the origin of her pain  "

## 2021-06-25 NOTE — PROGRESS NOTES
"Progress Notes by David Walton MD at 6/6/2017  8:50 AM     Author: David Walton MD Service: -- Author Type: Physician    Filed: 6/6/2017  9:30 AM Encounter Date: 6/6/2017 Status: Signed    : David Walton MD (Physician)           Click to link to [facility] Note      Assessment:    1. Coronary artery disease due to lipid rich plaque     2. Dyslipidemia, goal LDL below 70         Plan:    1.  Check LDL cholesterol today; we will call her with results.  2.  Discontinue clopidogrel on the one-year anniversary of her coronary stenting.  3.  Return to see Dr. Walton in 1 year after new lipid profile.    An After Visit Summary was printed and given to the patient.    Subjective:    Liana Briceño returned for a planned post hospital follow up visit.    She presented with unstable angina pectoris in April and had a drug-eluting stent implanted in her right coronary artery.  She did not receive a stent card and our team will ask the CV lab to mail one to her.    She continues to experience shooting brief pains in her left breast.  She no longer has epigastric and substernal pressure discomfort that was the hallmark of her angina pectoris.  She has problems with tennis elbow on the right and occasionally has pain in her right forearm.    Her nephew from [place] recommended that she take \"black cumin seed oil\".  We discussed the lack of evidence for effectiveness, safety, and composition of supplements.    Past Medical History:    Patient Active Problem List   Diagnosis   ? Carpal tunnel syndrome   ? Obstructive Sleep Apnea   ? Diffuse Nontoxic Goiter   ? Type 2 diabetes mellitus   ? Morbid obesity   ? Essential hypertension   ? Esophageal reflux   ? Dyslipidemia, goal LDL below 70   ? Hiatal Hernia   ? Sensorineural hearing loss   ? Statin intolerance   ? Stenosis of left carotid artery   ? Coronary artery disease due to lipid rich plaque       Past Medical " History:   Diagnosis Date   ? Anemia    ? Anemia    ? Breast cyst    ? Carpal tunnel syndrome    ? Coronary artery disease    ? Diabetes mellitus     Type 2   ? Dizziness    ? Dysphagia    ? Esophageal reflux    ? Goiter diffuse, nontoxic    ? Hiatal hernia    ? Hyperlipidemia    ? Hypertension    ? Obesity    ? RADHA (obstructive sleep apnea)    ? Paroxysmal SVT (supraventricular tachycardia)     S/P ablation       Past Surgical History:   Procedure Laterality Date   ? BREAST BIOPSY     ? BREAST CYST EXCISION     ? CARDIAC CATHETERIZATION N/A 2017    Procedure: Coronary Angiogram;  Surgeon: Ariane Biswas MD;  Location: [facility] Cath Lab;  Service:    ? CARDIAC CATHETERIZATION  2017    Dr Gutierrez   ? CARDIAC CATHETERIZATION N/A 2017    Procedure: Coronary Angiogram;  Surgeon: Howard Gutierrez MD;  Location: [facility] Cath Lab;  Service:    ? CHOLECYSTECTOMY     ? MS EXPLO/DRAIN BREAST ABSCESS      Description: Breast Surgery Mastotomy With Drainage Of Abscess;  Recorded: 2010;   ? MS L HRT CATH W/NJX L VENTRICULOGRAPHY IMG S&I N/A 2017    Procedure: Left Heart Catheterization with Left Ventriculogram;  Surgeon: Ariane Biswas MD;  Location: [facility] Cath Lab;  Service: Cardiology   ? MS REMOVAL GALLBLADDER     ? MS REVISE MEDIAN N/CARPAL TUNNEL SURG      Description: Neuroplasty Decompression Median Nerve At Carpal Tunnel;  Recorded: 2009;   ? MS THYROID LOBECTOMY,UNILAT          reports that she quit smoking about 16 months ago. Her smoking use included Cigarettes. She has a 10.00 pack-year smoking history. She has never used smokeless tobacco. She reports that she drinks alcohol. She reports that she does not use illicit drugs.    Family History   Problem Relation Age of Onset   ? Heart disease Father    ? Heart disease Mother    ? Leukemia Brother       at a young age   ? Kidney failure Other      over active bladder   ? Hypertension Other    ? Diabetes type II  "Other    ? Breast cancer Neg Hx        Outpatient Encounter Prescriptions as of 6/6/2017   Medication Sig Dispense Refill   ? acetaminophen (TYLENOL) 500 MG tablet Take by mouth every 6 (six) hours as needed for pain.     ? aspirin 81 MG EC tablet Take 81 mg by mouth daily.     ? blood glucose test strips Use 1 each As Directed 3 (three) times a day. 300 strip 12   ? cholecalciferol, vitamin D3, (CHOLECALCIFEROL) 1,000 unit tablet Take 1,000 Units by mouth daily.     ? clopidogrel (PLAVIX) 75 mg tablet Take 1 tablet (75 mg total) by mouth daily. 30 tablet 11   ? hydroCHLOROthiazide (MICROZIDE) 12.5 mg capsule Take 1 capsule (12.5 mg total) by mouth daily. 90 capsule 1   ? HYDROcodone-acetaminophen 5-325 mg per tablet Take 1 tablet by mouth every 4 (four) hours as needed for pain. 60 tablet 0   ? insulin aspart (NOVOLOG FLEXPEN) 100 unit/mL injection pen Inject 25 Units under the skin 3 (three) times a day with meals. 9 mL 0   ? insulin degludec (TRESIBA FLEXTOUCH U-200) 200 unit/mL (3 mL) InPn Inject 124 Units under the skin daily. 18 mL 11   ? lisinopril (PRINIVIL,ZESTRIL) 5 MG tablet Take 1 tablet (5 mg total) by mouth daily. 30 tablet 11   ? melatonin (MELATIN) 3 mg Tab tablet Take 3 mg by mouth at bedtime as needed.     ? metoprolol succinate (TOPROL-XL) 50 MG 24 hr tablet TAKE 1 TABLET(50 MG) BY MOUTH DAILY 90 tablet 0   ? nitroglycerin (NITROSTAT) 0.4 MG SL tablet Place 1 tablet (0.4 mg total) under the tongue every 5 (five) minutes as needed for chest pain. 25 tablet 3   ? omega-3 acid ethyl esters (LOVAZA) 1 gram capsule Take 2 capsules (2 g total) by mouth 2 (two) times a day. 120 capsule 11   ? omeprazole (PRILOSEC) 40 MG capsule TAKE 1 CAPSULE BY MOUTH EVERY DAY 90 capsule 0   ? pen needle, diabetic (BD ULTRA-FINE ZULMA PEN NEEDLES) 32 gauge x 5/32\" Ndle Inject 1 each under the skin daily. With Tresiba 100 each 3   ? rosuvastatin (CRESTOR) 10 MG tablet Take 1 tablet (10 mg total) by mouth daily. 30 tablet " "11   ? albuterol (PROAIR HFA;PROVENTIL HFA;VENTOLIN HFA) 90 mcg/actuation inhaler Inhale 2 puffs every 6 (six) hours as needed for wheezing. 1 Inhaler 1   ? ipratropium (ATROVENT) 0.06 % nasal spray 2 sprays into each nostril as needed for rhinitis.     ? ondansetron (ZOFRAN, AS HYDROCHLORIDE,) 4 MG tablet Take 1 tablet (4 mg total) by mouth daily as needed for nausea. 15 tablet 0   ? traZODone (DESYREL) 50 MG tablet Take 50 mg by mouth at bedtime as needed for sleep.       Facility-Administered Encounter Medications as of 6/6/2017   Medication Dose Route Frequency Provider Last Rate Last Dose   ? ondansetron disintegrating tablet 4 mg (ZOFRAN-ODT)  4 mg Oral Once Maximiliano Borden,            Review of Systems:     General: WNL  Eyes: WNL  Ears/Nose/Throat: WNL  Lungs: WNL  Heart: Chest Pain, Arm Pain  Stomach: Nausea  Bladder: WNL  Muscle/Joints: WNL  Skin: WNL  Nervous System: Daytime Sleepiness  Mental Health: WNL     Blood: WNL    Objective:     /60 (Patient Site: Left Arm, Patient Position: Sitting, Cuff Size: Adult Large)  Pulse 70  Resp 16  Ht 5' 6\" (1.676 m)  Wt (!) 256 lb 9.6 oz (116.4 kg)  BMI 41.42 kg/m2  Wt Readings from Last 5 Encounters:   06/06/17 (!) 256 lb 9.6 oz (116.4 kg)   05/24/17 (!) 255 lb 3.2 oz (115.8 kg)   05/19/17 (!) 257 lb (116.6 kg)   05/17/17 (!) 257 lb 6.4 oz (116.8 kg)   05/16/17 (!) 257 lb (116.6 kg)        Physical Exam:    GENERAL APPEARANCE: alert, no apparent distress  EYES: no scleral icterus  NECK: no carotid bruits or adenopathy, jugular venous pressure is difficult to evaluate due to obesity  CHEST: symmetric, the lungs are clear to auscultation  CARDIOVASCULAR: regular rhythm without murmur or gallop  EXTREMITIES: no cyanosis, clubbing or edema; the right radial pulse is strong and there is no swelling or bruit  SKIN: no xanthelasma  NEUROLOGIC: normal gait and coordination  PSYCHIATRIC: mood and affect are normal    Cardiac Testing:    Results for orders " placed during the hospital encounter of 16   NM exercise stress test [WWS220] 2016    [facility] Nuclear Cardiology   Exercise Stress Test Report       Patient: Liana Briceño   MRN: [id number]  : 1955  Primary Care Provider: Mimi Fuentes MD  Ordering Physician: Acosta Bloom MD  Indication: Chest pain [R07.9] atypicl chest pain/DM and + risk factors/  Chest pain, acute, nonspecific, low prob CAD    STRESS SUMMARY:  The patient exercised for 3 minutes and 0 seconds according to the Neal   protocol, stopping due to 6 out of 10 low back pain, dyspnea, and fatigue.    An additional 33 seconds were spent at stage I speed and grade to   facilitate myocardial tracer uptake post injection.  Chest discomfort was   not reported. The stress electrocardiogram was negative for inducible   ischemia.  The peak heart rate was 151 beats per minute, which is 93% of   the age predicted maximum. The blood pressure caio from 110/68 at rest to   160/60 at the peak of exercise. The supervising cardiologist was Dr. Melanie Medina .     TECHNICAL COMMENTS:   Nuclear imaging was accomplished utilizing 4.27 mCi of thallium injected   at rest and 41.5 mCi of technetium 99m sestamibi injected at the peak of   exercise. The  images are satisfactory.     FINDINGS: The exercise stress and rest images demonstrate normal left   ventricular size and tracer uptake. The gated images reveal normal resting   regional wall motion and borderline reduced global systolic performance.   The measured resting ejection fraction is 50%.     CONCLUSION:   1. Exercise stress nuclear study is negative for inducible myocardial   ischemia or infarction.   2. Borderline low left ventricular ejection fraction of 50%.  3. Poor exercise capacity, which reduces the negative predictive value of   this test.    David Walton MD  2016 1:42 PM                           Procedures   Coronary Angiogram    Panel Information   Panel 1   Provider Role   Howard Gutierrez MD Primary    Procedure Laterality Anesthesia   Coronary Angiogram N/A Conscious Sedation (RN)   DR GUTIERREZ ONLY, H&P DONE 5/18, [facility] ([facility])           Pre-procedure Diagnosis   chest pain       Indications   Chest pain [R07.9 (ICD-10-CM)]   Conclusion        A drug eluting .    Mid LAD lesion 30% stenosed.    Estimated blood loss was <20 ml.      Anatomy is unchanged compared to recent study of 4/24/2017       Imaging:    No results found.    Lab Results:    Lab Results   Component Value Date    CREATININE 1.15 (H) 05/18/2017    BUN 15 05/18/2017     (L) 05/18/2017    K 4.1 05/19/2017     05/18/2017    CO2 23 05/18/2017     Lab Results   Component Value Date    CHOL 205 (H) 04/25/2017    TRIG 481 (H) 04/25/2017    HDL 22 (L) 04/25/2017    LDLDIRECT 90 01/20/2016     BNP (pg/mL)   Date Value   09/30/2016 <10     Creatinine (mg/dL)   Date Value   05/18/2017 1.15 (H)   05/11/2017 0.89   04/25/2017 0.85   04/21/2017 1.02     Magnesium (mg/dL)   Date Value   01/22/2016 1.8     LDL Calculated (mg/dL)   Date Value   04/25/2017      Comment:     Invalid, Triglycerides >400   01/25/2016 67   01/20/2016      Comment:     Invalid, Triglyceride >400, Direct LDL Reflexed     Lab Results   Component Value Date    WBC 7.4 05/18/2017    WBC 8.0 06/30/2015    HGB 11.4 (L) 05/18/2017    HCT 33.8 (L) 05/18/2017    MCV 88 05/18/2017     05/18/2017           Much or all of the text in this note was generated through the use of the Dragon Dictate voice-to-text software. Errors in spelling or words which seem out of context are unintentional. Sound alike errors, in particular, may have escaped editing.

## 2021-06-25 NOTE — TELEPHONE ENCOUNTER
RN cannot approve Refill Request    RN can NOT refill this medication patient requesting 90 day supply.  Outside protocol window to alter this script. Last office visit: 6/15/2021 Mimi Fuentes MD Last Physical: 7/28/2020 Last MTM visit: Visit date not found Last visit same specialty: 6/15/2021 Mimi Fuentes MD.  Next visit within 3 mo: Visit date not found  Next physical within 3 mo: Visit date not found      Leander Billingsley, [facility] Triage/Med Refill 6/16/2021    Requested Prescriptions   Pending Prescriptions Disp Refills     famotidine (PEPCID) 40 MG tablet [Pharmacy Med Name: FAMOTIDINE 40MG TABLETS] 90 tablet 0     Sig: TAKE 1 TABLET(40 MG) BY MOUTH EVERY EVENING       GI Medications Refill Protocol Passed - 6/15/2021  3:20 PM        Passed - PCP or prescribing provider visit in last 12 or next 3 months.     Last office visit with prescriber/PCP: 6/15/2021 Mimi Fuentes MD OR same dept: 6/15/2021 Mimi Fuentes MD OR same specialty: 6/15/2021 Mimi Fuentes MD  Last physical: 7/28/2020 Last MTM visit: Visit date not found   Next visit within 3 mo: Visit date not found  Next physical within 3 mo: Visit date not found  Prescriber OR PCP: Mimi Fuentes MD  Last diagnosis associated with med order: 1. Dyspepsia  - famotidine (PEPCID) 40 MG tablet [Pharmacy Med Name: FAMOTIDINE 40MG TABLETS]; TAKE 1 TABLET(40 MG) BY MOUTH EVERY EVENING  Dispense: 90 tablet; Refill: 0    If protocol passes may refill for 12 months if within 3 months of last provider visit (or a total of 15 months).

## 2021-06-25 NOTE — TELEPHONE ENCOUNTER
Reason for Call:  Other call back      Detailed comments: Pt wondering status of refill for the Crestor, she has been out for a week    Pharmacy telling patient that PCP cancelled the medication        Having pain in stomach - pt started a natural med called: Daron  For brain health -  Pt did not take that today to see if her stomach would maybe better    Phone Number Patient can be reached at:   Cell number on file:    Telephone Information:   Mobile [phone]       Best Time: anytime    Can we leave a detailed message on this number?: Yes    Call taken on 5/26/2021 at 10:50 AM by Raquel Blackwell

## 2021-06-25 NOTE — PROGRESS NOTES
[facility] Certification Request    March 21, 2019      Patient: Liana Briceño  MR Number: [id number]  YOB: 1955  Date of Visit: 3/21/2019      Dear Dr. Mimi Fuentes:Thank you for this referral.   We are seeing Liana Briceño for Physical Therapy of Chronic LBP with Sciatica.    Medicare and/or Medicaid requires physician review and approval of the treatment plan. Please review the plan of care and verify that you agree with the therapy plan of care by co-signing this note.      Plan of Care  Authorization / Certification Start Date: 03/21/19  Authorization / Certification End Date: 06/07/19  Authorization / Certification Number of Visits: 12  Communication with: Referral Source  Patient Related Instruction: Nature of Condition;Treatment plan and rationale;Self Care instruction;Basis of treatment;Posture;Precautions;Next steps;Expected outcome  Times per Week: 1  Number of Weeks: 12  Number of Visits: 12  Discharge Planning: Independent HEP and self-management of symptoms  Precautions / Restrictions : Right BAR s/p 3205-1090, DM2, Morbid Obesity, HX: MRSA  Therapeutic Exercise: ROM;Stretching;Strengthening  Neuromuscular Reeducation: core  Manual Therapy: soft tissue mobilization;myofascial release  Modalities: ultrasound      Goals:  Pt. will demonstrate/verbalize independence in self-management of condition in : 6 weeks  Pt. will be independent with home exercise program in : 6 weeks  Pt. will be able to walk : in 6 weeks;with less pain;with less difficulty;other distance;for household mobility;for community mobility  Other Distance:: 4 blocks  Patient will stand : 30 minutes;in 6 weeks;with less pain;with less difficultty;for home chores    Patient will decrease : ERIS score;by _ points;for improved quality of function;for improved quality of life;in 6 weeks  by ___ points: 30        If you have any questions or concerns, please don't hesitate to call.    Sincerely,      April CORRALES  [facility]        Physician recommendation:     ___ Follow therapist's recommendation        ___ Modify therapy      *Physician co-signature indicates they certify the need for these services furnished within this plan and while under their care.          [facility]   [facility] Initial Evaluation    Patient Name: Liana Briceño  PRECAUTIONS/RESTRICTIONS:  Right BAR s/p 2507-5140, DM2, Morbid Obesity, HX: MRSA  Date of evaluation: 3/21/2019  Referral Diagnosis: Chronic Low Back Painw/Sciatica  Referring provider: Mimi Fuentes MD  Visit Diagnosis:     ICD-10-CM    1. Lumbar radiculopathy M54.16    2. Myofascial pain M79.18    3. Chronic back pain, unspecified back location, unspecified back pain laterality M54.9     G89.29    4. Decreased ROM of lumbar spine M53.86    5. Muscle weakness (generalized) M62.81    6. Poor posture R29.3        Assessment:      Skilled PT is required to modulate pain, increase ROM, flexibility, strength and fuction through modalities, manual therapy, exercise and education  Pt. is appropriate for skilled PT intervention as outlined in the Plan of Care (POC).  Pt. is a good candidate for skilled PT services to improve pain levels and function.       Goals:  Pt. will demonstrate/verbalize independence in self-management of condition in : 6 weeks  Pt. will be independent with home exercise program in : 6 weeks  Pt. will be able to walk : in 6 weeks;with less pain;with less difficulty;other distance;for household mobility;for community mobility  Other Distance:: 4 blocks  Patient will stand : 30 minutes;in 6 weeks;with less pain;with less difficultty;for home chores    Patient will decrease : ERIS score;by _ points;for improved quality of function;for improved quality of life;in 6 weeks  by ___ points: 30      Patient's expectations/goals are fair to guarded due to chronic LBP    Barriers to Learning or Achieving Goals:  Non- adherence to the home exercise  program.  Chronicity of the problem.  Co-morbidities or other medical factors.  DM2, Right BAR,  orbid obesity       Plan / Patient Instructions:        Plan of Care:   Authorization / Certification Start Date: 03/21/19  Authorization / Certification End Date: 06/07/19  Authorization / Certification Number of Visits: 12  Communication with: Referral Source  Patient Related Instruction: Nature of Condition;Treatment plan and rationale;Self Care instruction;Basis of treatment;Posture;Precautions;Next steps;Expected outcome  Times per Week: 1  Number of Weeks: 12  Number of Visits: 12  Discharge Planning: Independent HEP and self-management of symptoms  Precautions / Restrictions : Right BAR s/p 0314-9489, DM2, Morbid Obesity, HX: MRSA  Therapeutic Exercise: ROM;Stretching;Strengthening  Neuromuscular Reeducation: core  Manual Therapy: soft tissue mobilization;myofascial release  Modalities: ultrasound    POC and pathology of condition were reviewed with patient.  Pt. is in agreement with the Plan of Care  A Home Exercise Program (HEP) was initiated today.  Pt. was instructed in exercises by PT and patient was given a handout with detailed instructions.    The goals and plan of care were established in collaboration with the patient.    Plan for next visit: Patient will not schedule any further PT until she looks into a new plan as she is unable to continue to pay her new insurance co-pay.  Will work on aerobic exercise and core strengthening.    She will consider connecting with hip surgeon to assess if that is the origin of her pain.     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 female who presents to therapy today with chief complaints of exacerbation of pain and tightness right lumbar paraspinals to PSIS  approximately 3/1/2019 without injury or incident.  This is approximately the third exacerbation since Sept 2018 which begin in the same method:  Abrupt onset that has no associated cause that will dissipate within 2 weeks.  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  She reports having back pain since a work injury when a load fell on her but also states she has had back pain all of her life. She had a prior PT episode for similar LBP and briefly for knee pain which is no longer problematic from June 2018-January 2019.  She states that the exacerbations feel like that involve and start in her hip. Pt demo's signs and sx consistent with DDD/DJD.     Pain Rating current:  4-5  Pain rating at best: 4  Pain rating at worst: 10  Pain description: Deep inside ache    Functional limitations are described as occurring with:   Standing 20-25 min, walking 2 blocks    Patient reports benefit from:  Magnesium, Vicadin, heat, Gabapentin         Objective:         Patient arrived 15 minutes late.  Note: Items left blank indicates the item was not performed or not indicated at the time of the evaluation.    Patient Outcome Measures :    Modified Oswestry Low Back Pain Disablity Questionnaire  in %: 38     Scores range from 0-100%, where a score of 0% represents minimal pain and maximal function. The minimal clinically important difference is a score reduction of 12%.    Examination  1. Lumbar radiculopathy     2. Myofascial pain     3. Chronic back pain, unspecified back location, unspecified back pain laterality     4. Decreased ROM of lumbar spine     5. Muscle weakness (generalized)     6. Poor posture         Involved side: Right    Posture Observation: Standing: c-protraction, high cerv ext, left shoulder/scap/right iliac crest high, normal L lordosis, tight  tissue       Palpation:  Moderate tenderness/tightness right Lumbar paraspinals to PSIS  Repeated Motion Testing:  Not tested      Passive Mobility - Joint Integrity:  Not tested    LE Screen:  Gastroc Soleus bilat mod tightness    Lumbar ROM:    Date: 3/21/2019     *Indicate scale AROM AROM AROM   Lumbar Flexion Min loss to mid shin, tight     Lumbar Extension MIn loss, left LBP      Right Left Right Left Right Left   Lumbar Sidebending Min loss, restricted/pain Min-nil loss, NE       Lumbar Rotation Mod loss with right side pain Min loss, NE       Thoracic Flexion      Thoracic Extension      Thoracic Sidebending         Thoracic Rotation           Lower Extremity Strength:     Date:      LE strength/5 Right Left Right Left Right Left   Hip Flexion (L1-3)         Hip Extension (L5-S1)         Hip Abduction (L4-5)         Hip Adduction (L2-3)         Hip External Rotation         Hip Internal Rotation         Knee Extension (L3-4)         Knee Flexion         Ankle Dorsiflexion (L4-5)         Great Toe Extension (L5)         Ankle Plantar flexion (S1)         Abdominals        Sensation           Reflex Testing  Lumbar Dermatomes Right Left UE Reflexes Right Left   Iliac Crest and Groin (L1)   Biceps (C5-6)     Anterior Medial Thigh (L2)   Brachioradialis (C5-6)     Anterior Thigh, Medial Epicondyle Femur (L3)   Triceps (C7-8)     Lateral Thigh, Anterior Knee, Medial Leg/Malleolus (L4)   Antione s test     Lateral Leg, Dorsal Foot (L5)   LE Reflexes     Lateral Foot (S1)   Patellar (L3-4)     Posterior Leg (S2)   Achilles (S1-2)     Other:   Babinski Response           Lumbar Special Tests:    Lumbar Special Tests Right Left SI Tests Right  Left   Quadrant test   SI Compression     Straight leg raise   SI Distraction     Crossover response   POSH Test     Slump   Sacral Thrust     Sit-up test  FADIR     Trunk extensor endurance test  Resisted Abduction     Prone instability test  Other:     Pubic shotgun  Other:       Today's HEP:  Exercises:  Exercise #1: Patient was re-issued back exercises from prior episode; did not issue exercises for  "knee pain ; instructed to resume stationary bike/treadmill before beginning any weight machines.  Exercise #12: Hooklying rotation bilat 5x 10\"with greater ease in going to left with legs with deep breathing  Comment #13: Multifidus with OTB, central to pelvis 10x5\" with 2 bands then bias right and left  with single band with cues to avoid rotation 10x5\"-H  Exercise #14: prayer stretch without bias, 10\"x10-H  Comment #14: Abdominal crunch at incline to decrease effort, forward and diagonals, 3x2\"  each and forward 5x3\"-H  Exercise #15: Treadmill, 8 mins 1.2 MPH  Comment #15: standing UBE level 1.5 2 mins forward/backwards each      Treatment Today    TREATMENT MINUTES COMMENTS   Evaluation 30 Lumbosacral   Self-care/ Home management     Manual therapy 5 MFR right lumbar paraspinals   Neuromuscular Re-education     Therapeutic Activity     Therapeutic Exercises 5 See flow sheet   Gait training     Modality_________US to right lumbar paraspinals to PSIS___at left SL______ 8+3 set up 1 MHZ, 100%, 1.8 w/c2              Total 51    Blank areas are intentional and mean the treatment did not include these items.     PT Evaluation Code: (Please list factors)  Patient History/Comorbidities: see abive  Examination: see above  Clinical Presentation: uncomplicated  Clinical Decision Making: low    Patient History/  Comorbidities Examination  (body structures and functions, activity limitations, and/or participation restrictions) Clinical Presentation Clinical Decision Making (Complexity)   No documented Comorbidities or personal factors 1-2 Elements Stable and/or uncomplicated Low   1-2 documented comorbidities or personal factor 3 Elements Evolving clinical presentation with changing characteristics Moderate   3-4 documented comorbidities or personal factors 4 or more Unstable and unpredictable High              April Newell  3/21/2019  1:50 PM      [facility] Discharge Summary  Patient Name: Liana CORRALES" Navarro  Date: 4/30/2019  Referral Diagnosis: Chronic Low Back Painw/Sciatica  Referring provider: Mimi Fuentes MD  Visit Diagnosis:   1. Lumbar radiculopathy     2. Myofascial pain     3. Chronic back pain, unspecified back location, unspecified back pain laterality     4. Decreased ROM of lumbar spine     5. Muscle weakness (generalized)     6. Poor posture         Goals:Status Unknown/Unmet; patient came for initial visit only  Pt. will demonstrate/verbalize independence in self-management of condition in : 6 weeks  Pt. will be independent with home exercise program in : 6 weeks  Pt. will be able to walk : in 6 weeks;with less pain;with less difficulty;other distance;for household mobility;for community mobility  Other Distance:: 4 blocks  Patient will stand : 30 minutes;in 6 weeks;with less pain;with less difficultty;for home chores    Patient will decrease : ERIS score;by _ points;for improved quality of function;for improved quality of life;in 6 weeks  by ___ points: 30      Patient was seen for 1 visit on 3/21/2019 with 0 missed appointments.  The patient attended therapy initially, but did not finish the therapy sessions prescribed.  Goals were not fully achieved. Explanation for goals not achieved: patient came for initial evaluation only as she did not want to schedule any more appointments due to high cost of co-pays and wanted to change her insurance company before returning.  Patient was issued a copy of her previously instructed exercises for ROM, stretches and strengthening.   She was encouraged to resume bike and treadmill.    Therapy will be discontinued at this time.  The patient will need a new referral to resume.    Thank you for your referral.  April Newell  4/30/2019  1:04 PM

## 2021-06-26 NOTE — PROGRESS NOTES
Progress Notes by David Walton MD at 10/30/2018  4:30 PM     Author: David Walton MD Service: -- Author Type: Physician    Filed: 10/30/2018  5:22 PM Encounter Date: 10/30/2018 Status: Signed    : David Walton MD (Physician)           Click to link to [facility]     [facility] Clinic Note      Assessment:    1. Preoperative cardiovascular examination     2. Coronary artery disease due to lipid rich plaque     3. Dyslipidemia, goal LDL below 70     4. RADHA on CPAP         Plan:    1. There are no cardiac contraindications to general anesthesia and bariatric surgery.  2. Aspirin may be held perioperatively at the discretion of the surgeon.  3. Her statin should be continued perioperatively with a sip of water.  4. She will keep her plans to follow-up with me in the summer of 2020.    An After Visit Summary was printed and given to the patient.    Subjective:    Liana Briceño returned for preoperative cardiovascular examination.    She states she would like to lose weight to improve her overall health.    She last saw me in June at which time we planned to meet again in 2 years.  We stopped her Plavix at that time as she was more than a year out from coronary stenting.  Her only cardiac medications at this time are aspirin and a statin.    She does not experience any cardiac symptoms such as angina pectoris, shortness of breath, orthopnea, palpitations, lightheadedness, syncope, or lower extremity edema.    She has no functional limitations with regard to her heart.    Past Medical History:    Patient Active Problem List   Diagnosis   ? Obstructive Sleep Apnea   ? Diffuse Nontoxic Goiter   ? Type 2 diabetes mellitus (H)   ? Essential hypertension   ? Esophageal reflux   ? Dyslipidemia, goal LDL below 70   ? Hiatal Hernia   ? Sensorineural hearing loss   ? Stenosis of left carotid artery   ? Coronary artery disease due to lipid rich plaque   ? Morbid obesity with BMI of  40.0-44.9, adult (H)   ? RADHA on CPAP       Past Medical History:   Diagnosis Date   ? Anemia    ? Arthritis    ? Breast cyst    ? Carpal tunnel syndrome    ? Coronary artery disease due to lipid rich plaque    ? Dyslipidemia, goal LDL below 70    ? Esophageal reflux    ? Essential hypertension    ? Fatty liver    ? Goiter diffuse, nontoxic    ? Hiatal hernia    ? History of gestational diabetes    ? History of MRSA infection 07/17/2017    Pilonidal cyst culture   ? Morbid obesity with BMI of 40.0-44.9, adult (H)    ? RADHA on CPAP    ? Paroxysmal SVT (supraventricular tachycardia) (H)     S/P ablation   ? S/P hip replacement    ? Type 2 diabetes mellitus (H)        Past Surgical History:   Procedure Laterality Date   ? BREAST BIOPSY     ? BREAST CYST EXCISION     ? CARDIAC CATHETERIZATION N/A 4/24/2017    Procedure: Coronary Angiogram;  Surgeon: Ariane Biswas MD;  Location: [facility] Cath Lab;  Service:    ? CARDIAC CATHETERIZATION N/A 5/19/2017    Procedure: Coronary Angiogram;  Surgeon: Howard Gutierrez MD;  Location: [facility] Cath Lab;  Service:    ? CHOLECYSTECTOMY  2000   ? CORONARY STENT PLACEMENT  04/24/2017    BO to RCA by Dr. Biswas   ? IL EXPLO/DRAIN BREAST ABSCESS      Description: Breast Surgery Mastotomy With Drainage Of Abscess   ? IL L HRT CATH W/NJX L VENTRICULOGRAPHY IMG S&I N/A 4/24/2017    Procedure: Left Heart Catheterization with Left Ventriculogram;  Surgeon: Ariane Biswas MD;  Location: [facility] Cath Lab;  Service: Cardiology   ? IL REVISE MEDIAN N/CARPAL TUNNEL SURG      Description: Neuroplasty Decompression Median Nerve At Carpal Tunnel;  Recorded: 05/22/2009;   ? IL THYROID LOBECTOMY,UNILAT     ? TOTAL HIP ARTHROPLASTY Right         reports that she quit smoking about 2 years ago. Her smoking use included Cigarettes. She has a 11.00 pack-year smoking history. She has never used smokeless tobacco. She reports that she drinks alcohol. She reports that she does not use illicit  drugs.    Family History   Problem Relation Age of Onset   ? Heart disease Father      unknown type   ? Valvular heart disease Mother    ? Kidney disease Mother    ? Hypertension Mother    ? Diabetes Mother    ? Leukemia Brother       at a young age   ? No Medical Problems Son    ? Diabetes Paternal Aunt    ? Kidney disease Sister    ? Heart disease Sister    ? Breast cancer Neg Hx        Outpatient Encounter Prescriptions as of 10/30/2018   Medication Sig Dispense Refill   ? acetaminophen (TYLENOL) 500 MG tablet Take 1,000 mg by mouth 3 (three) times a day as needed.     ? aspirin 81 MG EC tablet Take 81 mg by mouth daily.     ? blood glucose test strips Use 1 each As Directed 3 (three) times a day. Dispense brand per patient's insurance at pharmacy discretion. (E11.9) 300 strip 1   ? cholecalciferol, vitamin D3, (CHOLECALCIFEROL) 1,000 unit tablet Take 1,000 Units by mouth daily.     ? generic lancets (MICROLET LANCET) Use 1 each As Directed 4 (four) times a day. 300 each 3   ? hydroCHLOROthiazide (MICROZIDE) 12.5 mg capsule Take 1 capsule (12.5 mg total) by mouth daily. 90 capsule 3   ? HYDROcodone-acetaminophen 5-325 mg per tablet Take 1 tablet by mouth 2 (two) times a day as needed for pain. 30 tablet 0   ? insulin aspart U-100 (NOVOLOG FLEXPEN U-100 INSULIN) 100 unit/mL injection pen Inject 50 Units under the skin 3 (three) times a day before meals. 45 mL 0   ? insulin degludec (TRESIBA FLEXTOUCH U-200) 200 unit/mL (3 mL) InPn Inject 146 Units under the skin daily. 18 mL 11   ? lisinopril (PRINIVIL,ZESTRIL) 5 MG tablet Take 1.5 tablets (7.5 mg total) by mouth daily. 135 tablet 3   ? melatonin (MELATIN) 3 mg Tab tablet Take 3 mg by mouth at bedtime as needed.     ? nitroglycerin (NITROSTAT) 0.4 MG SL tablet Place 1 tablet (0.4 mg total) under the tongue every 5 (five) minutes as needed for chest pain. 25 tablet 3   ? omega-3 acid ethyl esters (LOVAZA) 1 gram capsule TAKE 2 CAPSULES BY MOUTH TWICE DAILY 360  "capsule 1   ? omeprazole (PRILOSEC) 40 MG capsule TAKE 1 CAPSULE BY MOUTH EVERY DAY 90 capsule 1   ? pen needle, diabetic (BD ULTRA-FINE ZULMA PEN NEEDLES) 32 gauge x 5/32\" Ndle Inject 4 each under the skin daily. With Tresiba and Novolog 120 each 3   ? rosuvastatin (CRESTOR) 10 MG tablet TAKE 1 TABLET BY MOUTH DAILY 90 tablet 2   ? semaglutide (OZEMPIC) 0.25 mg or 0.5 mg(2 mg/1.5 mL) PnIj Inject 0.25 mg under the skin every 7 days. For four weeks, then increase to 0.5mg weekly for four weeks. 1.5 mL 1     No facility-administered encounter medications on file as of 10/30/2018.        Review of Systems:     General: Night Sweats, Weight Gain  Eyes: WNL  Ears/Nose/Throat: WNL  Lungs: WNL  Heart: WNL  Stomach: WNL  Bladder: WNL  Muscle/Joints: WNL  Skin: WNL  Nervous System: WNL  Mental Health: WNL     Blood: WNL    Objective:     /58 (Patient Site: Left Arm, Patient Position: Sitting, Cuff Size: Adult Large)  Pulse 78  Resp 14  Ht 5' 6\" (1.676 m)  Wt (!) 275 lb (124.7 kg)  BMI 44.39 kg/m2  Wt Readings from Last 5 Encounters:   10/30/18 (!) 275 lb (124.7 kg)   10/14/18 (!) 273 lb (123.8 kg)   10/11/18 (!) 273 lb (123.8 kg)   10/04/18 (!) 276 lb (125.2 kg)   09/28/18 (!) 276 lb (125.2 kg)        Physical Exam:    GENERAL APPEARANCE: alert, no apparent distress  EYES: no scleral icterus  NECK: no carotid bruits or adenopathy, jugular venous pressure is difficult to evaluate due to obesity  CHEST: symmetric, the lungs are clear to auscultation  CARDIOVASCULAR: regular rhythm without murmur or gallop  EXTREMITIES: no cyanosis, clubbing or edema  SKIN: no xanthelasma  NEUROLOGIC: normal gait and coordination  PSYCHIATRIC: mood and affect are normal    Cardiac Testing:    EKG: Sinus rhythm with nonspecific T wave changes and minimal prolongation of the corrected QT interval at 471 ms per my personal review.    Cardiac Catheterization:   Results for orders placed during the hospital encounter of 05/19/17   Cardiac " Catheterization [CATH01] 2017    Addendum    A drug eluting .   Mid LAD lesion 30% stenosed.   Estimated blood loss was <20 ml.   Anatomy is unchanged compared to recent study of 2017       Howard Gutierrez MD 2017 11:39 AM          Narrative   A drug eluting .    Mid LAD lesion 30% stenosed.    Estimated blood loss was <20 ml.     Anatomy is unchanged compared to recent study of 2017         Results for orders placed during the hospital encounter of 16   NM exercise stress test [EEZ528] 2016    [facility] Nuclear Cardiology   Exercise Stress Test Report       Patient: Liana Briceño   MRN: [id number]  : 1955  Primary Care Provider: Mimi Fuentes MD  Ordering Physician: Acosta Bloom MD  Indication: Chest pain [R07.9] atypicl chest pain/DM and + risk factors/  Chest pain, acute, nonspecific, low prob CAD    STRESS SUMMARY:  The patient exercised for 3 minutes and 0 seconds according to the Neal   protocol, stopping due to 6 out of 10 low back pain, dyspnea, and fatigue.    An additional 33 seconds were spent at stage I speed and grade to   facilitate myocardial tracer uptake post injection.  Chest discomfort was   not reported. The stress electrocardiogram was negative for inducible   ischemia.  The peak heart rate was 151 beats per minute, which is 93% of   the age predicted maximum. The blood pressure caio from 110/68 at rest to   160/60 at the peak of exercise. The supervising cardiologist was Dr. Melanie Medina .     TECHNICAL COMMENTS:   Nuclear imaging was accomplished utilizing 4.27 mCi of thallium injected   at rest and 41.5 mCi of technetium 99m sestamibi injected at the peak of   exercise. The  images are satisfactory.     FINDINGS: The exercise stress and rest images demonstrate normal left   ventricular size and tracer uptake. The gated images reveal normal resting   regional wall motion and borderline reduced  global systolic performance.   The measured resting ejection fraction is 50%.     CONCLUSION:   1. Exercise stress nuclear study is negative for inducible myocardial   ischemia or infarction.   2. Borderline low left ventricular ejection fraction of 50%.  3. Poor exercise capacity, which reduces the negative predictive value of   this test.    David Walton MD  2/4/2016 1:42 PM                          Imaging:    No results found.    Lab Results:    Lab Results   Component Value Date    CREATININE 1.00 09/04/2018    BUN 16 09/04/2018     09/04/2018    K 4.7 09/04/2018     09/04/2018    CO2 26 09/04/2018     Lab Results   Component Value Date    CHOL 134 03/13/2018    TRIG 276 (H) 03/13/2018    HDL 28 (L) 03/13/2018    LDLDIRECT 48 06/06/2017     BNP (pg/mL)   Date Value   09/30/2016 <10     Creatinine (mg/dL)   Date Value   09/04/2018 1.00   06/15/2018 1.17 (H)   03/13/2018 0.86   12/27/2017 0.82     Magnesium (mg/dL)   Date Value   01/22/2016 1.8     LDL Calculated (mg/dL)   Date Value   03/13/2018 51   06/06/2017 42   04/25/2017      Comment:     Invalid, Triglycerides >400     Lab Results   Component Value Date    WBC 6.3 09/04/2018    WBC 8.0 06/30/2015    HGB 11.9 (L) 09/04/2018    HCT 35.3 09/04/2018    MCV 87 09/04/2018     09/04/2018           Much or all of the text in this note was generated through the use of the Dragon Dictate voice-to-text software. Errors in spelling or words which seem out of context are unintentional. Sound alike errors, in particular, may have escaped editing.

## 2021-06-26 NOTE — PROGRESS NOTES
Progress Notes by Viviana Manning at 2/23/2018  9:34 AM     Author: Viviana Manning Service: -- Author Type: Community Health Worker    Filed: 2/23/2018  9:41 AM Encounter Date: 2/23/2018 Status: Signed    : Viviana Manning (Community Health Worker)       02/23/18 Called patient spoke with her, she reports her SSA is $1228/month plus $134/month she pays for Part B premium, (total would be approximately $1425/month of countable income) it appears patient is over income from Medicare Savings Program/MA at this time. Let patient know this and she reports she thought she probably was.   No further FRG outreach needed at this time. I am available for assistance if patient has a new need for FRG outreach-  patient or Care Guide may notify me.                  Viviana Manning, Phone: [phone], Fax: [phone]    02/02/2018 Referral:     FRG Referral 2/2/18  Received: 3 weeks ago       Lisa Manning                     Pt would like to see if she is eligible for MA. Please contact patient at your convenience to set up phone call visit. Pt does drive but has a hard time after having carpal tunnel surgery.

## 2021-06-26 NOTE — PROGRESS NOTES
[facility] Follow Up Note    Assessment/Plan:  1. Dyspepsia  Recent dyspepsia.  Increased aspirin use and nonsteroidal anti-inflammatory use.  Recommendation: Reduce aspirin to 81 mg/day as per original plan.  Avoid use of nonsteroidal anti-inflammatory agents.  Continue with omeprazole every morning and Pepcid every afternoon.  Recheck hemoglobin and blood work in 6 to 8 weeks.  If still with anemia, may need EGD  - famotidine (PEPCID) 40 MG tablet; Take 1 tablet (40 mg total) by mouth every evening.  Dispense: 30 tablet; Refill: 1    2. Stage 3a chronic kidney disease  Stable-monitoring    3. Type 2 diabetes mellitus with hyperglycemia, with long-term current use of insulin (H)  Labile blood sugars with recent hypoglycemic events.  Notes from Pharm.D. reviewed.  Long-acting insulin has been reduced to 80 units.  Blood sugars no longer low.  Some elevated sugars now that she has had an injection of the knee and a tooth extraction today.  Recommendation: Continue to monitor glucose.  Will ask [facility] pharmacy to check back with her next week as this is a very fluid situation.  Recent steroid injection and recent tooth extraction.  Significant physiologic stress and change in diet/etc.    4. Essential hypertension  Stable blood pressure      Follow-up in 6 to 8 weeks    Mimi Fuentes MD    Chief Complaint:  Chief Complaint   Patient presents with     Diabetes     elevated blood sugars     Leg Swelling     bilateral       History of Present Illness:  Liana is a 65 y.o. female who is here today for discussion of her multiple medical problems.  Initially, she was seen by [facility] pharmacy for hypoglycemic events.  She was symptomatic with blood sugars around 70.  Her most recent glycohemoglobin was 7.0 and certainly not low.  She does note discrepancy between her kiah 24-hour monitor and finger pricks.  She states her finger sticks tend to run 30-40 points higher.  She does describe some symptoms of  lightheadedness and dizziness with low blood sugar events.    Her insulin had been titrated downward.  Today, she comes in as her blood sugars tend to be somewhat labile.  She is here today directly from a dental visit and has packing in her mouth and is very swollen.  She had a wisdom tooth extracted on the right.  She states that she is not sure what she will be eating.  Additionally, she had a knee injection by her orthopedic surgeon.  This has resulted in some rebound increased elevation of blood sugars.    Also, she was instructed to decrease her aspirin.  However, she noted some dyspepsia.  Therefore, she bumped her aspirin back up to an elevated level.  She thought this might help with the pain.  She denies any blood in the stool.  She has heartburn and dyspepsia.  She is using omeprazole at 40 mg daily.    Review of Systems:  A comprehensive review of systems was performed and was otherwise negative    PFSH:  Social History: She is continue to prepare for moving out of her family home after 21 years.  Social History     Tobacco Use   Smoking Status Former Smoker     Packs/day: 0.25     Years: 44.00     Pack years: 11.00     Types: Cigarettes     Quit date: 2017     Years since quittin.4   Smokeless Tobacco Never Used   Tobacco Comment    age 18 to age 62 up to 1/2 ppd       Past History: Multiple complex medical problems that have been stable in recent years  Current Outpatient Medications   Medication Sig Dispense Refill     acetaminophen (TYLENOL) 500 MG tablet Take 1,000 mg by mouth 3 (three) times a day as needed.       allopurinoL (ZYLOPRIM) 100 MG tablet Take 1 tablet (100 mg total) by mouth daily. 90 tablet 2     aspirin 81 MG EC tablet Take 1 tablet (81 mg total) by mouth daily.  0     blood glucose meter (FREESTYLE LITE METER) Use 1 each As Directed 6 (six) times a day. Dispense glucometer brand per patient's insurance at pharmacy discretion. 1 each 0     blood glucose test strips Use 1  each As Directed 6 (six) times a day. Prodigy Meter 300 strip 3     cetirizine (ZYRTEC) 10 MG tablet Take 1 tablet (10 mg total) by mouth daily. 30 tablet 2     cholecalciferol, vitamin D3, (CHOLECALCIFEROL) 1,000 unit tablet Take 3,000 Units by mouth daily.        cinnamon bark (CINNAMON ORAL) Take 1 each by mouth 2 (two) times a day.       cyanocobalamin (VITAMIN B-12) 500 MCG tablet Take 500 mcg by mouth daily.       cyclobenzaprine (FLEXERIL) 10 MG tablet Take 1 tablet (10 mg total) by mouth at bedtime as needed for muscle spasms. 21 tablet 1     diclofenac sodium (VOLTAREN) 1 % Gel Apply one thin layer over toe joint/ affected joint 1 Tube 5     flash glucose scanning reader (FREESTYLE MIRANDA 14 DAY READER) Misc Use 1 each As Directed daily. 1 each 1     flash glucose sensor (FREESTYLE MIRANDA 14 DAY SENSOR) Kit Use 1 each As Directed every 14 (fourteen) days. 2 kit 11     gabapentin (NEURONTIN) 300 MG capsule Take one tablet hs (Patient taking differently: 2 (two) times a day. Take one tablet hs) 90 capsule 3     generic lancets (MICROLET LANCET) Use 1 each As Directed 6 (six) times a day. 300 each 3     hydroCHLOROthiazide (MICROZIDE) 12.5 mg capsule TAKE 1 CAPSULE(12.5 MG) BY MOUTH DAILY 90 capsule 3     HYDROcodone-acetaminophen 5-325 mg per tablet Take 1 tablet by mouth 2 (two) times a day as needed for pain. 40 tablet 0     insulin degludec (TRESIBA FLEXTOUCH U-200) 200 unit/mL (3 mL) InPn Inject 80 Units under the skin daily.       insulin lispro (HUMALOG KWIKPEN) 100 unit/mL pen Inject 12 Units under the skin 3 (three) times a day with meals. 45 mL 0     lisinopriL (PRINIVIL,ZESTRIL) 5 MG tablet Take 1.5 tablets (7.5 mg total) by mouth daily. 135 tablet 2     melatonin (MELATIN) 3 mg Tab tablet Take 3 mg by mouth at bedtime as needed.       nitroglycerin (NITROSTAT) 0.3 MG SL tablet Place 1 tablet (0.3 mg total) under the tongue every 5 (five) minutes as needed for chest pain. 100 tablet 3     omega-3 acid  "ethyl esters (LOVAZA) 1 gram capsule TAKE 2 CAPSULES BY MOUTH TWICE DAILY 360 capsule 3     omeprazole (PRILOSEC) 40 MG capsule TAKE 1 CAPSULE BY MOUTH EVERY DAY 90 capsule 3     ONETOUCH DELICA PLUS LANCET 33 gauge Misc USE TO TEST BLOOD SUGAR 6 TIMES DAILY 600 each 3     OZEMPIC 1 mg/dose (2 mg/1.5 mL) PnIj INJECT 1 MG UNDER THE SKIN EVERY 7 DAYS 3 mL 12     pen needle, diabetic (BD ULTRA-FINE ZULMA PEN NEEDLE) 32 gauge x 5/32\" Ndle Inject 4 each under the skin daily. With Tresiba and Novolog 120 each 3     rosuvastatin (CRESTOR) 10 MG tablet Take 1 tablet (10 mg total) by mouth daily. 90 tablet 3     triamcinolone (KENALOG) 0.1 % cream Apply bid 30 g 0     valACYclovir (VALTREX) 500 MG tablet Take 500 mg by mouth daily.       VITAMIN B-1 100 mg tablet TAKE 1 TABLET(100 MG) BY MOUTH DAILY 90 tablet 3     famotidine (PEPCID) 40 MG tablet Take 1 tablet (40 mg total) by mouth every evening. 30 tablet 1     No current facility-administered medications for this visit.        Family History:     Physical Exam:  General Appearance:   She is pleasant and well-appearing-weight is stable.  Mouth is packed and significant swelling noted on the right  Vitals:    06/15/21 1339   BP: 132/70   Patient Site: Left Arm   Patient Position: Sitting   Cuff Size: Adult Regular   Pulse: 82   SpO2: 97%   Weight: (!) 271 lb 9.6 oz (123.2 kg)   Height: 5' 5\" (1.651 m)     Wt Readings from Last 3 Encounters:   06/15/21 (!) 271 lb 9.6 oz (123.2 kg)   01/19/21 (!) 272 lb (123.4 kg)   09/02/20 (!) 265 lb (120.2 kg)     Body mass index is 45.2 kg/m .    Have reviewed [facility] pharmacy notes    Data Review:    Analysis and Summary of Old Records (2):       Records Requested (1):       Other History Summarized (from other people in the room) (2):     Radiology Tests Summarized (XRAY/CT/MRI/DXA) (1):     Labs Reviewed (1): Reviewed labs with her from May 21    Medicine Tests Reviewed (EKG/ECHO/COLONOSCOPY/EGD) (1):     Independent Review of EKG or X-RAY " (2):

## 2021-06-26 NOTE — PROGRESS NOTES
Progress Notes by David Walton MD at 6/4/2018 12:50 PM     Author: David Walton MD Service: -- Author Type: Physician    Filed: 6/4/2018  1:13 PM Encounter Date: 6/4/2018 Status: Signed    : David Walton MD (Physician)           Click to link to [facility]     [facility] Clinic Note      Assessment:    1. Coronary artery disease due to lipid rich plaque     2. Dyslipidemia, goal LDL below 70         Plan:    1.   Discontinue Plavix.  2.   Return to see Dr. Walton in two years.    An After Visit Summary was printed and given to the patient.    Subjective:    Liana Briceño returned for a planned annual follow up visit.    She tells me that [facility] worked.  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  She is having difficulty with arthritis in her back.    Since our last visit her father passed away at age 95.    Frank has been drinking more coffee and sometimes she feels that caffeine intake is related to transient sharp pains in the left breast.  She does not experience a substernal and central chest discomfort that she had prior to coronary stenting.  She does not experience unusual shortness of breath, palpitations or lightheadedness.    Past Medical History:    Patient Active Problem List   Diagnosis   ? Obstructive Sleep Apnea   ? Diffuse Nontoxic Goiter   ? Type 2 diabetes mellitus   ? Morbid obesity   ? Essential hypertension   ? Esophageal reflux   ? Dyslipidemia, goal LDL below 70   ? Hiatal Hernia   ? Sensorineural hearing loss   ? Stenosis of left carotid artery   ? Coronary artery disease due to lipid rich plaque       Past Medical History:   Diagnosis Date   ? Anemia    ? Breast cyst    ? Carpal tunnel syndrome    ? Coronary artery disease due to lipid rich plaque    ? Dyslipidemia, goal LDL below 70    ? Dysphagia    ? Esophageal reflux    ? Essential hypertension    ? Goiter diffuse, nontoxic    ? Hiatal hernia    ? Morbid obesity    ?  RADHA (obstructive sleep apnea)    ? Paroxysmal SVT (supraventricular tachycardia)     S/P ablation   ? Type 2 diabetes mellitus        Past Surgical History:   Procedure Laterality Date   ? BREAST BIOPSY     ? BREAST CYST EXCISION     ? CARDIAC CATHETERIZATION N/A 2017    Procedure: Coronary Angiogram;  Surgeon: Ariane Biswas MD;  Location: [facility];  Service:    ? CARDIAC CATHETERIZATION N/A 2017    Procedure: Coronary Angiogram;  Surgeon: Howard Gutierrez MD;  Location: [facility];  Service:    ? CHOLECYSTECTOMY  2000   ? CORONARY STENT PLACEMENT  2017    BO to RCA by Dr. Biswas   ? [facility] EXPLO/DRAIN BREAST ABSCESS      Description: Breast Surgery Mastotomy With Drainage Of Abscess   ? VA L HRT CATH W/NJX L VENTRICULOGRAPHY IMG S&I N/A 2017    Procedure: Left Heart Catheterization with Left Ventriculogram;  Surgeon: Ariane Biswas MD;  Location: [facility];  Service: Cardiology   ? VA REVISE MEDIAN N/CARPAL TUNNEL SURG      Description: Neuroplasty Decompression Median Nerve At Carpal Tunnel;  Recorded: 2009;   ? VA THYROID LOBECTOMY,UNILAT          reports that she quit smoking about 2 years ago. Her smoking use included Cigarettes. She has a 10.00 pack-year smoking history. She has never used smokeless tobacco. She reports that she drinks alcohol. She reports that she does not use illicit drugs.    Family History   Problem Relation Age of Onset   ? Heart disease Father    ? Heart disease Mother    ? Leukemia Brother       at a young age   ? No Medical Problems Son    ? Breast cancer Neg Hx        Outpatient Encounter Prescriptions as of 2018   Medication Sig Dispense Refill   ? acetaminophen (TYLENOL) 500 MG tablet Take by mouth every 6 (six) hours as needed for pain.     ? albuterol (PROAIR HFA;PROVENTIL HFA;VENTOLIN HFA) 90 mcg/actuation inhaler Inhale 2 puffs every 6 (six) hours as needed for wheezing. 1 Inhaler 1   ? aspirin 81 MG EC  "tablet Take 81 mg by mouth daily.     ? blood glucose test (CONTOUR NEXT STRIPS) strips Use 1 each As Directed 4 (four) times a day. 300 strip 3   ? cholecalciferol, vitamin D3, (CHOLECALCIFEROL) 1,000 unit tablet Take 1,000 Units by mouth daily.     ? gabapentin (NEURONTIN) 300 MG capsule Take 1 capsule (300 mg total) by mouth 3 (three) times a day. 90 capsule 9   ? generic lancets (MICROLET LANCET) Use 1 each As Directed 4 (four) times a day. 300 each 3   ? hydroCHLOROthiazide (MICROZIDE) 12.5 mg capsule Take 1 capsule (12.5 mg total) by mouth daily. 90 capsule 3   ? HYDROcodone-acetaminophen 5-325 mg per tablet Take 1 tablet by mouth 2 (two) times a day as needed for pain. 25 tablet 0   ? insulin aspart U-100 (NOVOLOG FLEXPEN U-100 INSULIN) 100 unit/mL injection pen Inject 42 Units under the skin 3 (three) times a day before meals. 36 mL 2   ? insulin degludec (TRESIBA FLEXTOUCH U-200) 200 unit/mL (3 mL) InPn Inject 132 Units under the skin daily. 18 mL 11   ? lisinopril (PRINIVIL,ZESTRIL) 5 MG tablet Take 1.5 tablets (7.5 mg total) by mouth daily. 135 tablet 3   ? nitroglycerin (NITROSTAT) 0.4 MG SL tablet Place 1 tablet (0.4 mg total) under the tongue every 5 (five) minutes as needed for chest pain. 25 tablet 3   ? omega-3 acid ethyl esters (LOVAZA) 1 gram capsule TAKE 2 CAPSULES BY MOUTH TWICE DAILY 360 capsule 3   ? omeprazole (PRILOSEC) 40 MG capsule TAKE 1 CAPSULE BY MOUTH EVERY DAY 90 capsule 1   ? pen needle, diabetic (BD ULTRA-FINE ZULMA PEN NEEDLES) 32 gauge x 5/32\" Ndle Inject 4 each under the skin daily. With Tresiba and Novolog 120 each 3   ? rosuvastatin (CRESTOR) 10 MG tablet TAKE 1 TABLET BY MOUTH DAILY 90 tablet 3   ? tiZANidine (ZANAFLEX) 2 MG tablet TAKE 1 TABLET(2 MG) BY MOUTH THREE TIMES DAILY AS NEEDED 270 tablet 0   ? [DISCONTINUED] clopidogrel (PLAVIX) 75 mg tablet TAKE 1 TABLET BY MOUTH DAILY 90 tablet 3   ? melatonin (MELATIN) 3 mg Tab tablet Take 3 mg by mouth at bedtime as needed.   " "    No facility-administered encounter medications on file as of 6/4/2018.        Review of Systems:     General: Weight Gain  Eyes: WNL  Ears/Nose/Throat: WNL  Lungs: WNL  Heart: WNL  Stomach: WNL  Bladder: WNL  Muscle/Joints: Muscle Pain  Skin: WNL  Nervous System: Daytime Sleepiness  Mental Health: WNL     Blood: WNL    Objective:     /68 (Patient Site: Left Arm, Patient Position: Sitting, Cuff Size: Adult Large)  Pulse 78  Resp 16  Ht 5' 6\" (1.676 m)  Wt (!) 279 lb (126.6 kg)  BMI 45.03 kg/m2  Wt Readings from Last 5 Encounters:   06/04/18 (!) 279 lb (126.6 kg)   05/25/18 (!) 276 lb (125.2 kg)   05/21/18 (!) 276 lb 11.2 oz (125.5 kg)   03/13/18 (!) 273 lb (123.8 kg)   12/27/17 (!) 270 lb (122.5 kg)        Physical Exam:    GENERAL APPEARANCE: alert, no apparent distress  EYES: no scleral icterus  NECK: no carotid bruits or adenopathy, jugular venous pressure is difficult to evaluate due to obesity  CHEST: symmetric, the lungs are clear to auscultation  CARDIOVASCULAR: regular rhythm without murmur or gallop  EXTREMITIES: no cyanosis, clubbing or edema  SKIN: no xanthelasma  NEUROLOGIC: normal gait and coordination  PSYCHIATRIC: mood and affect are normal    Cardiac Testing:    Cardiac Catheterization:   Results for orders placed during the hospital encounter of 05/19/17   Cardiac Catheterization [CATH01] 05/19/2017    Addendum    A drug eluting .   Mid LAD lesion 30% stenosed.   Estimated blood loss was <20 ml.   Anatomy is unchanged compared to recent study of 4/24/2017       Howard Gutierrez MD 5/19/2017 11:39 AM          Narrative   A drug eluting .    Mid LAD lesion 30% stenosed.    Estimated blood loss was <20 ml.     Anatomy is unchanged compared to recent study of 4/24/2017         Results for orders placed during the hospital encounter of 02/04/16   NM exercise stress test [QRA170] 02/04/2016    [facility]   Exercise Stress Test Report       Patient: " Liana Briceño   MRN: [id number]  : 1955  Primary Care Provider: Mimi Fuentes MD  Ordering Physician: Acosta Bloom MD  Indication: Chest pain [R07.9] atypicl chest pain/DM and + risk factors/  Chest pain, acute, nonspecific, low prob CAD    STRESS SUMMARY:  The patient exercised for 3 minutes and 0 seconds according to the Neal   protocol, stopping due to 6 out of 10 low back pain, dyspnea, and fatigue.    An additional 33 seconds were spent at stage I speed and grade to   facilitate myocardial tracer uptake post injection.  Chest discomfort was   not reported. The stress electrocardiogram was negative for inducible   ischemia.  The peak heart rate was 151 beats per minute, which is 93% of   the age predicted maximum. The blood pressure caio from 110/68 at rest to   160/60 at the peak of exercise. The supervising cardiologist was Dr. Melanie Medina .     TECHNICAL COMMENTS:   Nuclear imaging was accomplished utilizing 4.27 mCi of thallium injected   at rest and 41.5 mCi of technetium 99m sestamibi injected at the peak of   exercise. The  images are satisfactory.     FINDINGS: The exercise stress and rest images demonstrate normal left   ventricular size and tracer uptake. The gated images reveal normal resting   regional wall motion and borderline reduced global systolic performance.   The measured resting ejection fraction is 50%.     CONCLUSION:   1. Exercise stress nuclear study is negative for inducible myocardial   ischemia or infarction.   2. Borderline low left ventricular ejection fraction of 50%.  3. Poor exercise capacity, which reduces the negative predictive value of   this test.    David Walton MD  2016 1:42 PM                          Imaging:    No results found.    Lab Results:    Lab Results   Component Value Date    CREATININE 0.86 2018    BUN 16 2018     2018    K 4.2 2018     2018    CO2 27 2018     Lab  Results   Component Value Date    CHOL 134 03/13/2018    TRIG 276 (H) 03/13/2018    HDL 28 (L) 03/13/2018    LDLDIRECT 48 06/06/2017     BNP (pg/mL)   Date Value   09/30/2016 <10     Creatinine (mg/dL)   Date Value   03/13/2018 0.86   12/27/2017 0.82   06/28/2017 1.09   05/18/2017 1.15 (H)     Magnesium (mg/dL)   Date Value   01/22/2016 1.8     LDL Calculated (mg/dL)   Date Value   03/13/2018 51   06/06/2017 42   04/25/2017      Comment:     Invalid, Triglycerides >400     Lab Results   Component Value Date    WBC 6.9 03/13/2018    WBC 8.0 06/30/2015    HGB 12.2 03/13/2018    HCT 36.1 03/13/2018    MCV 85 03/13/2018     03/13/2018           Much or all of the text in this note was generated through the use of the Dragon Dictate voice-to-text software. Errors in spelling or words which seem out of context are unintentional. Sound alike errors, in particular, may have escaped editing.
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
## 2021-06-28 ENCOUNTER — ANCILLARY PROCEDURE (OUTPATIENT)
Dept: CT IMAGING | Facility: CLINIC | Age: 66
End: 2021-06-28
Attending: INTERNAL MEDICINE
Payer: COMMERCIAL

## 2021-06-28 DIAGNOSIS — I65.22 STENOSIS OF LEFT CAROTID ARTERY: ICD-10-CM

## 2021-06-28 LAB
CREAT BLD-MCNC: 1 MG/DL (ref 0.52–1.04)
GFR SERPL CREATININE-BSD FRML MDRD: 56 ML/MIN/{1.73_M2}

## 2021-06-28 PROCEDURE — 70498 CT ANGIOGRAPHY NECK: CPT | Performed by: RADIOLOGY

## 2021-06-28 RX ORDER — IOPAMIDOL 755 MG/ML
75 INJECTION, SOLUTION INTRAVASCULAR ONCE
Status: COMPLETED | OUTPATIENT
Start: 2021-06-28 | End: 2021-06-28

## 2021-06-28 RX ADMIN — IOPAMIDOL 75 ML: 755 INJECTION, SOLUTION INTRAVASCULAR at 17:22

## 2021-06-29 NOTE — PROGRESS NOTES
Progress Notes by David Walton MD at 7/29/2020  9:20 AM     Author: David Walton MD Service: -- Author Type: Physician    Filed: 7/29/2020 10:20 AM Encounter Date: 7/29/2020 Status: Signed    : David Walton MD (Physician)            [facility] Note    Liana Briceño was advised by Dr. Fuentes to meet with me today at the [facility] to evaluate chest discomfort in the setting of possibly needing vascular surgery.     Assessment:    1. Nonspecific chest pain  NM MPI with Lexiscan   2. Coronary artery disease due to lipid rich plaque  NM MPI with Lexiscan   3. Dyslipidemia, goal LDL below 70         Plan:    1. We will call her with the results of the Lexiscan nuclear stress test.  I am optimistic that it will not demonstrate significant inducible ischemia.  If it does not, we will plan to meet again in 2 years time.    An After Visit Summary was printed and given to the patient.    Primary Cardiologist:  Dr. David Walton    Current History:    Liana states she has been troubled by lightheadedness for a few months and because of this she sought care with her internist.  Carotid ultrasonography suggested narrowing of the left internal carotid artery and a recent CT angiogram describes a 65% stenosis of the left internal carotid artery.  She will be seeing Dr. Acosta Marshall in consultation later today.    She also reports intermittent nonexertional chest discomfort.  It is worse since the murder of her grand nephew, Albina Briceño, (the grandson of her sister), recently on [facility] in [place].  She reports a pinching sensation in the right upper pectoral area she states it is made worse if she rubs the area.  Sometimes she feels an uncomfortable sensation in the substernal area but does not have associated nausea, diaphoresis or dyspnea.  The symptoms can last for several minutes and come and go without clear-cut  provocation.    Patient Active Problem List   Diagnosis   ? Diffuse Nontoxic Goiter   ? Type 2 diabetes mellitus (H)   ? Essential hypertension   ? Esophageal reflux   ? Dyslipidemia, goal LDL below 70   ? Hiatal Hernia   ? Sensorineural hearing loss   ? Stenosis of left carotid artery   ? Coronary artery disease due to lipid rich plaque   ? Morbid obesity with BMI of 40.0-44.9, adult (H)   ? RADHA on CPAP   ? Low serum vitamin B12   ? Elevated ferritin level       Past Medical History:  Past Medical History:   Diagnosis Date   ? Anemia    ? Breast cyst    ? Carpal tunnel syndrome    ? Coronary artery disease due to lipid rich plaque    ? Dyslipidemia, goal LDL below 70    ? Esophageal reflux    ? Essential hypertension    ? Fatty liver    ? Goiter diffuse, nontoxic    ? Hiatal hernia    ? History of gestational diabetes    ? History of MRSA infection 07/17/2017    Pilonidal cyst culture   ? Increased PTH level 11/4/2018   ? Morbid obesity with BMI of 40.0-44.9, adult (H)    ? RADHA on CPAP    ? Osteoarthritis    ? Paroxysmal SVT (supraventricular tachycardia) (H) 2011    ablation by Dr. Johnson in 2011   ? Positive result for methicillin resistant Staphylococcus aureus (MRSA) screening 11/4/2018   ? S/P hip replacement    ? Thiamine deficiency 11/4/2018   ? Type 2 diabetes mellitus (H)    ? Vitamin D deficiency 11/4/2018       Past Surgical History:  Past Surgical History:   Procedure Laterality Date   ? BREAST BIOPSY     ? BREAST CYST EXCISION     ? CARDIAC CATHETERIZATION N/A 4/24/2017    Procedure: Coronary Angiogram;  Surgeon: Ariane Biswas MD;  Location: [facility] Cath Lab;  Service:    ? CARDIAC CATHETERIZATION N/A 5/19/2017    Procedure: Coronary Angiogram;  Surgeon: Howard Gutierrez MD;  Location: [facility] Cath Lab;  Service:    ? CHOLECYSTECTOMY  2000   ? CORONARY STENT PLACEMENT  04/24/2017    BO to RCA by Dr. Biswas   ? MS EXPLO/DRAIN BREAST ABSCESS      Description: Breast Surgery Mastotomy With  Drainage Of Abscess   ? KS L HRT CATH W/NJX L VENTRICULOGRAPHY IMG S&I N/A 2017    Procedure: Left Heart Catheterization with Left Ventriculogram;  Surgeon: Ariane Biswas MD;  Location: [facility] Cath Lab;  Service: Cardiology   ? KS REVISE MEDIAN N/CARPAL TUNNEL SURG      Description: Neuroplasty Decompression Median Nerve At Carpal Tunnel;  Recorded: 2009;   ? KS THYROID LOBECTOMY,UNILAT     ? RADIOFREQUENCY ABLATION  2011    for PSVT by Dr. Johnson   ? TOTAL HIP ARTHROPLASTY Right        Family History:  Family History   Problem Relation Age of Onset   ? Heart disease Father         unknown type   ? Valvular heart disease Mother    ? Kidney disease Mother    ? Hypertension Mother    ? Diabetes Mother    ? Leukemia Brother          at a young age   ? No Medical Problems Son    ? Diabetes Paternal Aunt    ? Kidney disease Sister    ? Heart disease Sister    ? Other Other 25        victim of a homicide   ? Breast cancer Neg Hx        Social History:   reports that she quit smoking about 3 years ago. Her smoking use included cigarettes. She has a 11.00 pack-year smoking history. She has never used smokeless tobacco. She reports current alcohol use. She reports that she does not use drugs.    Medications:  Outpatient Encounter Medications as of 2020   Medication Sig Dispense Refill   ? acetaminophen (TYLENOL) 500 MG tablet Take 1,000 mg by mouth 3 (three) times a day as needed.     ? allopurinol (ZYLOPRIM) 100 MG tablet Take 1 tablet (100 mg total) by mouth daily. 90 tablet 3   ? aspirin 81 MG EC tablet Take 81 mg by mouth daily.     ? blood glucose meter (FREESTYLE LITE METER) Use 1 each As Directed 6 (six) times a day. Dispense glucometer brand per patient's insurance at pharmacy discretion. 1 each 0   ? blood glucose test strips Use 1 each As Directed 6 (six) times a day. Prodigy Meter 300 strip 3   ? cetirizine (ZYRTEC) 10 MG tablet Take 1 tablet (10 mg total) by mouth daily. 30 tablet  2   ? cholecalciferol, vitamin D3, (CHOLECALCIFEROL) 1,000 unit tablet Take 2,000 Units by mouth daily.           ? cinnamon bark (CINNAMON ORAL) Take by mouth.     ? cyanocobalamin (VITAMIN B-12) 500 MCG tablet Take 500 mcg by mouth daily.     ? cyanocobalamin 250 MCG tablet Take 250 mcg by mouth daily.     ? cyclobenzaprine (FLEXERIL) 10 MG tablet Take 1 tablet (10 mg total) by mouth at bedtime as needed for muscle spasms. 21 tablet 1   ? diclofenac sodium (VOLTAREN) 1 % Gel Apply one thin layer over toe joint/ affected joint 1 Tube 0   ? flash glucose scanning reader (FREESTYLE MIRANDA 14 DAY READER) Misc Use 1 each As Directed daily. 1 each 1   ? flash glucose sensor (FREESTYLE MIRANDA 14 DAY SENSOR) Kit Use 1 each As Directed every 14 (fourteen) days. 2 kit 11   ? generic lancets (MICROLET LANCET) Use 1 each As Directed 6 (six) times a day. 300 each 3   ? HUMALOG KWIKPEN INSULIN 100 unit/mL pen INJECT 45 UNITS UNDER THE SKIN THREE TIMES DAILY 129 mL 0   ? hydroCHLOROthiazide (MICROZIDE) 12.5 mg capsule Take 1 capsule (12.5 mg total) by mouth daily. 90 capsule 3   ? HYDROcodone-acetaminophen 5-325 mg per tablet Take 1 tablet by mouth 2 (two) times a day as needed for pain. 40 tablet 0   ? insulin degludec (TRESIBA FLEXTOUCH U-200) 200 unit/mL (3 mL) InPn Inject 110 Units under the skin daily. 20 mL 11   ? lisinopril (PRINIVIL,ZESTRIL) 5 MG tablet Take 1.5 tablets (7.5 mg total) by mouth daily. 135 tablet 3   ? melatonin (MELATIN) 3 mg Tab tablet Take 3 mg by mouth at bedtime as needed.     ? nitroglycerin (NITROSTAT) 0.3 MG SL tablet Place 1 tablet (0.3 mg total) under the tongue every 5 (five) minutes as needed for chest pain. 100 tablet 3   ? nitroglycerin (NITROSTAT) 0.4 MG SL tablet Place 1 tablet (0.4 mg total) under the tongue every 5 (five) minutes as needed for chest pain. 25 tablet 3   ? omega-3 acid ethyl esters (LOVAZA) 1 gram capsule Take 2 capsules (2 g total) by mouth 2 (two) times a day. 360 capsule 3  "  ? omeprazole (PRILOSEC) 40 MG capsule TAKE 1 CAPSULE BY MOUTH EVERY DAY 90 capsule 3   ? ONETOUCH DELICA PLUS LANCET 33 gauge Misc USE TO TEST BLOOD SUGAR 6 TIMES DAILY 600 each 3   ? OZEMPIC 1 mg/dose (2 mg/1.5 mL) PnIj ADMINISTER 1 MG UNDER THE SKIN EVERY 7 DAYS 3 mL 12   ? pen needle, diabetic (BD ULTRA-FINE ZULMA PEN NEEDLE) 32 gauge x 5/32\" Ndle Inject 4 each under the skin daily. With Tresiba and Novolog 120 each 3   ? rosuvastatin (CRESTOR) 10 MG tablet TAKE 1 TABLET BY MOUTH DAILY 90 tablet 3   ? sulfamethoxazole-trimethoprim (BACTRIM DS) 800-160 mg per tablet Take 1 tablet by mouth 2 (two) times a day for 10 days. 20 tablet 0   ? triamcinolone (KENALOG) 0.1 % cream Apply bid 30 g 0   ? VITAMIN B-1 100 mg tablet Take 1 tablet (100 mg total) by mouth daily. 90 tablet 3     No facility-administered encounter medications on file as of 7/29/2020.        Allergies  Latex, natural rubber; Penicillins; Doxycycline; Eggshell membrane; Naloxone; Penicillamine; Pneumococcal 23-valent polysaccharide vaccine; Statins-hmg-coa reductase inhibitors; and Azithromycin    Review of Systems    General: WNL  Eyes: WNL  Ears/Nose/Throat: WNL  Lungs: Cough  Heart: Chest Pain, Shortness of Breath with activity  Stomach: WNL  Bladder: WNL  Muscle/Joints: WNL  Skin: WNL  Nervous System: WNL  Mental Health: WNL     Blood: WNL    Objective:    Wt Readings from Last 5 Encounters:   07/29/20 (!) 271 lb (122.9 kg)   07/28/20 (!) 270 lb (122.5 kg)   01/02/20 (!) 271 lb (122.9 kg)   12/30/19 (!) 271 lb 1.6 oz (123 kg)   12/28/19 (!) 265 lb (120.2 kg)      5' 5\" (1.651 m)  Body mass index is 45.1 kg/m .  /60 (Patient Site: Left Arm, Patient Position: Sitting, Cuff Size: Adult Large)   Pulse 85   Resp 18   Ht 5' 5\" (1.651 m)   Wt (!) 271 lb (122.9 kg)   BMI 45.10 kg/m       Physical Exam:    General Appearance: Alert and not in distress   HEENT: No scleral icterus; the mucous membranes are pink and moist   Neck: No cervical " bruits, adenopathy, or thyromegaly; jugular venous pressure is difficult to evaluate due to obesity   Chest: The spine is straight and the chest is symmetric   Lungs: Respirations are unlabored; the lungs are clear to auscultation   Cardiovasular: Auscultation reveals normal first and second heart sounds and no murmurs, rubs, or gallops   Extremities: No cyanosis, clubbing or edema   Skin: No xanthelasma   Neurologic: Normal gait and coordination   Psychiatric: Mood and affect are normal       Cardiac testing:    EKG: Sinus rhythm with nonspecific T wave changes and slight prolongation of the corrected QT interval per my personal review.    Echocardiogram:   Results for orders placed during the hospital encounter of 20   Echo Complete [ECH10] 2020    Narrative   When compared to the previous study dated 6/10/2011, no significant   change.    Normal left ventricular size, moderate LVH, normal wall motion. Left   ventricle ejection fraction is normal. The calculated left ventricular   ejection fraction is 57%.    Normal right ventricular size and systolic function.    No obvious valvular disease.          Cardiac Catheterization:   Results for orders placed during the hospital encounter of 17   Cardiac Catheterization [CATH01] 2017    Addendum    A drug eluting .   Mid LAD lesion 30% stenosed.   Estimated blood loss was <20 ml.   Anatomy is unchanged compared to recent study of 2017       Howard Gutierrez MD 2017 11:39 AM          Narrative   A drug eluting .    Mid LAD lesion 30% stenosed.    Estimated blood loss was <20 ml.     Anatomy is unchanged compared to recent study of 2017         Results for orders placed during the hospital encounter of 16   NM exercise stress test [LXG511] 2016    Narrative [facility]   Exercise Stress Test Report       Patient: Liana Briceño   MRN: [id number]  : 1955  Primary Care Provider: Mimi HOPPER  MD Gina  Ordering Physician: Acosta Bloom MD  Indication: Chest pain [R07.9] atypicl chest pain/DM and + risk factors/  Chest pain, acute, nonspecific, low prob CAD    STRESS SUMMARY:  The patient exercised for 3 minutes and 0 seconds according to the Neal   protocol, stopping due to 6 out of 10 low back pain, dyspnea, and fatigue.    An additional 33 seconds were spent at stage I speed and grade to   facilitate myocardial tracer uptake post injection.  Chest discomfort was   not reported. The stress electrocardiogram was negative for inducible   ischemia.  The peak heart rate was 151 beats per minute, which is 93% of   the age predicted maximum. The blood pressure caio from 110/68 at rest to   160/60 at the peak of exercise. The supervising cardiologist was Dr. Melanie Medina .     TECHNICAL COMMENTS:   Nuclear imaging was accomplished utilizing 4.27 mCi of thallium injected   at rest and 41.5 mCi of technetium 99m sestamibi injected at the peak of   exercise. The  images are satisfactory.     FINDINGS: The exercise stress and rest images demonstrate normal left   ventricular size and tracer uptake. The gated images reveal normal resting   regional wall motion and borderline reduced global systolic performance.   The measured resting ejection fraction is 50%.     CONCLUSION:   1. Exercise stress nuclear study is negative for inducible myocardial   ischemia or infarction.   2. Borderline low left ventricular ejection fraction of 50%.  3. Poor exercise capacity, which reduces the negative predictive value of   this test.    David Walton MD  2/4/2016 1:42 PM                                                 Imaging:    Cta Head And Neck    Result Date: 7/22/2020  EXAM: CTA HEAD AND NECK LOCATION: [facility] DATE/TIME: 7/22/2020 1:41 PM INDICATION: Severe stenosis of the left common carotid artery on the recent carotid duplex. COMPARISON: 07/17/2020 carotid duplex. 12/20/2019  head CT. CONTRAST: Iohexol (Omni) 75mL TECHNIQUE: Head and neck CT angiogram with IV contrast. Noncontrast head CT followed by axial helical CT images of the head and neck vessels obtained during the arterial phase of intravenous contrast administration. Axial 2D reconstructed images and multiplanar 3D MIP reconstructed images of the head and neck vessels were performed by the technologist. Dose reduction techniques were used. All stenosis measurements made according to NASCET criteria unless otherwise specified. FINDINGS: NONCONTRAST HEAD CT: INTRACRANIAL CONTENTS: No intracranial hemorrhage, extraaxial collection, or mass effect.  No CT evidence of acute infarct. Normal parenchymal attenuation. Normal ventricles and sulci. VISUALIZED ORBITS/SINUSES/MASTOIDS: No intraorbital abnormality. No paranasal sinus mucosal disease. No middle ear or mastoid effusion. BONES/SOFT TISSUES: No acute abnormality. HEAD CTA: ANTERIOR CIRCULATION: Atherosclerosis of the cavernous and paraclinoid internal carotid arteries without hemodynamically significant stenosis. Fetal origin of the right posterior cerebral artery from the anterior circulation. POSTERIOR CIRCULATION: No stenosis/occlusion, aneurysm, or high flow vascular malformation. Balanced vertebral arteries supply a normal basilar artery. DURAL VENOUS SINUSES: Expected enhancement of the major dural venous sinuses. NECK CTA: RIGHT CAROTID: No significant stenosis based on NASCET-like criteria. No dissection or pseudoaneurysm. LEFT CAROTID: Approximately 65% stenosis of the distal left common carotid artery, likely due to atherosclerosis. Atherosclerosis at the left carotid bifurcation without significant stenosis of the left internal carotid artery based on NASCET-like criteria. No dissection or pseudoaneurysm. VERTEBRAL ARTERIES: No focal stenosis or dissection. Balanced vertebral arteries. AORTIC ARCH: Classic aortic arch anatomy with no significant stenosis at the origin  of the great vessels. NONVASCULAR STRUCTURES: Unremarkable.     HEAD CT: 1.  Normal head CT. HEAD CTA: 1.  No significant stenosis, aneurysm, or high flow vascular malformation identified. NECK CTA: 1.  Approximately 65% stenosis of the distal left common carotid artery, likely due to atherosclerosis. 2.  No additional hemodynamically significant stenosis or occlusion of the cervical cerebrovascular arteries.     Us Carotid Bilateral    Result Date: 7/17/2020  EXAM: US CAROTID BILATERAL LOCATION: [facility] DATE/TIME: 7/17/2020 10:56 AM INDICATION: Occlusion and stenosis of left carotid artery. COMPARISON: Carotid ultrasound dated 4/25/2017. TECHNIQUE: Duplex exam performed utilizing 2D gray-scale imaging, Doppler interrogation with color-flow and spectral waveform analysis. FINDINGS: RIGHT: There is mild atheromatous plaque in the carotid bulb. Normal waveforms with no significant stenosis. LEFT: There is severe, eccentric, homogeneous, isoechoic, soft appearing atheromatous plaque in the distal common carotid artery. There is moderate plaque in the carotid bulb. Elevation in common and internal carotid artery peak systolic velocities. Both vertebral arteries and subclavian artery waveforms are normal. VELOCITY CHART: The following velocities were obtained in the RIGHT carotid system. CCA: 140/28 cm/s ICA: 121/36 cm/s ECA: 109/16 cm/s ICA/CCA: PS 0.9; ED 1.6 The following velocities were obtained in the LEFT carotid system. CCA: 234/51 cm/s ICA: 182/39 cm/s ECA: 298/-- cm/s ICA/CCA: PS 0.8; ED 0.8     1. RIGHT: Mild atheromatous plaque in the carotid bulb. No significant internal carotid artery stenosis. 2. LEFT: Severe, eccentric, homogeneous, soft appearing, high risk plaque in the distal common carotid artery. Moderate plaque in the carotid bulb. Moderate internal carotid artery stenosis (50-69%), similar to prior study. Given the plaque morphology, a  CT angiogram of the neck is suggested for further  evaluation. Evaluation based on velocities and NASCET criteria. NOTE: ABNORMAL REPORT THE DICTATION ABOVE DESCRIBES AN ABNORMALITY FOR WHICH FOLLOWUP IS NEEDED.       Lab Review:    Lab Results   Component Value Date     07/21/2020    K 3.7 07/21/2020     07/21/2020    CO2 26 07/21/2020    BUN 14 07/21/2020    CREATININE 0.98 07/21/2020    CALCIUM 9.6 07/21/2020     Lab Results   Component Value Date    WBC 6.7 07/21/2020    WBC 8.0 06/30/2015    HGB 11.7 (L) 07/21/2020    HCT 35.0 07/21/2020    MCV 90 07/21/2020     07/21/2020     Lab Results   Component Value Date    CHOL 134 03/13/2018    TRIG 276 (H) 03/13/2018    HDL 28 (L) 03/13/2018    LDLDIRECT 59 07/21/2020     No results found for: LDL  LDL Calculated (mg/dL)   Date Value   03/13/2018 51   06/06/2017 42   04/25/2017      Comment:     Invalid, Triglycerides >400     BNP (pg/mL)   Date Value   09/30/2016 <10           Much or all of the text in this note was generated through the use of the Dragon Dictate voice-to-text software. Errors in spelling or words which seem out of context are unintentional. Sound alike errors, in particular, may have escaped editing.

## 2021-06-30 NOTE — PROGRESS NOTES
Progress Notes by Joes Raul Coy, Esthela at 5/19/2021 11:30 AM     Author: Jose Raul Coy, PharmCARLA Service: -- Author Type: Pharmacist    Filed: 5/19/2021 12:25 PM Encounter Date: 5/19/2021 Status: Signed    : Jose Raul Coy PharmD (Pharmacist)       MTM Consult Encounter    Liana Briceño is a 65 y.o. female referred for a clinical pharmacist consult from [facility] for hypoglycemia.     Discussion: Liana presents to clinic for lab follow up and wanted to quickly touch base due to hypoglycemia episodes recently. She is symptomatic when her blood sugars drop below 70. She had worked with PharmD last week to start decreasing basal insulin due to low sugars, down to 90 units of Tresiba. She is eager to hear her lab results and would like close follow up. Reviewed CGM device download, with most recent episode of hypoglycemia this morning. Reviewed continued taper of basal insulin.       Plan:  1. Educate to decrease Tresiba to 80 units once daily     Follow up:   1 week by phone     Jose Raul Coy PharmD, [facility]  Medication Management (MTM) Pharmacist  [facility]    Current Outpatient Medications   Medication Sig Dispense Refill   ? acetaminophen (TYLENOL) 500 MG tablet Take 1,000 mg by mouth 3 (three) times a day as needed.     ? allopurinoL (ZYLOPRIM) 100 MG tablet Take 1 tablet (100 mg total) by mouth daily. 90 tablet 2   ? aspirin 81 MG EC tablet Take 1 tablet (81 mg total) by mouth daily.  0   ? blood glucose meter (FREESTYLE LITE METER) Use 1 each As Directed 6 (six) times a day. Dispense glucometer brand per patient's insurance at pharmacy discretion. 1 each 0   ? blood glucose test strips Use 1 each As Directed 6 (six) times a day. Prodigy Meter 300 strip 3   ? cetirizine (ZYRTEC) 10 MG tablet Take 1 tablet (10 mg total) by mouth daily. 30 tablet 2   ? cholecalciferol, vitamin D3, (CHOLECALCIFEROL) 1,000 unit tablet Take 3,000 Units by mouth daily.      ? cinnamon bark (CINNAMON ORAL)  Take 1 each by mouth 2 (two) times a day.     ? cyanocobalamin (VITAMIN B-12) 500 MCG tablet Take 500 mcg by mouth daily.     ? cyclobenzaprine (FLEXERIL) 10 MG tablet Take 1 tablet (10 mg total) by mouth at bedtime as needed for muscle spasms. 21 tablet 1   ? diclofenac sodium (VOLTAREN) 1 % Gel Apply one thin layer over toe joint/ affected joint 1 Tube 5   ? flash glucose scanning reader ([employer]STYLE MIRANDA 14 DAY READER) Misc Use 1 each As Directed daily. 1 each 1   ? flash glucose sensor (FREESTYLE MIRANDA 14 DAY SENSOR) Kit Use 1 each As Directed every 14 (fourteen) days. 2 kit 11   ? gabapentin (NEURONTIN) 300 MG capsule Take one tablet hs (Patient taking differently: 2 (two) times a day. Take one tablet hs) 90 capsule 3   ? generic lancets (MICROLET LANCET) Use 1 each As Directed 6 (six) times a day. 300 each 3   ? hydroCHLOROthiazide (MICROZIDE) 12.5 mg capsule TAKE 1 CAPSULE(12.5 MG) BY MOUTH DAILY 90 capsule 3   ? HYDROcodone-acetaminophen 5-325 mg per tablet Take 1 tablet by mouth 2 (two) times a day as needed for pain. 40 tablet 0   ? insulin degludec (TRESIBA FLEXTOUCH U-200) 200 unit/mL (3 mL) InPn Inject 80 Units under the skin daily.     ? insulin lispro (HUMALOG KWIKPEN) 100 unit/mL pen Inject 12 Units under the skin 3 (three) times a day with meals. 45 mL 0   ? lisinopriL (PRINIVIL,ZESTRIL) 5 MG tablet Take 1.5 tablets (7.5 mg total) by mouth daily. 135 tablet 2   ? melatonin (MELATIN) 3 mg Tab tablet Take 3 mg by mouth at bedtime as needed.     ? nitroglycerin (NITROSTAT) 0.3 MG SL tablet Place 1 tablet (0.3 mg total) under the tongue every 5 (five) minutes as needed for chest pain. 100 tablet 3   ? omega-3 acid ethyl esters (LOVAZA) 1 gram capsule TAKE 2 CAPSULES BY MOUTH TWICE DAILY 360 capsule 3   ? omeprazole (PRILOSEC) 40 MG capsule TAKE 1 CAPSULE BY MOUTH EVERY DAY 90 capsule 3   ? ONETOUCH DELICA PLUS LANCET 33 gauge Misc USE TO TEST BLOOD SUGAR 6 TIMES DAILY 600 each 3   ? OZEMPIC 1 mg/dose (2  "mg/1.5 mL) PnIj INJECT 1 MG UNDER THE SKIN EVERY 7 DAYS 3 mL 12   ? pen needle, diabetic (BD ULTRA-FINE ZULMA PEN NEEDLE) 32 gauge x 5/32\" Ndle Inject 4 each under the skin daily. With Tresiba and Novolog 120 each 3   ? rosuvastatin (CRESTOR) 10 MG tablet TAKE 1 TABLET BY MOUTH DAILY 90 tablet 3   ? triamcinolone (KENALOG) 0.1 % cream Apply bid 30 g 0   ? VITAMIN B-1 100 mg tablet TAKE 1 TABLET(100 MG) BY MOUTH DAILY 90 tablet 3     No current facility-administered medications for this visit.                              "

## 2021-07-03 NOTE — ADDENDUM NOTE
Addendum Note by Priti Lagos, RN at 11/5/2018 10:54 AM     Author: Priti Lagos RN Service: -- Author Type: Registered Nurse    Filed: 11/5/2018 10:54 AM Encounter Date: 11/4/2018 Status: Signed    : Priti Lagos RN (Registered Nurse)    Addended by: PRITI LAGOS on: 11/5/2018 10:54 AM        Modules accepted: Orders

## 2021-07-03 NOTE — ADDENDUM NOTE
Addendum Note by Kely Sands LPN at 8/24/2017  5:19 PM     Author: Kely Sands LPN Service: -- Author Type: Licensed Nurse    Filed: 8/24/2017  5:19 PM Encounter Date: 8/23/2017 Status: Signed    : Kely Sands LPN (Licensed Nurse)    Addended by: KELY SANDS on: 8/24/2017 05:19 PM        Modules accepted: Orders

## 2021-07-03 NOTE — ADDENDUM NOTE
Addendum Note by Saud Kathleen PT at 7/5/2018  7:02 PM     Author: Saud Kathleen PT Service: -- Author Type: Physical Therapist    Filed: 7/5/2018  7:02 PM Encounter Date: 6/28/2018 Status: Signed    : Saud Kathleen PT (Physical Therapist)    Addended by: SAUD KATHLEEN on: 7/5/2018 07:02 PM        Modules accepted: Orders

## 2021-07-03 NOTE — ADDENDUM NOTE
Addendum Note by Nieves Condon MLT at 3/13/2018 10:39 AM     Author: Nieves Condon MLT Service: -- Author Type:     Filed: 3/13/2018 10:39 AM Encounter Date: 3/13/2018 Status: Signed    : Nieves Condon MLT ()    Addended by: NIEVES CONDON on: 3/13/2018 10:39 AM        Modules accepted: Orders

## 2021-07-03 NOTE — ADDENDUM NOTE
Addendum Note by Ya Van MD at 10/11/2018  3:36 PM     Author: Ya Van MD Service: -- Author Type: Physician    Filed: 10/11/2018  3:36 PM Encounter Date: 10/11/2018 Status: Signed    : Ya Van MD (Physician)    Addended by: YA VAN on: 10/11/2018 03:36 PM        Modules accepted: Orders

## 2021-07-04 NOTE — ADDENDUM NOTE
Addendum Note by Kely Diaz CMA at 2/5/2021  4:01 PM     Author: Kely Diaz CMA Service: -- Author Type: Certified Medical Assistant    Filed: 2/5/2021  4:01 PM Encounter Date: 2/5/2021 Status: Signed    : Kely Diaz CMA (Certified Medical Assistant)    Addended by: KELY DIAZ on: 2/5/2021 04:01 PM        Modules accepted: Orders

## 2021-07-04 NOTE — ADDENDUM NOTE
Addendum Note by Jose Raul Coy, PharmD at 2/24/2021 12:20 PM     Author: Jose Raul Coy, PharmD Service: -- Author Type: Pharmacist    Filed: 2/24/2021 12:20 PM Encounter Date: 2/24/2021 Status: Signed    : Jose Raul Coy, PharmD (Pharmacist)    Addended by: JOSE RAUL COY on: 2/24/2021 12:20 PM        Modules accepted: Orders

## 2021-07-04 NOTE — ADDENDUM NOTE
Addendum Note by Nir Viera RN at 2/5/2021  4:20 PM     Author: Nir Viera RN Service: -- Author Type: Registered Nurse    Filed: 2/5/2021  4:20 PM Encounter Date: 2/5/2021 Status: Signed    : Nir Viera RN (Registered Nurse)    Addended by: NIR VIERA on: 2/5/2021 04:20 PM        Modules accepted: Orders

## 2021-07-05 PROBLEM — N18.30 CHRONIC KIDNEY DISEASE, STAGE 3 (H): Status: ACTIVE | Noted: 2021-06-15

## 2021-07-06 VITALS
OXYGEN SATURATION: 97 % | WEIGHT: 271.6 LBS | SYSTOLIC BLOOD PRESSURE: 132 MMHG | HEIGHT: 65 IN | HEART RATE: 82 BPM | DIASTOLIC BLOOD PRESSURE: 70 MMHG | BODY MASS INDEX: 45.25 KG/M2

## 2021-07-07 ENCOUNTER — COMMUNICATION - HEALTHEAST (OUTPATIENT)
Dept: PHARMACY | Facility: CLINIC | Age: 66
End: 2021-07-07

## 2021-07-13 ENCOUNTER — RECORDS - HEALTHEAST (OUTPATIENT)
Dept: ADMINISTRATIVE | Facility: CLINIC | Age: 66
End: 2021-07-13

## 2021-07-14 PROBLEM — I20.0 UNSTABLE ANGINA PECTORIS (H): Status: RESOLVED | Noted: 2017-04-22 | Resolved: 2017-06-06

## 2021-07-15 ENCOUNTER — MYC MEDICAL ADVICE (OUTPATIENT)
Dept: INTERNAL MEDICINE | Facility: CLINIC | Age: 66
End: 2021-07-15

## 2021-07-15 DIAGNOSIS — M17.10 ARTHRITIS OF KNEE: ICD-10-CM

## 2021-07-15 DIAGNOSIS — M54.50 CHRONIC LOW BACK PAIN: ICD-10-CM

## 2021-07-15 DIAGNOSIS — M51.369 DDD (DEGENERATIVE DISC DISEASE), LUMBAR: ICD-10-CM

## 2021-07-15 DIAGNOSIS — M99.53 INTERVERTEBRAL DISC STENOSIS OF NEURAL CANAL OF LUMBAR REGION: ICD-10-CM

## 2021-07-15 DIAGNOSIS — G89.29 CHRONIC LOW BACK PAIN: ICD-10-CM

## 2021-07-15 DIAGNOSIS — M25.569 ACUTE KNEE PAIN, UNSPECIFIED LATERALITY: Primary | ICD-10-CM

## 2021-07-16 RX ORDER — HYDROCODONE BITARTRATE AND ACETAMINOPHEN 5; 325 MG/1; MG/1
1 TABLET ORAL 2 TIMES DAILY PRN
Qty: 40 TABLET | Refills: 0 | Status: SHIPPED | OUTPATIENT
Start: 2021-07-16 | End: 2021-08-05

## 2021-07-19 ENCOUNTER — TRANSFERRED RECORDS (OUTPATIENT)
Dept: HEALTH INFORMATION MANAGEMENT | Facility: CLINIC | Age: 66
End: 2021-07-19

## 2021-08-02 ENCOUNTER — TELEPHONE (OUTPATIENT)
Dept: INTERNAL MEDICINE | Facility: CLINIC | Age: 66
End: 2021-08-02

## 2021-08-02 NOTE — TELEPHONE ENCOUNTER
Writer spoke with:  Liana   Message relayed from provider:  Patient does not need to see Dr. Telles at this time.  Scans of carotid arteries look fine.    Any further questions?  No

## 2021-08-02 NOTE — TELEPHONE ENCOUNTER
She received a call from Dr. Telles's office to schedule an appt for her carotids? She thought she did this already?

## 2021-08-02 NOTE — TELEPHONE ENCOUNTER
Dr. Telles's office called pt and said she was suppose to be seen for her carotid arteries she thought she did that ?????? Does she still need the appt?

## 2021-08-05 ENCOUNTER — MYC MEDICAL ADVICE (OUTPATIENT)
Dept: INTERNAL MEDICINE | Facility: CLINIC | Age: 66
End: 2021-08-05

## 2021-08-05 DIAGNOSIS — K59.09 OTHER CONSTIPATION: ICD-10-CM

## 2021-08-05 DIAGNOSIS — G89.29 CHRONIC BILATERAL LOW BACK PAIN WITHOUT SCIATICA: Primary | ICD-10-CM

## 2021-08-05 DIAGNOSIS — M54.50 CHRONIC BILATERAL LOW BACK PAIN WITHOUT SCIATICA: Primary | ICD-10-CM

## 2021-08-06 RX ORDER — DOCUSATE SODIUM 100 MG/1
100 CAPSULE, LIQUID FILLED ORAL 2 TIMES DAILY
Qty: 100 CAPSULE | Refills: 1 | Status: SHIPPED | OUTPATIENT
Start: 2021-08-06 | End: 2022-05-06

## 2021-08-06 NOTE — PATIENT INSTRUCTIONS - HE
Patient Instructions by Mimi Fuentes MD at 7/28/2020  1:40 PM     Author: Mimi Fuentes MD Service: -- Author Type: Physician    Filed: 7/28/2020  3:12 PM Encounter Date: 7/28/2020 Status: Signed    : Mimi Fuentes MD (Physician)         Patient Education     Your Health Risk Assessment indicates you feel you are not in good physical health.    A healthy lifestyle helps keep the body fit and the mind alert. It helps protect you from disease, helps you fight disease, and helps prevent chronic disease (disease that doesn't go away) from getting worse. This is important as you get older and begin to notice twinges in muscles and joints and a decline in the strength and stamina you once took for granted. A healthy lifestyle includes good healthcare, good nutrition, weight control, recreation, and regular exercise. Avoid harmful substances and do what you can to keep safe. Another part of a healthy lifestyle is stay mentally active and socially involved.    Good healthcare     Have a wellness visit every year.     If you have new symptoms, let us know right away. Don't wait until the next checkup.     Take medicines exactly as prescribed and keep your medicines in a safe place. Tell us if your medicine causes problems.   Healthy diet and weight control     Eat 3 or 4 small, nutritious, low-fat, high-fiber meals a day. Include a variety of fruits, vegetables, and whole-grain foods.     Make sure you get enough calcium in your diet. Calcium, vitamin D, and exercise help prevent osteoporosis (bone thinning).     If you live alone, try eating with others when you can. That way you get a good meal and have company while you eat it.     Try to keep a healthy weight. If you eat more calories than your body uses for energy, it will be stored as fat and you will gain weight.     Recreation   Recreation is not limited to sports and team events. It includes any activity that provides relaxation, interest,  enjoyment, and exercise. Recreation provides an outlet for physical, mental, and social energy. It can give a sense of worth and achievement. It can help you stay healthy.       Patient Education     Exercise for a Healthier Heart  You may wonder how you can improve the health of your heart. If youre thinking about exercise, youre on the right track. You dont need to become an athlete, but you do need a certain amount of brisk exercise to help strengthen your heart. If you have been diagnosed with a heart condition, your doctor may recommend exercise to help stabilize your condition. To help make exercise a habit, choose safe, fun activities.       Be sure to check with your health care provider before starting an exercise program.    Why exercise?  Exercising regularly offers many healthy rewards. It can help you do all of the following:    Improve your blood cholesterol levels to help prevent further heart trouble    Lower your blood pressure to help prevent a stroke or heart attack    Control diabetes, or reduce your risk of getting this disease    Improve your heart and lung function    Reach and maintain a healthy weight    Make your muscles stronger and more limber so you can stay active    Prevent falls and fractures by slowing the loss of bone mass (osteoporosis)    Manage stress better  Exercise tips  Ease into your routine. Set small goals. Then build on them.  Exercise on most days. Aim for a total of 150 or more minutes of moderate to  vigorous intensity activity each week. Consider 40 minutes, 3 to 4 times a week. For best results, activity should last for 40 minutes on average. It is OK to work up to the 40 minute period over time. Examples of moderate-intensity activity is walking one mile in 15 minutes or 30 to 45 minutes of yard work.  Step up your daily activity level. Along with your exercise program, try being more active throughout the day. Walk instead of drive. Do more household tasks or yard  work.  Choose one or more activities you enjoy. Walking is one of the easiest things you can do. You can also try swimming, riding a bike, or taking an exercise class.  Stop exercising and call your doctor if you:    Have chest pain or feel dizzy or lightheaded    Feel burning, tightness, pressure, or heaviness in your chest, neck, shoulders, back, or arms    Have unusual shortness of breath    Have increased joint or muscle pain    Have palpitations or an irregular heartbeat      1530-4200 [employer]. [address] [place] [address]. All rights reserved. This information is not intended as a substitute for professional medical care. Always follow your healthcare professional's instructions.         Patient Education   Instrumental Activities of Daily Living  Your Health Risk Assessment indicates you have difficulties with instrumental activities of daily living which include laundry, housekeeping, banking, shopping, using the telephone, food preparation, transportation, or taking your own medications. Please make a follow up appointment for us to address this issue in more detail.    [employer] has resources available on the following website: [email or web address]/caregivers.[email or web address]     Also, here is a local agency that provides help with meals and other assistance:   [facility] Line: [phone]     Patient Education   Your Health Risk Assessment indicates you feel you are not in good emotional health.    Recreation   Recreation is not limited to sports and team events. It includes any activity that provides relaxation, interest, enjoyment, and exercise. Recreation provides an outlet for physical, mental, and social energy. It can give a sense of worth and achievement. It can help you stay healthy.    Mental Exercise and Social Involvement  Mental and emotional health is as important as physical health. Keep in touch with friends and family. Stay as active as possible. Continue to  learn and challenge yourself.   Things you can do to stay mentally active are:    Learn something new, like a foreign language or musical instrument.     Play SCRABBLE or do crossword puzzles. If you cannot find people to play these games with you at home, you can play them with others on your computer through the Internet.     Join a games club--anything from card games to chess or checkers or lawn bowling.     Start a new hobby.     Go back to school.     Volunteer.     Read.     Keep up with world events.       Patient Education   Understanding Advance Care Planning  Advance care planning is the process of deciding ones own future medical care. It helps ensure that if you cant speak for yourself, your wishes can still be carried out. The plan is a series of legal documents that note a persons wishes. The documents vary by state. Advance care planning may be done when a person has a serious illness that is expected to get worse. It may be done before major surgery. And it can help you and your family be prepared in case of a major illness or injury. Advance care planning helps with making decisions at these times.       A health care proxy is a person who acts as the voice of a patient when the patient cant speak for himself or herself. The name of this role varies by state. It may be called a Durable Medical Power of  or Durable Power of  for Healthcare. It may be called an agent, surrogate, or advocate. Or it may be called a representative or decision maker. It is an official duty that is identified by a legal document. The document also varies by state.    Why Is Advance Care Planning Important?  If a person communicates their healthcare wishes:    They will be given medical care that matches their values and goals.    Their family members will not be forced to make decisions in a crisis with no guidance.  Creating a Plan  Making an advance care plan is often done in 3 steps:    Thinking about  ones wishes. To create an advance care plan, you should think about what kind of medical treatment you would want if you lose the ability to communicate. Are there any situations in which you would refuse or stop treatment? Are there therapies you would want or not want? And whom do you want to make decisions for you? There are many places to learn more about how to plan for your care. Ask your doctor or  for resources.    Picking a health care proxy. This means choosing a trusted person to speak for you only when you cant speak for yourself. When you cannot make medical decisions, your proxy makes sure the instructions in your advance care plan are followed. A proxy does not make decisions based on his or her own opinions. They must put aside those opinions and values if needed, and carry out your wishes.    Filling out the legal documents. There are several kinds of legal documents for advance care planning. Each one tells health care providers your wishes. The documents may vary by state. They must be signed and may need to be witnessed or notarized. You can cancel or change them whenever you wish. Depending on your state, the documents may include a Healthcare Proxy form, Living Will, Durable Medical Power of , Advance Directive, or others.  The Familys Role  The best help a family can give is to support their loved ones wishes. Open and honest communication is vital. Family should express any concerns they have about the patients choices while the patient can still make decisions.    5396-0633 [employer]. [address] [place] [address], [place] [address]. All rights reserved. This information is not intended as a substitute for professional medical care. Always follow your healthcare professional's instructions.         Also, [employer] [place] offers a free, downloadable health care directive that allows you to share your treatment choices and personal preferences if you  cannot communicate your wishes. It also allows you to appoint another person (called a health care agent) to make health care decisions if you are unable to do so. You can download an advance directive by going here: http://www.[facility].org/honoring-choices.html     Patient Education   Personalized Prevention Plan  You are due for the preventive services outlined below.  Your care team is available to assist you in scheduling these services.  If you have already completed any of these items, please share that information with your care team to update in your medical record.  Health Maintenance   Topic Date Due   ? HEPATITIS B VACCINES (1 of 3 - Risk 3-dose series) 07/07/1976   ? ZOSTER VACCINES (2 of 3) 10/23/2012   ? PAP SMEAR  06/17/2019   ? HPV TEST  06/17/2019   ? DIABETIC FOOT EXAM  06/21/2020   ? MEDICARE ANNUAL WELLNESS VISIT  08/02/2020   ? DIABETIC EYE EXAM  08/27/2020   ? LIPID  11/25/2020   ? A1C  01/21/2021   ? BMP  07/21/2021   ? MAMMOGRAM  08/06/2021   ? ADVANCE CARE PLANNING  10/12/2021   ? TD 18+ HE  03/08/2029   ? COLORECTAL CANCER SCREENING  11/21/2029   ? HEPATITIS C SCREENING  Completed   ? HIV SCREENING  Completed   ? PNEUMOCOCCAL IMMUNIZATION 19-64 MEDIUM RISK  Completed   ? TDAP ADULT ONE TIME DOSE  Completed   ? MICROALBUMIN  Discontinued

## 2021-08-12 ENCOUNTER — VIRTUAL VISIT (OUTPATIENT)
Dept: PHARMACY | Facility: CLINIC | Age: 66
End: 2021-08-12
Payer: COMMERCIAL

## 2021-08-12 DIAGNOSIS — E11.65 TYPE 2 DIABETES MELLITUS WITH HYPERGLYCEMIA, WITH LONG-TERM CURRENT USE OF INSULIN (H): Primary | ICD-10-CM

## 2021-08-12 DIAGNOSIS — Z79.4 TYPE 2 DIABETES MELLITUS WITH HYPERGLYCEMIA, WITH LONG-TERM CURRENT USE OF INSULIN (H): Primary | ICD-10-CM

## 2021-08-12 DIAGNOSIS — M25.561 RIGHT KNEE PAIN, UNSPECIFIED CHRONICITY: ICD-10-CM

## 2021-08-12 PROCEDURE — 99207 PR NO CHARGE LOS: CPT | Performed by: PHARMACIST

## 2021-08-12 RX ORDER — INSULIN LISPRO 100 [IU]/ML
20 INJECTION, SOLUTION INTRAVENOUS; SUBCUTANEOUS
Qty: 15 ML | Refills: 3 | COMMUNITY
Start: 2021-08-12 | End: 2021-09-10

## 2021-08-12 RX ORDER — GABAPENTIN 300 MG/1
300 CAPSULE ORAL
Qty: 90 CAPSULE | Refills: 3 | COMMUNITY
Start: 2021-08-12 | End: 2022-02-08 | Stop reason: ALTCHOICE

## 2021-08-12 RX ORDER — INSULIN DEGLUDEC 200 U/ML
65 INJECTION, SOLUTION SUBCUTANEOUS DAILY
COMMUNITY
Start: 2021-08-12 | End: 2022-02-14

## 2021-08-12 NOTE — PROGRESS NOTES
Medication Therapy Management (MTM) Encounter    ASSESSMENT:                            Medication Adherence/Access: No issues identified    1. Type 2 diabetes mellitus with hyperglycemia, with long-term current use of insulin (H)  At goal A1c of 7% per ADA guidelines, however with hyperglycemia recently as noted on her continuous glucose monitor.  In the last day she has shown improvement in her blood sugars with a more careful diet and increased dose of insulin.  Due to pain she will likely continue to have low activity levels, therefore continue to focus on diet.  Recommend continue current insulin doses, and reassess blood sugars in 2 weeks.  We will continue to adjust regimen if needed.  Recommend to continue Ozempic 1 mg weekly.  Stable on aspirin, lisinopril, rosuvastatin.    2. Right knee pain, unspecified chronicity  Ongoing pain despite multiple injections through orthopedics.  Noted significant improvement after course of prednisone however this did result in elevated blood sugars.  She will continue on Norco as needed and gabapentin as needed in the evening in addition to diclofenac topical.  She plans to follow-up with orthopedics next week.    PLAN:                            1. Continue current regimen     Follow-up: Return in about 4 weeks (around 9/9/2021) for Follow up, with me, using a [facility] eVisit.    SUBJECTIVE/OBJECTIVE:                          Liana Briceño is a 66 year old female called for a follow-up visit. She was referred to me from Dr. Fuentes.  Today's visit is a follow-up MTM visit from 5/19/21.      Reason for visit: Medication management follow-up.    Allergies/ADRs: Reviewed in chart  Past Medical History: Reviewed in chart  Tobacco: She reports that she quit smoking about 4 years ago. Her smoking use included cigarettes and cigarettes. She has a 11.00 pack-year smoking history. She has never used smokeless tobacco.  Alcohol: 1-3 beverages / week    Medication Adherence/Access: no  "issues reported    Type 2 diabetes: She reached out to me because she was experiencing hypoglycemia after her recent injections.  As of yesterday her blood sugars have improved.  It she continues to focus on diet, in addition to increasing her insulin to stabilize her blood sugars.  She continues to have some frustrations about differences between her fingerstick and CGM device readings which we discussed further.  She is currently taking 100 units of Tresiba once daily and 20 units of bolus insulin 3 times daily, in addition to Ozempic 1 mg weekly.    Hemoglobin A1C   Date Value Ref Range Status   05/19/2021 7.0 (H) <=5.6 % Final   01/19/2021 6.8 (H) <=5.6 % Final   07/21/2020 7.2 (H) 3.5 - 6.0 % Final      No results found for: UMALCR   LDL Cholesterol Calculated   Date Value Ref Range Status   05/19/2021 46 <=129 mg/dL Final     LDL Cholesterol Direct   Date Value Ref Range Status   07/21/2020 59 <=129 mg/dl Final     Creatinine   Date Value Ref Range Status   05/19/2021 0.95 0.60 - 1.10 mg/dL Final     Right knee pain: She has been following closely with orthopedics over the last 2 months or so, with significant pain regardless of steroid injection and \"the rooster shot.\"  After her most recent injection due to severe pain she did take a course of prednisone which significantly improved her pain.  She had previously been taking 3 Vicodin per day and now is only requiring 1 tablet/day.  However she feels now that prednisone is done that her pain is starting to come back.  She does find significant relief from Voltaren gel and continues to use this.  She plans to follow-up with orthopedics, as she may not require surgery.  Was on 3 vicodin per day now down to 1 tab per day, voltaren gives hear lot of relief.     Today's Vitals:  BP Readings from Last 3 Encounters:   06/15/21 132/70   01/19/21 138/66   09/02/20 116/66   ----------------      I spent 30 minutes with this patient today. All changes were made via " collaborative practice agreement with Mimi Fuentes MD. A copy of the visit note was provided to the patient's primary care provider.    The patient declined a summary of these recommendations.     Kenya HenriquezD, [facility] HospitalCP  Medication Management (MTM) Pharmacist  [facility]    Telemedicine Visit Details  Type of service:  Telephone visit  Start Time: 8AM  End Time: 8:30AM  Originating Location (patient location): Home  Distant Location (provider location):  [facility]     Medication Therapy Recommendations  No medication therapy recommendations to display

## 2021-08-12 NOTE — PATIENT INSTRUCTIONS
PLAN:                            1. Continue current regimen     Follow-up: Return in about 4 weeks (around 9/9/2021) for Follow up, with me, using a MyChart eVisit.

## 2021-08-24 DIAGNOSIS — L29.9 PRURITIC DISORDER: ICD-10-CM

## 2021-08-27 RX ORDER — CETIRIZINE HYDROCHLORIDE 10 MG/1
10 TABLET ORAL DAILY
Qty: 30 TABLET | Refills: 2 | Status: SHIPPED | OUTPATIENT
Start: 2021-08-27 | End: 2021-11-17

## 2021-08-27 NOTE — TELEPHONE ENCOUNTER
"Routing refill request to provider for review/approval because:  A break in medication    Last Written Prescription Date:  4/17/20  Last Fill Quantity: 30,  # refills: 2   Last office visit provider:  6/15/21     Requested Prescriptions   Pending Prescriptions Disp Refills     cetirizine (ZYRTEC) 10 MG tablet 30 tablet 2     Sig: Take 1 tablet (10 mg) by mouth daily       Antihistamines Protocol Passed - 8/27/2021 11:12 AM        Passed - Patient is 3-64 years of age     Apply weight-based dosing for peds patients age 3 - 12 years of age.    Forward request to provider for patients under the age of 3 or over the age of 64.          Passed - Recent (12 mo) or future (30 days) visit within the authorizing provider's specialty     Patient has had an office visit with the authorizing provider or a provider within the authorizing providers department within the previous 12 mos or has a future within next 30 days. See \"Patient Info\" tab in inbasket, or \"Choose Columns\" in Meds & Orders section of the refill encounter.              Passed - Medication is active on med list             Leander Billingsley RN 08/27/21 11:21 AM  "

## 2021-08-30 ENCOUNTER — TRANSFERRED RECORDS (OUTPATIENT)
Dept: HEALTH INFORMATION MANAGEMENT | Facility: CLINIC | Age: 66
End: 2021-08-30

## 2021-09-10 ENCOUNTER — TELEPHONE (OUTPATIENT)
Dept: INTERNAL MEDICINE | Facility: CLINIC | Age: 66
End: 2021-09-10

## 2021-09-10 DIAGNOSIS — E11.65 TYPE 2 DIABETES MELLITUS WITH HYPERGLYCEMIA, WITH LONG-TERM CURRENT USE OF INSULIN (H): ICD-10-CM

## 2021-09-10 DIAGNOSIS — E11.65 TYPE 2 DIABETES MELLITUS WITH HYPERGLYCEMIA, WITH LONG-TERM CURRENT USE OF INSULIN (H): Primary | ICD-10-CM

## 2021-09-10 DIAGNOSIS — Z79.4 TYPE 2 DIABETES MELLITUS WITH HYPERGLYCEMIA, WITH LONG-TERM CURRENT USE OF INSULIN (H): Primary | ICD-10-CM

## 2021-09-10 DIAGNOSIS — Z79.4 TYPE 2 DIABETES MELLITUS WITH HYPERGLYCEMIA, WITH LONG-TERM CURRENT USE OF INSULIN (H): ICD-10-CM

## 2021-09-10 RX ORDER — INSULIN LISPRO 100 [IU]/ML
20 INJECTION, SOLUTION INTRAVENOUS; SUBCUTANEOUS
Qty: 15 ML | Refills: 3 | Status: SHIPPED | OUTPATIENT
Start: 2021-09-10 | End: 2021-09-11

## 2021-09-10 RX ORDER — INSULIN LISPRO 100 [IU]/ML
INJECTION, SOLUTION INTRAVENOUS; SUBCUTANEOUS
Qty: 45 ML | OUTPATIENT
Start: 2021-09-10

## 2021-09-10 NOTE — TELEPHONE ENCOUNTER
Reason for Call:  Other call back    Detailed comments: Pt out of Humalog needs refill today 09/10/2021 - does not have enough to get thru the day    Pharm,:  [facility] [address]    Phone Number Patient can be reached at: Cell number on file:    Telephone Information:   Mobile [phone]       Best Time: anytime    Can we leave a detailed message on this number? YES    Call taken on 9/10/2021 at 12:06 PM by Raquel Blackwell

## 2021-09-11 RX ORDER — INSULIN LISPRO 100 [IU]/ML
INJECTION, SOLUTION INTRAVENOUS; SUBCUTANEOUS
Qty: 60 ML | Refills: 3 | Status: SHIPPED | OUTPATIENT
Start: 2021-09-11 | End: 2021-10-16

## 2021-09-11 NOTE — TELEPHONE ENCOUNTER
"Routing refill request to provider for review/approval because:  Patient needs to be seen because it has been more than 6 months since last office visit.    Last Written Prescription Date:  9/10/21  Last Fill Quantity: 15 mL,  # refills: 3   Last office visit provider:  6/15/21     Requested Prescriptions   Pending Prescriptions Disp Refills     HUMALOG KWIKPEN 100 UNIT/ML soln [Pharmacy Med Name: HUMALOG 100 U/ML KWIK PEN INJ 3ML] 60 mL      Sig: ADMINISTER 20 UNITS UNDER THE SKIN THREE TIMES DAILY BEFORE MEALS       Short Acting Insulin Protocol Failed - 9/10/2021  7:50 PM        Failed - Recent (6 mo) or future (30 days) visit within the authorizing provider's specialty     Patient had office visit in the last 6 months or has a visit in the next 30 days with authorizing provider or within the authorizing provider's specialty.  See \"Patient Info\" tab in inbasket, or \"Choose Columns\" in Meds & Orders section of the refill encounter.            Passed - Serum creatinine on file in past 12 months     Recent Labs   Lab Test 06/28/21  1731 05/19/21  1048   CR  --  0.95   CREAT 1.0  --        Ok to refill medication if creatinine is low          Passed - HgbA1C in past 3 or 6 months     If HgbA1C is 8 or greater, it needs to be on file within the past 3 months.  If less than 8, must be on file within the past 6 months.     Recent Labs   Lab Test 05/19/21  1048   A1C 7.0*             Passed - Medication is active on med list        Passed - Patient is age 18 or older             suraj busch RN 09/11/21 6:29 PM  "

## 2021-09-14 ENCOUNTER — MYC MEDICAL ADVICE (OUTPATIENT)
Dept: INTERNAL MEDICINE | Facility: CLINIC | Age: 66
End: 2021-09-14

## 2021-09-14 DIAGNOSIS — M54.50 CHRONIC BILATERAL LOW BACK PAIN WITHOUT SCIATICA: ICD-10-CM

## 2021-09-14 DIAGNOSIS — G89.29 CHRONIC BILATERAL LOW BACK PAIN WITHOUT SCIATICA: ICD-10-CM

## 2021-09-14 DIAGNOSIS — M99.53 INTERVERTEBRAL DISC STENOSIS OF NEURAL CANAL OF LUMBAR REGION: ICD-10-CM

## 2021-09-14 DIAGNOSIS — M25.569 ACUTE KNEE PAIN, UNSPECIFIED LATERALITY: Primary | ICD-10-CM

## 2021-09-14 NOTE — TELEPHONE ENCOUNTER
Routing refill request to provider for review/approval because:  Controlled substance Hydrocodone-acetaminophen (Norco)     Last filled on 8/26/2021 for 4 tabs    Last seen on 6/15/2021     Rx's pended.

## 2021-09-15 DIAGNOSIS — G89.29 CHRONIC BILATERAL LOW BACK PAIN WITHOUT SCIATICA: ICD-10-CM

## 2021-09-15 DIAGNOSIS — M54.50 CHRONIC BILATERAL LOW BACK PAIN WITHOUT SCIATICA: ICD-10-CM

## 2021-09-15 NOTE — TELEPHONE ENCOUNTER
Software for sending controlled medications to pharmacies is out of order at this time.    Separate encounter created for refill of voltaren gel created.

## 2021-09-15 NOTE — TELEPHONE ENCOUNTER
Pending Prescriptions:                       Disp   Refills    diclofenac (VOLTAREN) 1 % topical gel     100 g  1            Sig: Apply 2 g topically 4 times daily    Patient was last seen by Dr. Fuentes on 6/15/21.  Associated Diagnosis:  Chronic bilateral low back pain without sciatica

## 2021-09-16 RX ORDER — HYDROCODONE BITARTRATE AND ACETAMINOPHEN 5; 325 MG/1; MG/1
1 TABLET ORAL 2 TIMES DAILY PRN
Qty: 40 TABLET | Refills: 0 | Status: SHIPPED | OUTPATIENT
Start: 2021-09-16 | End: 2021-10-06

## 2021-09-16 NOTE — TELEPHONE ENCOUNTER
Medication: Hydrocodone-acetaminophen 5-325 mg  Last Date Filled 8/27/21, 40 tablets for 20 days   pulled: YES         Only PCP Prescribing? : YES  Taken as prescribed from physician notes? Yes    CSA in last year: NO  Random Utox in last year: NO  Opioids + benzodiazepines? YES     Use of Hydrocodone-acetaminophen was documented in an office visit with Jose Raul Coy PharmD. On 8/12/21.     Is patient on the Executive Review Team? NO    All responses suggest: Refilling prescription

## 2021-09-28 ENCOUNTER — TELEPHONE (OUTPATIENT)
Dept: INTERNAL MEDICINE | Facility: CLINIC | Age: 66
End: 2021-09-28

## 2021-09-28 DIAGNOSIS — Z79.4 TYPE 2 DIABETES MELLITUS WITH OTHER SPECIFIED COMPLICATION, WITH LONG-TERM CURRENT USE OF INSULIN (H): ICD-10-CM

## 2021-09-28 DIAGNOSIS — E11.65 TYPE 2 DIABETES MELLITUS WITH HYPERGLYCEMIA, WITH LONG-TERM CURRENT USE OF INSULIN (H): Primary | ICD-10-CM

## 2021-09-28 DIAGNOSIS — Z79.4 TYPE 2 DIABETES MELLITUS WITH HYPERGLYCEMIA, WITH LONG-TERM CURRENT USE OF INSULIN (H): Primary | ICD-10-CM

## 2021-09-28 DIAGNOSIS — E11.69 TYPE 2 DIABETES MELLITUS WITH OTHER SPECIFIED COMPLICATION, WITH LONG-TERM CURRENT USE OF INSULIN (H): ICD-10-CM

## 2021-09-28 RX ORDER — SEMAGLUTIDE 1.34 MG/ML
INJECTION, SOLUTION SUBCUTANEOUS
Qty: 3 ML | Refills: 12 | Status: SHIPPED | OUTPATIENT
Start: 2021-09-28 | End: 2021-10-11 | Stop reason: ALTCHOICE

## 2021-09-28 NOTE — TELEPHONE ENCOUNTER
Reason for Call:  Other call back    Detailed comments: pt out of medication for:  Ozempic    Pharm:  [facility] [address]    Phone Number Patient can be reached at: Cell number on file:    Telephone Information:   Mobile [phone]       Best Time: anytime    Can we leave a detailed message on this number? YES    Call taken on 9/28/2021 at 12:40 PM by Raquel Blackwell

## 2021-09-29 RX ORDER — SEMAGLUTIDE 1.34 MG/ML
INJECTION, SOLUTION SUBCUTANEOUS
Qty: 3 ML | Refills: 3 | Status: SHIPPED | OUTPATIENT
Start: 2021-09-29 | End: 2022-03-30

## 2021-09-29 NOTE — TELEPHONE ENCOUNTER
"Routing refill request to provider for review/approval because:  Patient needs to be seen because:      Last office visit provider:  6/15/21     Requested Prescriptions   Pending Prescriptions Disp Refills     OZEMPIC (1 MG/DOSE) 4 MG/3ML SOPN [Pharmacy Med Name: OZEMPIC 1MG PER DOSE (1X4MG PEN)] 3 mL      Sig: INJECT 1MG UNDER THE SKIN ONCE EVERY 7 DAYS       GLP-1 Agonists Protocol Failed - 9/28/2021 12:32 PM        Failed - Recent (6 mo) or future (30 days) visit within the authorizing provider's specialty     Patient had office visit in the last 6 months or has a visit in the next 30 days with authorizing provider.  See \"Patient Info\" tab in inbasket, or \"Choose Columns\" in Meds & Orders section of the refill encounter.            Passed - HgbA1C in past 3 or 6 months     If HgbA1C is 8 or greater, it needs to be on file within the past 3 months.  If less than 8, must be on file within the past 6 months.     Recent Labs   Lab Test 05/19/21  1048   A1C 7.0*             Passed - Medication is active on med list        Passed - Patient is age 18 or older        Passed - No active pregnancy on record        Passed - Normal serum creatinine on file in past 12 months     Recent Labs   Lab Test 06/28/21  1731 05/19/21  1048   CR  --  0.95   CREAT 1.0  --        Ok to refill medication if creatinine is low          Passed - No positive pregnancy test in past 12 months             Megan Pizano RN 09/29/21 6:17 PM    "

## 2021-10-10 ENCOUNTER — HEALTH MAINTENANCE LETTER (OUTPATIENT)
Age: 66
End: 2021-10-10

## 2021-10-11 ENCOUNTER — OFFICE VISIT (OUTPATIENT)
Dept: PHARMACY | Facility: CLINIC | Age: 66
End: 2021-10-11
Payer: COMMERCIAL

## 2021-10-11 DIAGNOSIS — M25.561 RIGHT KNEE PAIN, UNSPECIFIED CHRONICITY: ICD-10-CM

## 2021-10-11 DIAGNOSIS — Z79.4 TYPE 2 DIABETES MELLITUS WITH OTHER SPECIFIED COMPLICATION, WITH LONG-TERM CURRENT USE OF INSULIN (H): Primary | ICD-10-CM

## 2021-10-11 DIAGNOSIS — E11.69 TYPE 2 DIABETES MELLITUS WITH OTHER SPECIFIED COMPLICATION, WITH LONG-TERM CURRENT USE OF INSULIN (H): Primary | ICD-10-CM

## 2021-10-11 PROCEDURE — 99207 PR NO CHARGE LOS: CPT | Performed by: PHARMACIST

## 2021-10-11 RX ORDER — CELECOXIB 200 MG/1
1 CAPSULE ORAL 2 TIMES DAILY
COMMUNITY
Start: 2021-10-10 | End: 2022-09-06

## 2021-10-11 NOTE — PROGRESS NOTES
Medication Therapy Management (MTM) Encounter    ASSESSMENT:                            Medication Adherence/Access: No issues identified    1. Right knee pain, unspecified chronicity  Ongoing pain, requiring surgery that has not yet been scheduled. She is failed multiple types of injections through orthopedics. She is tolerating her current pain regimen of Vicodin, Celebrex, and as needed Flexeril without adverse effects.  She does have diabetic neuropathy as well but due to intolerance is not able to take gabapentin regularly or at higher doses.  She may benefit and tolerate an alternative symptoms pregabalin.  Will discuss with PCP if this would be a reasonable option.  I would recommend low-dose 75 mg twice daily as needed.    2. Type 2 diabetes mellitus with other specified complication, with long-term current use of insulin (H)  Not at goal A1c less than 7% per ADA guidelines, with blood sugars more recently increasing due to inactivity from severe knee pain.  Reviewed her CGM download, noting some days with very stable blood sugars and others that are slightly elevated, likely related to dietary choices.  Recommend to continue current medication regimen, avoiding titration in insulin as it minimizing these doses will help prevent weight gain, in addition to maximally dosed GLP-1 agonist.  Encouraged ongoing focus on making smart diabetes diet choices.  Additionally, once the freestyle maria victoria 2 android mitesh is available she was switched to this CGM system.  Stable on aspirin, lisinopril, rosuvastatin.      PLAN:                            1. Consider switching gabapentin to Lyrica, to discuss with Dr. Fuentes   2. Educated to switch to Freestyle Maria Victoria 2 system once application available on her phone     Follow-up: Return in about 4 weeks (around 11/8/2021) for Follow up, with me, using a [employer] eVisit.    SUBJECTIVE/OBJECTIVE:                          Liana Briceño is a 66 year old female coming in for a follow-up  "visit. She was referred to me from Dr. Fuentes.  Today's visit is a follow-up MTM visit from 8/12/21.      Reason for visit: Medication management follow-up.    Allergies/ADRs: Reviewed in chart  Past Medical History: Reviewed in chart  Tobacco: She reports that she quit smoking about 4 years ago. Her smoking use included cigarettes and cigarettes. She has a 11.00 pack-year smoking history. She has never used smokeless tobacco.  Alcohol: 1-3 beverages / week    Medication Adherence/Access: no issues reported    Meniscus Tear: She is frustrated that her surgery is not able to be scheduled, due to or availability.  She has had a significant amount of pain, however now she has a brace that has helped significantly.  She continues to use Vicodin and Celebrex.  She finds that the Celebrex alone is poorly effective however when taken alongside a Vicodin she does get some relief.  She uses Flexeril as needed for a type of pain that radiates down the front of her thigh at bedtime.  She continues on gabapentin at night as needed.  Historically she has become to \"loopy\" with gabapentin.  She has never turned of pregabalin to see if she would tolerate this better than gabapentin.  She would be open to trying this alternative for better pain control.  She will be following up with her PCP later this week, as she has other concerns as well.    Type 2 Diabetes: She continues to use a freestyle kiah, sharing her blood sugars remotely.  Reviewed his CGM download.  She continues on her current regimen as below.  Blood sugars are slightly elevated, and she is not able to be active as she was before due to her significant knee pain.  Her A1c has estimating about 7.3%.  Denies signs or symptoms of hyper or hypoglycemia.  She would find it helpful to have a continuous glucose monitor with alarms on it to help her pay attention to when she is having larger fluctuations in her blood sugars.  She is interested in the freestyle kiah 2 " system for this reason, however there is not currently an android application for telephone at this time.  She will likely wait until this is available prior to switching.      Regimen:   Tresiba 100 units   Humalog 20 units three times daily   Ozempic 1mg weekly     Hemoglobin A1C   Date Value Ref Range Status   05/19/2021 7.0 (H) <=5.6 % Final   01/19/2021 6.8 (H) <=5.6 % Final   07/21/2020 7.2 (H) 3.5 - 6.0 % Final      Today's Vitals:   BP Readings from Last 3 Encounters:   06/15/21 132/70   01/19/21 138/66   09/02/20 116/66   ----------------    I spent 30 minutes with this patient today. All changes were made via collaborative practice agreement with Mimi Fuentes MD. A copy of the visit note was provided to the patient's primary care provider.    The patient was sent via [employer] a summary of these recommendations.     Jose Raul Coy, PharmD, BCACP  Medication Management (MTM) Pharmacist  [facility]     Medication Therapy Recommendations  Type 2 diabetes mellitus (H)    Current Medication: gabapentin (NEURONTIN) 300 MG capsule   Rationale: Undesirable effect - Adverse medication event - Safety   Recommendation: Change Medication - Lyrica 75 MG Caps - consider switching gabapentin to lyrica for improved tolerance   Status: Contact Provider - Awaiting Response

## 2021-10-14 NOTE — PATIENT INSTRUCTIONS
PLAN:                            1. Consider switching gabapentin to Lyrica, to discuss with Dr. Fuentes   2. Educated to switch to Freestyle Maria Victoria 2 system once application available on her phone     Follow-up: Return in about 4 weeks (around 11/8/2021) for Follow up, with me, using a [employer] eVisit.

## 2021-10-15 ENCOUNTER — OFFICE VISIT (OUTPATIENT)
Dept: INTERNAL MEDICINE | Facility: CLINIC | Age: 66
End: 2021-10-15
Payer: COMMERCIAL

## 2021-10-15 VITALS — HEART RATE: 78 BPM | OXYGEN SATURATION: 98 % | DIASTOLIC BLOOD PRESSURE: 62 MMHG | SYSTOLIC BLOOD PRESSURE: 138 MMHG

## 2021-10-15 DIAGNOSIS — L29.9 ITCHING: ICD-10-CM

## 2021-10-15 DIAGNOSIS — E11.65 TYPE 2 DIABETES MELLITUS WITH HYPERGLYCEMIA, WITH LONG-TERM CURRENT USE OF INSULIN (H): Primary | ICD-10-CM

## 2021-10-15 DIAGNOSIS — E66.01 MORBID OBESITY WITH BMI OF 40.0-44.9, ADULT (H): ICD-10-CM

## 2021-10-15 DIAGNOSIS — M25.561 CHRONIC PAIN OF RIGHT KNEE: ICD-10-CM

## 2021-10-15 DIAGNOSIS — M99.53 INTERVERTEBRAL DISC STENOSIS OF NEURAL CANAL OF LUMBAR REGION: ICD-10-CM

## 2021-10-15 DIAGNOSIS — G89.29 CHRONIC PAIN OF RIGHT KNEE: ICD-10-CM

## 2021-10-15 DIAGNOSIS — Z79.4 TYPE 2 DIABETES MELLITUS WITH HYPERGLYCEMIA, WITH LONG-TERM CURRENT USE OF INSULIN (H): Primary | ICD-10-CM

## 2021-10-15 DIAGNOSIS — M25.569 ACUTE KNEE PAIN, UNSPECIFIED LATERALITY: ICD-10-CM

## 2021-10-15 DIAGNOSIS — I10 ESSENTIAL HYPERTENSION: ICD-10-CM

## 2021-10-15 DIAGNOSIS — Z23 HIGH PRIORITY FOR 2019-NCOV VACCINE: ICD-10-CM

## 2021-10-15 DIAGNOSIS — N18.31 STAGE 3A CHRONIC KIDNEY DISEASE (H): ICD-10-CM

## 2021-10-15 LAB
ALBUMIN SERPL-MCNC: 4 G/DL (ref 3.5–5)
ALP SERPL-CCNC: 91 U/L (ref 45–120)
ALT SERPL W P-5'-P-CCNC: 24 U/L (ref 0–45)
ANION GAP SERPL CALCULATED.3IONS-SCNC: 12 MMOL/L (ref 5–18)
AST SERPL W P-5'-P-CCNC: 23 U/L (ref 0–40)
BILIRUB DIRECT SERPL-MCNC: 0.2 MG/DL
BILIRUB SERPL-MCNC: 0.4 MG/DL (ref 0–1)
BUN SERPL-MCNC: 13 MG/DL (ref 8–22)
CALCIUM SERPL-MCNC: 10 MG/DL (ref 8.5–10.5)
CHLORIDE BLD-SCNC: 104 MMOL/L (ref 98–107)
CO2 SERPL-SCNC: 23 MMOL/L (ref 22–31)
CREAT SERPL-MCNC: 0.89 MG/DL (ref 0.6–1.1)
ERYTHROCYTE [DISTWIDTH] IN BLOOD BY AUTOMATED COUNT: 13.3 % (ref 10–15)
GFR SERPL CREATININE-BSD FRML MDRD: 69 ML/MIN/1.73M2
GLUCOSE BLD-MCNC: 127 MG/DL (ref 70–125)
HBA1C MFR BLD: 7.9 % (ref 0–5.6)
HCT VFR BLD AUTO: 36.8 % (ref 35–47)
HGB BLD-MCNC: 11.8 G/DL (ref 11.7–15.7)
MCH RBC QN AUTO: 29 PG (ref 26.5–33)
MCHC RBC AUTO-ENTMCNC: 32.1 G/DL (ref 31.5–36.5)
MCV RBC AUTO: 90 FL (ref 78–100)
PLATELET # BLD AUTO: 240 10E3/UL (ref 150–450)
POTASSIUM BLD-SCNC: 3.8 MMOL/L (ref 3.5–5)
PROT SERPL-MCNC: 7.6 G/DL (ref 6–8)
RBC # BLD AUTO: 4.07 10E6/UL (ref 3.8–5.2)
SODIUM SERPL-SCNC: 139 MMOL/L (ref 136–145)
WBC # BLD AUTO: 6.2 10E3/UL (ref 4–11)

## 2021-10-15 PROCEDURE — 83036 HEMOGLOBIN GLYCOSYLATED A1C: CPT | Performed by: INTERNAL MEDICINE

## 2021-10-15 PROCEDURE — 91300 COVID-19,PF,PFIZER (12+ YRS): CPT | Performed by: INTERNAL MEDICINE

## 2021-10-15 PROCEDURE — 80053 COMPREHEN METABOLIC PANEL: CPT | Performed by: INTERNAL MEDICINE

## 2021-10-15 PROCEDURE — 36415 COLL VENOUS BLD VENIPUNCTURE: CPT | Performed by: INTERNAL MEDICINE

## 2021-10-15 PROCEDURE — 99214 OFFICE O/P EST MOD 30 MIN: CPT | Mod: 25 | Performed by: INTERNAL MEDICINE

## 2021-10-15 PROCEDURE — 82248 BILIRUBIN DIRECT: CPT | Performed by: INTERNAL MEDICINE

## 2021-10-15 PROCEDURE — 0004A COVID-19,PF,PFIZER (12+ YRS): CPT | Performed by: INTERNAL MEDICINE

## 2021-10-15 PROCEDURE — 85027 COMPLETE CBC AUTOMATED: CPT | Performed by: INTERNAL MEDICINE

## 2021-10-15 RX ORDER — HYDROCODONE BITARTRATE AND ACETAMINOPHEN 5; 325 MG/1; MG/1
1 TABLET ORAL 2 TIMES DAILY PRN
Qty: 50 TABLET | Refills: 0 | Status: CANCELLED | OUTPATIENT
Start: 2021-10-15

## 2021-10-15 RX ORDER — HYDROXYZINE HYDROCHLORIDE 10 MG/1
10 TABLET, FILM COATED ORAL EVERY 4 HOURS PRN
Qty: 20 TABLET | Refills: 1 | Status: SHIPPED | OUTPATIENT
Start: 2021-10-15 | End: 2022-04-05

## 2021-10-15 RX ORDER — HYDROCODONE BITARTRATE AND ACETAMINOPHEN 5; 325 MG/1; MG/1
1 TABLET ORAL 2 TIMES DAILY PRN
Qty: 50 TABLET | Refills: 0 | Status: SHIPPED | OUTPATIENT
Start: 2021-10-15 | End: 2021-12-03

## 2021-10-15 NOTE — PROGRESS NOTES
"Counts include 234 beds at the [facility] Follow Up Note    Assessment/Plan:  1. Type 2 diabetes mellitus with hyperglycemia, with long-term current use of insulin (H)  We will update labs.  Working with pharmacy with regard to insulin dosing.  - Basic metabolic panel; Future  - CBC with platelets; Future  - Hemoglobin A1c; Future    2. Chronic pain of right knee  Severe DJD right knee-scheduled for knee replacement December 7-  In the interim, using 1-2 Vicodin per day.  This is reasonable as she is \"in agony \".  She is using this along with Celebrex    3. Stage 3a chronic kidney disease (H)  Monitoring in view of Celebrex usage    4. Essential hypertension  Stable on current medications    5. Morbid obesity with BMI of 40.0-44.9, adult (H)  Unable to exercise currently due to knee pain    7. High priority for 2019-nCoV vaccine  Ordered    8. Itching  Pruritus-transient.  Interesting phenomenon.  Occurs occasionally with intercourse and with emotion.  Lasts 10 minutes.  May be at the bottom of the foot or tips of fingers.  Last 10 minutes.  1-2 times per week.  Gabapentin helps but causes fatigue  Recommendation: We will try limited dose of hydroxyzine or Atarax for pruritus.  She may use as needed.  She is advised repeatedly not to drive if taking this medication  - hydrOXYzine (ATARAX) 10 MG tablet; Take 1 tablet (10 mg) by mouth every 4 hours as needed for itching  Dispense: 20 tablet; Refill: 1  - Hepatic panel (Albumin, ALT, AST, Bili, Alk Phos, TP); Future      Follow-up for preop before November 30    Mimi Fuentes MD, MD    Chief Complaint:  Chief Complaint   Patient presents with     Follow Up     Discuss Pain      Diabetes     Imm/Inj     COVID-19 VACCINE       History of Present Illness:  Liana is a 66 year old female who is here today for follow-up of her usual medical problems.  Of note, she has type 2 diabetes and is currently on insulin.  She is working with pharmacy for titration of her medications.  She feels " that now that she is off prednisone, her blood sugars are improving.  She denies polyuria or polydipsia.    She has been experiencing excruciating knee pain due to severe and advanced DJD of the knee.  She is due for a knee replacement but this has been postponed due to the pandemic.  She states that most recently, they placed a brace on her knee that was very helpful and decreasing pain.  She states it has helped quite a bit.  She is on Celebrex daily-with monitoring of kidney function.  Additionally, she is using 1-2 Vicodin per day.  This knee pain has been very limiting for ADLs/etc.    Additionally, she has intermittent issues with pruritus.  She gets a transient itchiness of the bottom of her foot-fingertip/etc. whenever she experiences extreme emotion.  She states this can occur with intercourse.  It can occur when she is upset about something.  She cites 1-2 events per week.    She also has facemask folliculitis.    Review of Systems:  A comprehensive review of systems was performed and was otherwise negative    PFSH:  Social History: Common-law marriage-living with her significant other and son  History   Smoking Status     Former Smoker     Packs/day: 0.25     Years: 44.00     Types: Cigarettes, Cigarettes     Quit date: 1/20/2017   Smokeless Tobacco     Never Used     Comment: age 18 to age 62 up to 1/2 ppd       Past History:   Current Outpatient Medications   Medication Sig Dispense Refill     acetaminophen (TYLENOL) 500 MG tablet [ACETAMINOPHEN (TYLENOL) 500 MG TABLET] Take 1,000 mg by mouth 3 (three) times a day as needed.       allopurinoL (ZYLOPRIM) 100 MG tablet [ALLOPURINOL (ZYLOPRIM) 100 MG TABLET] Take 1 tablet (100 mg total) by mouth daily. 90 tablet 2     aspirin 81 MG EC tablet [ASPIRIN 81 MG EC TABLET] Take 1 tablet (81 mg total) by mouth daily.  0     blood glucose meter (FREESTYLE LITE METER) [BLOOD GLUCOSE METER (FREESTYLE LITE METER)] Use 1 each As Directed 6 (six) times a day. Dispense  glucometer brand per patient's insurance at pharmacy discretion. 1 each 0     blood glucose test strips [BLOOD GLUCOSE TEST STRIPS] Use 1 each As Directed 6 (six) times a day. Prodigy Meter 300 strip 3     celecoxib (CELEBREX) 200 MG capsule Take 1 capsule by mouth 2 times daily       cetirizine (ZYRTEC) 10 MG tablet Take 1 tablet (10 mg) by mouth daily 30 tablet 2     cholecalciferol, vitamin D3, (CHOLECALCIFEROL) 1,000 unit tablet [CHOLECALCIFEROL, VITAMIN D3, (CHOLECALCIFEROL) 1,000 UNIT TABLET] Take 3,000 Units by mouth daily.        cinnamon bark (CINNAMON ORAL) [CINNAMON BARK (CINNAMON ORAL)] Take 1 each by mouth 2 (two) times a day.       Continuous Blood Gluc Sensor (FREESTYLE MIRANDA 2 SENSOR) MISC 1 each every 14 days 1 each every 14 days. Change every 14 days. 6 each 3     cyanocobalamin (VITAMIN B-12) 500 MCG tablet [CYANOCOBALAMIN (VITAMIN B-12) 500 MCG TABLET] Take 500 mcg by mouth daily.       cyclobenzaprine (FLEXERIL) 10 MG tablet [CYCLOBENZAPRINE (FLEXERIL) 10 MG TABLET] Take 1 tablet (10 mg total) by mouth at bedtime as needed for muscle spasms. 21 tablet 1     diclofenac (VOLTAREN) 1 % topical gel Apply 2 g topically 4 times daily 100 g 1     docusate sodium (COLACE) 100 MG capsule Take 1 capsule (100 mg) by mouth 2 times daily 100 capsule 1     famotidine (PEPCID) 40 MG tablet [FAMOTIDINE (PEPCID) 40 MG TABLET] TAKE 1 TABLET(40 MG) BY MOUTH EVERY EVENING 90 tablet 0     flash glucose sensor (FREESTYLE MIRANDA 14 DAY SENSOR) Kit [FLASH GLUCOSE SENSOR (FREESTYLE MIRANDA 14 DAY SENSOR) KIT] Use 1 each As Directed every 14 (fourteen) days. 2 kit 11     gabapentin (NEURONTIN) 300 MG capsule Take 300 mg by mouth nightly as needed 90 capsule 3     generic lancets (MICROLET LANCET) [GENERIC LANCETS (MICROLET LANCET)] Use 1 each As Directed 6 (six) times a day. 300 each 3     HUMALOG KWIKPEN 100 UNIT/ML soln ADMINISTER 20 UNITS UNDER THE SKIN THREE TIMES DAILY BEFORE MEALS 60 mL 3     hydrochlorothiazide  "(MICROZIDE) 12.5 MG capsule TAKE 1 CAPSULE(12.5 MG) BY MOUTH DAILY 90 capsule 2     HYDROcodone-acetaminophen (NORCO) 5-325 MG tablet Take 1 tablet by mouth 2 times daily as needed for pain 50 tablet 0     hydrOXYzine (ATARAX) 10 MG tablet Take 1 tablet (10 mg) by mouth every 4 hours as needed for itching 20 tablet 1     insulin degludec (TRESIBA FLEXTOUCH) 200 UNIT/ML pen Inject 100 Units Subcutaneous daily       lisinopriL (PRINIVIL,ZESTRIL) 5 MG tablet [LISINOPRIL (PRINIVIL,ZESTRIL) 5 MG TABLET] Take 1.5 tablets (7.5 mg total) by mouth daily. 135 tablet 2     melatonin (MELATIN) 3 mg Tab tablet [MELATONIN (MELATIN) 3 MG TAB TABLET] Take 3 mg by mouth at bedtime as needed.       nitroglycerin (NITROSTAT) 0.3 MG SL tablet [NITROGLYCERIN (NITROSTAT) 0.3 MG SL TABLET] Place 1 tablet (0.3 mg total) under the tongue every 5 (five) minutes as needed for chest pain. 100 tablet 3     omega-3 acid ethyl esters (LOVAZA) 1 gram capsule [OMEGA-3 ACID ETHYL ESTERS (LOVAZA) 1 GRAM CAPSULE] TAKE 2 CAPSULES BY MOUTH TWICE DAILY 360 capsule 3     omeprazole (PRILOSEC) 40 MG DR capsule TAKE 1 CAPSULE BY MOUTH EVERY DAY 90 capsule 3     ONETOUCH DELICA PLUS LANCET 33 gauge Misc [ONETOUCH DELICA PLUS LANCET 33 GAUGE MISC] USE TO TEST BLOOD SUGAR 6 TIMES DAILY 600 each 3     OZEMPIC, 1 MG/DOSE, 4 MG/3ML SOPN INJECT 1MG UNDER THE SKIN ONCE EVERY 7 DAYS 3 mL 3     pen needle, diabetic (BD ULTRA-FINE ZULMA PEN NEEDLE) 32 gauge x 5/32\" Ndle [PEN NEEDLE, DIABETIC (BD ULTRA-FINE ZULMA PEN NEEDLE) 32 GAUGE X 5/32\" NDLE] Inject 4 each under the skin daily. With Tresiba and Novolog 120 each 3     rosuvastatin (CRESTOR) 10 MG tablet [ROSUVASTATIN (CRESTOR) 10 MG TABLET] Take 1 tablet (10 mg total) by mouth daily. 90 tablet 3     triamcinolone (KENALOG) 0.1 % cream [TRIAMCINOLONE (KENALOG) 0.1 % CREAM] Apply bid 30 g 0     valACYclovir (VALTREX) 500 MG tablet TAKE 1 TABLET(500 MG) BY MOUTH DAILY 90 tablet 3     VITAMIN B-1 100 mg tablet [VITAMIN " B-1 100 MG TABLET] TAKE 1 TABLET(100 MG) BY MOUTH DAILY 90 tablet 3       Family History:     Physical Exam:  General Appearance:   She appears at baseline and currently in no acute distress  Vitals:    10/15/21 1207   BP: 138/62   BP Location: Left arm   Patient Position: Sitting   Cuff Size: Adult Large   Pulse: 78   SpO2: 98%     Wt Readings from Last 3 Encounters:   06/15/21 123.2 kg (271 lb 9.6 oz)   01/19/21 123.4 kg (272 lb)   09/02/20 120.2 kg (265 lb)     There is no height or weight on file to calculate BMI.        Data Review:    Analysis and Summary of Old Records (2): Review Ortho notes and pharmacy notes    Records Requested (1):       Other History Summarized (from other people in the room) (2):     Radiology Tests Summarized (XRAY/CT/MRI/DXA) (1):     Labs Reviewed (1): Labs ordered    Medicine Tests Reviewed (EKG/ECHO/COLONOSCOPY/EGD) (1):     Independent Review of EKG or X-RAY (2):

## 2021-10-16 DIAGNOSIS — E11.65 TYPE 2 DIABETES MELLITUS WITH HYPERGLYCEMIA, WITH LONG-TERM CURRENT USE OF INSULIN (H): ICD-10-CM

## 2021-10-16 DIAGNOSIS — Z79.4 TYPE 2 DIABETES MELLITUS WITH HYPERGLYCEMIA, WITH LONG-TERM CURRENT USE OF INSULIN (H): ICD-10-CM

## 2021-10-16 RX ORDER — INSULIN LISPRO 100 [IU]/ML
INJECTION, SOLUTION INTRAVENOUS; SUBCUTANEOUS
Qty: 60 ML | Refills: 1 | Status: SHIPPED | OUTPATIENT
Start: 2021-10-16 | End: 2021-12-09

## 2021-10-17 NOTE — TELEPHONE ENCOUNTER
"Pt requesting 90 day supply.    Last Written Prescription Date:  09/11/2021  Last Fill Quantity: 60 mL,  # refills: 3   Last office visit provider:  10/15/2021 with Dr Fuentes.    Requested Prescriptions   Pending Prescriptions Disp Refills     HUMALOG KWIKPEN 100 UNIT/ML soln [Pharmacy Med Name: HUMALOG 100 U/ML KWIK PEN INJ 3ML] 60 mL 3     Sig: ADMINISTER 20 UNITS UNDER THE SKIN THREE TIMES DAILY BEFORE MEALS       Short Acting Insulin Protocol Failed - 10/16/2021  3:48 PM        Failed - Recent (6 mo) or future (30 days) visit within the authorizing provider's specialty     Patient had office visit in the last 6 months or has a visit in the next 30 days with authorizing provider or within the authorizing provider's specialty.  See \"Patient Info\" tab in inbasket, or \"Choose Columns\" in Meds & Orders section of the refill encounter.            Passed - Serum creatinine on file in past 12 months     Recent Labs   Lab Test 10/15/21  1302 06/28/21  1731 05/19/21  1048   CR 0.89  --    < >   CREAT  --  1.0  --     < > = values in this interval not displayed.       Ok to refill medication if creatinine is low          Passed - HgbA1C in past 3 or 6 months     If HgbA1C is 8 or greater, it needs to be on file within the past 3 months.  If less than 8, must be on file within the past 6 months.     Recent Labs   Lab Test 10/15/21  1302   A1C 7.9*             Passed - Medication is active on med list        Passed - Patient is age 18 or older             Anyi Holt 10/16/21 11:31 PM  "

## 2021-10-18 ENCOUNTER — TELEPHONE (OUTPATIENT)
Dept: INTERNAL MEDICINE | Facility: CLINIC | Age: 66
End: 2021-10-18

## 2021-10-18 DIAGNOSIS — L03.114 CELLULITIS OF LEFT UPPER EXTREMITY: Primary | ICD-10-CM

## 2021-10-18 RX ORDER — SULFAMETHOXAZOLE/TRIMETHOPRIM 800-160 MG
1 TABLET ORAL 2 TIMES DAILY
Qty: 14 TABLET | Refills: 0 | Status: SHIPPED | OUTPATIENT
Start: 2021-10-18 | End: 2021-10-25

## 2021-10-18 NOTE — TELEPHONE ENCOUNTER
Pt stopped by clinic today to be seen by the nurse.  She had Covid vaccine booster on Friday and subsequently developed increased swelling pain and hotness and mild redness in the area.  Currently pain is escalating.  It is very tender to palpation.  The area is about 10 cm in diameter.  Patient is a diabetic.  We will treat with antibiotic for possible vaccination associated cellulitis.  History of previous MRSA, multiple other antibiotic allergies.  Will try Bactrim. Order placed.    Dr Gina MCCAULEY

## 2021-11-15 DIAGNOSIS — E11.65 TYPE 2 DIABETES MELLITUS WITH HYPERGLYCEMIA, WITH LONG-TERM CURRENT USE OF INSULIN (H): ICD-10-CM

## 2021-11-15 DIAGNOSIS — Z79.4 TYPE 2 DIABETES MELLITUS WITH HYPERGLYCEMIA, WITH LONG-TERM CURRENT USE OF INSULIN (H): ICD-10-CM

## 2021-11-15 DIAGNOSIS — L29.9 PRURITIC DISORDER: ICD-10-CM

## 2021-11-17 RX ORDER — INSULIN LISPRO 100 [IU]/ML
INJECTION, SOLUTION INTRAVENOUS; SUBCUTANEOUS
Qty: 60 ML | Refills: 1 | OUTPATIENT
Start: 2021-11-17 | End: 2022-11-17

## 2021-11-17 RX ORDER — CETIRIZINE HYDROCHLORIDE 10 MG/1
10 TABLET ORAL DAILY
Qty: 90 TABLET | Refills: 3 | Status: SHIPPED | OUTPATIENT
Start: 2021-11-17 | End: 2022-04-05

## 2021-11-17 NOTE — TELEPHONE ENCOUNTER
"Last Written Prescription Date:  8/27/21  Last Fill Quantity: 30,  # refills: 2   Last office visit provider:  10/15/21     Requested Prescriptions   Pending Prescriptions Disp Refills     cetirizine (ZYRTEC) 10 MG tablet [Pharmacy Med Name: CETIRIZINE 10MG TABLETS] 30 tablet 2     Sig: TAKE 1 TABLET(10 MG) BY MOUTH DAILY       Antihistamines Protocol Passed - 11/15/2021  1:47 PM        Passed - Patient is 3-64 years of age     Apply weight-based dosing for peds patients age 3 - 12 years of age.    Forward request to provider for patients under the age of 3 or over the age of 64.          Passed - Recent (12 mo) or future (30 days) visit within the authorizing provider's specialty     Patient has had an office visit with the authorizing provider or a provider within the authorizing providers department within the previous 12 mos or has a future within next 30 days. See \"Patient Info\" tab in inbasket, or \"Choose Columns\" in Meds & Orders section of the refill encounter.              Passed - Medication is active on med list         Refused Prescriptions Disp Refills     HUMALOG KWIKPEN 100 UNIT/ML soln [Pharmacy Med Name: HUMALOG 100 U/ML KWIK PEN INJ 3ML] 60 mL 1     Sig: ADMINISTER 20 UNITS UNDER THE SKIN THREE TIMES DAILY BEFORE MEALS       Short Acting Insulin Protocol Passed - 11/15/2021  1:47 PM        Passed - Serum creatinine on file in past 12 months     Recent Labs   Lab Test 10/15/21  1302 06/28/21  1731   CR 0.89  --    CREAT  --  1.0       Ok to refill medication if creatinine is low          Passed - HgbA1C in past 3 or 6 months     If HgbA1C is 8 or greater, it needs to be on file within the past 3 months.  If less than 8, must be on file within the past 6 months.     Recent Labs   Lab Test 10/15/21  1302   A1C 7.9*             Passed - Medication is active on med list        Passed - Patient is age 18 or older        Passed - Recent (6 mo) or future (30 days) visit within the authorizing provider's " "specialty     Patient had office visit in the last 6 months or has a visit in the next 30 days with authorizing provider or within the authorizing provider's specialty.  See \"Patient Info\" tab in inbasket, or \"Choose Columns\" in Meds & Orders section of the refill encounter.                 Leander Billingsley RN 11/17/21 10:42 AM  "

## 2021-11-18 ENCOUNTER — TRANSFERRED RECORDS (OUTPATIENT)
Dept: HEALTH INFORMATION MANAGEMENT | Facility: CLINIC | Age: 66
End: 2021-11-18
Payer: COMMERCIAL

## 2021-12-02 DIAGNOSIS — M25.569 ACUTE KNEE PAIN, UNSPECIFIED LATERALITY: ICD-10-CM

## 2021-12-02 DIAGNOSIS — M99.53 INTERVERTEBRAL DISC STENOSIS OF NEURAL CANAL OF LUMBAR REGION: ICD-10-CM

## 2021-12-02 NOTE — TELEPHONE ENCOUNTER
Reason for Call:  Medication or medication refill:    Do you use a [facility]?  Name of the pharmacy and phone number for the current request:    [facility] [address]    Name of the medication requested:   Vicodin    Other request: na/    Can we leave a detailed message on this number? YES    Phone number patient can be reached at: Home number on file [phone] (home)    Best Time: anytime    Call taken on 12/2/2021 at 3:46 PM by Raquel Blackwell

## 2021-12-03 RX ORDER — HYDROCODONE BITARTRATE AND ACETAMINOPHEN 5; 325 MG/1; MG/1
1 TABLET ORAL 2 TIMES DAILY PRN
Qty: 50 TABLET | Refills: 0 | Status: SHIPPED | OUTPATIENT
Start: 2021-12-03 | End: 2022-01-20

## 2021-12-03 NOTE — TELEPHONE ENCOUNTER
Medication: Hydrocodone-acetaminophen 5-325 mg  Last Date Filled 10/15/21   pulled: PROVIDER TO PULL FROM Epic.     Only PCP Prescribing? PROVIDER TO PULL  FROM [facility].    CSA in last year: NO  Random Utox in last year: NO  Opioids + benzodiazepines? YES     Patient's last office visit with Dr. Fuentes was 10/15/21.    Is patient on the Executive Review Team? NO

## 2021-12-05 DIAGNOSIS — Z79.4 TYPE 2 DIABETES MELLITUS WITH HYPERGLYCEMIA, WITH LONG-TERM CURRENT USE OF INSULIN (H): ICD-10-CM

## 2021-12-05 DIAGNOSIS — E11.65 TYPE 2 DIABETES MELLITUS WITH HYPERGLYCEMIA, WITH LONG-TERM CURRENT USE OF INSULIN (H): ICD-10-CM

## 2021-12-07 RX ORDER — INSULIN LISPRO 100 [IU]/ML
INJECTION, SOLUTION INTRAVENOUS; SUBCUTANEOUS
Qty: 60 ML | Refills: 1 | OUTPATIENT
Start: 2021-12-07 | End: 2022-12-07

## 2021-12-07 NOTE — TELEPHONE ENCOUNTER
Last Written Prescription Date:  10/16/21  Last Fill Quantity: 60mL,  # refills: 1   Last office visit provider:  10/15/21         Yamileth Lind RN 12/07/21 9:40 AM

## 2021-12-09 DIAGNOSIS — E11.65 TYPE 2 DIABETES MELLITUS WITH HYPERGLYCEMIA, WITH LONG-TERM CURRENT USE OF INSULIN (H): ICD-10-CM

## 2021-12-09 DIAGNOSIS — Z79.4 TYPE 2 DIABETES MELLITUS WITH HYPERGLYCEMIA, WITH LONG-TERM CURRENT USE OF INSULIN (H): ICD-10-CM

## 2021-12-09 RX ORDER — METHION/INOS/CHOL BT/B COM/LIV 110MG-86MG
CAPSULE ORAL
Qty: 90 TABLET | Refills: 1 | Status: SHIPPED | OUTPATIENT
Start: 2021-12-09 | End: 2022-06-06

## 2021-12-29 ENCOUNTER — LAB (OUTPATIENT)
Dept: FAMILY MEDICINE | Facility: CLINIC | Age: 66
End: 2021-12-29
Attending: INTERNAL MEDICINE
Payer: COMMERCIAL

## 2021-12-29 ENCOUNTER — VIRTUAL VISIT (OUTPATIENT)
Dept: INTERNAL MEDICINE | Facility: CLINIC | Age: 66
End: 2021-12-29
Payer: COMMERCIAL

## 2021-12-29 VITALS
TEMPERATURE: 98.3 F | BODY MASS INDEX: 39.94 KG/M2 | HEART RATE: 82 BPM | SYSTOLIC BLOOD PRESSURE: 135 MMHG | WEIGHT: 240 LBS | DIASTOLIC BLOOD PRESSURE: 73 MMHG

## 2021-12-29 DIAGNOSIS — I10 ESSENTIAL HYPERTENSION WITH GOAL BLOOD PRESSURE LESS THAN 140/90: ICD-10-CM

## 2021-12-29 DIAGNOSIS — R51.9 NONINTRACTABLE HEADACHE, UNSPECIFIED CHRONICITY PATTERN, UNSPECIFIED HEADACHE TYPE: ICD-10-CM

## 2021-12-29 DIAGNOSIS — E78.5 HYPERLIPEMIA: ICD-10-CM

## 2021-12-29 DIAGNOSIS — R51.9 NONINTRACTABLE HEADACHE, UNSPECIFIED CHRONICITY PATTERN, UNSPECIFIED HEADACHE TYPE: Primary | ICD-10-CM

## 2021-12-29 DIAGNOSIS — M10.9 ACUTE GOUTY ARTHROPATHY: ICD-10-CM

## 2021-12-29 PROCEDURE — U0005 INFEC AGEN DETEC AMPLI PROBE: HCPCS

## 2021-12-29 PROCEDURE — U0003 INFECTIOUS AGENT DETECTION BY NUCLEIC ACID (DNA OR RNA); SEVERE ACUTE RESPIRATORY SYNDROME CORONAVIRUS 2 (SARS-COV-2) (CORONAVIRUS DISEASE [COVID-19]), AMPLIFIED PROBE TECHNIQUE, MAKING USE OF HIGH THROUGHPUT TECHNOLOGIES AS DESCRIBED BY CMS-2020-01-R: HCPCS

## 2021-12-29 PROCEDURE — 99213 OFFICE O/P EST LOW 20 MIN: CPT | Mod: 95 | Performed by: INTERNAL MEDICINE

## 2021-12-29 NOTE — PROGRESS NOTES
"Laina is a 66 year old who is being evaluated via a billable video visit.      How would you like to obtain your AVS? Mail it  If the video visit is dropped, the invitation should be resent by:text link sent [phone]  Will anyone else be joining your video visit? No    Video Start Time: 3:14PM    Assessment & Plan     (R51.9) Nonintractable headache, unspecified chronicity pattern, unspecified headache type  (primary encounter diagnosis)  Comment: associated with recent covid exposure  Plan: Symptomatic; Yes; 12/29/2021 COVID-19 Virus         (Coronavirus) by PCR        covid test recommended. Tylenol has been helpful for the headache, okay to continue as needed.       BMI:   Estimated body mass index is 39.94 kg/m  as calculated from the following:    Height as of 6/15/21: 1.651 m (5' 5\").    Weight as of this encounter: 108.9 kg (240 lb).   Weight management plan: Patient was referred to their PCP to discuss a diet and exercise plan.    No follow-ups on file.    Frederick Briceño MD  [facility]    Subjective   Liana is a 66 year old who presents for the following health issues HPI   Pt has had mild headache this AM, relieved by tylenol. Also just found out about a positive COVID case from a recent event where she was close to this person.  Objective           Vitals:  No vitals were obtained today due to virtual visit.    Physical Exam   GENERAL: Healthy, alert and no distress  EYES: Eyes grossly normal to inspection.  No discharge or erythema, or obvious scleral/conjunctival abnormalities.  RESP: No audible wheeze, cough, or visible cyanosis.  No visible retractions or increased work of breathing.    SKIN: Visible skin clear. No significant rash, abnormal pigmentation or lesions.  NEURO: Cranial nerves grossly intact.  Mentation and speech appropriate for age.  PSYCH: Mentation appears normal, affect normal/bright, judgement and insight intact, normal speech and appearance " well-grogavin.        Video-Visit Details    Type of service:  Video Visit    Video End Time:3:20pm    Originating Location (pt. Location): Home    Distant Location (provider location):  [facility]     Platform used for Video Visit: [employer]

## 2021-12-30 LAB — SARS-COV-2 RNA RESP QL NAA+PROBE: NEGATIVE

## 2021-12-31 RX ORDER — OMEGA-3-ACID ETHYL ESTERS 1 G/1
CAPSULE, LIQUID FILLED ORAL
Qty: 360 CAPSULE | Refills: 1 | Status: SHIPPED | OUTPATIENT
Start: 2021-12-31 | End: 2022-04-05

## 2021-12-31 RX ORDER — ALLOPURINOL 100 MG/1
TABLET ORAL
Qty: 90 TABLET | Refills: 2 | Status: SHIPPED | OUTPATIENT
Start: 2021-12-31 | End: 2022-10-26

## 2021-12-31 RX ORDER — LISINOPRIL 5 MG/1
TABLET ORAL
Qty: 135 TABLET | Refills: 2 | Status: SHIPPED | OUTPATIENT
Start: 2021-12-31 | End: 2022-12-15

## 2021-12-31 NOTE — TELEPHONE ENCOUNTER
Pending Prescriptions:                       Disp   Refills    allopurinol (ZYLOPRIM) 100 MG tablet [Pha*90 tab*2            Sig: TAKE 1 TABLET(100 MG) BY MOUTH DAILY    Signed Prescriptions:                        Disp   Refills    omega-3 acid ethyl esters (LOVAZA) 1 g cap*360 ca*1        Sig: TAKE 2 CAPSULES BY MOUTH TWICE DAILY  Authorizing Provider: DARIANA SAUCEDA  Ordering User: BERTO MALDONADO

## 2021-12-31 NOTE — TELEPHONE ENCOUNTER
"Routing refill request to provider for review/approval because:  Labs not current:  Uric acid    Last Written Prescription Date:  2/5/21  Last Fill Quantity: 90,  # refills: 2   Last office visit provider:  10/15/21     Requested Prescriptions   Pending Prescriptions Disp Refills     allopurinol (ZYLOPRIM) 100 MG tablet [Pharmacy Med Name: ALLOPURINOL 100MG TABLETS] 90 tablet 2     Sig: TAKE 1 TABLET(100 MG) BY MOUTH DAILY       Gout Agents Protocol Failed - 12/29/2021  7:36 AM        Failed - Has Uric Acid on file in past 12 months and value is less than 6     Recent Labs   Lab Test 01/28/20  0920   URIC 6.1     If level is 6mg/dL or greater, ok to refill one time and refer to provider.           Passed - CBC on file in past 12 months     Recent Labs   Lab Test 10/15/21  1302   WBC 6.2   RBC 4.07   HGB 11.8   HCT 36.8                    Passed - ALT on file in past 12 months     Recent Labs   Lab Test 10/15/21  1302   ALT 24             Passed - Recent (12 mo) or future (30 days) visit within the authorizing provider's specialty     Patient has had an office visit with the authorizing provider or a provider within the authorizing providers department within the previous 12 mos or has a future within next 30 days. See \"Patient Info\" tab in inbasket, or \"Choose Columns\" in Meds & Orders section of the refill encounter.              Passed - Medication is active on med list        Passed - Patient is age 18 or older        Passed - No active pregnancy on record        Passed - Normal serum creatinine on file in the past 12 months     Recent Labs   Lab Test 10/15/21  1302 06/28/21  1731   CR 0.89  --    CREAT  --  1.0       Ok to refill medication if creatinine is low          Passed - No positive pregnancy test in past year         Signed Prescriptions Disp Refills    omega-3 acid ethyl esters (LOVAZA) 1 g capsule 360 capsule 1     Sig: TAKE 2 CAPSULES BY MOUTH TWICE DAILY       Antihyperlipidemic agents Passed " "- 12/29/2021  7:36 AM        Passed - Lipid panel on file in past 12 mos     Recent Labs   Lab Test 05/19/21  1048   CHOL 117   TRIG 200*   HDL 31*   LDL 46               Passed - Normal serum ALT on record in past 12 mos     Recent Labs   Lab Test 10/15/21  1302   ALT 24             Passed - Recent (12 mo) or future (30 days) visit within the authorizing provider's specialty     Patient has had an office visit with the authorizing provider or a provider within the authorizing providers department within the previous 12 mos or has a future within next 30 days. See \"Patient Info\" tab in inbasket, or \"Choose Columns\" in Meds & Orders section of the refill encounter.              Passed - Medication is active on med list        Passed - Patient is age 18 years or older        Passed - No active pregnancy on record        Passed - No positive pregnancy test in past 12 mos             Leander Billingsley RN 12/31/21 7:29 AM  "

## 2021-12-31 NOTE — TELEPHONE ENCOUNTER
"Routing refill request to provider for review/approval because:  A break in medication    Last Written Prescription Date:  12/17/20  Last Fill Quantity: 135,  # refills: 2   Last office visit provider:  10/15/21     Requested Prescriptions   Pending Prescriptions Disp Refills     lisinopril (ZESTRIL) 5 MG tablet [Pharmacy Med Name: LISINOPRIL 5MG TABLETS] 135 tablet 2     Sig: TAKE 1 AND 1/2 TABLETS(7.5 MG) BY MOUTH DAILY       ACE Inhibitors (Including Combos) Protocol Passed - 12/29/2021  7:36 AM        Passed - Blood pressure under 140/90 in past 12 months     BP Readings from Last 3 Encounters:   12/29/21 135/73   10/15/21 138/62   06/15/21 132/70                 Passed - Recent (12 mo) or future (30 days) visit within the authorizing provider's specialty     Patient has had an office visit with the authorizing provider or a provider within the authorizing providers department within the previous 12 mos or has a future within next 30 days. See \"Patient Info\" tab in inbasket, or \"Choose Columns\" in Meds & Orders section of the refill encounter.              Passed - Medication is active on med list        Passed - Patient is age 18 or older        Passed - No active pregnancy on record        Passed - Normal serum creatinine on file in past 12 months     Recent Labs   Lab Test 10/15/21  1302 06/28/21  1731   CR 0.89  --    CREAT  --  1.0       Ok to refill medication if creatinine is low          Passed - Normal serum potassium on file in past 12 months     Recent Labs   Lab Test 10/15/21  1302   POTASSIUM 3.8             Passed - No positive pregnancy test within past 12 months             Leander Billingsley RN 12/31/21 7:30 AM  "

## 2021-12-31 NOTE — TELEPHONE ENCOUNTER
Pending Prescriptions:                       Disp   Refills    lisinopril (ZESTRIL) 5 MG tablet [Pharmac*135 ta*2            Sig: TAKE 1 AND 1/2 TABLETS(7.5 MG) BY MOUTH DAILY    Last office visit with Dr. Fuentes regarding hypertension was 10/15/21.

## 2021-12-31 NOTE — TELEPHONE ENCOUNTER
"Last Written Prescription Date:  11/20/20  Last Fill Quantity: 360,  # refills: 3   Last office visit provider:  10/15/21     Requested Prescriptions   Pending Prescriptions Disp Refills     allopurinol (ZYLOPRIM) 100 MG tablet [Pharmacy Med Name: ALLOPURINOL 100MG TABLETS] 90 tablet 2     Sig: TAKE 1 TABLET(100 MG) BY MOUTH DAILY       Gout Agents Protocol Failed - 12/29/2021  7:36 AM        Failed - Has Uric Acid on file in past 12 months and value is less than 6     Recent Labs   Lab Test 01/28/20  0920   URIC 6.1     If level is 6mg/dL or greater, ok to refill one time and refer to provider.           Passed - CBC on file in past 12 months     Recent Labs   Lab Test 10/15/21  1302   WBC 6.2   RBC 4.07   HGB 11.8   HCT 36.8                    Passed - ALT on file in past 12 months     Recent Labs   Lab Test 10/15/21  1302   ALT 24             Passed - Recent (12 mo) or future (30 days) visit within the authorizing provider's specialty     Patient has had an office visit with the authorizing provider or a provider within the authorizing providers department within the previous 12 mos or has a future within next 30 days. See \"Patient Info\" tab in inbasket, or \"Choose Columns\" in Meds & Orders section of the refill encounter.              Passed - Medication is active on med list        Passed - Patient is age 18 or older        Passed - No active pregnancy on record        Passed - Normal serum creatinine on file in the past 12 months     Recent Labs   Lab Test 10/15/21  1302 06/28/21  1731   CR 0.89  --    CREAT  --  1.0       Ok to refill medication if creatinine is low          Passed - No positive pregnancy test in past year           omega-3 acid ethyl esters (LOVAZA) 1 g capsule [Pharmacy Med Name: OMEGA-3-ACID 1GM CAPSULES (RX)] 360 capsule 3     Sig: TAKE 2 CAPSULES BY MOUTH TWICE DAILY       Antihyperlipidemic agents Passed - 12/29/2021  7:36 AM        Passed - Lipid panel on file in past 12 mos     " "Recent Labs   Lab Test 05/19/21  1048   CHOL 117   TRIG 200*   HDL 31*   LDL 46               Passed - Normal serum ALT on record in past 12 mos     Recent Labs   Lab Test 10/15/21  1302   ALT 24             Passed - Recent (12 mo) or future (30 days) visit within the authorizing provider's specialty     Patient has had an office visit with the authorizing provider or a provider within the authorizing providers department within the previous 12 mos or has a future within next 30 days. See \"Patient Info\" tab in inbasket, or \"Choose Columns\" in Meds & Orders section of the refill encounter.              Passed - Medication is active on med list        Passed - Patient is age 18 years or older        Passed - No active pregnancy on record        Passed - No positive pregnancy test in past 12 mos             Leander Billingsley RN 12/31/21 7:28 AM  "

## 2022-01-11 ENCOUNTER — OFFICE VISIT (OUTPATIENT)
Dept: PODIATRY | Facility: CLINIC | Age: 67
End: 2022-01-11
Payer: COMMERCIAL

## 2022-01-11 VITALS — HEART RATE: 87 BPM | WEIGHT: 260 LBS | BODY MASS INDEX: 41.78 KG/M2 | HEIGHT: 66 IN | OXYGEN SATURATION: 99 %

## 2022-01-11 DIAGNOSIS — E11.9 TYPE 2 DIABETES MELLITUS WITHOUT COMPLICATION, WITH LONG-TERM CURRENT USE OF INSULIN (H): ICD-10-CM

## 2022-01-11 DIAGNOSIS — E11.42 DIABETIC POLYNEUROPATHY ASSOCIATED WITH TYPE 2 DIABETES MELLITUS (H): ICD-10-CM

## 2022-01-11 DIAGNOSIS — Z79.4 TYPE 2 DIABETES MELLITUS WITHOUT COMPLICATION, WITH LONG-TERM CURRENT USE OF INSULIN (H): ICD-10-CM

## 2022-01-11 DIAGNOSIS — M21.6X1 PRONATION DEFORMITY OF BOTH FEET: Primary | ICD-10-CM

## 2022-01-11 DIAGNOSIS — M21.6X2 PRONATION DEFORMITY OF BOTH FEET: Primary | ICD-10-CM

## 2022-01-11 PROCEDURE — 99203 OFFICE O/P NEW LOW 30 MIN: CPT | Performed by: PODIATRIST

## 2022-01-11 ASSESSMENT — MIFFLIN-ST. JEOR: SCORE: 1746.1

## 2022-01-11 ASSESSMENT — PAIN SCALES - GENERAL: PAINLEVEL: EXTREME PAIN (8)

## 2022-01-11 NOTE — LETTER
1/11/2022         RE: Liana Briceño  [address] [place] [address]        Dear Colleague,    Thank you for referring your patient, Liana Briceño, to the [facility]. Please see a copy of my visit note below.    FOOT AND ANKLE SURGERY/PODIATRY CONSULT NOTE         ASSESSMENT:   Pronation deformity  Diabetic neuropathy  Type 2 diabetes without complication      TREATMENT:  The patient was started on amitriptyline 25 mg at bedtime.  I have also recommended extra-depth diabetic shoes with molded insoles.        HPI:Liana Briceño today presented to the clinic today complaining of tingling and shooting pains in the bottom of both feet.  The patient stated that over the past year it has progressed and is now interfering with her ability to sleep.  It is more pronounced at night.  She does take gabapentin with some minimal relief.  She has not had any redness or swelling involving her feet.  She has no open wounds.  There are no factors which relieve her symptoms.    Past Medical History:   Diagnosis Date     Anemia      Breast cyst      Carpal tunnel syndrome      Coronary artery disease due to lipid rich plaque      Dyslipidemia, goal LDL below 70      Esophageal reflux      Essential hypertension      Fatty liver      Goiter diffuse, nontoxic      Hiatal hernia      History of gestational diabetes      History of MRSA infection 07/17/2017    Pilonidal cyst culture     Increased PTH level 11/4/2018     Morbid obesity with BMI of 40.0-44.9, adult (H)      RADHA on CPAP      Osteoarthritis      Paroxysmal SVT (supraventricular tachycardia) (H) 2011    ablation by Dr. Johnson in 2011     Positive result for methicillin resistant Staphylococcus aureus (MRSA) screening 11/4/2018     S/P hip replacement      Thiamine deficiency 11/4/2018     Type 2 diabetes mellitus (H)      Vitamin D deficiency 11/4/2018       Social History     Socioeconomic History     Marital status:      Spouse name: Not  on file     Number of children: 1     Years of education: Not on file     Highest education level: Not on file   Occupational History     Not on file   Tobacco Use     Smoking status: Former Smoker     Packs/day: 0.25     Years: 44.00     Pack years: 11.00     Types: Cigarettes, Cigarettes     Quit date: 2017     Years since quittin.9     Smokeless tobacco: Never Used     Tobacco comment: age 18 to age 62 up to 1/2 ppd   Substance and Sexual Activity     Alcohol use: Yes     Alcohol/week: 0.0 standard drinks     Comment: Alcoholic Drinks/day: < 1 drink a week     Drug use: No     Sexual activity: Not on file   Other Topics Concern     Not on file   Social History Narrative    Single but lives with her significant other, has one son, she is a .  She is a PCA.   Disability for LBP. Work injury.     Social Determinants of Health     Financial Resource Strain: Not on file   Food Insecurity: Not on file   Transportation Needs: Not on file   Physical Activity: Not on file   Stress: Not on file   Social Connections: Not on file   Intimate Partner Violence: Not on file   Housing Stability: Not on file          Allergies   Allergen Reactions     Latex, Natural Rubber [Latex] Hives     Penicillins Other (See Comments)     Passed out.      Doxycycline Dizziness     Eggshell Membrane [Egg Shells] Unknown     Erythromycin Itching     Ibuprofen      Naloxone Unknown     itching     Penicillamine Unknown     Pneumococcal 23-Valent Polysaccharide Vaccine [Pneumococcal Vaccine] Other (See Comments)     pneumonia     Statins-Hmg-Coa Reductase Inhibitors [Hmg-Coa-R Inhibitors] Muscle Pain (Myalgia)     Shaking with simvastatin and atorvastatin     Azithromycin Rash          Current Outpatient Medications:      acetaminophen (TYLENOL) 500 MG tablet, [ACETAMINOPHEN (TYLENOL) 500 MG TABLET] Take 1,000 mg by mouth 3 (three) times a day as needed., Disp: , Rfl:      allopurinol (ZYLOPRIM) 100 MG tablet, TAKE 1 TABLET(100  MG) BY MOUTH DAILY, Disp: 90 tablet, Rfl: 2     aspirin 81 MG EC tablet, [ASPIRIN 81 MG EC TABLET] Take 1 tablet (81 mg total) by mouth daily., Disp: , Rfl: 0     blood glucose meter (FREESTYLE LITE METER), [BLOOD GLUCOSE METER (FREESTYLE LITE METER)] Use 1 each As Directed 6 (six) times a day. Dispense glucometer brand per patient's insurance at pharmacy discretion., Disp: 1 each, Rfl: 0     blood glucose test strips, [BLOOD GLUCOSE TEST STRIPS] Use 1 each As Directed 6 (six) times a day. Prodigy Meter, Disp: 300 strip, Rfl: 3     celecoxib (CELEBREX) 200 MG capsule, Take 1 capsule by mouth 2 times daily, Disp: , Rfl:      cetirizine (ZYRTEC) 10 MG tablet, Take 1 tablet (10 mg) by mouth daily, Disp: 90 tablet, Rfl: 3     cholecalciferol, vitamin D3, (CHOLECALCIFEROL) 1,000 unit tablet, [CHOLECALCIFEROL, VITAMIN D3, (CHOLECALCIFEROL) 1,000 UNIT TABLET] Take 3,000 Units by mouth daily. , Disp: , Rfl:      cinnamon bark (CINNAMON ORAL), [CINNAMON BARK (CINNAMON ORAL)] Take 1 each by mouth 2 (two) times a day., Disp: , Rfl:      Continuous Blood Gluc Sensor (FREESTYLE MIRANDA 2 SENSOR) MISC, 1 each every 14 days 1 each every 14 days. Change every 14 days., Disp: 6 each, Rfl: 3     cyanocobalamin (VITAMIN B-12) 500 MCG tablet, [CYANOCOBALAMIN (VITAMIN B-12) 500 MCG TABLET] Take 500 mcg by mouth daily., Disp: , Rfl:      cyclobenzaprine (FLEXERIL) 10 MG tablet, [CYCLOBENZAPRINE (FLEXERIL) 10 MG TABLET] Take 1 tablet (10 mg total) by mouth at bedtime as needed for muscle spasms., Disp: 21 tablet, Rfl: 1     diclofenac (VOLTAREN) 1 % topical gel, Apply 2 g topically 4 times daily, Disp: 100 g, Rfl: 1     docusate sodium (COLACE) 100 MG capsule, Take 1 capsule (100 mg) by mouth 2 times daily, Disp: 100 capsule, Rfl: 1     famotidine (PEPCID) 40 MG tablet, [FAMOTIDINE (PEPCID) 40 MG TABLET] TAKE 1 TABLET(40 MG) BY MOUTH EVERY EVENING, Disp: 90 tablet, Rfl: 0     flash glucose sensor (FREESTYLE MIRANDA 14 DAY SENSOR) Kit, [FLASH  GLUCOSE SENSOR (FREESTYLE IMRANDA 14 DAY SENSOR) KIT] Use 1 each As Directed every 14 (fourteen) days., Disp: 2 kit, Rfl: 11     gabapentin (NEURONTIN) 300 MG capsule, Take 300 mg by mouth nightly as needed, Disp: 90 capsule, Rfl: 3     generic lancets (MICROLET LANCET), [GENERIC LANCETS (MICROLET LANCET)] Use 1 each As Directed 6 (six) times a day., Disp: 300 each, Rfl: 3     hydrochlorothiazide (MICROZIDE) 12.5 MG capsule, TAKE 1 CAPSULE(12.5 MG) BY MOUTH DAILY, Disp: 90 capsule, Rfl: 2     HYDROcodone-acetaminophen (NORCO) 5-325 MG tablet, Take 1 tablet by mouth 2 times daily as needed for pain, Disp: 50 tablet, Rfl: 0     hydrOXYzine (ATARAX) 10 MG tablet, Take 1 tablet (10 mg) by mouth every 4 hours as needed for itching, Disp: 20 tablet, Rfl: 1     insulin degludec (TRESIBA FLEXTOUCH) 200 UNIT/ML pen, Inject 86 Units Subcutaneous daily, Disp: , Rfl:      insulin lispro (HUMALOG KWIKPEN) 100 UNIT/ML (1 unit dial) KWIKPEN, Inject 15 Units Subcutaneous 3 times daily (before meals), Disp: 60 mL, Rfl: 1     lisinopril (ZESTRIL) 5 MG tablet, TAKE 1 AND 1/2 TABLETS(7.5 MG) BY MOUTH DAILY, Disp: 135 tablet, Rfl: 2     melatonin (MELATIN) 3 mg Tab tablet, [MELATONIN (MELATIN) 3 MG TAB TABLET] Take 3 mg by mouth at bedtime as needed., Disp: , Rfl:      nitroglycerin (NITROSTAT) 0.3 MG SL tablet, [NITROGLYCERIN (NITROSTAT) 0.3 MG SL TABLET] Place 1 tablet (0.3 mg total) under the tongue every 5 (five) minutes as needed for chest pain., Disp: 100 tablet, Rfl: 3     omega-3 acid ethyl esters (LOVAZA) 1 g capsule, TAKE 2 CAPSULES BY MOUTH TWICE DAILY, Disp: 360 capsule, Rfl: 1     omeprazole (PRILOSEC) 40 MG DR capsule, TAKE 1 CAPSULE BY MOUTH EVERY DAY, Disp: 90 capsule, Rfl: 3     ONETOUCH DELICA PLUS LANCET 33 gauge Misc, [ONETOUCH DELICA PLUS LANCET 33 [facility]GE [facility]] USE TO TEST BLOOD SUGAR 6 TIMES DAILY, Disp: 600 each, Rfl: 3     OZEMPIC, 1 MG/DOSE, 4 MG/3ML SOPN, INJECT 1MG UNDER THE SKIN ONCE EVERY 7 DAYS, Disp: 3 mL,  "Rfl: 3     pen needle, diabetic (BD ULTRA-FINE ZULMA PEN NEEDLE) 32 gauge x \" Ndle, [PEN NEEDLE, DIABETIC (BD ULTRA-FINE ZUMLA PEN NEEDLE) 32 GAUGE X \" NDLE] Inject 4 each under the skin daily. With Tresiba and Novolog, Disp: 120 each, Rfl: 3     rosuvastatin (CRESTOR) 10 MG tablet, [ROSUVASTATIN (CRESTOR) 10 MG TABLET] Take 1 tablet (10 mg total) by mouth daily., Disp: 90 tablet, Rfl: 3     Thiamine HCl (B-1) 100 MG TABS, TAKE 1 TABLET(100 MG) BY MOUTH DAILY, Disp: 90 tablet, Rfl: 1     triamcinolone (KENALOG) 0.1 % cream, [TRIAMCINOLONE (KENALOG) 0.1 % CREAM] Apply bid, Disp: 30 g, Rfl: 0     valACYclovir (VALTREX) 500 MG tablet, TAKE 1 TABLET(500 MG) BY MOUTH DAILY, Disp: 90 tablet, Rfl: 3     Family History   Problem Relation Age of Onset     Heart Disease Father         unknown type     Valvular heart disease Mother      Kidney Disease Mother      Hypertension Mother      Diabetes Mother      Leukemia Brother          at a young age     No Known Problems Son      Diabetes Paternal Aunt      Kidney Disease Sister      Heart Disease Sister      Other - See Comments Other 25.00        victim of a homicide     Breast Cancer No family hx of         Social History     Socioeconomic History     Marital status:      Spouse name: Not on file     Number of children: 1     Years of education: Not on file     Highest education level: Not on file   Occupational History     Not on file   Tobacco Use     Smoking status: Former Smoker     Packs/day: 0.25     Years: 44.00     Pack years: 11.00     Types: Cigarettes, Cigarettes     Quit date: 2017     Years since quittin.9     Smokeless tobacco: Never Used     Tobacco comment: age 18 to age 62 up to 1/2 ppd   Substance and Sexual Activity     Alcohol use: Yes     Alcohol/week: 0.0 standard drinks     Comment: Alcoholic Drinks/day: < 1 drink a week     Drug use: No     Sexual activity: Not on file   Other Topics Concern     Not on file   Social History " Narrative    Single but lives with her significant other, has one son, she is a .  She is a PCA.   Disability for LBP. Work injury.     Social Determinants of Health     Financial Resource Strain: Not on file   Food Insecurity: Not on file   Transportation Needs: Not on file   Physical Activity: Not on file   Stress: Not on file   Social Connections: Not on file   Intimate Partner Violence: Not on file   Housing Stability: Not on file        Review of Systems - Patient denies fever, chills, rash, wound, stiffness, limping, numbness, weakness, heart burn, blood in stool, chest pain with activity, calf pain when walking, shortness of breath with activity, chronic cough, easy bleeding/bruising, swelling of ankles, excessive thirst, fatigue, depression, anxiety.  Patient admits to numbness and tingling both feet..      OBJECTIVE:  Appearance: alert, well appearing, and in no distress.    There were no vitals taken for this visit.     There is no height or weight on file to calculate BMI.     General appearance: Patient is alert and fully cooperative with history & exam.  No sign of distress is noted during the visit.  Psychiatric: Affect is pleasant & appropriate.  Patient appears motivated to improve health.  Respiratory: Breathing is regular & unlabored while sitting.  HEENT: Hearing is intact to spoken word.  Speech is clear.  No gross evidence of visual impairment that would impact ambulation.    Vascular: Dorsalis pedis and posterior tibial pulses are palpable. There is no pedal hair growth bilaterally.  CFT < 3 sec from anterior tibial surface to distal digits bilaterally. There is no appreciable edema noted.  Dermatologic: Turgor and texture are within normal limits. No coloration or temperature changes. No primary or secondary lesions noted.  Neurologic: All epicritic and proprioceptive sensations are grossly diminished bilaterally.   Musculoskeletal: All active and passive ankle, subtalar, midtarsal, and  1st MPJ range of motion are grossly intact without pain or crepitus, with the exception of none. Manual muscle strength is within normal limits bilaterally. All dorsiflexors, plantarflexors, invertors, evertors are intact bilaterally.  No tenderness present to bilateral feet or ankles on palpation.  No  tenderness to bilateral feet or ankles with range of motion.  Severe flattening of the medial longitudinal arch noted bilaterally.  Calf is soft/non-tender without warmth/induration    Imaging:       No images are attached to the encounter or orders placed in the encounter.     No results found.   No results found.       Gary Olmstead DPM  [facility]         Again, thank you for allowing me to participate in the care of your patient.        Sincerely,        Gary Najera DPM

## 2022-01-11 NOTE — PROGRESS NOTES
FOOT AND ANKLE SURGERY/PODIATRY CONSULT NOTE         ASSESSMENT:   Pronation deformity  Diabetic neuropathy  Type 2 diabetes without complication      TREATMENT:  The patient was started on amitriptyline 25 mg at bedtime.  I have also recommended extra-depth diabetic shoes with molded insoles.        HPI:Liana Briceño today presented to the clinic today complaining of tingling and shooting pains in the bottom of both feet.  The patient stated that over the past year it has progressed and is now interfering with her ability to sleep.  It is more pronounced at night.  She does take gabapentin with some minimal relief.  She has not had any redness or swelling involving her feet.  She has no open wounds.  There are no factors which relieve her symptoms.    Past Medical History:   Diagnosis Date     Anemia      Breast cyst      Carpal tunnel syndrome      Coronary artery disease due to lipid rich plaque      Dyslipidemia, goal LDL below 70      Esophageal reflux      Essential hypertension      Fatty liver      Goiter diffuse, nontoxic      Hiatal hernia      History of gestational diabetes      History of MRSA infection 07/17/2017    Pilonidal cyst culture     Increased PTH level 11/4/2018     Morbid obesity with BMI of 40.0-44.9, adult (H)      RADHA on CPAP      Osteoarthritis      Paroxysmal SVT (supraventricular tachycardia) (H) 2011    ablation by Dr. Johnson in 2011     Positive result for methicillin resistant Staphylococcus aureus (MRSA) screening 11/4/2018     S/P hip replacement      Thiamine deficiency 11/4/2018     Type 2 diabetes mellitus (H)      Vitamin D deficiency 11/4/2018       Social History     Socioeconomic History     Marital status:      Spouse name: Not on file     Number of children: 1     Years of education: Not on file     Highest education level: Not on file   Occupational History     Not on file   Tobacco Use     Smoking status: Former Smoker     Packs/day: 0.25     Years: 44.00     Pack  years: 11.00     Types: Cigarettes, Cigarettes     Quit date: 2017     Years since quittin.9     Smokeless tobacco: Never Used     Tobacco comment: age 18 to age 62 up to 1/2 ppd   Substance and Sexual Activity     Alcohol use: Yes     Alcohol/week: 0.0 standard drinks     Comment: Alcoholic Drinks/day: < 1 drink a week     Drug use: No     Sexual activity: Not on file   Other Topics Concern     Not on file   Social History Narrative    Single but lives with her significant other, has one son, she is a .  She is a PCA.   Disability for LBP. Work injury.     Social Determinants of Health     Financial Resource Strain: Not on file   Food Insecurity: Not on file   Transportation Needs: Not on file   Physical Activity: Not on file   Stress: Not on file   Social Connections: Not on file   Intimate Partner Violence: Not on file   Housing Stability: Not on file          Allergies   Allergen Reactions     Latex, Natural Rubber [Latex] Hives     Penicillins Other (See Comments)     Passed out.      Doxycycline Dizziness     Eggshell Membrane [Egg Shells] Unknown     Erythromycin Itching     Ibuprofen      Naloxone Unknown     itching     Penicillamine Unknown     Pneumococcal 23-Valent Polysaccharide Vaccine [Pneumococcal Vaccine] Other (See Comments)     pneumonia     Statins-Hmg-Coa Reductase Inhibitors [Hmg-Coa-R Inhibitors] Muscle Pain (Myalgia)     Shaking with simvastatin and atorvastatin     Azithromycin Rash          Current Outpatient Medications:      acetaminophen (TYLENOL) 500 MG tablet, [ACETAMINOPHEN (TYLENOL) 500 MG TABLET] Take 1,000 mg by mouth 3 (three) times a day as needed., Disp: , Rfl:      allopurinol (ZYLOPRIM) 100 MG tablet, TAKE 1 TABLET(100 MG) BY MOUTH DAILY, Disp: 90 tablet, Rfl: 2     aspirin 81 MG EC tablet, [ASPIRIN 81 MG EC TABLET] Take 1 tablet (81 mg total) by mouth daily., Disp: , Rfl: 0     blood glucose meter (FREESTYLE LITE METER), [BLOOD GLUCOSE METER (FREESTYLE LITE  METER)] Use 1 each As Directed 6 (six) times a day. Dispense glucometer brand per patient's insurance at pharmacy discretion., Disp: 1 each, Rfl: 0     blood glucose test strips, [BLOOD GLUCOSE TEST STRIPS] Use 1 each As Directed 6 (six) times a day. Prodigy Meter, Disp: 300 strip, Rfl: 3     celecoxib (CELEBREX) 200 MG capsule, Take 1 capsule by mouth 2 times daily, Disp: , Rfl:      cetirizine (ZYRTEC) 10 MG tablet, Take 1 tablet (10 mg) by mouth daily, Disp: 90 tablet, Rfl: 3     cholecalciferol, vitamin D3, (CHOLECALCIFEROL) 1,000 unit tablet, [CHOLECALCIFEROL, VITAMIN D3, (CHOLECALCIFEROL) 1,000 UNIT TABLET] Take 3,000 Units by mouth daily. , Disp: , Rfl:      cinnamon bark (CINNAMON ORAL), [CINNAMON BARK (CINNAMON ORAL)] Take 1 each by mouth 2 (two) times a day., Disp: , Rfl:      Continuous Blood Gluc Sensor (FREESTYLE MIRANDA 2 SENSOR) MISC, 1 each every 14 days 1 each every 14 days. Change every 14 days., Disp: 6 each, Rfl: 3     cyanocobalamin (VITAMIN B-12) 500 MCG tablet, [CYANOCOBALAMIN (VITAMIN B-12) 500 MCG TABLET] Take 500 mcg by mouth daily., Disp: , Rfl:      cyclobenzaprine (FLEXERIL) 10 MG tablet, [CYCLOBENZAPRINE (FLEXERIL) 10 MG TABLET] Take 1 tablet (10 mg total) by mouth at bedtime as needed for muscle spasms., Disp: 21 tablet, Rfl: 1     diclofenac (VOLTAREN) 1 % topical gel, Apply 2 g topically 4 times daily, Disp: 100 g, Rfl: 1     docusate sodium (COLACE) 100 MG capsule, Take 1 capsule (100 mg) by mouth 2 times daily, Disp: 100 capsule, Rfl: 1     famotidine (PEPCID) 40 MG tablet, [FAMOTIDINE (PEPCID) 40 MG TABLET] TAKE 1 TABLET(40 MG) BY MOUTH EVERY EVENING, Disp: 90 tablet, Rfl: 0     flash glucose sensor (FREESTYLE MIRANDA 14 DAY SENSOR) Kit, [FLASH GLUCOSE SENSOR (FREESTYLE MIRANDA 14 DAY SENSOR) KIT] Use 1 each As Directed every 14 (fourteen) days., Disp: 2 kit, Rfl: 11     gabapentin (NEURONTIN) 300 MG capsule, Take 300 mg by mouth nightly as needed, Disp: 90 capsule, Rfl: 3     generic  "lancets (MICROLET LANCET), [GENERIC LANCETS (MICROLET LANCET)] Use 1 each As Directed 6 (six) times a day., Disp: 300 each, Rfl: 3     hydrochlorothiazide (MICROZIDE) 12.5 MG capsule, TAKE 1 CAPSULE(12.5 MG) BY MOUTH DAILY, Disp: 90 capsule, Rfl: 2     HYDROcodone-acetaminophen (NORCO) 5-325 MG tablet, Take 1 tablet by mouth 2 times daily as needed for pain, Disp: 50 tablet, Rfl: 0     hydrOXYzine (ATARAX) 10 MG tablet, Take 1 tablet (10 mg) by mouth every 4 hours as needed for itching, Disp: 20 tablet, Rfl: 1     insulin degludec (TRESIBA FLEXTOUCH) 200 UNIT/ML pen, Inject 86 Units Subcutaneous daily, Disp: , Rfl:      insulin lispro (HUMALOG KWIKPEN) 100 UNIT/ML (1 unit dial) KWIKPEN, Inject 15 Units Subcutaneous 3 times daily (before meals), Disp: 60 mL, Rfl: 1     lisinopril (ZESTRIL) 5 MG tablet, TAKE 1 AND 1/2 TABLETS(7.5 MG) BY MOUTH DAILY, Disp: 135 tablet, Rfl: 2     melatonin (MELATIN) 3 mg Tab tablet, [MELATONIN (MELATIN) 3 MG TAB TABLET] Take 3 mg by mouth at bedtime as needed., Disp: , Rfl:      nitroglycerin (NITROSTAT) 0.3 MG SL tablet, [NITROGLYCERIN (NITROSTAT) 0.3 MG SL TABLET] Place 1 tablet (0.3 mg total) under the tongue every 5 (five) minutes as needed for chest pain., Disp: 100 tablet, Rfl: 3     omega-3 acid ethyl esters (LOVAZA) 1 g capsule, TAKE 2 CAPSULES BY MOUTH TWICE DAILY, Disp: 360 capsule, Rfl: 1     omeprazole (PRILOSEC) 40 MG DR capsule, TAKE 1 CAPSULE BY MOUTH EVERY DAY, Disp: 90 capsule, Rfl: 3     ONETOUCH DELICA PLUS LANCET 33 gauge Misc, [ONETOUCH DELICA PLUS LANCET 33 GAUGE MISC] USE TO TEST BLOOD SUGAR 6 TIMES DAILY, Disp: 600 each, Rfl: 3     OZEMPIC, 1 MG/DOSE, 4 MG/3ML SOPN, INJECT 1MG UNDER THE SKIN ONCE EVERY 7 DAYS, Disp: 3 mL, Rfl: 3     pen needle, diabetic (BD ULTRA-FINE ZULMA PEN NEEDLE) 32 gauge x 5/32\" Ndle, [PEN NEEDLE, DIABETIC (BD ULTRA-FINE ZULMA PEN NEEDLE) 32 GAUGE X 5/32\" NDLE] Inject 4 each under the skin daily. With Tresiba and Novolog, Disp: 120 each, " Rfl: 3     rosuvastatin (CRESTOR) 10 MG tablet, [ROSUVASTATIN (CRESTOR) 10 MG TABLET] Take 1 tablet (10 mg total) by mouth daily., Disp: 90 tablet, Rfl: 3     Thiamine HCl (B-1) 100 MG TABS, TAKE 1 TABLET(100 MG) BY MOUTH DAILY, Disp: 90 tablet, Rfl: 1     triamcinolone (KENALOG) 0.1 % cream, [TRIAMCINOLONE (KENALOG) 0.1 % CREAM] Apply bid, Disp: 30 g, Rfl: 0     valACYclovir (VALTREX) 500 MG tablet, TAKE 1 TABLET(500 MG) BY MOUTH DAILY, Disp: 90 tablet, Rfl: 3     Family History   Problem Relation Age of Onset     Heart Disease Father         unknown type     Valvular heart disease Mother      Kidney Disease Mother      Hypertension Mother      Diabetes Mother      Leukemia Brother          at a young age     No Known Problems Son      Diabetes Paternal Aunt      Kidney Disease Sister      Heart Disease Sister      Other - See Comments Other 25.00        victim of a homicide     Breast Cancer No family hx of         Social History     Socioeconomic History     Marital status:      Spouse name: Not on file     Number of children: 1     Years of education: Not on file     Highest education level: Not on file   Occupational History     Not on file   Tobacco Use     Smoking status: Former Smoker     Packs/day: 0.25     Years: 44.00     Pack years: 11.00     Types: Cigarettes, Cigarettes     Quit date: 2017     Years since quittin.9     Smokeless tobacco: Never Used     Tobacco comment: age 18 to age 62 up to 1/2 ppd   Substance and Sexual Activity     Alcohol use: Yes     Alcohol/week: 0.0 standard drinks     Comment: Alcoholic Drinks/day: < 1 drink a week     Drug use: No     Sexual activity: Not on file   Other Topics Concern     Not on file   Social History Narrative    Single but lives with her significant other, has one son, she is a .  She is a PCA.   Disability for LBP. Work injury.     Social Determinants of Health     Financial Resource Strain: Not on file   Food Insecurity: Not on  file   Transportation Needs: Not on file   Physical Activity: Not on file   Stress: Not on file   Social Connections: Not on file   Intimate Partner Violence: Not on file   Housing Stability: Not on file        Review of Systems - Patient denies fever, chills, rash, wound, stiffness, limping, numbness, weakness, heart burn, blood in stool, chest pain with activity, calf pain when walking, shortness of breath with activity, chronic cough, easy bleeding/bruising, swelling of ankles, excessive thirst, fatigue, depression, anxiety.  Patient admits to numbness and tingling both feet..      OBJECTIVE:  Appearance: alert, well appearing, and in no distress.    There were no vitals taken for this visit.     There is no height or weight on file to calculate BMI.     General appearance: Patient is alert and fully cooperative with history & exam.  No sign of distress is noted during the visit.  Psychiatric: Affect is pleasant & appropriate.  Patient appears motivated to improve health.  Respiratory: Breathing is regular & unlabored while sitting.  HEENT: Hearing is intact to spoken word.  Speech is clear.  No gross evidence of visual impairment that would impact ambulation.    Vascular: Dorsalis pedis and posterior tibial pulses are palpable. There is no pedal hair growth bilaterally.  CFT < 3 sec from anterior tibial surface to distal digits bilaterally. There is no appreciable edema noted.  Dermatologic: Turgor and texture are within normal limits. No coloration or temperature changes. No primary or secondary lesions noted.  Neurologic: All epicritic and proprioceptive sensations are grossly diminished bilaterally.   Musculoskeletal: All active and passive ankle, subtalar, midtarsal, and 1st MPJ range of motion are grossly intact without pain or crepitus, with the exception of none. Manual muscle strength is within normal limits bilaterally. All dorsiflexors, plantarflexors, invertors, evertors are intact bilaterally.  No  tenderness present to bilateral feet or ankles on palpation.  No  tenderness to bilateral feet or ankles with range of motion.  Severe flattening of the medial longitudinal arch noted bilaterally.  Calf is soft/non-tender without warmth/induration    Imaging:       No images are attached to the encounter or orders placed in the encounter.     No results found.   No results found.       Gary Olmstead DPM  [facility] Foot & Ankle Surgery/Podiatry

## 2022-01-11 NOTE — PATIENT INSTRUCTIONS
What are Prescription Custom Orthotics?  Custom orthotics are specially-made devices designed to support and comfort your feet. Prescription orthotics are crafted for you and no one else. They match the contours of your feet precisely and are designed for the way you move. Orthotics are only manufactured after a podiatrist has conducted a complete evaluation of your feet, ankles, and legs, so the orthotic can accommodate your unique foot structure and pathology.  Prescription orthotics are divided into two categories:    Functional orthotics are designed to control abnormal motion. They may be used to treat foot pain caused by abnormal motion; they can also be used to treat injuries such as shin splints or tendinitis. Functional orthotics are usually crafted of a semi-rigid material such as plastic or graphite.    Accommodative orthotics are softer and meant to provide additional cushioning and support. They can be used to treat diabetic foot ulcers, painful calluses on the bottom of the foot, and other uncomfortable conditions.  Podiatrists use orthotics to treat foot problems such as plantar fasciitis, bursitis, tendinitis, diabetic foot ulcers, and foot, ankle, and heel pain. Clinical research studies have shown that podiatrist-prescribed foot orthotics decrease foot pain and improve function.  Orthotics typically cost more than shoe inserts purchased in a retail store, but the additional cost is usually well worth it. Unlike shoe inserts, orthotics are molded to fit each individual foot, so you can be sure that your orthotics fit and do what they're supposed to do. Prescription orthotics are also made of top-notch materials and last many years when cared for properly. Insurance often helps pay for prescription orthotics.  What are Shoe Inserts?   You've seen them at the grocery store and at the mall. You've probably even seen them on TV and online. Shoe inserts are any kind of non-prescription foot support  designed to be worn inside a shoe. Pre-packaged, mass produced, arch supports are shoe inserts. So are the  custom-made  insoles and foot supports that you can order online or at retail stores. Unless the device has been prescribed by a doctor and crafted for your specific foot, it's a shoe insert, not a custom orthotic device--despite what the ads might say.  Shoe inserts can be very helpful for a variety of foot ailments, including flat arches and foot and leg pain. They can cushion your feet, provide comfort, and support your arches, but they can't correct biomechanical foot problems or cure long-standing foot issues.  The most common types of shoe inserts are:    Arch supports: Some people have high arches. Others have low arches or flat feet. Arch supports generally have a  bumped-up  appearance and are designed to support the foot's natural arch.     Insoles: Insoles slip into your shoe to provide extra cushioning and support. Insoles are often made of gel, foam, or plastic.     Heel liners: Heel liners, sometimes called heel pads or heel cups, provide extra cushioning in the heel region. They may be especially useful for patients who have foot pain caused by age-related thinning of the heels' natural fat pads.     Foot cushions: Do your shoes rub against your heel or your toes? Foot cushions come in many different shapes and sizes and can be used as a barrier between you and your shoe.  Choosing an Over-the-Counter Shoe Insert  Selecting a shoe insert from the wide variety of devices on the market can be overwhelming. Here are some podiatrist-tested tips to help you find the insert that best meets your needs:    Consider your health. Do you have diabetes? Problems with circulation? An over-the-counter insert may not be your best bet. Diabetes and poor circulation increase your risk of foot ulcers and infections, so schedule an appointment with a podiatrist. He or she can help you select a solution that won't  cause additional health problems.     Think about the purpose. Are you planning to run a marathon, or do you just need a little arch support in your work shoes? Look for a product that fits your planned level of activity.     Bring your shoes. For the insert to be effective, it has to fit into your shoes. So bring your sneakers, dress shoes, or work boots--whatever you plan to wear with your insert. Look for an insert that will fit the contours of your shoe.     Try them on. If all possible, slip the insert into your shoe and try it out. Walk around a little. How does it feel? Don't assume that feelings of pressure will go away with continued wear. (If you can't try the inserts at the store, ask about the store's return policy and hold on to your receipt.)    Please call one of the [facility] locations below to schedule an appointment. If you received a prescription please bring it with you to your appointment. Some locations are limited to what they carry.    Office Locations    [facility]  [address] [place] [address]  Home Medical Equipment, [address]   Phone: [phone]   Orthotics and Prosthetics, [address]   Phone: [phone]    [facility]  Home Medical Equipment  [address] [address]   Phone: [phone]    Orthotics and Prosthetics ([facility])    [address], [address], [place] [address]  Phone: [phone]    [facility]  [address]   [place] [address]   Phone: [phone]    [facility]  [address]  [place] [address]  Phone: [phone]    [facility]   [address]  [place] [address]  Phone: [phone]    [facility]  [address]  [place] [address]  Phone: [phone]    [facility]  [address]  [place] [address]  Phone: [phone]    [address] [place] [address]   Phone: [phone]

## 2022-01-19 ENCOUNTER — MYC MEDICAL ADVICE (OUTPATIENT)
Dept: INTERNAL MEDICINE | Facility: CLINIC | Age: 67
End: 2022-01-19
Payer: COMMERCIAL

## 2022-01-19 DIAGNOSIS — M54.50 CHRONIC BILATERAL LOW BACK PAIN WITHOUT SCIATICA: ICD-10-CM

## 2022-01-19 DIAGNOSIS — M99.53 INTERVERTEBRAL DISC STENOSIS OF NEURAL CANAL OF LUMBAR REGION: ICD-10-CM

## 2022-01-19 DIAGNOSIS — G89.29 CHRONIC BILATERAL LOW BACK PAIN WITHOUT SCIATICA: ICD-10-CM

## 2022-01-19 DIAGNOSIS — M25.569 ACUTE KNEE PAIN, UNSPECIFIED LATERALITY: ICD-10-CM

## 2022-01-20 RX ORDER — HYDROCODONE BITARTRATE AND ACETAMINOPHEN 5; 325 MG/1; MG/1
1 TABLET ORAL 2 TIMES DAILY PRN
Qty: 50 TABLET | Refills: 0 | Status: SHIPPED | OUTPATIENT
Start: 2022-01-20 | End: 2022-02-23

## 2022-01-20 NOTE — TELEPHONE ENCOUNTER
Medication: Hydrocodone-Acetaminophen  Last Date Filled 12/3/21   pulled: PROVIDER TO PULL FROM Epic.   Only PCP Prescribing? PROVIDER TO PULL  FROM Epic.    Taken as prescribed from physician notes? YES    CSA in last year: NO  Random Utox in last year: NO  (if any of the above answer NO - schedule with PCP)     On opioids in addition to benzodiazepines? NO  (if the above answer YES - schedule with PCP every 6 months)     Is patient on the Executive Review Team? no    All responses suggest: Refilling prescription

## 2022-01-26 ENCOUNTER — TELEPHONE (OUTPATIENT)
Dept: INTERNAL MEDICINE | Facility: CLINIC | Age: 67
End: 2022-01-26

## 2022-01-26 DIAGNOSIS — Z79.4 TYPE 2 DIABETES MELLITUS WITH HYPERGLYCEMIA, WITH LONG-TERM CURRENT USE OF INSULIN (H): ICD-10-CM

## 2022-01-26 DIAGNOSIS — E11.65 TYPE 2 DIABETES MELLITUS WITH HYPERGLYCEMIA, WITH LONG-TERM CURRENT USE OF INSULIN (H): ICD-10-CM

## 2022-01-26 RX ORDER — FLASH GLUCOSE SENSOR
1 KIT MISCELLANEOUS
Qty: 4 EACH | Refills: 3 | Status: SHIPPED | OUTPATIENT
Start: 2022-01-26 | End: 2022-04-05

## 2022-01-26 NOTE — TELEPHONE ENCOUNTER
Reason for Call: Request for an order or referral:    Order or referral being requested:   Order to be sent to:  [facility] supplies  For a -  [employer] Freestyle sensors  Fax to:  [phone]    Date needed: as soon as possible    Has the patient been seen by the PCP for this problem? YES    Additional comments: pt is completely out of the sensors    Phone number Patient can be reached at:  Cell number on file:    Telephone Information:   Mobile [phone]       Best Time:  anytime    Can we leave a detailed message on this number?  YES    Call taken on 1/26/2022 at 12:16 PM by Raquel Blackwell

## 2022-01-31 DIAGNOSIS — Z79.4 TYPE 2 DIABETES MELLITUS WITH HYPERGLYCEMIA, WITH LONG-TERM CURRENT USE OF INSULIN (H): ICD-10-CM

## 2022-01-31 DIAGNOSIS — E11.65 TYPE 2 DIABETES MELLITUS WITH HYPERGLYCEMIA, WITH LONG-TERM CURRENT USE OF INSULIN (H): ICD-10-CM

## 2022-01-31 RX ORDER — INSULIN LISPRO 100 [IU]/ML
15 INJECTION, SOLUTION INTRAVENOUS; SUBCUTANEOUS
Qty: 45 ML | Refills: 3 | Status: SHIPPED | OUTPATIENT
Start: 2022-01-31 | End: 2022-02-08

## 2022-01-31 NOTE — TELEPHONE ENCOUNTER
Per previous [employer] messages that she is currently taking 15 units of Humalog three times daily. Updated prescription and sent in refill per CPA.

## 2022-01-31 NOTE — TELEPHONE ENCOUNTER
Pt is calling as she is on her last pen.,  She thought she had refills at the pharm.  She takes it 3 times a day.

## 2022-02-01 NOTE — PROGRESS NOTES
Briefly spoke with patient. She was in a meeting and unable to talk. Pt will call back later.   Subjective:       Patient ID: Júnior Torres is a 73 y.o. male.    Chief Complaint: Follow-up (Pt is here for tendinitis in his right elbow. Pt also complains of congestion.)    HPI     Chronic sinusitis--seems to be more flared of late. 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 He has been using claritin-D, Nyquil w/ limited improvement.     C/o right elbow pain, he has been using a sleeve and exercising. Some improvement in pain this morning.       Review of Systems   Constitutional: Negative for chills, fatigue and fever.   HENT: Positive for congestion and sinus pressure.    Respiratory: Negative for cough, shortness of breath and wheezing.    Cardiovascular: Negative for chest pain and palpitations.   Gastrointestinal: Negative for abdominal pain, nausea and vomiting.   Musculoskeletal: Positive for arthralgias. Negative for joint swelling and myalgias.           Past Medical History:  Past Medical History:   Diagnosis Date    Allergy     Anxiety     Colon cancer     Depression     GERD (gastroesophageal reflux disease)     Hearing loss     Hypertension     Hyponatremia     Macular degeneration     right eye    Malignant tumor of colon     Neck pain     Peripheral venous insufficiency       Past Surgical History:   Procedure Laterality Date    BOWEL RESECTION      small intestine-colon cancer    STAB PHLEBECTOMY OF VARICOSE VEINS Bilateral       Review of patient's allergies indicates:  No Known Allergies   Current Outpatient Medications   Medication Sig Dispense Refill    ALPRAZolam (XANAX) 0.5 MG tablet Take 0.5 mg by mouth every 6 (six) hours as needed.  4    budesonide (RINOCORT AQUA) 32 mcg/actuation nasal spray 1 spray (32 mcg total) by Nasal route once daily. 8.6 mL 0    cyanocobalamin 1,000 mcg/mL injection Inject 1 mL (1,000 mcg total) into the muscle every 30 days. 10 mL 1    cyclobenzaprine (FLEXERIL) 10 MG tablet Take 1 tablet (10 mg total) by mouth 3 (three) times daily  as needed. 90 tablet 5    GUAIATUSSIN AC  mg/5 mL syrup TAKE 5 MILLILITERS BY MOUTH EVERY 4 HOURS AS NEEDED FOR COUGH 240 mL 0    HYDROcodone-acetaminophen (NORCO)  mg per tablet Take 1 tablet by mouth every 8 (eight) hours as needed.  0    ibuprofen (ADVIL,MOTRIN) 800 MG tablet TAKE 1 TABLET BY MOUTH THREE TIMES A DAY 90 tablet 1    LORazepam (ATIVAN) 1 MG tablet Take 1 tablet by mouth daily as needed.      metoprolol ta-hydrochlorothiaz (LOPRESSOR HCT) 50-25 mg per tablet Take 1 tablet by mouth once daily. 90 tablet 3    multivitamin (THERAGRAN) per tablet Take 1 tablet by mouth once daily.      mv,julio c,iron,mn/folic acid/chol (HAIR-SKIN-NAILS, PABA, ORAL) Take 1 tablet by mouth once daily.      om 3/E/linol/ala/oleic/gla/lip (OMEGA 3-6-9 ORAL) Take 1,200 mg by mouth once daily.      promethazine (PHENERGAN) 25 MG tablet TAKE 1 TABLET BY MOUTH EVERY 4 HOURS AS NEEDED 30 tablet 3    simvastatin (ZOCOR) 40 MG tablet Take 40 mg by mouth once daily.  3    testosterone cypionate (DEPOTESTOTERONE CYPIONATE) 200 mg/mL injection Inject 0.5 mLs into the muscle once a week.  5    traZODone (DESYREL) 50 MG tablet TAKE 1 TABLET BY MOUTH EVERYDAY AT BEDTIME 90 tablet 3    ubidecarenone (COQ-10 ORAL) Take 1 capsule by mouth once daily.      vitamin D (VITAMIN D3) 1000 units Tab Take 1,000 Units by mouth once daily.      levocetirizine (XYZAL) 5 MG tablet Take 1 tablet (5 mg total) by mouth every evening. 30 tablet 2    methylPREDNISolone (MEDROL DOSEPACK) 4 mg tablet TAKE 6 TABLETS ON DAY 1 AS DIRECTED ON PACKAGE AND DECREASE BY 1 TAB EACH DAY FOR A TOTAL OF 6 DAYS 21 each 0    sulfamethoxazole-trimethoprim 800-160mg (BACTRIM DS) 800-160 mg Tab TAKE 1 TABLET BY MOUTH TWICE A DAY 14 tablet 0     No current facility-administered medications for this visit.     Social History     Socioeconomic History    Marital status:      Spouse name: Yue   Occupational History    Occupation: Dealer      Comment: [employer]   Tobacco Use    Smoking status: Never Smoker    Smokeless tobacco: Never Used   Substance and Sexual Activity    Alcohol use: Not Currently    Drug use: Not Currently      Family History   Problem Relation Age of Onset    Cancer Mother     Heart disease Father     Stroke Father     No Known Problems Sister         Objective:      Physical Exam  Constitutional:       Appearance: He is well-developed.   HENT:      Head: Normocephalic and atraumatic.      Nose: Congestion present.      Mouth/Throat:      Mouth: Mucous membranes are moist.      Pharynx: Oropharynx is clear.   Eyes:      General: No scleral icterus.     Conjunctiva/sclera: Conjunctivae normal.   Neck:      Trachea: Trachea normal.   Cardiovascular:      Rate and Rhythm: Normal rate and regular rhythm.   Pulmonary:      Effort: Pulmonary effort is normal.      Breath sounds: Normal breath sounds.   Abdominal:      General: Bowel sounds are normal.      Palpations: Abdomen is soft.   Musculoskeletal:         General: Swelling and tenderness (mild tenderness, Rt elbow) present. Normal range of motion.      Cervical back: Normal range of motion and neck supple.   Skin:     General: Skin is warm and dry.   Neurological:      Mental Status: He is alert and oriented to person, place, and time.   Psychiatric:         Mood and Affect: Mood normal.         Speech: Speech normal.         Behavior: Behavior normal.         Assessment:     1. Right elbow tendonitis    2. Acute recurrent sinusitis, unspecified location      Plan:       PROBLEM LIST     Right elbow tendonitis    Acute recurrent sinusitis, unspecified location    start ibuprofen 800 mg TID x 1 week. Continue compression dsg. If no improvement, xrays will ordered and referral to ortho will be sent for intra-articular injection

## 2022-02-08 ENCOUNTER — TELEPHONE (OUTPATIENT)
Dept: PHARMACY | Facility: CLINIC | Age: 67
End: 2022-02-08
Payer: COMMERCIAL

## 2022-02-08 DIAGNOSIS — Z79.4 TYPE 2 DIABETES MELLITUS WITH HYPERGLYCEMIA, WITH LONG-TERM CURRENT USE OF INSULIN (H): ICD-10-CM

## 2022-02-08 DIAGNOSIS — G62.9 NEUROPATHY: Primary | ICD-10-CM

## 2022-02-08 DIAGNOSIS — E11.65 TYPE 2 DIABETES MELLITUS WITH HYPERGLYCEMIA, WITH LONG-TERM CURRENT USE OF INSULIN (H): ICD-10-CM

## 2022-02-08 RX ORDER — INSULIN LISPRO 100 [IU]/ML
10-12 INJECTION, SOLUTION INTRAVENOUS; SUBCUTANEOUS
Qty: 45 ML | Refills: 3 | COMMUNITY
Start: 2022-02-08 | End: 2022-06-23

## 2022-02-08 RX ORDER — GABAPENTIN 100 MG/1
100-300 CAPSULE ORAL
Qty: 30 CAPSULE | Refills: 1 | Status: SHIPPED | OUTPATIENT
Start: 2022-02-08 | End: 2022-04-05

## 2022-02-08 NOTE — TELEPHONE ENCOUNTER
Discussed concerns about 5 episodes of hypoglycemia over the last 2 weeks.  These happen at different times per day.  She is down to 76 units of Tresiba daily.  Reviewed previous dose decreases with minimal impact on blood sugars therefore recommend to decrease Tresiba down to 65 units once daily starting today.  This should get rid of her episodes of hypoglycemia.  Blood sugars are very stable therefore recommend to continue current dosing of Humalog 10 units 3 times daily with meals.  She continues to experience neuropathy at bedtime.  She does not find diclofenac gel to be effective, she actually had symptomatic worsening with most recent use.  Gabapentin is very effective however makes her very groggy the next morning after use.  I encouraged her to try at lower dose gabapentin, starting at 100 mg at bedtime to see if she tolerates these lower doses.  She is willing to try this.  If not we may consider topical lidocaine product.    Plan:   1. Decrease Tresiba to 65 units once daily   2. Decrease gabapentin to 100-300mg at bedtime as needed for neuropathy

## 2022-02-09 DIAGNOSIS — E11.65 TYPE 2 DIABETES MELLITUS WITH HYPERGLYCEMIA, WITH LONG-TERM CURRENT USE OF INSULIN (H): ICD-10-CM

## 2022-02-09 DIAGNOSIS — Z79.4 TYPE 2 DIABETES MELLITUS WITH HYPERGLYCEMIA, WITH LONG-TERM CURRENT USE OF INSULIN (H): ICD-10-CM

## 2022-02-13 NOTE — TELEPHONE ENCOUNTER
"Routing refill request to provider for review/approval because:  Due to medication information not transferring due to [facility] please review the medication information prior to signing to ensure accuracy.    Last Written Prescription:      Last office visit provider:  12/29/21    Requested Prescriptions   Pending Prescriptions Disp Refills     TRESIBA FLEXTOUCH 200 UNIT/ML pen [Pharmacy Med Name: TRESIBA FLEXTOUCH PEN(U-200)INJ 3ML] 63 mL      Sig: ADMINISTER 136 UNITS UNDER THE SKIN DAILY       Long Acting Insulin Protocol Passed - 2/9/2022  4:26 PM        Passed - Serum creatinine on file in past 12 months     Recent Labs   Lab Test 10/15/21  1302 06/28/21  1731   CR 0.89  --    CREAT  --  1.0       Ok to refill medication if creatinine is low          Passed - HgbA1C in past 3 or 6 months     If HgbA1C is 8 or greater, it needs to be on file within the past 3 months.  If less than 8, must be on file within the past 6 months.     Recent Labs   Lab Test 10/15/21  1302   A1C 7.9*             Passed - Medication is active on med list        Passed - Patient is age 18 or older        Passed - Recent (6 mo) or future (30 days) visit within the authorizing provider's specialty     Patient had office visit in the last 6 months or has a visit in the next 30 days with authorizing provider or within the authorizing provider's specialty.  See \"Patient Info\" tab in inbasket, or \"Choose Columns\" in Meds & Orders section of the refill encounter.                 Eloisa Garcia RN 02/12/22 6:08 PM  "

## 2022-02-14 RX ORDER — INSULIN DEGLUDEC 200 U/ML
INJECTION, SOLUTION SUBCUTANEOUS
Qty: 63 ML | Refills: 1 | Status: SHIPPED | OUTPATIENT
Start: 2022-02-14 | End: 2022-02-25

## 2022-02-28 DIAGNOSIS — Z11.59 ENCOUNTER FOR SCREENING FOR OTHER VIRAL DISEASES: Primary | ICD-10-CM

## 2022-03-08 ENCOUNTER — MYC MEDICAL ADVICE (OUTPATIENT)
Dept: PHARMACY | Facility: CLINIC | Age: 67
End: 2022-03-08
Payer: COMMERCIAL

## 2022-03-09 NOTE — TELEPHONE ENCOUNTER
Addressed questions by phone. She has had a cold and wanted to know if there was an antibiotic that she has tolerated/can take. Reviewed that she tolerated bactrim this last Fall, but if she is improving to consider holding off on antibiotics.

## 2022-03-28 DIAGNOSIS — E11.65 TYPE 2 DIABETES MELLITUS WITH HYPERGLYCEMIA, WITH LONG-TERM CURRENT USE OF INSULIN (H): ICD-10-CM

## 2022-03-28 DIAGNOSIS — Z79.4 TYPE 2 DIABETES MELLITUS WITH HYPERGLYCEMIA, WITH LONG-TERM CURRENT USE OF INSULIN (H): ICD-10-CM

## 2022-03-29 NOTE — PROVIDER NOTIFICATION
Discharge plan according to [facility]:       03/18/22 0537   Discharge Planning   Patient/Family Anticipates Transition to home   Concerns to be Addressed all concerns addressed in this encounter   Living Arrangements   People in Home spouse   Type of Residence Private Residence   Is your private residence a single family home or apartment? Single family home   Stair Railings, Within Home, Primary railings safe and in good condition   Bathroom Shower/Tub Tub/Shower unit   Equipment Currently Used at Home none   Support System   Support Systems Spouse/Significant Other   Do you have someone available to stay with you one or two nights once you are home? No

## 2022-03-30 ENCOUNTER — OFFICE VISIT (OUTPATIENT)
Dept: FAMILY MEDICINE | Facility: CLINIC | Age: 67
End: 2022-03-30
Payer: COMMERCIAL

## 2022-03-30 VITALS
HEART RATE: 85 BPM | RESPIRATION RATE: 20 BRPM | DIASTOLIC BLOOD PRESSURE: 79 MMHG | SYSTOLIC BLOOD PRESSURE: 129 MMHG | WEIGHT: 260 LBS | BODY MASS INDEX: 41.97 KG/M2 | OXYGEN SATURATION: 94 % | TEMPERATURE: 98.7 F

## 2022-03-30 DIAGNOSIS — N18.30 TYPE 2 DIABETES MELLITUS WITH STAGE 3 CHRONIC KIDNEY DISEASE, WITHOUT LONG-TERM CURRENT USE OF INSULIN, UNSPECIFIED WHETHER STAGE 3A OR 3B CKD (H): ICD-10-CM

## 2022-03-30 DIAGNOSIS — E11.22 TYPE 2 DIABETES MELLITUS WITH STAGE 3 CHRONIC KIDNEY DISEASE, WITHOUT LONG-TERM CURRENT USE OF INSULIN, UNSPECIFIED WHETHER STAGE 3A OR 3B CKD (H): ICD-10-CM

## 2022-03-30 DIAGNOSIS — J01.00 ACUTE NON-RECURRENT MAXILLARY SINUSITIS: ICD-10-CM

## 2022-03-30 DIAGNOSIS — Z11.52 ENCOUNTER FOR SCREENING FOR COVID-19: ICD-10-CM

## 2022-03-30 DIAGNOSIS — J98.01 ACUTE BRONCHOSPASM: Primary | ICD-10-CM

## 2022-03-30 LAB — SARS-COV-2 RNA RESP QL NAA+PROBE: NEGATIVE

## 2022-03-30 PROCEDURE — U0005 INFEC AGEN DETEC AMPLI PROBE: HCPCS | Performed by: PHYSICIAN ASSISTANT

## 2022-03-30 PROCEDURE — U0003 INFECTIOUS AGENT DETECTION BY NUCLEIC ACID (DNA OR RNA); SEVERE ACUTE RESPIRATORY SYNDROME CORONAVIRUS 2 (SARS-COV-2) (CORONAVIRUS DISEASE [COVID-19]), AMPLIFIED PROBE TECHNIQUE, MAKING USE OF HIGH THROUGHPUT TECHNOLOGIES AS DESCRIBED BY CMS-2020-01-R: HCPCS | Performed by: PHYSICIAN ASSISTANT

## 2022-03-30 PROCEDURE — 99214 OFFICE O/P EST MOD 30 MIN: CPT | Mod: CS | Performed by: PHYSICIAN ASSISTANT

## 2022-03-30 RX ORDER — ALBUTEROL SULFATE 90 UG/1
2 AEROSOL, METERED RESPIRATORY (INHALATION) EVERY 6 HOURS
Qty: 18 G | Refills: 0 | Status: SHIPPED | OUTPATIENT
Start: 2022-03-30 | End: 2022-06-24

## 2022-03-30 RX ORDER — LEVOFLOXACIN 500 MG/1
500 TABLET, FILM COATED ORAL DAILY
Qty: 7 TABLET | Refills: 0 | Status: SHIPPED | OUTPATIENT
Start: 2022-03-30 | End: 2022-04-05

## 2022-03-30 RX ORDER — BENZONATATE 100 MG/1
100 CAPSULE ORAL 3 TIMES DAILY PRN
Qty: 30 CAPSULE | Refills: 0 | Status: SHIPPED | OUTPATIENT
Start: 2022-03-30 | End: 2022-04-05

## 2022-03-30 RX ORDER — FLUTICASONE PROPIONATE 50 MCG
1 SPRAY, SUSPENSION (ML) NASAL DAILY
Qty: 9.9 ML | Refills: 0 | Status: SHIPPED | OUTPATIENT
Start: 2022-03-30 | End: 2022-04-29

## 2022-03-30 RX ORDER — CETIRIZINE HYDROCHLORIDE 10 MG/1
10 TABLET ORAL DAILY
Qty: 14 TABLET | Refills: 0 | Status: SHIPPED | OUTPATIENT
Start: 2022-03-30 | End: 2022-10-12

## 2022-03-30 RX ORDER — SEMAGLUTIDE 1.34 MG/ML
INJECTION, SOLUTION SUBCUTANEOUS
Qty: 3 ML | Refills: 3 | Status: SHIPPED | OUTPATIENT
Start: 2022-03-30 | End: 2022-08-10

## 2022-03-30 NOTE — PROGRESS NOTES
Patient presents with:  Sinus Problem: sinuses plugged cough sneezing ringing in the ears and a lump in neck. Symptoms started on Saturday.      Clinical Decision Making:  Patient has known type 2 diabetes and will be treated with an antibiotic for her sinusitis and cough.  Patient has elevated blood glucose levels at this time and we discussed the selection of the antibiotic based on her allergies which were extensive.  I informed the patient the fluoroquinolones can cause hypoglycemia.  However since her blood sugars are high she will monitor her blood sugars at home.  Risks and benefits of the medication were gone over.  Patient was instructed to discontinue use of cold and sinus or decongestants which may elevate her blood pressure and interact with her medicines. Expected course of resolution and indication for return was gone over and questions were answered to patient/parent's satisfaction before discharge.          ICD-10-CM    1. Acute bronchospasm  J98.01 albuterol (PROAIR HFA/PROVENTIL HFA/VENTOLIN HFA) 108 (90 Base) MCG/ACT inhaler     benzonatate (TESSALON) 100 MG capsule   2. Type 2 diabetes mellitus with stage 3 chronic kidney disease, without long-term current use of insulin, unspecified whether stage 3a or 3b CKD (H)  E11.22     N18.30    3. Acute non-recurrent maxillary sinusitis  J01.00 levofloxacin (LEVAQUIN) 500 MG tablet     fluticasone (FLONASE) 50 MCG/ACT nasal spray     cetirizine (ZYRTEC) 10 MG tablet   4. Encounter for screening for COVID-19  Z11.52 Symptomatic; Yes; 3/27/2022 COVID-19 Virus (Coronavirus) by PCR Nose       Patient Instructions     Over-the-counter nasal steroid spray, follow packaging directions  Over-the-counter Mucinex, follow packaging directions  Hot packs 3 times per day to the forehead and face over the tender sinuses  saline salt water or saline irrigation with [place] Huber  Antibiotic as written below.  Monitor blood glucose level while on the antibiotic.   Risks  and benefits of the medication were gone over.  Indication for return to see urgent care or family practice provider for reevaluation and treatment.      Acute Bacterial Rhinosinusitis (ABRS)  Acute bacterial rhinosinusitis (ABRS) is an infection of your nasal cavity and sinuses. It s caused by bacteria. Acute means that you ve had symptoms for less than 12 weeks.  Understanding your sinuses  The nasal cavity is the large air-filled space behind your nose. The sinuses are a group of spaces formed by the bones of your face. They connect with your nasal cavity. ABRS causes the tissue lining these spaces to become inflamed. Mucus may not drain normally. This leads to facial pain and other symptoms.  What causes ABRS?  ABRS most often follows an upper respiratory infection caused by a virus. Bacteria then infect the lining of your nasal cavity and sinuses. But you can also get ABRS if you have:    Nasal allergies    Long-term nasal swelling and congestion not caused by allergies    Blockage in the nose  Symptoms of ABRS  The symptoms of ABRS may be different for each person, and can include:    Nasal congestion    Runny nose    Fluid draining from the nose down the throat (postnasal drip)    Headache    Cough    Pain in the sinuses    Thick, colored fluid from the nose (mucus)    Fever  Diagnosing ABRS  ABRS may be diagnosed if you ve had an upper respiratory infection like a cold and cough for longer than 10 to 14 days. Your health care provider will ask about your symptoms and your medical history. The provider will check your vital signs, including your temperature. You ll have a physical exam. The health care provider will check your ears, nose, and throat. You likely won t need any tests. If ABRS comes back, you may have a culture or other tests.  Treatment for ABRS  Treatment may include:    Antibiotic medicine. This is for symptoms that last for at least 10 to 14 days.    Nasal corticosteroid medicine. Drops or  spray used in the nose can lessen swelling and congestion.    Over-the-counter pain medicine. This is to lessen sinus pain and pressure.    Nasal decongestant medicine. Spray or drops may help to lessen congestion. Do not use them for more than a few days.    Salt wash (saline irrigation). This can help to loosen mucus.  Possible complications of ABRS  ABRS may come back or become long-term (chronic).  In rare cases, ABRS may cause complications such as:     Inflamed tissue around the brain and spinal cord (meningitis)    Inflamed tissue around the eyes (orbital cellulitis)    Inflamed bones around the sinuses (osteitis)  These problems may need to be treated in a hospital with intravenous (IV) antibiotic medicine or surgery.  When to call the health care provider  Call your health care provider if you have any of the following:    Symptoms that don t get better, or get worse    Symptoms that don t get better after 3 to 5 days on antibiotics    Trouble seeing    Swelling around your eyes    Confusion or trouble staying awake   Date Last Reviewed: 3/3/2015    6757-8481 [employer]. [address] [place] [address]. All rights reserved. This information is not intended as a substitute for professional medical care. Always follow your healthcare professional's instructions.                HPI:  Liana Briceño is a 66 year old female who has DMT2 who presents today for concern for a five day history of acute onset facial frontal and maxillary pain and pressure that is radiating to the teeth and to the jaw.  Patient has a headache that is made worse with dependency.  Postnasal drainage.  Denies fever chills night sweats epistaxis or other constitutional symptoms.  Has tried Vicks vapor rub on her nose, and DayQuil.    History obtained from chart review and the patient.    Patient has had COVID vaccination.     Problem List:  2021-06: Chronic kidney disease, stage 3 (H)  2018-11: Low serum vitamin  B12  2018: Elevated ferritin level  2017: Coronary artery disease due to lipid rich plaque  2017: Stenosis of left carotid artery  2017: Unstable angina pectoris (H)  2016: Unstable angina (H)  Diffuse Nontoxic Goiter  Type 2 diabetes mellitus (H)  Essential hypertension  Esophageal reflux  Dyslipidemia, goal LDL below 70  Hiatal Hernia  Sensorineural hearing loss  Morbid obesity with BMI of 40.0-44.9, adult (H)  RADHA on CPAP  Fatty liver  Paroxysmal SVT -- S/P ablation  Morbid obesity (H)      Past Medical History:   Diagnosis Date     Anemia      Breast cyst      Carpal tunnel syndrome      Coronary artery disease due to lipid rich plaque      Dyslipidemia, goal LDL below 70      Esophageal reflux      Essential hypertension      Fatty liver      Goiter diffuse, nontoxic      Hiatal hernia      History of gestational diabetes      History of MRSA infection 2017    Pilonidal cyst culture     Increased PTH level 2018     Morbid obesity with BMI of 40.0-44.9, adult (H)      RADHA on CPAP      Osteoarthritis      Paroxysmal SVT (supraventricular tachycardia) (H)     ablation by Dr. Johnson in      Positive result for methicillin resistant Staphylococcus aureus (MRSA) screening 2018     S/P hip replacement      Stented coronary artery      Thiamine deficiency 2018     Type 2 diabetes mellitus (H)      Vitamin D deficiency 2018       Social History     Tobacco Use     Smoking status: Former Smoker     Packs/day: 0.25     Years: 44.00     Pack years: 11.00     Types: Cigarettes     Quit date: 2017     Years since quittin.1     Smokeless tobacco: Never Used     Tobacco comment: age 18 to age 62 up to 1/2 ppd   Substance Use Topics     Alcohol use: Yes     Alcohol/week: 0.0 standard drinks     Comment: Alcoholic Drinks/day: < 1 drink a week       Review of Systems  As above in HPI otherwise negative.    Vitals:    22 1229   BP: 129/79   Pulse: 85   Resp: 20   Temp:  98.7  F (37.1  C)   TempSrc: Oral   SpO2: 94%   Weight: 117.9 kg (260 lb)       General: Patient is resting comfortably no acute distress is afebrile  HEENT: Head is normocephalic atraumatic   Frontal maxillary sinuses are tender to percussion  eyes are PERRL EOMI sclera anicteric   TMs are clear bilaterally  Throat is with pharyngeal wall drainage and no exudate  No cervical lymphadenopathy present  LUNGS: Clear to auscultation bilaterally  HEART: Regular rate and rhythm  Skin: Without rash non-diaphoretic    Physical Exam    Labs:  Covid is pending.    At the end of the encounter, I discussed results, diagnosis, medications. Discussed red flags for immediate return to clinic/ER, as well as indications for follow up if no improvement. Patient understood and agreed to plan. Patient was stable for discharge.

## 2022-03-30 NOTE — TELEPHONE ENCOUNTER
Pt is out of medication.  She was supposed to take On Sunday and didn't get to.    Please send to pharm ASAP.

## 2022-03-30 NOTE — PATIENT INSTRUCTIONS
Over-the-counter nasal steroid spray, follow packaging directions  Over-the-counter Mucinex, follow packaging directions  Hot packs 3 times per day to the forehead and face over the tender sinuses  saline salt water or saline irrigation with [place] Huber  Antibiotic as written below.  Monitor blood glucose level while on the antibiotic.   Risks and benefits of the medication were gone over.  Indication for return to see urgent care or family practice provider for reevaluation and treatment.      Acute Bacterial Rhinosinusitis (ABRS)  Acute bacterial rhinosinusitis (ABRS) is an infection of your nasal cavity and sinuses. It s caused by bacteria. Acute means that you ve had symptoms for less than 12 weeks.  Understanding your sinuses  The nasal cavity is the large air-filled space behind your nose. The sinuses are a group of spaces formed by the bones of your face. They connect with your nasal cavity. ABRS causes the tissue lining these spaces to become inflamed. Mucus may not drain normally. This leads to facial pain and other symptoms.  What causes ABRS?  ABRS most often follows an upper respiratory infection caused by a virus. Bacteria then infect the lining of your nasal cavity and sinuses. But you can also get ABRS if you have:    Nasal allergies    Long-term nasal swelling and congestion not caused by allergies    Blockage in the nose  Symptoms of ABRS  The symptoms of ABRS may be different for each person, and can include:    Nasal congestion    Runny nose    Fluid draining from the nose down the throat (postnasal drip)    Headache    Cough    Pain in the sinuses    Thick, colored fluid from the nose (mucus)    Fever  Diagnosing ABRS  ABRS may be diagnosed if you ve had an upper respiratory infection like a cold and cough for longer than 10 to 14 days. Your health care provider will ask about your symptoms and your medical history. The provider will check your vital signs, including your temperature. You ll  have a physical exam. The health care provider will check your ears, nose, and throat. You likely won t need any tests. If ABRS comes back, you may have a culture or other tests.  Treatment for ABRS  Treatment may include:    Antibiotic medicine. This is for symptoms that last for at least 10 to 14 days.    Nasal corticosteroid medicine. Drops or spray used in the nose can lessen swelling and congestion.    Over-the-counter pain medicine. This is to lessen sinus pain and pressure.    Nasal decongestant medicine. Spray or drops may help to lessen congestion. Do not use them for more than a few days.    Salt wash (saline irrigation). This can help to loosen mucus.  Possible complications of ABRS  ABRS may come back or become long-term (chronic).  In rare cases, ABRS may cause complications such as:     Inflamed tissue around the brain and spinal cord (meningitis)    Inflamed tissue around the eyes (orbital cellulitis)    Inflamed bones around the sinuses (osteitis)  These problems may need to be treated in a hospital with intravenous (IV) antibiotic medicine or surgery.  When to call the health care provider  Call your health care provider if you have any of the following:    Symptoms that don t get better, or get worse    Symptoms that don t get better after 3 to 5 days on antibiotics    Trouble seeing    Swelling around your eyes    Confusion or trouble staying awake   Date Last Reviewed: 3/3/2015    [id number] [employer]. [address] [place] [address], [place] [address]. All rights reserved. This information is not intended as a substitute for professional medical care. Always follow your healthcare professional's instructions.

## 2022-04-05 ENCOUNTER — LAB (OUTPATIENT)
Dept: LAB | Facility: CLINIC | Age: 67
End: 2022-04-05
Attending: ORTHOPAEDIC SURGERY

## 2022-04-05 ENCOUNTER — OFFICE VISIT (OUTPATIENT)
Dept: INTERNAL MEDICINE | Facility: CLINIC | Age: 67
End: 2022-04-05
Payer: COMMERCIAL

## 2022-04-05 ENCOUNTER — ANCILLARY PROCEDURE (OUTPATIENT)
Dept: GENERAL RADIOLOGY | Facility: CLINIC | Age: 67
End: 2022-04-05
Attending: INTERNAL MEDICINE
Payer: COMMERCIAL

## 2022-04-05 VITALS
OXYGEN SATURATION: 96 % | BODY MASS INDEX: 44.43 KG/M2 | SYSTOLIC BLOOD PRESSURE: 116 MMHG | HEIGHT: 65 IN | DIASTOLIC BLOOD PRESSURE: 64 MMHG | HEART RATE: 90 BPM | WEIGHT: 266.7 LBS

## 2022-04-05 DIAGNOSIS — G89.4 CHRONIC PAIN SYNDROME: ICD-10-CM

## 2022-04-05 DIAGNOSIS — M99.53 INTERVERTEBRAL DISC STENOSIS OF NEURAL CANAL OF LUMBAR REGION: ICD-10-CM

## 2022-04-05 DIAGNOSIS — Z11.59 ENCOUNTER FOR SCREENING FOR OTHER VIRAL DISEASES: ICD-10-CM

## 2022-04-05 DIAGNOSIS — Z79.899 ENCOUNTER FOR LONG-TERM (CURRENT) USE OF MEDICATIONS: ICD-10-CM

## 2022-04-05 DIAGNOSIS — R05.9 COUGH: ICD-10-CM

## 2022-04-05 DIAGNOSIS — I10 ESSENTIAL HYPERTENSION: ICD-10-CM

## 2022-04-05 DIAGNOSIS — I25.83 CORONARY ARTERY DISEASE DUE TO LIPID RICH PLAQUE: ICD-10-CM

## 2022-04-05 DIAGNOSIS — I25.10 CORONARY ARTERY DISEASE DUE TO LIPID RICH PLAQUE: ICD-10-CM

## 2022-04-05 DIAGNOSIS — E78.5 DYSLIPIDEMIA, GOAL LDL BELOW 70: ICD-10-CM

## 2022-04-05 DIAGNOSIS — N18.31 STAGE 3A CHRONIC KIDNEY DISEASE (H): ICD-10-CM

## 2022-04-05 DIAGNOSIS — Z79.4 TYPE 2 DIABETES MELLITUS WITH HYPERGLYCEMIA, WITH LONG-TERM CURRENT USE OF INSULIN (H): ICD-10-CM

## 2022-04-05 DIAGNOSIS — M25.569 ACUTE KNEE PAIN, UNSPECIFIED LATERALITY: ICD-10-CM

## 2022-04-05 DIAGNOSIS — E66.01 MORBID OBESITY WITH BMI OF 40.0-44.9, ADULT (H): ICD-10-CM

## 2022-04-05 DIAGNOSIS — Z01.818 PREOP GENERAL PHYSICAL EXAM: Primary | ICD-10-CM

## 2022-04-05 DIAGNOSIS — Z12.31 VISIT FOR SCREENING MAMMOGRAM: ICD-10-CM

## 2022-04-05 DIAGNOSIS — E11.65 TYPE 2 DIABETES MELLITUS WITH HYPERGLYCEMIA, WITH LONG-TERM CURRENT USE OF INSULIN (H): ICD-10-CM

## 2022-04-05 DIAGNOSIS — E11.42 DIABETIC POLYNEUROPATHY ASSOCIATED WITH TYPE 2 DIABETES MELLITUS (H): ICD-10-CM

## 2022-04-05 LAB
ALBUMIN SERPL-MCNC: 4 G/DL (ref 3.5–5)
ALP SERPL-CCNC: 94 U/L (ref 45–120)
ALT SERPL W P-5'-P-CCNC: 26 U/L (ref 0–45)
AMPHETAMINES UR QL SCN: ABNORMAL
ANION GAP SERPL CALCULATED.3IONS-SCNC: 10 MMOL/L (ref 5–18)
AST SERPL W P-5'-P-CCNC: 26 U/L (ref 0–40)
BARBITURATES UR QL: ABNORMAL
BASOPHILS # BLD AUTO: 0 10E3/UL (ref 0–0.2)
BASOPHILS NFR BLD AUTO: 0 %
BENZODIAZ UR QL: ABNORMAL
BILIRUB SERPL-MCNC: 0.4 MG/DL (ref 0–1)
BUN SERPL-MCNC: 19 MG/DL (ref 8–22)
CALCIUM SERPL-MCNC: 10.1 MG/DL (ref 8.5–10.5)
CANNABINOIDS UR QL SCN: ABNORMAL
CANNABINOIDS UR QL SCN: NORMAL
CHLORIDE BLD-SCNC: 104 MMOL/L (ref 98–107)
CO2 SERPL-SCNC: 25 MMOL/L (ref 22–31)
COCAINE UR QL: ABNORMAL
CREAT SERPL-MCNC: 1.04 MG/DL (ref 0.6–1.1)
CREAT UR-MCNC: 190 MG/DL
CREAT UR-MCNC: 193 MG/DL
CREAT UR-MCNC: 193 MG/DL
EOSINOPHIL # BLD AUTO: 0.2 10E3/UL (ref 0–0.7)
EOSINOPHIL NFR BLD AUTO: 2 %
ERYTHROCYTE [DISTWIDTH] IN BLOOD BY AUTOMATED COUNT: 13.2 % (ref 10–15)
GFR SERPL CREATININE-BSD FRML MDRD: 60 ML/MIN/1.73M2
GLUCOSE BLD-MCNC: 119 MG/DL (ref 70–125)
HBA1C MFR BLD: 6.7 % (ref 0–5.6)
HCT VFR BLD AUTO: 33.8 % (ref 35–47)
HGB BLD-MCNC: 11 G/DL (ref 11.7–15.7)
IMM GRANULOCYTES # BLD: 0 10E3/UL
IMM GRANULOCYTES NFR BLD: 0 %
LYMPHOCYTES # BLD AUTO: 2.7 10E3/UL (ref 0.8–5.3)
LYMPHOCYTES NFR BLD AUTO: 35 %
MCH RBC QN AUTO: 29.9 PG (ref 26.5–33)
MCHC RBC AUTO-ENTMCNC: 32.5 G/DL (ref 31.5–36.5)
MCV RBC AUTO: 92 FL (ref 78–100)
MONOCYTES # BLD AUTO: 0.5 10E3/UL (ref 0–1.3)
MONOCYTES NFR BLD AUTO: 6 %
NEUTROPHILS # BLD AUTO: 4.2 10E3/UL (ref 1.6–8.3)
NEUTROPHILS NFR BLD AUTO: 56 %
OPIATES UR QL SCN: ABNORMAL
OXYCODONE UR QL: ABNORMAL
PCP UR QL SCN: ABNORMAL
PLATELET # BLD AUTO: 297 10E3/UL (ref 150–450)
POTASSIUM BLD-SCNC: 3.8 MMOL/L (ref 3.5–5)
PROT SERPL-MCNC: 7.7 G/DL (ref 6–8)
RBC # BLD AUTO: 3.68 10E6/UL (ref 3.8–5.2)
SODIUM SERPL-SCNC: 139 MMOL/L (ref 136–145)
TSH SERPL DL<=0.005 MIU/L-ACNC: 4.44 UIU/ML (ref 0.3–5)
WBC # BLD AUTO: 7.6 10E3/UL (ref 4–11)

## 2022-04-05 PROCEDURE — U0005 INFEC AGEN DETEC AMPLI PROBE: HCPCS

## 2022-04-05 PROCEDURE — 80307 DRUG TEST PRSMV CHEM ANLYZR: CPT | Performed by: INTERNAL MEDICINE

## 2022-04-05 PROCEDURE — 85025 COMPLETE CBC W/AUTO DIFF WBC: CPT | Performed by: INTERNAL MEDICINE

## 2022-04-05 PROCEDURE — 99207 URINE DRUG CONFIRMATION PANEL: CPT | Performed by: INTERNAL MEDICINE

## 2022-04-05 PROCEDURE — 71046 X-RAY EXAM CHEST 2 VIEWS: CPT | Mod: TC | Performed by: RADIOLOGY

## 2022-04-05 PROCEDURE — 80053 COMPREHEN METABOLIC PANEL: CPT | Performed by: INTERNAL MEDICINE

## 2022-04-05 PROCEDURE — 36415 COLL VENOUS BLD VENIPUNCTURE: CPT | Performed by: INTERNAL MEDICINE

## 2022-04-05 PROCEDURE — 99214 OFFICE O/P EST MOD 30 MIN: CPT | Performed by: INTERNAL MEDICINE

## 2022-04-05 PROCEDURE — 83036 HEMOGLOBIN GLYCOSYLATED A1C: CPT | Performed by: INTERNAL MEDICINE

## 2022-04-05 PROCEDURE — 93010 ELECTROCARDIOGRAM REPORT: CPT | Performed by: INTERNAL MEDICINE

## 2022-04-05 PROCEDURE — 84443 ASSAY THYROID STIM HORMONE: CPT | Performed by: INTERNAL MEDICINE

## 2022-04-05 PROCEDURE — 93005 ELECTROCARDIOGRAM TRACING: CPT | Performed by: INTERNAL MEDICINE

## 2022-04-05 PROCEDURE — U0003 INFECTIOUS AGENT DETECTION BY NUCLEIC ACID (DNA OR RNA); SEVERE ACUTE RESPIRATORY SYNDROME CORONAVIRUS 2 (SARS-COV-2) (CORONAVIRUS DISEASE [COVID-19]), AMPLIFIED PROBE TECHNIQUE, MAKING USE OF HIGH THROUGHPUT TECHNOLOGIES AS DESCRIBED BY CMS-2020-01-R: HCPCS

## 2022-04-05 RX ORDER — HYDROCODONE BITARTRATE AND ACETAMINOPHEN 5; 325 MG/1; MG/1
1 TABLET ORAL 2 TIMES DAILY PRN
Qty: 30 TABLET | Refills: 0 | Status: ON HOLD | OUTPATIENT
Start: 2022-04-05 | End: 2022-04-09

## 2022-04-05 RX ORDER — INSULIN DEGLUDEC 200 U/ML
70 INJECTION, SOLUTION SUBCUTANEOUS DAILY
Qty: 63 ML | Refills: 1 | Status: SHIPPED | OUTPATIENT
Start: 2022-04-05 | End: 2022-08-23

## 2022-04-05 RX ORDER — HYDROCODONE BITARTRATE AND ACETAMINOPHEN 5; 325 MG/1; MG/1
1 TABLET ORAL 2 TIMES DAILY PRN
Qty: 50 TABLET | Refills: 0
Start: 2022-04-05 | End: 2022-04-05

## 2022-04-05 NOTE — PATIENT INSTRUCTIONS
Insulin on am of surgery-  Only Tressiba at 40 units.  No Humolgue/ Use your inhaler before you go.  DO NOT TAKE LISINOPRIL or HCTZ ON AM OF SURGERY    From this point forward:  STOP ASPIRIN./ stop all vitamins/  No laxative on am of surgery/ NO celebrex . You may use tylenol/ pain med

## 2022-04-05 NOTE — H&P (VIEW-ONLY)
[facility]  [address] [place] [address]  Phone: [phone]  Fax: [phone]  Primary Provider: Mimi Fuentes  :[id number]}  PREOPERATIVE EVALUATION:  Today's date: 4/5/2022    Liana Briceño is a 66 year old female who presents for a preoperative evaluation.    Surgical Information:  Surgery/Procedure: R knee arthroplasty  Surgery Location: [facility]  Surgeon: Dr. Eaton  Surgery Date: 4/8/22  Where patient plans to recover: At home with family  Fax number for surgical facility: Note does not need to be faxed, will be available electronically in Epic.    Type of Anesthesia Anticipated: to be determined    Assessment/Plan:     ASSESSMENT and PLAN:  1. Preop general physical exam-right knee arthroplasty for severe DJD right knee  She is currently medically stable for surgery/anesthesia prior to the a forementioned surgery.  She has no history of allergic reaction to local or general anesthetic agents.  However, she does get post anesthesia NAUSEA.  She has no history of bleeding or procoagulant disorder.  She has type 2 diabetes, known coronary artery disease, history of atrial arrhythmias-status post ablation, chronic kidney disease and is quite sedentary-.    Medications are reviewed.  She is holding her aspirin and supplements from this day forward.  On the a.m. of surgery, she will take Tresiba insulin at 40 units.  She will hold Humalog insulin.  She will hold lisinopril and hydrochlorothiazide.  Additionally, she may take other oral prescription meds with sips of water.    Aftercare will be provided by her son who has a sprained ankle-breast-minimal support.  Of note, she lives in a home with 1 bathroom that is 16 steps upward.  Because of the complexity of her medical history/decreased home support/consideration for TCU as needed.    - CBC with Platelets & Differential; Future  - Comprehensive metabolic panel; Future  - EKG 12-lead,  tracing only-reviewed.  Normal sinus rhythm with no acute changes.  Comparison to ECG done in 2020-no changes.    2. Type 2 diabetes mellitus with hyperglycemia, with long-term current use of insulin (H)  Fasting sugars have been less than 120.  She is monitored via DEXA scan will monitor.  We will update labs today  - Hemoglobin A1c; Future  - TSH with free T4 reflex; Future  - insulin degludec (TRESIBA FLEXTOUCH) 200 UNIT/ML pen; Inject 70 Units Subcutaneous daily  Dispense: 63 mL; Refill: 1    3. Coronary artery disease due to lipid rich plaque  Stable with no recent angina or coronary events    4. Essential hypertension  Controlled on current medications  - TSH with free T4 reflex; Future    5. Cough  Recent cough and treatment for sinus infection.  Seems resolved-but will update chest x-ray  - XR Chest 2 Views-x-ray reviewed.  Some fibrotic stranding noted.  Exam and oxygen levels unremarkable  May proceed with surgery.    6. Stage 3a chronic kidney disease (H)  We will update chemistries      7. Morbid obesity with BMI of 40.0-44.9, adult (H)  Noted           Preventive Care Assessed:       Medication list carefully reviewed and corrected  Epic Merger Problem list addressed and updated         Subjective     HPI related to upcoming procedure: She is a delightful 66-year-old female who is here today for a preoperative evaluation prior to having a right total knee replacement for severe DJD of the right knee.  Of note, her medical history has been relatively stable over the last couple of years.  She uses Vicodin for chronic pain of her lumbar back and severe DJD.  She has type 2 diabetes which has been under good control for the past couple of years.  Remotely, she has a history of coronary artery disease, chronic kidney disease.  She currently is unable to exercise at greater than 4 METS of activity due to the severity of her pain.    Preop Questions 4/5/2022   1. Have you ever had a heart attack or stroke?  UNKNOWN -acute coronary [id number]-fnkb years   2. Have you ever had surgery on your heart or blood vessels, such as a stent placement, a coronary artery bypass, or surgery on an artery in your head, neck, heart, or legs? YES -status post mid RCA stents-2017   3. Do you have chest pain with activity? No   4. Do you have a history of  heart failure? No   5. Do you currently have a cold, bronchitis or symptoms of other infection? No   6. Do you have a cough, shortness of breath, or wheezing? No   7. Do you or anyone in your family have previous history of blood clots? No   8. Do you or does anyone in your family have a serious bleeding problem such as prolonged bleeding following surgeries or cuts? No   9. Have you ever had problems with anemia or been told to take iron pills? No   10. Have you had any abnormal blood loss such as black, tarry or bloody stools, or abnormal vaginal bleeding? No   11. Have you ever had a blood transfusion? No   12. Are you willing to have a blood transfusion if it is medically needed before, during, or after your surgery? Yes   13. Have you or any of your relatives ever had problems with anesthesia? YES -nausea   14. Do you have sleep apnea, excessive snoring or daytime drowsiness? YES -daytime drowsiness   14a. Do you have a CPAP machine? Yes   15. Do you have any artifical heart valves or other implanted medical devices like a pacemaker, defibrillator, or continuous glucose monitor? YES -continuous glucose monitor   15a. What type of device do you have? glucose monitor   15b. Name of the clinic that manages your device:  [facility]   16. Do you have artificial joints? YES -    17. Are you allergic to latex? YES:          Further interval history and review of symptoms: Recently, she has had a sinus infection.  Treated with antibiotics beginning on the 30th.  Marked resolution of symptoms.    Health Care Directive:  Patient does not have a Health Care Directive or Living  Will: She is a full code    Preoperative Review of :   reviewed - controlled substances reflected in medication list.    Patient Active Problem List    Diagnosis Date Noted     Chronic kidney disease, stage 3 (H) 06/15/2021     Priority: Medium     RADHA on CPAP      Priority: Medium     Low serum vitamin B12 11/04/2018     Priority: Medium     Elevated ferritin level 11/04/2018     Priority: Medium     Morbid obesity with BMI of 40.0-44.9, adult (H)      Priority: Medium     Coronary artery disease due to lipid rich plaque 05/12/2017     Priority: Medium     Stenosis of left carotid artery 05/02/2017     Priority: Medium     Diffuse Nontoxic Goiter      Priority: Medium     Created by Conversion  Replacement Utility updated for latest IMO load         Essential hypertension      Priority: Medium     Esophageal reflux      Priority: Medium     Created by Conversion         Dyslipidemia, goal LDL below 70      Priority: Medium     Created by Conversion         Hiatal Hernia      Priority: Medium     Created by Conversion  Replacement Utility updated for latest IMO load         Type 2 diabetes mellitus (H)      Priority: Medium     Created by Conversion         Sensorineural hearing loss      Priority: Medium     Created by Conversion  Replacement Utility updated for latest IMO load          Past Medical History:   Diagnosis Date     Anemia      Breast cyst      Carpal tunnel syndrome      Coronary artery disease due to lipid rich plaque      Dyslipidemia, goal LDL below 70      Esophageal reflux      Essential hypertension      Fatty liver      Goiter diffuse, nontoxic      Hiatal hernia      History of gestational diabetes      History of MRSA infection 07/17/2017    Pilonidal cyst culture     Increased PTH level 11/4/2018     Morbid obesity with BMI of 40.0-44.9, adult (H)      RADHA on CPAP      Osteoarthritis      Paroxysmal SVT (supraventricular tachycardia) (H) 2011    ablation by Dr. Johnson in 2011      Positive result for methicillin resistant Staphylococcus aureus (MRSA) screening 11/4/2018     S/P hip replacement      Stented coronary artery      Thiamine deficiency 11/4/2018     Type 2 diabetes mellitus (H)      Vitamin D deficiency 11/4/2018     Past Surgical History:   Procedure Laterality Date     BIOPSY BREAST       BREAST CYST EXCISION       CARDIAC CATHETERIZATION N/A 4/24/2017    Procedure: Coronary Angiogram;  Surgeon: Ariane Biswas MD;  Location: [facility] Cath Lab;  Service:      CARDIAC CATHETERIZATION N/A 5/19/2017    Procedure: Coronary Angiogram;  Surgeon: Howard Gutierrez MD;  Location: [facility] Cath Lab;  Service:      CHOLECYSTECTOMY  2000     CORONARY STENT PLACEMENT  04/24/2017    BO to RCA by Dr. Biswas     HC REVISE MEDIAN N/CARPAL TUNNEL SURG      Description: Neuroplasty Decompression Median Nerve At Carpal Tunnel;  Recorded: 05/22/2009;     IR LUMBAR EPIDURAL STEROID INJECTION  6/22/2007     IR LUMBAR EPIDURAL STEROID INJECTION  11/2/2007     IR LUMBAR EPIDURAL STEROID INJECTION  8/6/2008     IR LUMBAR EPIDURAL STEROID INJECTION  10/8/2008     IR LUMBAR EPIDURAL STEROID INJECTION  10/28/2008     IR LUMBAR EPIDURAL STEROID INJECTION  3/10/2009     WA EXPLO/DRAIN BREAST ABSCESS      Description: Breast Surgery Mastotomy With Drainage Of Abscess     WA L HRT CATH W/NJX L VENTRICULOGRAPHY IMG S&I N/A 4/24/2017    Procedure: Left Heart Catheterization with Left Ventriculogram;  Surgeon: Ariane Biswas MD;  Location: [facility] Cath Lab;  Service: Cardiology     WA THYROID LOBECTOMY,UNILAT       RADIOFREQUENCY ABLATION  07/05/2011    for PSVT by Dr. Johnson     TOTAL HIP ARTHROPLASTY Right      Current Outpatient Medications   Medication Sig Dispense Refill     acetaminophen (TYLENOL) 500 MG tablet [ACETAMINOPHEN (TYLENOL) 500 MG TABLET] Take 1,000 mg by mouth 3 (three) times a day as needed.       albuterol (PROAIR HFA/PROVENTIL HFA/VENTOLIN HFA) 108 (90 Base) MCG/ACT inhaler  Inhale 2 puffs into the lungs every 6 hours 18 g 0     allopurinol (ZYLOPRIM) 100 MG tablet TAKE 1 TABLET(100 MG) BY MOUTH DAILY 90 tablet 2     aspirin 81 MG EC tablet [ASPIRIN 81 MG EC TABLET] Take 1 tablet (81 mg total) by mouth daily.  0     benzonatate (TESSALON) 100 MG capsule Take 1 capsule (100 mg) by mouth 3 times daily as needed for cough 30 capsule 0     blood glucose test strips [BLOOD GLUCOSE TEST STRIPS] Use 1 each As Directed 6 (six) times a day. Prodigy Meter 300 strip 3     celecoxib (CELEBREX) 200 MG capsule Take 1 capsule by mouth 2 times daily       cetirizine (ZYRTEC) 10 MG tablet Take 1 tablet (10 mg) by mouth daily for 14 days 14 tablet 0     cholecalciferol, vitamin D3, (CHOLECALCIFEROL) 1,000 unit tablet [CHOLECALCIFEROL, VITAMIN D3, (CHOLECALCIFEROL) 1,000 UNIT TABLET] Take 3,000 Units by mouth daily.        cinnamon bark (CINNAMON ORAL) [CINNAMON BARK (CINNAMON ORAL)] Take 1 each by mouth 2 (two) times a day.       Continuous Blood Gluc Sensor (FREESTYLE MIRANDA 2 SENSOR) MISC 1 each every 14 days 1 each every 14 days. Change every 14 days. 6 each 3     cyanocobalamin (VITAMIN B-12) 500 MCG tablet [CYANOCOBALAMIN (VITAMIN B-12) 500 MCG TABLET] Take 500 mcg by mouth daily.       docusate sodium (COLACE) 100 MG capsule Take 1 capsule (100 mg) by mouth 2 times daily 100 capsule 1     fluticasone (FLONASE) 50 MCG/ACT nasal spray Spray 1 spray into both nostrils daily 9.9 mL 0     generic lancets (MICROLET LANCET) [GENERIC LANCETS (MICROLET LANCET)] Use 1 each As Directed 6 (six) times a day. 300 each 3     hydrochlorothiazide (MICROZIDE) 12.5 MG capsule TAKE 1 CAPSULE(12.5 MG) BY MOUTH DAILY 90 capsule 2     HYDROcodone-acetaminophen (NORCO) 5-325 MG tablet Take 1 tablet by mouth 2 times daily as needed for pain 50 tablet 0     hydrOXYzine (ATARAX) 10 MG tablet Take 1 tablet (10 mg) by mouth every 4 hours as needed for itching 20 tablet 1     insulin degludec (TRESIBA FLEXTOUCH) 200 UNIT/ML pen  "Inject 64 Units Subcutaneous daily 63 mL 1     insulin lispro (HUMALOG KWIKPEN) 100 UNIT/ML (1 unit dial) KWIKPEN Inject 10 Units Subcutaneous 3 times daily (before meals) 45 mL 3     levofloxacin (LEVAQUIN) 500 MG tablet Take 1 tablet (500 mg) by mouth daily for 7 days 7 tablet 0     lisinopril (ZESTRIL) 5 MG tablet TAKE 1 AND 1/2 TABLETS(7.5 MG) BY MOUTH DAILY 135 tablet 2     melatonin (MELATIN) 3 mg Tab tablet [MELATONIN (MELATIN) 3 MG TAB TABLET] Take 3 mg by mouth at bedtime as needed.       nitroglycerin (NITROSTAT) 0.3 MG SL tablet [NITROGLYCERIN (NITROSTAT) 0.3 MG SL TABLET] Place 1 tablet (0.3 mg total) under the tongue every 5 (five) minutes as needed for chest pain. 100 tablet 3     ONETOUCH DELICA PLUS LANCET 33 gauge Misc [ONETOUCH DELICA PLUS LANCET 33 GAUGE MISC] USE TO TEST BLOOD SUGAR 6 TIMES DAILY 600 each 3     OZEMPIC, 1 MG/DOSE, 4 MG/3ML SOPN INJECT 1MG UNDER THE SKIN ONCE EVERY 7 DAYS 3 mL 3     pen needle, diabetic (BD ULTRA-FINE ZULMA PEN NEEDLE) 32 gauge x 5/32\" Ndle [PEN NEEDLE, DIABETIC (BD ULTRA-FINE ZULMA PEN NEEDLE) 32 GAUGE X 5/32\" NDLE] Inject 4 each under the skin daily. With Tresiba and Novolog 120 each 3     rosuvastatin (CRESTOR) 10 MG tablet [ROSUVASTATIN (CRESTOR) 10 MG TABLET] Take 1 tablet (10 mg total) by mouth daily. 90 tablet 3     Thiamine HCl (B-1) 100 MG TABS TAKE 1 TABLET(100 MG) BY MOUTH DAILY 90 tablet 1     triamcinolone (KENALOG) 0.1 % cream [TRIAMCINOLONE (KENALOG) 0.1 % CREAM] Apply bid 30 g 0     valACYclovir (VALTREX) 500 MG tablet TAKE 1 TABLET(500 MG) BY MOUTH DAILY 90 tablet 3       Allergies   Allergen Reactions     Latex, Natural Rubber [Latex] Hives     Penicillins Other (See Comments)     Passed out.      Doxycycline Dizziness     Eggshell Membrane [Egg Shells] Unknown     Erythromycin Itching     Ibuprofen      Naloxone Unknown     itching     Penicillamine Unknown     Pneumococcal 23-Valent Polysaccharide Vaccine [Pneumococcal Vaccine] Other (See " "Comments)     pneumonia     Statins-Hmg-Coa Reductase Inhibitors [Hmg-Coa-R Inhibitors] Muscle Pain (Myalgia)     Shaking with simvastatin and atorvastatin     Azithromycin Rash        Social History     Tobacco Use     Smoking status: Former Smoker     Packs/day: 0.25     Years: 44.00     Pack years: 11.00     Types: Cigarettes     Quit date: 2017     Years since quittin.2     Smokeless tobacco: Never Used     Tobacco comment: age 18 to age 62 up to 1/2 ppd   Substance Use Topics     Alcohol use: Yes     Alcohol/week: 0.0 standard drinks     Comment: Alcoholic Drinks/day: < 1 drink a week     History   Drug Use No         Objective     /64 (BP Location: Left arm, Patient Position: Sitting, Cuff Size: Adult Large)   Pulse 90   Ht 1.651 m (5' 5\")   Wt 121 kg (266 lb 11.2 oz)   SpO2 96%   BMI 44.38 kg/m      Physical Exam  EYES: Eyelids, conjunctiva, and sclera were normal. Pupils were normal. Cornea, iris, and lens were normal bilaterally.  HEAD, EARS, NOSE, MOUTH, AND THROAT: Head and face were normal. Hearing was normal to voice and the ears were normal to external exam. Nose appearance was normal and there was no discharge. Oropharynx was normal.  TMs were normal.  NECK: Neck appearance was normal. There were no neck masses and the thyroid was not enlarged.  RESPIRATORY: Breathing pattern was normal and the chest moved symmetrically.   Lung sounds were equal bilaterally.  CARDIOVASCULAR: Heart rate and rhythm were normal.  S1 and S2 were normal and there were no extra sounds or murmurs. Peripheral pulses in arms and legs were normal.  Jugular venous pressure was normal.  There was no peripheral edema.  GASTROINTESTINAL: The abdomen was normal in contour.  Bowel sounds were present.   Palpation detected no tenderness, mass, or enlarged organs.   MUSCULOSKELETAL: Skeletal configuration was normal and muscle mass was normal for age. Joint appearance was overall normal.  LYMPHATIC: There were no " enlarged nodes.  SKIN/HAIR/NAILS: Skin color was normal.  There were no abnormal skin lesions.  Hair and nails were normal.  NEUROLOGIC: The patient was alert and oriented to person, place, time, and circumstance. Speech was normal. Cranial nerves were normal. Motor strength was normal for age. The patient was normally coordinated.  PSYCHIATRIC:  Mood and affect were normal and the patient had normal recent and remote memory. The patient's judgment and insight were normal.          Recent Labs   Lab Test 10/15/21  1302 05/19/21  1048 01/19/21  0909   HGB 11.8 10.6* 11.5*    141 141   POTASSIUM 3.8 4.0 4.3   CR 0.89 0.95 0.88   A1C 7.9* 7.0* 6.8*   TSH  --   --  3.78       Lab Results   Component Value Date    CHOL 117 05/19/2021        Diagnostics:  New orders:   Orders Placed This Encounter   Procedures     XR Chest 2 Views     Comprehensive metabolic panel     Hemoglobin A1c     TSH with free T4 reflex     EKG 12-lead, tracing only     CBC with Platelets & Differential           Revised Cardiac Risk Index (RCRI):  The patient has the following serious cardiovascular risks for perioperative complications:  Remote history of stent placement with known coronary artery disease/type 2 diabetes/chronic kidney disease-creatinine less than 2        The visit lasted a total of 40 minutes          Signed Electronically by: Mimi Fuentes MD      HPI

## 2022-04-05 NOTE — PROGRESS NOTES
[facility]  [address] [place] [address]  Phone: [phone]  Fax: [phone]  Primary Provider: Mimi Fuentes  :[id number]}  PREOPERATIVE EVALUATION:  Today's date: 4/5/2022    Liana Briceño is a 66 year old female who presents for a preoperative evaluation.    Surgical Information:  Surgery/Procedure: R knee arthroplasty  Surgery Location: [facility]  Surgeon: Dr. Eaton  Surgery Date: 4/8/22  Where patient plans to recover: At home with family  Fax number for surgical facility: Note does not need to be faxed, will be available electronically in Epic.    Type of Anesthesia Anticipated: to be determined    Assessment/Plan:     ASSESSMENT and PLAN:  1. Preop general physical exam-right knee arthroplasty for severe DJD right knee  She is currently medically stable for surgery/anesthesia prior to the a forementioned surgery.  She has no history of allergic reaction to local or general anesthetic agents.  However, she does get post anesthesia NAUSEA.  She has no history of bleeding or procoagulant disorder.  She has type 2 diabetes, known coronary artery disease, history of atrial arrhythmias-status post ablation, chronic kidney disease and is quite sedentary-.    Medications are reviewed.  She is holding her aspirin and supplements from this day forward.  On the a.m. of surgery, she will take Tresiba insulin at 40 units.  She will hold Humalog insulin.  She will hold lisinopril and hydrochlorothiazide.  Additionally, she may take other oral prescription meds with sips of water.    Aftercare will be provided by her son who has a sprained ankle-breast-minimal support.  Of note, she lives in a home with 1 bathroom that is 16 steps upward.  Because of the complexity of her medical history/decreased home support/consideration for TCU as needed.    - CBC with Platelets & Differential; Future  - Comprehensive metabolic panel; Future  - EKG 12-lead,  tracing only-reviewed.  Normal sinus rhythm with no acute changes.  Comparison to ECG done in 2020-no changes.    2. Type 2 diabetes mellitus with hyperglycemia, with long-term current use of insulin (H)  Fasting sugars have been less than 120.  She is monitored via DEXA scan will monitor.  We will update labs today  - Hemoglobin A1c; Future  - TSH with free T4 reflex; Future  - insulin degludec (TRESIBA FLEXTOUCH) 200 UNIT/ML pen; Inject 70 Units Subcutaneous daily  Dispense: 63 mL; Refill: 1    3. Coronary artery disease due to lipid rich plaque  Stable with no recent angina or coronary events    4. Essential hypertension  Controlled on current medications  - TSH with free T4 reflex; Future    5. Cough  Recent cough and treatment for sinus infection.  Seems resolved-but will update chest x-ray  - XR Chest 2 Views-x-ray reviewed.  Some fibrotic stranding noted.  Exam and oxygen levels unremarkable  May proceed with surgery.    6. Stage 3a chronic kidney disease (H)  We will update chemistries      7. Morbid obesity with BMI of 40.0-44.9, adult (H)  Noted           Preventive Care Assessed:       Medication list carefully reviewed and corrected  Epic Merger Problem list addressed and updated         Subjective     HPI related to upcoming procedure: She is a delightful 66-year-old female who is here today for a preoperative evaluation prior to having a right total knee replacement for severe DJD of the right knee.  Of note, her medical history has been relatively stable over the last couple of years.  She uses Vicodin for chronic pain of her lumbar back and severe DJD.  She has type 2 diabetes which has been under good control for the past couple of years.  Remotely, she has a history of coronary artery disease, chronic kidney disease.  She currently is unable to exercise at greater than 4 METS of activity due to the severity of her pain.    Preop Questions 4/5/2022   1. Have you ever had a heart attack or stroke?  UNKNOWN -acute coronary ovgygwre-aiadgfpk-asym years   2. Have you ever had surgery on your heart or blood vessels, such as a stent placement, a coronary artery bypass, or surgery on an artery in your head, neck, heart, or legs? YES -status post mid RCA stents-2017   3. Do you have chest pain with activity? No   4. Do you have a history of  heart failure? No   5. Do you currently have a cold, bronchitis or symptoms of other infection? No   6. Do you have a cough, shortness of breath, or wheezing? No   7. Do you or anyone in your family have previous history of blood clots? No   8. Do you or does anyone in your family have a serious bleeding problem such as prolonged bleeding following surgeries or cuts? No   9. Have you ever had problems with anemia or been told to take iron pills? No   10. Have you had any abnormal blood loss such as black, tarry or bloody stools, or abnormal vaginal bleeding? No   11. Have you ever had a blood transfusion? No   12. Are you willing to have a blood transfusion if it is medically needed before, during, or after your surgery? Yes   13. Have you or any of your relatives ever had problems with anesthesia? YES -nausea   14. Do you have sleep apnea, excessive snoring or daytime drowsiness? YES -daytime drowsiness   14a. Do you have a CPAP machine? Yes   15. Do you have any artifical heart valves or other implanted medical devices like a pacemaker, defibrillator, or continuous glucose monitor? YES -continuous glucose monitor   15a. What type of device do you have? glucose monitor   15b. Name of the clinic that manages your device:  [facility]   16. Do you have artificial joints? YES -    17. Are you allergic to latex? YES:          Further interval history and review of symptoms: Recently, she has had a sinus infection.  Treated with antibiotics beginning on the 30th.  Marked resolution of symptoms.    Health Care Directive:  Patient does not have a Health Care Directive or Living  Will: She is a full code    Preoperative Review of :   reviewed - controlled substances reflected in medication list.    Patient Active Problem List    Diagnosis Date Noted     Chronic kidney disease, stage 3 (H) 06/15/2021     Priority: Medium     RADHA on CPAP      Priority: Medium     Low serum vitamin B12 11/04/2018     Priority: Medium     Elevated ferritin level 11/04/2018     Priority: Medium     Morbid obesity with BMI of 40.0-44.9, adult (H)      Priority: Medium     Coronary artery disease due to lipid rich plaque 05/12/2017     Priority: Medium     Stenosis of left carotid artery 05/02/2017     Priority: Medium     Diffuse Nontoxic Goiter      Priority: Medium     Created by Conversion  Replacement Utility updated for latest IMO load         Essential hypertension      Priority: Medium     Esophageal reflux      Priority: Medium     Created by Conversion         Dyslipidemia, goal LDL below 70      Priority: Medium     Created by Conversion         Hiatal Hernia      Priority: Medium     Created by Conversion  Replacement Utility updated for latest IMO load         Type 2 diabetes mellitus (H)      Priority: Medium     Created by Conversion         Sensorineural hearing loss      Priority: Medium     Created by Conversion  Replacement Utility updated for latest IMO load          Past Medical History:   Diagnosis Date     Anemia      Breast cyst      Carpal tunnel syndrome      Coronary artery disease due to lipid rich plaque      Dyslipidemia, goal LDL below 70      Esophageal reflux      Essential hypertension      Fatty liver      Goiter diffuse, nontoxic      Hiatal hernia      History of gestational diabetes      History of MRSA infection 07/17/2017    Pilonidal cyst culture     Increased PTH level 11/4/2018     Morbid obesity with BMI of 40.0-44.9, adult (H)      RADHA on CPAP      Osteoarthritis      Paroxysmal SVT (supraventricular tachycardia) (H) 2011    ablation by Dr. Johnson in 2011      Positive result for methicillin resistant Staphylococcus aureus (MRSA) screening 11/4/2018     S/P hip replacement      Stented coronary artery      Thiamine deficiency 11/4/2018     Type 2 diabetes mellitus (H)      Vitamin D deficiency 11/4/2018     Past Surgical History:   Procedure Laterality Date     BIOPSY BREAST       BREAST CYST EXCISION       CARDIAC CATHETERIZATION N/A 4/24/2017    Procedure: Coronary Angiogram;  Surgeon: Ariane Biswas MD;  Location: [facility];  Service:      CARDIAC CATHETERIZATION N/A 5/19/2017    Procedure: Coronary Angiogram;  Surgeon: Howard Gutierrez MD;  Location: [facility];  Service:      CHOLECYSTECTOMY  2000     CORONARY STENT PLACEMENT  04/24/2017    BO to RCA by Dr. Biswas     HC REVISE MEDIAN N/CARPAL TUNNEL SURG      Description: Neuroplasty Decompression Median Nerve At Carpal Tunnel;  Recorded: 05/22/2009;     IR LUMBAR EPIDURAL STEROID INJECTION  6/22/2007     IR LUMBAR EPIDURAL STEROID INJECTION  11/2/2007     IR LUMBAR EPIDURAL STEROID INJECTION  8/6/2008     IR LUMBAR EPIDURAL STEROID INJECTION  10/8/2008     IR LUMBAR EPIDURAL STEROID INJECTION  10/28/2008     IR LUMBAR EPIDURAL STEROID INJECTION  3/10/2009     CO EXPLO/DRAIN BREAST ABSCESS      Description: Breast Surgery Mastotomy With Drainage Of Abscess     CO L HRT CATH W/NJX L VENTRICULOGRAPHY IMG S&I N/A 4/24/2017    Procedure: Left Heart Catheterization with Left Ventriculogram;  Surgeon: Ariane Biswas MD;  Location: [facility];  Service: Cardiology     CO THYROID LOBECTOMY,UNILAT       RADIOFREQUENCY ABLATION  07/05/2011    for PSVT by Dr. Johnson     TOTAL HIP ARTHROPLASTY Right      Current Outpatient Medications   Medication Sig Dispense Refill     acetaminophen (TYLENOL) 500 MG tablet [ACETAMINOPHEN (TYLENOL) 500 MG TABLET] Take 1,000 mg by mouth 3 (three) times a day as needed.       albuterol (PROAIR HFA/PROVENTIL HFA/VENTOLIN HFA) 108 (90 Base) MCG/ACT inhaler  Inhale 2 puffs into the lungs every 6 hours 18 g 0     allopurinol (ZYLOPRIM) 100 MG tablet TAKE 1 TABLET(100 MG) BY MOUTH DAILY 90 tablet 2     aspirin 81 MG EC tablet [ASPIRIN 81 MG EC TABLET] Take 1 tablet (81 mg total) by mouth daily.  0     benzonatate (TESSALON) 100 MG capsule Take 1 capsule (100 mg) by mouth 3 times daily as needed for cough 30 capsule 0     blood glucose test strips [BLOOD GLUCOSE TEST STRIPS] Use 1 each As Directed 6 (six) times a day. Prodigy Meter 300 strip 3     celecoxib (CELEBREX) 200 MG capsule Take 1 capsule by mouth 2 times daily       cetirizine (ZYRTEC) 10 MG tablet Take 1 tablet (10 mg) by mouth daily for 14 days 14 tablet 0     cholecalciferol, vitamin D3, (CHOLECALCIFEROL) 1,000 unit tablet [CHOLECALCIFEROL, VITAMIN D3, (CHOLECALCIFEROL) 1,000 UNIT TABLET] Take 3,000 Units by mouth daily.        cinnamon bark (CINNAMON ORAL) [CINNAMON BARK (CINNAMON ORAL)] Take 1 each by mouth 2 (two) times a day.       Continuous Blood Gluc Sensor (FREESTYLE MIRANDA 2 SENSOR) MISC 1 each every 14 days 1 each every 14 days. Change every 14 days. 6 each 3     cyanocobalamin (VITAMIN B-12) 500 MCG tablet [CYANOCOBALAMIN (VITAMIN B-12) 500 MCG TABLET] Take 500 mcg by mouth daily.       docusate sodium (COLACE) 100 MG capsule Take 1 capsule (100 mg) by mouth 2 times daily 100 capsule 1     fluticasone (FLONASE) 50 MCG/ACT nasal spray Spray 1 spray into both nostrils daily 9.9 mL 0     generic lancets (MICROLET LANCET) [GENERIC LANCETS (MICROLET LANCET)] Use 1 each As Directed 6 (six) times a day. 300 each 3     hydrochlorothiazide (MICROZIDE) 12.5 MG capsule TAKE 1 CAPSULE(12.5 MG) BY MOUTH DAILY 90 capsule 2     HYDROcodone-acetaminophen (NORCO) 5-325 MG tablet Take 1 tablet by mouth 2 times daily as needed for pain 50 tablet 0     hydrOXYzine (ATARAX) 10 MG tablet Take 1 tablet (10 mg) by mouth every 4 hours as needed for itching 20 tablet 1     insulin degludec (TRESIBA FLEXTOUCH) 200 UNIT/ML pen  "Inject 64 Units Subcutaneous daily 63 mL 1     insulin lispro (HUMALOG KWIKPEN) 100 UNIT/ML (1 unit dial) KWIKPEN Inject 10 Units Subcutaneous 3 times daily (before meals) 45 mL 3     levofloxacin (LEVAQUIN) 500 MG tablet Take 1 tablet (500 mg) by mouth daily for 7 days 7 tablet 0     lisinopril (ZESTRIL) 5 MG tablet TAKE 1 AND 1/2 TABLETS(7.5 MG) BY MOUTH DAILY 135 tablet 2     melatonin (MELATIN) 3 mg Tab tablet [MELATONIN (MELATIN) 3 MG TAB TABLET] Take 3 mg by mouth at bedtime as needed.       nitroglycerin (NITROSTAT) 0.3 MG SL tablet [NITROGLYCERIN (NITROSTAT) 0.3 MG SL TABLET] Place 1 tablet (0.3 mg total) under the tongue every 5 (five) minutes as needed for chest pain. 100 tablet 3     ONETOUCH DELICA PLUS LANCET 33 gauge Misc [ONETOUCH DELICA PLUS LANCET 33 GAUGE MISC] USE TO TEST BLOOD SUGAR 6 TIMES DAILY 600 each 3     OZEMPIC, 1 MG/DOSE, 4 MG/3ML SOPN INJECT 1MG UNDER THE SKIN ONCE EVERY 7 DAYS 3 mL 3     pen needle, diabetic (BD ULTRA-FINE ZULMA PEN NEEDLE) 32 gauge x 5/32\" Ndle [PEN NEEDLE, DIABETIC (BD ULTRA-FINE ZULMA PEN NEEDLE) 32 GAUGE X 5/32\" NDLE] Inject 4 each under the skin daily. With Tresiba and Novolog 120 each 3     rosuvastatin (CRESTOR) 10 MG tablet [ROSUVASTATIN (CRESTOR) 10 MG TABLET] Take 1 tablet (10 mg total) by mouth daily. 90 tablet 3     Thiamine HCl (B-1) 100 MG TABS TAKE 1 TABLET(100 MG) BY MOUTH DAILY 90 tablet 1     triamcinolone (KENALOG) 0.1 % cream [TRIAMCINOLONE (KENALOG) 0.1 % CREAM] Apply bid 30 g 0     valACYclovir (VALTREX) 500 MG tablet TAKE 1 TABLET(500 MG) BY MOUTH DAILY 90 tablet 3       Allergies   Allergen Reactions     Latex, Natural Rubber [Latex] Hives     Penicillins Other (See Comments)     Passed out.      Doxycycline Dizziness     Eggshell Membrane [Egg Shells] Unknown     Erythromycin Itching     Ibuprofen      Naloxone Unknown     itching     Penicillamine Unknown     Pneumococcal 23-Valent Polysaccharide Vaccine [Pneumococcal Vaccine] Other (See " "Comments)     pneumonia     Statins-Hmg-Coa Reductase Inhibitors [Hmg-Coa-R Inhibitors] Muscle Pain (Myalgia)     Shaking with simvastatin and atorvastatin     Azithromycin Rash        Social History     Tobacco Use     Smoking status: Former Smoker     Packs/day: 0.25     Years: 44.00     Pack years: 11.00     Types: Cigarettes     Quit date: 2017     Years since quittin.2     Smokeless tobacco: Never Used     Tobacco comment: age 18 to age 62 up to 1/2 ppd   Substance Use Topics     Alcohol use: Yes     Alcohol/week: 0.0 standard drinks     Comment: Alcoholic Drinks/day: < 1 drink a week     History   Drug Use No         Objective     /64 (BP Location: Left arm, Patient Position: Sitting, Cuff Size: Adult Large)   Pulse 90   Ht 1.651 m (5' 5\")   Wt 121 kg (266 lb 11.2 oz)   SpO2 96%   BMI 44.38 kg/m      Physical Exam  EYES: Eyelids, conjunctiva, and sclera were normal. Pupils were normal. Cornea, iris, and lens were normal bilaterally.  HEAD, EARS, NOSE, MOUTH, AND THROAT: Head and face were normal. Hearing was normal to voice and the ears were normal to external exam. Nose appearance was normal and there was no discharge. Oropharynx was normal.  TMs were normal.  NECK: Neck appearance was normal. There were no neck masses and the thyroid was not enlarged.  RESPIRATORY: Breathing pattern was normal and the chest moved symmetrically.   Lung sounds were equal bilaterally.  CARDIOVASCULAR: Heart rate and rhythm were normal.  S1 and S2 were normal and there were no extra sounds or murmurs. Peripheral pulses in arms and legs were normal.  Jugular venous pressure was normal.  There was no peripheral edema.  GASTROINTESTINAL: The abdomen was normal in contour.  Bowel sounds were present.   Palpation detected no tenderness, mass, or enlarged organs.   MUSCULOSKELETAL: Skeletal configuration was normal and muscle mass was normal for age. Joint appearance was overall normal.  LYMPHATIC: There were no " enlarged nodes.  SKIN/HAIR/NAILS: Skin color was normal.  There were no abnormal skin lesions.  Hair and nails were normal.  NEUROLOGIC: The patient was alert and oriented to person, place, time, and circumstance. Speech was normal. Cranial nerves were normal. Motor strength was normal for age. The patient was normally coordinated.  PSYCHIATRIC:  Mood and affect were normal and the patient had normal recent and remote memory. The patient's judgment and insight were normal.          Recent Labs   Lab Test 10/15/21  1302 05/19/21  1048 01/19/21  0909   HGB 11.8 10.6* 11.5*    141 141   POTASSIUM 3.8 4.0 4.3   CR 0.89 0.95 0.88   A1C 7.9* 7.0* 6.8*   TSH  --   --  3.78       Lab Results   Component Value Date    CHOL 117 05/19/2021        Diagnostics:  New orders:   Orders Placed This Encounter   Procedures     XR Chest 2 Views     Comprehensive metabolic panel     Hemoglobin A1c     TSH with free T4 reflex     EKG 12-lead, tracing only     CBC with Platelets & Differential           Revised Cardiac Risk Index (RCRI):  The patient has the following serious cardiovascular risks for perioperative complications:  Remote history of stent placement with known coronary artery disease/type 2 diabetes/chronic kidney disease-creatinine less than 2        The visit lasted a total of 40 minutes          Signed Electronically by: Mimi Fuentes MD      HPI

## 2022-04-06 ENCOUNTER — TELEPHONE (OUTPATIENT)
Dept: INTERNAL MEDICINE | Facility: CLINIC | Age: 67
End: 2022-04-06

## 2022-04-06 LAB
ATRIAL RATE - MUSE: 81 BPM
DIASTOLIC BLOOD PRESSURE - MUSE: NORMAL MMHG
INTERPRETATION ECG - MUSE: NORMAL
P AXIS - MUSE: 53 DEGREES
PR INTERVAL - MUSE: 168 MS
QRS DURATION - MUSE: 72 MS
QT - MUSE: 416 MS
QTC - MUSE: 483 MS
R AXIS - MUSE: -21 DEGREES
SARS-COV-2 RNA RESP QL NAA+PROBE: NEGATIVE
SYSTOLIC BLOOD PRESSURE - MUSE: NORMAL MMHG
T AXIS - MUSE: 33 DEGREES
VENTRICULAR RATE- MUSE: 81 BPM

## 2022-04-06 NOTE — PROGRESS NOTES
I am evaluating this patient for upcoming Right Total Knee Arthroplasty with Dr. Eaton at [facility] on 4/8/22:    - Notified by pre-op RN of some abnormalities on pre-op H&P from 4/5/22: Hgb 11.0, Patient unable to exercise > 4 METS due to severity of knee pain, EKG- shows prolonged QT, Chest xray 4/5/22: showed some slight linear opacities in both mid lungs. I would favor some slight fibrosis although a mild pneumonia cannot be completely excluded     - 4/6/22 at 0945: spoke to patient: she is recovering from a sinus infection last week. Just completed antibiotics (Levaquin)  yesterday and reports feeling significantly better. Denies shortness of breath, chest pain, fever, or chills. Does have a hoarse voice and slight cough (maybe coughing a couple times/day now). Reports that she no longer has any thick, productive mucous when she coughs. Cough has significantly improved with the antibiotics. Covid-19 test on 3/30/22 was negative. Covid-19 test from 4/5/22- results still pending.  Patient states that she feels well enough to proceed with surgery on 4/8/22. L/M with PCP about chest x-ray results above just to make sure they are ok with patient proceeding. EKG showing QT prolongation is similar to previous EKG's. Patient had NM Stress test 7/30/2020 that was negative for inducible myocardial ischemia or infarction. Will also notify Dr. Eaton of information above to make sure he is ok proceeding. Call me if any questions.     - Update 4/6/22 at 12:50 pm: Received message back from PCP, Dr. Fuentes's office: she reviewed patient's chest xray and patient may proceed with surgery. Dr. Fuentes addended H&P to reflect this. Dr. Eaton also notified of patient's recent hemoglobin of 11.0 and recent recovery from sinus infection and is also ok proceeding. Will proceed with surgery as planned. Full Treatment Plan note to follow. Call me if any questions.       Adam Stockton APRN, CNP   Advanced Practice Nurse  Navigator- Orthopedics  [facility]   Office Phone: [phone]  Direct Fax: [phone]

## 2022-04-06 NOTE — TELEPHONE ENCOUNTER
Reason for Call:  Other call back    Detailed comments: calling to ask PCP to review chest x-ray, patient is scheduled for Total Knee on 04/08/2022 at [facility]    Can leave message if needed -  Need to comment on the chest xray - if it is OK to proceed with surgery    Phone Number Patient can be reached at: Other phone number:  [phone]    Best Time: anytime    Can we leave a detailed message on this number? YES    Call taken on 4/6/2022 at 10:29 AM by Raquel Blackwell

## 2022-04-07 NOTE — TREATMENT PLAN
Orthopedic Surgery Pre-Op Plan: Liana Briceño  pre-op review. This is NOT an H&P   Surgeon: Dr. Eaton    [facility]: [facility]  Name of Surgery: Right Total Knee Arthroplasty   Date of Surgery: 4/8/22  H&P: Completed on 4/5/22 by Dr. Mimi Fuentes at [facility].   History of ASA, NSAIDS, vitamin and/or herbal supplements within 10 days: Yes- ASA 81 mg daily (last dose 4/5/22, then held for surgery). Celebrex, Cinnamon Bark- instructed to hold these medications/supplements for 7 days before surgery.   History of blood thinners: No    Plan:   1) Discharge Plan: Home POD 1 with assist of Son and would like Home Care. Backup plan is TCU only if true medical patient discusses, patient prefers to discharge home but is concerned about the number of stairs in her home. Please see Discharge Planning section near bottom of this note for further details.     2) Maxillary Sinusitis: Resolving. Presented to urgent care on 3/30/22 with cough, sneezing, plugged sinuses. Diagnosed with sinus infection and treated with Levaquin, albuterol inhaler, decongestants and saline rinses. Covid-19 tests on 3/30/22 and 4/5/22 both negative. Patient reports feeling significantly better now- cough has significantly improved following completion of antibiotics. Denies shortness of breath, chest pain, fever or chills. Chest x-ray 4/5/22 showed some fibrotic stranding. Oxygen levels normal per PCP. Cleared by PCP for surgery. Dr. Eaton also notified and is ok proceeding.     3) Coronary Artery Disease- S/P Stent to RCA in 2017:  CAD medically managed with ASA 81 mg, statin and nitroglycerin as needed. Denies any recent chest pain/pressure/discomfort, JIMENES, palpitations, dizziness, presyncope or syncope. Unable to achieve 4 METS due to knee pain. NM Stress Test 7/30/2020 was negative for inducible myocardial ischemia or infarction. Cleared by PCP to proceed with surgery without further cardiovascular testing.     4)  Supraventricular Tachycardia- S/P Ablation in 2011:     5) Dyslipidemia: On rosuvastatin.    6) Hypertension: Appears well controlled on lisinopril and hydrochlorothiazide.  Patient instructed to hold lisinopril and hydrochlorothiazide on the morning of surgery.    7) PONV: I recommend patient discusses this with preop nursing and anesthesia on day of surgery. Would likely benefit from antiemetics and other measures to prevent or minimize nausea/vomiting.    8) Type 2 Diabetes Mellitus, with long-term current use of insulin:  Well-controlled.  Hemoglobin A1c 6.7 on 4/5/2022. On insulin degludec 70 units daily, insulin lispro 10 units 3x daily before meals and Ozempic. I recommend blood glucose checks at least three times a day and at bedtime during hospital stay. Goal BG < 180 to decrease risk for infection and wound healing complications. Nursing to please notify Hospitalist if BG > 180.      9) Chronic Kidney Disease-Stage 3b: Stable.  Most recent creatinine 1.04, GFR 60 on 4/5/2022.  Renal function appears quite stable over the past year. I recommend avoiding or minimizing use of nephrotoxins like NSAIDS, promoting good post-op hydration and monitoring post-op kidney function as needed.     10) Morbid Obesity: BMI 44.3, Wt: 260 lbs. I recommend continued efforts at safe weight loss following recovery from surgery. If patient is interested in further assistance with weight loss, please consider referral to [facility]. They offer both non-surgical and surgical evidence-based weight loss strategies. Call [phone] to schedule a consultation to learn more.      11) Anemia: Mild: Hemoglobin 11.0 on 4/5/2022. Dr. Eaton notified of this hemoglobin and is okay proceeding.     Patient appears medically optimized for upcoming surgery. I would recommend Hospitalist Consult to assist with medical management. Please call me below with any questions on this patient.        Review of Systems Notable for: Coronary artery disease-s/p stent to RCA in 2017, supraventricular tachycardia-s/p ablation in 2011, dyslipidemia, hypertension, PONV, type 2 diabetes mellitus on long-term use of insulin, chronic kidney disease-stage 3b, morbid obesity.    Past Medical History:   Past Medical History:   Diagnosis Date     Anemia      Breast cyst      Carpal tunnel syndrome      Coronary artery disease due to lipid rich plaque      Dyslipidemia, goal LDL below 70      Esophageal reflux      Essential hypertension      Fatty liver      Goiter diffuse, nontoxic      Hiatal hernia      History of gestational diabetes      History of MRSA infection 07/17/2017    Pilonidal cyst culture     Increased PTH level 11/4/2018     Morbid obesity with BMI of 40.0-44.9, adult (H)      RADHA on CPAP      Osteoarthritis      Paroxysmal SVT (supraventricular tachycardia) (H) 2011    ablation by Dr. Johnson in 2011     PONV (postoperative nausea and vomiting)      Positive result for methicillin resistant Staphylococcus aureus (MRSA) screening 11/4/2018     Renal disease      S/P hip replacement      Stented coronary artery      Thiamine deficiency 11/4/2018     Type 2 diabetes mellitus (H)      Uncomplicated asthma      Vitamin D deficiency 11/4/2018     Past Surgical History:   Procedure Laterality Date     BIOPSY BREAST       BREAST CYST EXCISION       CARDIAC CATHETERIZATION N/A 4/24/2017    Procedure: Coronary Angiogram;  Surgeon: Ariane Biswas MD;  Location: [facility] Cath Lab;  Service:      CARDIAC CATHETERIZATION N/A 5/19/2017    Procedure: Coronary Angiogram;  Surgeon: Howard Gutierrez MD;  Location: [facility] Cath Lab;  Service:      CHOLECYSTECTOMY  2000     CORONARY STENT PLACEMENT  04/24/2017    BO to RCA by Dr. Biswas     HC REVISE MEDIAN N/CARPAL TUNNEL SURG      Description: Neuroplasty Decompression Median Nerve At Carpal Tunnel;  Recorded: 05/22/2009;     IR LUMBAR EPIDURAL STEROID  INJECTION  6/22/2007     IR LUMBAR EPIDURAL STEROID INJECTION  11/2/2007     IR LUMBAR EPIDURAL STEROID INJECTION  8/6/2008     IR LUMBAR EPIDURAL STEROID INJECTION  10/8/2008     IR LUMBAR EPIDURAL STEROID INJECTION  10/28/2008     IR LUMBAR EPIDURAL STEROID INJECTION  3/10/2009     WY EXPLO/DRAIN BREAST ABSCESS      Description: Breast Surgery Mastotomy With Drainage Of Abscess     WY L HRT CATH W/NJX L VENTRICULOGRAPHY IMG S&I N/A 4/24/2017    Procedure: Left Heart Catheterization with Left Ventriculogram;  Surgeon: Ariane Biswas MD;  Location: [facility] Cath Lab;  Service: Cardiology     WY THYROID LOBECTOMY,UNILAT       RADIOFREQUENCY ABLATION  07/05/2011    for PSVT by Dr. Johnson     TOTAL HIP ARTHROPLASTY Right        Current Medications:  Patient's Medications   New Prescriptions    No medications on file   Previous Medications    ACETAMINOPHEN (TYLENOL) 500 MG TABLET    [ACETAMINOPHEN (TYLENOL) 500 MG TABLET] Take 1,000 mg by mouth 3 (three) times a day as needed.    ALBUTEROL (PROAIR HFA/PROVENTIL HFA/VENTOLIN HFA) 108 (90 BASE) MCG/ACT INHALER    Inhale 2 puffs into the lungs every 6 hours    ALLOPURINOL (ZYLOPRIM) 100 MG TABLET    TAKE 1 TABLET(100 MG) BY MOUTH DAILY    ASPIRIN 81 MG EC TABLET    [ASPIRIN 81 MG EC TABLET] Take 1 tablet (81 mg total) by mouth daily.    BLOOD GLUCOSE TEST STRIPS    [BLOOD GLUCOSE TEST STRIPS] Use 1 each As Directed 6 (six) times a day. Prodigy Meter    CELECOXIB (CELEBREX) 200 MG CAPSULE    Take 1 capsule by mouth 2 times daily    CETIRIZINE (ZYRTEC) 10 MG TABLET    Take 1 tablet (10 mg) by mouth daily for 14 days    CHOLECALCIFEROL, VITAMIN D3, (CHOLECALCIFEROL) 1,000 UNIT TABLET    [CHOLECALCIFEROL, VITAMIN D3, (CHOLECALCIFEROL) 1,000 UNIT TABLET] Take 3,000 Units by mouth daily.     CINNAMON BARK (CINNAMON ORAL)    [CINNAMON BARK (CINNAMON ORAL)] Take 1 each by mouth 2 (two) times a day.    CONTINUOUS BLOOD GLUC SENSOR (FREESTYLE MIRANDA 2 SENSOR) MISC    1 each every  "14 days 1 each every 14 days. Change every 14 days.    CYANOCOBALAMIN (VITAMIN B-12) 500 MCG TABLET    [CYANOCOBALAMIN (VITAMIN B-12) 500 MCG TABLET] Take 500 mcg by mouth daily.    DOCUSATE SODIUM (COLACE) 100 MG CAPSULE    Take 1 capsule (100 mg) by mouth 2 times daily    FLUTICASONE (FLONASE) 50 MCG/ACT NASAL SPRAY    Spray 1 spray into both nostrils daily    GENERIC LANCETS (MICROLET LANCET)    [GENERIC LANCETS (MICROLET LANCET)] Use 1 each As Directed 6 (six) times a day.    HYDROCHLOROTHIAZIDE (MICROZIDE) 12.5 MG CAPSULE    TAKE 1 CAPSULE(12.5 MG) BY MOUTH DAILY    HYDROCODONE-ACETAMINOPHEN (NORCO) 5-325 MG TABLET    Take 1 tablet by mouth 2 times daily as needed for pain    INSULIN DEGLUDEC (TRESIBA FLEXTOUCH) 200 UNIT/ML PEN    Inject 70 Units Subcutaneous daily    INSULIN LISPRO (HUMALOG KWIKPEN) 100 UNIT/ML (1 UNIT DIAL) KWIKPEN    Inject 10 Units Subcutaneous 3 times daily (before meals)    LISINOPRIL (ZESTRIL) 5 MG TABLET    TAKE 1 AND 1/2 TABLETS(7.5 MG) BY MOUTH DAILY    MELATONIN (MELATIN) 3 MG TAB TABLET    [MELATONIN (MELATIN) 3 MG TAB TABLET] Take 3 mg by mouth at bedtime as needed.    NITROGLYCERIN (NITROSTAT) 0.3 MG SL TABLET    [NITROGLYCERIN (NITROSTAT) 0.3 MG SL TABLET] Place 1 tablet (0.3 mg total) under the tongue every 5 (five) minutes as needed for chest pain.    ONETOUCH DELICA PLUS LANCET 33 GAUGE MISC    [ONETOUCH DELICA PLUS LANCET 33 GAUGE MISC] USE TO TEST BLOOD SUGAR 6 TIMES DAILY    OZEMPIC, 1 MG/DOSE, 4 MG/3ML SOPN    INJECT 1MG UNDER THE SKIN ONCE EVERY 7 DAYS    PEN NEEDLE, DIABETIC (BD ULTRA-FINE ZULMA PEN NEEDLE) 32 GAUGE X 5/32\" NDLE    [PEN NEEDLE, DIABETIC (BD ULTRA-FINE ZULMA PEN NEEDLE) 32 GAUGE X 5/32\" NDLE] Inject 4 each under the skin daily. With Tresiba and Novolog    ROSUVASTATIN (CRESTOR) 10 MG TABLET    [ROSUVASTATIN (CRESTOR) 10 MG TABLET] Take 1 tablet (10 mg total) by mouth daily.    THIAMINE HCL (B-1) 100 MG TABS    TAKE 1 TABLET(100 MG) BY MOUTH DAILY    " TRIAMCINOLONE (KENALOG) 0.1 % CREAM    [TRIAMCINOLONE (KENALOG) 0.1 % CREAM] Apply bid    VALACYCLOVIR (VALTREX) 500 MG TABLET    TAKE 1 TABLET(500 MG) BY MOUTH DAILY   Modified Medications    No medications on file   Discontinued Medications    No medications on file       ALLERGIES:  Allergies   Allergen Reactions     Latex, Natural Rubber [Latex] Hives     Penicillins Other (See Comments)     Passed out.      Doxycycline Dizziness     Eggshell Membrane [Egg Shells] Unknown     Erythromycin Itching     Ibuprofen      Naloxone Unknown     itching     Penicillamine Unknown     Pneumococcal 23-Valent Polysaccharide Vaccine [Pneumococcal Vaccine] Other (See Comments)     pneumonia     Statins-Hmg-Coa Reductase Inhibitors [Hmg-Coa-R Inhibitors] Muscle Pain (Myalgia)     Shaking with simvastatin and atorvastatin     Azithromycin Rash       Social History  Social History     Tobacco Use     Smoking status: Former Smoker     Packs/day: 0.25     Years: 44.00     Pack years: 11.00     Types: Cigarettes     Quit date: 2017     Years since quittin.2     Smokeless tobacco: Never Used     Tobacco comment: age 18 to age 62 up to 1/2 ppd   Substance Use Topics     Alcohol use: Yes     Alcohol/week: 0.0 standard drinks     Comment: Alcoholic Drinks/day: < 1 drink a week     Drug use: No       Any Abnormal Recent Diagnostics? Yes  Hemoglobin 11.0 on 2022: Dr. Eaton notified and is ok proceeding with this hemoglobin.   Hemoglobin A1c 6.7 on 2022: Shows good recent control of type 2 diabetes on insulin and Ozempic.  Blood glucose 119 on 2022: We will monitor BG's closely during hospital stay.  Goal BG <180 to decrease risk for infection and postop wound healing complications.  Creatinine 1.04, GFR 60 on 2022: Shows stable chronic kidney disease-stage 3b.     Discharge Planning:   Patient expects to be discharged to: Patient prefers to discharge Home with assist of her Son and Home Care. Backup plan TCU only  if truly needed based on assessments after surgery.   Barriers to discharge: Son recently sprained his ankle so is moving around more slowly.   Assistance Needed?: Yes- interested in Home Care after surgery   Discharge arrangements made: Yes  Discharge Plan: Home with assist of her son and Home Care   Living Arrangements: with Spouse  Type of residence: Private Residence   Do you have stairs?  Yes- 8 stairs to get into house. 10 stairs to get up to upper level where bedroom and bathroom are located. Railings on one side.   Once in the house are you able to live on one level? Yes- could mostly live on upper level but needs to get up 10 stairs to get to upper level.   Which level? Upper Level   What does your bathroom have? Tub/shower unit.    Home Care Services? Not currently but would like to have Home Care for a couple weeks after surgery.   Support Systems: Spouse and Son    Pt/Rep Engagement Interventions: Discussed discharge plans   Caregiver at Home: Yes- Son will be able to help at home but he recently sprained his ankle so can't help physically as much as her normally would.   Patient confirmed they have help/assistance in place at home upon discharge?   Yes    FRANCO Jean, CNP   Advanced Practice Nurse Navigator- Orthopedics  [facility]   Phone: [phone]

## 2022-04-08 ENCOUNTER — HOSPITAL ENCOUNTER (OUTPATIENT)
Facility: CLINIC | Age: 67
Discharge: HOME | End: 2022-04-10
Attending: ORTHOPAEDIC SURGERY | Admitting: ORTHOPAEDIC SURGERY
Payer: COMMERCIAL

## 2022-04-08 ENCOUNTER — ANESTHESIA EVENT (OUTPATIENT)
Dept: SURGERY | Facility: CLINIC | Age: 67
End: 2022-04-08
Payer: COMMERCIAL

## 2022-04-08 ENCOUNTER — ANESTHESIA (OUTPATIENT)
Dept: SURGERY | Facility: CLINIC | Age: 67
End: 2022-04-08
Payer: COMMERCIAL

## 2022-04-08 DIAGNOSIS — I65.22 STENOSIS OF LEFT CAROTID ARTERY: ICD-10-CM

## 2022-04-08 DIAGNOSIS — M17.11 PRIMARY OSTEOARTHRITIS OF RIGHT KNEE: Primary | ICD-10-CM

## 2022-04-08 DIAGNOSIS — E83.42 HYPOMAGNESEMIA: ICD-10-CM

## 2022-04-08 PROBLEM — M17.9 KNEE OSTEOARTHRITIS: Status: ACTIVE | Noted: 2022-04-08

## 2022-04-08 LAB
DHC UR CFM-MCNC: 132 NG/ML
DHC/CREAT UR: 68 NG/MG {CREAT}
GLUCOSE BLDC GLUCOMTR-MCNC: 113 MG/DL (ref 70–99)
GLUCOSE BLDC GLUCOMTR-MCNC: 115 MG/DL (ref 70–99)
GLUCOSE BLDC GLUCOMTR-MCNC: 176 MG/DL (ref 70–99)
GLUCOSE BLDC GLUCOMTR-MCNC: 250 MG/DL (ref 70–99)
HYDROCODONE UR CFM-MCNC: 318 NG/ML
HYDROCODONE/CREAT UR: 165 NG/MG {CREAT}
HYDROMORPHONE UR CFM-MCNC: 57 NG/ML
HYDROMORPHONE/CREAT UR: 30 NG/MG {CREAT}

## 2022-04-08 PROCEDURE — 250N000011 HC RX IP 250 OP 636: Performed by: ANESTHESIOLOGY

## 2022-04-08 PROCEDURE — 250N000009 HC RX 250: Performed by: NURSE ANESTHETIST, CERTIFIED REGISTERED

## 2022-04-08 PROCEDURE — 250N000013 HC RX MED GY IP 250 OP 250 PS 637: Performed by: PHYSICIAN ASSISTANT

## 2022-04-08 PROCEDURE — 250N000013 HC RX MED GY IP 250 OP 250 PS 637: Performed by: ORTHOPAEDIC SURGERY

## 2022-04-08 PROCEDURE — 82962 GLUCOSE BLOOD TEST: CPT | Mod: 91

## 2022-04-08 PROCEDURE — 250N000011 HC RX IP 250 OP 636: Performed by: PHYSICIAN ASSISTANT

## 2022-04-08 PROCEDURE — 96372 THER/PROPH/DIAG INJ SC/IM: CPT | Performed by: INTERNAL MEDICINE

## 2022-04-08 PROCEDURE — 250N000012 HC RX MED GY IP 250 OP 636 PS 637: Performed by: INTERNAL MEDICINE

## 2022-04-08 PROCEDURE — 272N000001 HC OR GENERAL SUPPLY STERILE: Performed by: ORTHOPAEDIC SURGERY

## 2022-04-08 PROCEDURE — 278N000051 HC OR IMPLANT GENERAL: Performed by: ORTHOPAEDIC SURGERY

## 2022-04-08 PROCEDURE — 258N000003 HC RX IP 258 OP 636: Performed by: ANESTHESIOLOGY

## 2022-04-08 PROCEDURE — 99213 OFFICE O/P EST LOW 20 MIN: CPT | Performed by: INTERNAL MEDICINE

## 2022-04-08 PROCEDURE — 258N000003 HC RX IP 258 OP 636: Performed by: ORTHOPAEDIC SURGERY

## 2022-04-08 PROCEDURE — 999N000141 HC STATISTIC PRE-PROCEDURE NURSING ASSESSMENT: Performed by: ORTHOPAEDIC SURGERY

## 2022-04-08 PROCEDURE — 250N000013 HC RX MED GY IP 250 OP 250 PS 637: Performed by: INTERNAL MEDICINE

## 2022-04-08 PROCEDURE — 258N000003 HC RX IP 258 OP 636: Performed by: PHYSICIAN ASSISTANT

## 2022-04-08 PROCEDURE — 360N000077 HC SURGERY LEVEL 4, PER MIN: Performed by: ORTHOPAEDIC SURGERY

## 2022-04-08 PROCEDURE — C1776 JOINT DEVICE (IMPLANTABLE): HCPCS | Performed by: ORTHOPAEDIC SURGERY

## 2022-04-08 PROCEDURE — 370N000017 HC ANESTHESIA TECHNICAL FEE, PER MIN: Performed by: ORTHOPAEDIC SURGERY

## 2022-04-08 PROCEDURE — 250N000011 HC RX IP 250 OP 636: Performed by: ORTHOPAEDIC SURGERY

## 2022-04-08 PROCEDURE — 710N000010 HC RECOVERY PHASE 1, LEVEL 2, PER MIN: Performed by: ORTHOPAEDIC SURGERY

## 2022-04-08 PROCEDURE — 258N000001 HC RX 258: Performed by: ORTHOPAEDIC SURGERY

## 2022-04-08 PROCEDURE — 250N000011 HC RX IP 250 OP 636: Performed by: NURSE ANESTHETIST, CERTIFIED REGISTERED

## 2022-04-08 DEVICE — IMP BONE CEMENT SIMPLEX W/GENTAMICIN 6195-1-001: Type: IMPLANTABLE DEVICE | Site: KNEE | Status: FUNCTIONAL

## 2022-04-08 DEVICE — IMPLANTABLE DEVICE
Type: IMPLANTABLE DEVICE | Site: KNEE | Status: FUNCTIONAL
Brand: PERSONA® VIVACIT-E®

## 2022-04-08 DEVICE — IMPLANTABLE DEVICE
Type: IMPLANTABLE DEVICE | Site: KNEE | Status: FUNCTIONAL
Brand: PERSONA®

## 2022-04-08 DEVICE — IMPLANTABLE DEVICE
Type: IMPLANTABLE DEVICE | Site: KNEE | Status: FUNCTIONAL
Brand: PERSONA® NATURAL TIBIA®

## 2022-04-08 DEVICE — BONE CEMENT RADIOPAQUE SIMPLEX HV FULL DOSE 6194-1-001: Type: IMPLANTABLE DEVICE | Site: KNEE | Status: FUNCTIONAL

## 2022-04-08 DEVICE — IMPLANTABLE DEVICE
Type: IMPLANTABLE DEVICE | Site: KNEE | Status: FUNCTIONAL
Brand: PERSONA™

## 2022-04-08 RX ORDER — NITROGLYCERIN 0.3 MG/1
0.3 TABLET SUBLINGUAL EVERY 5 MIN PRN
Status: DISCONTINUED | OUTPATIENT
Start: 2022-04-08 | End: 2022-04-08 | Stop reason: CLARIF

## 2022-04-08 RX ORDER — OXYCODONE HYDROCHLORIDE 5 MG/1
10 TABLET ORAL EVERY 4 HOURS PRN
Status: DISCONTINUED | OUTPATIENT
Start: 2022-04-08 | End: 2022-04-10 | Stop reason: HOSPADM

## 2022-04-08 RX ORDER — TRANEXAMIC ACID 650 MG/1
1950 TABLET ORAL ONCE
Status: COMPLETED | OUTPATIENT
Start: 2022-04-08 | End: 2022-04-08

## 2022-04-08 RX ORDER — INSULIN LISPRO 100 [IU]/ML
10-12 INJECTION, SOLUTION INTRAVENOUS; SUBCUTANEOUS
Status: DISCONTINUED | OUTPATIENT
Start: 2022-04-09 | End: 2022-04-08 | Stop reason: CLARIF

## 2022-04-08 RX ORDER — CLINDAMYCIN PHOSPHATE 900 MG/50ML
900 INJECTION, SOLUTION INTRAVENOUS SEE ADMIN INSTRUCTIONS
Status: DISCONTINUED | OUTPATIENT
Start: 2022-04-08 | End: 2022-04-08

## 2022-04-08 RX ORDER — SODIUM CHLORIDE, SODIUM LACTATE, POTASSIUM CHLORIDE, CALCIUM CHLORIDE 600; 310; 30; 20 MG/100ML; MG/100ML; MG/100ML; MG/100ML
INJECTION, SOLUTION INTRAVENOUS CONTINUOUS
Status: DISCONTINUED | OUTPATIENT
Start: 2022-04-08 | End: 2022-04-10 | Stop reason: HOSPADM

## 2022-04-08 RX ORDER — TRIAMCINOLONE ACETONIDE 1 MG/G
CREAM TOPICAL 2 TIMES DAILY
Status: DISCONTINUED | OUTPATIENT
Start: 2022-04-08 | End: 2022-04-10 | Stop reason: HOSPADM

## 2022-04-08 RX ORDER — HYDROMORPHONE HCL IN WATER/PF 6 MG/30 ML
0.4 PATIENT CONTROLLED ANALGESIA SYRINGE INTRAVENOUS
Status: DISCONTINUED | OUTPATIENT
Start: 2022-04-08 | End: 2022-04-10 | Stop reason: HOSPADM

## 2022-04-08 RX ORDER — ROSUVASTATIN CALCIUM 10 MG/1
10 TABLET, COATED ORAL DAILY
Status: DISCONTINUED | OUTPATIENT
Start: 2022-04-09 | End: 2022-04-10 | Stop reason: HOSPADM

## 2022-04-08 RX ORDER — CELECOXIB 200 MG/1
200 CAPSULE ORAL 2 TIMES DAILY
Status: DISCONTINUED | OUTPATIENT
Start: 2022-04-08 | End: 2022-04-10 | Stop reason: HOSPADM

## 2022-04-08 RX ORDER — SODIUM CHLORIDE, SODIUM LACTATE, POTASSIUM CHLORIDE, CALCIUM CHLORIDE 600; 310; 30; 20 MG/100ML; MG/100ML; MG/100ML; MG/100ML
INJECTION, SOLUTION INTRAVENOUS CONTINUOUS
Status: DISCONTINUED | OUTPATIENT
Start: 2022-04-08 | End: 2022-04-08 | Stop reason: HOSPADM

## 2022-04-08 RX ORDER — ONDANSETRON 4 MG/1
4 TABLET, ORALLY DISINTEGRATING ORAL EVERY 6 HOURS PRN
Status: DISCONTINUED | OUTPATIENT
Start: 2022-04-08 | End: 2022-04-10 | Stop reason: HOSPADM

## 2022-04-08 RX ORDER — LIDOCAINE 40 MG/G
CREAM TOPICAL
Status: DISCONTINUED | OUTPATIENT
Start: 2022-04-08 | End: 2022-04-08 | Stop reason: HOSPADM

## 2022-04-08 RX ORDER — ONDANSETRON 2 MG/ML
4 INJECTION INTRAMUSCULAR; INTRAVENOUS EVERY 30 MIN PRN
Status: DISCONTINUED | OUTPATIENT
Start: 2022-04-08 | End: 2022-04-08 | Stop reason: HOSPADM

## 2022-04-08 RX ORDER — AMOXICILLIN 250 MG
1 CAPSULE ORAL 2 TIMES DAILY
Status: DISCONTINUED | OUTPATIENT
Start: 2022-04-08 | End: 2022-04-10 | Stop reason: HOSPADM

## 2022-04-08 RX ORDER — ACETAMINOPHEN 325 MG/1
650 TABLET ORAL EVERY 4 HOURS PRN
Status: DISCONTINUED | OUTPATIENT
Start: 2022-04-11 | End: 2022-04-10 | Stop reason: HOSPADM

## 2022-04-08 RX ORDER — DEXTROSE MONOHYDRATE 25 G/50ML
25-50 INJECTION, SOLUTION INTRAVENOUS
Status: DISCONTINUED | OUTPATIENT
Start: 2022-04-08 | End: 2022-04-10 | Stop reason: HOSPADM

## 2022-04-08 RX ORDER — VALACYCLOVIR HYDROCHLORIDE 500 MG/1
500 TABLET, FILM COATED ORAL DAILY
Status: DISCONTINUED | OUTPATIENT
Start: 2022-04-08 | End: 2022-04-10 | Stop reason: HOSPADM

## 2022-04-08 RX ORDER — BUPIVACAINE HYDROCHLORIDE 7.5 MG/ML
INJECTION, SOLUTION INTRASPINAL
Status: COMPLETED | OUTPATIENT
Start: 2022-04-08 | End: 2022-04-08

## 2022-04-08 RX ORDER — POLYETHYLENE GLYCOL 3350 17 G/17G
17 POWDER, FOR SOLUTION ORAL DAILY
Status: DISCONTINUED | OUTPATIENT
Start: 2022-04-09 | End: 2022-04-10 | Stop reason: HOSPADM

## 2022-04-08 RX ORDER — AMOXICILLIN 250 MG
1-2 CAPSULE ORAL 2 TIMES DAILY
Qty: 30 TABLET | Refills: 0 | Status: SHIPPED | OUTPATIENT
Start: 2022-04-08 | End: 2022-07-06

## 2022-04-08 RX ORDER — FLUTICASONE PROPIONATE 50 MCG
1 SPRAY, SUSPENSION (ML) NASAL DAILY
Status: DISCONTINUED | OUTPATIENT
Start: 2022-04-08 | End: 2022-04-10 | Stop reason: HOSPADM

## 2022-04-08 RX ORDER — ASPIRIN 81 MG/1
81 TABLET ORAL 2 TIMES DAILY
Qty: 60 TABLET | Refills: 0 | Status: SHIPPED | OUTPATIENT
Start: 2022-04-08 | End: 2022-06-24

## 2022-04-08 RX ORDER — LIDOCAINE HYDROCHLORIDE 10 MG/ML
INJECTION, SOLUTION INFILTRATION; PERINEURAL PRN
Status: DISCONTINUED | OUTPATIENT
Start: 2022-04-08 | End: 2022-04-08

## 2022-04-08 RX ORDER — FENTANYL CITRATE 50 UG/ML
100 INJECTION, SOLUTION INTRAMUSCULAR; INTRAVENOUS ONCE
Status: COMPLETED | OUTPATIENT
Start: 2022-04-08 | End: 2022-04-08

## 2022-04-08 RX ORDER — HYDROXYZINE HYDROCHLORIDE 25 MG/1
25 TABLET, FILM COATED ORAL EVERY 6 HOURS PRN
Qty: 30 TABLET | Refills: 0 | Status: SHIPPED | OUTPATIENT
Start: 2022-04-08 | End: 2022-06-24

## 2022-04-08 RX ORDER — ALLOPURINOL 100 MG/1
100 TABLET ORAL DAILY
Status: DISCONTINUED | OUTPATIENT
Start: 2022-04-09 | End: 2022-04-10 | Stop reason: HOSPADM

## 2022-04-08 RX ORDER — ASPIRIN 81 MG/1
81 TABLET ORAL 2 TIMES DAILY
Status: DISCONTINUED | OUTPATIENT
Start: 2022-04-08 | End: 2022-04-10 | Stop reason: HOSPADM

## 2022-04-08 RX ORDER — HYDROCHLOROTHIAZIDE 12.5 MG/1
12.5 CAPSULE ORAL DAILY
Status: DISCONTINUED | OUTPATIENT
Start: 2022-04-09 | End: 2022-04-10 | Stop reason: HOSPADM

## 2022-04-08 RX ORDER — HYDROMORPHONE HCL IN WATER/PF 6 MG/30 ML
0.2 PATIENT CONTROLLED ANALGESIA SYRINGE INTRAVENOUS
Status: DISCONTINUED | OUTPATIENT
Start: 2022-04-08 | End: 2022-04-10 | Stop reason: HOSPADM

## 2022-04-08 RX ORDER — NALOXONE HYDROCHLORIDE 0.4 MG/ML
0.4 INJECTION, SOLUTION INTRAMUSCULAR; INTRAVENOUS; SUBCUTANEOUS
Status: DISCONTINUED | OUTPATIENT
Start: 2022-04-08 | End: 2022-04-10 | Stop reason: HOSPADM

## 2022-04-08 RX ORDER — OXYCODONE HYDROCHLORIDE 5 MG/1
5-10 TABLET ORAL
Qty: 30 TABLET | Refills: 0 | Status: SHIPPED | OUTPATIENT
Start: 2022-04-08 | End: 2022-06-24

## 2022-04-08 RX ORDER — DOCUSATE SODIUM 100 MG/1
100 CAPSULE, LIQUID FILLED ORAL 2 TIMES DAILY
Status: DISCONTINUED | OUTPATIENT
Start: 2022-04-08 | End: 2022-04-10 | Stop reason: HOSPADM

## 2022-04-08 RX ORDER — DIPHENHYDRAMINE HYDROCHLORIDE 50 MG/ML
INJECTION INTRAMUSCULAR; INTRAVENOUS PRN
Status: DISCONTINUED | OUTPATIENT
Start: 2022-04-08 | End: 2022-04-08

## 2022-04-08 RX ORDER — AZTREONAM 2 G/1
INJECTION, POWDER, LYOPHILIZED, FOR SOLUTION INTRAMUSCULAR; INTRAVENOUS PRN
Status: DISCONTINUED | OUTPATIENT
Start: 2022-04-08 | End: 2022-04-08

## 2022-04-08 RX ORDER — NITROGLYCERIN 0.4 MG/1
0.4 TABLET SUBLINGUAL EVERY 5 MIN PRN
Status: DISCONTINUED | OUTPATIENT
Start: 2022-04-08 | End: 2022-04-10 | Stop reason: HOSPADM

## 2022-04-08 RX ORDER — VITAMIN B COMPLEX
3000 TABLET ORAL DAILY
Status: DISCONTINUED | OUTPATIENT
Start: 2022-04-08 | End: 2022-04-10 | Stop reason: HOSPADM

## 2022-04-08 RX ORDER — DEXAMETHASONE SODIUM PHOSPHATE 10 MG/ML
INJECTION, SOLUTION INTRAMUSCULAR; INTRAVENOUS PRN
Status: DISCONTINUED | OUTPATIENT
Start: 2022-04-08 | End: 2022-04-08

## 2022-04-08 RX ORDER — ONDANSETRON 2 MG/ML
INJECTION INTRAMUSCULAR; INTRAVENOUS PRN
Status: DISCONTINUED | OUTPATIENT
Start: 2022-04-08 | End: 2022-04-08

## 2022-04-08 RX ORDER — OXYCODONE HYDROCHLORIDE 5 MG/1
5 TABLET ORAL EVERY 4 HOURS PRN
Status: DISCONTINUED | OUTPATIENT
Start: 2022-04-08 | End: 2022-04-08 | Stop reason: HOSPADM

## 2022-04-08 RX ORDER — FENTANYL CITRATE 50 UG/ML
25 INJECTION, SOLUTION INTRAMUSCULAR; INTRAVENOUS EVERY 5 MIN PRN
Status: DISCONTINUED | OUTPATIENT
Start: 2022-04-08 | End: 2022-04-08 | Stop reason: HOSPADM

## 2022-04-08 RX ORDER — ONDANSETRON 4 MG/1
4 TABLET, ORALLY DISINTEGRATING ORAL EVERY 30 MIN PRN
Status: DISCONTINUED | OUTPATIENT
Start: 2022-04-08 | End: 2022-04-08 | Stop reason: HOSPADM

## 2022-04-08 RX ORDER — NALOXONE HYDROCHLORIDE 0.4 MG/ML
0.2 INJECTION, SOLUTION INTRAMUSCULAR; INTRAVENOUS; SUBCUTANEOUS
Status: DISCONTINUED | OUTPATIENT
Start: 2022-04-08 | End: 2022-04-10 | Stop reason: HOSPADM

## 2022-04-08 RX ORDER — ALBUTEROL SULFATE 90 UG/1
2 AEROSOL, METERED RESPIRATORY (INHALATION) EVERY 6 HOURS
Status: DISCONTINUED | OUTPATIENT
Start: 2022-04-08 | End: 2022-04-10 | Stop reason: HOSPADM

## 2022-04-08 RX ORDER — ACETAMINOPHEN 325 MG/1
975 TABLET ORAL EVERY 8 HOURS
Status: DISCONTINUED | OUTPATIENT
Start: 2022-04-08 | End: 2022-04-10 | Stop reason: HOSPADM

## 2022-04-08 RX ORDER — LIDOCAINE 40 MG/G
CREAM TOPICAL
Status: DISCONTINUED | OUTPATIENT
Start: 2022-04-08 | End: 2022-04-10 | Stop reason: HOSPADM

## 2022-04-08 RX ORDER — FENTANYL CITRATE 50 UG/ML
25 INJECTION, SOLUTION INTRAMUSCULAR; INTRAVENOUS
Status: DISCONTINUED | OUTPATIENT
Start: 2022-04-08 | End: 2022-04-08 | Stop reason: HOSPADM

## 2022-04-08 RX ORDER — CETIRIZINE HYDROCHLORIDE 10 MG/1
10 TABLET ORAL DAILY
Status: DISCONTINUED | OUTPATIENT
Start: 2022-04-08 | End: 2022-04-10 | Stop reason: HOSPADM

## 2022-04-08 RX ORDER — OXYCODONE HYDROCHLORIDE 5 MG/1
5 TABLET ORAL EVERY 4 HOURS PRN
Status: DISCONTINUED | OUTPATIENT
Start: 2022-04-08 | End: 2022-04-10 | Stop reason: HOSPADM

## 2022-04-08 RX ORDER — ONDANSETRON 2 MG/ML
4 INJECTION INTRAMUSCULAR; INTRAVENOUS EVERY 6 HOURS PRN
Status: DISCONTINUED | OUTPATIENT
Start: 2022-04-08 | End: 2022-04-10 | Stop reason: HOSPADM

## 2022-04-08 RX ORDER — BISACODYL 10 MG
10 SUPPOSITORY, RECTAL RECTAL DAILY PRN
Status: DISCONTINUED | OUTPATIENT
Start: 2022-04-08 | End: 2022-04-10 | Stop reason: HOSPADM

## 2022-04-08 RX ORDER — PROPOFOL 10 MG/ML
INJECTION, EMULSION INTRAVENOUS CONTINUOUS PRN
Status: DISCONTINUED | OUTPATIENT
Start: 2022-04-08 | End: 2022-04-08

## 2022-04-08 RX ORDER — HYDROMORPHONE HCL IN WATER/PF 6 MG/30 ML
0.2 PATIENT CONTROLLED ANALGESIA SYRINGE INTRAVENOUS EVERY 5 MIN PRN
Status: DISCONTINUED | OUTPATIENT
Start: 2022-04-08 | End: 2022-04-08 | Stop reason: HOSPADM

## 2022-04-08 RX ORDER — CEFAZOLIN SODIUM 1 G/3ML
INJECTION, POWDER, FOR SOLUTION INTRAMUSCULAR; INTRAVENOUS PRN
Status: DISCONTINUED | OUTPATIENT
Start: 2022-04-08 | End: 2022-04-08

## 2022-04-08 RX ORDER — FENTANYL CITRATE 50 UG/ML
INJECTION, SOLUTION INTRAMUSCULAR; INTRAVENOUS PRN
Status: DISCONTINUED | OUTPATIENT
Start: 2022-04-08 | End: 2022-04-08

## 2022-04-08 RX ORDER — HYDROXYZINE HYDROCHLORIDE 25 MG/1
25 TABLET, FILM COATED ORAL EVERY 6 HOURS PRN
Status: DISCONTINUED | OUTPATIENT
Start: 2022-04-08 | End: 2022-04-10 | Stop reason: HOSPADM

## 2022-04-08 RX ORDER — ROPIVACAINE HYDROCHLORIDE 5 MG/ML
INJECTION, SOLUTION EPIDURAL; INFILTRATION; PERINEURAL
Status: COMPLETED | OUTPATIENT
Start: 2022-04-08 | End: 2022-04-08

## 2022-04-08 RX ORDER — CLINDAMYCIN PHOSPHATE 900 MG/50ML
900 INJECTION, SOLUTION INTRAVENOUS
Status: DISCONTINUED | OUTPATIENT
Start: 2022-04-08 | End: 2022-04-08

## 2022-04-08 RX ORDER — NICOTINE POLACRILEX 4 MG
15-30 LOZENGE BUCCAL
Status: DISCONTINUED | OUTPATIENT
Start: 2022-04-08 | End: 2022-04-10 | Stop reason: HOSPADM

## 2022-04-08 RX ORDER — ACETAMINOPHEN 500 MG
1000 TABLET ORAL EVERY 8 HOURS PRN
Status: DISCONTINUED | OUTPATIENT
Start: 2022-04-08 | End: 2022-04-10 | Stop reason: HOSPADM

## 2022-04-08 RX ORDER — PROCHLORPERAZINE MALEATE 10 MG
10 TABLET ORAL EVERY 6 HOURS PRN
Status: DISCONTINUED | OUTPATIENT
Start: 2022-04-08 | End: 2022-04-10 | Stop reason: HOSPADM

## 2022-04-08 RX ADMIN — Medication 3000 UNITS: at 18:31

## 2022-04-08 RX ADMIN — OXYCODONE HYDROCHLORIDE 5 MG: 5 TABLET ORAL at 18:36

## 2022-04-08 RX ADMIN — CELECOXIB 200 MG: 200 CAPSULE ORAL at 20:49

## 2022-04-08 RX ADMIN — SODIUM CHLORIDE, POTASSIUM CHLORIDE, SODIUM LACTATE AND CALCIUM CHLORIDE: 600; 310; 30; 20 INJECTION, SOLUTION INTRAVENOUS at 12:36

## 2022-04-08 RX ADMIN — DIPHENHYDRAMINE HYDROCHLORIDE 25 MG: 50 INJECTION, SOLUTION INTRAMUSCULAR; INTRAVENOUS at 13:05

## 2022-04-08 RX ADMIN — DOCUSATE SODIUM 100 MG: 100 CAPSULE, LIQUID FILLED ORAL at 20:49

## 2022-04-08 RX ADMIN — VANCOMYCIN HYDROCHLORIDE 1500 MG: 5 INJECTION, POWDER, LYOPHILIZED, FOR SOLUTION INTRAVENOUS at 12:23

## 2022-04-08 RX ADMIN — MIDAZOLAM HYDROCHLORIDE 1 MG: 1 INJECTION, SOLUTION INTRAMUSCULAR; INTRAVENOUS at 12:36

## 2022-04-08 RX ADMIN — SODIUM CHLORIDE, POTASSIUM CHLORIDE, SODIUM LACTATE AND CALCIUM CHLORIDE: 600; 310; 30; 20 INJECTION, SOLUTION INTRAVENOUS at 16:38

## 2022-04-08 RX ADMIN — SENNOSIDES AND DOCUSATE SODIUM 1 TABLET: 50; 8.6 TABLET ORAL at 20:49

## 2022-04-08 RX ADMIN — BUPIVACAINE HYDROCHLORIDE IN DEXTROSE 1.6 ML: 7.5 INJECTION, SOLUTION SUBARACHNOID at 13:10

## 2022-04-08 RX ADMIN — MIDAZOLAM 1 MG: 1 INJECTION INTRAMUSCULAR; INTRAVENOUS at 13:05

## 2022-04-08 RX ADMIN — INSULIN ASPART 1 UNITS: 100 INJECTION, SOLUTION INTRAVENOUS; SUBCUTANEOUS at 18:31

## 2022-04-08 RX ADMIN — FENTANYL CITRATE 50 MCG: 50 INJECTION, SOLUTION INTRAMUSCULAR; INTRAVENOUS at 13:05

## 2022-04-08 RX ADMIN — PROPOFOL 75 MCG/KG/MIN: 10 INJECTION, EMULSION INTRAVENOUS at 13:16

## 2022-04-08 RX ADMIN — CEFAZOLIN 2 G: 1 INJECTION, POWDER, FOR SOLUTION INTRAMUSCULAR; INTRAVENOUS at 13:07

## 2022-04-08 RX ADMIN — FENTANYL CITRATE 50 MCG: 50 INJECTION, SOLUTION INTRAMUSCULAR; INTRAVENOUS at 14:32

## 2022-04-08 RX ADMIN — LIDOCAINE HYDROCHLORIDE 20 MG: 10 INJECTION, SOLUTION INFILTRATION; PERINEURAL at 13:16

## 2022-04-08 RX ADMIN — FENTANYL CITRATE 100 MCG: 50 INJECTION, SOLUTION INTRAMUSCULAR; INTRAVENOUS at 12:36

## 2022-04-08 RX ADMIN — OXYCODONE HYDROCHLORIDE 10 MG: 5 TABLET ORAL at 23:19

## 2022-04-08 RX ADMIN — DEXAMETHASONE SODIUM PHOSPHATE 10 MG: 10 INJECTION, SOLUTION INTRAMUSCULAR; INTRAVENOUS at 13:05

## 2022-04-08 RX ADMIN — HYDROMORPHONE HYDROCHLORIDE 0.4 MG: 0.2 INJECTION, SOLUTION INTRAMUSCULAR; INTRAVENOUS; SUBCUTANEOUS at 21:59

## 2022-04-08 RX ADMIN — CETIRIZINE HYDROCHLORIDE 10 MG: 10 TABLET, FILM COATED ORAL at 18:31

## 2022-04-08 RX ADMIN — HYDROXYZINE HYDROCHLORIDE 25 MG: 25 TABLET, FILM COATED ORAL at 16:36

## 2022-04-08 RX ADMIN — ACETAMINOPHEN 1000 MG: 500 TABLET ORAL at 20:54

## 2022-04-08 RX ADMIN — TRANEXAMIC ACID 1950 MG: 650 TABLET ORAL at 11:48

## 2022-04-08 RX ADMIN — INSULIN ASPART 2 UNITS: 100 INJECTION, SOLUTION INTRAVENOUS; SUBCUTANEOUS at 21:54

## 2022-04-08 RX ADMIN — HYDROMORPHONE HYDROCHLORIDE 0.4 MG: 0.2 INJECTION, SOLUTION INTRAMUSCULAR; INTRAVENOUS; SUBCUTANEOUS at 19:42

## 2022-04-08 RX ADMIN — ASPIRIN 81 MG: 81 TABLET, COATED ORAL at 20:49

## 2022-04-08 RX ADMIN — ALBUTEROL SULFATE 2 PUFF: 90 AEROSOL, METERED RESPIRATORY (INHALATION) at 18:31

## 2022-04-08 RX ADMIN — VALACYCLOVIR 500 MG: 500 TABLET, FILM COATED ORAL at 18:31

## 2022-04-08 RX ADMIN — ROPIVACAINE HYDROCHLORIDE 15 ML: 5 INJECTION, SOLUTION EPIDURAL; INFILTRATION; PERINEURAL at 12:40

## 2022-04-08 RX ADMIN — ACETAMINOPHEN 975 MG: 325 TABLET ORAL at 16:36

## 2022-04-08 RX ADMIN — ONDANSETRON 4 MG: 2 INJECTION INTRAMUSCULAR; INTRAVENOUS at 13:05

## 2022-04-08 NOTE — CONSULTS
INTERNAL MEDICINE CONSULT    Physician requesting consult: Dr. Eaton  Reason for consult: CAD hypertension dyslipidemia    Assessment and Plan:  CAD status post stent  Asymptomatic  Continue aspirin, statin  Lexiscan stress test 7/20 negative for inducible ischemia.    Hypertension  Blood pressure is stable  Continue home I am antihypertensives    Diabetes mellitus type 2  Most recent A1c 6.74/22  Continue home insulin regimen starting tomorrow  Insulin sliding scale as needed.  Blood sugar so far stable.     dyslipidemia  Continue home statin    CKD stage III  Avoid nephrotoxic medications  Most recent creatinine 1.04.    Recent maxillary sinusitis  S/p treatment  History of SVT status post ablation    Morbid obesity BMI 43  Obstructive sleep apnea  Does not use CPAP.  Monitor for hypoxemia.    Right knee osteoarthritis status post TKA: Postop day #0.  Continue PT OT, pain control, DVT prophylaxis per orthopedic surgery.  Encourage incentive spirometry, monitor hemoglobin    HPI:     Liana Briceño is a 66 year old old female with past history significant for hypertension, diabetes mellitus type 2, dyslipidemia admitted postoperatively after she underwent a total knee arthroplasty.  Tolerated procedure well.  Estimated blood loss. Currently denies any complaints such as nausea vomiting shortness of breath chest pain abdominal pain. Denies any previous history of blood clots.  Preop history and physical reviewed.     Medical History  Past Medical History:   Diagnosis Date     Anemia      Breast cyst      Carpal tunnel syndrome      Coronary artery disease due to lipid rich plaque      Dyslipidemia, goal LDL below 70      Esophageal reflux      Essential hypertension      Fatty liver      Goiter diffuse, nontoxic      Hiatal hernia      History of gestational diabetes      History of MRSA infection 07/17/2017    Pilonidal cyst culture     Increased PTH level 11/4/2018     Morbid obesity with BMI of 40.0-44.9, adult  (H)      Motion sickness      RADHA on CPAP      Osteoarthritis      Paroxysmal SVT (supraventricular tachycardia) (H) 2011    ablation by Dr. Johnson in 2011     PONV (postoperative nausea and vomiting)      Positive result for methicillin resistant Staphylococcus aureus (MRSA) screening 11/4/2018     Renal disease      S/P hip replacement      Stented coronary artery      Thiamine deficiency 11/4/2018     Type 2 diabetes mellitus (H)      Uncomplicated asthma      Vitamin D deficiency 11/4/2018      Patient Active Problem List    Diagnosis Date Noted     Knee osteoarthritis 04/08/2022     Priority: Medium     Chronic kidney disease, stage 3 (H) 06/15/2021     Priority: Medium     RADHA on CPAP      Priority: Medium     Low serum vitamin B12 11/04/2018     Priority: Medium     Elevated ferritin level 11/04/2018     Priority: Medium     Morbid obesity with BMI of 40.0-44.9, adult (H)      Priority: Medium     Coronary artery disease due to lipid rich plaque 05/12/2017     Priority: Medium     Stenosis of left carotid artery 05/02/2017     Priority: Medium     Diffuse Nontoxic Goiter      Priority: Medium     Created by Conversion  Replacement Utility updated for latest IMO load         Essential hypertension      Priority: Medium     Esophageal reflux      Priority: Medium     Created by Conversion         Dyslipidemia, goal LDL below 70      Priority: Medium     Created by Conversion         Hiatal Hernia      Priority: Medium     Created by Conversion  Replacement Utility updated for latest IMO load         Type 2 diabetes mellitus (H)      Priority: Medium     Created by Conversion         Sensorineural hearing loss      Priority: Medium     Created by Conversion  Replacement Utility updated for latest IMO load            Surgical History  She  has a past surgical history that includes IR Lumbar Epidural Steroid Injection (6/22/2007); IR Lumbar Epidural Steroid Injection (11/2/2007); IR Lumbar Epidural Steroid  Injection (8/6/2008); IR Lumbar Epidural Steroid Injection (10/8/2008); IR Lumbar Epidural Steroid Injection (10/28/2008); IR Lumbar Epidural Steroid Injection (3/10/2009); REVISE MEDIAN N/CARPAL TUNNEL SURG; Pr Explo/Drain Breast Abscess; Pr Thyroid Lobectomy,Unilat; Breast Cyst Excision; Biopsy breast; Pr L Hrt Cath W/Njx L Ventriculography Img S&I (N/A, 4/24/2017); Cardiac catheterization (N/A, 4/24/2017); Cardiac catheterization (N/A, 5/19/2017); Coronary Stent Placement (04/24/2017); Cholecystectomy (2000); Total Hip Arthroplasty (Right); and Radiofrequency Ablation (07/05/2011).     Past Surgical History:   Procedure Laterality Date     BIOPSY BREAST       BREAST CYST EXCISION       CARDIAC CATHETERIZATION N/A 4/24/2017    Procedure: Coronary Angiogram;  Surgeon: Ariane Biswas MD;  Location: [facility];  Service:      CARDIAC CATHETERIZATION N/A 5/19/2017    Procedure: Coronary Angiogram;  Surgeon: Hoawrd Gutierrez MD;  Location: [facility];  Service:      CHOLECYSTECTOMY  2000     CORONARY STENT PLACEMENT  04/24/2017    BO to RCA by Dr. Biswas     HC REVISE MEDIAN N/CARPAL TUNNEL SURG      Description: Neuroplasty Decompression Median Nerve At Carpal Tunnel;  Recorded: 05/22/2009;     IR LUMBAR EPIDURAL STEROID INJECTION  6/22/2007     IR LUMBAR EPIDURAL STEROID INJECTION  11/2/2007     IR LUMBAR EPIDURAL STEROID INJECTION  8/6/2008     IR LUMBAR EPIDURAL STEROID INJECTION  10/8/2008     IR LUMBAR EPIDURAL STEROID INJECTION  10/28/2008     IR LUMBAR EPIDURAL STEROID INJECTION  3/10/2009     VA EXPLO/DRAIN BREAST ABSCESS      Description: Breast Surgery Mastotomy With Drainage Of Abscess     VA L HRT CATH W/NJX L VENTRICULOGRAPHY IMG S&I N/A 4/24/2017    Procedure: Left Heart Catheterization with Left Ventriculogram;  Surgeon: Ariane Biswas MD;  Location: [facility];  Service: Cardiology     VA THYROID LOBECTOMY,UNILAT       RADIOFREQUENCY ABLATION  07/05/2011    for PSVT  by Dr. Johnson     TOTAL HIP ARTHROPLASTY Right        Allergies  Allergies   Allergen Reactions     Latex, Natural Rubber [Latex] Hives     Penicillins Other (See Comments)     Passed out.      Doxycycline Dizziness     Eggshell Membrane [Egg Shells] Unknown     Erythromycin Itching     Ibuprofen      Ulcer      Naloxone Unknown     itching     Penicillamine Unknown     Pneumococcal 23-Valent Polysaccharide Vaccine [Pneumococcal Vaccine] Other (See Comments)     pneumonia     Statins-Hmg-Coa Reductase Inhibitors [Hmg-Coa-R Inhibitors] Muscle Pain (Myalgia)     Shaking with simvastatin and atorvastatin     Azithromycin Rash       Prior to Admission Medications   Medications Prior to Admission   Medication Sig Dispense Refill Last Dose     acetaminophen (TYLENOL) 500 MG tablet [ACETAMINOPHEN (TYLENOL) 500 MG TABLET] Take 1,000 mg by mouth 3 (three) times a day as needed.   4/8/2022 at Unknown time     albuterol (PROAIR HFA/PROVENTIL HFA/VENTOLIN HFA) 108 (90 Base) MCG/ACT inhaler Inhale 2 puffs into the lungs every 6 hours 18 g 0 4/8/2022 at Doesn't have with     allopurinol (ZYLOPRIM) 100 MG tablet TAKE 1 TABLET(100 MG) BY MOUTH DAILY 90 tablet 2 4/8/2022 at Unknown time     aspirin 81 MG EC tablet [ASPIRIN 81 MG EC TABLET] Take 1 tablet (81 mg total) by mouth daily.  0 4/5/2022 at Unknown time     celecoxib (CELEBREX) 200 MG capsule Take 1 capsule by mouth 2 times daily   4/5/2022 at Unknown time     cetirizine (ZYRTEC) 10 MG tablet Take 1 tablet (10 mg) by mouth daily for 14 days 14 tablet 0 4/7/2022 at Unknown time     cholecalciferol, vitamin D3, (CHOLECALCIFEROL) 1,000 unit tablet [CHOLECALCIFEROL, VITAMIN D3, (CHOLECALCIFEROL) 1,000 UNIT TABLET] Take 3,000 Units by mouth daily.    4/7/2022 at Unknown time     cinnamon bark (CINNAMON ORAL) [CINNAMON BARK (CINNAMON ORAL)] Take 1 each by mouth 2 (two) times a day.   Past Week at Unknown time     cyanocobalamin (VITAMIN B-12) 500 MCG tablet [CYANOCOBALAMIN (VITAMIN  B-12) 500 MCG TABLET] Take 500 mcg by mouth daily.   4/7/2022 at Unknown time     docusate sodium (COLACE) 100 MG capsule Take 1 capsule (100 mg) by mouth 2 times daily 100 capsule 1 4/7/2022 at Unknown time     fluticasone (FLONASE) 50 MCG/ACT nasal spray Spray 1 spray into both nostrils daily 9.9 mL 0 4/7/2022 at Doesn't have with     hydrochlorothiazide (MICROZIDE) 12.5 MG capsule TAKE 1 CAPSULE(12.5 MG) BY MOUTH DAILY 90 capsule 2 4/7/2022 at Unknown time     HYDROcodone-acetaminophen (NORCO) 5-325 MG tablet Take 1 tablet by mouth 2 times daily as needed for pain 30 tablet 0 Past Week at Unknown time     insulin degludec (TRESIBA FLEXTOUCH) 200 UNIT/ML pen Inject 70 Units Subcutaneous daily 63 mL 1 4/8/2022 at Unknown time     insulin lispro (HUMALOG KWIKPEN) 100 UNIT/ML (1 unit dial) KWIKPEN Inject 10-12 Units Subcutaneous 3 times daily (before meals)  45 mL 3 4/7/2022 at Unknown time     lisinopril (ZESTRIL) 5 MG tablet TAKE 1 AND 1/2 TABLETS(7.5 MG) BY MOUTH DAILY 135 tablet 2 4/7/2022 at Unknown time     melatonin (MELATIN) 3 mg Tab tablet [MELATONIN (MELATIN) 3 MG TAB TABLET] Take 3 mg by mouth at bedtime as needed.   Unknown at Unknown time     nitroglycerin (NITROSTAT) 0.3 MG SL tablet [NITROGLYCERIN (NITROSTAT) 0.3 MG SL TABLET] Place 1 tablet (0.3 mg total) under the tongue every 5 (five) minutes as needed for chest pain. 100 tablet 3 Unknown at Unknown time     OZEMPIC, 1 MG/DOSE, 4 MG/3ML SOPN INJECT 1MG UNDER THE SKIN ONCE EVERY 7 DAYS 3 mL 3 Past Week at Unknown time     rosuvastatin (CRESTOR) 10 MG tablet [ROSUVASTATIN (CRESTOR) 10 MG TABLET] Take 1 tablet (10 mg total) by mouth daily. 90 tablet 3 4/8/2022 at Unknown time     triamcinolone (KENALOG) 0.1 % cream [TRIAMCINOLONE (KENALOG) 0.1 % CREAM] Apply bid 30 g 0 4/7/2022 at Unknown time     valACYclovir (VALTREX) 500 MG tablet TAKE 1 TABLET(500 MG) BY MOUTH DAILY 90 tablet 3 4/7/2022 at Unknown time     blood glucose test strips [BLOOD GLUCOSE  "TEST STRIPS] Use 1 each As Directed 6 (six) times a day. Prodigy Meter 300 strip 3      Continuous Blood Gluc Sensor (FREESTYLE MIRANDA 2 SENSOR) MISC 1 each every 14 days 1 each every 14 days. Change every 14 days. 6 each 3      generic lancets (MICROLET LANCET) [GENERIC LANCETS (MICROLET LANCET)] Use 1 each As Directed 6 (six) times a day. 300 each 3      ONETOUCH DELICA PLUS LANCET 33 gauge Misc [ONETOUCH DELICA PLUS LANCET 33 GAUGE MISC] USE TO TEST BLOOD SUGAR 6 TIMES DAILY 600 each 3      pen needle, diabetic (BD ULTRA-FINE ZULMA PEN NEEDLE) 32 gauge x 5/32\" Ndle [PEN NEEDLE, DIABETIC (BD ULTRA-FINE ZULMA PEN NEEDLE) 32 GAUGE X 5/32\" NDLE] Inject 4 each under the skin daily. With Tresiba and Novolog 120 each 3      Thiamine HCl (B-1) 100 MG TABS TAKE 1 TABLET(100 MG) BY MOUTH DAILY 90 tablet 1        Social History  Reviewed, and she  reports that she quit smoking about 5 years ago. Her smoking use included cigarettes. She has a 11.00 pack-year smoking history. She has never used smokeless tobacco. She reports current alcohol use. She reports that she does not use drugs.  Social History     Tobacco Use     Smoking status: Former Smoker     Packs/day: 0.25     Years: 44.00     Pack years: 11.00     Types: Cigarettes     Quit date: 2017     Years since quittin.2     Smokeless tobacco: Never Used     Tobacco comment: age 18 to age 62 up to 1/2 ppd   Substance Use Topics     Alcohol use: Yes     Alcohol/week: 0.0 standard drinks     Comment: Alcoholic Drinks/day: < 1 drink a week       Family History  Reviewed, and both parents with h/o heart disease, father was on dialysis     Review Of Systems  As per admission HPI, all others reviewed and negative.     Physical Exam:  /62 (BP Location: Right arm)   Pulse 91   Temp 98  F (36.7  C) (Oral)   Resp 16   Ht 1.651 m (5' 5\")   Wt 119.3 kg (262 lb 14.4 oz)   SpO2 93%   BMI 43.75 kg/m    General Appearance:  Awake Alert, orientedx3, not in any apparent " "distress   Head:  Normocephalic, without obvious abnormality   Eyes:  PERRL, conjunctiva/corneas clear   Throat: Oral mucosa moist   Neck: Supple,  no JVD   Lungs:   Clear to auscultation bilaterally, respirations unlabored   Chest Wall:  No tenderness   Heart:  Regular rate and rhythm, S1, S2 normal,no murmur   Abdomen:   Soft, non-tender, bowel sounds present,  no masses, no organomegaly   Extremities:  Status post right total knee arthroplasty   Skin: Skin color, texture, turgor normal, no rashes or lesions   Neurologic: Alert and oriented X 3, exam on operated extremity defer to surgery     Results:  Results for orders placed or performed during the hospital encounter of 04/08/22   POC US Guidance Needle Placement     Status: None    Narrative    Ultrasound was performed as guidance to an anesthesia procedure.  Click   \"PACS images\" hyperlink below to view any stored images.  For specific   procedure details, view procedure note authored by anesthesia.   Glucose by meter     Status: Abnormal   Result Value Ref Range    GLUCOSE BY METER POCT 113 (H) 70 - 99 mg/dL   Glucose by meter     Status: Abnormal   Result Value Ref Range    GLUCOSE BY METER POCT 115 (H) 70 - 99 mg/dL     Imaging:   XR Chest 2 Views    Result Date: 4/5/2022  EXAM: XR CHEST 2 VW LOCATION: [facility] DATE/TIME: 4/5/2022 12:02 PM INDICATION:  Cough COMPARISON: None.     IMPRESSION: There some slight linear opacities in both mid lungs. I would favor some slight fibrosis although a mild pneumonia cannot be completely excluded.    POC US Guidance Needle Placement    Result Date: 4/8/2022  Ultrasound was performed as guidance to an anesthesia procedure.  Click \"PACS images\" hyperlink below to view any stored images.  For specific procedure details, view procedure note authored by anesthesia.    Emma Chester M.D  [facility]  Internal Medicine    4/8/2022  4:55 PM        "

## 2022-04-08 NOTE — ANESTHESIA CARE TRANSFER NOTE
Patient: Liana Briceño    Procedure: Procedure(s):  RIGHT TOTAL KNEE ARTHROPLASTY       Diagnosis: Osteoarthritis of right knee [M17.11]  Diagnosis Additional Information: No value filed.    Anesthesia Type:   Spinal     Note:    Oropharynx: oropharynx clear of all foreign objects and spontaneously breathing  Level of Consciousness: drowsy  Oxygen Supplementation: face mask  Level of Supplemental Oxygen (L/min / FiO2): 8  Independent Airway: airway patency satisfactory and stable  Dentition: dentition unchanged  Vital Signs Stable: post-procedure vital signs reviewed and stable  Report to RN Given: handoff report given  Patient transferred to: PACU    Handoff Report: Identifed the Patient, Identified the Reponsible Provider, Reviewed the pertinent medical history, Discussed the surgical course, Reviewed Intra-OP anesthesia mangement and issues during anesthesia, Set expectations for post-procedure period and Allowed opportunity for questions and acknowledgement of understanding      Vitals:  Vitals Value Taken Time   /62 04/08/22 1448   Temp 36.2  C (97.1  F) 04/08/22 1445   Pulse 91 04/08/22 1448   Resp 16 04/08/22 1448   SpO2 100 % 04/08/22 1448   Vitals shown include unvalidated device data.    Electronically Signed By: FRANCO Ward CRNA  April 8, 2022  2:49 PM

## 2022-04-08 NOTE — OP NOTE
Operative Report    PATIENT:  Liana Briceño    DATE OF SURGERY:  4/8/2022    SURGEON  Declan Eaton MD.      FIRST ASSISTANT  Bill Cohen PA-C  (Expert LARA assist was required throughout for patient positioning, soft tissue retraction, appropriate use of knee instrumentation, and patient safety)     PREOPERATIVE DIAGNOSIS  right knee osteoarthritis     POSTOPERATIVE DIAGNOSIS  right knee osteoarthritis.         PROCEDURE  right Total Knee Arthroplasty.         ANESTHESIA  Spinal    SPECIMENS: none     ESTIMATED BLOOD LOSS  100cc     INDICATIONS  Ms. Liana Briceño is a pleasant 66 year old-year-old female with an ongoing history of increasing and progressive pain in the right knee with severe disability. Pain and disability due to knee arthritis are severely affecting quality of life and ability to perform even simple activities of daily living.  X-rays have shown bone-on-bone degenerative change. Consequently after trying and failing all conservative management of knee arthritis, discussion regarding the risk and benefits of knee replacement was undertaken and the patient elected to proceed.     FINDINGS:  The operative knee showed a severe full-thickness cartilage loss on the femur and tibia in the medial compartment of the knee.  The patellofemoral joint also showed advanced arthritic changes.      IMPLANTS  1. Genevieve, Persona, CR femoral component, size 8 .  2. Genevieve, Persona, tibial component, size E.  3. Genevieve, Persona,  all polyethylene articular surface 13 mm thickness.  MC  4. Genevieve, Persona, all polyethylene patellar button, 35mm diameter      PROCEDURE  Once consent was obtained and the operative site marked in the preop holding area, the patient was brought to the operating room.  Anesthesia was established without difficulty. All bony prominences and the non-operative leg were padded appropriately. The right leg was sterilely prepped and draped in the usual fashion after placement of a  proximal tourniquet.       The limb was exsanguinated, the tourniquet inflated and a longitudinal incision made over the knee.  Dissection was carried down through the extensor mechanism.  A medial parapatellar arthrotomy  was performed.  The patella was luxed laterally and protected. Standard medial release performed.    An intramedullary guide was used in the femur.  The distal femoral was made at 4 degrees of valgus.  The femoral cutting block  was applied and the rest of the femoral cuts completed. ACL was removed and the PCL was recessed.    Attention was then turned to the tibia.  This was cut using an extramedullary tibial cutting guide perpendicular to its mechanical axis.  The trial tibial and femoral components were then placed and the knee reduced. The patella was resurfaced and found to track well. Flexion and extension gaps were appropriate and varus valgus stability was good.     The knee was copiously irrigated and all bony surfaces dried.  Cement was mixed and all components were cemented and held until firm.  The knee was again trialed.  The  Polyethylene was opened and snapped into place, the locking mechanism was ensured.  The knee was copiously irrigated. The extensor mechanism was closed with # 1 interrupted Vicryl suture. Deep dermis was closed layer-wise of # 2-0 interrupted inverted Vicryl sutures followed by running # 3-0 Monocryl in the skin.  Dressings were applied. The patient tolerated the procedure well and was returned to the postop recovery area in stable condition.          DECLAN GEE MD    @C(1)@  Declan Gee MD    @C(2)@  Mimi Fuentes

## 2022-04-08 NOTE — ANESTHESIA POSTPROCEDURE EVALUATION
Patient: Liana Briceño    Procedure: Procedure(s):  RIGHT TOTAL KNEE ARTHROPLASTY       Anesthesia Type:  Spinal    Note:  Disposition: Inpatient   Postop Pain Control: Uneventful            Sign Out: Well controlled pain   PONV: No   Neuro/Psych: Uneventful            Sign Out: Acceptable/Baseline neuro status   Airway/Respiratory: Uneventful            Sign Out: Acceptable/Baseline resp. status   CV/Hemodynamics: Uneventful            Sign Out: Acceptable CV status; No obvious hypovolemia; No obvious fluid overload   Other NRE: NONE   DID A NON-ROUTINE EVENT OCCUR? No           Last vitals:  Vitals Value Taken Time   /58 04/08/22 1540   Temp 36.1  C (97  F) 04/08/22 1520   Pulse 90 04/08/22 1544   Resp 19 04/08/22 1544   SpO2 98 % 04/08/22 1544   Vitals shown include unvalidated device data.    Electronically Signed By: Agata Antony MD  April 8, 2022  3:58 PM

## 2022-04-08 NOTE — ANESTHESIA PROCEDURE NOTES
Adductor canal Procedure Note    Pre-Procedure   Staff -        Anesthesiologist:  Santino Fish MD       Performed By: anesthesiologist       Location: pre-op       Procedure Start/Stop Times: 4/8/2022 12:35 PM and 4/8/2022 12:40 PM       Pre-Anesthestic Checklist: patient identified, IV checked, site marked, risks and benefits discussed, informed consent, monitors and equipment checked, pre-op evaluation, at physician/surgeon's request and post-op pain management  Timeout:       Correct Patient: Yes        Correct Procedure: Yes        Correct Site: Yes        Correct Position: Yes        Correct Laterality: Yes        Site Marked: Yes  Procedure Documentation  Procedure: Adductor canal       Laterality: right       Patient Position: supine       Skin prep: Chloraprep       Needle Gauge: 20.        Needle Length (Inches): 6        Ultrasound guided       1. Ultrasound was used to identify targeted nerve, plexus, vascular marker, or fascial plane and place a needle adjacent to it in real-time.       2. Ultrasound was used to visualize the spread of anesthetic in close proximity to the above referenced structure.       3. A permanent image is entered into the patient's record.       4. The visualized anatomic structures appeared normal.       5. There were no apparent abnormal pathologic findings.    Assessment/Narrative         The placement was negative for: blood aspirated, painful injection and site bleeding       Paresthesias: No.      Bolus given via needle. No blood aspirated via catheter.        Secured via.        Insertion/Infusion Method: Single Shot       Complications: none    Medication(s) Administered   Ropivacaine 0.5% PF (Infiltration), 15 mL  Medication Administration Time: 4/8/2022 12:40 PM

## 2022-04-08 NOTE — ANESTHESIA PREPROCEDURE EVALUATION
Anesthesia Pre-Procedure Evaluation    Patient: Liana Briceño   MRN: [id number] : 1955        Procedure : Procedure(s):  RIGHT TOTAL KNEE ARTHROPLASTY          Past Medical History:   Diagnosis Date     Anemia      Breast cyst      Carpal tunnel syndrome      Coronary artery disease due to lipid rich plaque      Dyslipidemia, goal LDL below 70      Esophageal reflux      Essential hypertension      Fatty liver      Goiter diffuse, nontoxic      Hiatal hernia      History of gestational diabetes      History of MRSA infection 2017    Pilonidal cyst culture     Increased PTH level 2018     Morbid obesity with BMI of 40.0-44.9, adult (H)      RADHA on CPAP      Osteoarthritis      Paroxysmal SVT (supraventricular tachycardia) (H)     ablation by Dr. Johnson in      PONV (postoperative nausea and vomiting)      Positive result for methicillin resistant Staphylococcus aureus (MRSA) screening 2018     Renal disease      S/P hip replacement      Stented coronary artery      Thiamine deficiency 2018     Type 2 diabetes mellitus (H)      Uncomplicated asthma      Vitamin D deficiency 2018      Past Surgical History:   Procedure Laterality Date     BIOPSY BREAST       BREAST CYST EXCISION       CARDIAC CATHETERIZATION N/A 2017    Procedure: Coronary Angiogram;  Surgeon: Ariane Biswas MD;  Location: [facility] Cath Lab;  Service:      CARDIAC CATHETERIZATION N/A 2017    Procedure: Coronary Angiogram;  Surgeon: Howard Gutierrez MD;  Location: [facility] Cath Lab;  Service:      CHOLECYSTECTOMY  2000     CORONARY STENT PLACEMENT  2017    BO to RCA by Dr. Biswas     HC REVISE MEDIAN N/CARPAL TUNNEL SURG      Description: Neuroplasty Decompression Median Nerve At Carpal Tunnel;  Recorded: 2009;     IR LUMBAR EPIDURAL STEROID INJECTION  2007     IR LUMBAR EPIDURAL STEROID INJECTION  2007     IR LUMBAR EPIDURAL STEROID INJECTION  2008     IR LUMBAR  EPIDURAL STEROID INJECTION  10/8/2008     IR LUMBAR EPIDURAL STEROID INJECTION  10/28/2008     IR LUMBAR EPIDURAL STEROID INJECTION  3/10/2009     WA EXPLO/DRAIN BREAST ABSCESS      Description: Breast Surgery Mastotomy With Drainage Of Abscess     WA L HRT CATH W/NJX L VENTRICULOGRAPHY IMG S&I N/A 2017    Procedure: Left Heart Catheterization with Left Ventriculogram;  Surgeon: Ariane Biswas MD;  Location: [facility] Cath Lab;  Service: Cardiology     WA THYROID LOBECTOMY,UNILAT       RADIOFREQUENCY ABLATION  2011    for PSVT by Dr. Johnson     TOTAL HIP ARTHROPLASTY Right       Allergies   Allergen Reactions     Latex, Natural Rubber [Latex] Hives     Penicillins Other (See Comments)     Passed out.      Doxycycline Dizziness     Eggshell Membrane [Egg Shells] Unknown     Erythromycin Itching     Ibuprofen      Naloxone Unknown     itching     Penicillamine Unknown     Pneumococcal 23-Valent Polysaccharide Vaccine [Pneumococcal Vaccine] Other (See Comments)     pneumonia     Statins-Hmg-Coa Reductase Inhibitors [Hmg-Coa-R Inhibitors] Muscle Pain (Myalgia)     Shaking with simvastatin and atorvastatin     Azithromycin Rash      Social History     Tobacco Use     Smoking status: Former Smoker     Packs/day: 0.25     Years: 44.00     Pack years: 11.00     Types: Cigarettes     Quit date: 2017     Years since quittin.2     Smokeless tobacco: Never Used     Tobacco comment: age 18 to age 62 up to 1/2 ppd   Substance Use Topics     Alcohol use: Yes     Alcohol/week: 0.0 standard drinks     Comment: Alcoholic Drinks/day: < 1 drink a week      Wt Readings from Last 1 Encounters:   22 117.9 kg (260 lb)        Anesthesia Evaluation            ROS/MED HX  ENT/Pulmonary:     (+) sleep apnea, asthma     Neurologic:       Cardiovascular:     (+) hypertension--CAD ---    METS/Exercise Tolerance:     Hematologic:       Musculoskeletal:       GI/Hepatic:     (+) GERD, hiatal hernia, liver disease,      Renal/Genitourinary:     (+) renal disease,     Endo:     (+) thyroid problem, Obesity,  (-) Type II DM   Psychiatric/Substance Use:       Infectious Disease:       Malignancy:       Other:            Physical Exam    Airway  airway exam normal           Respiratory Devices and Support         Dental  no notable dental history         Cardiovascular   cardiovascular exam normal          Pulmonary   pulmonary exam normal                OUTSIDE LABS:  CBC:   Lab Results   Component Value Date    WBC 7.6 04/05/2022    WBC 6.2 10/15/2021    HGB 11.0 (L) 04/05/2022    HGB 11.8 10/15/2021    HCT 33.8 (L) 04/05/2022    HCT 36.8 10/15/2021     04/05/2022     10/15/2021     BMP:   Lab Results   Component Value Date     04/05/2022     10/15/2021    POTASSIUM 3.8 04/05/2022    POTASSIUM 3.8 10/15/2021    CHLORIDE 104 04/05/2022    CHLORIDE 104 10/15/2021    CO2 25 04/05/2022    CO2 23 10/15/2021    BUN 19 04/05/2022    BUN 13 10/15/2021    CR 1.04 04/05/2022    CR 0.89 10/15/2021     04/05/2022     (H) 10/15/2021     COAGS: No results found for: PTT, INR, FIBR  POC: No results found for: BGM, HCG, HCGS  HEPATIC:   Lab Results   Component Value Date    ALBUMIN 4.0 04/05/2022    PROTTOTAL 7.7 04/05/2022    ALT 26 04/05/2022    AST 26 04/05/2022    ALKPHOS 94 04/05/2022    BILITOTAL 0.4 04/05/2022     OTHER:   Lab Results   Component Value Date    LACT 0.8 04/28/2019    A1C 6.7 (H) 04/05/2022    LORELEI 10.1 04/05/2022    TSH 4.44 04/05/2022    CRP 1.8 (H) 04/28/2019    SED 65 (H) 09/02/2020       Anesthesia Plan    ASA Status:  3      Anesthesia Type: Spinal.              Consents    Anesthesia Plan(s) and associated risks, benefits, and realistic alternatives discussed. Questions answered and patient/representative(s) expressed understanding.     - Discussed: Risks, Benefits and Alternatives for BOTH SEDATION and the PROCEDURE were discussed     - Discussed with:  Patient          Postoperative Care    Pain management: IV analgesics, Peripheral nerve block (Single Shot).   PONV prophylaxis: Ondansetron (or other 5HT-3)     Comments:                Snatino Fish MD

## 2022-04-08 NOTE — ANESTHESIA PROCEDURE NOTES
Intrathecal injection Procedure Note    Pre-Procedure   Staff -        Anesthesiologist:  Santino Fsih MD       Performed By: anesthesiologist       Location: OR       Procedure Start/Stop Times: 4/8/2022 1:00 PM and 4/8/2022 1:10 PM       Pre-Anesthestic Checklist: patient identified, IV checked, risks and benefits discussed, informed consent, monitors and equipment checked, pre-op evaluation, at physician/surgeon's request and post-op pain management  Timeout:       Correct Patient: Yes        Correct Procedure: Yes        Correct Site: Yes        Correct Position: Yes   Procedure Documentation  Procedure: intrathecal injection       Patient Position: sitting       Skin prep: Chloraprep       Insertion Site: L3-4. (right paramedian approach).       Needle Gauge: 22.        Needle Length (Inches): 5        Spinal Needle Type: Quincke       Introducer used      # of attempts: 3 and  # of redirects:     Assessment/Narrative         Paresthesias: No.       CSF fluid: clear.    Medication(s) Administered   0.75% Hyperbaric Bupivacaine (Intrathecal), 1.6 mL  Medication Administration Time: 4/8/2022 1:10 PM

## 2022-04-08 NOTE — INTERVAL H&P NOTE
"I have reviewed the surgical (or preoperative) H&P that is linked to this encounter, and examined the patient. There are no significant changes    Clinical Conditions Present on Arrival:  Clinically Significant Risk Factors Present on Admission                  # Platelet Defect: home medication list includes an antiplatelet medication  # Diabetes, type II: last A1C 6.7 % (Ref range: 0.0 - 5.6 %)  # Severe Obesity: Estimated body mass index is 41.97 kg/m  as calculated from the following:    Height as of this encounter: 1.676 m (5' 6\").    Weight as of this encounter: 117.9 kg (260 lb).       "

## 2022-04-08 NOTE — PHARMACY-ADMISSION MEDICATION HISTORY
Pharmacy Note - Admission Medication History    Pertinent Provider Information: None   ______________________________________________________________________    Prior To Admission (PTA) med list completed and updated in EMR.       PTA Med List   Medication Sig Note Last Dose     acetaminophen (TYLENOL) 500 MG tablet [ACETAMINOPHEN (TYLENOL) 500 MG TABLET] Take 1,000 mg by mouth 3 (three) times a day as needed.  4/8/2022 at Unknown time     albuterol (PROAIR HFA/PROVENTIL HFA/VENTOLIN HFA) 108 (90 Base) MCG/ACT inhaler Inhale 2 puffs into the lungs every 6 hours  4/8/2022 at Doesn't have with     allopurinol (ZYLOPRIM) 100 MG tablet TAKE 1 TABLET(100 MG) BY MOUTH DAILY  4/8/2022 at Unknown time     aspirin 81 MG EC tablet [ASPIRIN 81 MG EC TABLET] Take 1 tablet (81 mg total) by mouth daily. 4/7/2022: LD 4/5 4/5/2022 at Unknown time     celecoxib (CELEBREX) 200 MG capsule Take 1 capsule by mouth 2 times daily  4/5/2022 at Unknown time     cetirizine (ZYRTEC) 10 MG tablet Take 1 tablet (10 mg) by mouth daily for 14 days  4/7/2022 at Unknown time     cholecalciferol, vitamin D3, (CHOLECALCIFEROL) 1,000 unit tablet [CHOLECALCIFEROL, VITAMIN D3, (CHOLECALCIFEROL) 1,000 UNIT TABLET] Take 3,000 Units by mouth daily.   4/7/2022 at Unknown time     cinnamon bark (CINNAMON ORAL) [CINNAMON BARK (CINNAMON ORAL)] Take 1 each by mouth 2 (two) times a day.  Past Week at Unknown time     cyanocobalamin (VITAMIN B-12) 500 MCG tablet [CYANOCOBALAMIN (VITAMIN B-12) 500 MCG TABLET] Take 500 mcg by mouth daily.  4/7/2022 at Unknown time     docusate sodium (COLACE) 100 MG capsule Take 1 capsule (100 mg) by mouth 2 times daily  4/7/2022 at Unknown time     fluticasone (FLONASE) 50 MCG/ACT nasal spray Spray 1 spray into both nostrils daily  4/7/2022 at Doesn't have with     hydrochlorothiazide (MICROZIDE) 12.5 MG capsule TAKE 1 CAPSULE(12.5 MG) BY MOUTH DAILY  4/7/2022 at Unknown time     HYDROcodone-acetaminophen (NORCO) 5-325 MG tablet  Take 1 tablet by mouth 2 times daily as needed for pain  Past Week at Unknown time     insulin degludec (TRESIBA FLEXTOUCH) 200 UNIT/ML pen Inject 70 Units Subcutaneous daily 4/8/2022: Took 40 units 4/8 AM 4/8/2022 at Unknown time     insulin lispro (HUMALOG KWIKPEN) 100 UNIT/ML (1 unit dial) KWIKPEN Inject 10-12 Units Subcutaneous 3 times daily (before meals)   4/7/2022 at Unknown time     lisinopril (ZESTRIL) 5 MG tablet TAKE 1 AND 1/2 TABLETS(7.5 MG) BY MOUTH DAILY  4/7/2022 at Unknown time     melatonin (MELATIN) 3 mg Tab tablet [MELATONIN (MELATIN) 3 MG TAB TABLET] Take 3 mg by mouth at bedtime as needed.  Unknown at Unknown time     nitroglycerin (NITROSTAT) 0.3 MG SL tablet [NITROGLYCERIN (NITROSTAT) 0.3 MG SL TABLET] Place 1 tablet (0.3 mg total) under the tongue every 5 (five) minutes as needed for chest pain.  Unknown at Unknown time     OZEMPIC, 1 MG/DOSE, 4 MG/3ML SOPN INJECT 1MG UNDER THE SKIN ONCE EVERY 7 DAYS  Past Week at Unknown time     rosuvastatin (CRESTOR) 10 MG tablet [ROSUVASTATIN (CRESTOR) 10 MG TABLET] Take 1 tablet (10 mg total) by mouth daily.  4/8/2022 at Unknown time     triamcinolone (KENALOG) 0.1 % cream [TRIAMCINOLONE (KENALOG) 0.1 % CREAM] Apply bid  4/7/2022 at Unknown time     valACYclovir (VALTREX) 500 MG tablet TAKE 1 TABLET(500 MG) BY MOUTH DAILY  4/7/2022 at Unknown time       Information source(s): Patient, Patient's pharmacy and Clinic records    Method of interview communication: in-person    Patient was asked about OTC/herbal products specifically.  PTA med list reflects this.    Based on the pharmacist's assessment, the PTA med list information appears reliable    Allergies were reviewed, assessed, and updated with the patient.      Patient did not bring any medications to the hospital and can't retrieve from home. No multi-dose medications are available for use during hospital stay.      Thank you for the opportunity to participate in the care of this  patient.      Castillo Benitez, [facility]     4/8/2022     11:58 AM

## 2022-04-09 ENCOUNTER — APPOINTMENT (OUTPATIENT)
Dept: OCCUPATIONAL THERAPY | Facility: CLINIC | Age: 67
End: 2022-04-09
Attending: ORTHOPAEDIC SURGERY
Payer: COMMERCIAL

## 2022-04-09 ENCOUNTER — APPOINTMENT (OUTPATIENT)
Dept: PHYSICAL THERAPY | Facility: CLINIC | Age: 67
End: 2022-04-09
Attending: ORTHOPAEDIC SURGERY
Payer: COMMERCIAL

## 2022-04-09 LAB
GLUCOSE BLDC GLUCOMTR-MCNC: 185 MG/DL (ref 70–99)
GLUCOSE BLDC GLUCOMTR-MCNC: 208 MG/DL (ref 70–99)
GLUCOSE BLDC GLUCOMTR-MCNC: 221 MG/DL (ref 70–99)
GLUCOSE BLDC GLUCOMTR-MCNC: 236 MG/DL (ref 70–99)
GLUCOSE BLDC GLUCOMTR-MCNC: 259 MG/DL (ref 70–99)
HGB BLD-MCNC: 10.2 G/DL (ref 11.7–15.7)

## 2022-04-09 PROCEDURE — 99225 PR SUBSEQUENT OBSERVATION CARE,LEVEL II: CPT | Performed by: INTERNAL MEDICINE

## 2022-04-09 PROCEDURE — 250N000013 HC RX MED GY IP 250 OP 250 PS 637: Performed by: INTERNAL MEDICINE

## 2022-04-09 PROCEDURE — 258N000003 HC RX IP 258 OP 636: Performed by: ORTHOPAEDIC SURGERY

## 2022-04-09 PROCEDURE — 82962 GLUCOSE BLOOD TEST: CPT

## 2022-04-09 PROCEDURE — 85018 HEMOGLOBIN: CPT | Performed by: ORTHOPAEDIC SURGERY

## 2022-04-09 PROCEDURE — 97162 PT EVAL MOD COMPLEX 30 MIN: CPT | Mod: GP | Performed by: PHYSICAL THERAPIST

## 2022-04-09 PROCEDURE — 97530 THERAPEUTIC ACTIVITIES: CPT | Mod: GP | Performed by: PHYSICAL THERAPIST

## 2022-04-09 PROCEDURE — 97110 THERAPEUTIC EXERCISES: CPT | Mod: GP | Performed by: PHYSICAL THERAPIST

## 2022-04-09 PROCEDURE — 250N000013 HC RX MED GY IP 250 OP 250 PS 637: Performed by: ORTHOPAEDIC SURGERY

## 2022-04-09 PROCEDURE — 97535 SELF CARE MNGMENT TRAINING: CPT | Mod: GO

## 2022-04-09 PROCEDURE — 250N000011 HC RX IP 250 OP 636: Performed by: ORTHOPAEDIC SURGERY

## 2022-04-09 PROCEDURE — 97166 OT EVAL MOD COMPLEX 45 MIN: CPT | Mod: GO

## 2022-04-09 PROCEDURE — 36415 COLL VENOUS BLD VENIPUNCTURE: CPT | Performed by: ORTHOPAEDIC SURGERY

## 2022-04-09 PROCEDURE — 97116 GAIT TRAINING THERAPY: CPT | Mod: GP | Performed by: PHYSICAL THERAPIST

## 2022-04-09 RX ADMIN — OXYCODONE HYDROCHLORIDE 10 MG: 5 TABLET ORAL at 21:16

## 2022-04-09 RX ADMIN — ALBUTEROL SULFATE 2 PUFF: 90 AEROSOL, METERED RESPIRATORY (INHALATION) at 06:12

## 2022-04-09 RX ADMIN — Medication 3000 UNITS: at 09:35

## 2022-04-09 RX ADMIN — INSULIN ASPART 1 UNITS: 100 INJECTION, SOLUTION INTRAVENOUS; SUBCUTANEOUS at 21:20

## 2022-04-09 RX ADMIN — ALBUTEROL SULFATE 2 PUFF: 90 AEROSOL, METERED RESPIRATORY (INHALATION) at 01:05

## 2022-04-09 RX ADMIN — VALACYCLOVIR 500 MG: 500 TABLET, FILM COATED ORAL at 09:35

## 2022-04-09 RX ADMIN — CELECOXIB 200 MG: 200 CAPSULE ORAL at 21:16

## 2022-04-09 RX ADMIN — DOCUSATE SODIUM 100 MG: 100 CAPSULE, LIQUID FILLED ORAL at 09:35

## 2022-04-09 RX ADMIN — CELECOXIB 200 MG: 200 CAPSULE ORAL at 09:35

## 2022-04-09 RX ADMIN — HYDROXYZINE HYDROCHLORIDE 25 MG: 25 TABLET, FILM COATED ORAL at 01:02

## 2022-04-09 RX ADMIN — VANCOMYCIN HYDROCHLORIDE 1500 MG: 5 INJECTION, POWDER, LYOPHILIZED, FOR SOLUTION INTRAVENOUS at 00:47

## 2022-04-09 RX ADMIN — ASPIRIN 81 MG: 81 TABLET, COATED ORAL at 09:35

## 2022-04-09 RX ADMIN — INSULIN DEGLUDEC INJECTION 70 UNITS: 100 INJECTION, SOLUTION SUBCUTANEOUS at 09:36

## 2022-04-09 RX ADMIN — HYDROCHLOROTHIAZIDE 12.5 MG: 12.5 CAPSULE ORAL at 09:35

## 2022-04-09 RX ADMIN — ALLOPURINOL 100 MG: 100 TABLET ORAL at 09:35

## 2022-04-09 RX ADMIN — ACETAMINOPHEN 975 MG: 325 TABLET ORAL at 09:34

## 2022-04-09 RX ADMIN — ACETAMINOPHEN 975 MG: 325 TABLET ORAL at 16:53

## 2022-04-09 RX ADMIN — HYDROMORPHONE HYDROCHLORIDE 0.2 MG: 0.2 INJECTION, SOLUTION INTRAMUSCULAR; INTRAVENOUS; SUBCUTANEOUS at 02:44

## 2022-04-09 RX ADMIN — SENNOSIDES AND DOCUSATE SODIUM 1 TABLET: 50; 8.6 TABLET ORAL at 09:35

## 2022-04-09 RX ADMIN — OXYCODONE HYDROCHLORIDE 10 MG: 5 TABLET ORAL at 11:29

## 2022-04-09 RX ADMIN — ROSUVASTATIN CALCIUM 10 MG: 10 TABLET, FILM COATED ORAL at 09:35

## 2022-04-09 RX ADMIN — OXYCODONE HYDROCHLORIDE 10 MG: 5 TABLET ORAL at 06:08

## 2022-04-09 RX ADMIN — ACETAMINOPHEN 975 MG: 325 TABLET ORAL at 00:45

## 2022-04-09 RX ADMIN — ASPIRIN 81 MG: 81 TABLET, COATED ORAL at 21:17

## 2022-04-09 RX ADMIN — POLYETHYLENE GLYCOL 3350 17 G: 17 POWDER, FOR SOLUTION ORAL at 09:35

## 2022-04-09 ASSESSMENT — ACTIVITIES OF DAILY LIVING (ADL): DEPENDENT_IADLS:: INDEPENDENT

## 2022-04-09 NOTE — PROGRESS NOTES
[facility]      OUTPATIENT OCCUPATIONAL THERAPY  EVALUATION  PLAN OF TREATMENT FOR OUTPATIENT REHABILITATION  (COMPLETE FOR INITIAL CLAIMS ONLY)  Patient's Last Name, First Name, M.I.  YOB: 1955  Liana Briceño                          Provider's Name  [facility] Medical Record No.  [id number]                               Onset Date:  (P) 04/09/22   Start of Care Date:  (P) 04/09/22     Type:     ___PT   _X_OT   ___SLP Medical Diagnosis:  (P) s/p L TKA                        OT Diagnosis:  (P) Decreased ADL independence   Visits from SOC:  1   _________________________________________________________________________________  Plan of Treatment/Functional Goals    Planned Interventions: (P) ADL retraining   Goals: See Occupational Therapy Goals on Care Plan in [facility] electronic health record.    Therapy Frequency: (P) Daily  Predicted Duration of Therapy Intervention: (P) 04/12/22  _________________________________________________________________________________    I CERTIFY THE NEED FOR THESE SERVICES FURNISHED UNDER        THIS PLAN OF TREATMENT AND WHILE UNDER MY CARE     (Physician co-signature of this document indicates review and certification of the therapy plan).                Certification date from: (P) 04/09/22, Certification date to: (P) 05/09/22    Referring Physician: Eaton (P)            Initial Assessment        See Occupational Therapy evaluation dated (P) 04/09/22 in Epic electronic health record.

## 2022-04-09 NOTE — PROGRESS NOTES
hospitalist cross cover note:    Pt's SSI changed from q 4hours to 4 times a day with meals and at bedtime     Addis [facility] MD   Page [phone]

## 2022-04-09 NOTE — PROGRESS NOTES
04/09/22 0925   Quick Adds   Type of Visit Initial Occupational Therapy Evaluation   Living Environment   People in Home significant other   Current Living Arrangements apartment   Living Environment Comments Low toilet, commode,    Self-Care   Current Activity Tolerance good   Equipment Currently Used at Home commode chair;shower chair  (reacher, adjustable bed)   Instrumental Activities of Daily Living (IADL)   IADL Comments Pt reports I with all ADLs and gives IADL support for significant other   General Information   Onset of Illness/Injury or Date of Surgery 04/09/22   Referring Physician Angelito   Patient/Family Therapy Goal Statement (OT) None stated   Right Upper Extremity (Weight-bearing Status) weight-bearing as tolerated (WBAT)   Cognitive Status Examination   Orientation Status orientation to person, place and time   Cognitive Status Comments Pt appropriately asking questions and advocating for self   Sensory   Sensory Comments Reporting numbness in R ankle   Strength Comprehensive (MMT)   Comment, General Manual Muscle Testing (MMT) Assessment Grossly WFL   Transfers   Transfers sit-stand transfer   Transfer Comments SBA w/vc for safe tech    Activities of Daily Living   BADL Assessment/Intervention lower body dressing   Lower Body Dressing Assessment/Training   Comment, (Lower Body Dressing) Pt mod I w/le dressing of socks and sweat pants. Increased time and effort and education of AE   Clinical Impression   Criteria for Skilled Therapeutic Interventions Met (OT) Yes, treatment indicated   OT Diagnosis Decreased ADL independence   OT Problem List-Impairments impacting ADL activity tolerance impaired   Assessment of Occupational Performance 1-3 Performance Deficits   Identified Performance Deficits Bed mobility, transfers, LE dressing   Planned Therapy Interventions (OT) ADL retraining   Clinical Decision Making Complexity (OT) moderate complexity   Risk & Benefits of therapy have been explained  evaluation/treatment results reviewed   OT Discharge Planning   OT Discharge Recommendation (DC Rec) home with assist;home with home care occupational therapy   OT Rationale for DC Rec Pt near baseline with ADLs given AE, would benefit from home OT to further advance transfers in the home ADL independence in home   Therapy Certification   Start of Care Date 04/09/22   Certification date from 04/09/22   Certification date to 05/09/22   Medical Diagnosis s/p L TKA   Total Evaluation Time (Minutes)   Total Evaluation Time (Minutes) 15   OT Goals   Therapy Frequency (OT) Daily   OT Predicated Duration/Target Date for Goal Attainment 04/12/22   OT Goals Lower Body Dressing;Bed Mobility;Toilet Transfer/Toileting   OT: Lower Body Dressing Modified independent   OT: Bed Mobility Modified independent   OT: Toilet Transfer/Toileting Modified independent;toilet transfer

## 2022-04-09 NOTE — CONSULTS
Care Management Initial Consult    General Information  Assessment completed with: Patient,    Type of CM/SW Visit: Initial Assessment    Primary Care Provider verified and updated as needed: Yes   Readmission within the last 30 days:        Reason for Consult: discharge planning  Advance Care Planning:            Communication Assessment  Patient's communication style: spoken language (English or Bilingual)    Hearing Difficulty or Deaf: no        Cognitive  Cognitive/Neuro/Behavioral: WDL     Arousal Level: arouses to voice                Living Environment:   People in home: child(josy), adult, significant other     Current living Arrangements: house      Able to return to prior arrangements: yes       Family/Social Support:  Care provided by: self  Provides care for: no one  Marital Status: Lives with Significant Other  Significant Other          Description of Support System: Supportive, Involved         Current Resources:   Patient receiving home care services: No     Community Resources: None  Equipment currently used at home: commode chair, shower chair (reacher, adjustable bed)  Supplies currently used at home: None    Employment/Financial:  Employment Status: retired        Financial Concerns: No concerns identified   Referral to Financial Worker: No       Lifestyle & Psychosocial Needs:  Social Determinants of Health     Tobacco Use: Medium Risk     Smoking Tobacco Use: Former Smoker     Smokeless Tobacco Use: Never Used   Alcohol Use: Not on file   Financial Resource Strain: Not on file   Food Insecurity: Not on file   Transportation Needs: Not on file   Physical Activity: Not on file   Stress: Not on file   Social Connections: Not on file   Intimate Partner Violence: Not on file   Depression: Not at risk     PHQ-2 Score: 0   Housing Stability: Not on file       Functional Status:  Prior to admission patient needed assistance:   Dependent ADLs:: Independent  Dependent IADLs:: Independent       Mental  Health Status:  Mental Health Status: No Current Concerns       Chemical Dependency Status:  Chemical Dependency Status: No Current Concerns             Values/Beliefs:  Spiritual, Cultural Beliefs, Orthodox Practices, Values that affect care: no               Additional Information:  SWCM met and introduced self and CM services to Pt.  Pt lives with significant other Panchito.  Pt independent at baseline.  Uses cane to do 16 steps in her home. Home PT and OT recommended.  Kim with Home [facility] Care can accept.  Pt is not discharging today.  Family to transport.     LINA Mendenhall

## 2022-04-09 NOTE — PROGRESS NOTES
Occupational Therapy Discharge Summary    Reason for therapy discharge:    Discharged to home with home therapy.    Progress towards therapy goal(s). See goals on Care Plan in [facility] electronic health record for goal details.  Goals partially met.  Barriers to achieving goals:   benefit from further OT to advance ADLs in home.    Therapy recommendation(s):    Further progress ADLs and functional mobility.

## 2022-04-09 NOTE — PROGRESS NOTES
[facility]      OUTPATIENT PHYSICAL THERAPY EVALUATION  PLAN OF TREATMENT FOR OUTPATIENT REHABILITATION  (COMPLETE FOR INITIAL CLAIMS ONLY)  Patient's Last Name, First Name, M.I.  YOB: 1955  Liana Briceño                        Provider's Name  [facility] Medical Record No.  [id number]                               Onset Date:  (P) 04/08/22   Start of Care Date:  (P) 04/09/22      Type:     _X_PT   ___OT   ___SLP Medical Diagnosis:  (P) s/p R TKA                        PT Diagnosis:  (P) impaired functional mobility   Visits from SOC:  1   _________________________________________________________________________________  Plan of Treatment/Functional Goals    Planned Interventions: (P) home exercise program, gait training, transfer training, stretching, stair training, strengthening     Goals: See Physical Therapy Goals on Care Plan in [facility] electronic health record.    Therapy Frequency: (P) 2x/day  Predicted Duration of Therapy Intervention: (P) 04/15/22  _________________________________________________________________________________    I CERTIFY THE NEED FOR THESE SERVICES FURNISHED UNDER        THIS PLAN OF TREATMENT AND WHILE UNDER MY CARE     (Physician co-signature of this document indicates review and certification of the therapy plan).                Certification date from: (P) 04/09/22, Certification date to: (P) 05/11/22    Referring Physician: MARGOT Eaton (P)            Initial Assessment        See Physical Therapy evaluation dated (P) 04/09/22 in Epic electronic health record.

## 2022-04-09 NOTE — DISCHARGE INSTRUCTIONS
Home care services have been arranged for you:  Home Care Agency: [facility]  Home Care Services: Pt and OT   Home Care Phone: [phone]    Instructions: Home care agency will call within 48 hours of discharge to schedule appointments.

## 2022-04-09 NOTE — PROGRESS NOTES
[facility] Daily Progress Note    Assessment/Plan:  CAD status post stent  Asymptomatic  Continue aspirin, statin  Lexiscan stress test 7/20 negative for inducible ischemia.     Hypertension  Blood pressure is stable  Continue home I am antihypertensives     Diabetes mellitus type 2  Most recent A1c 6.74/22  Continue home insulin regimen  Insulin sliding scale as needed.  Blood sugar so far stable.      dyslipidemia  Continue home statin     CKD stage III  Avoid nephrotoxic medications  Most recent creatinine 1.04.     Recent maxillary sinusitis  S/p treatment  History of SVT status post ablation     Morbid obesity BMI 43  Obstructive sleep apnea  Does not use CPAP.  Monitor for hypoxemia.     Acute blood loss anemia:  Iron rich diet    Right knee osteoarthritis status post TKA: Postop day #1.  Continue PT OT, pain control, DVT prophylaxis per orthopedic surgery.  Encourage incentive spirometry, monitor hemoglobin       Active Problems:    Knee osteoarthritis     LOS: 0 days     Subjective:   She is doing well.  Denies any shortness of breath, chest pain.  Complains of pain in her knee.  No other urinary or bowel complaints.    acetaminophen  975 mg Oral Q8H     albuterol  2 puff Inhalation Q6H     allopurinol  100 mg Oral Daily     aspirin  81 mg Oral BID     celecoxib  200 mg Oral BID     cetirizine  10 mg Oral Daily     docusate sodium  100 mg Oral BID     fluticasone  1 spray Both Nostrils Daily     hydrochlorothiazide  12.5 mg Oral Daily     insulin aspart  1-7 Units Subcutaneous TID AC     insulin aspart  1-5 Units Subcutaneous At Bedtime     insulin aspart  10-12 Units Subcutaneous TID AC     insulin degludec  70 Units Subcutaneous Daily     polyethylene glycol  17 g Oral Daily     rosuvastatin  10 mg Oral Daily     senna-docusate  1 tablet Oral BID     sodium chloride (PF)  3 mL Intracatheter Q8H     triamcinolone   Topical BID     valACYclovir  500 mg Oral Daily     Vitamin D3  3,000 Units Oral Daily        Objective:  Vital signs in last 24 hours:  Temp:  [97  F (36.1  C)-98.4  F (36.9  C)] 97.6  F (36.4  C)  Pulse:  [] 94  Resp:  [12-24] 15  BP: (129-177)/(52-75) 143/65  SpO2:  [91 %-100 %] 93 %  Weight:   [unfilled]    Intake/Output last 3 shifts:  I/O last 3 completed shifts:  In: 2380 [P.O.:1080; I.V.:1300]  Out: 1200 [Urine:1100; Blood:100]  Intake/Output this shift:  No intake/output data recorded.    Review of Systems:   As per subjective, all others negative.    Physical Exam:    General Appearance:  Alert, cooperative, no distress, appears stated age   Head:  Normocephalic, without obvious abnormality, atraumatic   Eyes:  PERRL,    Neck: Supple   Back:   Symmetric, no curvature, ROM normal, no CVA tenderness   Lungs:   Clear to auscultation bilaterally, respirations unlabored   Chest Wall:  No tenderness or deformity   Heart:  Regular rate and rhythm, S1, S2 normal,no murmur   Abdomen:   Soft, non tender, non distended, bowel sounds present, no guarding or rigidity   Extremities: S/p right TKA   Skin: Skin color, texture, turgor normal, no rashes or lesions   Neurologic: Alert and oriented X 3, Moves all 4 extremities       Lab Results:  Personally Reviewed.   Recent Results (from the past 24 hour(s))   Glucose by meter    Collection Time: 04/08/22 12:14 PM   Result Value Ref Range    GLUCOSE BY METER POCT 113 (H) 70 - 99 mg/dL   Glucose by meter    Collection Time: 04/08/22  2:58 PM   Result Value Ref Range    GLUCOSE BY METER POCT 115 (H) 70 - 99 mg/dL   Glucose by meter    Collection Time: 04/08/22  5:52 PM   Result Value Ref Range    GLUCOSE BY METER POCT 176 (H) 70 - 99 mg/dL   Glucose by meter    Collection Time: 04/08/22  9:11 PM   Result Value Ref Range    GLUCOSE BY METER POCT 250 (H) 70 - 99 mg/dL   Hemoglobin    Collection Time: 04/09/22  7:08 AM   Result Value Ref Range    Hemoglobin 10.2 (L) 11.7 - 15.7 g/dL         [facility]  Internal  Medicine

## 2022-04-09 NOTE — PLAN OF CARE
Problem: Plan of Care - These are the overarching goals to be used throughout the patient stay.    Goal: Absence of Hospital-Acquired Illness or Injury  Intervention: Identify and Manage Fall Risk  Recent Flowsheet Documentation  Taken 4/9/2022 1650 by Mimi Garcia RN  Safety Promotion/Fall Prevention:   activity supervised   patient and family education   nonskid shoes/slippers when out of bed   mobility aid in reach   lighting adjusted   fall prevention program maintained  Taken 4/9/2022 1130 by Mimi Garcia RN  Safety Promotion/Fall Prevention:   activity supervised   patient and family education   nonskid shoes/slippers when out of bed   mobility aid in reach   lighting adjusted   fall prevention program maintained  Taken 4/9/2022 0930 by Mimi Garcia RN  Safety Promotion/Fall Prevention:   activity supervised   patient and family education   nonskid shoes/slippers when out of bed   mobility aid in reach   lighting adjusted   fall prevention program maintained     Problem: Functional Ability Impaired (Knee Arthroplasty)  Goal: Optimal Functional Ability  Intervention: Promote Optimal Functional Status  Recent Flowsheet Documentation  Taken 4/9/2022 1650 by Mimi Garcia RN  Assistive Device Utilized:   walker   gait belt  Activity Management: activity adjusted per tolerance  Taken 4/9/2022 1130 by Mimi Garcia RN  Assistive Device Utilized:   walker   gait belt  Activity Management: activity adjusted per tolerance  Taken 4/9/2022 0930 by Mimi Garcia RN  Assistive Device Utilized:   walker   gait belt  Activity Management: activity adjusted per tolerance   Goal Outcome Evaluation:    Plan of Care Reviewed With: patient     Overall Patient Progress: improving     Pt is A&Ox4 on RA. Pt has worked with PT and OT today. Pt c/o pain after PT. Pt up to chair and ambulated in room. Pt denies nausea. Call light is within reach and pt is able to make wants and needs known.

## 2022-04-09 NOTE — PROGRESS NOTES
Patient vital signs are at baseline: Yes  Patient able to ambulate as they were prior to admission or with assist devices provided by therapies during their stay:  Yes Assist 1-2, slightly unsteady. Met 75 feet goal. Patient stops and periodically stretches out leg when walking.  Patient MUST void prior to discharge:  Yes  Patient able to tolerate oral intake:  Yes  Pain has adequate pain control using Oral analgesics:  Yes  Does patient have an identified :  Yes  Has goal D/C date and time been discussed with patient:  Yes      Patient has baseline numbness and tingling r/t neuropathy.     Lisa Fortune RN

## 2022-04-09 NOTE — PLAN OF CARE
Patient vital signs are at baseline: Yes, VSS. Removed off of O2.   Patient able to ambulate as they were prior to admission or with assist devices provided by therapies during their stay:  Yes, A X 1 using a walker. Slightly unsteady. Ambulated 55 feet. Couldn't do the full 75 ft at once.  Patient MUST void prior to discharge:  Yes  Patient able to tolerate oral intake:  Yes  Patient has adequate pain control using Oral analgesics:  No, Patient has had increased right knee pain throughout the shift. She is taking scheduled Tylenol. She is also taking PRN Oxycodone and IV Dilaudid for comfort. Using ice and repositioning.   Does patient have an identified :  Yes  Has goal D/C date and time been discussed with patient:  Yes    CMS is intact. Pt still reports numbness to her RLE. She is having difficulty bearing full weight on her RLE d/t the decreased sensation. Acewrap is CDI.

## 2022-04-10 ENCOUNTER — APPOINTMENT (OUTPATIENT)
Dept: PHYSICAL THERAPY | Facility: CLINIC | Age: 67
End: 2022-04-10
Attending: ORTHOPAEDIC SURGERY
Payer: COMMERCIAL

## 2022-04-10 VITALS
HEART RATE: 92 BPM | HEIGHT: 65 IN | RESPIRATION RATE: 18 BRPM | OXYGEN SATURATION: 96 % | WEIGHT: 262.9 LBS | SYSTOLIC BLOOD PRESSURE: 133 MMHG | TEMPERATURE: 98.4 F | DIASTOLIC BLOOD PRESSURE: 60 MMHG | BODY MASS INDEX: 43.8 KG/M2

## 2022-04-10 LAB
ANION GAP SERPL CALCULATED.3IONS-SCNC: 12 MMOL/L (ref 5–18)
BUN SERPL-MCNC: 25 MG/DL (ref 8–22)
CALCIUM SERPL-MCNC: 10 MG/DL (ref 8.5–10.5)
CHLORIDE BLD-SCNC: 105 MMOL/L (ref 98–107)
CO2 SERPL-SCNC: 23 MMOL/L (ref 22–31)
CREAT SERPL-MCNC: 0.99 MG/DL (ref 0.6–1.1)
GFR SERPL CREATININE-BSD FRML MDRD: 63 ML/MIN/1.73M2
GLUCOSE BLD-MCNC: 163 MG/DL (ref 70–125)
GLUCOSE BLDC GLUCOMTR-MCNC: 147 MG/DL (ref 70–99)
HGB BLD-MCNC: 10 G/DL (ref 11.7–15.7)
MAGNESIUM SERPL-MCNC: 1.5 MG/DL (ref 1.8–2.6)
POTASSIUM BLD-SCNC: 4.7 MMOL/L (ref 3.5–5)
SODIUM SERPL-SCNC: 140 MMOL/L (ref 136–145)

## 2022-04-10 PROCEDURE — 82962 GLUCOSE BLOOD TEST: CPT

## 2022-04-10 PROCEDURE — 97530 THERAPEUTIC ACTIVITIES: CPT | Mod: GP | Performed by: PHYSICAL THERAPIST

## 2022-04-10 PROCEDURE — 36415 COLL VENOUS BLD VENIPUNCTURE: CPT | Performed by: ORTHOPAEDIC SURGERY

## 2022-04-10 PROCEDURE — 80048 BASIC METABOLIC PNL TOTAL CA: CPT | Performed by: FAMILY MEDICINE

## 2022-04-10 PROCEDURE — 85018 HEMOGLOBIN: CPT | Performed by: ORTHOPAEDIC SURGERY

## 2022-04-10 PROCEDURE — 250N000013 HC RX MED GY IP 250 OP 250 PS 637: Performed by: ORTHOPAEDIC SURGERY

## 2022-04-10 PROCEDURE — 250N000013 HC RX MED GY IP 250 OP 250 PS 637: Performed by: FAMILY MEDICINE

## 2022-04-10 PROCEDURE — 97116 GAIT TRAINING THERAPY: CPT | Mod: GP | Performed by: PHYSICAL THERAPIST

## 2022-04-10 PROCEDURE — 99225 PR SUBSEQUENT OBSERVATION CARE,LEVEL II: CPT | Performed by: FAMILY MEDICINE

## 2022-04-10 PROCEDURE — 250N000013 HC RX MED GY IP 250 OP 250 PS 637: Performed by: INTERNAL MEDICINE

## 2022-04-10 PROCEDURE — 83735 ASSAY OF MAGNESIUM: CPT | Performed by: FAMILY MEDICINE

## 2022-04-10 RX ORDER — MAGNESIUM OXIDE 400 MG/1
400 TABLET ORAL DAILY
Status: DISCONTINUED | OUTPATIENT
Start: 2022-04-10 | End: 2022-04-10 | Stop reason: HOSPADM

## 2022-04-10 RX ORDER — ASPIRIN 81 MG/1
81 TABLET ORAL 2 TIMES DAILY
Qty: 60 TABLET | Refills: 0 | Status: SHIPPED | OUTPATIENT
Start: 2022-04-10 | End: 2022-04-10

## 2022-04-10 RX ORDER — MAGNESIUM OXIDE 400 MG/1
400 TABLET ORAL DAILY
Qty: 7 TABLET | Refills: 0 | Status: SHIPPED | OUTPATIENT
Start: 2022-04-10 | End: 2022-04-17

## 2022-04-10 RX ADMIN — ALLOPURINOL 100 MG: 100 TABLET ORAL at 09:18

## 2022-04-10 RX ADMIN — POLYETHYLENE GLYCOL 3350 17 G: 17 POWDER, FOR SOLUTION ORAL at 09:19

## 2022-04-10 RX ADMIN — OXYCODONE HYDROCHLORIDE 10 MG: 5 TABLET ORAL at 00:43

## 2022-04-10 RX ADMIN — ASPIRIN 81 MG: 81 TABLET, COATED ORAL at 09:17

## 2022-04-10 RX ADMIN — SENNOSIDES AND DOCUSATE SODIUM 1 TABLET: 50; 8.6 TABLET ORAL at 09:18

## 2022-04-10 RX ADMIN — ALBUTEROL SULFATE 2 PUFF: 90 AEROSOL, METERED RESPIRATORY (INHALATION) at 00:50

## 2022-04-10 RX ADMIN — MAGNESIUM OXIDE TAB 400 MG (241.3 MG ELEMENTAL MG) 400 MG: 400 (241.3 MG) TAB at 11:38

## 2022-04-10 RX ADMIN — OXYCODONE HYDROCHLORIDE 10 MG: 5 TABLET ORAL at 06:17

## 2022-04-10 RX ADMIN — ALBUTEROL SULFATE 2 PUFF: 90 AEROSOL, METERED RESPIRATORY (INHALATION) at 06:17

## 2022-04-10 RX ADMIN — VALACYCLOVIR 500 MG: 500 TABLET, FILM COATED ORAL at 09:27

## 2022-04-10 RX ADMIN — HYDROCHLOROTHIAZIDE 12.5 MG: 12.5 CAPSULE ORAL at 09:17

## 2022-04-10 RX ADMIN — ACETAMINOPHEN 975 MG: 325 TABLET ORAL at 09:17

## 2022-04-10 RX ADMIN — CETIRIZINE HYDROCHLORIDE 10 MG: 10 TABLET, FILM COATED ORAL at 09:17

## 2022-04-10 RX ADMIN — INSULIN DEGLUDEC INJECTION 70 UNITS: 100 INJECTION, SOLUTION SUBCUTANEOUS at 09:20

## 2022-04-10 RX ADMIN — Medication 3000 UNITS: at 09:26

## 2022-04-10 RX ADMIN — HYDROXYZINE HYDROCHLORIDE 25 MG: 25 TABLET, FILM COATED ORAL at 00:43

## 2022-04-10 RX ADMIN — ROSUVASTATIN CALCIUM 10 MG: 10 TABLET, FILM COATED ORAL at 09:18

## 2022-04-10 RX ADMIN — ACETAMINOPHEN 975 MG: 325 TABLET ORAL at 00:43

## 2022-04-10 NOTE — PROGRESS NOTES
Cross cover note:    Aqua K pad ordered per RN request to help with knee pain     Addis Chester MD   Pager [phone]

## 2022-04-10 NOTE — PROGRESS NOTES
Patient vital signs are at baseline: Yes  Patient able to ambulate as they were prior to admission or with assist devices provided by therapies during their stay:  No,  Reason:  Patient was able to walk stair before surgery. PT working with patient to walk stairs. Possible home care/ PT.  Patient MUST void prior to discharge:  Yes  Patient able to tolerate oral intake:  Yes  Pain has adequate pain control using Oral analgesics:  Yes  Does patient have an identified :  Yes  Has goal D/C date and time been discussed with patient:  Yes    Patient had some severe pain in evening, but controlled with oral pain meds. Patient tried T-pump but decided to stop using, saying she prefers ice.     Lisa Fortune RN

## 2022-04-10 NOTE — PROGRESS NOTES
Care Management Discharge Note    Discharge Date: 04/10/2022       Discharge Disposition: Home Care, Home    Discharge Services:      Discharge DME:      Discharge Transportation: family or friend will provide    Additional Information:  Discharge orders for Home Care still needed. Request made with provider that discharge orders for home care are still needed.     10:15 AM  Home Care discharge orders in and faxed to [place] [facility] as needed. CM called [facility] and left a message Gabriela who is following admissions today.     LINA Johnson

## 2022-04-10 NOTE — PROGRESS NOTES
Post-operative Day: 2 Day Post-Op  S/P Procedure(s):  RIGHT TOTAL KNEE ARTHROPLASTY        Assessment: 2 day s/p right TKA     Plan:   Continue PT/OT  Weightbearing status:WBAT  Anticoagulation: ASA 81 PO BID in addition to SCDs, patricia stockings and early ambulation.  Discharge planning: Anticipate discharge to home today. Doing well with Oxycodone 10mg Q 4 hours for pain. Will continue hydroxyzine as well. Discussed with patient and she is in agreement with this plan.      Subjective:  Pain: moderate today, was worse overnight and yesterday.  Chest pain, SOB:  NO  Nausea, vomiting:  NO  Lightheadedness, dizziness:  NO  Neuro:  Patient denies new onset numbness or paresthesias     Objective:  Gen: A&O x 3. NAD. Appears comfortable seated in the chair next to the bed.  Wound status: clean, dry and intact dressing without drainage.  Circulation, motion and sensation: Dorsiflexion/plantarflexion intact and equal bilaterally; distal lower extremity sensation is intact and equal bilaterally.  Swelling: mild  Calf tenderness: calves are soft and non-tender bilaterally.  Recent Labs   Lab Test 04/09/22  0708 04/05/22  1106 10/15/21  1302 05/19/21  1048   HGB 10.2* 11.0* 11.8 10.6*   PLT  --  297 240 226         Amber Bella PA-C  [facility]

## 2022-04-10 NOTE — PROGRESS NOTES
[facility] MEDICINE PROGRESS NOTE      Identification/Summary: Liana Briceño is a 66 year old female with a past medical history of CAD, HTN, DM 2, CKD 2, dyslipidemia, RADHA, and morbid obesity  who was admitted on 4/8/2022 for elective right TKA: Postop day #2.  Hospital course is unremarkable    Assessment and Plan:    CAD status post stent  Asymptomatic  Continue aspirin, statin  Lexiscan stress test 7/20 negative for inducible ischemia.     Hypertension  Blood pressure is stable  Continue home antihypertensives     Diabetes mellitus type 2  Most recent A1c 6.74/22dyslipidemia  Continue home insulin regimen      dyslipidemia  Continue home statin     CKD stage III  Creatinine 0.99     Recent maxillary sinusitis  S/p treatment    History of SVT status post ablation     Morbid obesity BMI 43    Obstructive sleep apnea  Does not use CPAP.     Acute blood loss anemia, hgb stable:     Right knee osteoarthritis status post TKA: Postop day #2. Plan: per orthopedic surgery.  Home  today    Shimon Bentley MD  [facility]  [facility]  Phone: #[phone]    Interval History/Subjective:  Doing well. No complaints    Physical Exam/Objective:  Temp:  [98.4  F (36.9  C)-98.5  F (36.9  C)] 98.4  F (36.9  C)  Pulse:  [] 92  Resp:  [16-18] 18  BP: (133-177)/(60-74) 133/60  SpO2:  [96 %-98 %] 96 %  Body mass index is 43.75 kg/m .    GENERAL:  Alert, appears comfortable, in no acute distress, appears stated age   HEAD:  Normocephalic, without obvious abnormality, atraumatic   LUNGS:   Clear to auscultation bilaterally, no rales, rhonchi, or wheezing, symmetric chest rise on inhalation, respirations unlabored   CHEST WALL:  No tenderness or deformity   HEART:  Regular rate and rhythm, S1 and S2 normal, no murmur, rub, or gallop    ABDOMEN:   Soft, non-tender   EXTREMITIES: Extremities normal, atraumatic, no cyanosis or edema    SKIN: Dry to touch,  no exanthems in the visualized areas   NEURO: Alert, moves all four extremities freely   PSYCH: Cooperative, behavior is appropriate      Data reviewed today: I personally reviewed all new medications, labs, imaging/diagnostics reports over the past 24 hours. Pertinent findings include:    Labs:  Most Recent 3 CBC's:Recent Labs   Lab Test 04/10/22  0952 04/09/22  0708 04/05/22  1106 10/15/21  1302 05/19/21  1048   WBC  --   --  7.6 6.2 6.6   HGB 10.0* 10.2* 11.0* 11.8 10.6*   MCV  --   --  92 90 91   PLT  --   --  297 240 226     Most Recent 3 BMP's:Recent Labs   Lab Test 04/10/22  0952 04/10/22  0810 04/09/22 2050 04/08/22  1214 04/05/22  1106 10/15/21  1302     --   --   --  139 139   POTASSIUM 4.7  --   --   --  3.8 3.8   CHLORIDE 105  --   --   --  104 104   CO2 23  --   --   --  25 23   BUN 25*  --   --   --  19 13   CR 0.99  --   --   --  1.04 0.89   ANIONGAP 12  --   --   --  10 12   LORELEI 10.0  --   --   --  10.1 10.0   * 147* 221*   < > 119 127*    < > = values in this interval not displayed.       Medications:   Personally Reviewed.  Medications     lactated ringers Stopped (04/09/22 0100)       acetaminophen  975 mg Oral Q8H     albuterol  2 puff Inhalation Q6H     allopurinol  100 mg Oral Daily     aspirin  81 mg Oral BID     [Held by provider] celecoxib  200 mg Oral BID     cetirizine  10 mg Oral Daily     docusate sodium  100 mg Oral BID     fluticasone  1 spray Both Nostrils Daily     hydrochlorothiazide  12.5 mg Oral Daily     insulin aspart  8 Units Subcutaneous TID AC     insulin aspart  1-7 Units Subcutaneous TID AC     insulin aspart  1-5 Units Subcutaneous At Bedtime     insulin degludec  70 Units Subcutaneous Daily     polyethylene glycol  17 g Oral Daily     rosuvastatin  10 mg Oral Daily     senna-docusate  1 tablet Oral BID     sodium chloride (PF)  3 mL Intracatheter Q8H     triamcinolone   Topical BID     valACYclovir  500 mg Oral Daily     Vitamin D3  3,000 Units Oral Daily

## 2022-04-10 NOTE — PLAN OF CARE
Physical Therapy Discharge Summary    Reason for therapy discharge:    All goals and outcomes met, no further needs identified.    Progress towards therapy goal(s). See goals on Care Plan in [facility] electronic health record for goal details.  Goals met    Therapy recommendation(s):    Patient would benefit from home physical therapy to prevent falls on flight of 16 stairs leading to her apartment.

## 2022-04-26 ENCOUNTER — TRANSFERRED RECORDS (OUTPATIENT)
Dept: HEALTH INFORMATION MANAGEMENT | Facility: CLINIC | Age: 67
End: 2022-04-26
Payer: COMMERCIAL

## 2022-05-03 DIAGNOSIS — K59.09 OTHER CONSTIPATION: ICD-10-CM

## 2022-05-04 ENCOUNTER — TELEPHONE (OUTPATIENT)
Dept: INTERNAL MEDICINE | Facility: CLINIC | Age: 67
End: 2022-05-04

## 2022-05-04 DIAGNOSIS — K21.9 GASTROESOPHAGEAL REFLUX DISEASE WITHOUT ESOPHAGITIS: ICD-10-CM

## 2022-05-04 RX ORDER — OMEPRAZOLE 40 MG/1
CAPSULE, DELAYED RELEASE ORAL
Qty: 90 CAPSULE | Refills: 3 | Status: SHIPPED | OUTPATIENT
Start: 2022-05-04 | End: 2023-01-25

## 2022-05-04 NOTE — TELEPHONE ENCOUNTER
Reason for Call:  Medication or medication refill:    Do you use a [facility]?  Name of the pharmacy and phone number for the current request:  [facility]-using new one as she had surgery.-updated    Name of the medication requested:     omeprazole (PRILOSEC) 40 MG DR capsule (Discontinued) 90 capsule 3 10/1/2021 4/5/2022 No   Sig: TAKE 1 CAPSULE BY MOUTH EVERY DAY   Patient not taking: Reported on 3/30/2022              Other request: pt is out of medication and pt states she always takes and not sure why it was discontued    Can we leave a detailed message on this number? YES    Phone number patient can be reached at: Cell number on file:    Telephone Information:   Mobile [phone]       Best Time: any    Call taken on 5/4/2022 at 1:03 PM by Pam J. Behr

## 2022-05-06 RX ORDER — DOCUSATE SODIUM 100 MG/1
CAPSULE, LIQUID FILLED ORAL
Qty: 180 CAPSULE | Refills: 3 | Status: SHIPPED | OUTPATIENT
Start: 2022-05-06 | End: 2022-12-12

## 2022-05-06 NOTE — TELEPHONE ENCOUNTER
"Last Written Prescription Date:  8/6/21  Last Fill Quantity: 100,  # refills: 1   Last office visit provider:  4/5/22     Requested Prescriptions   Pending Prescriptions Disp Refills      MG capsule [Pharmacy Med Name: DOK (DOCUSATE SODIUM) 100MG CAPS] 100 capsule 1     Sig: TAKE 1 CAPSULE(100 MG) BY MOUTH TWICE DAILY       Laxatives Protocol Passed - 5/6/2022  9:22 AM        Passed - Patient is age 6 or older        Passed - Recent (12 mo) or future (30 days) visit within the authorizing provider's specialty     Patient has had an office visit with the authorizing provider or a provider within the authorizing providers department within the previous 12 mos or has a future within next 30 days. See \"Patient Info\" tab in inbasket, or \"Choose Columns\" in Meds & Orders section of the refill encounter.              Passed - Medication is active on med list             Leander Billingsley RN 05/06/22 9:23 AM    "

## 2022-05-27 ENCOUNTER — TELEPHONE (OUTPATIENT)
Dept: INTERNAL MEDICINE | Facility: CLINIC | Age: 67
End: 2022-05-27
Payer: COMMERCIAL

## 2022-05-27 DIAGNOSIS — E11.42 DIABETIC POLYNEUROPATHY ASSOCIATED WITH TYPE 2 DIABETES MELLITUS (H): Primary | ICD-10-CM

## 2022-05-27 RX ORDER — GABAPENTIN 300 MG/1
CAPSULE ORAL
Qty: 90 CAPSULE | Refills: 0 | Status: SHIPPED | OUTPATIENT
Start: 2022-05-27 | End: 2022-08-29

## 2022-05-27 NOTE — TELEPHONE ENCOUNTER
Routing refill request to provider for review/approval because:  Drug not on the [facility] refill protocol   Drug not active on patient's medication list    Last Written Prescription Date:    Last Fill Quantity: ,  # refills:    Last office visit provider:  4/5/22     Requested Prescriptions   Pending Prescriptions Disp Refills     gabapentin (NEURONTIN) 300 MG capsule [Pharmacy Med Name: GABAPENTIN 300MG CAPSULES] 90 capsule      Sig: TAKE 1 CAPSULE BY MOUTH AT BEDTIME       There is no refill protocol information for this order          Amy Velez RN 05/27/22 3:25 PM

## 2022-05-27 NOTE — TELEPHONE ENCOUNTER
Reason for Call:  Medication or medication refill:    Do you use a [facility]?  Name of the pharmacy and phone number for the current request:    [facility] - [address]    Name of the medication requested:   Gabapentin    Other request: n/a    Can we leave a detailed message on this number? YES    Phone number patient can be reached at: Cell number on file:    Telephone Information:   Mobile [phone]       Best Time: anytime    Call taken on 5/27/2022 at 1:14 PM by Raquel Blackwell

## 2022-05-31 NOTE — TELEPHONE ENCOUNTER
Medication refilled in another encounter. Closing encounter.       Susie Duncan RN  [facility]    8:54 AM, May 31, 2022

## 2022-06-05 DIAGNOSIS — Z79.4 TYPE 2 DIABETES MELLITUS WITH HYPERGLYCEMIA, WITH LONG-TERM CURRENT USE OF INSULIN (H): ICD-10-CM

## 2022-06-05 DIAGNOSIS — E11.65 TYPE 2 DIABETES MELLITUS WITH HYPERGLYCEMIA, WITH LONG-TERM CURRENT USE OF INSULIN (H): ICD-10-CM

## 2022-06-05 DIAGNOSIS — E78.5 HYPERLIPEMIA: ICD-10-CM

## 2022-06-06 ENCOUNTER — TRANSFERRED RECORDS (OUTPATIENT)
Dept: HEALTH INFORMATION MANAGEMENT | Facility: CLINIC | Age: 67
End: 2022-06-06
Payer: COMMERCIAL

## 2022-06-06 RX ORDER — ROSUVASTATIN CALCIUM 10 MG/1
TABLET, COATED ORAL
Qty: 90 TABLET | Refills: 3 | Status: SHIPPED | OUTPATIENT
Start: 2022-06-06 | End: 2023-06-15

## 2022-06-06 RX ORDER — METHION/INOS/CHOL BT/B COM/LIV 110MG-86MG
CAPSULE ORAL
Qty: 90 TABLET | Refills: 3 | Status: SHIPPED | OUTPATIENT
Start: 2022-06-06 | End: 2022-11-01

## 2022-06-06 NOTE — TELEPHONE ENCOUNTER
"Routing refill request to provider for review/approval because:  Drug not on the [facility] refill protocol   Labs not current:  LDL    Last Written Prescription Date:  12/9/21  Last Fill Quantity: 90,  # refills: 1   Last office visit provider:  4/5/22     Last Written Prescription Date:  5/26/21  Last Fill Quantity: 90,  # refills: 3   Last office visit provider:  4/5/22    Requested Prescriptions   Pending Prescriptions Disp Refills     Thiamine HCl (B-1) 100 MG TABS [Pharmacy Med Name: VITAMIN B-1 100MG TABLETS] 90 tablet 1     Sig: TAKE 1 TABLET(100 MG) BY MOUTH DAILY       There is no refill protocol information for this order        rosuvastatin (CRESTOR) 10 MG tablet [Pharmacy Med Name: ROSUVASTATIN 10MG TABLETS] 90 tablet 3     Sig: TAKE 1 TABLET(10 MG) BY MOUTH DAILY       Statins Protocol Failed - 6/6/2022 10:48 AM        Failed - LDL on file in past 12 months     Recent Labs   Lab Test 05/19/21  1048   LDL 46             Passed - No abnormal creatine kinase in past 12 months     No lab results found.             Passed - Recent (12 mo) or future (30 days) visit within the authorizing provider's specialty     Patient has had an office visit with the authorizing provider or a provider within the authorizing providers department within the previous 12 mos or has a future within next 30 days. See \"Patient Info\" tab in inbasket, or \"Choose Columns\" in Meds & Orders section of the refill encounter.              Passed - Medication is active on med list        Passed - Patient is age 18 or older        Passed - No active pregnancy on record        Passed - No positive pregnancy test in past 12 months             Leander Billingsley RN 06/06/22 10:49 AM  "

## 2022-06-21 ENCOUNTER — TELEPHONE (OUTPATIENT)
Dept: INTERNAL MEDICINE | Facility: CLINIC | Age: 67
End: 2022-06-21

## 2022-06-21 DIAGNOSIS — D50.9 IRON DEFICIENCY ANEMIA, UNSPECIFIED IRON DEFICIENCY ANEMIA TYPE: Primary | ICD-10-CM

## 2022-06-21 DIAGNOSIS — M99.53 INTERVERTEBRAL DISC STENOSIS OF NEURAL CANAL OF LUMBAR REGION: ICD-10-CM

## 2022-06-21 DIAGNOSIS — M25.569 ACUTE KNEE PAIN, UNSPECIFIED LATERALITY: ICD-10-CM

## 2022-06-21 DIAGNOSIS — M17.11 PRIMARY OSTEOARTHRITIS OF RIGHT KNEE: ICD-10-CM

## 2022-06-21 NOTE — TELEPHONE ENCOUNTER
Reason for Call:  Medication or medication refill:    Do you use a [facility]?  Name of the pharmacy and phone number for the current request:  [facility]-updated    Name of the medication requested:   HYDROcodone-acetaminophen (NORCO) 5-325 MG tablet (Discontinued) 50 tablet 0 4/5/2022 4/5/2022 No   Sig - Route: Take 1 tablet by mouth 2 times daily as needed for pain - Oral         Other request: n/a    Can we leave a detailed message on this number? YES    Phone number patient can be reached at: Cell number on file:    Telephone Information:   Mobile [phone]       Best Time: any    Call taken on 6/21/2022 at 4:48 PM by Pam J. Behr

## 2022-06-23 DIAGNOSIS — Z79.4 TYPE 2 DIABETES MELLITUS WITH HYPERGLYCEMIA, WITH LONG-TERM CURRENT USE OF INSULIN (H): ICD-10-CM

## 2022-06-23 DIAGNOSIS — E11.65 TYPE 2 DIABETES MELLITUS WITH HYPERGLYCEMIA, WITH LONG-TERM CURRENT USE OF INSULIN (H): ICD-10-CM

## 2022-06-23 RX ORDER — INSULIN LISPRO 100 [IU]/ML
10-12 INJECTION, SOLUTION INTRAVENOUS; SUBCUTANEOUS
Qty: 45 ML | Refills: 3 | Status: SHIPPED | OUTPATIENT
Start: 2022-06-23 | End: 2022-08-23

## 2022-06-23 RX ORDER — OXYCODONE HYDROCHLORIDE 5 MG/1
5-10 TABLET ORAL
Qty: 30 TABLET | Refills: 0 | Status: CANCELLED | OUTPATIENT
Start: 2022-06-23

## 2022-06-23 NOTE — TELEPHONE ENCOUNTER
Reason for Call:  Medication or medication refill:    Do you use a [facility]?  Name of the pharmacy and phone number for the current request:    [facility] - [address]    Name of the medication requested:   Humalog    Other request: n/a    Can we leave a detailed message on this number? YES    Phone number patient can be reached at: Cell number on file:    Telephone Information:   Mobile [phone]       Best Time: anytime    Call taken on 6/23/2022 at 10:39 AM by Raquel Blackwell

## 2022-06-24 RX ORDER — HYDROXYZINE HYDROCHLORIDE 25 MG/1
25 TABLET, FILM COATED ORAL EVERY 6 HOURS PRN
Qty: 30 TABLET | Refills: 0 | Status: SHIPPED | OUTPATIENT
Start: 2022-06-24 | End: 2022-09-06

## 2022-06-24 RX ORDER — HYDROCODONE BITARTRATE AND ACETAMINOPHEN 5; 325 MG/1; MG/1
1 TABLET ORAL 2 TIMES DAILY PRN
Qty: 50 TABLET | Refills: 0 | Status: CANCELLED | OUTPATIENT
Start: 2022-06-24

## 2022-06-24 RX ORDER — HYDROCODONE BITARTRATE AND ACETAMINOPHEN 5; 325 MG/1; MG/1
1 TABLET ORAL EVERY 6 HOURS PRN
Qty: 40 TABLET | Refills: 0 | Status: SHIPPED | OUTPATIENT
Start: 2022-06-24 | End: 2022-07-04

## 2022-06-24 NOTE — TELEPHONE ENCOUNTER
Spoke with patient at the request of Dr Fuentes. Patient states she is doing well after her surgery, but is having pain again and would like a refill of the Norco she was previously on. Also requesting refill of hydroxyzine. Medications reconciled.    Controlled Substance Refill Request for Norco    Last refill: 4/5/2022 for 52 with 0    Last clinic visit: 4/5/2022     Clinic visit frequency required: Q 3 months  Next appt: 7/6/2022    Controlled substance agreement on file: Yes:  Date 1/28/2020.    Documentation in problem list reviewed:  Yes    Processing:  Rx to be electronically transmitted to pharmacy by provider

## 2022-07-06 ENCOUNTER — OFFICE VISIT (OUTPATIENT)
Dept: INTERNAL MEDICINE | Facility: CLINIC | Age: 67
End: 2022-07-06
Payer: COMMERCIAL

## 2022-07-06 VITALS
HEART RATE: 80 BPM | SYSTOLIC BLOOD PRESSURE: 128 MMHG | OXYGEN SATURATION: 98 % | DIASTOLIC BLOOD PRESSURE: 60 MMHG | WEIGHT: 255 LBS | BODY MASS INDEX: 42.43 KG/M2

## 2022-07-06 DIAGNOSIS — Z12.31 ENCOUNTER FOR SCREENING MAMMOGRAM FOR MALIGNANT NEOPLASM OF BREAST: ICD-10-CM

## 2022-07-06 DIAGNOSIS — Z00.00 ENCOUNTER FOR PREVENTIVE CARE: ICD-10-CM

## 2022-07-06 DIAGNOSIS — I25.83 CORONARY ARTERY DISEASE DUE TO LIPID RICH PLAQUE: ICD-10-CM

## 2022-07-06 DIAGNOSIS — D50.9 IRON DEFICIENCY ANEMIA, UNSPECIFIED IRON DEFICIENCY ANEMIA TYPE: ICD-10-CM

## 2022-07-06 DIAGNOSIS — E11.22 TYPE 2 DIABETES MELLITUS WITH STAGE 3 CHRONIC KIDNEY DISEASE, WITHOUT LONG-TERM CURRENT USE OF INSULIN, UNSPECIFIED WHETHER STAGE 3A OR 3B CKD (H): Primary | ICD-10-CM

## 2022-07-06 DIAGNOSIS — I25.10 CORONARY ARTERY DISEASE DUE TO LIPID RICH PLAQUE: ICD-10-CM

## 2022-07-06 DIAGNOSIS — Z23 NEED FOR PNEUMOCOCCAL VACCINATION: ICD-10-CM

## 2022-07-06 DIAGNOSIS — N18.30 TYPE 2 DIABETES MELLITUS WITH STAGE 3 CHRONIC KIDNEY DISEASE, WITHOUT LONG-TERM CURRENT USE OF INSULIN, UNSPECIFIED WHETHER STAGE 3A OR 3B CKD (H): Primary | ICD-10-CM

## 2022-07-06 DIAGNOSIS — Z23 HIGH PRIORITY FOR COVID-19 VACCINATION: ICD-10-CM

## 2022-07-06 LAB
ANION GAP SERPL CALCULATED.3IONS-SCNC: 9 MMOL/L (ref 7–15)
BUN SERPL-MCNC: 22.9 MG/DL (ref 8–23)
CALCIUM SERPL-MCNC: 9.9 MG/DL (ref 8.8–10.2)
CHLORIDE SERPL-SCNC: 104 MMOL/L (ref 98–107)
CHOLEST SERPL-MCNC: 105 MG/DL
CREAT SERPL-MCNC: 1.2 MG/DL (ref 0.51–0.95)
DEPRECATED HCO3 PLAS-SCNC: 26 MMOL/L (ref 22–29)
ERYTHROCYTE [DISTWIDTH] IN BLOOD BY AUTOMATED COUNT: 14.5 % (ref 10–15)
FERRITIN SERPL-MCNC: 563 NG/ML (ref 11–328)
GFR SERPL CREATININE-BSD FRML MDRD: 50 ML/MIN/1.73M2
GLUCOSE SERPL-MCNC: 108 MG/DL (ref 70–99)
HBA1C MFR BLD: 6.1 % (ref 0–5.6)
HCT VFR BLD AUTO: 32.1 % (ref 35–47)
HDLC SERPL-MCNC: 29 MG/DL
HGB BLD-MCNC: 10.3 G/DL (ref 11.7–15.7)
IRON BINDING CAPACITY (ROCHE): 252 UG/DL (ref 240–430)
IRON SATN MFR SERPL: 22 % (ref 15–46)
IRON SERPL-MCNC: 55 UG/DL (ref 37–145)
LDLC SERPL CALC-MCNC: 33 MG/DL
MCH RBC QN AUTO: 28.3 PG (ref 26.5–33)
MCHC RBC AUTO-ENTMCNC: 32.1 G/DL (ref 31.5–36.5)
MCV RBC AUTO: 88 FL (ref 78–100)
NONHDLC SERPL-MCNC: 76 MG/DL
PLATELET # BLD AUTO: 249 10E3/UL (ref 150–450)
POTASSIUM SERPL-SCNC: 4.8 MMOL/L (ref 3.4–5.3)
RBC # BLD AUTO: 3.64 10E6/UL (ref 3.8–5.2)
SODIUM SERPL-SCNC: 139 MMOL/L (ref 136–145)
TRANSFERRIN SERPL-MCNC: 215 MG/DL (ref 200–360)
TRIGL SERPL-MCNC: 213 MG/DL
WBC # BLD AUTO: 6.8 10E3/UL (ref 4–11)

## 2022-07-06 PROCEDURE — 80061 LIPID PANEL: CPT | Performed by: INTERNAL MEDICINE

## 2022-07-06 PROCEDURE — 84466 ASSAY OF TRANSFERRIN: CPT | Performed by: INTERNAL MEDICINE

## 2022-07-06 PROCEDURE — 85027 COMPLETE CBC AUTOMATED: CPT | Performed by: INTERNAL MEDICINE

## 2022-07-06 PROCEDURE — 80048 BASIC METABOLIC PNL TOTAL CA: CPT | Performed by: INTERNAL MEDICINE

## 2022-07-06 PROCEDURE — 99214 OFFICE O/P EST MOD 30 MIN: CPT | Performed by: INTERNAL MEDICINE

## 2022-07-06 PROCEDURE — 83036 HEMOGLOBIN GLYCOSYLATED A1C: CPT | Performed by: INTERNAL MEDICINE

## 2022-07-06 PROCEDURE — 82728 ASSAY OF FERRITIN: CPT | Performed by: INTERNAL MEDICINE

## 2022-07-06 PROCEDURE — 83550 IRON BINDING TEST: CPT | Mod: 59 | Performed by: INTERNAL MEDICINE

## 2022-07-06 PROCEDURE — 36415 COLL VENOUS BLD VENIPUNCTURE: CPT | Performed by: INTERNAL MEDICINE

## 2022-07-06 PROCEDURE — 83540 ASSAY OF IRON: CPT | Performed by: INTERNAL MEDICINE

## 2022-07-06 RX ORDER — HYDROCODONE BITARTRATE AND ACETAMINOPHEN 5; 325 MG/1; MG/1
1 TABLET ORAL EVERY 12 HOURS PRN
COMMUNITY
End: 2022-09-06

## 2022-07-06 RX ORDER — TRAMADOL HYDROCHLORIDE 50 MG/1
50 TABLET ORAL 2 TIMES DAILY PRN
COMMUNITY
Start: 2022-06-29 | End: 2022-11-01

## 2022-07-06 NOTE — PROGRESS NOTES
{  [facility] Follow Up Note    Assessment/Plan:  1. Type 2 diabetes mellitus with stage 3 chronic kidney disease, without long-term current use of insulin, unspecified whether stage 3a or 3b CKD (H)  Glucose monitor reviewed.  She is 99% at goal for blood sugar with occasional nocturnal lows.  As a result of some low blood sugar events, she has reduced her Tresiba to 55 units.  Fasting sugars are around 100.  Of note, she does not particularly pay attention to predinner and postprandial blood sugars.  She is going to be more mindful and make note of these sugars and may need to cut back on Premeal insulin.  Recommendations: Update labs today.  Follow-up with [facility] pharmacy in about 4 to 6 weeks for review of information.    - CBC with platelets; Future  - Basic metabolic panel; Future  - Hemoglobin A1c; Future  - Lipid panel reflex to direct LDL Fasting; Future    2. Coronary artery disease due to lipid rich plaque  We will update lab  - Lipid panel reflex to direct LDL Fasting; Future    3. High priority for COVID-19 vaccination  She will proceed with this at pharmacy      4 Encounter for preventive care  Due for mammogram  - MA Screen Bilateral w/Jose; Future    6. Encounter for screening mammogram for malignant neoplasm of breast     - MA Screen Bilateral w/Jose; Future    7.  Back pain/status post knee replacement-recovery going well.  Back pain is somewhat exacerbated.  She is using Vicodin twice daily.      Mimi Fuentes MD, MD    Chief Complaint:  Chief Complaint   Patient presents with     Follow Up       History of Present Illness:  Liana is a 66 year old female who is here today for follow-up.  Of note, Frank is about 8 weeks status post total knee replacement.  Things are overall going well.  She states her back pain is exacerbated.  She is wondering if her epidural contributed.  I suspect it is secondary to postural changes that occur with knee replacement/etc.  She is off oxycodone.   She is not using tramadol prescribed by orthopedic surgery.  Her Vicodin-for twice daily use was recently refilled.    She is due for a diabetic follow-up.  She has been able to reduce her Tresiba insulin to 55 units-starting 5 days ago.  Review of her glucose monitor reveals that she has been at goal for 99% of the time.  Her low blood sugar events tend to occur nocturnally.  She is eating her dinner later in the evening and administering 10 to 12 units of short acting insulin.  She does not have a presupper and 2-hour postprandial information available today.  She denies any major symptoms with low blood sugar.  She is losing weight which is good.    Health maintenance is reviewed and she is due for a mammogram    Review of Systems:  A comprehensive review of systems was performed and was otherwise negative    PFSH:  Social History:   History   Smoking Status     Former Smoker     Packs/day: 0.25     Years: 44.00     Types: Cigarettes     Quit date: 1/20/2017   Smokeless Tobacco     Never Used     Comment: age 18 to age 62 up to 1/2 ppd       Past History:   Current Outpatient Medications   Medication Sig Dispense Refill     acetaminophen (TYLENOL) 500 MG tablet [ACETAMINOPHEN (TYLENOL) 500 MG TABLET] Take 1,000 mg by mouth 3 (three) times a day as needed.       allopurinol (ZYLOPRIM) 100 MG tablet TAKE 1 TABLET(100 MG) BY MOUTH DAILY 90 tablet 2     aspirin (ASA) 81 MG EC tablet Take 1 tablet (81 mg) by mouth daily Resume after done with BID dosing  0     blood glucose test strips [BLOOD GLUCOSE TEST STRIPS] Use 1 each As Directed 6 (six) times a day. Prodigy Meter 300 strip 3     celecoxib (CELEBREX) 200 MG capsule Take 1 capsule by mouth 2 times daily       cholecalciferol, vitamin D3, (CHOLECALCIFEROL) 1,000 unit tablet [CHOLECALCIFEROL, VITAMIN D3, (CHOLECALCIFEROL) 1,000 UNIT TABLET] Take 3,000 Units by mouth daily.        cinnamon bark (CINNAMON ORAL) [CINNAMON BARK (CINNAMON ORAL)] Take 1 each by mouth 2  (two) times a day.       Continuous Blood Gluc Sensor (FREESTYLE MIRANDA 2 SENSOR) MISC 1 each every 14 days 1 each every 14 days. Change every 14 days. 6 each 3     cyanocobalamin (VITAMIN B-12) 500 MCG tablet [CYANOCOBALAMIN (VITAMIN B-12) 500 MCG TABLET] Take 500 mcg by mouth daily.        MG capsule TAKE 1 CAPSULE(100 MG) BY MOUTH TWICE DAILY 180 capsule 3     gabapentin (NEURONTIN) 300 MG capsule TAKE 1 CAPSULE BY MOUTH AT BEDTIME 90 capsule 0     generic lancets (MICROLET LANCET) [GENERIC LANCETS (MICROLET LANCET)] Use 1 each As Directed 6 (six) times a day. 300 each 3     hydrochlorothiazide (MICROZIDE) 12.5 MG capsule TAKE 1 CAPSULE(12.5 MG) BY MOUTH DAILY 90 capsule 2     HYDROcodone-acetaminophen (NORCO) 5-325 MG tablet Take 1 tablet by mouth every 12 hours as needed for severe pain       hydrOXYzine (ATARAX) 25 MG tablet Take 1 tablet (25 mg) by mouth every 6 hours as needed for itching or anxiety (with pain, moderate pain) 30 tablet 0     insulin degludec (TRESIBA FLEXTOUCH) 200 UNIT/ML pen Inject 70 Units Subcutaneous daily (Patient taking differently: Inject 55 Units Subcutaneous daily) 63 mL 1     insulin lispro (HUMALOG KWIKPEN) 100 UNIT/ML (1 unit dial) KWIKPEN Inject 10-12 Units Subcutaneous 3 times daily (before meals) (Patient taking differently: Inject 8-10 Units Subcutaneous 3 times daily (before meals)) 45 mL 3     lisinopril (ZESTRIL) 5 MG tablet TAKE 1 AND 1/2 TABLETS(7.5 MG) BY MOUTH DAILY 135 tablet 2     melatonin (MELATIN) 3 mg Tab tablet Take 6 mg by mouth nightly as needed       nitroglycerin (NITROSTAT) 0.3 MG SL tablet [NITROGLYCERIN (NITROSTAT) 0.3 MG SL TABLET] Place 1 tablet (0.3 mg total) under the tongue every 5 (five) minutes as needed for chest pain. 100 tablet 3     omeprazole (PRILOSEC) 40 MG DR capsule TAKE 1 CAPSULE BY MOUTH EVERY DAY 90 capsule 3     ONETOUCH DELICA PLUS LANCET 33 gauge Misc [ONETOUCH DELICA PLUS LANCET 33 GAUGE MISC] USE TO TEST BLOOD SUGAR 6 TIMES  "DAILY 600 each 3     OZEMPIC, 1 MG/DOSE, 4 MG/3ML SOPN INJECT 1MG UNDER THE SKIN ONCE EVERY 7 DAYS 3 mL 3     pen needle, diabetic (BD ULTRA-FINE ZULMA PEN NEEDLE) 32 gauge x 5/32\" Ndle [PEN NEEDLE, DIABETIC (BD ULTRA-FINE ZULMA PEN NEEDLE) 32 GAUGE X 5/32\" NDLE] Inject 4 each under the skin daily. With Tresiba and Novolog 120 each 3     rosuvastatin (CRESTOR) 10 MG tablet TAKE 1 TABLET(10 MG) BY MOUTH DAILY 90 tablet 3     Thiamine HCl (B-1) 100 MG TABS TAKE 1 TABLET(100 MG) BY MOUTH DAILY 90 tablet 3     traMADol (ULTRAM) 50 MG tablet Take 50 mg by mouth 2 times daily as needed       triamcinolone (KENALOG) 0.1 % cream [TRIAMCINOLONE (KENALOG) 0.1 % CREAM] Apply bid 30 g 0     valACYclovir (VALTREX) 500 MG tablet TAKE 1 TABLET(500 MG) BY MOUTH DAILY 90 tablet 3       Family History:     Physical Exam:  General Appearance:   She appears quite well and in no acute distress  Vitals:    07/06/22 0756   BP: 128/60   BP Location: Left arm   Patient Position: Sitting   Cuff Size: Adult Large   Pulse: 80   SpO2: 98%   Weight: 115.7 kg (255 lb)     Wt Readings from Last 3 Encounters:   07/06/22 115.7 kg (255 lb)   04/08/22 119.3 kg (262 lb 14.4 oz)   04/05/22 121 kg (266 lb 11.2 oz)     Body mass index is 42.43 kg/m .    Ambulates with a slight limp but otherwise healthy      Time spent greater than 30 minutes reviewing glucose monitor/discussing postop care back pain/etc.    "

## 2022-07-07 ENCOUNTER — MYC MEDICAL ADVICE (OUTPATIENT)
Dept: INTERNAL MEDICINE | Facility: CLINIC | Age: 67
End: 2022-07-07

## 2022-07-07 DIAGNOSIS — R79.89 ELEVATED SERUM CREATININE: Primary | ICD-10-CM

## 2022-07-11 ENCOUNTER — MYC MEDICAL ADVICE (OUTPATIENT)
Dept: INTERNAL MEDICINE | Facility: CLINIC | Age: 67
End: 2022-07-11

## 2022-07-22 ENCOUNTER — ANCILLARY PROCEDURE (OUTPATIENT)
Dept: MAMMOGRAPHY | Facility: CLINIC | Age: 67
End: 2022-07-22
Attending: INTERNAL MEDICINE
Payer: COMMERCIAL

## 2022-07-22 PROCEDURE — 77063 BREAST TOMOSYNTHESIS BI: CPT | Mod: TC | Performed by: RADIOLOGY

## 2022-07-22 PROCEDURE — 77067 SCR MAMMO BI INCL CAD: CPT | Mod: TC | Performed by: RADIOLOGY

## 2022-08-10 ENCOUNTER — TELEPHONE (OUTPATIENT)
Dept: INTERNAL MEDICINE | Facility: CLINIC | Age: 67
End: 2022-08-10

## 2022-08-10 DIAGNOSIS — E11.65 TYPE 2 DIABETES MELLITUS WITH HYPERGLYCEMIA, WITH LONG-TERM CURRENT USE OF INSULIN (H): ICD-10-CM

## 2022-08-10 DIAGNOSIS — Z79.4 TYPE 2 DIABETES MELLITUS WITH HYPERGLYCEMIA, WITH LONG-TERM CURRENT USE OF INSULIN (H): ICD-10-CM

## 2022-08-10 RX ORDER — SEMAGLUTIDE 1.34 MG/ML
1 INJECTION, SOLUTION SUBCUTANEOUS
Qty: 9 ML | Refills: 0 | Status: SHIPPED | OUTPATIENT
Start: 2022-08-10 | End: 2022-11-09

## 2022-08-10 NOTE — TELEPHONE ENCOUNTER
Refill signed, please ask pt to schedule lab appt to have repeat labs done ordered by Dr Fuentes ( to check kidney function and hemoglobin level).

## 2022-08-10 NOTE — TELEPHONE ENCOUNTER
"Routing refill request to provider for review/approval because:  Labs out of range:  Creatinine    Last Written Prescription Date:  3/30/22  Last Fill Quantity: 3 ml,  # refills: 3   Last office visit provider:  7/6/22     Requested Prescriptions   Pending Prescriptions Disp Refills     Semaglutide (1 MG/DOSE) (OZEMPIC (1 MG/DOSE)) 4 MG/3ML SOPN 3 mL 3     Sig: Inject 1 mg Subcutaneous every 7 days       GLP-1 Agonists Protocol Failed - 8/10/2022 11:36 AM        Failed - Normal serum creatinine on file in past 12 months     Recent Labs   Lab Test 07/06/22  0842 10/15/21  1302 06/28/21  1731   CR 1.20*   < >  --    CREAT  --   --  1.0    < > = values in this interval not displayed.       Ok to refill medication if creatinine is low          Passed - HgbA1C in past 3 or 6 months     If HgbA1C is 8 or greater, it needs to be on file within the past 3 months.  If less than 8, must be on file within the past 6 months.     Recent Labs   Lab Test 07/06/22  0842   A1C 6.1*             Passed - Medication is active on med list        Passed - Patient is age 18 or older        Passed - No active pregnancy on record        Passed - No positive pregnancy test in past 12 months        Passed - Recent (6 mo) or future (30 days) visit within the authorizing provider's specialty     Patient had office visit in the last 6 months or has a visit in the next 30 days with authorizing provider.  See \"Patient Info\" tab in inbasket, or \"Choose Columns\" in Meds & Orders section of the refill encounter.                 Leander Billingsley RN 08/10/22 11:36 AM  "

## 2022-08-10 NOTE — TELEPHONE ENCOUNTER
Medication Question or Refill-pt ius totally out medication as well.        What medication are you calling about (include dose and sig)?: pt states that pharmacy doesn't have any refills on this medication?  She is confused on why she is having to call us every month.  Please refill    OZEMPIC, 1 MG/DOSE, 4 MG/3ML SOPN 3 mL 3 3/30/2022 3/30/2023 No   Sig: INJECT 1MG UNDER THE SKIN ONCE EVERY 7 DAYS   Sent to pharmacy as: Ozempic (1 MG/DOSE) 4 MG/3ML Subcutaneous Solution Pen-injector (Semaglutide (1 MG/DOSE))   Class: E-Prescribe   Order: [id number]   E-Prescribing Status: Receipt confirmed by pharmacy (3/30/2022  6:02 PM CDT)         Controlled Substance Agreement on file:   CSA -- Patient Level:    Controlled Substance Agreement - Opioid - Scan on 1/28/2020  Controlled Substance Agreement - Opioid - Scan on 11/13/2018  Controlled Substance Agreement - Opioid - Scan on 8/2/2017  Controlled Substance Agreement - Opioid - Scan on 6/27/2016       Who prescribed the medication?: Dr Fuentes    Do you need a refill? Yes: she thought she had refills at the pharm but they are saying no to her.    When did you use the medication last? She was supposed to take on Sunday but was waiting for the pharm to refill and they just told her -no refills    Patient offered an appointment? No    Do you have any questions or concerns?      Preferred Pharmacy: [facility]  [facility] [employer] [facility] [employer] [facility] [id number] - [place], [place] - [address]  AT [facility] [address]  [facility] [address] [address]  [place] [address]  Phone: [phone] Fax: [phone]      Could we send this information to you in [facility] or would you prefer to receive a phone call?:   Patient would prefer a phone call   Okay to leave a detailed message?: Yes at Cell number on file:    Telephone Information:   Mobile [phone]

## 2022-08-16 ENCOUNTER — LAB (OUTPATIENT)
Dept: LAB | Facility: CLINIC | Age: 67
End: 2022-08-16
Payer: COMMERCIAL

## 2022-08-16 DIAGNOSIS — R79.89 ELEVATED SERUM CREATININE: ICD-10-CM

## 2022-08-16 DIAGNOSIS — D50.9 IRON DEFICIENCY ANEMIA, UNSPECIFIED IRON DEFICIENCY ANEMIA TYPE: ICD-10-CM

## 2022-08-16 LAB
ANION GAP SERPL CALCULATED.3IONS-SCNC: 10 MMOL/L (ref 7–15)
BUN SERPL-MCNC: 19.2 MG/DL (ref 8–23)
CALCIUM SERPL-MCNC: 9.7 MG/DL (ref 8.8–10.2)
CHLORIDE SERPL-SCNC: 103 MMOL/L (ref 98–107)
CREAT SERPL-MCNC: 1.05 MG/DL (ref 0.51–0.95)
DEPRECATED HCO3 PLAS-SCNC: 29 MMOL/L (ref 22–29)
ERYTHROCYTE [DISTWIDTH] IN BLOOD BY AUTOMATED COUNT: 14.7 % (ref 10–15)
GFR SERPL CREATININE-BSD FRML MDRD: 59 ML/MIN/1.73M2
GLUCOSE SERPL-MCNC: 128 MG/DL (ref 70–99)
HCT VFR BLD AUTO: 33.6 % (ref 35–47)
HGB BLD-MCNC: 10.7 G/DL (ref 11.7–15.7)
MCH RBC QN AUTO: 28.7 PG (ref 26.5–33)
MCHC RBC AUTO-ENTMCNC: 31.8 G/DL (ref 31.5–36.5)
MCV RBC AUTO: 90 FL (ref 78–100)
PLATELET # BLD AUTO: 246 10E3/UL (ref 150–450)
POTASSIUM SERPL-SCNC: 4.4 MMOL/L (ref 3.4–5.3)
RBC # BLD AUTO: 3.73 10E6/UL (ref 3.8–5.2)
SODIUM SERPL-SCNC: 142 MMOL/L (ref 136–145)
WBC # BLD AUTO: 6.7 10E3/UL (ref 4–11)

## 2022-08-16 PROCEDURE — 85027 COMPLETE CBC AUTOMATED: CPT

## 2022-08-16 PROCEDURE — 36415 COLL VENOUS BLD VENIPUNCTURE: CPT

## 2022-08-16 PROCEDURE — 80048 BASIC METABOLIC PNL TOTAL CA: CPT

## 2022-08-17 ENCOUNTER — HOSPITAL ENCOUNTER (OUTPATIENT)
Dept: ULTRASOUND IMAGING | Facility: HOSPITAL | Age: 67
Discharge: HOME | End: 2022-08-17
Attending: INTERNAL MEDICINE | Admitting: INTERNAL MEDICINE
Payer: COMMERCIAL

## 2022-08-17 DIAGNOSIS — I25.10 CORONARY ARTERY DISEASE DUE TO LIPID RICH PLAQUE: ICD-10-CM

## 2022-08-17 DIAGNOSIS — R20.2 NUMBNESS AND TINGLING OF BOTH LOWER EXTREMITIES: ICD-10-CM

## 2022-08-17 DIAGNOSIS — I25.83 CORONARY ARTERY DISEASE DUE TO LIPID RICH PLAQUE: ICD-10-CM

## 2022-08-17 DIAGNOSIS — R09.89 OTHER SPECIFIED SYMPTOMS AND SIGNS INVOLVING THE CIRCULATORY AND RESPIRATORY SYSTEMS: ICD-10-CM

## 2022-08-17 DIAGNOSIS — R20.0 NUMBNESS AND TINGLING OF BOTH LOWER EXTREMITIES: ICD-10-CM

## 2022-08-17 PROCEDURE — 93924 LWR XTR VASC STDY BILAT: CPT

## 2022-08-29 DIAGNOSIS — E11.42 DIABETIC POLYNEUROPATHY ASSOCIATED WITH TYPE 2 DIABETES MELLITUS (H): ICD-10-CM

## 2022-08-29 DIAGNOSIS — B00.9 HSV (HERPES SIMPLEX VIRUS) INFECTION: ICD-10-CM

## 2022-08-29 RX ORDER — VALACYCLOVIR HYDROCHLORIDE 500 MG/1
TABLET, FILM COATED ORAL
Qty: 90 TABLET | Refills: 3 | Status: SHIPPED | OUTPATIENT
Start: 2022-08-29 | End: 2023-11-27

## 2022-08-29 RX ORDER — GABAPENTIN 300 MG/1
CAPSULE ORAL
Qty: 90 CAPSULE | Refills: 0 | Status: SHIPPED | OUTPATIENT
Start: 2022-08-29 | End: 2022-09-06

## 2022-08-29 NOTE — TELEPHONE ENCOUNTER
"Routing refill request to provider for review/approval because:  Labs out of range:  cr    Last Written Prescription Date:  9/3/21  Last Fill Quantity: 90,  # refills: 3   Last office visit provider:  7/6/22     Requested Prescriptions   Pending Prescriptions Disp Refills     valACYclovir (VALTREX) 500 MG tablet [Pharmacy Med Name: VALACYCLOVIR 500MG TABLETS] 90 tablet 3     Sig: TAKE 1 TABLET(500 MG) BY MOUTH DAILY       Antivirals for Herpes Protocol Failed - 8/29/2022 11:56 AM        Failed - Normal serum creatinine on file in past 12 months     Recent Labs   Lab Test 08/16/22  1004 10/15/21  1302 06/28/21  1731   CR 1.05*   < >  --    CREAT  --   --  1.0    < > = values in this interval not displayed.       Ok to refill medication if creatinine is low          Passed - Patient is age 12 or older        Passed - Recent (12 mo) or future (30 days) visit within the authorizing provider's specialty     Patient has had an office visit with the authorizing provider or a provider within the authorizing providers department within the previous 12 mos or has a future within next 30 days. See \"Patient Info\" tab in inbasket, or \"Choose Columns\" in Meds & Orders section of the refill encounter.              Passed - Medication is active on med Amy Miranda RN 08/29/22 5:26 PM  "

## 2022-08-29 NOTE — TELEPHONE ENCOUNTER
Routing refill request to provider for review/approval because:  Drug not on the [facility] refill protocol     Last Written Prescription Date:  5/27/22  Last Fill Quantity: 90,  # refills: 0   Last office visit provider:  7/6/22     Requested Prescriptions   Pending Prescriptions Disp Refills     gabapentin (NEURONTIN) 300 MG capsule [Pharmacy Med Name: GABAPENTIN 300MG CAPSULES] 90 capsule 0     Sig: TAKE 1 CAPSULE BY MOUTH AT BEDTIME       There is no refill protocol information for this order          Amy Velez RN 08/29/22 4:38 PM

## 2022-09-06 ENCOUNTER — OFFICE VISIT (OUTPATIENT)
Dept: INTERNAL MEDICINE | Facility: CLINIC | Age: 67
End: 2022-09-06
Payer: COMMERCIAL

## 2022-09-06 VITALS
OXYGEN SATURATION: 96 % | WEIGHT: 251.5 LBS | HEIGHT: 65 IN | BODY MASS INDEX: 41.9 KG/M2 | DIASTOLIC BLOOD PRESSURE: 56 MMHG | HEART RATE: 76 BPM | TEMPERATURE: 97.5 F | SYSTOLIC BLOOD PRESSURE: 120 MMHG

## 2022-09-06 DIAGNOSIS — E66.01 MORBID OBESITY WITH BMI OF 40.0-44.9, ADULT (H): ICD-10-CM

## 2022-09-06 DIAGNOSIS — M54.50 CHRONIC LOW BACK PAIN, UNSPECIFIED BACK PAIN LATERALITY, UNSPECIFIED WHETHER SCIATICA PRESENT: ICD-10-CM

## 2022-09-06 DIAGNOSIS — D50.9 IRON DEFICIENCY ANEMIA, UNSPECIFIED IRON DEFICIENCY ANEMIA TYPE: ICD-10-CM

## 2022-09-06 DIAGNOSIS — R10.13 DYSPEPSIA: Primary | ICD-10-CM

## 2022-09-06 DIAGNOSIS — E11.22 TYPE 2 DIABETES MELLITUS WITH STAGE 3 CHRONIC KIDNEY DISEASE, WITHOUT LONG-TERM CURRENT USE OF INSULIN, UNSPECIFIED WHETHER STAGE 3A OR 3B CKD (H): ICD-10-CM

## 2022-09-06 DIAGNOSIS — I10 ESSENTIAL HYPERTENSION: ICD-10-CM

## 2022-09-06 DIAGNOSIS — N18.30 TYPE 2 DIABETES MELLITUS WITH STAGE 3 CHRONIC KIDNEY DISEASE, WITHOUT LONG-TERM CURRENT USE OF INSULIN, UNSPECIFIED WHETHER STAGE 3A OR 3B CKD (H): ICD-10-CM

## 2022-09-06 DIAGNOSIS — G89.29 CHRONIC LOW BACK PAIN, UNSPECIFIED BACK PAIN LATERALITY, UNSPECIFIED WHETHER SCIATICA PRESENT: ICD-10-CM

## 2022-09-06 DIAGNOSIS — G89.29 OTHER CHRONIC PAIN: ICD-10-CM

## 2022-09-06 DIAGNOSIS — L30.9 DERMATITIS: ICD-10-CM

## 2022-09-06 DIAGNOSIS — I73.9 PERIPHERAL ARTERIAL DISEASE (H): ICD-10-CM

## 2022-09-06 DIAGNOSIS — E11.42 DIABETIC POLYNEUROPATHY ASSOCIATED WITH TYPE 2 DIABETES MELLITUS (H): ICD-10-CM

## 2022-09-06 DIAGNOSIS — M17.11 PRIMARY OSTEOARTHRITIS OF RIGHT KNEE: ICD-10-CM

## 2022-09-06 LAB
ANION GAP SERPL CALCULATED.3IONS-SCNC: 10 MMOL/L (ref 7–15)
BUN SERPL-MCNC: 19.4 MG/DL (ref 8–23)
CALCIUM SERPL-MCNC: 10 MG/DL (ref 8.8–10.2)
CHLORIDE SERPL-SCNC: 103 MMOL/L (ref 98–107)
CREAT SERPL-MCNC: 1.14 MG/DL (ref 0.51–0.95)
DEPRECATED HCO3 PLAS-SCNC: 28 MMOL/L (ref 22–29)
GFR SERPL CREATININE-BSD FRML MDRD: 53 ML/MIN/1.73M2
GLUCOSE SERPL-MCNC: 120 MG/DL (ref 70–99)
POTASSIUM SERPL-SCNC: 4.3 MMOL/L (ref 3.4–5.3)
SODIUM SERPL-SCNC: 141 MMOL/L (ref 136–145)

## 2022-09-06 PROCEDURE — 99214 OFFICE O/P EST MOD 30 MIN: CPT | Performed by: INTERNAL MEDICINE

## 2022-09-06 PROCEDURE — 80048 BASIC METABOLIC PNL TOTAL CA: CPT | Performed by: INTERNAL MEDICINE

## 2022-09-06 PROCEDURE — 36415 COLL VENOUS BLD VENIPUNCTURE: CPT | Performed by: INTERNAL MEDICINE

## 2022-09-06 RX ORDER — HYDROCODONE BITARTRATE AND ACETAMINOPHEN 5; 325 MG/1; MG/1
1 TABLET ORAL EVERY 12 HOURS PRN
Qty: 30 TABLET | Refills: 0
Start: 2022-09-06 | End: 2022-09-06

## 2022-09-06 RX ORDER — HYDROXYZINE HYDROCHLORIDE 25 MG/1
25 TABLET, FILM COATED ORAL EVERY 6 HOURS PRN
Qty: 30 TABLET | Refills: 0 | Status: SHIPPED | OUTPATIENT
Start: 2022-09-06 | End: 2022-11-01

## 2022-09-06 RX ORDER — GABAPENTIN 300 MG/1
600 CAPSULE ORAL AT BEDTIME
Qty: 180 CAPSULE | Refills: 1 | Status: SHIPPED | OUTPATIENT
Start: 2022-09-06 | End: 2022-10-21

## 2022-09-06 RX ORDER — TRIAMCINOLONE ACETONIDE 1 MG/G
CREAM TOPICAL
Qty: 30 G | Refills: 0 | Status: SHIPPED | OUTPATIENT
Start: 2022-09-06 | End: 2023-11-22

## 2022-09-06 RX ORDER — HYDROCHLOROTHIAZIDE 12.5 MG/1
12.5 TABLET ORAL DAILY
Qty: 90 TABLET | Refills: 3 | Status: SHIPPED | OUTPATIENT
Start: 2022-09-06 | End: 2023-09-26

## 2022-09-06 RX ORDER — HYDROCODONE BITARTRATE AND ACETAMINOPHEN 5; 325 MG/1; MG/1
1 TABLET ORAL EVERY 12 HOURS PRN
Qty: 30 TABLET | Refills: 0 | Status: SHIPPED | OUTPATIENT
Start: 2022-09-06 | End: 2022-11-01

## 2022-09-06 NOTE — PROGRESS NOTES
[facility] Follow Up Note    Assessment/Plan:  1. Dyspepsia  Ongoing issues with dyspepsia despite proton pump inhibitor and extra Tums.  Has persistent knee anemia following knee replacement.  Recommendation: We will proceed with upper endoscopy for diagnostics.  Continue antacids.  - Adult GI  Referral - Procedure Only; Future    2. Iron deficiency anemia, unspecified iron deficiency anemia type  As above-iron levels normal.  However, persistence of anemia following knee replacement in April.  Persistent dyspepsia-we will proceed with referral as below  - Adult GI  Referral - Procedure Only; Future    3. Peripheral arterial disease (H)  LARA exam reveals results consistent with microvascular disease.  Burning in legs at bedtime.  No specific claudication.  Exercising regularly.  Recommendation: We will ask physical therapy to do a sign graded exercise program for increased circulation  - Physical Therapy Referral; Future    4. Essential hypertension  Stable on current medications.  Of note, we had had her hold hydrochlorothiazide based on chronic kidney disease.  However, she developed significant swelling.  We will resume low-dose hydrochlorothiazide and monitor  - hydrochlorothiazide (HYDRODIURIL) 12.5 MG tablet; Take 1 tablet (12.5 mg) by mouth daily  Dispense: 90 tablet; Refill: 3  - Basic metabolic panel; Future    5. Type 2 diabetes mellitus with stage 3 chronic kidney disease, without long-term current use of insulin, unspecified whether stage 3a or 3b CKD (H)  A1c at 6.1-significant weight loss  - Basic metabolic panel; Future    6. Primary osteoarthritis of right knee  Has chronic pain  - hydrOXYzine (ATARAX) 25 MG tablet; Take 1 tablet (25 mg) by mouth every 6 hours as needed for itching or anxiety (with pain, moderate pain)  Dispense: 30 tablet; Refill: 0  - HYDROcodone-acetaminophen (NORCO) 5-325 MG tablet; Take 1 tablet by mouth every 12 hours as needed for severe pain   Dispense: 30 tablet; Refill: 0    7. Dermatitis    - triamcinolone (KENALOG) 0.1 % external cream; [TRIAMCINOLONE (KENALOG) 0.1 % CREAM] Apply bid  Dispense: 30 g; Refill: 0    8. Other chronic pain  Low back pain/knee pain  - Physical Therapy Referral; Future  - HYDROcodone-acetaminophen (NORCO) 5-325 MG tablet; Take 1 tablet by mouth every 12 hours as needed for severe pain  Dispense: 30 tablet; Refill: 0    9. Chronic low back pain, unspecified back pain laterality, unspecified whether sciatica present    - Physical Therapy Referral; Future    10. Morbid obesity with BMI of 40.0-44.9, adult (H)      11. Diabetic polyneuropathy associated with type 2 diabetes mellitus (H)  Discomfort at bedtime    - gabapentin (NEURONTIN) 300 MG capsule; Take 2 capsules (600 mg) by mouth At Bedtime One capsule at 6pm and one at 8pm  Dispense: 180 capsule; Refill: 1    Will increase gabapentin to 600 mg at bedtime.  Recommend quinine water/etc.    \  Mimi Fuentes MD, MD    Chief Complaint:  Follow-up multiple medical problem      History of Present Illness:  Liana is a 67 year old female who is here today for follow-up of her multiple medical problems.  Of note, since last visit, she has had greater than a 20 pound weight loss.  She has been working hard by advancing her physical activity since her knee replacement and monitoring her intake.  She believes that she has lost 30 pounds on her scale.  Her last A1c was 6.1.  Her medications are reviewed and are stable.  She had some concerns regarding the diagnosis of chronic kidney disease with diabetes.  We discontinued Celebrex as a result.  She tried to minimize her hydrochlorothiazide usage but had significant swelling.    Since last visit also, we did an LARA.  The results were consistent with microvascular disease of the lower extremities.  She is exercising regularly and on aspirin.    She continues to have dyspepsia despite cessation of Celebrex.  She is on a proton pump  inhibitor already.  She is using Tums at bedtime.  Her melatonin gives her a bad taste in the mouth.  We are going to place this on hold.    She has nocturnal leg burning.    Review of Systems:  A comprehensive review of systems was performed and was otherwise negative    PFSH:  Social History: Common-law .  History   Smoking Status     Former Smoker     Packs/day: 0.25     Years: 44.00     Types: Cigarettes     Quit date: 1/20/2017   Smokeless Tobacco     Never Used     Comment: age 18 to age 62 up to 1/2 ppd       Past History:   Past Medical History:   Diagnosis Date     Anemia      Breast cyst      Carpal tunnel syndrome      Coronary artery disease due to lipid rich plaque      Dyslipidemia, goal LDL below 70      Esophageal reflux      Essential hypertension      Fatty liver      Goiter diffuse, nontoxic      Hiatal hernia      History of gestational diabetes      History of MRSA infection 07/17/2017    Pilonidal cyst culture     Increased PTH level 11/4/2018     Morbid obesity with BMI of 40.0-44.9, adult (H)      Motion sickness      RADHA on CPAP      Osteoarthritis      Paroxysmal SVT (supraventricular tachycardia) (H) 2011    ablation by Dr. Johnson in 2011     PONV (postoperative nausea and vomiting)      Positive result for methicillin resistant Staphylococcus aureus (MRSA) screening 11/4/2018     Renal disease      S/P hip replacement      Stented coronary artery      Thiamine deficiency 11/4/2018     Type 2 diabetes mellitus (H)      Uncomplicated asthma      Vitamin D deficiency 11/4/2018       Current Outpatient Medications   Medication Sig Dispense Refill     acetaminophen (TYLENOL) 500 MG tablet [ACETAMINOPHEN (TYLENOL) 500 MG TABLET] Take 1,000 mg by mouth 3 (three) times a day as needed.       allopurinol (ZYLOPRIM) 100 MG tablet TAKE 1 TABLET(100 MG) BY MOUTH DAILY 90 tablet 2     aspirin (ASA) 81 MG EC tablet Take 1 tablet (81 mg) by mouth daily Resume after done with BID dosing  0      blood glucose test strips [BLOOD GLUCOSE TEST STRIPS] Use 1 each As Directed 6 (six) times a day. Prodigy Meter 300 strip 3     cholecalciferol, vitamin D3, (CHOLECALCIFEROL) 1,000 unit tablet [CHOLECALCIFEROL, VITAMIN D3, (CHOLECALCIFEROL) 1,000 UNIT TABLET] Take 3,000 Units by mouth daily.        cinnamon bark (CINNAMON ORAL) [CINNAMON BARK (CINNAMON ORAL)] Take 1 each by mouth 2 (two) times a day.       Continuous Blood Gluc Sensor (FREESTYLE MIRANDA 2 SENSOR) MISC 1 each every 14 days 1 each every 14 days. Change every 14 days. 6 each 3     cyanocobalamin (VITAMIN B-12) 500 MCG tablet [CYANOCOBALAMIN (VITAMIN B-12) 500 MCG TABLET] Take 500 mcg by mouth daily.        MG capsule TAKE 1 CAPSULE(100 MG) BY MOUTH TWICE DAILY 180 capsule 3     gabapentin (NEURONTIN) 300 MG capsule Take 2 capsules (600 mg) by mouth At Bedtime One capsule at 6pm and one at 8pm 180 capsule 1     generic lancets (MICROLET LANCET) [GENERIC LANCETS (MICROLET LANCET)] Use 1 each As Directed 6 (six) times a day. 300 each 3     hydrochlorothiazide (HYDRODIURIL) 12.5 MG tablet Take 1 tablet (12.5 mg) by mouth daily 90 tablet 3     HYDROcodone-acetaminophen (NORCO) 5-325 MG tablet Take 1 tablet by mouth every 12 hours as needed for severe pain 30 tablet 0     hydrOXYzine (ATARAX) 25 MG tablet Take 1 tablet (25 mg) by mouth every 6 hours as needed for itching or anxiety (with pain, moderate pain) 30 tablet 0     insulin degludec (TRESIBA FLEXTOUCH) 200 UNIT/ML pen Inject 35 Units Subcutaneous daily 63 mL 1     insulin lispro (HUMALOG KWIKPEN) 100 UNIT/ML (1 unit dial) KWIKPEN Inject 8 Units Subcutaneous 3 times daily (before meals) 45 mL 3     lisinopril (ZESTRIL) 5 MG tablet TAKE 1 AND 1/2 TABLETS(7.5 MG) BY MOUTH DAILY 135 tablet 2     melatonin (MELATIN) 3 mg Tab tablet Take 6 mg by mouth nightly as needed       nitroglycerin (NITROSTAT) 0.3 MG SL tablet [NITROGLYCERIN (NITROSTAT) 0.3 MG SL TABLET] Place 1 tablet (0.3 mg total) under the  "tongue every 5 (five) minutes as needed for chest pain. 100 tablet 3     omeprazole (PRILOSEC) 40 MG DR capsule TAKE 1 CAPSULE BY MOUTH EVERY DAY 90 capsule 3     ONETOUCH DELICA PLUS LANCET 33 gauge Misc [ONETOUCH DELICA PLUS LANCET 33 GAUGE MISC] USE TO TEST BLOOD SUGAR 6 TIMES DAILY 600 each 3     pen needle, diabetic (BD ULTRA-FINE ZULMA PEN NEEDLE) 32 gauge x 5/32\" Ndle [PEN NEEDLE, DIABETIC (BD ULTRA-FINE ZULMA PEN NEEDLE) 32 GAUGE X 5/32\" NDLE] Inject 4 each under the skin daily. With Tresiba and Novolog 120 each 3     rosuvastatin (CRESTOR) 10 MG tablet TAKE 1 TABLET(10 MG) BY MOUTH DAILY 90 tablet 3     Semaglutide, 1 MG/DOSE, (OZEMPIC, 1 MG/DOSE,) 4 MG/3ML SOPN Inject 1 mg Subcutaneous every 7 days 9 mL 0     Thiamine HCl (B-1) 100 MG TABS TAKE 1 TABLET(100 MG) BY MOUTH DAILY 90 tablet 3     traMADol (ULTRAM) 50 MG tablet Take 50 mg by mouth 2 times daily as needed       triamcinolone (KENALOG) 0.1 % external cream [TRIAMCINOLONE (KENALOG) 0.1 % CREAM] Apply bid 30 g 0     valACYclovir (VALTREX) 500 MG tablet TAKE 1 TABLET(500 MG) BY MOUTH DAILY 90 tablet 3       Family History:     Physical Exam:  General Appearance:   She appears well and in no acute distress  Vitals:    09/06/22 0725   BP: 120/56   BP Location: Left arm   Patient Position: Sitting   Cuff Size: Adult Large   Pulse: 76   Temp: 97.5  F (36.4  C)   TempSrc: Tympanic   SpO2: 96%   Weight: 114.1 kg (251 lb 8 oz)   Height: 1.651 m (5' 5\")   HC: 14 cm (5.51\")     Wt Readings from Last 3 Encounters:   09/06/22 114.1 kg (251 lb 8 oz)   07/06/22 115.7 kg (255 lb)   04/08/22 119.3 kg (262 lb 14.4 oz)     Body mass index is 41.85 kg/m .    Lungs are clear to auscultation and percussion  Cardiac exam reveals regular rate and rhythm  Abdomen reveals some very mild dyspepsia to palpation  Extremities are without edema and appear nicely intact    Data Review:    Analysis and Summary of Old Records (2): Reviewed my chart note as well as orthopedic surgery " notes    Records Requested (1):       Other History Summarized (from other people in the room) (2):     Radiology Tests Summarized (XRAY/CT/MRI/DXA) (1):     Labs Reviewed (1): Ordered and reviewed from last visit    Medicine Tests Reviewed (EKG/ECHO/COLONOSCOPY/EGD) (1): Ordered EGD    Independent Review of EKG or X-RAY (2):       Answers for HPI/ROS submitted by the patient on 9/6/2022  Do you take an aspirin every day?: Yes  How many servings of fruits and vegetables do you eat daily?: 2-3  On average, how many sweetened beverages do you drink each day (Examples: soda, juice, sweet tea, etc.  Do NOT count diet or artificially sweetened beverages)?: 2  How many minutes a day do you exercise enough to make your heart beat faster?: 10 to 19  How many days a week do you exercise enough to make your heart beat faster?: 3 or less  How many days per week do you miss taking your medication?: 0

## 2022-09-09 ENCOUNTER — TRANSFERRED RECORDS (OUTPATIENT)
Dept: HEALTH INFORMATION MANAGEMENT | Facility: CLINIC | Age: 67
End: 2022-09-09

## 2022-09-15 ENCOUNTER — TRANSFERRED RECORDS (OUTPATIENT)
Dept: HEALTH INFORMATION MANAGEMENT | Facility: CLINIC | Age: 67
End: 2022-09-15

## 2022-09-15 ENCOUNTER — MYC MEDICAL ADVICE (OUTPATIENT)
Dept: INTERNAL MEDICINE | Facility: CLINIC | Age: 67
End: 2022-09-15

## 2022-09-18 ENCOUNTER — HEALTH MAINTENANCE LETTER (OUTPATIENT)
Age: 67
End: 2022-09-18

## 2022-09-27 ENCOUNTER — TRANSFERRED RECORDS (OUTPATIENT)
Dept: HEALTH INFORMATION MANAGEMENT | Facility: CLINIC | Age: 67
End: 2022-09-27

## 2022-09-27 LAB — RETINOPATHY: NORMAL

## 2022-09-28 ENCOUNTER — TRANSFERRED RECORDS (OUTPATIENT)
Dept: HEALTH INFORMATION MANAGEMENT | Facility: CLINIC | Age: 67
End: 2022-09-28

## 2022-09-29 ENCOUNTER — TELEPHONE (OUTPATIENT)
Dept: INTERNAL MEDICINE | Facility: CLINIC | Age: 67
End: 2022-09-29

## 2022-09-29 NOTE — TELEPHONE ENCOUNTER
Reason for call:  Other   Patient called regarding (reason for call): appointment  Additional comments: PT needs an ER Follow Up, ER Follow Up: Pulled Muscle In Back, [facility] on 09/27/2022. Pt refuses to see anyone else, only wants to see PCP Mimi Fuentes MD. Please contact pt.     Phone number to reach patient:  Cell number on file:    Telephone Information:   Mobile [phone]       Best Time:  Anytime    Can we leave a detailed message on this number?  YES    Travel screening: Not Applicable

## 2022-10-03 ENCOUNTER — MYC MEDICAL ADVICE (OUTPATIENT)
Dept: INTERNAL MEDICINE | Facility: CLINIC | Age: 67
End: 2022-10-03

## 2022-10-11 ENCOUNTER — TELEPHONE (OUTPATIENT)
Dept: INTERNAL MEDICINE | Facility: CLINIC | Age: 67
End: 2022-10-11

## 2022-10-11 DIAGNOSIS — E11.42 DIABETIC POLYNEUROPATHY ASSOCIATED WITH TYPE 2 DIABETES MELLITUS (H): ICD-10-CM

## 2022-10-11 DIAGNOSIS — J01.00 ACUTE NON-RECURRENT MAXILLARY SINUSITIS: ICD-10-CM

## 2022-10-11 NOTE — TELEPHONE ENCOUNTER
Medication Question or Refill        What medication are you calling about (include dose and sig)?:    Pt states she was told by Dr Fuentes to take 2 capsules-at 6 pm and 1 at 10-11pm when she goes to bed. She only has 15 pills per day.  She states she doesn't take 3 pills everynight but at least 2 times per week.  This is really helping with her pain levels.       gabapentin (NEURONTIN) 300 MG capsule; Take 2 capsules (600 mg) by mouth At Bedtime One capsule at 6pm and one at 8pm  Dispense: 180 capsule; Refill: 1    Controlled Substance Agreement on file:   CSA -- Patient Level:     [Media Unavailable] Controlled Substance Agreement - Opioid - Scan on 1/28/2020   [Media Unavailable] Controlled Substance Agreement - Opioid - Scan on 11/13/2018   [Media Unavailable] Controlled Substance Agreement - Opioid - Scan on 8/2/2017   [Media Unavailable] Controlled Substance Agreement - Opioid - Scan on 6/27/2016       Who prescribed the medication?: Dr Fuentes    Do you need a refill? Yes:     When did you use the medication last? Yesterday.    Patient offered an appointment? No-pt has appt on 11/1 with Dr Fuentes    Do you have any questions or concerns?  No    Preferred Pharmacy:   [id number] - [place], [place] - [address] [place] [address]  Phone: [phone] Fax: [phone]        Could we send this information to you in [facility] or would you prefer to receive a phone call?:   Patient would prefer a phone call   Okay to leave a detailed message?: Yes at Cell number on file:    Telephone Information:   Mobile [phone]

## 2022-10-12 NOTE — TELEPHONE ENCOUNTER
"Routing refill request to provider for review/approval because:  Age warning   script    Last Written Prescription Date:  3/30/22  Last Fill Quantity: 14,  # refills: 0   Last office visit provider:  22     Requested Prescriptions   Pending Prescriptions Disp Refills     cetirizine (ZYRTEC) 10 MG tablet 14 tablet 0     Sig: Take 1 tablet (10 mg) by mouth daily       Antihistamines Protocol Failed - 10/12/2022  8:33 AM        Failed - Patient is 3-64 years of age     Apply weight-based dosing for peds patients age 3 - 12 years of age.    Forward request to provider for patients under the age of 3 or over the age of 64.          Failed - Medication is active on med list        Passed - Recent (12 mo) or future (30 days) visit within the authorizing provider's specialty     Patient has had an office visit with the authorizing provider or a provider within the authorizing providers department within the previous 12 mos or has a future within next 30 days. See \"Patient Info\" tab in inbasket, or \"Choose Columns\" in Meds & Orders section of the refill encounter.                   Leander Billingsley RN 10/12/22 8:34 AM  "

## 2022-10-13 RX ORDER — CETIRIZINE HYDROCHLORIDE 10 MG/1
10 TABLET ORAL DAILY
Qty: 90 TABLET | Refills: 0 | Status: SHIPPED | OUTPATIENT
Start: 2022-10-13 | End: 2023-06-05

## 2022-10-21 RX ORDER — GABAPENTIN 300 MG/1
300 CAPSULE ORAL 3 TIMES DAILY
Qty: 270 CAPSULE | Refills: 3 | Status: SHIPPED | OUTPATIENT
Start: 2022-10-21 | End: 2022-10-31

## 2022-10-26 DIAGNOSIS — M10.9 ACUTE GOUTY ARTHROPATHY: ICD-10-CM

## 2022-10-27 NOTE — TELEPHONE ENCOUNTER
"Routing refill request to provider for review/approval because:  Labs not current:  Uric acid  cbc    Last Written Prescription Date:  12/31/21  Last Fill Quantity: 90,  # refills: 2   Last office visit provider:  9/6/22     Requested Prescriptions   Pending Prescriptions Disp Refills     allopurinol (ZYLOPRIM) 100 MG tablet 90 tablet 2     Sig: Take 1 tablet (100 mg) by mouth daily       Gout Agents Protocol Failed - 10/26/2022 10:51 AM        Failed - Has Uric Acid on file in past 12 months and value is less than 6     Recent Labs   Lab Test 01/28/20  0920   URIC 6.1     If level is 6mg/dL or greater, ok to refill one time and refer to provider.           Failed - Normal serum creatinine on file in the past 12 months     Recent Labs   Lab Test 09/06/22  0817 10/15/21  1302 06/28/21  1731   CR 1.14*   < >  --    CREAT  --   --  1.0    < > = values in this interval not displayed.       Ok to refill medication if creatinine is low          Passed - CBC on file in past 12 months     Recent Labs   Lab Test 08/16/22  1004   WBC 6.7   RBC 3.73*   HGB 10.7*   HCT 33.6*                    Passed - ALT on file in past 12 months     Recent Labs   Lab Test 04/05/22  1106   ALT 26             Passed - Recent (12 mo) or future (30 days) visit within the authorizing provider's specialty     Patient has had an office visit with the authorizing provider or a provider within the authorizing providers department within the previous 12 mos or has a future within next 30 days. See \"Patient Info\" tab in inbasket, or \"Choose Columns\" in Meds & Orders section of the refill encounter.              Passed - Medication is active on med list        Passed - Patient is age 18 or older        Passed - No active pregnancy on record        Passed - No positive pregnancy test in past year             Amy Velez, RN 10/27/22 6:04 PM  "

## 2022-10-28 RX ORDER — ALLOPURINOL 100 MG/1
100 TABLET ORAL DAILY
Qty: 90 TABLET | Refills: 3 | Status: SHIPPED | OUTPATIENT
Start: 2022-10-28 | End: 2023-11-16

## 2022-10-31 DIAGNOSIS — E11.42 DIABETIC POLYNEUROPATHY ASSOCIATED WITH TYPE 2 DIABETES MELLITUS (H): ICD-10-CM

## 2022-10-31 RX ORDER — GABAPENTIN 300 MG/1
300 CAPSULE ORAL 3 TIMES DAILY
Qty: 270 CAPSULE | Refills: 3 | Status: SHIPPED | OUTPATIENT
Start: 2022-10-31 | End: 2023-09-26

## 2022-10-31 NOTE — TELEPHONE ENCOUNTER
Needed to take the take at 6 pm and 8 pm for pharmacy to fill right    Josephine Gamboa CMA ([facility])

## 2022-11-01 ENCOUNTER — OFFICE VISIT (OUTPATIENT)
Dept: INTERNAL MEDICINE | Facility: CLINIC | Age: 67
End: 2022-11-01
Payer: COMMERCIAL

## 2022-11-01 VITALS
BODY MASS INDEX: 40.65 KG/M2 | WEIGHT: 244 LBS | HEIGHT: 65 IN | HEART RATE: 85 BPM | DIASTOLIC BLOOD PRESSURE: 60 MMHG | OXYGEN SATURATION: 96 % | SYSTOLIC BLOOD PRESSURE: 122 MMHG

## 2022-11-01 DIAGNOSIS — E11.65 TYPE 2 DIABETES MELLITUS WITH HYPERGLYCEMIA, WITH LONG-TERM CURRENT USE OF INSULIN (H): Primary | ICD-10-CM

## 2022-11-01 DIAGNOSIS — D64.9 ANEMIA, UNSPECIFIED TYPE: ICD-10-CM

## 2022-11-01 DIAGNOSIS — Z78.0 MENOPAUSE: ICD-10-CM

## 2022-11-01 DIAGNOSIS — L29.9 ITCHING: ICD-10-CM

## 2022-11-01 DIAGNOSIS — Z23 HIGH PRIORITY FOR 2019-NCOV VACCINE: ICD-10-CM

## 2022-11-01 DIAGNOSIS — K44.9 HIATAL HERNIA: ICD-10-CM

## 2022-11-01 DIAGNOSIS — M17.11 PRIMARY OSTEOARTHRITIS OF RIGHT KNEE: ICD-10-CM

## 2022-11-01 DIAGNOSIS — G89.29 CHRONIC LOW BACK PAIN, UNSPECIFIED BACK PAIN LATERALITY, UNSPECIFIED WHETHER SCIATICA PRESENT: ICD-10-CM

## 2022-11-01 DIAGNOSIS — E04.9 GOITER: Chronic | ICD-10-CM

## 2022-11-01 DIAGNOSIS — I73.9 PERIPHERAL ARTERIAL DISEASE (H): ICD-10-CM

## 2022-11-01 DIAGNOSIS — E53.8 VITAMIN B12 DEFICIENCY (NON ANEMIC): ICD-10-CM

## 2022-11-01 DIAGNOSIS — Z79.4 TYPE 2 DIABETES MELLITUS WITH HYPERGLYCEMIA, WITH LONG-TERM CURRENT USE OF INSULIN (H): Primary | ICD-10-CM

## 2022-11-01 DIAGNOSIS — M54.50 CHRONIC LOW BACK PAIN, UNSPECIFIED BACK PAIN LATERALITY, UNSPECIFIED WHETHER SCIATICA PRESENT: ICD-10-CM

## 2022-11-01 LAB
ALBUMIN SERPL BCG-MCNC: 4.6 G/DL (ref 3.5–5.2)
ALP SERPL-CCNC: 93 U/L (ref 35–104)
ALT SERPL W P-5'-P-CCNC: 24 U/L (ref 10–35)
ANION GAP SERPL CALCULATED.3IONS-SCNC: 11 MMOL/L (ref 7–15)
AST SERPL W P-5'-P-CCNC: 34 U/L (ref 10–35)
BILIRUB SERPL-MCNC: 0.4 MG/DL
BUN SERPL-MCNC: 17.2 MG/DL (ref 8–23)
CALCIUM SERPL-MCNC: 10.5 MG/DL (ref 8.8–10.2)
CHLORIDE SERPL-SCNC: 104 MMOL/L (ref 98–107)
CREAT SERPL-MCNC: 1.02 MG/DL (ref 0.51–0.95)
DEPRECATED HCO3 PLAS-SCNC: 25 MMOL/L (ref 22–29)
ERYTHROCYTE [DISTWIDTH] IN BLOOD BY AUTOMATED COUNT: 13.7 % (ref 10–15)
FERRITIN SERPL-MCNC: 563 NG/ML (ref 11–328)
GFR SERPL CREATININE-BSD FRML MDRD: 61 ML/MIN/1.73M2
GLUCOSE SERPL-MCNC: 78 MG/DL (ref 70–99)
HBA1C MFR BLD: 5.9 % (ref 0–5.6)
HCT VFR BLD AUTO: 35.5 % (ref 35–47)
HGB BLD-MCNC: 11.3 G/DL (ref 11.7–15.7)
MCH RBC QN AUTO: 29.2 PG (ref 26.5–33)
MCHC RBC AUTO-ENTMCNC: 31.8 G/DL (ref 31.5–36.5)
MCV RBC AUTO: 92 FL (ref 78–100)
PLATELET # BLD AUTO: 257 10E3/UL (ref 150–450)
POTASSIUM SERPL-SCNC: 4.9 MMOL/L (ref 3.4–5.3)
PROT SERPL-MCNC: 7.8 G/DL (ref 6.4–8.3)
RBC # BLD AUTO: 3.87 10E6/UL (ref 3.8–5.2)
SODIUM SERPL-SCNC: 140 MMOL/L (ref 136–145)
WBC # BLD AUTO: 6.3 10E3/UL (ref 4–11)

## 2022-11-01 PROCEDURE — 36415 COLL VENOUS BLD VENIPUNCTURE: CPT | Performed by: INTERNAL MEDICINE

## 2022-11-01 PROCEDURE — 82728 ASSAY OF FERRITIN: CPT | Performed by: INTERNAL MEDICINE

## 2022-11-01 PROCEDURE — 80053 COMPREHEN METABOLIC PANEL: CPT | Performed by: INTERNAL MEDICINE

## 2022-11-01 PROCEDURE — 99214 OFFICE O/P EST MOD 30 MIN: CPT | Performed by: INTERNAL MEDICINE

## 2022-11-01 PROCEDURE — 85027 COMPLETE CBC AUTOMATED: CPT | Performed by: INTERNAL MEDICINE

## 2022-11-01 PROCEDURE — 0124A COVID-19,PF,PFIZER BOOSTER BIVALENT: CPT | Performed by: INTERNAL MEDICINE

## 2022-11-01 PROCEDURE — 91312 COVID-19,PF,PFIZER BOOSTER BIVALENT: CPT | Performed by: INTERNAL MEDICINE

## 2022-11-01 PROCEDURE — 83036 HEMOGLOBIN GLYCOSYLATED A1C: CPT | Performed by: INTERNAL MEDICINE

## 2022-11-01 RX ORDER — CYCLOBENZAPRINE HCL 10 MG
10 TABLET ORAL 2 TIMES DAILY PRN
Qty: 30 TABLET | Refills: 0 | Status: SHIPPED | OUTPATIENT
Start: 2022-11-01 | End: 2023-05-18

## 2022-11-01 RX ORDER — CYANOCOBALAMIN (VITAMIN B-12) 2500 MCG
TABLET, SUBLINGUAL SUBLINGUAL
Qty: 60 TABLET | Refills: 3 | Status: SHIPPED | OUTPATIENT
Start: 2022-11-01

## 2022-11-01 RX ORDER — HYDROXYZINE HYDROCHLORIDE 25 MG/1
25 TABLET, FILM COATED ORAL EVERY 6 HOURS PRN
Qty: 30 TABLET | Refills: 0 | Status: SHIPPED | OUTPATIENT
Start: 2022-11-01 | End: 2023-07-03

## 2022-11-01 RX ORDER — HYDROXYZINE HYDROCHLORIDE 25 MG/1
25 TABLET, FILM COATED ORAL EVERY 6 HOURS PRN
Qty: 30 TABLET | Refills: 0 | Status: SHIPPED | OUTPATIENT
Start: 2022-11-01 | End: 2022-11-01

## 2022-11-01 RX ORDER — METHION/INOS/CHOL BT/B COM/LIV 110MG-86MG
100 CAPSULE ORAL DAILY
Qty: 90 TABLET | Refills: 3 | Status: SHIPPED | OUTPATIENT
Start: 2022-11-01 | End: 2024-01-15

## 2022-11-01 RX ORDER — HYDROCODONE BITARTRATE AND ACETAMINOPHEN 5; 325 MG/1; MG/1
1 TABLET ORAL EVERY 12 HOURS PRN
Qty: 30 TABLET | Refills: 0 | Status: SHIPPED | OUTPATIENT
Start: 2022-11-01 | End: 2022-11-28

## 2022-11-01 NOTE — PROGRESS NOTES
[facility] Follow Up Note    Assessment/Plan:  1. Type 2 diabetes mellitus with hyperglycemia, with long-term current use of insulin (H)  Actually has been doing fairly well as of late.  She has been able to wean mealtime insulin with weight loss.  She is now on long-term/Tresiba insulin.  We will check A1c today.  Like to encourage ongoing portion control and weight loss.    - Thiamine HCl (B-1) 100 MG TABS; Take 100 mg by mouth daily  Dispense: 90 tablet; Refill: 3  - Comprehensive metabolic panel; Future  - Hemoglobin A1c; Future  - Comprehensive metabolic panel  - Hemoglobin A1c    2. Chronic low back pain, unspecified back pain laterality, unspecified whether sciatica present  Longstanding history of chronic back pain.  This has resulted in a couple of emergency room visits.  Those notes are reviewed.  She does not feel there is any new pathology here.  She is advised to see a spine specialist at [facility].  After she pays her balance, she will contemplate whether she will proceed.    A prescription for Vicodin provided.  She uses up to 1 to 2/day-sporadically.  Flexeril for muscle spasms as needed.  - HYDROcodone-acetaminophen (NORCO) 5-325 MG tablet; Take 1 tablet by mouth every 12 hours as needed for severe pain  Dispense: 30 tablet; Refill: 0  - cyclobenzaprine (FLEXERIL) 10 MG tablet; Take 1 tablet (10 mg) by mouth 2 times daily as needed for muscle spasms  Dispense: 30 tablet; Refill: 0    3. Anemia, unspecified type  Recent iron deficiency.  Upper endoscopy reveals large hiatal hernia but no gastritis or bleeding ulcer.  Has large hiatal hernia.  Recommendations: Continue with antacids.  If anemia persist, will refer for colonoscopy although had colonoscopy in 2019.  Patient states that she is not necessarily keen on having a follow up colonoscopy due to difficulty with prep.  Could consider stool for occult blood if necessary.    - CBC with platelets; Future  - Ferritin;  Future  - CBC with platelets  - Ferritin    4. Primary osteoarthritis of right knee  Noted  - HYDROcodone-acetaminophen (NORCO) 5-325 MG tablet; Take 1 tablet by mouth every 12 hours as needed for severe pain  Dispense: 30 tablet; Refill: 0  - hydrOXYzine (ATARAX) 25 MG tablet; Take 1 tablet (25 mg) by mouth every 6 hours as needed for itching or anxiety (with pain, moderate pain)  Dispense: 30 tablet; Refill: 0    5. Menopause  Due for bone density none emergently  - DEXA HIP/PELVIS/SPINE - Future; Future    6.  Hiatal hernia  Recommend portion reduction/weight loss/proton pump inhibitor.  If she continues with GERD symptoms, consideration for surgery.    7. Itching  Recommend discontinuation of any unnecessary supplements such as cinnamon and memory supplement.  She has a nonspecific pruritus.  Before pursuing any other evaluation, would like to rule out nonspecific allergy.    8.  Peripheral arterial disease    We are working with exercise on her symptoms.    Mimi Fuentes MD, MD    Chief Complaint:  Chief Complaint   Patient presents with     Diabetes     Recheck - needs Rx's renewed      Back Pain     Would like to discuss an Rx for Flexeril - was previous on this.      Imm/Inj     COVID-19 VACCINE       History of Present Illness:  Liana is a 67 year old female who is well-known to me in my practice.  She has a longstanding history of type 2 diabetes, coronary artery disease, remote history of supraventricular arrhythmia-status post ablation, chronic back pain secondary to spinal stenosis and lumbar disc disease, chronic pain as a result.  Additionally, she has a Zenker's diverticulum and chronic kidney disease.    Today, she is here today for follow-up of her type 2 diabetes.  She has been on long-acting insulin.  She has been able to discontinue her mealtime insulin with her significant weight loss.  She states she has been working very hard with portions..  She states her blood sugars have been nicely  controlled.  She has a continuous glucose monitor and follows up with pharmacy in this regard.    She suffers from intermittent chronic back pain.  She states that she was seen [facility] after a significant sharp back pain occurred.  She states she felt a snap in her back and felt as though she tore a muscle.  She had a very thorough evaluation for nonmuscular causes for back pain while in the ER.  This evaluation was reviewed.  She states that she is back to baseline.  She has chronic back pain for which she uses an occasional Vicodin and Flexeril.  She states she did follow-up with [facility] on this.  They would like her to see their spine doctor.  She has not yet done this as she owes a balance on her bill with them.    Additionally, she states she has been having some difficulty getting her routine prescriptions filled here.  We discussed the mechanism and process of that as well.    Review of Systems:  A comprehensive review of systems was performed and was otherwise negative    PFSH:  Social History: She has a common-law  of many years.  Her son is 26 years of age.  She is a  of the .  She is on a disability due to chronic back pain  History   Smoking Status     Former     Packs/day: 0.25     Years: 44.00     Types: Cigarettes     Quit date: 1/20/2017   Smokeless Tobacco     Never       Past History:   Past Medical History:   Diagnosis Date     Anemia      Breast cyst      Carpal tunnel syndrome      Coronary artery disease due to lipid rich plaque      Dyslipidemia, goal LDL below 70      Esophageal reflux      Essential hypertension      Fatty liver      Goiter diffuse, nontoxic      Hiatal hernia      History of gestational diabetes      History of MRSA infection 07/17/2017    Pilonidal cyst culture     Increased PTH level 11/4/2018     Morbid obesity with BMI of 40.0-44.9, adult (H)      Motion sickness      RADHA on CPAP      Osteoarthritis      Paroxysmal SVT  (supraventricular tachycardia) (H) 2011    ablation by Dr. Johnson in 2011     PONV (postoperative nausea and vomiting)      Positive result for methicillin resistant Staphylococcus aureus (MRSA) screening 11/4/2018     Renal disease      S/P hip replacement      Stented coronary artery      Thiamine deficiency 11/4/2018     Type 2 diabetes mellitus (H)      Uncomplicated asthma      Vitamin D deficiency 11/4/2018       Current Outpatient Medications   Medication Sig Dispense Refill     acetaminophen (TYLENOL) 500 MG tablet [ACETAMINOPHEN (TYLENOL) 500 MG TABLET] Take 1,000 mg by mouth 3 (three) times a day as needed.       allopurinol (ZYLOPRIM) 100 MG tablet Take 1 tablet (100 mg) by mouth daily 90 tablet 3     aspirin (ASA) 81 MG EC tablet Take 1 tablet (81 mg) by mouth daily Resume after done with BID dosing  0     blood glucose test strips [BLOOD GLUCOSE TEST STRIPS] Use 1 each As Directed 6 (six) times a day. Prodigy Meter 300 strip 3     cetirizine (ZYRTEC) 10 MG tablet Take 1 tablet (10 mg) by mouth daily 90 tablet 0     cholecalciferol, vitamin D3, (CHOLECALCIFEROL) 1,000 unit tablet [CHOLECALCIFEROL, VITAMIN D3, (CHOLECALCIFEROL) 1,000 UNIT TABLET] Take 3,000 Units by mouth daily.        cinnamon bark (CINNAMON ORAL) [CINNAMON BARK (CINNAMON ORAL)] Take 1 each by mouth 2 (two) times a day.       Continuous Blood Gluc Sensor (FREESTYLE MIRANDA 2 SENSOR) MISC 1 each every 14 days 1 each every 14 days. Change every 14 days. 6 each 3     cyclobenzaprine (FLEXERIL) 10 MG tablet Take 1 tablet (10 mg) by mouth 2 times daily as needed for muscle spasms 30 tablet 0      MG capsule TAKE 1 CAPSULE(100 MG) BY MOUTH TWICE DAILY 180 capsule 3     gabapentin (NEURONTIN) 300 MG capsule Take 1 capsule (300 mg) by mouth 3 times daily 270 capsule 3     generic lancets (MICROLET LANCET) [GENERIC LANCETS (MICROLET LANCET)] Use 1 each As Directed 6 (six) times a day. 300 each 3     hydrochlorothiazide (HYDRODIURIL) 12.5 MG  "tablet Take 1 tablet (12.5 mg) by mouth daily 90 tablet 3     HYDROcodone-acetaminophen (NORCO) 5-325 MG tablet Take 1 tablet by mouth every 12 hours as needed for severe pain 30 tablet 0     hydrOXYzine (ATARAX) 25 MG tablet Take 1 tablet (25 mg) by mouth every 6 hours as needed for itching or anxiety (with pain, moderate pain) 30 tablet 0     insulin degludec (TRESIBA FLEXTOUCH) 200 UNIT/ML pen Inject 16 Units Subcutaneous daily 63 mL 1     lisinopril (ZESTRIL) 5 MG tablet TAKE 1 AND 1/2 TABLETS(7.5 MG) BY MOUTH DAILY 135 tablet 2     melatonin (MELATIN) 3 mg Tab tablet Take 6 mg by mouth nightly as needed       nitroglycerin (NITROSTAT) 0.3 MG SL tablet [NITROGLYCERIN (NITROSTAT) 0.3 MG SL TABLET] Place 1 tablet (0.3 mg total) under the tongue every 5 (five) minutes as needed for chest pain. 100 tablet 3     omeprazole (PRILOSEC) 40 MG DR capsule TAKE 1 CAPSULE BY MOUTH EVERY DAY 90 capsule 3     ONETOUCH DELICA PLUS LANCET 33 gauge Misc [ONETOUCH DELICA PLUS LANCET 33 GAUGE MISC] USE TO TEST BLOOD SUGAR 6 TIMES DAILY 600 each 3     pen needle, diabetic (BD ULTRA-FINE ZULMA PEN NEEDLE) 32 gauge x 5/32\" Ndle [PEN NEEDLE, DIABETIC (BD ULTRA-FINE ZULMA PEN NEEDLE) 32 GAUGE X 5/32\" NDLE] Inject 4 each under the skin daily. With Tresiba and Novolog 120 each 3     rosuvastatin (CRESTOR) 10 MG tablet TAKE 1 TABLET(10 MG) BY MOUTH DAILY 90 tablet 3     Semaglutide, 1 MG/DOSE, (OZEMPIC, 1 MG/DOSE,) 4 MG/3ML SOPN Inject 1 mg Subcutaneous every 7 days 9 mL 0     Thiamine HCl (B-1) 100 MG TABS Take 100 mg by mouth daily 90 tablet 3     triamcinolone (KENALOG) 0.1 % external cream [TRIAMCINOLONE (KENALOG) 0.1 % CREAM] Apply bid 30 g 0     valACYclovir (VALTREX) 500 MG tablet TAKE 1 TABLET(500 MG) BY MOUTH DAILY 90 tablet 3     vitamin B-12 (CYANOCOBALAMIN) 2500 MCG sublingual tablet One tab every other day 60 tablet 3       Family History:     Physical Exam:  General Appearance:   She appears quite well and in no acute " "distress.  Vitals:    11/01/22 1257   BP: 122/60   BP Location: Left arm   Patient Position: Sitting   Cuff Size: Adult Regular   Pulse: 85   SpO2: 96%   Weight: 110.7 kg (244 lb)   Height: 1.651 m (5' 5\")     Wt Readings from Last 3 Encounters:   11/01/22 110.7 kg (244 lb)   09/06/22 114.1 kg (251 lb 8 oz)   07/06/22 115.7 kg (255 lb)     Body mass index is 40.6 kg/m .    Lungs are clear to auscultation percussion  Cardiac exam reveals regular rate and rhythm    Data Review:    Analysis and Summary of Old Records (2): Reviewed [facility]    Records Requested (1):       Other History Summarized (from other people in the room) (2):     Radiology Tests Summarized (XRAY/CT/MRI/DXA) (1): Reviewed CT scans    Labs Reviewed (1):     Medicine Tests Reviewed (EKG/ECHO/COLONOSCOPY/EGD) (1):     Independent Review of EKG or X-RAY (2):                 Answers for HPI/ROS submitted by the patient on 11/1/2022  Frequency of checking blood sugars:: continous glucose monitor  What time of day are you checking your blood sugars : before and after meals, at bedtime  Have you had any blood sugars above 200?: No  Have you had any blood sugars below 70?: Yes  Hypoglycemia symptoms:: shakiness, weakness  Diabetic concerns:: none  Paraesthesia present:: burning in feet  Have you had a diabetic eye exam within the last year?: No  Do you take any over the counter pain medicine?  : Yes  Your back pain is: chronic  Where is your back pain located? : right lower back, right middle of back, right side of waist  How would you describe your back pain? : dull ache, gnawing, shooting  Where does your back pain spread? : right buttocks, right thigh, right knee  Since you noticed your back pain, how has it changed? : always present, but gets better and worse  Does your back pain interfere with your job?: Yes  If yes, which:: acetaminophen (Tylenol), activity or exercise, cold, heat, massage, stretching, topical pain relievers  What over the " counter medicine are you taking for your pain?  : tylenol  How often do you take this medicine?: two times daily  How many servings of fruits and vegetables do you eat daily?: 2-3  On average, how many sweetened beverages do you drink each day (Examples: soda, juice, sweet tea, etc.  Do NOT count diet or artificially sweetened beverages)?: 2  How many minutes a day do you exercise enough to make your heart beat faster?: 60 or more  How many days a week do you exercise enough to make your heart beat faster?: 5  How many days per week do you miss taking your medication?: 0

## 2022-11-04 ENCOUNTER — NURSE TRIAGE (OUTPATIENT)
Dept: NURSING | Facility: CLINIC | Age: 67
End: 2022-11-04

## 2022-11-04 NOTE — TELEPHONE ENCOUNTER
"Pt calling in regarding having fever for past couple days, abd pain, & R arm swelling.  She stated on 11/01/22 she received her 4th covid vaccine.  The night of receiving the vaccine she started to have the chills & reported a Tmax of 104, and has been treating it w/ tylenol.  This morning her Tmax is 98.  The following day she reported having abd pain, diarrhea, weakness, & poor appetite; however, she stated the diarrhea could have started the day before as well.  The Pt reported the abd pain has improved today, especially when she is up moving.  Denies having diarrhea this morning, but some small formed \"turds.\"  As for her R arm, Pt reported swelling, redness, & tenderness around the injection site, but again, reports it has improved today.    Of note, Pt reports she has a hx of allergy to eggs & having bad reactions to most vaccines in the past.    Writer reassured pt that her symptoms are typical of vaccines & may last up to 2 to 3 days.  Writer encouraged her to cont taking tylenol if fever is still present, elevate her arm & to apply ice & heat as needed to arm.  Also, writer told Pt if the fever lasts over 3 days or the pain & swelling of injection site lasts over 7 days, then she should see her PCP.  Pt agreed w/ disposition & will cont to monitor her symptoms at home.      Ruba Randle RN on 11/4/2022 at 10:39 AM      Reason for Disposition    COVID-19 vaccine, systemic reactions (e.g., fatigue, fever, muscle aches), questions about    COVID-19 vaccine, injection site reaction (e.g., pain, redness, swelling), question about    Additional Information    Negative: Difficulty breathing or swallowing and starts within 2 hours after injection    Negative: Sounds like a life-threatening emergency to the triager    Negative: [1] Fever > 101 F (38.3 C) AND [2] over 60 years of age AND [3] started > 48 hours after getting vaccine    Negative: [1] Fever > 100.0 F (37.8 C) AND [2] bedridden (e.g., nursing home " patient, CVA, chronic illness, recovering from surgery) AND [3] started > 48 hours after getting vaccine    Negative: [1] Fever > 100.0 F (37.8 C) AND [2] diabetes mellitus or weak immune system (e.g., HIV positive, cancer chemo, splenectomy, organ transplant, chronic steroids) AND [3] started > 48 hours after getting vaccine    Negative: [1] Fever > 100.0 F (37.8 C) AND [2] present > 3 days (72 hours)    Negative: [1] Fever > 100.0 F (37.8 C) AND [2] healthcare worker    Negative: [1] Pain, tenderness, or swelling at the injection site AND [2] over 3 days (72 hours) since vaccine AND [3] getting worse    Negative: [1] Redness around the injection site AND [2] started > 48 hours after getting vaccine (Exception: red area < 1 inch or 2.5 cm wide)    Negative: [1] Pain, tenderness, or swelling at the injection site AND [2] lasts > 7 days    Negative: [1] Lymph node swelling (i.e., armpit or neck on side of vaccine) AND [2] lasts > 3 weeks    Protocols used: CORONAVIRUS (COVID-19) VACCINE QUESTIONS AND [id number] 1.18.2022

## 2022-11-08 ENCOUNTER — TELEPHONE (OUTPATIENT)
Dept: INTERNAL MEDICINE | Facility: CLINIC | Age: 67
End: 2022-11-08

## 2022-11-08 NOTE — TELEPHONE ENCOUNTER
Please check if there is a fax around as Rai states they have faxed several times and one yesterday 11/7.

## 2022-11-08 NOTE — TELEPHONE ENCOUNTER
General Call      Reason for Call:  DM paperwork for sensors    What are your questions or concerns:  Please call [facility] Delivery services  [phone]-for DM sensors  She is going to be out in a few days and wont be able to check her blood sugars    States they faxed over paperwork and is needing notes for sensors.  Please call and find out as this has been going on for 2 weeks and no response per pt.    Please call pt and update her    Date of last appointment with provider: 11/1/2022    Could we send this information to you in [facility] or would you prefer to receive a phone call?:   Patient would prefer a phone call   Okay to leave a detailed message?: Yes at Cell number on file:    Telephone Information:   Mobile [phone]

## 2022-11-09 ENCOUNTER — TELEPHONE (OUTPATIENT)
Dept: INTERNAL MEDICINE | Facility: CLINIC | Age: 67
End: 2022-11-09

## 2022-11-09 DIAGNOSIS — Z79.4 TYPE 2 DIABETES MELLITUS WITH HYPERGLYCEMIA, WITH LONG-TERM CURRENT USE OF INSULIN (H): ICD-10-CM

## 2022-11-09 DIAGNOSIS — E11.65 TYPE 2 DIABETES MELLITUS WITH HYPERGLYCEMIA, WITH LONG-TERM CURRENT USE OF INSULIN (H): ICD-10-CM

## 2022-11-09 RX ORDER — SEMAGLUTIDE 1.34 MG/ML
1 INJECTION, SOLUTION SUBCUTANEOUS
Qty: 9 ML | Refills: 4 | Status: SHIPPED | OUTPATIENT
Start: 2022-11-09 | End: 2023-01-25

## 2022-11-11 RX ORDER — SEMAGLUTIDE 1.34 MG/ML
INJECTION, SOLUTION SUBCUTANEOUS
Qty: 9 ML | Refills: 0 | OUTPATIENT
Start: 2022-11-11

## 2022-11-14 ENCOUNTER — ANCILLARY PROCEDURE (OUTPATIENT)
Dept: BONE DENSITY | Facility: CLINIC | Age: 67
End: 2022-11-14
Attending: INTERNAL MEDICINE
Payer: COMMERCIAL

## 2022-11-14 DIAGNOSIS — Z78.0 POST-MENOPAUSAL: ICD-10-CM

## 2022-11-14 DIAGNOSIS — Z78.0 MENOPAUSE: ICD-10-CM

## 2022-11-14 PROCEDURE — 77081 DXA BONE DENSITY APPENDICULR: CPT | Mod: 59 | Performed by: INTERNAL MEDICINE

## 2022-11-14 PROCEDURE — 77080 DXA BONE DENSITY AXIAL: CPT | Performed by: INTERNAL MEDICINE

## 2022-11-28 ENCOUNTER — OFFICE VISIT (OUTPATIENT)
Dept: INTERNAL MEDICINE | Facility: CLINIC | Age: 67
End: 2022-11-28
Payer: COMMERCIAL

## 2022-11-28 VITALS
HEART RATE: 80 BPM | DIASTOLIC BLOOD PRESSURE: 62 MMHG | HEIGHT: 65 IN | OXYGEN SATURATION: 96 % | TEMPERATURE: 97.8 F | WEIGHT: 244 LBS | BODY MASS INDEX: 40.65 KG/M2 | SYSTOLIC BLOOD PRESSURE: 122 MMHG

## 2022-11-28 DIAGNOSIS — N61.1 LEFT BREAST ABSCESS: ICD-10-CM

## 2022-11-28 DIAGNOSIS — D64.9 ANEMIA, UNSPECIFIED TYPE: ICD-10-CM

## 2022-11-28 DIAGNOSIS — M17.11 PRIMARY OSTEOARTHRITIS OF RIGHT KNEE: ICD-10-CM

## 2022-11-28 DIAGNOSIS — M54.50 CHRONIC LOW BACK PAIN, UNSPECIFIED BACK PAIN LATERALITY, UNSPECIFIED WHETHER SCIATICA PRESENT: ICD-10-CM

## 2022-11-28 DIAGNOSIS — R07.89 ATYPICAL CHEST PAIN: ICD-10-CM

## 2022-11-28 DIAGNOSIS — G89.29 CHRONIC LOW BACK PAIN, UNSPECIFIED BACK PAIN LATERALITY, UNSPECIFIED WHETHER SCIATICA PRESENT: ICD-10-CM

## 2022-11-28 DIAGNOSIS — E11.9 TYPE 2 DIABETES MELLITUS WITHOUT COMPLICATION, WITHOUT LONG-TERM CURRENT USE OF INSULIN (H): Primary | ICD-10-CM

## 2022-11-28 LAB — CRP SERPL-MCNC: 32.8 MG/L

## 2022-11-28 PROCEDURE — 99214 OFFICE O/P EST MOD 30 MIN: CPT | Performed by: INTERNAL MEDICINE

## 2022-11-28 PROCEDURE — 36415 COLL VENOUS BLD VENIPUNCTURE: CPT | Performed by: INTERNAL MEDICINE

## 2022-11-28 PROCEDURE — 87040 BLOOD CULTURE FOR BACTERIA: CPT | Performed by: INTERNAL MEDICINE

## 2022-11-28 PROCEDURE — 86140 C-REACTIVE PROTEIN: CPT | Performed by: INTERNAL MEDICINE

## 2022-11-28 RX ORDER — CLINDAMYCIN HCL 150 MG
CAPSULE ORAL
COMMUNITY
Start: 2022-11-25 | End: 2023-01-12

## 2022-11-28 RX ORDER — HYDROCODONE BITARTRATE AND ACETAMINOPHEN 5; 325 MG/1; MG/1
1 TABLET ORAL EVERY 12 HOURS PRN
Qty: 30 TABLET | Refills: 0 | Status: SHIPPED | OUTPATIENT
Start: 2022-11-28 | End: 2022-12-15

## 2022-11-28 NOTE — PROGRESS NOTES
Assessment & Plan     Type 2 diabetes mellitus (H)  Lab Results   Component Value Date    A1C 5.9 11/01/2022    A1C 6.1 07/06/2022    A1C 6.7 04/05/2022    A1C 7.9 10/15/2021    A1C 7.0 05/19/2021     Well controlled on ozempic . Some digestive issues could be related to I     Anemia, unspecified type  Chronic but seems to have gotten worse   inhaled steroids on b12 . Needs monitoring     Atypical chest pain      Left breast abscess  Painful tender lump in the anterior lower quadrant of the left breast with some overlying skin erythema and warmth.  It is not very fluctuant it seems to be semisolid.  Exquisitely tender.  She has no lymphadenopathy anywhere else.  Her facial swelling has gone.  It appears to be an abscess.  With some associated mastitis.  My seen is the antibiotic of choice for this but it would require drainage if it is truly an abscess.  We will get an urgent diagnostic ultrasound done with possible I&D.  Given the spread of infection and continuous dental or soft tissue infection or of the mouth and the breast is suspicious for bacteremia we will get blood cultures done today as well.  I will not change the antibiotics until the ultrasound is done.  I think we can continue outpatient management she is not toxic or having fever.  - US Breast Aspiration Cyst Initial Left  - Blood Culture Peripheral Blood  - CRP, inflammation  - Blood Culture Peripheral Blood  - CRP, inflammation    Primary osteoarthritis of right knee  She is taking chronic hydrocodone for back pain through her PCP and use some extra because of the pain so I did put in an early refill of her normal amount that she picks of 30 tablets every month.  - HYDROcodone-acetaminophen (NORCO) 5-325 MG tablet  Dispense: 30 tablet; Refill: 0    Chronic low back pain, unspecified back pain laterality, unspecified whether sciatica present    - HYDROcodone-acetaminophen (NORCO) 5-325 MG tablet  Dispense: 30 tablet; Refill: 0             MED REC  "REQUIRED  Post Medication Reconciliation Status: discharge medications reconciled, continue medications without change  BMI:   Estimated body mass index is 40.6 kg/m  as calculated from the following:    Height as of this encounter: 1.651 m (5' 5\").    Weight as of this encounter: 110.7 kg (244 lb).           Return in about 2 weeks (around 12/12/2022) for Follow up.    Bela Corbin MD  [facility]  Stress test negative in 2020   Subjective   Liana is a 67 year old, presenting for the following health issues:  ER F/U (Dental pains& facial swelling 11/25 ([facility] ) )    Cooked a lot of food for thanskgiving and felt that she could eat her teeth were not hurting but she was just feeling sick and unwell.  Then she noticed the swelling in her left cheek area.  ER note is below.  She did not notice any breast lump till she says the ER doctor episode of manipulated the the breast area because she was having chest pain there and now she then she noticed the lump in the redness it was not there at the ER time.  HPI      ER note: Liaan Briceño is a 67 y.o. female who presents to the UR for evaluation of chest pain and a dental problem. According to the patient, she has had left sided jaw pain with associated swelling of the left cheek for the past few days. Patient reports attempting to be seen this morning by an emergency dentist who recommended she be seen in the UR instead. Patient cannot identify any focal dental pain. Also this morning around 0930, patient reports sudden onset of left sided chest pain in an area that overlies a scar from a previous cyst removal. Patient states the chest pain lasted for a few minutes, but was improved with Tylenol. The pain is exacerbated with any palpation of the chest. She presents now concerned that her \"dental infection is leaking into her chest\".    Troponin is WNL. EKG without signs of arrhythmia or ischemia  On exam, patient does have left " "sided cheek swelling and tenderness. There is no evidence of deep space infection of the neck or any sepsis-like syndrome. Given the concern for potential dental infection, the patient was given a prescription for Clindamycin. T          Review of Systems         Objective    /62 (BP Location: Left arm, Patient Position: Sitting, Cuff Size: Adult Large)   Pulse 80   Temp 97.8  F (36.6  C)   Ht 1.651 m (5' 5\")   Wt 110.7 kg (244 lb)   SpO2 96%   BMI 40.60 kg/m    Body mass index is 40.6 kg/m .  Physical Exam   GENERAL: healthy, alert and no distress  NECK: no adenopathy, no asymmetry, masses, or scars and thyroid normal to palpation  RESP: lungs clear to auscultation - no rales, rhonchi or wheezes  CV: regular rate and rhythm, normal S1 S2, no S3 or S4, no murmur, click or rub, no peripheral edema and peripheral pulses strong  ABDOMEN: soft, nontender, no hepatosplenomegaly, no masses and bowel sounds normal  MS: no gross musculoskeletal defects noted, no edema  Left breast show some mild erythema in the inner lower quadrant.  There is a soft tissue lump felt near the sternum in the breast.  It is exquisitely tender patient would not really let me examine it properly it is not fluctuant.  There is no discharge there is no nipple discharge.  Right breast is normal rest of the breast is normal there is no lymphadenopathy                  "

## 2022-11-29 ENCOUNTER — ANCILLARY PROCEDURE (OUTPATIENT)
Dept: MAMMOGRAPHY | Facility: CLINIC | Age: 67
End: 2022-11-29
Attending: INTERNAL MEDICINE
Payer: COMMERCIAL

## 2022-11-29 DIAGNOSIS — N61.1 LEFT BREAST ABSCESS: ICD-10-CM

## 2022-11-29 LAB
GRAM STAIN RESULT: ABNORMAL

## 2022-11-29 PROCEDURE — 87075 CULTR BACTERIA EXCEPT BLOOD: CPT | Performed by: INTERNAL MEDICINE

## 2022-11-29 PROCEDURE — 87077 CULTURE AEROBIC IDENTIFY: CPT | Performed by: INTERNAL MEDICINE

## 2022-11-29 PROCEDURE — 19000 PUNCTURE ASPIR CYST BREAST: CPT | Mod: LT | Performed by: STUDENT IN AN ORGANIZED HEALTH CARE EDUCATION/TRAINING PROGRAM

## 2022-11-29 PROCEDURE — 76942 ECHO GUIDE FOR BIOPSY: CPT | Mod: XU | Performed by: STUDENT IN AN ORGANIZED HEALTH CARE EDUCATION/TRAINING PROGRAM

## 2022-11-29 PROCEDURE — 87070 CULTURE OTHR SPECIMN AEROBIC: CPT | Performed by: INTERNAL MEDICINE

## 2022-11-29 PROCEDURE — 76642 ULTRASOUND BREAST LIMITED: CPT | Mod: LT | Performed by: STUDENT IN AN ORGANIZED HEALTH CARE EDUCATION/TRAINING PROGRAM

## 2022-11-29 PROCEDURE — 87205 SMEAR GRAM STAIN: CPT | Performed by: INTERNAL MEDICINE

## 2022-11-29 RX ORDER — LIDOCAINE HYDROCHLORIDE 10 MG/ML
10 INJECTION, SOLUTION EPIDURAL; INFILTRATION; INTRACAUDAL; PERINEURAL ONCE
Status: COMPLETED | OUTPATIENT
Start: 2022-11-29 | End: 2022-11-29

## 2022-11-29 RX ADMIN — LIDOCAINE HYDROCHLORIDE 10 ML: 10 INJECTION, SOLUTION EPIDURAL; INFILTRATION; INTRACAUDAL; PERINEURAL at 08:30

## 2022-11-30 DIAGNOSIS — N61.0 MASTITIS: Primary | ICD-10-CM

## 2022-11-30 RX ORDER — SULFAMETHOXAZOLE/TRIMETHOPRIM 800-160 MG
1 TABLET ORAL 2 TIMES DAILY
Qty: 14 TABLET | Refills: 0 | Status: SHIPPED | OUTPATIENT
Start: 2022-11-30 | End: 2022-12-15

## 2022-12-01 LAB — BACTERIA ABSC ANAEROBE+AEROBE CULT: NORMAL

## 2022-12-02 ENCOUNTER — DOCUMENTATION ONLY (OUTPATIENT)
Dept: INTERNAL MEDICINE | Facility: CLINIC | Age: 67
End: 2022-12-02

## 2022-12-02 ENCOUNTER — ANCILLARY PROCEDURE (OUTPATIENT)
Dept: MAMMOGRAPHY | Facility: CLINIC | Age: 67
End: 2022-12-02
Payer: COMMERCIAL

## 2022-12-02 DIAGNOSIS — N61.1 LEFT BREAST ABSCESS: ICD-10-CM

## 2022-12-02 DIAGNOSIS — Z09 FOLLOW-UP EXAM: ICD-10-CM

## 2022-12-02 LAB
BACTERIA ABSC ANAEROBE+AEROBE CULT: ABNORMAL

## 2022-12-02 PROCEDURE — G0279 TOMOSYNTHESIS, MAMMO: HCPCS | Mod: LT | Performed by: RADIOLOGY

## 2022-12-02 PROCEDURE — 77065 DX MAMMO INCL CAD UNI: CPT | Mod: LT | Performed by: RADIOLOGY

## 2022-12-02 PROCEDURE — 76642 ULTRASOUND BREAST LIMITED: CPT | Mod: LT | Performed by: RADIOLOGY

## 2022-12-03 LAB — BACTERIA BLD CULT: NO GROWTH

## 2022-12-06 ENCOUNTER — ANCILLARY PROCEDURE (OUTPATIENT)
Dept: MAMMOGRAPHY | Facility: CLINIC | Age: 67
End: 2022-12-06
Attending: INTERNAL MEDICINE
Payer: COMMERCIAL

## 2022-12-06 DIAGNOSIS — N61.1 LEFT BREAST ABSCESS: ICD-10-CM

## 2022-12-06 DIAGNOSIS — Z09 FOLLOW-UP EXAM: ICD-10-CM

## 2022-12-06 LAB
GRAM STAIN RESULT: ABNORMAL

## 2022-12-06 PROCEDURE — 87070 CULTURE OTHR SPECIMN AEROBIC: CPT | Performed by: INTERNAL MEDICINE

## 2022-12-06 PROCEDURE — 10160 PNXR ASPIR ABSC HMTMA BULLA: CPT | Mod: GC | Performed by: RADIOLOGY

## 2022-12-06 PROCEDURE — 87077 CULTURE AEROBIC IDENTIFY: CPT | Performed by: INTERNAL MEDICINE

## 2022-12-06 PROCEDURE — 87075 CULTR BACTERIA EXCEPT BLOOD: CPT | Performed by: INTERNAL MEDICINE

## 2022-12-06 PROCEDURE — 87205 SMEAR GRAM STAIN: CPT | Performed by: INTERNAL MEDICINE

## 2022-12-06 PROCEDURE — 76942 ECHO GUIDE FOR BIOPSY: CPT | Mod: GC | Performed by: RADIOLOGY

## 2022-12-08 RX ORDER — CEFUROXIME AXETIL 500 MG/1
500 TABLET ORAL 2 TIMES DAILY
Qty: 20 TABLET | Refills: 0 | Status: SHIPPED | OUTPATIENT
Start: 2022-12-08 | End: 2022-12-15

## 2022-12-09 LAB
BACTERIA ABSC ANAEROBE+AEROBE CULT: ABNORMAL
BACTERIA ABSC ANAEROBE+AEROBE CULT: ABNORMAL

## 2022-12-11 ENCOUNTER — MYC MEDICAL ADVICE (OUTPATIENT)
Dept: INTERNAL MEDICINE | Facility: CLINIC | Age: 67
End: 2022-12-11

## 2022-12-11 DIAGNOSIS — I25.83 CORONARY ARTERY DISEASE DUE TO LIPID RICH PLAQUE: ICD-10-CM

## 2022-12-11 DIAGNOSIS — I25.10 CORONARY ARTERY DISEASE DUE TO LIPID RICH PLAQUE: ICD-10-CM

## 2022-12-11 DIAGNOSIS — R07.9 CHEST PAIN, UNSPECIFIED TYPE: ICD-10-CM

## 2022-12-11 DIAGNOSIS — K59.09 OTHER CONSTIPATION: ICD-10-CM

## 2022-12-11 LAB — BACTERIA ABSC ANAEROBE+AEROBE CULT: NORMAL

## 2022-12-12 ENCOUNTER — ANCILLARY PROCEDURE (OUTPATIENT)
Dept: MAMMOGRAPHY | Facility: CLINIC | Age: 67
End: 2022-12-12
Attending: INTERNAL MEDICINE
Payer: COMMERCIAL

## 2022-12-12 DIAGNOSIS — Z09 FOLLOW-UP EXAM: ICD-10-CM

## 2022-12-12 DIAGNOSIS — N61.1 LEFT BREAST ABSCESS: ICD-10-CM

## 2022-12-12 PROCEDURE — 76642 ULTRASOUND BREAST LIMITED: CPT | Mod: LT | Performed by: RADIOLOGY

## 2022-12-13 RX ORDER — NITROGLYCERIN 0.3 MG/1
0.3 TABLET SUBLINGUAL EVERY 5 MIN PRN
Qty: 100 TABLET | Refills: 3 | Status: SHIPPED | OUTPATIENT
Start: 2022-12-13 | End: 2022-12-15

## 2022-12-13 RX ORDER — DOCUSATE SODIUM 100 MG/1
CAPSULE, LIQUID FILLED ORAL
Qty: 180 CAPSULE | Refills: 3 | Status: SHIPPED | OUTPATIENT
Start: 2022-12-13

## 2022-12-15 ENCOUNTER — OFFICE VISIT (OUTPATIENT)
Dept: INTERNAL MEDICINE | Facility: CLINIC | Age: 67
End: 2022-12-15
Payer: COMMERCIAL

## 2022-12-15 VITALS
DIASTOLIC BLOOD PRESSURE: 71 MMHG | SYSTOLIC BLOOD PRESSURE: 137 MMHG | HEART RATE: 82 BPM | OXYGEN SATURATION: 98 % | TEMPERATURE: 97.9 F

## 2022-12-15 DIAGNOSIS — M17.11 PRIMARY OSTEOARTHRITIS OF RIGHT KNEE: ICD-10-CM

## 2022-12-15 DIAGNOSIS — I25.10 CORONARY ARTERY DISEASE DUE TO LIPID RICH PLAQUE: ICD-10-CM

## 2022-12-15 DIAGNOSIS — N61.0 MASTITIS: ICD-10-CM

## 2022-12-15 DIAGNOSIS — I10 ESSENTIAL HYPERTENSION WITH GOAL BLOOD PRESSURE LESS THAN 140/90: ICD-10-CM

## 2022-12-15 DIAGNOSIS — I25.83 CORONARY ARTERY DISEASE DUE TO LIPID RICH PLAQUE: ICD-10-CM

## 2022-12-15 DIAGNOSIS — M54.50 CHRONIC LOW BACK PAIN, UNSPECIFIED BACK PAIN LATERALITY, UNSPECIFIED WHETHER SCIATICA PRESENT: ICD-10-CM

## 2022-12-15 DIAGNOSIS — G89.29 CHRONIC LOW BACK PAIN, UNSPECIFIED BACK PAIN LATERALITY, UNSPECIFIED WHETHER SCIATICA PRESENT: ICD-10-CM

## 2022-12-15 DIAGNOSIS — R07.9 CHEST PAIN, UNSPECIFIED TYPE: ICD-10-CM

## 2022-12-15 DIAGNOSIS — N61.1 BREAST ABSCESS: Primary | ICD-10-CM

## 2022-12-15 LAB
CRP SERPL-MCNC: 11.6 MG/L
ERYTHROCYTE [DISTWIDTH] IN BLOOD BY AUTOMATED COUNT: 13.7 % (ref 10–15)
ERYTHROCYTE [SEDIMENTATION RATE] IN BLOOD BY WESTERGREN METHOD: 70 MM/HR (ref 0–20)
HCT VFR BLD AUTO: 33.2 % (ref 35–47)
HGB BLD-MCNC: 10.7 G/DL (ref 11.7–15.7)
MCH RBC QN AUTO: 29.2 PG (ref 26.5–33)
MCHC RBC AUTO-ENTMCNC: 32.2 G/DL (ref 31.5–36.5)
MCV RBC AUTO: 91 FL (ref 78–100)
PLATELET # BLD AUTO: 297 10E3/UL (ref 150–450)
RBC # BLD AUTO: 3.66 10E6/UL (ref 3.8–5.2)
WBC # BLD AUTO: 6.6 10E3/UL (ref 4–11)

## 2022-12-15 PROCEDURE — 99214 OFFICE O/P EST MOD 30 MIN: CPT | Performed by: INTERNAL MEDICINE

## 2022-12-15 PROCEDURE — 85027 COMPLETE CBC AUTOMATED: CPT | Performed by: INTERNAL MEDICINE

## 2022-12-15 PROCEDURE — 86140 C-REACTIVE PROTEIN: CPT | Performed by: INTERNAL MEDICINE

## 2022-12-15 PROCEDURE — 36415 COLL VENOUS BLD VENIPUNCTURE: CPT | Performed by: INTERNAL MEDICINE

## 2022-12-15 PROCEDURE — 85652 RBC SED RATE AUTOMATED: CPT | Performed by: INTERNAL MEDICINE

## 2022-12-15 RX ORDER — HYDROCODONE BITARTRATE AND ACETAMINOPHEN 5; 325 MG/1; MG/1
1 TABLET ORAL EVERY 12 HOURS PRN
Qty: 30 TABLET | Refills: 0 | Status: SHIPPED | OUTPATIENT
Start: 2022-12-15 | End: 2023-01-12

## 2022-12-15 RX ORDER — HYDROCODONE BITARTRATE AND ACETAMINOPHEN 5; 325 MG/1; MG/1
1 TABLET ORAL EVERY 12 HOURS PRN
Qty: 30 TABLET | Refills: 0
Start: 2022-12-15 | End: 2022-12-15

## 2022-12-15 RX ORDER — CEFUROXIME AXETIL 500 MG/1
500 TABLET ORAL 2 TIMES DAILY
Qty: 10 TABLET | Refills: 0 | Status: SHIPPED | OUTPATIENT
Start: 2022-12-15 | End: 2023-01-12

## 2022-12-15 RX ORDER — LISINOPRIL 5 MG/1
TABLET ORAL
Qty: 135 TABLET | Refills: 2 | Status: SHIPPED | OUTPATIENT
Start: 2022-12-15 | End: 2023-09-26

## 2022-12-15 RX ORDER — NITROGLYCERIN 0.3 MG/1
0.3 TABLET SUBLINGUAL EVERY 5 MIN PRN
Qty: 100 TABLET | Refills: 3 | Status: SHIPPED | OUTPATIENT
Start: 2022-12-15

## 2022-12-15 NOTE — PROGRESS NOTES
"  Assessment & Plan     Breast abscess  Left medial breast-status post 2 I&D's.  Cultures reviewed and reveal Enterococcus.  Multiple drug allergies and especially to penicillin.  Recommendation: Continue Ceftin.  She reports that things are improving.  Of note, I would like her to see a breast surgeon to see if anything surgical needs to be done.  The needle aspiration has been done on 2 occasions without anesthesia at interventional radiology and has been quite painful for her.  Recommend Dr. Kaylan Dunham.    Will extend antibiotic treatment for total of 2 weeks.  We will check labs today.  She should follow-up with one of my partners in my absence.    - CBC with platelets  - Erythrocyte sedimentation rate auto  - CRP, inflammation  - Adult General Surg Referral  - CBC with platelets  - Erythrocyte sedimentation rate auto  - CRP, inflammation    Essential hypertension with goal blood pressure less than 140/90  Stable on current med  - lisinopril (ZESTRIL) 5 MG tablet  Dispense: 135 tablet; Refill: 2  ispense: 100 tablet; Refill: 3    Coronary artery disease due to lipid rich plaque  Meds renewed  - nitroGLYcerin (NITROSTAT) 0.3 MG sublingual tablet  Dispense: 100 tablet; Refill: 3    Primary osteoarthritis of right knee chronic back pain  Meds renewed  - HYDROcodone-acetaminophen (NORCO) 5-325 MG tablet  Dispense: 30 tablet; Refill: 0    Type 2 diabetes on Tresiba-low-dose  Meds renewed    She has made significant improvements in diet and activity level such that her A1c is below 6.  We have been able to discontinue short acting insulin.  She is now on Tresiba.        BMI:   Estimated body mass index is 40.6 kg/m  as calculated from the following:    Height as of 11/28/22: 1.651 m (5' 5\").    Weight as of 11/28/22: 110.7 kg (244 lb).       Return in about 3 months (around 3/15/2023).    Mimi Fuentes MD  [facility]   Liana is a 67 year old accompanied by her ALONE, " presenting for the following health issues:  Recheck Medication and RECHECK      Liana is here for follow-up of breast abscess.  Details of present illness are outlined in Dr. Corbin's note.  She did present to the office with a swollen face.  She was diagnosed with a tooth abscess.  She was placed on an antibiotic-clindamycin.  She had some improvement of the facial swelling-but later went on to develop fluctuance and a large breast abscess on the left.  She was immediately referred to interventional radiology at the [facility].  She did have a needle drainage x2.  With adjustment of medications based on cultures i.e. Enterococcus, things are improving.    Of note, she had her tooth extracted yesterday.  Last normal mammogram in July 2022.    With regard to type 2 diabetes with insulin use, she has made great strides.  She has lost weight and has gotten her hemoglobin A1c under 6.  She is on semaglutide as well as Tresiba.  Her short acting insulin has been discontinued altogether.  She feels well and otherwise has been exercising.    She requests renewal of medications.    Of note, she is having some heartburn with her antibiotics and is transiently taking a second dose of the omeprazole.        History of Present Illness       Reason for visit:  Swelling  Symptom onset:  1-2 weeks ago  Symptoms include:  Swelling  Symptom intensity:  Severe  Symptom progression:  Improving  Had these symptoms before:  No    She eats 2-3 servings of fruits and vegetables daily.She consumes 1 sweetened beverage(s) daily.She exercises with enough effort to increase her heart rate 60 or more minutes per day.  She exercises with enough effort to increase her heart rate 3 or less days per week.   She is taking medications regularly.       Current Outpatient Medications   Medication     acetaminophen (TYLENOL) 500 MG tablet     allopurinol (ZYLOPRIM) 100 MG tablet     aspirin (ASA) 81 MG EC tablet     blood glucose test  "strips     cefuroxime (CEFTIN) 500 MG tablet     cetirizine (ZYRTEC) 10 MG tablet     cholecalciferol, vitamin D3, (CHOLECALCIFEROL) 1,000 unit tablet     clindamycin (CLEOCIN) 150 MG capsule     Continuous Blood Gluc Sensor (FREESTYLE MIRANDA 2 SENSOR) MISC     cyclobenzaprine (FLEXERIL) 10 MG tablet     docusate sodium (DOK) 100 MG capsule     gabapentin (NEURONTIN) 300 MG capsule     generic lancets (MICROLET LANCET)     hydrochlorothiazide (HYDRODIURIL) 12.5 MG tablet     HYDROcodone-acetaminophen (NORCO) 5-325 MG tablet     hydrOXYzine (ATARAX) 25 MG tablet     insulin degludec (TRESIBA FLEXTOUCH) 200 UNIT/ML pen     lisinopril (ZESTRIL) 5 MG tablet     melatonin (MELATIN) 3 mg Tab tablet     nitroGLYcerin (NITROSTAT) 0.3 MG sublingual tablet     omeprazole (PRILOSEC) 40 MG DR capsule     ONETOUCH DELICA PLUS LANCET 33 gauge Misc     pen needle, diabetic (BD ULTRA-FINE ZULMA PEN NEEDLE) 32 gauge x 5/32\" Ndle     rosuvastatin (CRESTOR) 10 MG tablet     Semaglutide, 1 MG/DOSE, (OZEMPIC, 1 MG/DOSE,) 4 MG/3ML SOPN     Thiamine HCl (B-1) 100 MG TABS     triamcinolone (KENALOG) 0.1 % external cream     valACYclovir (VALTREX) 500 MG tablet     vitamin B-12 (CYANOCOBALAMIN) 2500 MCG sublingual tablet     cinnamon bark (CINNAMON ORAL)     No current facility-administered medications for this visit.     Facility-Administered Medications Ordered in Other Visits   Medication     lidocaine 1 % 10 mL           Objective    /71 (BP Location: Left arm, Patient Position: Sitting, Cuff Size: Adult Large)   Pulse 82   Temp 97.9  F (36.6  C) (Tympanic)   SpO2 98%   There is no height or weight on file to calculate BMI.  Physical Exam   Breast exam is performed.  There is a nodular mass medial left breast-consistent with breast abscess.  According to patient much improved.  It is about the size of an avocado pit??                "

## 2023-01-11 ENCOUNTER — TRANSFERRED RECORDS (OUTPATIENT)
Dept: HEALTH INFORMATION MANAGEMENT | Facility: CLINIC | Age: 68
End: 2023-01-11

## 2023-01-12 ENCOUNTER — OFFICE VISIT (OUTPATIENT)
Dept: SURGERY | Facility: CLINIC | Age: 68
End: 2023-01-12
Payer: COMMERCIAL

## 2023-01-12 VITALS
BODY MASS INDEX: 40.65 KG/M2 | HEIGHT: 65 IN | HEART RATE: 91 BPM | WEIGHT: 244 LBS | RESPIRATION RATE: 16 BRPM | SYSTOLIC BLOOD PRESSURE: 132 MMHG | DIASTOLIC BLOOD PRESSURE: 78 MMHG

## 2023-01-12 DIAGNOSIS — L08.9 INFECTED SEBACEOUS CYST: Primary | ICD-10-CM

## 2023-01-12 DIAGNOSIS — L72.3 INFECTED SEBACEOUS CYST: Primary | ICD-10-CM

## 2023-01-12 PROCEDURE — 99203 OFFICE O/P NEW LOW 30 MIN: CPT | Performed by: SPECIALIST

## 2023-01-12 PROCEDURE — G0463 HOSPITAL OUTPT CLINIC VISIT: HCPCS | Performed by: SPECIALIST

## 2023-01-12 RX ORDER — METHYLPREDNISOLONE 4 MG
TABLET, DOSE PACK ORAL
COMMUNITY
Start: 2023-01-11 | End: 2023-01-25

## 2023-01-12 NOTE — LETTER
1/12/2023         RE: Liana Briceño  [address] [place] [address]        Dear Colleague,    Thank you for referring your patient, Liana Briceño, to the [facility] [place]. Please see a copy of my visit note below.    History of Present Illness:  This is a 67 year old y/o female who I am asked to see by Mimi Fuentes for evaluation of a persistent left breast abscess.  This is a patient with multiple significant medical problems including a history of MRSA who recently developed an abscess in her left breast.  Its been aspirated twice by radiology.  The last aspiration was a little over a month ago.  She just recently was put on a different type of antibiotic for another infection and now the breast feels much better.  She has had a cyst removed from that portion of her breast sometime in the past.    PAST MEDICAL HISTORY:  Past Medical History:   Diagnosis Date     Anemia      Breast cyst      Carpal tunnel syndrome      Coronary artery disease due to lipid rich plaque      Dyslipidemia, goal LDL below 70      Esophageal reflux      Essential hypertension      Fatty liver      Goiter diffuse, nontoxic      Hiatal hernia      History of gestational diabetes      History of MRSA infection 07/17/2017    Pilonidal cyst culture     Increased PTH level 11/4/2018     Morbid obesity with BMI of 40.0-44.9, adult (H)      Motion sickness      RADHA on CPAP      Osteoarthritis      Paroxysmal SVT (supraventricular tachycardia) (H) 2011    ablation by Dr. Johnson in 2011     PONV (postoperative nausea and vomiting)      Positive result for methicillin resistant Staphylococcus aureus (MRSA) screening 11/4/2018     Renal disease      S/P hip replacement      Stented coronary artery      Thiamine deficiency 11/4/2018     Type 2 diabetes mellitus (H)      Uncomplicated asthma      Vitamin D deficiency 11/4/2018       Past Surgical History:   Procedure Laterality Date     ARTHROPLASTY KNEE Right  4/8/2022    Procedure: RIGHT TOTAL KNEE ARTHROPLASTY;  Surgeon: Declan Eaton MD;  Location: [facility] OR     BIOPSY BREAST       BREAST CYST EXCISION       CARDIAC CATHETERIZATION N/A 4/24/2017    Procedure: Coronary Angiogram;  Surgeon: Ariane Biswas MD;  Location: [facility];  Service:      CARDIAC CATHETERIZATION N/A 5/19/2017    Procedure: Coronary Angiogram;  Surgeon: Howard Gutierrez MD;  Location: [facility];  Service:      CHOLECYSTECTOMY  2000     CORONARY STENT PLACEMENT  04/24/2017    BO to RCA by Dr. Biswas     HC REVISE MEDIAN N/CARPAL TUNNEL SURG      Description: Neuroplasty Decompression Median Nerve At Carpal Tunnel;  Recorded: 05/22/2009;     IR LUMBAR EPIDURAL STEROID INJECTION  6/22/2007     IR LUMBAR EPIDURAL STEROID INJECTION  11/2/2007     IR LUMBAR EPIDURAL STEROID INJECTION  8/6/2008     IR LUMBAR EPIDURAL STEROID INJECTION  10/8/2008     IR LUMBAR EPIDURAL STEROID INJECTION  10/28/2008     IR LUMBAR EPIDURAL STEROID INJECTION  3/10/2009     NJ EXPLO/DRAIN BREAST ABSCESS      Description: Breast Surgery Mastotomy With Drainage Of Abscess     NJ L HRT CATH W/NJX L VENTRICULOGRAPHY IMG S&I N/A 4/24/2017    Procedure: Left Heart Catheterization with Left Ventriculogram;  Surgeon: Ariane Biswas MD;  Location: [facility];  Service: Cardiology     NJ THYROID LOBECTOMY,UNILAT       RADIOFREQUENCY ABLATION  07/05/2011    for PSVT by Dr. Johnson     TOTAL HIP ARTHROPLASTY Right          Medications:    Current Outpatient Medications:      acetaminophen (TYLENOL) 500 MG tablet, [ACETAMINOPHEN (TYLENOL) 500 MG TABLET] Take 1,000 mg by mouth 3 (three) times a day as needed., Disp: , Rfl:      allopurinol (ZYLOPRIM) 100 MG tablet, Take 1 tablet (100 mg) by mouth daily, Disp: 90 tablet, Rfl: 3     aspirin (ASA) 81 MG EC tablet, Take 1 tablet (81 mg) by mouth daily Resume after done with BID dosing, Disp: , Rfl: 0     blood glucose test strips, [BLOOD  GLUCOSE TEST STRIPS] Use 1 each As Directed 6 (six) times a day. Prodigy Meter, Disp: 300 strip, Rfl: 3     cefuroxime (CEFTIN) 500 MG tablet, Take 1 tablet (500 mg) by mouth 2 times daily, Disp: 10 tablet, Rfl: 0     cetirizine (ZYRTEC) 10 MG tablet, Take 1 tablet (10 mg) by mouth daily, Disp: 90 tablet, Rfl: 0     cholecalciferol, vitamin D3, (CHOLECALCIFEROL) 1,000 unit tablet, [CHOLECALCIFEROL, VITAMIN D3, (CHOLECALCIFEROL) 1,000 UNIT TABLET] Take 3,000 Units by mouth daily. , Disp: , Rfl:      cinnamon bark (CINNAMON ORAL), [CINNAMON BARK (CINNAMON ORAL)] Take 1 each by mouth 2 (two) times a day. (Patient not taking: Reported on 11/28/2022), Disp: , Rfl:      clindamycin (CLEOCIN) 150 MG capsule, TAKE 3 CAPSULES BY MOUTH THREE TIMES DAILY FOR TEN DAYS, Disp: , Rfl:      Continuous Blood Gluc Sensor (FREESTYLE MIRANDA 2 SENSOR) [facility] 1 each every 14 days 1 each every 14 days. Change every 14 days., Disp: 6 each, Rfl: 3     cyclobenzaprine (FLEXERIL) 10 MG tablet, Take 1 tablet (10 mg) by mouth 2 times daily as needed for muscle spasms, Disp: 30 tablet, Rfl: 0     docusate sodium (DOK) 100 MG capsule, TAKE 1 CAPSULE(100 MG) BY MOUTH TWICE DAILY Strength: 100 mg, Disp: 180 capsule, Rfl: 3     gabapentin (NEURONTIN) 300 MG capsule, Take 1 capsule (300 mg) by mouth 3 times daily, Disp: 270 capsule, Rfl: 3     generic lancets (MICROLET LANCET), [GENERIC LANCETS (MICROLET LANCET)] Use 1 each As Directed 6 (six) times a day., Disp: 300 each, Rfl: 3     hydrochlorothiazide (HYDRODIURIL) 12.5 MG tablet, Take 1 tablet (12.5 mg) by mouth daily, Disp: 90 tablet, Rfl: 3     HYDROcodone-acetaminophen (NORCO) 5-325 MG tablet, Take 1 tablet by mouth every 12 hours as needed for severe pain (7-10), Disp: 30 tablet, Rfl: 0     hydrOXYzine (ATARAX) 25 MG tablet, Take 1 tablet (25 mg) by mouth every 6 hours as needed for itching or anxiety (with pain, moderate pain), Disp: 30 tablet, Rfl: 0     insulin degludec (TRESIBA FLEXTOUCH)  "200 UNIT/ML pen, Inject 8 Units Subcutaneous daily, Disp: 63 mL, Rfl: 1     lisinopril (ZESTRIL) 5 MG tablet, TAKE 1 AND 1/2 TABLETS(7.5 MG) BY MOUTH DAILY Strength: 5 mg, Disp: 135 tablet, Rfl: 2     melatonin (MELATIN) 3 mg Tab tablet, Take 6 mg by mouth nightly as needed, Disp: , Rfl:      nitroGLYcerin (NITROSTAT) 0.3 MG sublingual tablet, Place 1 tablet (0.3 mg) under the tongue every 5 minutes as needed for chest pain, Disp: 100 tablet, Rfl: 3     omeprazole (PRILOSEC) 40 MG DR capsule, TAKE 1 CAPSULE BY MOUTH EVERY DAY, Disp: 90 capsule, Rfl: 3     ONETOUCH DELICA PLUS LANCET 33 gauge Misc, [ONETOUCH DELICA PLUS LANCET 33 GAUGE MISC] USE TO TEST BLOOD SUGAR 6 TIMES DAILY, Disp: 600 each, Rfl: 3     pen needle, diabetic (BD ULTRA-FINE ZULMA PEN NEEDLE) 32 gauge x 5/32\" Ndle, [PEN NEEDLE, DIABETIC (BD ULTRA-FINE ZULMA PEN NEEDLE) 32 GAUGE X 5/32\" NDLE] Inject 4 each under the skin daily. With Tresiba and Novolog, Disp: 120 each, Rfl: 3     rosuvastatin (CRESTOR) 10 MG tablet, TAKE 1 TABLET(10 MG) BY MOUTH DAILY, Disp: 90 tablet, Rfl: 3     Semaglutide, 1 MG/DOSE, (OZEMPIC, 1 MG/DOSE,) 4 MG/3ML SOPN, Inject 1 mg Subcutaneous every 7 days, Disp: 9 mL, Rfl: 4     Thiamine HCl (B-1) 100 MG TABS, Take 100 mg by mouth daily, Disp: 90 tablet, Rfl: 3     triamcinolone (KENALOG) 0.1 % external cream, [TRIAMCINOLONE (KENALOG) 0.1 % CREAM] Apply bid, Disp: 30 g, Rfl: 0     valACYclovir (VALTREX) 500 MG tablet, TAKE 1 TABLET(500 MG) BY MOUTH DAILY, Disp: 90 tablet, Rfl: 3     vitamin B-12 (CYANOCOBALAMIN) 2500 MCG sublingual tablet, One tab every other day, Disp: 60 tablet, Rfl: 3  No current facility-administered medications for this visit.    Facility-Administered Medications Ordered in Other Visits:      lidocaine 1 % 10 mL, 10 mL, Intradermal, Once, Eileen Castañeda MD      Allergies:     Allergies   Allergen Reactions     Latex, Natural Rubber [Latex] Hives     Penicillins Other (See Comments)     Passed out.      " "Doxycycline Dizziness     Eggshell Membrane [Egg Shells] Unknown     Erythromycin Itching     Ibuprofen      Ulcer      Naloxone Unknown     itching     Penicillamine Unknown     Pneumococcal 23-Valent Polysaccharide Vaccine [Pneumococcal Vaccine] Other (See Comments)     pneumonia     Statins-Hmg-Coa Reductase Inhibitors [Hmg-Coa-R Inhibitors] Muscle Pain (Myalgia)     Shaking with simvastatin and atorvastatin     Azithromycin Rash        Social History:  Social History     Tobacco Use     Smoking status: Former     Packs/day: 0.25     Years: 44.00     Pack years: 11.00     Types: Cigarettes     Quit date: 2017     Years since quittin.9     Smokeless tobacco: Never     Tobacco comments:     age 18 to age 62 up to  ppd   Substance Use Topics     Alcohol use: Yes     Alcohol/week: 0.0 standard drinks     Comment: Alcoholic Drinks/day: < 1 drink a week     Drug use: No        ROS:  Pertinent items are noted in HPI.    PHYSICAL EXAM:  /78 (BP Location: Left arm)   Pulse 91   Resp 16   Ht 1.651 m (5' 5\")   Wt 110.7 kg (244 lb)   BMI 40.60 kg/m    General: alert, appears stated age, cooperative and morbidly obese  Breast: In the medial aspect of the left breast is an area of some thickening consistent with a sebaceous cyst.  At this point there is no erythema, no fluctuance and [place] no sign of infection.  No other palpable masses in her breast.      Impression: This was clearly an infected sebaceous cyst.  A needle aspiration may temporize it but is not going to completely take care of it.  I gave her the option of prophylactically excising this.  The only problem with that is she is already had something done in the past and now that its been infected you end up having to take a little more tissue around the actual cyst so she is going to have a little cosmetic deficit in the breast.  The other option is just to wait until it gets infected again but if it does I would then do a more formal " incision and drainage to allow the sebaceous material to drain.  She wants to wait at this point.    Plan: No further recommendations at this time as the infection seems to have resolved.  She will follow-up with us as needed if and when it becomes infected again.  At that point I would recommend a formal incision and drainage.            Again, thank you for allowing me to participate in the care of your patient.        Sincerely,        Kaylan Weathers MD

## 2023-01-12 NOTE — PROGRESS NOTES
History of Present Illness:  This is a 67 year old y/o female who I am asked to see by Mimi Fuentes for evaluation of a persistent left breast abscess.  This is a patient with multiple significant medical problems including a history of MRSA who recently developed an abscess in her left breast.  Its been aspirated twice by radiology.  The last aspiration was a little over a month ago.  She just recently was put on a different type of antibiotic for another infection and now the breast feels much better.  She has had a cyst removed from that portion of her breast sometime in the past.    PAST MEDICAL HISTORY:  Past Medical History:   Diagnosis Date     Anemia      Breast cyst      Carpal tunnel syndrome      Coronary artery disease due to lipid rich plaque      Dyslipidemia, goal LDL below 70      Esophageal reflux      Essential hypertension      Fatty liver      Goiter diffuse, nontoxic      Hiatal hernia      History of gestational diabetes      History of MRSA infection 07/17/2017    Pilonidal cyst culture     Increased PTH level 11/4/2018     Morbid obesity with BMI of 40.0-44.9, adult (H)      Motion sickness      RADHA on CPAP      Osteoarthritis      Paroxysmal SVT (supraventricular tachycardia) (H) 2011    ablation by Dr. Johnson in 2011     PONV (postoperative nausea and vomiting)      Positive result for methicillin resistant Staphylococcus aureus (MRSA) screening 11/4/2018     Renal disease      S/P hip replacement      Stented coronary artery      Thiamine deficiency 11/4/2018     Type 2 diabetes mellitus (H)      Uncomplicated asthma      Vitamin D deficiency 11/4/2018       Past Surgical History:   Procedure Laterality Date     ARTHROPLASTY KNEE Right 4/8/2022    Procedure: RIGHT TOTAL KNEE ARTHROPLASTY;  Surgeon: Declan Eaton MD;  Location: [facility] Main OR     BIOPSY BREAST       BREAST CYST EXCISION       CARDIAC CATHETERIZATION N/A 4/24/2017    Procedure: Coronary Angiogram;  Surgeon: Araine  MD True;  Location: [facility] Cath Lab;  Service:      CARDIAC CATHETERIZATION N/A 5/19/2017    Procedure: Coronary Angiogram;  Surgeon: Howard Gutierrez MD;  Location: [facility] Cath Lab;  Service:      CHOLECYSTECTOMY  2000     CORONARY STENT PLACEMENT  04/24/2017    BO to RCA by Dr. Biswas     HC REVISE MEDIAN N/CARPAL TUNNEL SURG      Description: Neuroplasty Decompression Median Nerve At Carpal Tunnel;  Recorded: 05/22/2009;     IR LUMBAR EPIDURAL STEROID INJECTION  6/22/2007     IR LUMBAR EPIDURAL STEROID INJECTION  11/2/2007     IR LUMBAR EPIDURAL STEROID INJECTION  8/6/2008     IR LUMBAR EPIDURAL STEROID INJECTION  10/8/2008     IR LUMBAR EPIDURAL STEROID INJECTION  10/28/2008     IR LUMBAR EPIDURAL STEROID INJECTION  3/10/2009     RI EXPLO/DRAIN BREAST ABSCESS      Description: Breast Surgery Mastotomy With Drainage Of Abscess     RI L HRT CATH W/NJX L VENTRICULOGRAPHY IMG S&I N/A 4/24/2017    Procedure: Left Heart Catheterization with Left Ventriculogram;  Surgeon: Ariane Biswas MD;  Location: [facility] Cath Lab;  Service: Cardiology     RI THYROID LOBECTOMY,UNILAT       RADIOFREQUENCY ABLATION  07/05/2011    for PSVT by Dr. Johnson     TOTAL HIP ARTHROPLASTY Right          Medications:    Current Outpatient Medications:      acetaminophen (TYLENOL) 500 MG tablet, [ACETAMINOPHEN (TYLENOL) 500 MG TABLET] Take 1,000 mg by mouth 3 (three) times a day as needed., Disp: , Rfl:      allopurinol (ZYLOPRIM) 100 MG tablet, Take 1 tablet (100 mg) by mouth daily, Disp: 90 tablet, Rfl: 3     aspirin (ASA) 81 MG EC tablet, Take 1 tablet (81 mg) by mouth daily Resume after done with BID dosing, Disp: , Rfl: 0     blood glucose test strips, [BLOOD GLUCOSE TEST STRIPS] Use 1 each As Directed 6 (six) times a day. Prodigy Meter, Disp: 300 strip, Rfl: 3     cefuroxime (CEFTIN) 500 MG tablet, Take 1 tablet (500 mg) by mouth 2 times daily, Disp: 10 tablet, Rfl: 0     cetirizine (ZYRTEC) 10 MG tablet, Take 1  tablet (10 mg) by mouth daily, Disp: 90 tablet, Rfl: 0     cholecalciferol, vitamin D3, (CHOLECALCIFEROL) 1,000 unit tablet, [CHOLECALCIFEROL, VITAMIN D3, (CHOLECALCIFEROL) 1,000 UNIT TABLET] Take 3,000 Units by mouth daily. , Disp: , Rfl:      cinnamon bark (CINNAMON ORAL), [CINNAMON BARK (CINNAMON ORAL)] Take 1 each by mouth 2 (two) times a day. (Patient not taking: Reported on 11/28/2022), Disp: , Rfl:      clindamycin (CLEOCIN) 150 MG capsule, TAKE 3 CAPSULES BY MOUTH THREE TIMES DAILY FOR TEN DAYS, Disp: , Rfl:      Continuous Blood Gluc Sensor (FREESTYLE MIRANDA 2 SENSOR) MISC, 1 each every 14 days 1 each every 14 days. Change every 14 days., Disp: 6 each, Rfl: 3     cyclobenzaprine (FLEXERIL) 10 MG tablet, Take 1 tablet (10 mg) by mouth 2 times daily as needed for muscle spasms, Disp: 30 tablet, Rfl: 0     docusate sodium (DOK) 100 MG capsule, TAKE 1 CAPSULE(100 MG) BY MOUTH TWICE DAILY Strength: 100 mg, Disp: 180 capsule, Rfl: 3     gabapentin (NEURONTIN) 300 MG capsule, Take 1 capsule (300 mg) by mouth 3 times daily, Disp: 270 capsule, Rfl: 3     generic lancets (MICROLET LANCET), [GENERIC LANCETS (MICROLET LANCET)] Use 1 each As Directed 6 (six) times a day., Disp: 300 each, Rfl: 3     hydrochlorothiazide (HYDRODIURIL) 12.5 MG tablet, Take 1 tablet (12.5 mg) by mouth daily, Disp: 90 tablet, Rfl: 3     HYDROcodone-acetaminophen (NORCO) 5-325 MG tablet, Take 1 tablet by mouth every 12 hours as needed for severe pain (7-10), Disp: 30 tablet, Rfl: 0     hydrOXYzine (ATARAX) 25 MG tablet, Take 1 tablet (25 mg) by mouth every 6 hours as needed for itching or anxiety (with pain, moderate pain), Disp: 30 tablet, Rfl: 0     insulin degludec (TRESIBA FLEXTOUCH) 200 UNIT/ML pen, Inject 8 Units Subcutaneous daily, Disp: 63 mL, Rfl: 1     lisinopril (ZESTRIL) 5 MG tablet, TAKE 1 AND 1/2 TABLETS(7.5 MG) BY MOUTH DAILY Strength: 5 mg, Disp: 135 tablet, Rfl: 2     melatonin (MELATIN) 3 mg Tab tablet, Take 6 mg by mouth  "nightly as needed, Disp: , Rfl:      nitroGLYcerin (NITROSTAT) 0.3 MG sublingual tablet, Place 1 tablet (0.3 mg) under the tongue every 5 minutes as needed for chest pain, Disp: 100 tablet, Rfl: 3     omeprazole (PRILOSEC) 40 MG DR capsule, TAKE 1 CAPSULE BY MOUTH EVERY DAY, Disp: 90 capsule, Rfl: 3     ONETOUCH DELICA PLUS LANCET 33 gauge Misc, [ONETOUCH DELICA PLUS LANCET 33 GAUGE MISC] USE TO TEST BLOOD SUGAR 6 TIMES DAILY, Disp: 600 each, Rfl: 3     pen needle, diabetic (BD ULTRA-FINE ZULMA PEN NEEDLE) 32 gauge x 5/32\" Ndle, [PEN NEEDLE, DIABETIC (BD ULTRA-FINE ZULMA PEN NEEDLE) 32 GAUGE X 5/32\" NDLE] Inject 4 each under the skin daily. With Tresiba and Novolog, Disp: 120 each, Rfl: 3     rosuvastatin (CRESTOR) 10 MG tablet, TAKE 1 TABLET(10 MG) BY MOUTH DAILY, Disp: 90 tablet, Rfl: 3     Semaglutide, 1 MG/DOSE, (OZEMPIC, 1 MG/DOSE,) 4 MG/3ML SOPN, Inject 1 mg Subcutaneous every 7 days, Disp: 9 mL, Rfl: 4     Thiamine HCl (B-1) 100 MG TABS, Take 100 mg by mouth daily, Disp: 90 tablet, Rfl: 3     triamcinolone (KENALOG) 0.1 % external cream, [TRIAMCINOLONE (KENALOG) 0.1 % CREAM] Apply bid, Disp: 30 g, Rfl: 0     valACYclovir (VALTREX) 500 MG tablet, TAKE 1 TABLET(500 MG) BY MOUTH DAILY, Disp: 90 tablet, Rfl: 3     vitamin B-12 (CYANOCOBALAMIN) 2500 MCG sublingual tablet, One tab every other day, Disp: 60 tablet, Rfl: 3  No current facility-administered medications for this visit.    Facility-Administered Medications Ordered in Other Visits:      lidocaine 1 % 10 mL, 10 mL, Intradermal, Once, Eileen Castañeda MD      Allergies:     Allergies   Allergen Reactions     Latex, Natural Rubber [Latex] Hives     Penicillins Other (See Comments)     Passed out.      Doxycycline Dizziness     Eggshell Membrane [Egg Shells] Unknown     Erythromycin Itching     Ibuprofen      Ulcer      Naloxone Unknown     itching     Penicillamine Unknown     Pneumococcal 23-Valent Polysaccharide Vaccine [Pneumococcal Vaccine] Other (See " "Comments)     pneumonia     Statins-Hmg-Coa Reductase Inhibitors [Hmg-Coa-R Inhibitors] Muscle Pain (Myalgia)     Shaking with simvastatin and atorvastatin     Azithromycin Rash        Social History:  Social History     Tobacco Use     Smoking status: Former     Packs/day: 0.25     Years: 44.00     Pack years: 11.00     Types: Cigarettes     Quit date: 2017     Years since quittin.9     Smokeless tobacco: Never     Tobacco comments:     age 18 to age 62 up to 1/2 ppd   Substance Use Topics     Alcohol use: Yes     Alcohol/week: 0.0 standard drinks     Comment: Alcoholic Drinks/day: < 1 drink a week     Drug use: No        ROS:  Pertinent items are noted in HPI.    PHYSICAL EXAM:  /78 (BP Location: Left arm)   Pulse 91   Resp 16   Ht 1.651 m (5' 5\")   Wt 110.7 kg (244 lb)   BMI 40.60 kg/m    General: alert, appears stated age, cooperative and morbidly obese  Breast: In the medial aspect of the left breast is an area of some thickening consistent with a sebaceous cyst.  At this point there is no erythema, no fluctuance and [place] no sign of infection.  No other palpable masses in her breast.      Impression: This was clearly an infected sebaceous cyst.  A needle aspiration may temporize it but is not going to completely take care of it.  I gave her the option of prophylactically excising this.  The only problem with that is she is already had something done in the past and now that its been infected you end up having to take a little more tissue around the actual cyst so she is going to have a little cosmetic deficit in the breast.  The other option is just to wait until it gets infected again but if it does I would then do a more formal incision and drainage to allow the sebaceous material to drain.  She wants to wait at this point.    Plan: No further recommendations at this time as the infection seems to have resolved.  She will follow-up with us as needed if and when it becomes infected again. "  At that point I would recommend a formal incision and drainage.

## 2023-01-12 NOTE — NURSING NOTE
"Liana presents to [facility] today for a surgical consult with Dr. Weathers  regarding a breast abscess she had recently.  See imaging for details. .  RN assessment and EMR update. /78 (BP Location: Left arm)   Pulse 91   Resp 16   Ht 1.651 m (5' 5\")   Wt 110.7 kg (244 lb)   BMI 40.60 kg/m    Patient met with Dr. Weathers .  See dictation for details of visit and follow up plan. Support provided, invited calls.   RN time 15 mins.  "

## 2023-01-25 ENCOUNTER — OFFICE VISIT (OUTPATIENT)
Dept: INTERNAL MEDICINE | Facility: CLINIC | Age: 68
End: 2023-01-25
Payer: COMMERCIAL

## 2023-01-25 DIAGNOSIS — I25.83 CORONARY ARTERY DISEASE DUE TO LIPID RICH PLAQUE: Chronic | ICD-10-CM

## 2023-01-25 DIAGNOSIS — K21.9 GASTROESOPHAGEAL REFLUX DISEASE WITHOUT ESOPHAGITIS: ICD-10-CM

## 2023-01-25 DIAGNOSIS — Z96.641 STATUS POST RIGHT HIP REPLACEMENT: ICD-10-CM

## 2023-01-25 DIAGNOSIS — I10 ESSENTIAL HYPERTENSION: Chronic | ICD-10-CM

## 2023-01-25 DIAGNOSIS — M10.00 IDIOPATHIC GOUT, UNSPECIFIED CHRONICITY, UNSPECIFIED SITE: ICD-10-CM

## 2023-01-25 DIAGNOSIS — Z87.891 PERSONAL HISTORY OF TOBACCO USE, PRESENTING HAZARDS TO HEALTH: ICD-10-CM

## 2023-01-25 DIAGNOSIS — Z79.4 TYPE 2 DIABETES MELLITUS WITH HYPERGLYCEMIA, WITH LONG-TERM CURRENT USE OF INSULIN (H): ICD-10-CM

## 2023-01-25 DIAGNOSIS — I65.22 STENOSIS OF LEFT CAROTID ARTERY: Chronic | ICD-10-CM

## 2023-01-25 DIAGNOSIS — E89.0 H/O PARTIAL THYROIDECTOMY: ICD-10-CM

## 2023-01-25 DIAGNOSIS — N18.31 STAGE 3A CHRONIC KIDNEY DISEASE (H): Primary | ICD-10-CM

## 2023-01-25 DIAGNOSIS — Z96.651 STATUS POST RIGHT KNEE REPLACEMENT: ICD-10-CM

## 2023-01-25 DIAGNOSIS — I73.9 PERIPHERAL ARTERIAL DISEASE (H): ICD-10-CM

## 2023-01-25 DIAGNOSIS — M54.50 ACUTE MIDLINE LOW BACK PAIN WITHOUT SCIATICA: ICD-10-CM

## 2023-01-25 DIAGNOSIS — I25.10 CORONARY ARTERY DISEASE DUE TO LIPID RICH PLAQUE: Chronic | ICD-10-CM

## 2023-01-25 DIAGNOSIS — E11.65 TYPE 2 DIABETES MELLITUS WITH HYPERGLYCEMIA, WITH LONG-TERM CURRENT USE OF INSULIN (H): ICD-10-CM

## 2023-01-25 DIAGNOSIS — G47.33 OSA ON CPAP: Chronic | ICD-10-CM

## 2023-01-25 DIAGNOSIS — Z00.00 HEALTH MAINTENANCE EXAMINATION: ICD-10-CM

## 2023-01-25 DIAGNOSIS — E04.9 GOITER: Chronic | ICD-10-CM

## 2023-01-25 PROCEDURE — 99214 OFFICE O/P EST MOD 30 MIN: CPT | Performed by: INTERNAL MEDICINE

## 2023-01-25 RX ORDER — OMEPRAZOLE 40 MG/1
CAPSULE, DELAYED RELEASE ORAL
Qty: 90 CAPSULE | Refills: 3 | Status: SHIPPED | OUTPATIENT
Start: 2023-01-25 | End: 2023-11-16

## 2023-01-25 RX ORDER — OMEPRAZOLE 40 MG/1
CAPSULE, DELAYED RELEASE ORAL
Qty: 90 CAPSULE | Refills: 3 | Status: SHIPPED | OUTPATIENT
Start: 2023-01-25 | End: 2023-01-25

## 2023-01-25 RX ORDER — HYDROCODONE BITARTRATE AND ACETAMINOPHEN 5; 325 MG/1; MG/1
TABLET ORAL EVERY 6 HOURS
COMMUNITY
Start: 2022-09-09 | End: 2023-01-25

## 2023-01-25 RX ORDER — SEMAGLUTIDE 1.34 MG/ML
1 INJECTION, SOLUTION SUBCUTANEOUS
Qty: 9 ML | Refills: 4 | Status: SHIPPED | OUTPATIENT
Start: 2023-01-25 | End: 2023-06-05

## 2023-01-25 RX ORDER — HYDROCODONE BITARTRATE AND ACETAMINOPHEN 5; 325 MG/1; MG/1
1 TABLET ORAL EVERY 6 HOURS
Qty: 30 TABLET | Refills: 0 | Status: SHIPPED | OUTPATIENT
Start: 2023-01-25 | End: 2023-05-16

## 2023-01-25 NOTE — PATIENT INSTRUCTIONS
You are up to date in your screening  . You do qualify for lung cancer screening so a CT chest has been ordered

## 2023-01-25 NOTE — PROGRESS NOTES
Assessment & Plan     Gastroesophageal reflux disease without esophagitis    - omeprazole (PRILOSEC) 40 MG DR capsule  Dispense: 90 capsule; Refill: 3    Type 2 diabetes mellitus with hyperglycemia, with long-term current use of insulin (H)  Lab Results   Component Value Date    A1C 5.9 11/01/2022    A1C 6.1 07/06/2022    A1C 6.7 04/05/2022    A1C 7.9 10/15/2021    A1C 7.0 05/19/2021     Well-controlled diabetes, she is on an ACE inhibitor normal blood pressure aspirin and a statin.  No endorgan damage  - Semaglutide, 1 MG/DOSE, (OZEMPIC, 1 MG/DOSE,) 4 MG/3ML pen  Dispense: 9 mL; Refill: 4    Stage 3a chronic kidney disease (H)  Creatinine   Date Value Ref Range Status   11/01/2022 1.02 (H) 0.51 - 0.95 mg/dL Final     Stable kidney function test    Coronary artery disease due to lipid rich plaque  Stable     Peripheral arterial disease (H)    Very mild right lower peripheral arterial disease left side is normal  Essential hypertension      Stenosis of left carotid artery  No significant stenosis done and a CT angio of the neck    Diffuse Nontoxic Goiter  TSH   Date Value Ref Range Status   04/05/2022 4.44 0.30 - 5.00 uIU/mL Final         Health maintenance examination      RADHA on CPAP      Status post right knee replacement      Status post right hip replacement      H/O partial thyroidectomy      Idiopathic gout, unspecified chronicity, unspecified site  On allopurinol  Uric Acid   Date Value Ref Range Status   01/28/2020 6.1 2.0 - 7.5 mg/dL Final         Personal history of tobacco use, presenting hazards to health  She actually has a 30-pack-year smoker.  She qualifies for low-dose lung cancer screening.  She quit in 2016  - CT Chest Low Dose Non Contrast    Acute midline low back pain without sciatica  She uses hydrocodone very sparingly.  Reviewed .  Tramadol has not been helpful for her she was given oxycodone in the past by prior physician.  She feels 30 hydrocodone on the month are work best for her  chronic back pain.  She also takes gabapentin.  - HYDROcodone-acetaminophen (LORCET) 5-325 MG tablet  Dispense: 30 tablet; Refill: 0    Return in about 3 months (around 4/25/2023) for Routine preventive.    Subjective     Recheck Medication (PT REPORTS DESIRE FOR MEDICATION CHECK)    Has lost 35 lbs was taken off insulin    had a back injury , sees [facility] for back . He was prescribed prednisone   BS came high and went back on tresiba 16 units .   Lab Results   Component Value Date    A1C 5.9 11/01/2022    A1C 6.1 07/06/2022    A1C 6.7 04/05/2022    A1C 7.9 10/15/2021    A1C 7.0 05/19/2021     Gabapentin is for pain in feet , helps somewhat   Stress test in 2020 negative   Quit smoking 2016 2 pck a day     History of Present Illness       Back Pain:  She presents for follow up of back pain. Patient's back pain is a recurring problem.  Location of back pain:  Left lower back and left middle of back  Description of back pain: dull ache  Back pain spreads: nowhere    Since patient first noticed back pain, pain is: gradually worsening  Does back pain interfere with her job:  Yes      Diabetes:   She presents for follow up of diabetes.  She is checking home blood glucose three times daily. She checks blood glucose before and after meals.  Blood glucose is sometimes over 200 and never under 70. When her blood glucose is low, the patient is asymptomatic for confusion, blurred vision, lethargy and reports not feeling dizzy, shaky, or weak.  She is concerned about blood sugar frequently over 200. She is not experiencing numbness or burning in feet, excessive thirst, blurry vision, weight changes or redness, sores or blisters on feet.         She eats 2-3 servings of fruits and vegetables daily.She consumes 2 sweetened beverage(s) daily.She exercises with enough effort to increase her heart rate 60 or more minutes per day.  She exercises with enough effort to increase her heart rate 3 or less days per week.   She is  "taking medications regularly.          Current Outpatient Medications   Medication     acetaminophen (TYLENOL) 500 MG tablet     allopurinol (ZYLOPRIM) 100 MG tablet     aspirin (ASA) 81 MG EC tablet     blood glucose test strips     cetirizine (ZYRTEC) 10 MG tablet     cholecalciferol, vitamin D3, (CHOLECALCIFEROL) 1,000 unit tablet     Continuous Blood Gluc Sensor (FREESTYLE MIRANDA 2 SENSOR) MISC     cyclobenzaprine (FLEXERIL) 10 MG tablet     docusate sodium (DOK) 100 MG capsule     gabapentin (NEURONTIN) 300 MG capsule     generic lancets (MICROLET LANCET)     hydrochlorothiazide (HYDRODIURIL) 12.5 MG tablet     HYDROcodone-acetaminophen (LORCET) 5-325 MG tablet     hydrOXYzine (ATARAX) 25 MG tablet     insulin degludec (TRESIBA FLEXTOUCH) 200 UNIT/ML pen     lisinopril (ZESTRIL) 5 MG tablet     melatonin (MELATIN) 3 mg Tab tablet     methylPREDNISolone (MEDROL DOSEPAK) 4 MG tablet therapy pack     nitroGLYcerin (NITROSTAT) 0.3 MG sublingual tablet     omeprazole (PRILOSEC) 40 MG DR capsule     ONETOUCH DELICA PLUS LANCET 33 gauge Misc     pen needle, diabetic (BD ULTRA-FINE ZULMA PEN NEEDLE) 32 gauge x 5/32\" Ndle     rosuvastatin (CRESTOR) 10 MG tablet     Semaglutide, 1 MG/DOSE, (OZEMPIC, 1 MG/DOSE,) 4 MG/3ML pen     Thiamine HCl (B-1) 100 MG TABS     triamcinolone (KENALOG) 0.1 % external cream     valACYclovir (VALTREX) 500 MG tablet     vitamin B-12 (CYANOCOBALAMIN) 2500 MCG sublingual tablet     cinnamon bark (CINNAMON ORAL)     No current facility-administered medications for this visit.     Facility-Administered Medications Ordered in Other Visits   Medication     lidocaine 1 % 10 mL       Patient Active Problem List   Diagnosis     Diffuse Nontoxic Goiter     Type 2 diabetes mellitus (H)     Essential hypertension     Esophageal reflux     Dyslipidemia, goal LDL below 70     Hiatal hernia     Sensorineural hearing loss     Stenosis of left carotid artery     Coronary artery disease due to lipid rich " "plaque     Morbid obesity with BMI of 40.0-44.9, adult (H)     RADHA on CPAP     Low serum vitamin B12     Elevated ferritin level     Chronic kidney disease, stage 3 (H)     Knee osteoarthritis     Peripheral arterial disease (H)     Chronic low back pain, unspecified back pain laterality, unspecified whether sciatica present     Current Outpatient Medications   Medication     acetaminophen (TYLENOL) 500 MG tablet     allopurinol (ZYLOPRIM) 100 MG tablet     aspirin (ASA) 81 MG EC tablet     blood glucose test strips     cetirizine (ZYRTEC) 10 MG tablet     cholecalciferol, vitamin D3, (CHOLECALCIFEROL) 1,000 unit tablet     Continuous Blood Gluc Sensor (FREESTYLE MIRANDA 2 SENSOR) MISC     cyclobenzaprine (FLEXERIL) 10 MG tablet     docusate sodium (DOK) 100 MG capsule     gabapentin (NEURONTIN) 300 MG capsule     generic lancets (MICROLET LANCET)     hydrochlorothiazide (HYDRODIURIL) 12.5 MG tablet     HYDROcodone-acetaminophen (LORCET) 5-325 MG tablet     hydrOXYzine (ATARAX) 25 MG tablet     insulin degludec (TRESIBA FLEXTOUCH) 200 UNIT/ML pen     lisinopril (ZESTRIL) 5 MG tablet     melatonin (MELATIN) 3 mg Tab tablet     methylPREDNISolone (MEDROL DOSEPAK) 4 MG tablet therapy pack     nitroGLYcerin (NITROSTAT) 0.3 MG sublingual tablet     omeprazole (PRILOSEC) 40 MG DR capsule     ONETOUCH DELICA PLUS LANCET 33 gauge Misc     pen needle, diabetic (BD ULTRA-FINE ZULMA PEN NEEDLE) 32 gauge x 5/32\" Ndle     rosuvastatin (CRESTOR) 10 MG tablet     Semaglutide, 1 MG/DOSE, (OZEMPIC, 1 MG/DOSE,) 4 MG/3ML pen     Thiamine HCl (B-1) 100 MG TABS     triamcinolone (KENALOG) 0.1 % external cream     valACYclovir (VALTREX) 500 MG tablet     vitamin B-12 (CYANOCOBALAMIN) 2500 MCG sublingual tablet     cinnamon bark (CINNAMON ORAL)     No current facility-administered medications for this visit.     Facility-Administered Medications Ordered in Other Visits   Medication     lidocaine 1 % 10 mL       Review of Systems   "   Constitutional, HEENT, cardiovascular, pulmonary, gi and gu systems are negative, except as otherwise noted.      Objective    There were no vitals taken for this visit.  There is no height or weight on file to calculate BMI.  Physical Exam   GENERAL: healthy, alert and no distress  NECK: no adenopathy, no asymmetry, masses, or scars and thyroid normal to palpation  RESP: lungs clear to auscultation - no rales, rhonchi or wheezes  CV: regular rate and rhythm, normal S1 S2, no S3 or S4, no murmur, click or rub, no peripheral edema and peripheral pulses strong  ABDOMEN: soft, nontender, no hepatosplenomegaly, no masses and bowel sounds normal  MS: no gross musculoskeletal defects noted, no edema

## 2023-01-28 PROBLEM — I65.22 STENOSIS OF LEFT CAROTID ARTERY: Chronic | Status: RESOLVED | Noted: 2017-05-02 | Resolved: 2023-01-28

## 2023-02-15 ENCOUNTER — HOSPITAL ENCOUNTER (OUTPATIENT)
Dept: CT IMAGING | Facility: HOSPITAL | Age: 68
Discharge: HOME | End: 2023-02-15
Attending: INTERNAL MEDICINE | Admitting: INTERNAL MEDICINE
Payer: COMMERCIAL

## 2023-02-15 DIAGNOSIS — Z87.891 PERSONAL HISTORY OF TOBACCO USE, PRESENTING HAZARDS TO HEALTH: ICD-10-CM

## 2023-02-15 PROCEDURE — 71250 CT THORAX DX C-: CPT

## 2023-03-09 DIAGNOSIS — E11.65 TYPE 2 DIABETES MELLITUS WITH HYPERGLYCEMIA, WITH LONG-TERM CURRENT USE OF INSULIN (H): ICD-10-CM

## 2023-03-09 DIAGNOSIS — Z79.4 TYPE 2 DIABETES MELLITUS WITH HYPERGLYCEMIA, WITH LONG-TERM CURRENT USE OF INSULIN (H): ICD-10-CM

## 2023-03-10 NOTE — TELEPHONE ENCOUNTER
"Routing refill request to provider for review/approval because:  Pt needs new script if she needs these.     Last Written Prescription Date:  8/19/19  Last Fill Quantity: 120,  # refills: 3   Last office visit provider: 1/25/23    Requested Prescriptions   Pending Prescriptions Disp Refills     insulin pen needle (32G X 4 MM) 32G X 4 MM miscellaneous 120 each 3     Sig: Inject 4 each Subcutaneous daily       Diabetic Supplies Protocol Passed - 3/10/2023  9:53 AM        Passed - Medication is active on med list        Passed - Patient is 18 years of age or older        Passed - Recent (6 mo) or future (30 days) visit within the authorizing provider's specialty     Patient had office visit in the last 6 months or has a visit in the next 30 days with authorizing provider.  See \"Patient Info\" tab in inbasket, or \"Choose Columns\" in Meds & Orders section of the refill encounter.                 Stacy Lopez RN 03/10/23 9:59 AM  "

## 2023-04-03 ENCOUNTER — OFFICE VISIT (OUTPATIENT)
Dept: INTERNAL MEDICINE | Facility: CLINIC | Age: 68
End: 2023-04-03
Payer: COMMERCIAL

## 2023-04-03 VITALS
HEIGHT: 65 IN | DIASTOLIC BLOOD PRESSURE: 60 MMHG | BODY MASS INDEX: 42.15 KG/M2 | RESPIRATION RATE: 14 BRPM | WEIGHT: 253 LBS | TEMPERATURE: 97.7 F | OXYGEN SATURATION: 95 % | HEART RATE: 74 BPM | SYSTOLIC BLOOD PRESSURE: 118 MMHG

## 2023-04-03 DIAGNOSIS — E11.9 TYPE 2 DIABETES MELLITUS WITHOUT COMPLICATION, WITHOUT LONG-TERM CURRENT USE OF INSULIN (H): Primary | Chronic | ICD-10-CM

## 2023-04-03 DIAGNOSIS — Z23 NEED FOR SHINGLES VACCINE: ICD-10-CM

## 2023-04-03 DIAGNOSIS — D64.9 ANEMIA, UNSPECIFIED TYPE: ICD-10-CM

## 2023-04-03 PROBLEM — Z86.0100 HISTORY OF COLONIC POLYPS: Status: ACTIVE | Noted: 2019-11-25

## 2023-04-03 PROBLEM — K57.30 DIVERTICULAR DISEASE OF LARGE INTESTINE: Status: ACTIVE | Noted: 2019-11-21

## 2023-04-03 PROBLEM — K63.5 POLYP OF COLON: Status: ACTIVE | Noted: 2019-11-21

## 2023-04-03 PROBLEM — R10.13 EPIGASTRIC PAIN: Status: ACTIVE | Noted: 2022-09-09

## 2023-04-03 PROBLEM — D50.9 IRON DEFICIENCY ANEMIA: Status: ACTIVE | Noted: 2022-09-13

## 2023-04-03 PROBLEM — D12.3 BENIGN NEOPLASM OF TRANSVERSE COLON: Status: ACTIVE | Noted: 2019-11-25

## 2023-04-03 LAB
ALBUMIN SERPL BCG-MCNC: 4.5 G/DL (ref 3.5–5.2)
ALP SERPL-CCNC: 93 U/L (ref 35–104)
ALT SERPL W P-5'-P-CCNC: 27 U/L (ref 10–35)
ANION GAP SERPL CALCULATED.3IONS-SCNC: 13 MMOL/L (ref 7–15)
AST SERPL W P-5'-P-CCNC: 33 U/L (ref 10–35)
BILIRUB SERPL-MCNC: 0.5 MG/DL
BUN SERPL-MCNC: 14.1 MG/DL (ref 8–23)
CALCIUM SERPL-MCNC: 9.9 MG/DL (ref 8.8–10.2)
CHLORIDE SERPL-SCNC: 103 MMOL/L (ref 98–107)
CREAT SERPL-MCNC: 0.98 MG/DL (ref 0.51–0.95)
DEPRECATED HCO3 PLAS-SCNC: 24 MMOL/L (ref 22–29)
ERYTHROCYTE [DISTWIDTH] IN BLOOD BY AUTOMATED COUNT: 13 % (ref 10–15)
FOLATE SERPL-MCNC: 12.5 NG/ML (ref 4.6–34.8)
GFR SERPL CREATININE-BSD FRML MDRD: 64 ML/MIN/1.73M2
GLUCOSE SERPL-MCNC: 130 MG/DL (ref 70–99)
HBA1C MFR BLD: 7.1 % (ref 0–5.6)
HCT VFR BLD AUTO: 33.4 % (ref 35–47)
HGB BLD-MCNC: 11 G/DL (ref 11.7–15.7)
IRON BINDING CAPACITY (ROCHE): 287 UG/DL (ref 240–430)
IRON SATN MFR SERPL: 22 % (ref 15–46)
IRON SERPL-MCNC: 63 UG/DL (ref 37–145)
MCH RBC QN AUTO: 29.4 PG (ref 26.5–33)
MCHC RBC AUTO-ENTMCNC: 32.9 G/DL (ref 31.5–36.5)
MCV RBC AUTO: 89 FL (ref 78–100)
PLATELET # BLD AUTO: 232 10E3/UL (ref 150–450)
POTASSIUM SERPL-SCNC: 4 MMOL/L (ref 3.4–5.3)
PROT SERPL-MCNC: 7.7 G/DL (ref 6.4–8.3)
RBC # BLD AUTO: 3.74 10E6/UL (ref 3.8–5.2)
SODIUM SERPL-SCNC: 140 MMOL/L (ref 136–145)
TSH SERPL DL<=0.005 MIU/L-ACNC: 2.8 UIU/ML (ref 0.3–4.2)
WBC # BLD AUTO: 6.6 10E3/UL (ref 4–11)

## 2023-04-03 PROCEDURE — 83036 HEMOGLOBIN GLYCOSYLATED A1C: CPT | Performed by: INTERNAL MEDICINE

## 2023-04-03 PROCEDURE — 99213 OFFICE O/P EST LOW 20 MIN: CPT | Performed by: INTERNAL MEDICINE

## 2023-04-03 PROCEDURE — 83550 IRON BINDING TEST: CPT | Performed by: INTERNAL MEDICINE

## 2023-04-03 PROCEDURE — 84443 ASSAY THYROID STIM HORMONE: CPT | Performed by: INTERNAL MEDICINE

## 2023-04-03 PROCEDURE — 83921 ORGANIC ACID SINGLE QUANT: CPT | Performed by: INTERNAL MEDICINE

## 2023-04-03 PROCEDURE — 82746 ASSAY OF FOLIC ACID SERUM: CPT | Performed by: INTERNAL MEDICINE

## 2023-04-03 PROCEDURE — 85027 COMPLETE CBC AUTOMATED: CPT | Performed by: INTERNAL MEDICINE

## 2023-04-03 PROCEDURE — 83540 ASSAY OF IRON: CPT | Performed by: INTERNAL MEDICINE

## 2023-04-03 PROCEDURE — 80053 COMPREHEN METABOLIC PANEL: CPT | Performed by: INTERNAL MEDICINE

## 2023-04-03 PROCEDURE — 36415 COLL VENOUS BLD VENIPUNCTURE: CPT | Performed by: INTERNAL MEDICINE

## 2023-04-03 RX ORDER — CINNAMON BARK
POWDER (GRAM) MISCELLANEOUS
COMMUNITY

## 2023-04-03 NOTE — PROGRESS NOTES
"  Assessment & Plan     Need for shingles vaccine  Doesn't want to get any vaccines due to allergies.  Description of the cluster of blisters which are itchy on the left buttock is very typical of shingles she can go back to Valt[employer] prevention  Type 2 diabetes mellitus without complication, without long-term current use of insulin (H)  Had excellent control . But she says she is not eating right and had a few 200's so started insulin back on her own . She also takes humalog with it . im a little concerned about the way she self medicates on insulin.  She does have a 24-hour sensor so the risk of undetected hypoglycemia is low.  Recommend increasing Ozempic to the maximum dose and stopping the Humalog.  She should contact the diabetic educator and then find more balanced way to adjust her insulin if needed.  We will try and avoid insulin in her as much as possible.  - AMB Adult Diabetes Educator Referral; Future  - Hemoglobin A1c; Future  - Semaglutide, 2 MG/DOSE, (OZEMPIC) 8 MG/3ML pen; Inject 2 mg Subcutaneous every 7 days    Anemia, unspecified type  Last 6 months she is has had anemia of unknown origin.  Her iron levels have been normal.  She has had an endoscopy last year and she had a colonoscopy 2 years ago.  We will recheck hemoglobin and track B12 levels.  - CBC with platelets; Future  - Methylmalonic Acid; Future  - Folate; Future  - Iron and iron binding capacity; Future  - Comprehensive metabolic panel; Future  She has mild kidney disease.  Will monitor.  Blood pressure is in excellent control she is on a statin and aspirin.  He had her wellness visit at home they came with the and they did her whole clock test and everything.  6 months return for diabetic test.             BMI:   Estimated body mass index is 42.1 kg/m  as calculated from the following:    Height as of this encounter: 1.651 m (5' 5\").    Weight as of this encounter: 114.8 kg (253 lb).           Bela Corbin MD  [facility] " CLINIC [facility] OMID Gaines is a 67 year old, presenting for the following health issues:  Derm Problem (Better now, still want to talk about it)  tesiba 8 units and also takes humalog   Lab Results   Component Value Date    A1C 5.9 11/01/2022    A1C 6.1 07/06/2022    A1C 6.7 04/05/2022    A1C 7.9 10/15/2021    A1C 7.0 05/19/2021           4/3/2023     1:37 PM   Additional Questions   Roomed by Josephine KWAN         4/3/2023     1:37 PM   Patient Reported Additional Medications   Patient reports taking the following new medications add B-12 and B-1, cinnambark     History of Present Illness       Reason for visit:  Rash    She eats 2-3 servings of fruits and vegetables daily.She consumes 2 sweetened beverage(s) daily.She exercises with enough effort to increase her heart rate 20 to 29 minutes per day.  She exercises with enough effort to increase her heart rate 3 or less days per week.   She is taking medications regularly.       Patient Active Problem List   Diagnosis     Diffuse Nontoxic Goiter     Type 2 diabetes mellitus (H)     Essential hypertension     Esophageal reflux     Dyslipidemia, goal LDL below 70     Hiatal hernia     Sensorineural hearing loss     Coronary artery disease due to lipid rich plaque     Morbid obesity with BMI of 40.0-44.9, adult (H)     RADHA on CPAP     Low serum vitamin B12     Elevated ferritin level     Chronic kidney disease, stage 3 (H)     Knee osteoarthritis     Peripheral arterial disease (H)     Chronic low back pain, unspecified back pain laterality, unspecified whether sciatica present     Benign neoplasm of transverse colon     Diverticular disease of large intestine     Dysphagia     Epigastric pain     History of colonic polyps     Iron deficiency anemia     Polyp of colon     Current Outpatient Medications   Medication     acetaminophen (TYLENOL) 500 MG tablet     allopurinol (ZYLOPRIM) 100 MG tablet     aspirin (ASA) 81 MG EC tablet     blood glucose test  "strips     cetirizine (ZYRTEC) 10 MG tablet     cholecalciferol, vitamin D3, (CHOLECALCIFEROL) 1,000 unit tablet     Cinnamon Bark POWD     Continuous Blood Gluc Sensor (FREESTYLE MIRANDA 2 SENSOR) MISC     cyclobenzaprine (FLEXERIL) 10 MG tablet     docusate sodium (DOK) 100 MG capsule     gabapentin (NEURONTIN) 300 MG capsule     generic lancets (MICROLET LANCET)     hydrochlorothiazide (HYDRODIURIL) 12.5 MG tablet     HYDROcodone-acetaminophen (LORCET) 5-325 MG tablet     hydrOXYzine (ATARAX) 25 MG tablet     insulin degludec (TRESIBA FLEXTOUCH) 200 UNIT/ML pen     insulin pen needle (32G X 4 MM) 32G X 4 MM miscellaneous     lisinopril (ZESTRIL) 5 MG tablet     melatonin (MELATIN) 3 mg Tab tablet     nitroGLYcerin (NITROSTAT) 0.3 MG sublingual tablet     omeprazole (PRILOSEC) 40 MG DR capsule     ONETOUCH DELICA PLUS LANCET 33 gauge Misc     rosuvastatin (CRESTOR) 10 MG tablet     Semaglutide, 1 MG/DOSE, (OZEMPIC, 1 MG/DOSE,) 4 MG/3ML pen     Semaglutide, 2 MG/DOSE, (OZEMPIC) 8 MG/3ML pen     Thiamine HCl (B-1) 100 MG TABS     triamcinolone (KENALOG) 0.1 % external cream     valACYclovir (VALTREX) 500 MG tablet     vitamin B-12 (CYANOCOBALAMIN) 2500 MCG sublingual tablet     No current facility-administered medications for this visit.     Facility-Administered Medications Ordered in Other Visits   Medication     lidocaine 1 % 10 mL             Review of Systems   Constitutional, HEENT, cardiovascular, pulmonary, gi and gu systems are negative, except as otherwise noted.      Objective    /60   Pulse 74   Temp 97.7  F (36.5  C) (Tympanic)   Resp 14   Ht 1.651 m (5' 5\")   Wt 114.8 kg (253 lb)   SpO2 95%   BMI 42.10 kg/m    Body mass index is 42.1 kg/m .  Physical Exam   GENERAL: healthy, alert and no distress  NECK: no adenopathy, no asymmetry, masses, or scars and thyroid normal to palpation  RESP: lungs clear to auscultation - no rales, rhonchi or wheezes  CV: regular rate and rhythm, normal S1 S2, no " S3 or S4, no murmur, click or rub, no peripheral edema and peripheral pulses strong

## 2023-04-05 LAB — METHYLMALONATE SERPL-SCNC: 0.15 UMOL/L (ref 0–0.4)

## 2023-05-01 ENCOUNTER — TELEPHONE (OUTPATIENT)
Dept: INTERNAL MEDICINE | Facility: CLINIC | Age: 68
End: 2023-05-01
Payer: COMMERCIAL

## 2023-05-01 ENCOUNTER — NURSE TRIAGE (OUTPATIENT)
Dept: NURSING | Facility: CLINIC | Age: 68
End: 2023-05-01
Payer: COMMERCIAL

## 2023-05-01 DIAGNOSIS — K04.7 DENTAL INFECTION: Primary | ICD-10-CM

## 2023-05-01 NOTE — TELEPHONE ENCOUNTER
Pt came into clinic today to further discuss antibiotic concerns.     Dentist will not pull tooth unless she is on antibiotic for a few days. Pt was then referred to oral surgeon.     She reports having many allergies to antibiotics. Per chart review/patient contact with her pharmacy: Dr. Fuentes prescribed cefuroxime in December 2022 for a breast infection. Pt does not recall having a reaction to this medication.     Prescription should go to:     [id number] - [place], [place] - [address] AT [facility] [address]    Pt would like phone call / voicemail (okay to leave detailed) with update on antibiotic prescription.   Contact Information   [phone]

## 2023-05-01 NOTE — TELEPHONE ENCOUNTER
General Call      Reason for Call:  Antibiotic     What are your questions or concerns:  Patient is currently at dentist place right now and need her teeth pull out. Dentist place need to know what antibiotic provider suggest as patient is allergic to most antibiotic. Please call patient back ASAP.     Date of last appointment with provider: 4/3/2023    Could we send this information to you in [facility] or would you prefer to receive a phone call?:   Patient would prefer a phone call   Okay to leave a detailed message?: Yes at Cell number on file:    Telephone Information:   Mobile [phone]

## 2023-05-02 RX ORDER — CEPHALEXIN 500 MG/1
500 CAPSULE ORAL 3 TIMES DAILY
Qty: 21 CAPSULE | Refills: 0 | Status: SHIPPED | OUTPATIENT
Start: 2023-05-02 | End: 2023-07-03

## 2023-05-02 NOTE — TELEPHONE ENCOUNTER
Call from patient who is asking where she can find the pre-dental visit questionnaire for her new dental clinic in [facility].    After some questioning, writer informed her that she has to get her MyChart activated through the dental clinic.  Advised her [place] MyChart is active but she will not be able to see her dental MyChart. Gave patient the web site to [employer] for her to use on her laptop.    Patient verbalized understanding.      Ariana Noble RN, BSN  Triage Nurse Advisor        Reason for Disposition    Health Information question, no triage required and triager able to answer question    Protocols used: INFORMATION ONLY CALL - NO TRIAGE-A-

## 2023-05-09 ENCOUNTER — TELEPHONE (OUTPATIENT)
Dept: INTERNAL MEDICINE | Facility: CLINIC | Age: 68
End: 2023-05-09
Payer: COMMERCIAL

## 2023-05-09 NOTE — TELEPHONE ENCOUNTER
Spoke with Liana Briceño today in regards to her ozempic. Last filled on 4/9/23 for 28 day supply and due for refill. Per Liana Briceño, she took her dose yesterday and is due for a refill. Is interested in a 90 day supply instead of 28 day supply.     Patient was agreeable to a 90 day prescription so her pharmacy was contacted (appears already sent as a 90 day).     Thank you,    Brina Flores, PharmD, [facility]

## 2023-05-15 ENCOUNTER — MYC MEDICAL ADVICE (OUTPATIENT)
Dept: INTERNAL MEDICINE | Facility: CLINIC | Age: 68
End: 2023-05-15

## 2023-05-15 DIAGNOSIS — M54.50 ACUTE MIDLINE LOW BACK PAIN WITHOUT SCIATICA: ICD-10-CM

## 2023-05-16 RX ORDER — HYDROCODONE BITARTRATE AND ACETAMINOPHEN 5; 325 MG/1; MG/1
1 TABLET ORAL EVERY 6 HOURS
Qty: 30 TABLET | Refills: 0 | Status: SHIPPED | OUTPATIENT
Start: 2023-05-16 | End: 2023-07-03

## 2023-05-17 ENCOUNTER — MYC MEDICAL ADVICE (OUTPATIENT)
Dept: INTERNAL MEDICINE | Facility: CLINIC | Age: 68
End: 2023-05-17
Payer: COMMERCIAL

## 2023-05-17 DIAGNOSIS — M54.50 CHRONIC LOW BACK PAIN, UNSPECIFIED BACK PAIN LATERALITY, UNSPECIFIED WHETHER SCIATICA PRESENT: ICD-10-CM

## 2023-05-17 DIAGNOSIS — G89.29 CHRONIC LOW BACK PAIN, UNSPECIFIED BACK PAIN LATERALITY, UNSPECIFIED WHETHER SCIATICA PRESENT: ICD-10-CM

## 2023-05-18 RX ORDER — CYCLOBENZAPRINE HCL 10 MG
10 TABLET ORAL 2 TIMES DAILY PRN
Qty: 30 TABLET | Refills: 0 | Status: SHIPPED | OUTPATIENT
Start: 2023-05-18 | End: 2024-04-04

## 2023-06-02 ENCOUNTER — TELEPHONE (OUTPATIENT)
Dept: EDUCATION SERVICES | Facility: CLINIC | Age: 68
End: 2023-06-02

## 2023-06-02 ENCOUNTER — ALLIED HEALTH/NURSE VISIT (OUTPATIENT)
Dept: EDUCATION SERVICES | Facility: CLINIC | Age: 68
End: 2023-06-02
Attending: INTERNAL MEDICINE
Payer: COMMERCIAL

## 2023-06-02 DIAGNOSIS — E11.9 TYPE 2 DIABETES MELLITUS WITHOUT COMPLICATION, WITHOUT LONG-TERM CURRENT USE OF INSULIN (H): Chronic | ICD-10-CM

## 2023-06-02 PROCEDURE — G0108 DIAB MANAGE TRN  PER INDIV: HCPCS | Mod: AE

## 2023-06-02 NOTE — LETTER
"    6/2/2023         RE: Liana Briceño  [address] [place] [address]        Dear Colleague,    Thank you for referring your patient, Liana Briceño, to the [facility]. Please see a copy of my visit note below.    Diabetes Self-Management Education & Support    Presents for: Individual review    Type of Service: In Person Visit    REPORTS:                 ASSESSMENT:  Patient seen today for for diabetes education.  Last seen in 2017.  Patient reports she was seeing SIERRA Martinez and that she was very helpful.  Connected [facility] syndications to clinic and reviewed reviewed reports with patient.  Patient is using the [employer] mitesh on her phone.  She has very good control.  Average blood sugar 136 at 93% in target range.   Patient reports she has been taking the 2.0 mg/week of Ozempic for about the last month.  She reports she is tolerating well with no side effects.    Patient reports she has not been taking any Humalog since she increased the Ozempic.  However she has been taking 4 units of Tresiba per day.  Patient states 1 days she hopes she can discontinue the Tresiba, advised patient she may discontinue the Tresiba now.    Reports her appetite is okay.  She is trying to be mindful of eating healthy and watching \"bad foods.\"  She states she is trying to get some exercise every day and has been working on this.    Patient's most recent   Lab Results   Component Value Date    A1C 7.1 04/03/2023     is not meeting goal of <7.0    Diabetes knowledge and skills assessment:   Patient is knowledgeable in diabetes management concepts related to: Healthy Eating, Being Active, Monitoring, Taking Medication, Problem Solving, Reducing Risks and Healthy Coping    Continue education with the following diabetes management concepts: Healthy Eating, Being Active, Monitoring, Taking Medication, Problem Solving, Reducing Risks and Healthy Coping    Based on learning assessment above, most appropriate setting for further " "diabetes education would be: Individual setting.      PLAN    Overall doing really well.  May discontinue Tresiba.  Continue to work on daily exercise and activity.  Try to scan kiah every 8 hours at minimum, did not lose data.  Scan when waking up midday and at bedtime.    Follow-up: 3 months as scheduled    See Care Plan for co-developed, patient-state behavior change goals.  AVS provided for patient today.        SUBJECTIVE/OBJECTIVE:  Presents for: Individual review  Accompanied by: Self  Diabetes education in the past 24mo: No  Diabetes type: Type 2  Cultural Influences/Ethnic Background:  Not  or     Diabetes Symptoms & Complications:          Patient Problem List and Family Medical History reviewed for relevant medical history, current medical status, and diabetes risk factors.    Vitals:  There were no vitals taken for this visit.  Estimated body mass index is 42.1 kg/m  as calculated from the following:    Height as of 4/3/23: 1.651 m (5' 5\").    Weight as of 4/3/23: 114.8 kg (253 lb).   Last 3 BP:   BP Readings from Last 3 Encounters:   04/03/23 118/60   01/12/23 132/78   12/15/22 137/71       History   Smoking Status     Former     Packs/day: 0.25     Years: 44.00     Types: Cigarettes     Quit date: 1/20/2017   Smokeless Tobacco     Never       Labs:  Lab Results   Component Value Date    A1C 7.1 04/03/2023     Lab Results   Component Value Date     04/03/2023     04/10/2022     04/10/2022     Lab Results   Component Value Date    LDL 33 07/06/2022    LDL 59 07/21/2020     Direct Measure HDL   Date Value Ref Range Status   07/06/2022 29 (L) >=50 mg/dL Final   ]  GFR Estimate   Date Value Ref Range Status   04/03/2023 64 >60 mL/min/1.73m2 Final     Comment:     eGFR calculated using 2021 CKD-EPI equation.   06/28/2021 56 (L) >60 mL/min/[1.73_m2] Final     GFR Estimate If Black   Date Value Ref Range Status   06/28/2021 68 >60 mL/min/[1.73_m2] Final     Lab Results "   Component Value Date    CR 0.98 04/03/2023     No results found for: MICROALBUMIN    Healthy Eating:  Healthy Eating Assessed Today: Yes    Being Active:  Being Active Assessed Today: Yes  Exercise:: Yes    Monitoring:  Monitoring Assessed Today: Yes  Blood Glucose Meter: CGM      Taking Medications:  Diabetes Medication(s)     Insulin       insulin degludec (TRESIBA FLEXTOUCH) 200 UNIT/ML pen    Inject 8 Units Subcutaneous daily    Incretin Mimetic Agents       Semaglutide, 1 MG/DOSE, (OZEMPIC, 1 MG/DOSE,) 4 MG/3ML pen    Inject 1 mg Subcutaneous every 7 days     Semaglutide, 2 MG/DOSE, (OZEMPIC) 8 MG/3ML pen    Inject 2 mg Subcutaneous every 7 days          Taking Medication Assessed Today: Yes  Problems taking diabetes medications regularly?: Yes  Diabetes medication side effects?: No    Problem Solving:  Problem Solving Assessed Today: Yes              Reducing Risks:  Reducing Risks Assessed Today: Yes    Healthy Coping:  Healthy Coping Assessed Today: Yes  Emotional response to diabetes: Ready to learn  Stage of change: MAINTENANCE (Working to maintain change, with risk of relapse)  Patient Activation Measure Survey Score:       View : No data to display.                  Care Plan and Education Provided:  Care Plan: Diabetes   Updates made by Samira Joseph RN since 6/2/2023 12:00 AM      Problem: HbA1C Not In Goal       Goal: Establish Regular Follow-Ups with PCP       Task: Discuss with PCP the recommended timing for patient's next follow up visit(s)    Responsible User: Samira Joseph RN      Task: Discuss schedule for PCP visits with patient    Responsible User: Samira Joseph RN      Goal: Get HbA1C Level in Goal       Task: Educate patient on diabetes education self-management topics    Responsible User: Samira Joseph RN      Task: Educate patient on benefits of regular glucose monitoring    Responsible User: Samira Joseph RN      Task: Refer patient to appropriate extended care team member, as  needed (Medication Therapy Management, Behavioral Health, Physical Therapy, etc.)    Responsible User: Samira Joseph RN      Task: Discuss diabetes treatment plan with patient Completed 6/2/2023   Responsible User: Samira Joseph RN      Problem: Diabetes Self-Management Education Needed to Optimize Self-Care Behaviors       Goal: Understand diabetes pathophysiology and disease progression       Task: Provide education on diabetes pathophysiology and disease progression specfic to patient's diabetes type Completed 6/2/2023   Responsible User: Samira Joseph RN      Goal: Healthy Eating - follow a healthy eating pattern for diabetes       Task: Provide education on portion control and consistency in amount, composition and timing of food intake Completed 6/2/2023   Responsible User: Samira Joseph RN      Task: Provide education on managing carbohydrate intake (carbohydrate counting, plate planning method, etc.) Completed 6/2/2023   Responsible User: Samira Joseph RN      Task: Provide education on weight management Completed 6/2/2023   Responsible User: Samira Joseph RN      Task: Provide education on heart healthy eating    Responsible User: Samira Joseph RN      Task: Provide education on eating out    Responsible User: Samira Joseph RN      Task: Develop individualized healthy eating plan with patient    Responsible User: Samira Joseph RN      Goal: Being Active - get regular physical activity, working up to at least 150 minutes per week    Note:    Continue to work on daily activity      Task: Provide education on relationship of activity to glucose and precautions to take if at risk for low glucose Completed 6/2/2023   Responsible User: Samira Joseph RN      Task: Discuss barriers to physical activity with patient Completed 6/2/2023   Responsible User: Samira Joseph RN      Task: Develop physical activity plan with patient    Responsible User: Samira Joseph RN      Task: Explore community  resources including walking groups, assistance programs, and home videos    Responsible User: Samira Joseph RN      Goal: Monitoring - monitor glucose and ketones as directed    Note:    Monitor BG daily with [employer] mitesh. Scan at least every 8 hours      Task: Provide education on blood glucose monitoring (purpose, proper technique, frequency, glucose targets, interpreting results, when to use glucose control solution, sharps disposal)    Responsible User: Samira Joseph RN      Task: Provide education on continuous glucose monitoring (sensor placement, use of mitesh or /reader, understanding glucose trends, alerts and alarms, differences between sensor glucose and blood glucose) Completed 6/2/2023   Responsible User: Samira Joseph RN      Task: Provide education on ketone monitoring (when to monitor, frequency, etc.)    Responsible User: Samira Joseph RN      Goal: Taking Medication - patient is consistently taking medications as directed    Note:    Take medication as prescribed. Stop all insulin      Task: Provide education on action of prescribed medication, including when to take and possible side effects    Responsible User: Samira Joseph RN      Task: Provide education on insulin and injectable diabetes medications, including administration, storage, site selection and rotation for injection sites Completed 6/2/2023   Responsible User: Samira Joseph RN      Task: Discuss barriers to medication adherence with patient and provide management technique ideas as appropriate    Responsible User: Samira Joseph RN      Task: Provide education on frequency and refill details of medications    Responsible User: Samira Joseph RN      Goal: Problem Solving - know how to prevent and manage short-term diabetes complications       Task: Provide education on high blood glucose - causes, signs/symptoms, prevention and treatment Completed 6/2/2023   Responsible User: Samira Joseph RN      Task: Provide  education on low blood glucose - causes, signs/symptoms, prevention, treatment, carrying a carbohydrate source at all times, and medical identification Completed 6/2/2023   Responsible User: Samira Joseph RN      Task: Provide education on safe travel with diabetes    Responsible User: Samira Joseph RN      Task: Provide education on how to care for diabetes on sick days    Responsible User: Samira Joseph RN      Task: Provide education on when to call a health care provider    Responsible User: Samira Joseph RN      Goal: Reducing Risks - know how to prevent and treat long-term diabetes complications       Task: Provide education on major complications of diabetes, prevention, early diagnostic measures and treatment of complications    Responsible User: Samira Joseph RN      Task: Provide education on recommended care for dental, eye and foot health    Responsible User: Samira Joseph RN      Task: Provide education on Hemoglobin A1c - goals and relationship to blood glucose levels Completed 6/2/2023   Responsible User: Samira Joseph RN      Task: Provide education on recommendations for heart health - lipid levels and goals, blood pressure and goals, and aspirin therapy, if indicated    Responsible User: Samira Joseph RN      Task: Provide education on tobacco cessation    Responsible User: Samira Joseph RN      Goal: Healthy Coping - use available resources to cope with the challenges of managing diabetes       Task: Discuss recognizing feelings about having diabetes Completed 6/2/2023   Responsible User: Samira Joseph RN      Task: Provide education on the benefits of making appropriate lifestyle changes    Responsible User: Samira Joseph RN      Task: Provide education on benefits of utilizing support systems    Responsible User: Samira Joseph RN      Task: Discuss methods for coping with stress    Responsible User: Samira Joseph RN      Task: Provide education on when to seek  professional counseling    Responsible User: Samira Joseph RN            Time Spent: 60 minutes  Encounter Type: Individual    Any diabetes medication dose changes were made via the CDE Protocol per the patient's referring provider. A copy of this encounter was shared with the provider.

## 2023-06-02 NOTE — PROGRESS NOTES
"Diabetes Self-Management Education & Support    Presents for: Individual review    Type of Service: In Person Visit    REPORTS:                 ASSESSMENT:  Patient seen today for for diabetes education.  Last seen in 2017.  Patient reports she was seeing SIERRA Martinez and that she was very helpful.  Connected kiah to clinic and reviewed reviewed reports with patient.  Patient is using the [email or web address] mitesh on her phone.  She has very good control.  Average blood sugar 136 at 93% in target range.   Patient reports she has been taking the 2.0 mg/week of Ozempic for about the last month.  She reports she is tolerating well with no side effects.    Patient reports she has not been taking any Humalog since she increased the Ozempic.  However she has been taking 4 units of Tresiba per day.  Patient states 1 days she hopes she can discontinue the Tresiba, advised patient she may discontinue the Tresiba now.    Reports her appetite is okay.  She is trying to be mindful of eating healthy and watching \"bad foods.\"  She states she is trying to get some exercise every day and has been working on this.    Patient's most recent   Lab Results   Component Value Date    A1C 7.1 04/03/2023     is not meeting goal of <7.0    Diabetes knowledge and skills assessment:   Patient is knowledgeable in diabetes management concepts related to: Healthy Eating, Being Active, Monitoring, Taking Medication, Problem Solving, Reducing Risks and Healthy Coping    Continue education with the following diabetes management concepts: Healthy Eating, Being Active, Monitoring, Taking Medication, Problem Solving, Reducing Risks and Healthy Coping    Based on learning assessment above, most appropriate setting for further diabetes education would be: Individual setting.      PLAN    Overall doing really well.  May discontinue Tresiba.  Continue to work on daily exercise and activity.  Try to scan kiah every 8 hours at minimum, did not lose data.  Scan when waking " "up midday and at bedtime.    Follow-up: 3 months as scheduled    See Care Plan for co-developed, patient-state behavior change goals.  AVS provided for patient today.        SUBJECTIVE/OBJECTIVE:  Presents for: Individual review  Accompanied by: Self  Diabetes education in the past 24mo: No  Diabetes type: Type 2  Cultural Influences/Ethnic Background:  Not  or     Diabetes Symptoms & Complications:          Patient Problem List and Family Medical History reviewed for relevant medical history, current medical status, and diabetes risk factors.    Vitals:  There were no vitals taken for this visit.  Estimated body mass index is 42.1 kg/m  as calculated from the following:    Height as of 4/3/23: 1.651 m (5' 5\").    Weight as of 4/3/23: 114.8 kg (253 lb).   Last 3 BP:   BP Readings from Last 3 Encounters:   04/03/23 118/60   01/12/23 132/78   12/15/22 137/71       History   Smoking Status     Former     Packs/day: 0.25     Years: 44.00     Types: Cigarettes     Quit date: 1/20/2017   Smokeless Tobacco     Never       Labs:  Lab Results   Component Value Date    A1C 7.1 04/03/2023     Lab Results   Component Value Date     04/03/2023     04/10/2022     04/10/2022     Lab Results   Component Value Date    LDL 33 07/06/2022    LDL 59 07/21/2020     Direct Measure HDL   Date Value Ref Range Status   07/06/2022 29 (L) >=50 mg/dL Final   ]  GFR Estimate   Date Value Ref Range Status   04/03/2023 64 >60 mL/min/1.73m2 Final     Comment:     eGFR calculated using 2021 CKD-EPI equation.   06/28/2021 56 (L) >60 mL/min/[1.73_m2] Final     GFR Estimate If Black   Date Value Ref Range Status   06/28/2021 68 >60 mL/min/[1.73_m2] Final     Lab Results   Component Value Date    CR 0.98 04/03/2023     No results found for: MICROALBUMIN    Healthy Eating:  Healthy Eating Assessed Today: Yes    Being Active:  Being Active Assessed Today: Yes  Exercise:: Yes    Monitoring:  Monitoring Assessed Today: " Yes  Blood Glucose Meter: CGM      Taking Medications:  Diabetes Medication(s)     Insulin       insulin degludec (TRESIBA FLEXTOUCH) 200 UNIT/ML pen    Inject 8 Units Subcutaneous daily    Incretin Mimetic Agents       Semaglutide, 1 MG/DOSE, (OZEMPIC, 1 MG/DOSE,) 4 MG/3ML pen    Inject 1 mg Subcutaneous every 7 days     Semaglutide, 2 MG/DOSE, (OZEMPIC) 8 MG/3ML pen    Inject 2 mg Subcutaneous every 7 days          Taking Medication Assessed Today: Yes  Problems taking diabetes medications regularly?: Yes  Diabetes medication side effects?: No    Problem Solving:  Problem Solving Assessed Today: Yes              Reducing Risks:  Reducing Risks Assessed Today: Yes    Healthy Coping:  Healthy Coping Assessed Today: Yes  Emotional response to diabetes: Ready to learn  Stage of change: MAINTENANCE (Working to maintain change, with risk of relapse)  Patient Activation Measure Survey Score:       View : No data to display.                  Care Plan and Education Provided:  Care Plan: Diabetes   Updates made by Samira Joseph RN since 6/2/2023 12:00 AM      Problem: HbA1C Not In Goal       Goal: Establish Regular Follow-Ups with PCP       Task: Discuss with PCP the recommended timing for patient's next follow up visit(s)    Responsible User: Samira Joseph RN      Task: Discuss schedule for PCP visits with patient    Responsible User: Samira Joseph RN      Goal: Get HbA1C Level in Goal       Task: Educate patient on diabetes education self-management topics    Responsible User: Samira Joseph RN      Task: Educate patient on benefits of regular glucose monitoring    Responsible User: Samira Joseph RN      Task: Refer patient to appropriate extended care team member, as needed (Medication Therapy Management, Behavioral Health, Physical Therapy, etc.)    Responsible User: Samira Joseph RN      Task: Discuss diabetes treatment plan with patient Completed 6/2/2023   Responsible User: Samira Joseph RN       Problem: Diabetes Self-Management Education Needed to Optimize Self-Care Behaviors       Goal: Understand diabetes pathophysiology and disease progression       Task: Provide education on diabetes pathophysiology and disease progression specfic to patient's diabetes type Completed 6/2/2023   Responsible User: Samira Joseph RN      Goal: Healthy Eating - follow a healthy eating pattern for diabetes       Task: Provide education on portion control and consistency in amount, composition and timing of food intake Completed 6/2/2023   Responsible User: Samira Joseph RN      Task: Provide education on managing carbohydrate intake (carbohydrate counting, plate planning method, etc.) Completed 6/2/2023   Responsible User: Samira Joseph RN      Task: Provide education on weight management Completed 6/2/2023   Responsible User: Samira Joseph RN      Task: Provide education on heart healthy eating    Responsible User: Samira Joseph RN      Task: Provide education on eating out    Responsible User: Samira Joseph RN      Task: Develop individualized healthy eating plan with patient    Responsible User: Samira Joseph RN      Goal: Being Active - get regular physical activity, working up to at least 150 minutes per week    Note:    Continue to work on daily activity      Task: Provide education on relationship of activity to glucose and precautions to take if at risk for low glucose Completed 6/2/2023   Responsible User: Samira Joseph RN      Task: Discuss barriers to physical activity with patient Completed 6/2/2023   Responsible User: Samira Joseph RN      Task: Develop physical activity plan with patient    Responsible User: Samira Joseph RN      Task: Explore community resources including walking groups, assistance programs, and home videos    Responsible User: Samira Joseph RN      Goal: Monitoring - monitor glucose and ketones as directed    Note:    Monitor BG daily with [employer] mitesh. Scan at least  every 8 hours      Task: Provide education on blood glucose monitoring (purpose, proper technique, frequency, glucose targets, interpreting results, when to use glucose control solution, sharps disposal)    Responsible User: Samira Joseph RN      Task: Provide education on continuous glucose monitoring (sensor placement, use of mitesh or /reader, understanding glucose trends, alerts and alarms, differences between sensor glucose and blood glucose) Completed 6/2/2023   Responsible User: Samira Joseph RN      Task: Provide education on ketone monitoring (when to monitor, frequency, etc.)    Responsible User: Samira Joseph RN      Goal: Taking Medication - patient is consistently taking medications as directed    Note:    Take medication as prescribed. Stop all insulin      Task: Provide education on action of prescribed medication, including when to take and possible side effects    Responsible User: Samira Joseph RN      Task: Provide education on insulin and injectable diabetes medications, including administration, storage, site selection and rotation for injection sites Completed 6/2/2023   Responsible User: Samira Joseph RN      Task: Discuss barriers to medication adherence with patient and provide management technique ideas as appropriate    Responsible User: Samira Joseph RN      Task: Provide education on frequency and refill details of medications    Responsible User: Samira Joseph RN      Goal: Problem Solving - know how to prevent and manage short-term diabetes complications       Task: Provide education on high blood glucose - causes, signs/symptoms, prevention and treatment Completed 6/2/2023   Responsible User: Samira Joseph RN      Task: Provide education on low blood glucose - causes, signs/symptoms, prevention, treatment, carrying a carbohydrate source at all times, and medical identification Completed 6/2/2023   Responsible User: Samira Joseph RN      Task: Provide education  on safe travel with diabetes    Responsible User: Samira Joseph RN      Task: Provide education on how to care for diabetes on sick days    Responsible User: Samira Joseph RN      Task: Provide education on when to call a health care provider    Responsible User: Samira Joseph RN      Goal: Reducing Risks - know how to prevent and treat long-term diabetes complications       Task: Provide education on major complications of diabetes, prevention, early diagnostic measures and treatment of complications    Responsible User: Samira Joseph RN      Task: Provide education on recommended care for dental, eye and foot health    Responsible User: Samira Joseph RN      Task: Provide education on Hemoglobin A1c - goals and relationship to blood glucose levels Completed 6/2/2023   Responsible User: Samira Joseph RN      Task: Provide education on recommendations for heart health - lipid levels and goals, blood pressure and goals, and aspirin therapy, if indicated    Responsible User: Samira Joseph RN      Task: Provide education on tobacco cessation    Responsible User: Samira Joseph RN      Goal: Healthy Coping - use available resources to cope with the challenges of managing diabetes       Task: Discuss recognizing feelings about having diabetes Completed 6/2/2023   Responsible User: Samira Joseph RN      Task: Provide education on the benefits of making appropriate lifestyle changes    Responsible User: Samira Joseph RN      Task: Provide education on benefits of utilizing support systems    Responsible User: Samira Joseph RN      Task: Discuss methods for coping with stress    Responsible User: Samira Joseph RN      Task: Provide education on when to seek professional counseling    Responsible User: Samira Joseph RN            Time Spent: 60 minutes  Encounter Type: Individual    Any diabetes medication dose changes were made via the CDE Protocol per the patient's referring provider. A copy of  this encounter was shared with the provider.     No

## 2023-06-05 ENCOUNTER — TELEPHONE (OUTPATIENT)
Dept: INTERNAL MEDICINE | Facility: CLINIC | Age: 68
End: 2023-06-05

## 2023-06-05 ENCOUNTER — NURSE TRIAGE (OUTPATIENT)
Dept: NURSING | Facility: CLINIC | Age: 68
End: 2023-06-05

## 2023-06-05 ENCOUNTER — OFFICE VISIT (OUTPATIENT)
Dept: INTERNAL MEDICINE | Facility: CLINIC | Age: 68
End: 2023-06-05
Payer: COMMERCIAL

## 2023-06-05 ENCOUNTER — HOSPITAL ENCOUNTER (OUTPATIENT)
Dept: CT IMAGING | Facility: HOSPITAL | Age: 68
Discharge: HOME | End: 2023-06-05
Attending: INTERNAL MEDICINE | Admitting: INTERNAL MEDICINE
Payer: COMMERCIAL

## 2023-06-05 VITALS
SYSTOLIC BLOOD PRESSURE: 136 MMHG | TEMPERATURE: 98.3 F | HEIGHT: 65 IN | DIASTOLIC BLOOD PRESSURE: 72 MMHG | OXYGEN SATURATION: 97 % | BODY MASS INDEX: 41.87 KG/M2 | RESPIRATION RATE: 16 BRPM | HEART RATE: 79 BPM | WEIGHT: 251.3 LBS

## 2023-06-05 DIAGNOSIS — K57.32 DIVERTICULITIS OF COLON: Primary | ICD-10-CM

## 2023-06-05 DIAGNOSIS — R10.32 ABDOMINAL PAIN, LEFT LOWER QUADRANT: ICD-10-CM

## 2023-06-05 DIAGNOSIS — R10.12 ABDOMINAL PAIN, LEFT UPPER QUADRANT: ICD-10-CM

## 2023-06-05 DIAGNOSIS — R10.32 ABDOMINAL PAIN, LEFT LOWER QUADRANT: Primary | ICD-10-CM

## 2023-06-05 DIAGNOSIS — E11.9 TYPE 2 DIABETES MELLITUS WITHOUT COMPLICATION, WITHOUT LONG-TERM CURRENT USE OF INSULIN (H): Chronic | ICD-10-CM

## 2023-06-05 LAB
ALBUMIN SERPL BCG-MCNC: 4.5 G/DL (ref 3.5–5.2)
ALBUMIN UR-MCNC: 30 MG/DL
ALP SERPL-CCNC: 91 U/L (ref 35–104)
ALT SERPL W P-5'-P-CCNC: 24 U/L (ref 10–35)
ANION GAP SERPL CALCULATED.3IONS-SCNC: 12 MMOL/L (ref 7–15)
APPEARANCE UR: CLEAR
AST SERPL W P-5'-P-CCNC: 26 U/L (ref 10–35)
BACTERIA #/AREA URNS HPF: ABNORMAL /HPF
BASOPHILS # BLD AUTO: 0 10E3/UL (ref 0–0.2)
BASOPHILS NFR BLD AUTO: 0 %
BILIRUB SERPL-MCNC: 0.3 MG/DL
BILIRUB UR QL STRIP: NEGATIVE
BUN SERPL-MCNC: 21.7 MG/DL (ref 8–23)
CALCIUM SERPL-MCNC: 10.5 MG/DL (ref 8.8–10.2)
CHLORIDE SERPL-SCNC: 103 MMOL/L (ref 98–107)
COLOR UR AUTO: YELLOW
CREAT BLD-MCNC: 1.1 MG/DL (ref 0.6–1.1)
CREAT SERPL-MCNC: 0.97 MG/DL (ref 0.51–0.95)
DEPRECATED HCO3 PLAS-SCNC: 24 MMOL/L (ref 22–29)
EOSINOPHIL # BLD AUTO: 0.2 10E3/UL (ref 0–0.7)
EOSINOPHIL NFR BLD AUTO: 2 %
ERYTHROCYTE [DISTWIDTH] IN BLOOD BY AUTOMATED COUNT: 13.2 % (ref 10–15)
GFR SERPL CREATININE-BSD FRML MDRD: 55 ML/MIN/1.73M2
GFR SERPL CREATININE-BSD FRML MDRD: 65 ML/MIN/1.73M2
GLUCOSE SERPL-MCNC: 123 MG/DL (ref 70–99)
GLUCOSE UR STRIP-MCNC: NEGATIVE MG/DL
HCT VFR BLD AUTO: 34.7 % (ref 35–47)
HGB BLD-MCNC: 11.2 G/DL (ref 11.7–15.7)
HGB UR QL STRIP: NEGATIVE
IMM GRANULOCYTES # BLD: 0 10E3/UL
IMM GRANULOCYTES NFR BLD: 0 %
KETONES UR STRIP-MCNC: NEGATIVE MG/DL
LEUKOCYTE ESTERASE UR QL STRIP: ABNORMAL
LIPASE SERPL-CCNC: 25 U/L (ref 13–60)
LYMPHOCYTES # BLD AUTO: 2.7 10E3/UL (ref 0.8–5.3)
LYMPHOCYTES NFR BLD AUTO: 29 %
MCH RBC QN AUTO: 29.8 PG (ref 26.5–33)
MCHC RBC AUTO-ENTMCNC: 32.3 G/DL (ref 31.5–36.5)
MCV RBC AUTO: 92 FL (ref 78–100)
MONOCYTES # BLD AUTO: 0.8 10E3/UL (ref 0–1.3)
MONOCYTES NFR BLD AUTO: 8 %
NEUTROPHILS # BLD AUTO: 5.7 10E3/UL (ref 1.6–8.3)
NEUTROPHILS NFR BLD AUTO: 61 %
NITRATE UR QL: NEGATIVE
PH UR STRIP: 5.5 [PH] (ref 5–8)
PLATELET # BLD AUTO: 226 10E3/UL (ref 150–450)
POTASSIUM SERPL-SCNC: 4.6 MMOL/L (ref 3.4–5.3)
PROT SERPL-MCNC: 7.8 G/DL (ref 6.4–8.3)
RBC # BLD AUTO: 3.76 10E6/UL (ref 3.8–5.2)
RBC #/AREA URNS AUTO: ABNORMAL /HPF
SODIUM SERPL-SCNC: 139 MMOL/L (ref 136–145)
SP GR UR STRIP: 1.02 (ref 1–1.03)
SQUAMOUS #/AREA URNS AUTO: ABNORMAL /LPF
UROBILINOGEN UR STRIP-ACNC: 1 E.U./DL
WBC # BLD AUTO: 9.5 10E3/UL (ref 4–11)
WBC #/AREA URNS AUTO: ABNORMAL /HPF

## 2023-06-05 PROCEDURE — 82565 ASSAY OF CREATININE: CPT | Performed by: INTERNAL MEDICINE

## 2023-06-05 PROCEDURE — 74177 CT ABD & PELVIS W/CONTRAST: CPT

## 2023-06-05 PROCEDURE — 83690 ASSAY OF LIPASE: CPT | Performed by: INTERNAL MEDICINE

## 2023-06-05 PROCEDURE — 81001 URINALYSIS AUTO W/SCOPE: CPT | Performed by: INTERNAL MEDICINE

## 2023-06-05 PROCEDURE — 82565 ASSAY OF CREATININE: CPT

## 2023-06-05 PROCEDURE — 85025 COMPLETE CBC W/AUTO DIFF WBC: CPT | Performed by: INTERNAL MEDICINE

## 2023-06-05 PROCEDURE — 36415 COLL VENOUS BLD VENIPUNCTURE: CPT | Performed by: INTERNAL MEDICINE

## 2023-06-05 PROCEDURE — 99214 OFFICE O/P EST MOD 30 MIN: CPT | Performed by: INTERNAL MEDICINE

## 2023-06-05 PROCEDURE — 250N000011 HC RX IP 250 OP 636: Performed by: INTERNAL MEDICINE

## 2023-06-05 RX ORDER — CIPROFLOXACIN 500 MG/1
500 TABLET, FILM COATED ORAL 2 TIMES DAILY
Qty: 20 TABLET | Refills: 0 | Status: SHIPPED | OUTPATIENT
Start: 2023-06-05 | End: 2023-06-15

## 2023-06-05 RX ORDER — IOPAMIDOL 755 MG/ML
90 INJECTION, SOLUTION INTRAVASCULAR ONCE
Status: COMPLETED | OUTPATIENT
Start: 2023-06-05 | End: 2023-06-05

## 2023-06-05 RX ORDER — MOXIFLOXACIN HYDROCHLORIDE 400 MG/1
400 TABLET ORAL DAILY
Qty: 10 TABLET | Refills: 0 | Status: SHIPPED | OUTPATIENT
Start: 2023-06-05 | End: 2023-06-15

## 2023-06-05 RX ADMIN — IOPAMIDOL 90 ML: 755 INJECTION, SOLUTION INTRAVENOUS at 14:04

## 2023-06-05 NOTE — TELEPHONE ENCOUNTER
"Nurse Triage SBAR    Is this a 2nd Level Triage? NO    Situation:     Patient calling reporting left sided abd pain.    Background:     Type 2 diabetic    Assessment:     Patient calling reporting symptoms starting 6/4/23.  Patient reporting left lower to mid abd is very tender to touch.  Rating pain level \"5\" on 1-10 pain scale.  Denies change in out put.   Afebrile.  Patient denies nausea or vomiting today.  Blood sugar 125 fasting this morning.Stating she slept through night waking again in past hour with constant left sided pain.    Protocol Recommended Disposition:   See with in 4 hours. Transferred to Central Scheduling. Reviewed with patient if pain increases/becomes severe she should be seen in ED.       Reason for Disposition    [1] MILD-MODERATE pain AND [2] constant AND [3] present > 2 hours    Additional Information    Negative: Chest pain    Negative: Pain is mainly in upper abdomen  (if needed ask: \"is it mainly above the belly button?\")    Negative: Followed an abdomen (stomach) injury    Negative: [1] Abdominal pain AND [2] pregnant < 20 weeks    Negative: [1] Abdominal pain AND [2] pregnant 20 or more weeks    Negative: [1] Abdominal pain AND [2] postpartum (from 0 to 6 weeks after delivery)    Negative: [1] SEVERE pain (e.g., excruciating) AND [2] present > 1 hour    Negative: [1] SEVERE pain AND [2] age > 60 years    Negative: [1] Vomiting AND [2] contains red blood or black (\"coffee ground\") material  (Exception: few red streaks in vomit that only happened once)    Negative: Blood in bowel movements (Exception: blood on surface of BM with constipation)    Negative: Black or tarry bowel movements (Exception: chronic-unchanged black-grey bowel movements AND is taking iron pills or Pepto-bismol)    Negative: Patient sounds very sick or weak to the triager    Negative: Shock suspected (e.g., cold/pale/clammy skin, too weak to stand, low BP, rapid pulse)    Negative: Difficult to awaken or acting " confused (e.g., disoriented, slurred speech)    Negative: Passed out (i.e., lost consciousness, collapsed and was not responding)    Negative: Sounds like a life-threatening emergency to the triager    Protocols used: ABDOMINAL PAIN - FEMALE-A-AH

## 2023-06-05 NOTE — PROGRESS NOTES
"  Assessment & Plan     Abdominal pain, left lower quadrant, left midabdomen and left upper quadrant  Discussed differential would include diverticulitis versus kidney stone.  She has never had history of kidney stones in the past but hyperparathyroidism listed on her history.  She has had colonoscopy 2 years ago which did show diverticulosis.      Currently discussed to decrease her intake to full liquids only to have a bowel rest.  If that does not improve her pain she will start on antibiotic.  She has numerous sensitivity and side effects with antibiotics, I gave her prescription for Cipro to have on hand.    Also will check CT scan to delineate between diverticulitis and a kidney stone.    - ciprofloxacin (CIPRO) 500 MG tablet; Take 1 tablet (500 mg) by mouth 2 times daily for 10 days  - CBC with platelets and differential  - UA with Microscopic reflex to Culture - lab collect  - UA Microscopic with Reflex to Culture  - CT Abdomen Pelvis w Contrast; Future    Type 2 diabetes mellitus without complication, without long-term current use of insulin (H)  Ozempic was increased to 2 mg in April, 4 days ago her 4 units of Tresiba was stopped by diabetic educator.  Currently sugars at home has been 120-150, she will continue monitoring them closely.  - CT Abdomen Pelvis w Contrast; Future     BMI:   Estimated body mass index is 41.82 kg/m  as calculated from the following:    Height as of this encounter: 1.651 m (5' 5\").    Weight as of this encounter: 114 kg (251 lb 4.8 oz).     Irene Bagley MD  [facility]    Edmundo Gaines is a 67 year old, presenting for the following health issues:  office visit and Recheck Medication (Pt reports that she had stomach pain since Sunday.Pt reports that she continues to have pain on left side ,lower stomach.)        6/5/2023    11:49 AM   Additional Questions   Roomed by nayla   Accompanied by alone         6/5/2023    11:49 AM   Patient " Reported Additional Medications   Patient reports taking the following new medications none     Liana is a 67-year-old female who is 2 diabetes, mild chronic anemia, history of MRSA, SVT, thiamine deficiency, hyperparathyroidism, vitamin D deficiency, sleep apnea on CPAP, hypertension, heart disease, hip replacement, GERD, asthma and cholecystectomy.  She is currently here for acute abdominal pain.    Symptoms started yesterday, she woke up with the pain in her left lower and mid abdomen slightly radiating into her back.  Because she had fishing trip planned she just popped and had Tylenol which improved her pain and continued on her trip.  Throughout the day patient was going in and out, she took a total of 2000 mg of Tylenol and some mild dull.  And since yesterday currently she woke up with pain at 6:30 AM today and it has been more constant.  Currently pain is 5 out of 10, she has mild nausea and decreased appetite, she denies any blood in the urine or dysuria.  No history of kidney stones.  She has been feeling gassy but denies any constipation or diarrhea.    For hypertension she is on HCTZ 12.5 mg a day.    She also has chronic low back pain and takes hydrocodone 2-3 times a week.    For diabetes she was increased on Ozempic dose in April and currently takes 2 mg once a week.  Since then Tresiba insulin was tapered down and several days ago she was recommended to stop her 4 units of Tresiba by diabetic educator.  Since then she has been monitoring her sugars closely and it has been between 120-150.    History of Present Illness       Reason for visit:  Pain in lower stomach and left side rib cage  Symptom onset:  1-3 days ago    She eats 2-3 servings of fruits and vegetables daily.She consumes 1 sweetened beverage(s) daily.She exercises with enough effort to increase her heart rate 30 to 60 minutes per day.  She exercises with enough effort to increase her heart rate 3 or less days per week.        Pt reports  "that she has been having lower left stomach pain since Sunday.        Review of Systems   As above, no fevers or chills.      Objective    /72 (BP Location: Left arm, Patient Position: Sitting, Cuff Size: Adult Large)   Pulse 79   Temp 98.3  F (36.8  C) (Oral)   Resp 16   Ht 1.651 m (5' 5\")   Wt 114 kg (251 lb 4.8 oz)   LMP  (LMP Unknown)   SpO2 97%   BMI 41.82 kg/m    Body mass index is 41.82 kg/m .  Physical Exam   General: well appearing female, alert and oriented x3, in moderate distress when moving around.  EYES: Eyelids, conjunctiva, and sclera were normal. Pupils were normal.   HEAD, EARS, NOSE, MOUTH, AND THROAT: no cervical LAD, no thyromegaly or nodules appreciated. TMs are visualized and normal, oropharynx is clear.  RESPIRATORY: respirations non labored, CTA bl, no wheezes, rales, no forced expiratory wheezing.  CARDIOVASCULAR: Heart rate and rhythm were normal. No murmurs, rubs,gallops. There was no peripheral edema.   GASTROINTESTINAL: Positive bowel sounds, abdomen is soft,  non distended, tenderness to palpation of the left upper mid and lower quadrant, more severe in the left lower quadrant..     MUSCULOSKELETAL: Muscle mass was normal for age. No joint synovitis or deformity.  LYMPHATIC: There were no enlarged nodes palpable.  SKIN/HAIR/NAILS: Skin color was normal.  No rashes.  NEUROLOGIC: The patient was alert and oriented.  Speech was normal.  There is no facial asymmetry.   PSYCHIATRIC:  Mood and affect were normal.           "

## 2023-06-05 NOTE — LETTER
To whom it may concern.    Lianajake Briceño was seen in the office today. Due to acute illness, please excuse from work 06.05.23, 06.06.23, 06.07.23.    Dr Bagley

## 2023-06-05 NOTE — TELEPHONE ENCOUNTER
Please make sure t got my results note:      CT scan did show mild uncomplicated diverticulitis on the left side of your colon.  As discussed before, first-line of treatment is to really cut back diet to full liquids only.  Do not eat any solid foods.  Once pain resolves on liquid diet then you can go on a soft solid diet and stay on low fiber diet for at least several weeks.    If on liquid diet abdominal discomfort is not improving then start antibiotic.  I did initially order ciprofloxacin, usually you need to take ciprofloxacin and metronidazole together for coverage.  I am not sure if you already picked up ciprofloxacin, if not then alternatively I sent a different prescription for moxifloxacin which is easier because then you only need to take 1 pill once a day and you do not need a second antibiotic.    Let me know if you have any questions.    For pain control you can take Tylenol up to 3000 mg a day.  If pain is severe and uncontrolled, I could prescribe small dose of narcotic medication but it can cause constipation.    Make sure you are hydrated and monitoring your sugars.    Dr THEODORE

## 2023-06-05 NOTE — NURSING NOTE
Pt scheduled for a STAT CT in [place] today 6/5/23 13:50 arrival, 1420 Scan.   Pt to have labs at [facility] prior to driving over for scan. Pt expressed understanding of arrival times and no food/drink.

## 2023-06-05 NOTE — PATIENT INSTRUCTIONS
Lower abdominal pain: can be due to diverticulitis vs kidney stone. Will check labs today.  Ct scan today/tomorrow.    Decrease diet to liquids, if pain is escalating, start antibiotic: Cipro    Have CT scan today or tomorrow.     Make sure to stay well hydrated and monitor sugars

## 2023-06-06 NOTE — TELEPHONE ENCOUNTER
Yes continue on Moxiflox. Liquid diet until pain resolves ( even if she is hungry). Then soft LOW fiber diet for 2-3 weeks.

## 2023-06-06 NOTE — TELEPHONE ENCOUNTER
Spoke with patient and relayed information below from Dr Bagley. Patient verbalized understanding and states she is not sure what a liquid diet is. Discussed foods she can eat on a liquid diet. Patient states she did eat a piece of fish about 1 hour ago and her stomach is hurting again. States she did also  the moxifloxacin and started it already. She was not sure if she was supposed to start it already or not.

## 2023-06-11 NOTE — PROGRESS NOTES
Pt called with concern of a runny nose. Pt was told to contact PCP if this occurred because she may need an antibiotic or postpone surgery. Pt states her nose usually only runs when out in the cold/coming back in from the cold. Nasal drip is green at times but also clear. She states it seemed to clear up but worried it will start again upon going outdoors. Pt is also reporting stopping Plavix as of 1/1/18. CG sent message to [place] pool.  
Male

## 2023-06-15 ENCOUNTER — VIRTUAL VISIT (OUTPATIENT)
Dept: INTERNAL MEDICINE | Facility: CLINIC | Age: 68
End: 2023-06-15
Payer: COMMERCIAL

## 2023-06-15 ENCOUNTER — TELEPHONE (OUTPATIENT)
Dept: INTERNAL MEDICINE | Facility: CLINIC | Age: 68
End: 2023-06-15

## 2023-06-15 ENCOUNTER — MYC MEDICAL ADVICE (OUTPATIENT)
Dept: INTERNAL MEDICINE | Facility: CLINIC | Age: 68
End: 2023-06-15

## 2023-06-15 DIAGNOSIS — E78.5 HYPERLIPEMIA: ICD-10-CM

## 2023-06-15 DIAGNOSIS — Z53.9 NO SHOW: Primary | ICD-10-CM

## 2023-06-15 RX ORDER — ROSUVASTATIN CALCIUM 10 MG/1
TABLET, COATED ORAL
Qty: 90 TABLET | Refills: 0 | Status: SHIPPED | OUTPATIENT
Start: 2023-06-15 | End: 2023-09-26

## 2023-06-15 NOTE — TELEPHONE ENCOUNTER
"Routing refill request to provider for review/approval because:  Need reason for override     Last Written Prescription Date:  6/6/22  Last Fill Quantity: 90,  # refills: 3   Last office visit provider:   6/5/23    Requested Prescriptions   Pending Prescriptions Disp Refills     rosuvastatin (CRESTOR) 10 MG tablet [Pharmacy Med Name: ROSUVASTATIN 10MG TABLETS] 90 tablet 3     Sig: TAKE 1 TABLET(10 MG) BY MOUTH DAILY       Statins Protocol Passed - 6/15/2023  3:34 AM        Passed - LDL on file in past 12 months     Recent Labs   Lab Test 07/06/22  0842   LDL 33             Passed - No abnormal creatine kinase in past 12 months     No lab results found.             Passed - Recent (12 mo) or future (30 days) visit within the authorizing provider's specialty     Patient has had an office visit with the authorizing provider or a provider within the authorizing providers department within the previous 12 mos or has a future within next 30 days. See \"Patient Info\" tab in inbasket, or \"Choose Columns\" in Meds & Orders section of the refill encounter.              Passed - Medication is active on med list        Passed - Patient is age 18 or older        Passed - No active pregnancy on record        Passed - No positive pregnancy test in past 12 months             Samira Villalobos RN 06/15/23 4:09 PM  "

## 2023-06-15 NOTE — TELEPHONE ENCOUNTER
Patient was scheduled to have a telephone appointment with me on Thursday at 5 but no showed.  When I spoke to her  he reported that she was called into work.  Appointment was for persistent abdominal pain.  She was recently diagnosed with acute diverticulitis on June 5, antibiotic was prescribed and we discussed liquid diet and not advance it until pain is completely gone.  Please check in with her Friday morning and see how she is doing.

## 2023-06-16 NOTE — TELEPHONE ENCOUNTER
Spoke with Frank,  She finished antibiotic, she transition to regular diet 5 days ago and has been constipated since then.  Currently has gotten herself Metamucil at the pharmacy to help with constipation.  Initially when she advance her diet her pain was getting better but now it is getting worse.  Feels very bloated.  No fevers or chills.    Discussed to go back on liquid diet.  She can use MiraLAX for constipation but if pain is escalating or she develops fevers or chills she will need repeat imaging and will have to go to the emergency room or emergency room.  Patient is understandable.

## 2023-06-16 NOTE — TELEPHONE ENCOUNTER
Spoke with patient for an update:     Pt reports that her stomach is tighter no BM for 3 days.  Pt feels that her diverticulitis is improving.     Pt's greater concern is not having a BM and the stomach pain.     Currently stomach pain: upper middle stomach, feels consistently level pain, feels tight/uncomfortable. Pain score 6-7/10.     Pt was trying to find laxative around the house, she found lópez laxative and mixed it water and she took this. Pt took this about 10-15 min ago. No change since this laxative.

## 2023-06-16 NOTE — TELEPHONE ENCOUNTER
Attempted to call pt, no answer (1/3). Left message requesting pt to call back clinic.      Pt sent [employer] message in 6/15/23 [employer] encounter:     Sharon 15, 2023  Liana Briceño  to [facility] Internal Medicine Support Pool (supporting Irene Bagley MD)        6/15/23  6:54 PM  Hi I'm sorry Our phone appointment Didn't happen but my  Ringer on my phone Somehow got turned down I was having pain on the right side and I have not had a bowel movement in 2 days and I'm normally regular and the last time I had Bowel movement it was like diarrhea.  And I didn't have a bowel movement before the diarrhea it had been 2 day so I'll just have to watch and see I am tolerating solid foods now but small portions and i am also on a stool softner 2 and then 1 the next day because of the pain medication i take for my back inwhich I'm soon to be out of that but anyway i do feel  like I'm getting better but the bowel movement and pain in upper right side is a little concerning have a very blessed day and let me know what you think

## 2023-06-17 ENCOUNTER — NURSE TRIAGE (OUTPATIENT)
Dept: NURSING | Facility: CLINIC | Age: 68
End: 2023-06-17
Payer: COMMERCIAL

## 2023-06-18 NOTE — TELEPHONE ENCOUNTER
" Try Miralax 2X a day (smoothalax, off brand) and stool softener, and gentle lax.  Gets lightheaded when she gets up and moves around.  .    Last BM has been four days.    Nurse Triage SBAR    Is this a 2nd Level Triage? YES, LICENSED PRACTITIONER REVIEW IS REQUIRED    Situation: Continued constipation and abdominal discomfort along with new symptom of lightheadedness when standing at times.    Background: Diagnosed two weeks ago with diverticulitis.  Few days drank broth, no meats.  Started on soft foods as tolerated. Ate some chicken broth.      Spoke to provider yesterday about her constipation was started Miralax.       Assessment: Has had constipation four days, lightheaded at times, uncomfortable. Denies worsening abdominal pain, but is getting occasional lightheadedness when moving around and abdominal \"discomfort\" is 6/10.  Has had a lightheaded spell four times today. Denies fever.    Protocol Recommended Disposition:   See HCP Within 4 Hours (Or PCP Triage)    Recommendation: Secondary triage recommendation    Paged to provider     If lightheadedness is mild but continues, then can wait and be seen in urgency room tomorrow morning.  Call for appointment, unless dizziness is gone tomorrow.  If dizziness and/or discomfort worsens or develops [facility]      Check BP if possible and if  or less then hold both Lisinopril and hydrochlorothiazide until BP improved.  If unable to take BP at home, hold hydrochlorothiazide until symptoms improve.    Miralax twice a day, the gentle lax twice a day along with her stool softener. Follow up with PCP.    Provider Recommendation Follow Up:   Reached patient/caregiver. Informed of provider's recommendations. Patient verbalized understanding and agrees with the plan.     BP tonight is 125/65, P is 72, temp is 36.5 c.      Joanna Zhong RN  [facility]    Does the patient meet one of the following criteria for ADS visit " "consideration? 16+ years old, with an [facility] PCP     TIP  Providers, please consider if this condition is appropriate for management at one of our Acute and Diagnostic Services sites.     If patient is a good candidate, please use dotphrase <dot>triageresponse and select Refer to ADS to document.      Reason for Disposition    [1] Dizziness caused by heat exposure, sudden standing, or poor fluid intake AND [2] no improvement after 2 hours of rest and fluids    Additional Information    Negative: SEVERE difficulty breathing (e.g., struggling for each breath, speaks in single words)    Negative: [1] Difficulty breathing or swallowing AND [2] started suddenly after medicine, an allergic food or bee sting    Negative: Shock suspected (e.g., cold/pale/clammy skin, too weak to stand, low BP, rapid pulse)    Negative: Difficult to awaken or acting confused (e.g., disoriented, slurred speech)    Negative: [1] Weakness (i.e., paralysis, loss of muscle strength) of the face, arm or leg on one side of the body AND [2] sudden onset AND [3] present now    Negative: [1] Numbness (i.e., loss of sensation) of the face, arm or leg on one side of the body AND [2] sudden onset AND [3] present now    Negative: [1] Loss of speech or garbled speech AND [2] sudden onset AND [3] present now    Negative: Overdose (accidental or intentional) of medications    Negative: [1] Fainted > 15 minutes ago AND [2] still feels too weak or dizzy to stand    Negative: Heart beating < 50 beats per minute OR > 140 beats per minute    Negative: Sounds like a life-threatening emergency to the triager    Negative: Chest pain    Negative: Rectal bleeding, bloody stool, or tarry-black stool    Negative: [1] Vomiting AND [2] contains red blood or black (\"coffee ground\") material    Negative: Vomiting is main symptom    Negative: Diarrhea is main symptom    Negative: Headache is main symptom    Negative: Dizziness from low blood sugar (i.e., < 60 mg/dl or 3.5 " "mmol/l)    Negative: Difficulty breathing    Negative: SEVERE dizziness (e.g., unable to stand, requires support to walk, feels like passing out now)    Negative: Extra heart beats OR irregular heart beating (i.e., \"palpitations\")    Negative: [1] Drinking very little AND [2] dehydration suspected (e.g., no urine > 12 hours, very dry mouth, very lightheaded)    Negative: [1] Weakness (i.e., paralysis, loss of muscle strength) of the face, arm / hand, or leg / foot on one side of the body AND [2] sudden onset AND [3] brief (now gone)    Negative: [1] Numbness (i.e., loss of sensation) of the face, arm / hand, or leg / foot on one side of the body AND [2] sudden onset AND [3] brief (now gone)    Negative: [1] Loss of speech or garbled speech AND [2] sudden onset AND [3] brief (now gone)    Negative: Loss of vision or double vision (Exception: similar to previous migraines)    Negative: Patient sounds very sick or weak to the triager    Protocols used: DIZZINESS - [id number]-KB      "

## 2023-06-22 ENCOUNTER — VIRTUAL VISIT (OUTPATIENT)
Dept: INTERNAL MEDICINE | Facility: CLINIC | Age: 68
End: 2023-06-22
Payer: COMMERCIAL

## 2023-06-22 DIAGNOSIS — E11.9 TYPE 2 DIABETES MELLITUS WITHOUT COMPLICATION, WITHOUT LONG-TERM CURRENT USE OF INSULIN (H): ICD-10-CM

## 2023-06-22 DIAGNOSIS — L60.0 INGROWN NAIL OF FIFTH TOE: ICD-10-CM

## 2023-06-22 DIAGNOSIS — R19.7 DIARRHEA, UNSPECIFIED TYPE: Primary | ICD-10-CM

## 2023-06-22 DIAGNOSIS — A04.8 OTHER SPECIFIED BACTERIAL INTESTINAL INFECTIONS: ICD-10-CM

## 2023-06-22 DIAGNOSIS — K57.32 DIVERTICULITIS OF COLON: ICD-10-CM

## 2023-06-22 PROCEDURE — 99443 PR PHYSICIAN TELEPHONE EVALUATION 21-30 MIN: CPT | Mod: 93 | Performed by: INTERNAL MEDICINE

## 2023-06-22 ASSESSMENT — ENCOUNTER SYMPTOMS: DIARRHEA: 1

## 2023-06-22 NOTE — PROGRESS NOTES
"Liana is a 67 year old who is being evaluated via a billable telephone visit.      What phone number would you like to be contacted at? [phone]  How would you like to obtain your AVS? Diana    Distant Location (provider location):  On-site    Assessment & Plan     Diarrhea, unspecified type  Differential diagnosis would include C. difficile colitis in light of recent antibiotic use, versus viral gastroenteritis due to sick exposure from her son.    While she is having profuse diarrhea, discussed to stop hydrochlorothiazide, stay hydrated with Gatorade and fluids.      She will have a family member  stool collection kit to rule out  C.Diff.    If diarrhea is not improving, also discussed to have blood work done next week.    - C. difficile Toxin B PCR with reflex to C. difficile Antigen and Toxins A/B EIA; Future  - Basic metabolic panel  (Ca, Cl, CO2, Creat, Gluc, K, Na, BUN); Future  - CBC with platelets and differential; Future  - Enteric Bacteria and Virus Panel by EST[facility] Stool; Future    Diverticulitis of colon  This was diagnosed on June 5 and she received treatment with Avelox and full liquid diet.  Most recently her abdominal pain has resolved, she denies any recurrent fevers.    Type 2 diabetes mellitus without complication, without long-term current use of insulin (H)  Sugars at home have been 1 30-90, couple of times she had lows in the 60s.  - Enteric Bacteria and Virus Panel by [facility] Stool; Future      Ingrown nail of fifth toe  - Orthopedic  Referral; Future     BMI:   Estimated body mass index is 41.82 kg/m  as calculated from the following:    Height as of 6/5/23: 1.651 m (5' 5\").    Weight as of 6/5/23: 114 kg (251 lb 4.8 oz).     Irene Bagley MD  [facility]    Subjective   Liana is a 67 year old, presenting for the following health issues:  Recheck Medication and Diarrhea        6/15/2023     4:09 PM   Additional Questions   Roomed by nayla "   Accompanied by alone     Liana is a 67-year-old female who is 2 diabetes, mild chronic anemia, history of MRSA, SVT, thiamine deficiency, hyperparathyroidism, vitamin D deficiency, sleep apnea on CPAP, hypertension, heart disease, hip replacement, GERD, asthma and cholecystectomy.    She is currently here for telephone visit due to the significant diarrhea.    Liana developed left lower abdominal pain in the beginning of June and was diagnosed with diverticulitis based on CT scan and started on antibiotic on June 5.  She did well on antibiotic and liquid diet, subsequently she advance diet and pain has came back and on Sharon 15 we discussed to go back to liquid diet which she has been doing.  She also developed episode of constipation in the last week and has been on laxatives.    Currently pain has resolved but she developed diarrhea 4 days ago.  Initially it was mild but currently has gotten much worse and this morning she had more than 10 watery bowel movements.  She is not taking any laxatives currently, she denies any fevers or localized abdominal pain.  Additionally her son came back from travels last weekend and has had diarrhea nausea and vomiting.  He has gotten better however Frank and her  now have diarrhea.  She has not checked her COVID testing yet.    Blood pressure at home is 146/66 and heart rate is 80.  Temperature is 99.8.  She has tried Kaopectate for diarrhea.    Diarrhea    History of Present Illness       Reason for visit:  Diverticulitis  Symptom onset:  1-2 weeks ago  Symptoms include:  Off and on constipation and diarrhea  Symptom intensity:  Moderate  Symptom progression:  Worsening  What makes it worse:  No  What makes it better:  No    She eats 0-1 servings of fruits and vegetables daily.She consumes 0 sweetened beverage(s) daily.She exercises with enough effort to increase her heart rate 9 or less minutes per day.  She exercises with enough effort to increase her heart rate 3 or  less days per week.   She is taking medications regularly.       Diarrhea  Onset/Duration: 2-3 weeks  Description:       Consistency of stool: watery       Blood in stool: No       Number of loose stools past 24 hours: 8-9  Progression of Symptoms: worsening  Accompanying signs and symptoms:       Fever: No       Nausea/Vomiting: No       Abdominal pain: YES       Weight loss: No       Episodes of constipation: YES  History   Ill contacts: YES  Recent use of antibiotics: YES  Recent travels: No  Recent medication-new or changes(Rx or OTC): No  Precipitating or alleviating factors: None  Therapies tried and outcome: PeptoBismol          Review of Systems   Gastrointestinal: Positive for diarrhea.   As above      Objective    Vitals - Patient Reported  Pain Score: Mild Pain (2)      Vitals:  No vitals were obtained today due to virtual visit.    Physical Exam   Pleasant female, awake alert and oriented, in no acute distress while talking to me on the phone.  So processes linear, concentration is good, no shortness of breath or respiratory distress.    Telephone visit start time: 2:55 PM telephone visit end time: 3:18 PM    Phone call duration: 23 minutes

## 2023-06-23 ENCOUNTER — NURSE TRIAGE (OUTPATIENT)
Dept: NURSING | Facility: CLINIC | Age: 68
End: 2023-06-23

## 2023-06-23 NOTE — TELEPHONE ENCOUNTER
Liana calls and says that she has had the diarrhea since yesterday. Pt. Says that she has had over 20 diarrhea stools. Pt. Says that her stools have been black/dark green. Pt. Says that she vomited yesterday 2 times. Pt. Also says that her acid-reflux has been happening. Pt. Says that she will go to the [facility] ER now.    Reason for Disposition    Black or tarry bowel movements (Exception: chronic-unchanged black-grey bowel movements AND is taking iron pills or Pepto-bismol)    Additional Information    Negative: Shock suspected (e.g., cold/pale/clammy skin, too weak to stand, low BP, rapid pulse)    Negative: Difficult to awaken or acting confused (e.g., disoriented, slurred speech)    Negative: Sounds like a life-threatening emergency to the triager    Negative: Vomiting also present and worse than the diarrhea    Negative: [1] Blood in stool AND [2] without diarrhea    Negative: Diarrhea in a cancer patient who is currently (or recently) receiving chemotherapy or radiation therapy, or cancer patient who has metastatic or end-stage cancer and is receiving palliative care    Negative: [1] SEVERE abdominal pain (e.g., excruciating) AND [2] present > 1 hour    Negative: [1] SEVERE abdominal pain AND [2] age > 60 years    Negative: [1] Blood in the stool AND [2] moderate or large amount of blood    Protocols used: DIARRHEA-A-

## 2023-06-23 NOTE — TELEPHONE ENCOUNTER
Nurse Triage SBAR     Is this a 2nd Level Triage? YES, LICENSED PRACTITIONER REVIEW IS REQUIRED    Situation: diarrhea    Background: Patient began having diarrhea this morning states that she has had more than 20 episodes.  Patient did speak to her primary care provider Dr. Holland at 3:00 this afternoon and was told to sip on Gatorade which she has been doing.  Patient took an omeprazole at about 8 PM and vomited 20 minutes later patient inquiring as to whether or not she could take another and was told that she could    Assessment: Diarrhea    Protocol Recommended Disposition:   See HCP Within 4 Hours (Or PCP Triage)    Recommendation: It was recommended that patient be seen in urgent care tomorrow if she continues to have diarrhea through the night.  Patient verbalized understanding of care advice.    Stephy Lyons RN on 6/23/2023 at 2:20 AM      Does the patient meet one of the following criteria for ADS visit consideration? No    Reason for Disposition    [1] SEVERE diarrhea (e.g., 7 or more times / day more than normal) AND [2] age > 60 years    Additional Information    Negative: Shock suspected (e.g., cold/pale/clammy skin, too weak to stand, low BP, rapid pulse)    Negative: Difficult to awaken or acting confused (e.g., disoriented, slurred speech)    Negative: Sounds like a life-threatening emergency to the triager    Negative: [1] SEVERE abdominal pain (e.g., excruciating) AND [2] present > 1 hour    Negative: [1] SEVERE abdominal pain AND [2] age > 60 years    Negative: [1] Blood in the stool AND [2] moderate or large amount of blood    Negative: Black or tarry bowel movements (Exception: chronic-unchanged black-grey bowel movements AND is taking iron pills or Pepto-bismol)    Negative: [1] Drinking very little AND [2] dehydration suspected (e.g., no urine > 12 hours, very dry mouth, very lightheaded)    Negative: Patient sounds very sick or weak to the triager    Protocols used: DIARRHEA-A-AH

## 2023-06-26 ENCOUNTER — NURSE TRIAGE (OUTPATIENT)
Dept: NURSING | Facility: CLINIC | Age: 68
End: 2023-06-26
Payer: COMMERCIAL

## 2023-06-26 ENCOUNTER — TELEPHONE (OUTPATIENT)
Dept: INTERNAL MEDICINE | Facility: CLINIC | Age: 68
End: 2023-06-26
Payer: COMMERCIAL

## 2023-06-26 DIAGNOSIS — R19.7 DIARRHEA, UNSPECIFIED TYPE: Primary | ICD-10-CM

## 2023-06-26 RX ORDER — LOPERAMIDE HCL 2 MG
2 CAPSULE ORAL 4 TIMES DAILY PRN
Qty: 60 CAPSULE | Refills: 0 | Status: SHIPPED | OUTPATIENT
Start: 2023-06-26 | End: 2023-07-03

## 2023-06-26 RX ORDER — METRONIDAZOLE 500 MG/1
500 TABLET ORAL 3 TIMES DAILY
Qty: 30 TABLET | Refills: 0 | Status: SHIPPED | OUTPATIENT
Start: 2023-06-26 | End: 2023-07-03

## 2023-06-26 NOTE — TELEPHONE ENCOUNTER
Patient has been to the emergency room twice and still have the same symptoms.  Friday June 23 and Saturday 24 th.  Patient was seen for diarrhea and was told that it was a virus and gave saline with vitamins and Imodium.  Diarrhea stopped for one day and then started again last night around 9:00 pm.  Patient has been on the toilet since 9:00 am and has no sleep.  Patient had diarrhea every twenty minutes since 9:00 pm and stool was dark green and denies black or tarry stools.  Patient states that he went to [facility] and they gave him IV vitamins.  Patient is calling for a course of action as he does not wish to return to ED.  Patient has not been tested for C-dif.  Liana is requesting a message be sent to PCP/Clinic Bela Corbin MD.  Patient is requesting to speak with MD about course of action.  Clinic please phone patient. FNA advised patient to go to ED and  patient is refusing to go to ED.       Nurse Triage SBAR    Is this a 2nd Level Triage? YES, LICENSED PRACTITIONER REVIEW IS REQUIRED    Situation:   Diarrhea    Background:   Patient seen in ER on Friday and Saturday with diarrhea.    Assessment:   Patient given IV fluids and given imodium and diarrhea stopped.  Patient states that diarrhea started again last night around 9:00 pm.  Patient states that he is unable to get off the toilet.    Protocol Recommended Disposition:   Go to ED Now (Or PCP Triage)    Recommendation:   FNA advised patient to return to ED and patient refusing.  Clinic please phone patient.     Routed to provider    Does the patient meet one of the following criteria for ADS visit consideration? 16+ years old, with an [facility] PCP     TIP  Providers, please consider if this condition is appropriate for management at one of our Acute and Diagnostic Services sites.     If patient is a good candidate, please use dotphrase <dot>triageresponse and select Refer to ADS to document.     Reason for Disposition    [1] Drinking very little AND [2]  dehydration suspected (e.g., no urine > 12 hours, very dry mouth, very lightheaded)    Additional Information    Negative: Shock suspected (e.g., cold/pale/clammy skin, too weak to stand, low BP, rapid pulse)    Negative: Difficult to awaken or acting confused (e.g., disoriented, slurred speech)    Negative: Sounds like a life-threatening emergency to the triager    Negative: [1] SEVERE abdominal pain (e.g., excruciating) AND [2] present > 1 hour    Negative: [1] SEVERE abdominal pain AND [2] age > 60 years    Negative: [1] Blood in the stool AND [2] moderate or large amount of blood    Negative: Black or tarry bowel movements (Exception: chronic-unchanged black-grey bowel movements AND is taking iron pills or Pepto-bismol)    Protocols used: DIARRHEA-A-AH

## 2023-06-26 NOTE — TELEPHONE ENCOUNTER
Called patient and told I still do not have her [facility] [place] records the last CT scan I have is on the fifth.  She reports that they did another CT scan 24th.  She has been to the ER at [facility] [place] twice even though we have care everywhere and I can see her other [facility] records I do not see these.  At both times she was given IV fluids and Imodium which did help but last night again she had constant diarrhea she says the color changes between green-black and yellow and is watery.  She has no fever or significant abdominal pain but feels very dizzy and dehydrated and cannot get out of bed she has been drinking bottles of Gatorade but still feels very dehydrated.  At any rate yet she is was unable to  the stool containers received    She lives alone.  Care advice was given to go to the ER again.  Go to the ER again.  She declines.  Next best option is to #1 retry Imodium.  the assumption being that the recent CT scan should not have shown any significant inflammatory disease.  Since she is unable to control get the containers as that she only friend she has can only go after hours.  Could empirically treat with Flagyl.  Medications were sent to [facility] she could have a friend try and pick them up.  Again she was told that if she is unable to  the medications or despite the medications she continues to have symptoms she has to go to the ER.

## 2023-06-30 ENCOUNTER — TELEPHONE (OUTPATIENT)
Dept: INTERNAL MEDICINE | Facility: CLINIC | Age: 68
End: 2023-06-30

## 2023-06-30 NOTE — TELEPHONE ENCOUNTER
Reason for Call:  Appointment Request    Patient requesting this type of appt:  Hospital/ED Follow-Up     Requested provider: Bela Corbin    Reason patient unable to be scheduled: Not within requested timeframe    When does patient want to be seen/preferred time: 1-2 days    Comments: Patient was release from [facility] and wanted to see if Provider can squeeze her in on Monday before she goes on vacation. Patient does not want to see any other provider.     Could we send this information to you in [facility] or would you prefer to receive a phone call?:   Patient would prefer a phone call   Okay to leave a detailed message?: Yes at Cell number on file:    Telephone Information:   Mobile [phone]       Call taken on 6/30/2023 at 1:10 PM by Jhonatan Saenz

## 2023-07-03 ENCOUNTER — OFFICE VISIT (OUTPATIENT)
Dept: INTERNAL MEDICINE | Facility: CLINIC | Age: 68
End: 2023-07-03
Payer: COMMERCIAL

## 2023-07-03 VITALS
HEIGHT: 65 IN | TEMPERATURE: 98 F | BODY MASS INDEX: 41.82 KG/M2 | DIASTOLIC BLOOD PRESSURE: 67 MMHG | SYSTOLIC BLOOD PRESSURE: 121 MMHG | OXYGEN SATURATION: 96 % | HEART RATE: 77 BPM | RESPIRATION RATE: 19 BRPM

## 2023-07-03 DIAGNOSIS — H81.10 BENIGN PAROXYSMAL POSITIONAL VERTIGO, UNSPECIFIED LATERALITY: ICD-10-CM

## 2023-07-03 DIAGNOSIS — K52.9 GASTROENTERITIS: Primary | ICD-10-CM

## 2023-07-03 PROCEDURE — 99213 OFFICE O/P EST LOW 20 MIN: CPT | Performed by: INTERNAL MEDICINE

## 2023-07-03 ASSESSMENT — PAIN SCALES - GENERAL: PAINLEVEL: MILD PAIN (3)

## 2023-07-03 NOTE — PROGRESS NOTES
"  Assessment & Plan     Gastroenteritis  Initially they never worked up.  Diarrhea the second visit they did a comprehensive work-up including CT abdomen, multi pathogen stool testing and C. difficile testing she was negative for autoimmune norovirus Shigella Campylobacter and C. difficile.  She had some mild hypomagnesia and several IV fluids and then was back to baseline now she is passing 1 stool a day and no longer has symptoms.  Her significant partner other had also had diarrhea.    Benign paroxysmal positional vertigo, unspecified laterality  He also complained of vertigo and could hardly walk this could have been triggered by the diarrhea she had MRI brain which was negative she can benefit from vestibular rehab and rest at home until she is fully recovered  - Physical Therapy Referral             MED REC REQUIRED  Post Medication Reconciliation Status: discharge medications reconciled, continue medications without change  BMI:   Estimated body mass index is 41.82 kg/m  as calculated from the following:    Height as of this encounter: 1.651 m (5' 5\").    Weight as of 6/5/23: 114 kg (251 lb 4.8 oz).           Bela Corbin MD  [facility]   Liana is a 67 year old, presenting for the following health issues:  Hospital F/U (Patient was seen in the EDU 6/23 for gastroenteritis, 6/25 for lightheadedness and 6/26 for diarrhea. States she was in the hosptial x1 week. Patient states she has vertigo./)        7/3/2023     1:47 PM   Additional Questions   Roomed by Cyndee EASLEY     History of diabetes mellitus.   Lab Results   Component Value Date    A1C 7.1 04/03/2023    A1C 5.9 11/01/2022    A1C 6.1 07/06/2022    A1C 6.7 04/05/2022    A1C 7.9 10/15/2021     Recently started on Ozempic as well as Tresiba  We will recheck A1c at next visit.  Current Outpatient Medications   Medication     acetaminophen (TYLENOL) 500 MG tablet     allopurinol (ZYLOPRIM) 100 MG tablet " "    aspirin (ASA) 81 MG EC tablet     blood glucose test strips     cholecalciferol, vitamin D3, (CHOLECALCIFEROL) 1,000 unit tablet     Cinnamon Bark POWD     Continuous Blood Gluc Sensor (FREESTYLE MIRANDA 2 SENSOR) MISC     cyclobenzaprine (FLEXERIL) 10 MG tablet     docusate sodium (DOK) 100 MG capsule     gabapentin (NEURONTIN) 300 MG capsule     generic lancets (MICROLET LANCET)     hydrochlorothiazide (HYDRODIURIL) 12.5 MG tablet     insulin degludec (TRESIBA FLEXTOUCH) 200 UNIT/ML pen     insulin pen needle (32G X 4 MM) 32G X 4 MM miscellaneous     lisinopril (ZESTRIL) 5 MG tablet     melatonin (MELATIN) 3 mg Tab tablet     nitroGLYcerin (NITROSTAT) 0.3 MG sublingual tablet     omeprazole (PRILOSEC) 40 MG DR capsule     ONETOUCH DELICA PLUS LANCET 33 gauge Misc     rosuvastatin (CRESTOR) 10 MG tablet     Semaglutide, 2 MG/DOSE, (OZEMPIC) 8 MG/3ML pen     Thiamine HCl (B-1) 100 MG TABS     triamcinolone (KENALOG) 0.1 % external cream     valACYclovir (VALTREX) 500 MG tablet     vitamin B-12 (CYANOCOBALAMIN) 2500 MCG sublingual tablet     No current facility-administered medications for this visit.     Facility-Administered Medications Ordered in Other Visits   Medication     lidocaine 1 % 10 mL       Review of Systems         Objective    /67   Pulse 77   Temp 98  F (36.7  C) (Tympanic)   Resp 19   Ht 1.651 m (5' 5\")   LMP  (LMP Unknown)   SpO2 96%   BMI 41.82 kg/m    Body mass index is 41.82 kg/m .  Physical Exam   GENERAL: healthy, alert and no distress  NECK: no adenopathy, no asymmetry, masses, or scars and thyroid normal to palpation  RESP: lungs clear to auscultation - no rales, rhonchi or wheezes  CV: regular rate and rhythm, normal S1 S2, no S3 or S4, no murmur, click or rub, no peripheral edema and peripheral pulses strong  ABDOMEN: soft, nontender, no hepatosplenomegaly, no masses and bowel sounds normal  MS: no gross musculoskeletal defects noted, no edema                    "

## 2023-07-03 NOTE — LETTER
July 3, 2023      Liana Briceño  [address] [place] [address]        To Whom It May Concern:    Liana Briceño was seen on July 3, 2023.  Please excuse her until July 10th due to illness.        Sincerely,        Bela Corbin MD

## 2023-07-11 ENCOUNTER — NURSE TRIAGE (OUTPATIENT)
Dept: NURSING | Facility: CLINIC | Age: 68
End: 2023-07-11
Payer: COMMERCIAL

## 2023-07-11 DIAGNOSIS — M54.40 ACUTE MIDLINE LOW BACK PAIN WITH SCIATICA, SCIATICA LATERALITY UNSPECIFIED: Primary | ICD-10-CM

## 2023-07-11 RX ORDER — MELOXICAM 15 MG/1
15 TABLET ORAL DAILY
Qty: 14 TABLET | Refills: 0 | Status: SHIPPED | OUTPATIENT
Start: 2023-07-11 | End: 2023-11-22

## 2023-07-11 RX ORDER — HYDROCODONE BITARTRATE AND ACETAMINOPHEN 5; 325 MG/1; MG/1
1 TABLET ORAL EVERY 6 HOURS PRN
Qty: 18 TABLET | Refills: 0 | Status: SHIPPED | OUTPATIENT
Start: 2023-07-11 | End: 2023-07-16

## 2023-07-11 NOTE — TELEPHONE ENCOUNTER
Nurse Triage SBAR    Is this a 2nd Level Triage? YES, LICENSED PRACTITIONER REVIEW IS REQUIRED    Situation: Patient is calling due to increase in back pain      Assessment: Back pain has increased in the last two days, is anywhere for 5/10 to 8/10. Tried heat, Ibuprofen, Vicodin, nothing is helping. States she is also now having pain in her abdomen area and this is new to her. She does not recall an injury and states she has been walking a lot but now is having this pain.     Protocol Recommended Disposition:   Go to ED Now Patient does not want to go to ED, wants to see her PCP or provider in clinic.    Recommendation: Patient is requesting a call back from PCP office to see in person in clinic, no available appointments    Ok to leave detailed message     Routed to provider    Anyi Bowles RN on 7/11/2023 at 8:44 AM      Reason for Disposition    Abdominal pain and age > 60 years    Additional Information    Negative: Passed out (i.e., fainted, collapsed and was not responding)    Negative: Shock suspected (e.g., cold/pale/clammy skin, too weak to stand, low BP, rapid pulse)    Negative: Sounds like a life-threatening emergency to the triager    Negative: Major injury to the back (e.g., MVA, fall > 10 feet or 3 meters, penetrating injury, etc.)    Negative: Pain in the upper back over the ribs (rib cage) that radiates (travels) into the chest    Negative: Pain in the upper back over the ribs (rib cage) and worsened by coughing (or clearly increases with breathing)    Negative: Back pain during pregnancy    Negative: SEVERE back pain of sudden onset and age > 60 years    Negative: SEVERE abdominal pain (e.g., excruciating)    Protocols used: BACK PAIN-A-[place]

## 2023-07-11 NOTE — TELEPHONE ENCOUNTER
Low back  pain , no injury , positional pain referred to the groin . She cannot come in to clinic   Some numbness o leg , no other problems she is feeling a little bit better now.  In the past she still has had pain like this before but this is a little bit more severe.  She has used intermittent hydrocodone in the past ibuprofen helps her the most but she was told not to use it.  We discussed the trajectory of acute back pain with no injury and no cauda equina signs.  Mainstays conservative care with an increase combination of NSAID and Flexeril and hydrocodone along with physical therapy.  She can get an MRI done if there is absolutely no improvement in pain in a week and delay going back to work for 2 weeks.

## 2023-07-21 ENCOUNTER — THERAPY VISIT (OUTPATIENT)
Dept: PHYSICAL THERAPY | Facility: REHABILITATION | Age: 68
End: 2023-07-21
Attending: INTERNAL MEDICINE
Payer: COMMERCIAL

## 2023-07-21 DIAGNOSIS — M54.40 ACUTE MIDLINE LOW BACK PAIN WITH SCIATICA, SCIATICA LATERALITY UNSPECIFIED: ICD-10-CM

## 2023-07-21 PROCEDURE — 97110 THERAPEUTIC EXERCISES: CPT | Mod: GP

## 2023-07-21 PROCEDURE — 97161 PT EVAL LOW COMPLEX 20 MIN: CPT | Mod: GP

## 2023-07-21 NOTE — PROGRESS NOTES
PHYSICAL THERAPY EVALUATION  Type of Visit: Evaluation    See electronic medical record for Abuse and Falls Screening details.    Subjective       Presenting condition or subjective complaint:  Pt presents with LBP. Onset early July 2023. Previous episodes of pain in low back, but this time felt more along flanks and abdomen in addition to low back. Went to ER for severe pain, and assessed for possible kidney stone. CT negative. Denies LE tingling/numbness. Did have some symptom radiation from left low back to left mid thigh. Pain is currently not as severe, but still very limiting.  Still has some flank pain R>L and lower abdominal pain, and low back pain (tailbone). Difficulty turning in bed, sleeping, standing and bending. Lives with spouse in a 2 story house. [address] home with railing. 20 stairs to second floor. Uses quad cane when walking long distances. Works as PCA. Currently not working due to pain but returning soon. Enjoys fishing, bingo and reading.  Hospitalization for ~ 1 week in June for dehydration, diverticulitis dx, virus, vertigo.  Hx R BAR >10 years ago, R TKA 1.5 years ago.   Current walking tolerance ~ 1-2 blocks, previously 6-7 blocks. More recently notices (a few years ago)stress urinary incontinence. Denies urge urinary incontinence. Endorses LBP with sex.   Prior to episode, enjoyed working out at [employer]- up to 100 sit ups, free weights, weight machines (ab machine). Hasn't been to the gym since May.     Date of onset: 07/09/23    Relevant medical history:     Past Medical History:   Diagnosis Date     Anemia      Breast cyst      Carpal tunnel syndrome      Coronary artery disease due to lipid rich plaque      Dyslipidemia, goal LDL below 70      Esophageal reflux      Essential hypertension      Fatty liver      Goiter diffuse, nontoxic      Hiatal hernia      History of gestational diabetes      History of MRSA infection 07/17/2017    Pilonidal cyst culture     Increased PTH level  11/4/2018     Morbid obesity with BMI of 40.0-44.9, adult (H)      Motion sickness      RADHA on CPAP      Osteoarthritis      Paroxysmal SVT (supraventricular tachycardia) (H) 2011    ablation by Dr. Johnson in 2011     PONV (postoperative nausea and vomiting)      Positive result for methicillin resistant Staphylococcus aureus (MRSA) screening 11/4/2018     Renal disease      S/P hip replacement      Stented coronary artery      Thiamine deficiency 11/4/2018     Type 2 diabetes mellitus (H)      Uncomplicated asthma      Vitamin D deficiency 11/4/2018       Dates & types of surgery:     Prior diagnostic imaging/testing results:     CT ABDOMEN PELVIS STONE PROTOCOL   Impression  1.  No acute findings in the abdomen and pelvis.   2.  Incidental findings as detailed above.    Prior therapy history for the same diagnosis, illness or injury:        Prior Level of Function  Transfers: Independent  Ambulation: Assistive equipment  ADL: Independent  IADL:     Patient goals for therapy:  reduce pain, get back to the gym     Objective   LUMBAR SPINE EVALUATION  PAIN: Pain Level at Rest: 7/10  Pain Level with Use: 10/10  Pain Location: lumbar spine  Pain Quality: Aching and Sharp  Pain Frequency: constant  Pain is Worst: nighttime  Pain is Exacerbated By: turning over in bed, sleeping, standing, bending  Pain is Relieved By: otc medications  INTEGUMENTARY (edema, incisions):   POSTURE: Standing Posture: Rounded shoulders, Forward head, Lordosis decreased, Thoracic kyphosis increased  Sitting Posture: Rounded shoulders, Forward head, Lordosis decreased, Thoracic kyphosis increased  GAIT:   Weightbearing Status:   Assistive Device(s): Cane (quad)  Gait Deviations: Antalgic  Base of support increased  BALANCE/PROPRIOCEPTION:   WEIGHTBEARING ALIGNMENT:   NON-WEIGHTBEARING ALIGNMENT:    ROM:   (Degrees) Left AROM Left PROM  Right AROM Right PROM   Hip Flexion  Limited to ~90 degrees  Limited to ~90 degrees   Hip Extension   Significant loss  Significant loss   Hip Abduction       Hip Adduction       Hip Internal Rotation  Mod loss  Mod loss   Hip External Rotation  Mod loss  Min loss   Knee Flexion       Knee Extension       Lumbar Side glide Mod loss, pain across low back     Lumbar Flexion To mid thigh, pain across low back   Lumbar Extension Significant loss, pain across LB   Pain:   End feel:     PELVIC/SI SCREEN: Sacroiliac Provocation Test: negative, Pubic Symphysis Provocation: positive  STRENGTH: WNL     FLEXIBILITY: significantly limited hip flexor, hamstrings, quads and hip adductor length bilaterally  PALPATION: pain with palpation throughout abdomen including right right psoas, bilateral lower quadrants especially centrally, bilateral lumbar paraspinals, buddy QL, bilateral gluteals with significant tension noted throughout  SPINAL SEGMENTAL CONCLUSIONS: hypomobility throughout lumbar and thoracic spine     Prone on elbows: x 3 minutes-reduced LBP to 4/10     Functional mobility:  Sit<>stand: buddy UE support, increased time to perform, painful with transition  Bed mobility: increased time, painful throughout movement    Assessment & Plan   CLINICAL IMPRESSIONS  Medical Diagnosis: M54.40 (ICD-10-CM) - Acute midline low back pain with sciatica, sciatica laterality unspecified    Treatment Diagnosis: low back pain, spinal hypomobility, decreased LE flexibility, decreased activity tolerance   Impression/Assessment: Patient is a 68 year old female with progressively improving low back, flank and lower abdominal pain with insidious onset ~ 2 week agos. complaints.  The following significant findings have been identified: Pain, Decreased ROM/flexibility, Decreased joint mobility, Decreased strength, Impaired muscle performance, Decreased activity tolerance, Impaired posture and Instability. These impairments interfere with their ability to perform self care tasks, work tasks, recreational activities, household chores, household  mobility and community mobility as compared to previous level of function.     Clinical Decision Making (Complexity):  Clinical Presentation: Stable/Uncomplicated  Clinical Presentation Rationale: based on medical and personal factors listed in PT evaluation  Clinical Decision Making (Complexity): Low complexity    PLAN OF CARE  Treatment Interventions:  Modalities: Cryotherapy, Hot Pack  Interventions: Gait Training, Manual Therapy, Neuromuscular Re-education, Therapeutic Activity, Therapeutic Exercise, Self-Care/Home Management    Long Term Goals     PT Goal 1  Goal Identifier: HEP  Goal Description: pt will demonstrate independence and report compliance with HEP to facilitate improved independent symptom mgmt.  Rationale: to maximize safety and independence with performance of ADLs and functional tasks;to maximize safety and independence with self cares  Target Date: 10/13/23  PT Goal 2  Goal Identifier: activity tolerance  Goal Description: Pt will demonstrate improved activity tolerance via ability to walk greater than or equal to 6 blocks without needing to sit and rest due to pain or fatigue.  Rationale: to maximize safety and independence with performance of ADLs and functional tasks;to maximize safety and independence within the home;to maximize safety and independence within the community;to maximize safety and independence with self cares  Target Date: 10/13/23  PT Goal 3  Goal Identifier: rolling tolerance  Goal Description: pt will report ability to roll over in bed without greater than or equal to 3/10 pain.  Rationale: to maximize safety and independence with performance of ADLs and functional tasks;to maximize safety and independence within the home;to maximize safety and independence within the community;to maximize safety and independence with self cares  Target Date: 10/13/23  PT Goal 4  Goal Identifier: lumbar AROM  Goal Description: pt will demonstrate improved lumbar flexion AROM being able to  flex and reach fingertips to at list mid shin, to promote improved ease with ADL's and work tasks.  Rationale: to maximize safety and independence with performance of ADLs and functional tasks;to maximize safety and independence within the home;to maximize safety and independence within the community;to maximize safety and independence with self cares  Target Date: 10/13/23      Frequency of Treatment: 1x/week  Duration of Treatment: up to 12 weeks    Recommended Referrals to Other Professionals:   Education Assessment:   Learner/Method: Patient    Risks and benefits of evaluation/treatment have been explained.   Patient/Family/caregiver agrees with Plan of Care.     Evaluation Time:     PT Eval, Low Complexity Minutes (45691): 40       Signing Clinician: Jennifer Hernandez PT      [facility]                                                                                   OUTPATIENT PHYSICAL THERAPY      PLAN OF TREATMENT FOR OUTPATIENT REHABILITATION   Patient's Last Name, First Name, Liana Farley YOB: 1955   Provider's Name   [facility]   Medical Record No.  [id number]     Onset Date: 07/09/23  Start of Care Date: 07/21/23     Medical Diagnosis:  M54.40 (ICD-10-CM) - Acute midline low back pain with sciatica, sciatica laterality unspecified      PT Treatment Diagnosis:  low back pain, spinal hypomobility, decreased LE flexibility, decreased activity tolerance Plan of Treatment  Frequency/Duration: 1x/week/ up to 12 weeks    Certification date from 07/21/23 to 10/13/23         See note for plan of treatment details and functional goals     Jennifer Hernandez PT                         I CERTIFY THE NEED FOR THESE SERVICES FURNISHED UNDER        THIS PLAN OF TREATMENT AND WHILE UNDER MY CARE     (Physician attestation of this document indicates review and certification of the therapy plan).                  Referring Provider:  Bela HOPPER  [facility]      Initial Assessment  See Epic Evaluation- Start of Care Date: 07/21/23

## 2023-08-16 ENCOUNTER — TELEPHONE (OUTPATIENT)
Dept: PHYSICAL THERAPY | Facility: CLINIC | Age: 68
End: 2023-08-16

## 2023-08-16 NOTE — TELEPHONE ENCOUNTER
Called pt about missed appt, mentioned he was just in the hospital last night for SOB and was not able to contact to cancel today's appt. Confirmed will be attending next appt.

## 2023-08-23 ENCOUNTER — THERAPY VISIT (OUTPATIENT)
Dept: PHYSICAL THERAPY | Facility: REHABILITATION | Age: 68
End: 2023-08-23
Payer: COMMERCIAL

## 2023-08-23 DIAGNOSIS — M54.40 ACUTE MIDLINE LOW BACK PAIN WITH SCIATICA, SCIATICA LATERALITY UNSPECIFIED: Primary | ICD-10-CM

## 2023-08-23 PROCEDURE — 97110 THERAPEUTIC EXERCISES: CPT | Mod: GP | Performed by: PHYSICAL THERAPIST

## 2023-08-25 ENCOUNTER — OFFICE VISIT (OUTPATIENT)
Dept: INTERNAL MEDICINE | Facility: CLINIC | Age: 68
End: 2023-08-25
Payer: COMMERCIAL

## 2023-08-25 VITALS
OXYGEN SATURATION: 95 % | WEIGHT: 251 LBS | TEMPERATURE: 97.6 F | RESPIRATION RATE: 14 BRPM | HEIGHT: 65 IN | SYSTOLIC BLOOD PRESSURE: 128 MMHG | DIASTOLIC BLOOD PRESSURE: 81 MMHG | HEART RATE: 84 BPM | BODY MASS INDEX: 41.82 KG/M2

## 2023-08-25 DIAGNOSIS — L29.9 ITCHING: ICD-10-CM

## 2023-08-25 DIAGNOSIS — K21.00 GASTROESOPHAGEAL REFLUX DISEASE WITH ESOPHAGITIS WITHOUT HEMORRHAGE: Primary | ICD-10-CM

## 2023-08-25 DIAGNOSIS — G47.33 OSA (OBSTRUCTIVE SLEEP APNEA): ICD-10-CM

## 2023-08-25 DIAGNOSIS — R05.2 SUBACUTE COUGH: ICD-10-CM

## 2023-08-25 DIAGNOSIS — N39.3 FEMALE STRESS INCONTINENCE: ICD-10-CM

## 2023-08-25 DIAGNOSIS — E11.9 TYPE 2 DIABETES MELLITUS WITHOUT COMPLICATION, WITHOUT LONG-TERM CURRENT USE OF INSULIN (H): ICD-10-CM

## 2023-08-25 DIAGNOSIS — I25.10 CORONARY ARTERY DISEASE DUE TO LIPID RICH PLAQUE: ICD-10-CM

## 2023-08-25 DIAGNOSIS — I25.83 CORONARY ARTERY DISEASE DUE TO LIPID RICH PLAQUE: ICD-10-CM

## 2023-08-25 PROCEDURE — 99214 OFFICE O/P EST MOD 30 MIN: CPT | Performed by: INTERNAL MEDICINE

## 2023-08-25 RX ORDER — FAMOTIDINE 20 MG/1
20 TABLET, FILM COATED ORAL 2 TIMES DAILY
Qty: 30 TABLET | Refills: 3 | Status: SHIPPED | OUTPATIENT
Start: 2023-08-25 | End: 2023-11-16

## 2023-08-25 RX ORDER — HYDROXYZINE HYDROCHLORIDE 25 MG/1
25 TABLET, FILM COATED ORAL 3 TIMES DAILY PRN
Qty: 20 TABLET | Refills: 0 | Status: SHIPPED | OUTPATIENT
Start: 2023-08-25

## 2023-08-25 ASSESSMENT — PAIN SCALES - GENERAL: PAINLEVEL: NO PAIN (0)

## 2023-08-25 NOTE — PATIENT INSTRUCTIONS
Mucinex 600 mg 1-2ce a day as needed, albuterol to help open airway    2. Acid reflux: continue Omeprazole before breakfast, can take Pepcid at bed time,  if acid reflux continues to be bad, consider hiatal hernia repair.    3. Stress incontinence: urology. [phone].    4. See sleep clinic for sleep apnea treatment.     5. Follow up with Dr Corbin 1 month.     6. If cough is getting better and then becomes worse, let us know and we will Rx antibiotic.  Try to sleep with head elevated.

## 2023-08-25 NOTE — PROGRESS NOTES
"  Assessment & Plan     Gastroesophageal reflux disease with esophagitis without hemorrhage  Recent emergency room due to acidic exacerbation secondary to spicy meal.  She will continue on omeprazole 40 before breakfast, discussed to use Pepcid before bedtime.  Avoid spicy foods and have Maalox on hand if she has recurrent acute symptoms.  If your acid reflux continues to be fairly persistent, consideration can be given to hiatal hernia repair surgery.  - famotidine (PEPCID) 20 MG tablet; Take 1 tablet (20 mg) by mouth 2 times daily    Subacute cough  I suspect she has mild bronchitis, most likely due to nighttime regurgitation and acid reflux.  Currently no signs or symptoms of infection, chest x-ray was normal in the emergency room.  Cough is improving.  Discussed if cough starts getting worse to let us know and we could give her antibiotic for bacterial bronchitis but would observe for now.    RADHA (obstructive sleep apnea)  In the past sleep apnea masks have not been feeding.  I recommended that she follows up with her sleep clinic for alternative treatments.    Coronary artery disease due to lipid rich plaque  EKG and troponin were normal in the ER, stress test in 2020 was normal.    Itching  She uses it occasionally.  - hydrOXYzine (ATARAX) 25 MG tablet; Take 1 tablet (25 mg) by mouth 3 times daily as needed for itching    Type 2 diabetes mellitus without complication, without long-term current use of insulin (H)  Sugars at home has been slightly higher lately, 1 .  She refused A1c today, will follow-up with his PCP in the near future.    Female stress incontinence  This is chronic and not new, worse with coughing.  - Adult Urology  Referral; Future  BMI:   Estimated body mass index is 41.77 kg/m  as calculated from the following:    Height as of this encounter: 1.651 m (5' 5\").    Weight as of this encounter: 113.9 kg (251 lb).     Irene Bagley MD  [facility] " OMID Gaines is a 68 year old, presenting for the following health issues:  Recheck Medication and Follow Up (ED follow up 8/16-  [facility]SOB)      8/25/2023    11:29 AM   Additional Questions   Roomed by stella vee         8/25/2023    11:29 AM   Patient Reported Additional Medications   Patient reports taking the following new medications Albuterol inhaler       HPI     ED/UC Followup:    Facility:  [facility]  Date of visit: 8/16/23  Reason for visit: SOB and Acid Reflux  Current Status: improving      rafa is a 68-year-old female who is 2 diabetes, mild chronic anemia, history of MRSA, SVT, thiamine deficiency, hyperparathyroidism, vitamin D deficiency, sleep apnea on CPAP, hypertension, heart disease, hip replacement, GERD, asthma and cholecystectomy.  She is currently here for a follow-up visit from emergency rooms visit on August 16 at [facility].    She woke up in the middle of the night with intense soreness in her throat and feeling of suffocation.  She does have chronic acid reflux for which she takes PPI once a day and she did eat a spicy food earlier that night.  She took some Tums and additional PPI which has not helped her symptoms and since she was having some cough and wheezing she came to the emergency room.    In ER her EKG and troponins were normal, hemoglobin was 10.2, she does have chronic anemia, D-dimer and chest x-ray were negative.    Currently she continues to have mild cough, more bronchial and brings up mucus but no wheezing or shortness of breath.  She denies postnasal drainage.  She has been using albuterol as needed.    She does have history of chronic anemia and previous EGD showed severe large hiatal hernia.  She had colonoscopy in 2019 which showed polyps.    She does have sleep apnea but is not using CPAP.  She has several masks at home none of which are fitting her well.    She has chronic low back pain and takes Tylenol daily and Vicodin as needed, she does some  "physical therapy.    She takes aspirin daily in the morning with food.    Sugars at home has been in 180-200s.    She has had stress test in 2020 which was normal.    She has chronic stress incontinence and would like to see a urologist.    Review of Systems   As above      Objective    /81 (BP Location: Left arm, Patient Position: Sitting, Cuff Size: Adult Large)   Pulse 84   Temp 97.6  F (36.4  C)   Resp 14   Ht 1.651 m (5' 5\")   Wt 113.9 kg (251 lb)   LMP  (LMP Unknown)   SpO2 95%   BMI 41.77 kg/m    Body mass index is 41.77 kg/m .  Physical Exam   General: well appearing female, alert and oriented x3  EYES: Eyelids, conjunctiva, and sclera were normal. Pupils were normal.   HEAD, EARS, NOSE, MOUTH, AND THROAT: no cervical LAD, no thyromegaly or nodules appreciated. TMs are visualized and normal, oropharynx is clear.  RESPIRATORY: respirations non labored, CTA bl, no wheezes, rales, no forced expiratory wheezing.  When she coughs sometimes she does expectorate some mucus but her lungs are clear, no wheezing or mucus impaction.  CARDIOVASCULAR: Heart rate and rhythm were normal. No murmurs, rubs,gallops. There was no peripheral edema. No carotid bruits.  GASTROINTESTINAL: Positive bowel sounds, abdomen is soft, non tender, non distended.     MUSCULOSKELETAL: Muscle mass was normal for age. No joint synovitis or deformity.  LYMPHATIC: There were no enlarged nodes palpable.  SKIN/HAIR/NAILS: Skin color was normal.  No rashes.  NEUROLOGIC: The patient was alert and oriented.  Speech was normal.  There is no facial asymmetry.   PSYCHIATRIC:  Mood and affect were normal.            "

## 2023-08-30 ENCOUNTER — THERAPY VISIT (OUTPATIENT)
Dept: PHYSICAL THERAPY | Facility: REHABILITATION | Age: 68
End: 2023-08-30
Payer: COMMERCIAL

## 2023-08-30 DIAGNOSIS — M54.40 ACUTE MIDLINE LOW BACK PAIN WITH SCIATICA, SCIATICA LATERALITY UNSPECIFIED: Primary | ICD-10-CM

## 2023-08-30 PROCEDURE — 97110 THERAPEUTIC EXERCISES: CPT | Mod: GP

## 2023-09-05 DIAGNOSIS — E78.5 HYPERLIPEMIA: ICD-10-CM

## 2023-09-05 RX ORDER — OMEGA-3-ACID ETHYL ESTERS 1 G/1
2 CAPSULE, LIQUID FILLED ORAL 2 TIMES DAILY
Qty: 360 CAPSULE | Refills: 1 | Status: SHIPPED | OUTPATIENT
Start: 2023-09-05 | End: 2023-09-11

## 2023-09-05 NOTE — TELEPHONE ENCOUNTER
Routing refill request to provider for review/approval because:  Drug not active on patient's medication list    Last Written Prescription Date:  12/31/21  Last Fill Quantity: 360 capsules,  # refills: 1   Last office visit provider:  08/25/23     Requested Prescriptions   Pending Prescriptions Disp Refills    omega-3 acid ethyl esters (LOVAZA) 1 g capsule 360 capsule 1     Sig: Take 2 capsules by mouth 2 times daily       There is no refill protocol information for this order          Ronda Otero RN 09/05/23 11:14 AM

## 2023-09-05 NOTE — TELEPHONE ENCOUNTER
Patient is requesting a 90 day supply    Patient is also out of medication                    Disp Refills Start End BORA    omega-3 acid ethyl esters (LOVAZA) 1 g capsule (Discontinued) 360 capsule 1 12/31/2021 4/5/2022 No   Sig: TAKE 2 CAPSULES BY MOUTH TWICE DAILY   Patient not taking: Reported on 3/30/2022        Sent to pharmacy as: Omega-3-acid Ethyl Esters 1 GM Oral Capsule (LOVAZA)   Class: E-Prescribe   Order: [id number]   E-Prescribing Status: Receipt confirmed by pharmacy (12/31/2021  7:29 AM CST)   E-Cancel Status: Request denied by pharmacy (4/5/2022  7:20 AM CDT)       E-Cancel Status Note: Unable to Cancel Rx. Please contact Pharmacy

## 2023-09-06 ENCOUNTER — THERAPY VISIT (OUTPATIENT)
Dept: PHYSICAL THERAPY | Facility: REHABILITATION | Age: 68
End: 2023-09-06
Payer: COMMERCIAL

## 2023-09-06 DIAGNOSIS — M54.40 ACUTE MIDLINE LOW BACK PAIN WITH SCIATICA, SCIATICA LATERALITY UNSPECIFIED: Primary | ICD-10-CM

## 2023-09-06 PROCEDURE — 97140 MANUAL THERAPY 1/> REGIONS: CPT | Mod: GP

## 2023-09-06 PROCEDURE — 97110 THERAPEUTIC EXERCISES: CPT | Mod: GP

## 2023-09-11 ENCOUNTER — OFFICE VISIT (OUTPATIENT)
Dept: INTERNAL MEDICINE | Facility: CLINIC | Age: 68
End: 2023-09-11
Payer: COMMERCIAL

## 2023-09-11 VITALS
OXYGEN SATURATION: 98 % | HEIGHT: 65 IN | SYSTOLIC BLOOD PRESSURE: 120 MMHG | TEMPERATURE: 98 F | WEIGHT: 253 LBS | RESPIRATION RATE: 14 BRPM | BODY MASS INDEX: 42.15 KG/M2 | HEART RATE: 68 BPM | DIASTOLIC BLOOD PRESSURE: 68 MMHG

## 2023-09-11 DIAGNOSIS — G89.29 OTHER CHRONIC PAIN: ICD-10-CM

## 2023-09-11 DIAGNOSIS — I25.83 CORONARY ARTERY DISEASE DUE TO LIPID RICH PLAQUE: Primary | ICD-10-CM

## 2023-09-11 DIAGNOSIS — E78.2 MIXED HYPERLIPIDEMIA: ICD-10-CM

## 2023-09-11 DIAGNOSIS — I65.29 STENOSIS OF CAROTID ARTERY, UNSPECIFIED LATERALITY: ICD-10-CM

## 2023-09-11 DIAGNOSIS — R06.02 SOB (SHORTNESS OF BREATH) ON EXERTION: ICD-10-CM

## 2023-09-11 DIAGNOSIS — Z79.4 TYPE 2 DIABETES MELLITUS WITHOUT COMPLICATION, WITH LONG-TERM CURRENT USE OF INSULIN (H): ICD-10-CM

## 2023-09-11 DIAGNOSIS — N18.31 STAGE 3A CHRONIC KIDNEY DISEASE (H): ICD-10-CM

## 2023-09-11 DIAGNOSIS — E11.9 TYPE 2 DIABETES MELLITUS WITHOUT COMPLICATION, WITH LONG-TERM CURRENT USE OF INSULIN (H): ICD-10-CM

## 2023-09-11 DIAGNOSIS — I25.10 CORONARY ARTERY DISEASE DUE TO LIPID RICH PLAQUE: Primary | ICD-10-CM

## 2023-09-11 LAB
ALBUMIN SERPL BCG-MCNC: 4.4 G/DL (ref 3.5–5.2)
ALP SERPL-CCNC: 83 U/L (ref 35–104)
ALT SERPL W P-5'-P-CCNC: 19 U/L (ref 0–50)
ANION GAP SERPL CALCULATED.3IONS-SCNC: 13 MMOL/L (ref 7–15)
AST SERPL W P-5'-P-CCNC: 26 U/L (ref 0–45)
BILIRUB SERPL-MCNC: 0.4 MG/DL
BUN SERPL-MCNC: 23.5 MG/DL (ref 8–23)
CALCIUM SERPL-MCNC: 10.3 MG/DL (ref 8.8–10.2)
CHLORIDE SERPL-SCNC: 103 MMOL/L (ref 98–107)
CHOLEST SERPL-MCNC: 126 MG/DL
CREAT SERPL-MCNC: 1 MG/DL (ref 0.51–0.95)
DEPRECATED HCO3 PLAS-SCNC: 23 MMOL/L (ref 22–29)
EGFRCR SERPLBLD CKD-EPI 2021: 62 ML/MIN/1.73M2
ERYTHROCYTE [DISTWIDTH] IN BLOOD BY AUTOMATED COUNT: 12.8 % (ref 10–15)
GLUCOSE SERPL-MCNC: 162 MG/DL (ref 70–99)
HBA1C MFR BLD: 7.8 % (ref 0–5.6)
HCT VFR BLD AUTO: 34.6 % (ref 35–47)
HDLC SERPL-MCNC: 29 MG/DL
HGB BLD-MCNC: 11.3 G/DL (ref 11.7–15.7)
LDLC SERPL CALC-MCNC: 44 MG/DL
MCH RBC QN AUTO: 29.6 PG (ref 26.5–33)
MCHC RBC AUTO-ENTMCNC: 32.7 G/DL (ref 31.5–36.5)
MCV RBC AUTO: 91 FL (ref 78–100)
NONHDLC SERPL-MCNC: 97 MG/DL
PLATELET # BLD AUTO: 218 10E3/UL (ref 150–450)
POTASSIUM SERPL-SCNC: 4.4 MMOL/L (ref 3.4–5.3)
PROT SERPL-MCNC: 7.3 G/DL (ref 6.4–8.3)
RBC # BLD AUTO: 3.82 10E6/UL (ref 3.8–5.2)
SODIUM SERPL-SCNC: 139 MMOL/L (ref 136–145)
TRIGL SERPL-MCNC: 263 MG/DL
TSH SERPL DL<=0.005 MIU/L-ACNC: 2.51 UIU/ML (ref 0.3–4.2)
WBC # BLD AUTO: 6.2 10E3/UL (ref 4–11)

## 2023-09-11 PROCEDURE — 84443 ASSAY THYROID STIM HORMONE: CPT | Performed by: INTERNAL MEDICINE

## 2023-09-11 PROCEDURE — 99214 OFFICE O/P EST MOD 30 MIN: CPT | Performed by: INTERNAL MEDICINE

## 2023-09-11 PROCEDURE — 85027 COMPLETE CBC AUTOMATED: CPT | Performed by: INTERNAL MEDICINE

## 2023-09-11 PROCEDURE — 80061 LIPID PANEL: CPT | Performed by: INTERNAL MEDICINE

## 2023-09-11 PROCEDURE — 36415 COLL VENOUS BLD VENIPUNCTURE: CPT | Performed by: INTERNAL MEDICINE

## 2023-09-11 PROCEDURE — 80053 COMPREHEN METABOLIC PANEL: CPT | Performed by: INTERNAL MEDICINE

## 2023-09-11 PROCEDURE — 83036 HEMOGLOBIN GLYCOSYLATED A1C: CPT | Performed by: INTERNAL MEDICINE

## 2023-09-11 RX ORDER — OMEGA-3-ACID ETHYL ESTERS 1 G/1
2 CAPSULE, LIQUID FILLED ORAL 2 TIMES DAILY
Qty: 360 CAPSULE | Refills: 1 | Status: SHIPPED | OUTPATIENT
Start: 2023-09-11 | End: 2024-05-30

## 2023-09-11 RX ORDER — HYDROCODONE BITARTRATE AND ACETAMINOPHEN 5; 325 MG/1; MG/1
1 TABLET ORAL EVERY 6 HOURS PRN
Qty: 18 TABLET | Refills: 0 | Status: SHIPPED | OUTPATIENT
Start: 2023-09-11 | End: 2023-09-14

## 2023-09-11 NOTE — PATIENT INSTRUCTIONS
Currently [facility] is not providing or scheduling any RSV vaccines or monoclonal antibodies.  We are waiting to receive our supply of these items and for more information on insurance benefit coverage.  We plan to provide these options to the following patients in the future, when they are available:    RSV vaccine  Patients 60+ years*  Pregnant patients at 32 to 36 weeks' gestation    *It is recommended that patients on Medicare insurance plans receive their RSV vaccine in the pharmacy.

## 2023-09-11 NOTE — PROGRESS NOTES
Assessment & Plan     Coronary artery disease due to lipid rich plaque  BP controlled on statin and aspirin    - Lipid panel reflex to direct LDL Non-fasting; Future  - Lipid panel reflex to direct LDL Non-fasting    Type 2 diabetes mellitus without complication, with long-term current use of insulin (H)  Has not been compliant with ozempic and has had weight gain . If insurance covers jardiance would be a good choice . Trial of low dose jardiance close follow up   - empagliflozin (JARDIANCE) 10 MG TABS tablet; Take 1 tablet (10 mg) by mouth daily  - US Carotid Bilateral; Future  - Comprehensive metabolic panel; Future  - Hemoglobin A1c; Future  - Echocardiogram Complete; Future  - CBC with platelets; Future  - Comprehensive metabolic panel  - Hemoglobin A1c  - CBC with platelets    Stage 3a chronic kidney disease (H)      SOB (shortness of breath) on exertion  Non specific symptoms   Stress test 2020 negative   CT angio 2023 showed patent coronary arteries .   Will repeat echo , lightheadedness is non specific as well   Defer stress testing for now . Focus on improving blood sugars , surveillance US carotids  May need an increase in her statin   Anxiety and deprtession may be playing arole in her missing doses   - US Carotid Bilateral; Future  - Comprehensive metabolic panel; Future  - Hemoglobin A1c; Future  - Echocardiogram Complete; Future  - CBC with platelets; Future  - TSH; Future  - Comprehensive metabolic panel  - Hemoglobin A1c  - CBC with platelets  - TSH    Stenosis of carotid artery, unspecified laterality    - US Carotid Bilateral; Future  - HYDROcodone-acetaminophen (NORCO) 5-325 MG tablet; Take 1 tablet by mouth every 6 hours as needed for severe pain    Mixed hyperlipidemia    - omega-3 acid ethyl esters (LOVAZA) 1 g capsule; Take 2 capsules by mouth 2 times daily    Other chronic pain  Leg pain is non specific . Not sciatica , not typical of statin myopathy as unilateral recommend stretching  "excercises   - HYDROcodone-acetaminophen (NORCO) 5-325 MG tablet; Take 1 tablet by mouth every 6 hours as needed for severe pain      Review labs and follow up after echo is done        BMI:   Estimated body mass index is 42.1 kg/m  as calculated from the following:    Height as of this encounter: 1.651 m (5' 5\").    Weight as of this encounter: 114.8 kg (253 lb).           Bela Corbin MD  [facility]    Edmundo Gaines is a 68 year old, presenting for the following health issues:  Exertional SOB , multiple ER visits for     INDICATION: Flank pain, kidney stone suspected   COMPARISON: CT AP 06/25/2023   TECHNIQUE: CT scan of the abdomen and pelvis was performed without oral or IV contrast. Multiplanar reformats were obtained. Dose reduction techniques were used.   CONTRAST: None.     FINDINGS:   LOWER CHEST: Visualized lungs are clear. No pleural effusion. Heart size normal with no pericardial effusion.  Small esophageal hiatal hernia.     HEPATOBILIARY: Mild diffuse hepatic steatosis. Liver is otherwise normal. No bile duct dilatation. Cholecystectomy.     PANCREAS: Normal.     SPLEEN: Spleen size normal.     ADRENAL GLANDS: Benign 2 cm adenoma left adrenal gland requires no follow-up.     KIDNEYS/BLADDER: Kidneys, ureters and bladder are normal.     BOWEL: Normal appendix. Colonic diverticulosis with no diverticulitis or colitis. Bowel is otherwise normal with no obstruction or inflammatory change. No free air. No free fluid.     LYMPH NODES: No lymphadenopathy.     VASCULATURE: Normal caliber abdominal aorta.     PELVIC ORGANS: No pelvic mass or fluid.     6/2023  MRI PRESSION:  1.  No acute intracranial process.  2.  A few nonspecific T2/FLAIR hyperintensities within the cerebral white matter may reflect minor sequela of previous microvascular ischemic or inflammatory insults and have also been described in the setting of chronic migraine headaches.  Images on Order " [id number]  Creatinine   Date Value Ref Range Status   06/05/2023 0.97 (H) 0.51 - 0.95 mg/dL Final     Creatinine POCT   Date Value Ref Range Status   06/05/2023 1.1 0.6 - 1.1 mg/dL Final         Lab Results   Component Value Date    A1C 7.1 04/03/2023    A1C 5.9 11/01/2022    A1C 6.1 07/06/2022    A1C 6.7 04/05/2022    A1C 7.9 10/15/2021       Muscle Pain (Right leg cramps x 7 days at bedtime ) and Dizziness (Pt notes some intermittent eps of feeling lightheaded )      9/11/2023    10:42 AM   Additional Questions   Roomed by Aisha       History of Present Illness       Reason for visit:  Cramping in legs and feet and futigue and out of breath  Symptom onset:  1-2 weeks ago  Symptoms include:  Crampng in legs and feet shortness ofbreath  Symptom intensity:  Moderate  Symptom progression:  Staying the same  Had these symptoms before:  No  What makes it worse:  No  What makes it better:  Rest    She eats 2-3 servings of fruits and vegetables daily.She consumes 2 sweetened beverage(s) daily.She exercises with enough effort to increase her heart rate 9 or less minutes per day.  She exercises with enough effort to increase her heart rate 3 or less days per week. She is missing 1 dose(s) of medications per week.  She is not taking prescribed medications regularly due to remembering to take.       Patient Active Problem List   Diagnosis    Diffuse Nontoxic Goiter    Type 2 diabetes mellitus (H)    Essential hypertension    Esophageal reflux    Dyslipidemia, goal LDL below 70    Hiatal hernia    Sensorineural hearing loss    Coronary artery disease due to lipid rich plaque    Morbid obesity with BMI of 40.0-44.9, adult (H)    RADHA on CPAP    Low serum vitamin B12    Elevated ferritin level    Chronic kidney disease, stage 3 (H)    Knee osteoarthritis    Peripheral arterial disease (H)    Chronic low back pain, unspecified back pain laterality, unspecified whether sciatica present    Benign neoplasm of transverse colon     Diverticular disease of large intestine    Dysphagia    Epigastric pain    History of colonic polyps    Iron deficiency anemia    Polyp of colon    Acute midline low back pain with sciatica, sciatica laterality unspecified     Current Outpatient Medications   Medication    acetaminophen (TYLENOL) 500 MG tablet    allopurinol (ZYLOPRIM) 100 MG tablet    aspirin (ASA) 81 MG EC tablet    blood glucose test strips    cholecalciferol, vitamin D3, (CHOLECALCIFEROL) 1,000 unit tablet    Cinnamon Bark POWD    Continuous Blood Gluc Sensor (FREESTYLE MIRANDA 2 SENSOR) MISC    cyclobenzaprine (FLEXERIL) 10 MG tablet    docusate sodium (DOK) 100 MG capsule        famotidine (PEPCID) 20 MG tablet    gabapentin (NEURONTIN) 300 MG capsule    generic lancets (MICROLET LANCET)    hydrochlorothiazide (HYDRODIURIL) 12.5 MG tablet    HYDROcodone-acetaminophen (NORCO) 5-325 MG tablet    hydrOXYzine (ATARAX) 25 MG tablet    insulin degludec (TRESIBA FLEXTOUCH) 200 UNIT/ML pen    insulin pen needle (32G X 4 MM) 32G X 4 MM miscellaneous    lisinopril (ZESTRIL) 5 MG tablet    melatonin (MELATIN) 3 mg Tab tablet    meloxicam (MOBIC) 15 MG tablet    nitroGLYcerin (NITROSTAT) 0.3 MG sublingual tablet    omega-3 acid ethyl esters (LOVAZA) 1 g capsule    omeprazole (PRILOSEC) 40 MG DR capsule    ONETOUCH DELICA PLUS LANCET 33 gauge Misc    rosuvastatin (CRESTOR) 10 MG tablet    Semaglutide, 2 MG/DOSE, (OZEMPIC) 8 MG/3ML pen    Thiamine HCl (B-1) 100 MG TABS    triamcinolone (KENALOG) 0.1 % external cream    valACYclovir (VALTREX) 500 MG tablet    vitamin B-12 (CYANOCOBALAMIN) 2500 MCG sublingual tablet     No current facility-administered medications for this visit.     Facility-Administered Medications Ordered in Other Visits   Medication    lidocaine 1 % 10 mL             Review of Systems         Objective    /68 (BP Location: Left arm, Patient Position: Sitting, Cuff Size: Adult Small)   Pulse 68   Temp 98  F (36.7  C) (Tympanic)    "Resp 14   Ht 1.651 m (5' 5\")   Wt 114.8 kg (253 lb)   LMP  (LMP Unknown)   SpO2 98%   BMI 42.10 kg/m    Body mass index is 42.1 kg/m .  Physical Exam   GENERAL: healthy, alert and no distress  NECK: no adenopathy, no asymmetry, masses, or scars and thyroid normal to palpation  RESP: lungs clear to auscultation - no rales, rhonchi or wheezes  CV: regular rate and rhythm, normal S1 S2, no S3 or S4, no murmur, click or rub, no peripheral edema and peripheral pulses strong          "

## 2023-09-13 ENCOUNTER — ALLIED HEALTH/NURSE VISIT (OUTPATIENT)
Dept: EDUCATION SERVICES | Facility: CLINIC | Age: 68
End: 2023-09-13
Payer: COMMERCIAL

## 2023-09-13 DIAGNOSIS — E11.9 DIABETES MELLITUS (H): Primary | ICD-10-CM

## 2023-09-13 PROCEDURE — G0108 DIAB MANAGE TRN  PER INDIV: HCPCS | Mod: AE

## 2023-09-13 NOTE — PROGRESS NOTES
Diabetes Self-Management Education & Support    Presents for: Individual review    Type of Service: In Person Visit      ASSESSMENT:  Patient seen today for follow-up.  She explains she has had a lot going on the last couple of months.  Patient reports some losses in her family and some health issues of her own, visits to the ED.  Patient states she is getting back to work now and is doing physical therapy for her back.  She is hoping to get back into the gym, patient notes she was going to the gym about 3 days a week but has not been since June.  She thinks that is helpful for her mental health as well.  We discussed the benefits today and encouraged patient to work on increasing activity.  Patient states she oftentimes cannot remember whether or not she has taken her Ozempic, therefore she will not take it because she does not want to double dose.  However she notes she can tell that she missed it because she will notice an increase in appetite.  We discussed ways of remembering to take it.  Patient was given a chart today to check off when she gives the Ozempic dose every Sunday that way she will know if she took it or not.  She was also started on Jardiance 10 mg/day and just started yesterday.    Reviewed kiah reports.  Patient notes issues with sensors staying on for longer than 3 to 4 days.  She has been getting these replaced.  Patient was given skin tac today to try.    We reviewed healthy eating, carbohydrate foods and portions as well as goals.    REPORTS:           Patient's most recent   Lab Results   Component Value Date    A1C 7.8 09/11/2023     is not meeting goal of <7.0    Diabetes knowledge and skills assessment:   Patient is knowledgeable in diabetes management concepts related to: Healthy Eating, Being Active, Monitoring, Taking Medication, Problem Solving, Reducing Risks, and Healthy Coping    Continue education with the following diabetes management concepts: Healthy Eating, Being Active,  "Monitoring, Taking Medication, Problem Solving, Reducing Risks, and Healthy Coping    Based on learning assessment above, most appropriate setting for further diabetes education would be: Individual setting.      PLAN    Chart when Ozempic is taken.  Continue Jardiance.  Try to work on increasing activity, patient hoping to go back to the gym, continue physical therapy for her back.  Start to be a little more mindful of carbohydrate foods and portions.  Continue with kiah to monitor blood sugars.      Follow-up: December 11 as scheduled, will have A1c done that morning as well.    See Care Plan for co-developed, patient-state behavior change goals.  AVS provided for patient today.    Education Materials Provided:  Carbohydrate Counting and chart for Ozempic       SUBJECTIVE/OBJECTIVE:  Presents for: Individual review  Accompanied by: Self  Diabetes education in the past 24mo: No  Diabetes type: Type 2  Cultural Influences/Ethnic Background:  Not  or     Diabetes Symptoms & Complications:          Patient Problem List and Family Medical History reviewed for relevant medical history, current medical status, and diabetes risk factors.    Vitals:  LMP  (LMP Unknown)   Estimated body mass index is 42.1 kg/m  as calculated from the following:    Height as of 9/11/23: 1.651 m (5' 5\").    Weight as of 9/11/23: 114.8 kg (253 lb).   Last 3 BP:   BP Readings from Last 3 Encounters:   09/11/23 120/68   08/25/23 128/81   07/03/23 121/67       History   Smoking Status    Former    Packs/day: 0.25    Years: 44.00    Types: Cigarettes    Quit date: 1/20/2017   Smokeless Tobacco    Never       Labs:  Lab Results   Component Value Date    A1C 7.8 09/11/2023     Lab Results   Component Value Date     09/11/2023     04/10/2022     04/10/2022     Lab Results   Component Value Date    LDL 44 09/11/2023    LDL 59 07/21/2020     Direct Measure HDL   Date Value Ref Range Status   09/11/2023 29 (L) >=50 " mg/dL Final   ]  GFR Estimate   Date Value Ref Range Status   09/11/2023 62 >60 mL/min/1.73m2 Final   06/28/2021 56 (L) >60 mL/min/[1.73_m2] Final     GFR, ESTIMATED POCT   Date Value Ref Range Status   06/05/2023 55 (L) >60 mL/min/1.73m2 Final     GFR Estimate If Black   Date Value Ref Range Status   06/28/2021 68 >60 mL/min/[1.73_m2] Final     Lab Results   Component Value Date    CR 1.00 09/11/2023     No results found for: MICROALBUMIN    Healthy Eating:  Healthy Eating Assessed Today: Yes    Being Active:  Being Active Assessed Today: Yes  Exercise:: Yes    Monitoring:  Monitoring Assessed Today: Yes  Blood Glucose Meter: CGM    Taking Medications:  Diabetes Medication(s)       Insulin       insulin degludec (TRESIBA FLEXTOUCH) 200 UNIT/ML pen    Inject 8 Units Subcutaneous daily      Sodium-Glucose Co-Transporter 2 (SGLT2) Inhibitors       empagliflozin (JARDIANCE) 10 MG TABS tablet    Take 1 tablet (10 mg) by mouth daily      Incretin Mimetic Agents       Semaglutide, 2 MG/DOSE, (OZEMPIC) 8 MG/3ML pen    Inject 2 mg Subcutaneous every 7 days            Taking Medication Assessed Today: Yes  Problems taking diabetes medications regularly?: Yes  Diabetes medication side effects?: No    Problem Solving:  Problem Solving Assessed Today: Yes    Reducing Risks:  Reducing Risks Assessed Today: Yes    Healthy Coping:  Healthy Coping Assessed Today: Yes  Emotional response to diabetes: Ready to learn  Stage of change: MAINTENANCE (Working to maintain change, with risk of relapse)  Patient Activation Measure Survey Score:       No data to display                  Care Plan and Education Provided:  Care Plan: Diabetes   Updates made by Samira Joseph RN since 9/13/2023 12:00 AM        Problem: Diabetes Self-Management Education Needed to Optimize Self-Care Behaviors         Goal: Healthy Eating - follow a healthy eating pattern for diabetes    Note:    Watch portions of carb foods        Goal: Being Active - get  regular physical activity, working up to at least 150 minutes per week    This Visit's Progress: 50%   Note:    Continue to work on daily activity        Goal: Monitoring - monitor glucose and ketones as directed    This Visit's Progress: 60%   Note:    Monitor BG daily with [employer] 2 mitesh. Scan at least every 8 hours        Goal: Taking Medication - patient is consistently taking medications as directed    This Visit's Progress: 60%   Note:    Take medication as prescribed. Document when Ozempic is taken so  you don't forget              Time Spent: 60 minutes  Encounter Type: Individual    Any diabetes medication dose changes were made via the CDE Protocol per the patient's referring provider. A copy of this encounter was shared with the provider.

## 2023-09-13 NOTE — LETTER
9/13/2023         RE: Liana Briceño  [address]  [place] [address]        Dear Colleague,    Thank you for referring your patient, Liana Briceño, to the [facility]. Please see a copy of my visit note below.    Diabetes Self-Management Education & Support    Presents for: Individual review    Type of Service: In Person Visit      ASSESSMENT:  Patient seen today for follow-up.  She explains she has had a lot going on the last couple of months.  Patient reports some losses in her family and some health issues of her own, visits to the ED.  Patient states she is getting back to work now and is doing physical therapy for her back.  She is hoping to get back into the gym, patient notes she was going to the gym about 3 days a week but has not been since June.  She thinks that is helpful for her mental health as well.  We discussed the benefits today and encouraged patient to work on increasing activity.  Patient states she oftentimes cannot remember whether or not she has taken her Ozempic, therefore she will not take it because she does not want to double dose.  However she notes she can tell that she missed it because she will notice an increase in appetite.  We discussed ways of remembering to take it.  Patient was given a chart today to check off when she gives the Ozempic dose every Sunday that way she will know if she took it or not.  She was also started on Jardiance 10 mg/day and just started yesterday.    Reviewed kiah reports.  Patient notes issues with sensors staying on for longer than 3 to 4 days.  She has been getting these replaced.  Patient was given skin tac today to try.    We reviewed healthy eating, carbohydrate foods and portions as well as goals.    REPORTS:           Patient's most recent   Lab Results   Component Value Date    A1C 7.8 09/11/2023     is not meeting goal of <7.0    Diabetes knowledge and skills assessment:   Patient is knowledgeable in diabetes  "management concepts related to: Healthy Eating, Being Active, Monitoring, Taking Medication, Problem Solving, Reducing Risks, and Healthy Coping    Continue education with the following diabetes management concepts: Healthy Eating, Being Active, Monitoring, Taking Medication, Problem Solving, Reducing Risks, and Healthy Coping    Based on learning assessment above, most appropriate setting for further diabetes education would be: Individual setting.      PLAN    Chart when Ozempic is taken.  Continue Jardiance.  Try to work on increasing activity, patient hoping to go back to the gym, continue physical therapy for her back.  Start to be a little more mindful of carbohydrate foods and portions.  Continue with kiah to monitor blood sugars.      Follow-up: December 11 as scheduled, will have A1c done that morning as well.    See Care Plan for co-developed, patient-state behavior change goals.  AVS provided for patient today.    Education Materials Provided:  Carbohydrate Counting and chart for Ozempic       SUBJECTIVE/OBJECTIVE:  Presents for: Individual review  Accompanied by: Self  Diabetes education in the past 24mo: No  Diabetes type: Type 2  Cultural Influences/Ethnic Background:  Not  or     Diabetes Symptoms & Complications:          Patient Problem List and Family Medical History reviewed for relevant medical history, current medical status, and diabetes risk factors.    Vitals:  LMP  (LMP Unknown)   Estimated body mass index is 42.1 kg/m  as calculated from the following:    Height as of 9/11/23: 1.651 m (5' 5\").    Weight as of 9/11/23: 114.8 kg (253 lb).   Last 3 BP:   BP Readings from Last 3 Encounters:   09/11/23 120/68   08/25/23 128/81   07/03/23 121/67       History   Smoking Status     Former     Packs/day: 0.25     Years: 44.00     Types: Cigarettes     Quit date: 1/20/2017   Smokeless Tobacco     Never       Labs:  Lab Results   Component Value Date    A1C 7.8 09/11/2023     Lab " Results   Component Value Date     09/11/2023     04/10/2022     04/10/2022     Lab Results   Component Value Date    LDL 44 09/11/2023    LDL 59 07/21/2020     Direct Measure HDL   Date Value Ref Range Status   09/11/2023 29 (L) >=50 mg/dL Final   ]  GFR Estimate   Date Value Ref Range Status   09/11/2023 62 >60 mL/min/1.73m2 Final   06/28/2021 56 (L) >60 mL/min/[1.73_m2] Final     GFR, ESTIMATED POCT   Date Value Ref Range Status   06/05/2023 55 (L) >60 mL/min/1.73m2 Final     GFR Estimate If Black   Date Value Ref Range Status   06/28/2021 68 >60 mL/min/[1.73_m2] Final     Lab Results   Component Value Date    CR 1.00 09/11/2023     No results found for: MICROALBUMIN    Healthy Eating:  Healthy Eating Assessed Today: Yes    Being Active:  Being Active Assessed Today: Yes  Exercise:: Yes    Monitoring:  Monitoring Assessed Today: Yes  Blood Glucose Meter: CGM    Taking Medications:  Diabetes Medication(s)       Insulin       insulin degludec (TRESIBA FLEXTOUCH) 200 UNIT/ML pen    Inject 8 Units Subcutaneous daily      Sodium-Glucose Co-Transporter 2 (SGLT2) Inhibitors       empagliflozin (JARDIANCE) 10 MG TABS tablet    Take 1 tablet (10 mg) by mouth daily      Incretin Mimetic Agents       Semaglutide, 2 MG/DOSE, (OZEMPIC) 8 MG/3ML pen    Inject 2 mg Subcutaneous every 7 days            Taking Medication Assessed Today: Yes  Problems taking diabetes medications regularly?: Yes  Diabetes medication side effects?: No    Problem Solving:  Problem Solving Assessed Today: Yes    Reducing Risks:  Reducing Risks Assessed Today: Yes    Healthy Coping:  Healthy Coping Assessed Today: Yes  Emotional response to diabetes: Ready to learn  Stage of change: MAINTENANCE (Working to maintain change, with risk of relapse)  Patient Activation Measure Survey Score:       No data to display                  Care Plan and Education Provided:  Care Plan: Diabetes   Updates made by Samira Joseph RN since  9/13/2023 12:00 AM        Problem: Diabetes Self-Management Education Needed to Optimize Self-Care Behaviors         Goal: Healthy Eating - follow a healthy eating pattern for diabetes    Note:    Watch portions of carb foods        Goal: Being Active - get regular physical activity, working up to at least 150 minutes per week    This Visit's Progress: 50%   Note:    Continue to work on daily activity        Goal: Monitoring - monitor glucose and ketones as directed    This Visit's Progress: 60%   Note:    Monitor BG daily with [employer] mitesh. Scan at least every 8 hours        Goal: Taking Medication - patient is consistently taking medications as directed    This Visit's Progress: 60%   Note:    Take medication as prescribed. Document when Ozempic is taken so  you don't forget              Time Spent: 60 minutes  Encounter Type: Individual    Any diabetes medication dose changes were made via the CDE Protocol per the patient's referring provider. A copy of this encounter was shared with the provider.

## 2023-09-14 ENCOUNTER — TELEPHONE (OUTPATIENT)
Dept: INTERNAL MEDICINE | Facility: CLINIC | Age: 68
End: 2023-09-14

## 2023-09-14 NOTE — TELEPHONE ENCOUNTER
September 14, 2023    Home health orders was received via fax for Dr. Corbin to sign.  Patient label was attached to paperwork and placed in provider's inbox to be signed.    Alissa Fish

## 2023-09-18 NOTE — TELEPHONE ENCOUNTER
September 18, 2023    Home health orders was picked up from outbox of Dr. Corbin.  Paperwork has been reviewed and is complete.  Per initial initial request, this was sent via fax to [phone].     Alissa Fish

## 2023-09-20 ENCOUNTER — THERAPY VISIT (OUTPATIENT)
Dept: PHYSICAL THERAPY | Facility: REHABILITATION | Age: 68
End: 2023-09-20
Payer: COMMERCIAL

## 2023-09-20 DIAGNOSIS — M54.40 ACUTE MIDLINE LOW BACK PAIN WITH SCIATICA, SCIATICA LATERALITY UNSPECIFIED: Primary | ICD-10-CM

## 2023-09-20 PROCEDURE — 97140 MANUAL THERAPY 1/> REGIONS: CPT | Mod: GP

## 2023-09-20 PROCEDURE — 97110 THERAPEUTIC EXERCISES: CPT | Mod: GP

## 2023-09-21 ENCOUNTER — HOSPITAL ENCOUNTER (OUTPATIENT)
Dept: ULTRASOUND IMAGING | Facility: HOSPITAL | Age: 68
Discharge: HOME | End: 2023-09-21
Attending: INTERNAL MEDICINE | Admitting: INTERNAL MEDICINE
Payer: COMMERCIAL

## 2023-09-21 DIAGNOSIS — R06.02 SOB (SHORTNESS OF BREATH) ON EXERTION: ICD-10-CM

## 2023-09-21 DIAGNOSIS — Z79.4 TYPE 2 DIABETES MELLITUS WITHOUT COMPLICATION, WITH LONG-TERM CURRENT USE OF INSULIN (H): ICD-10-CM

## 2023-09-21 DIAGNOSIS — E11.9 TYPE 2 DIABETES MELLITUS WITHOUT COMPLICATION, WITH LONG-TERM CURRENT USE OF INSULIN (H): ICD-10-CM

## 2023-09-21 DIAGNOSIS — I65.29 STENOSIS OF CAROTID ARTERY, UNSPECIFIED LATERALITY: ICD-10-CM

## 2023-09-21 PROCEDURE — 93880 EXTRACRANIAL BILAT STUDY: CPT

## 2023-09-26 ENCOUNTER — TELEPHONE (OUTPATIENT)
Dept: INTERNAL MEDICINE | Facility: CLINIC | Age: 68
End: 2023-09-26

## 2023-09-26 DIAGNOSIS — E78.5 HYPERLIPEMIA: ICD-10-CM

## 2023-09-26 DIAGNOSIS — E11.42 DIABETIC POLYNEUROPATHY ASSOCIATED WITH TYPE 2 DIABETES MELLITUS (H): ICD-10-CM

## 2023-09-26 DIAGNOSIS — I10 ESSENTIAL HYPERTENSION WITH GOAL BLOOD PRESSURE LESS THAN 140/90: ICD-10-CM

## 2023-09-26 DIAGNOSIS — I10 ESSENTIAL HYPERTENSION: ICD-10-CM

## 2023-09-26 RX ORDER — HYDROCHLOROTHIAZIDE 12.5 MG/1
12.5 TABLET ORAL DAILY
Qty: 90 TABLET | Refills: 3 | OUTPATIENT
Start: 2023-09-26

## 2023-09-26 RX ORDER — LISINOPRIL 5 MG/1
TABLET ORAL
Qty: 135 TABLET | Refills: 2 | OUTPATIENT
Start: 2023-09-26

## 2023-09-26 RX ORDER — HYDROCHLOROTHIAZIDE 12.5 MG/1
12.5 TABLET ORAL DAILY
Qty: 90 TABLET | Refills: 3 | Status: SHIPPED | OUTPATIENT
Start: 2023-09-26

## 2023-09-26 RX ORDER — ROSUVASTATIN CALCIUM 10 MG/1
10 TABLET, COATED ORAL DAILY
Qty: 90 TABLET | Refills: 0 | OUTPATIENT
Start: 2023-09-26

## 2023-09-26 RX ORDER — LISINOPRIL 5 MG/1
TABLET ORAL
Qty: 135 TABLET | Refills: 2 | Status: SHIPPED | OUTPATIENT
Start: 2023-09-26 | End: 2024-07-10

## 2023-09-26 RX ORDER — GABAPENTIN 300 MG/1
300 CAPSULE ORAL 3 TIMES DAILY
Qty: 270 CAPSULE | Refills: 3 | Status: SHIPPED | OUTPATIENT
Start: 2023-09-26 | End: 2023-11-22

## 2023-09-26 RX ORDER — ROSUVASTATIN CALCIUM 10 MG/1
TABLET, COATED ORAL
Qty: 90 TABLET | Refills: 0 | Status: SHIPPED | OUTPATIENT
Start: 2023-09-26 | End: 2024-01-09

## 2023-09-29 ENCOUNTER — HOSPITAL ENCOUNTER (OUTPATIENT)
Dept: CARDIOLOGY | Facility: HOSPITAL | Age: 68
Discharge: HOME | End: 2023-09-29
Attending: INTERNAL MEDICINE | Admitting: INTERNAL MEDICINE
Payer: COMMERCIAL

## 2023-09-29 DIAGNOSIS — E11.9 TYPE 2 DIABETES MELLITUS WITHOUT COMPLICATION, WITH LONG-TERM CURRENT USE OF INSULIN (H): ICD-10-CM

## 2023-09-29 DIAGNOSIS — Z79.4 TYPE 2 DIABETES MELLITUS WITHOUT COMPLICATION, WITH LONG-TERM CURRENT USE OF INSULIN (H): ICD-10-CM

## 2023-09-29 DIAGNOSIS — R06.02 SOB (SHORTNESS OF BREATH) ON EXERTION: ICD-10-CM

## 2023-09-29 PROCEDURE — 93306 TTE W/DOPPLER COMPLETE: CPT

## 2023-09-29 PROCEDURE — 93306 TTE W/DOPPLER COMPLETE: CPT | Mod: 26 | Performed by: INTERNAL MEDICINE

## 2023-10-03 ENCOUNTER — TRANSFERRED RECORDS (OUTPATIENT)
Dept: HEALTH INFORMATION MANAGEMENT | Facility: CLINIC | Age: 68
End: 2023-10-03

## 2023-10-03 LAB — RETINOPATHY: NEGATIVE

## 2023-10-24 NOTE — PROGRESS NOTES
DISCHARGE  Reason for Discharge: Patient has met all goals.    Equipment Issued: none    Discharge Plan: Patient to continue home program.    Referring Provider:  Bela Corbin       09/20/23 0500   Appointment Info   Signing clinician's name / credentials Monica Spicer, PT, DPT   Visits Used 5   Medical Diagnosis M54.40 (ICD-10-CM) - Acute midline low back pain with sciatica, sciatica laterality unspecified   PT Tx Diagnosis low back pain, spinal hypomobility, decreased LE flexibility, decreased activity tolerance   Quick Adds Certification   Progress Note/Certification   Start of Care Date 07/21/23   Onset of illness/injury or Date of Surgery 07/09/23   Therapy Frequency 1x/week   Predicted Duration up to 12 weeks   Certification date from 07/21/23   Certification date to 10/13/23   Progress Note Due Date 10/13/23   GOALS   PT Goals 2;3;4   PT Goal 1   Goal Identifier HEP   Goal Description pt will demonstrate independence and report compliance with HEP to facilitate improved independent symptom mgmt.   Rationale to maximize safety and independence with performance of ADLs and functional tasks;to maximize safety and independence with self cares   Goal Progress in progress   Target Date 10/13/23   PT Goal 2   Goal Identifier activity tolerance   Goal Description Pt will demonstrate improved activity tolerance via ability to walk greater than or equal to 6 blocks without needing to sit and rest due to pain or fatigue.   Rationale to maximize safety and independence with performance of ADLs and functional tasks;to maximize safety and independence within the home;to maximize safety and independence within the community;to maximize safety and independence with self cares   Goal Progress in progress   Target Date 10/13/23   PT Goal 3   Goal Identifier rolling tolerance   Goal Description pt will report ability to roll over in bed without greater than or equal to 3/10 pain.   Rationale to maximize safety and  "independence with performance of ADLs and functional tasks;to maximize safety and independence within the home;to maximize safety and independence within the community;to maximize safety and independence with self cares   Goal Progress in progress   Target Date 10/13/23   PT Goal 4   Goal Identifier lumbar AROM   Goal Description pt will demonstrate improved lumbar flexion AROM being able to flex and reach fingertips to at list mid shin, to promote improved ease with ADL's and work tasks.   Rationale to maximize safety and independence with performance of ADLs and functional tasks;to maximize safety and independence within the home;to maximize safety and independence within the community;to maximize safety and independence with self cares   Goal Progress in progress   Target Date 10/13/23   Subjective Report   Subjective Report Pt arrives today reporting after last session had increased back pain and felt like back was thrown out. R low back was \"catching\" and spasming. May have been from heavy lifting, and or may have been from PT visit. Pt had increased pain which was eventually alleviated with alternating ice and heat and medication.   Treatment Interventions (PT)   Interventions Therapeutic Procedure/Exercise;Neuromuscular Re-education;Manual Therapy   Therapeutic Procedure/Exercise   Therapeutic Procedures: strength, endurance, ROM, flexibillity minutes (63966) 30   PTRx Ther Proc 7 - Details For improved axial rotation - nustep level 5, 7 minutes total steps 488. For improved lumbar mobility and strength - Seated flexion 2 x 15''. Seated thoracic extension x 10, cues for exhaling with extension. Unilateral trunk leans x 10 B using 7# weight. modified martinez pose 30'' with L and R side leans. For improved core activation - performed in supine: yoga ball lumbar rolls 2 x 10, bridge on ball x 5. Hip and knee flexion x 10.   Skilled Intervention Progressed  HEP, Patient education, Verbal and tactile cues utilized " to facilitate correct exercise technique   Patient Response/Progress Tolerated well, verbalizes understanding   Therapeutic Activity   PTRx Ther Act 1 Diaphragmatic Breathing   PTRx Ther Act 1 - Details not completed   PTRx Ther Act 2 Bed Mobility - Log Rolling for Spinal Precautions   PTRx Ther Act 2 - Details No Notes   Neuromuscular Re-education   Neuro Re-ed 1 Diaphragmatic Breathing   Neuro Re-ed 1 - Details verbal review-instructed in technique and provided rationale for performance.   Skilled Intervention techniques to reduce sympathetic CNS response likely occuring secondary to high pain levels   Patient Response/Progress receptive   Manual Therapy   Manual Therapy: Mobilization, MFR, MLD, friction massage minutes (37154) 13   Manual Therapy 1 - Details For improved pain relief and reduced mm tone - performed in prone, STM to R lumbar paraspinals, erector spine. TPR to QL   Skilled Intervention STM   Patient Response/Progress Tolerated well   Education   Learner/Method Patient   Plan   Home program ptrx   Updates to plan of care Added: pallof and pulldowns   Plan for next session LE flexibility, spinal mobility, breathing, functional strength and mobility, myofascial work in abdomen and low back. Resisted rotational motions   Comments   Comments Pt returns for episode of low back pain, has made significant progress with overall reported less back, did have one instance of back going out, though was able to manage after couple days with medication and light stretches. Planning to return back to gym and activity levels. Tolerated strengthening and manual well, appropriate increase time between appontments for more independence with management at this time.   Total Session Time   Timed Code Treatment Minutes 43   Total Treatment Time (sum of timed and untimed services) 43     HAYDEN BURTON, PT

## 2023-11-01 ENCOUNTER — OFFICE VISIT (OUTPATIENT)
Dept: INTERNAL MEDICINE | Facility: CLINIC | Age: 68
End: 2023-11-01
Payer: COMMERCIAL

## 2023-11-01 VITALS
BODY MASS INDEX: 42.1 KG/M2 | HEIGHT: 65 IN | TEMPERATURE: 98.6 F | HEART RATE: 89 BPM | DIASTOLIC BLOOD PRESSURE: 60 MMHG | SYSTOLIC BLOOD PRESSURE: 128 MMHG | RESPIRATION RATE: 16 BRPM | OXYGEN SATURATION: 97 %

## 2023-11-01 DIAGNOSIS — M54.50 CHRONIC LOW BACK PAIN, UNSPECIFIED BACK PAIN LATERALITY, UNSPECIFIED WHETHER SCIATICA PRESENT: ICD-10-CM

## 2023-11-01 DIAGNOSIS — Z29.11 NEED FOR VACCINATION AGAINST RESPIRATORY SYNCYTIAL VIRUS: ICD-10-CM

## 2023-11-01 DIAGNOSIS — Z23 NEED FOR PROPHYLACTIC VACCINATION AGAINST HEPATITIS B VIRUS: ICD-10-CM

## 2023-11-01 DIAGNOSIS — G89.29 CHRONIC LOW BACK PAIN, UNSPECIFIED BACK PAIN LATERALITY, UNSPECIFIED WHETHER SCIATICA PRESENT: ICD-10-CM

## 2023-11-01 DIAGNOSIS — N64.9 BREAST LESION: ICD-10-CM

## 2023-11-01 DIAGNOSIS — N39.3 FEMALE STRESS INCONTINENCE: ICD-10-CM

## 2023-11-01 DIAGNOSIS — E11.9 TYPE 2 DIABETES MELLITUS WITHOUT COMPLICATION, WITHOUT LONG-TERM CURRENT USE OF INSULIN (H): Primary | Chronic | ICD-10-CM

## 2023-11-01 DIAGNOSIS — I10 ESSENTIAL HYPERTENSION: Chronic | ICD-10-CM

## 2023-11-01 PROCEDURE — 99214 OFFICE O/P EST MOD 30 MIN: CPT | Performed by: INTERNAL MEDICINE

## 2023-11-01 RX ORDER — CEFUROXIME AXETIL 500 MG/1
500 TABLET ORAL 2 TIMES DAILY
Qty: 20 TABLET | Refills: 0 | Status: SHIPPED | OUTPATIENT
Start: 2023-11-01 | End: 2024-01-15

## 2023-11-01 RX ORDER — TRAMADOL HYDROCHLORIDE 50 MG/1
50 TABLET ORAL 2 TIMES DAILY
Qty: 60 TABLET | Refills: 0 | Status: SHIPPED | OUTPATIENT
Start: 2023-11-01 | End: 2023-11-16

## 2023-11-01 RX ORDER — RESPIRATORY SYNCYTIAL VIRUS VACCINE 120MCG/0.5
0.5 KIT INTRAMUSCULAR ONCE
Qty: 1 EACH | Refills: 0 | Status: SHIPPED | OUTPATIENT
Start: 2023-11-01 | End: 2023-11-01

## 2023-11-01 NOTE — PROGRESS NOTES
"  Assessment & Plan     Need for prophylactic vaccination against hepatitis B virus      Need for vaccination against respiratory syncytial virus      Type 2 diabetes mellitus without complication, without long-term current use of insulin (H)  Jardiance started and FBS have improved   Will repeat AIC at next visit     Essential hypertension  Controlled     Female stress incontinence      Chronic low back pain, unspecified back pain laterality, unspecified whether sciatica present  Gabapetnin makes her feel drowsy   Oxycodone in small amounts helps . Consider tramadol if tolerated better for chronic use     Breast lesion  Recurrent mastitis , no obvious abscess formation or skin breakdown   Due to multiple allergies   Will use cefuroxime ( used before )   Did have to have it surgically drained last time   Recommend sports bra . May need breast reduction if recurrent problems persists     Lab Results   Component Value Date    A1C 7.8 09/11/2023    A1C 7.1 04/03/2023    A1C 5.9 11/01/2022    A1C 6.1 07/06/2022    A1C 6.7 04/05/2022                BMI:   Estimated body mass index is 42.1 kg/m  as calculated from the following:    Height as of this encounter: 1.651 m (5' 5\").    Weight as of 9/11/23: 114.8 kg (253 lb).           Bela Corbin MD  [facility]   Liana is a 68 year old, presenting for the following health issues:  office visit and Recheck Medication (Pt reports that she is here for a cyst on her left breast.)      11/1/2023     3:48 PM   Additional Questions   Roomed by nayla   Accompanied by alone         11/1/2023     3:48 PM   Patient Reported Additional Medications   Patient reports taking the following new medications none       History of Present Illness       Back Pain:  She presents for follow up of back pain. Patient's back pain is a recurring problem.  Location of back pain:  Left lower back  Description of back pain: gnawing, sharp and stabbing  Back " "pain spreads: left buttocks and left thigh    Since patient first noticed back pain, pain is: always present, but gets better and worse  Does back pain interfere with her job:  Yes       Reason for visit:  Infected breast and back pain    She eats 2-3 servings of fruits and vegetables daily.She consumes 2 sweetened beverage(s) daily.She exercises with enough effort to increase her heart rate 9 or less minutes per day.  She exercises with enough effort to increase her heart rate 3 or less days per week.   She is taking medications regularly.         Pt reports that she is here for having a cyst on left breast.        Review of Systems         Objective    /60 (BP Location: Left arm, Patient Position: Sitting, Cuff Size: Adult Large)   Pulse 89   Temp 98.6  F (37  C) (Tympanic)   Resp 16   Ht 1.651 m (5' 5\")   LMP  (LMP Unknown)   SpO2 97%   Breastfeeding No   BMI 42.10 kg/m    Body mass index is 42.1 kg/m .  Physical Exam   GENERAL: healthy, alert and no distress  NECK: no adenopathy, no asymmetry, masses, or scars and thyroid normal to palpation  RESP: lungs clear to auscultation - no rales, rhonchi or wheezes  BREAST: erythema left, mass none , nipple discharge none , skin dimpling none  , warmth left, and well healed biopsy incision left . From prior surgical drainage of abscess that area has warmth and tenderness   CV: regular rate and rhythm, normal S1 S2, no S3 or S4, no murmur, click or rub, no peripheral edema and peripheral pulses strong  ABDOMEN: soft, nontender, no hepatosplenomegaly, no masses and bowel sounds normal  MS: no gross musculoskeletal defects noted, no edema                      "

## 2023-11-03 ENCOUNTER — NURSE TRIAGE (OUTPATIENT)
Dept: NURSING | Facility: CLINIC | Age: 68
End: 2023-11-03

## 2023-11-03 DIAGNOSIS — N61.0 CELLULITIS OF BREAST: Primary | ICD-10-CM

## 2023-11-03 RX ORDER — HYDROCODONE BITARTRATE AND ACETAMINOPHEN 5; 325 MG/1; MG/1
1 TABLET ORAL EVERY 6 HOURS PRN
Qty: 18 TABLET | Refills: 0 | Status: SHIPPED | OUTPATIENT
Start: 2023-11-03 | End: 2023-11-08

## 2023-11-03 NOTE — TELEPHONE ENCOUNTER
Relayed provider recommendation to patient.  Patient requesting hydrocodone be sent to [facility] on [address] and [address]. Patient scheduled with PCP on 11/7/23.

## 2023-11-03 NOTE — TELEPHONE ENCOUNTER
Nurse Triage SBAR    Is this a 2nd Level Triage? YES, LICENSED PRACTITIONER REVIEW IS REQUIRED    Situation: Patient was seen in the office on Wednesday- started on an antibiotic  Consent: not needed    Background: Seen on Wednesday with Dr Corbin    Assessment:   cyst returned- was prescribed an antibiotic and pain medication- back pain  Indicates that he feels the tramadol is too strong  - feels nauseated and making her pretty sleepy  - indicates helps with the pain, but doesn't like the other effects of the medication  - stinging pain    Indicates her chest is  and still red  - pain at the area she has this cyst  - was started on antibiotics for this  Antibiotic: Ceftin 500 mg BID  - has taken 4 doses (first dose Wednesday at 4 pm)  - red area has possibly expanded a little  - no fever  - red area is about the size of a halo orange  - no drainage  - did try to get a sports bra but feels this is too tight so she is not wearing it  Rating pain 7-8/10      Protocol Recommended Disposition:       Recommendation: would like a different medication for pain and would like to have provider review her symptoms after her antibiotic use. Triage suggests a visit but patient indicates that provider had made other suggestions to her about pain management if she did not have improvement.    Routed to provider    Does the patient meet one of the following criteria for ADS visit consideration? 16+ years old, with an [facility] PCP     TIP  Providers, please consider if this condition is appropriate for management at one of our Acute and Diagnostic Services sites.     If patient is a good candidate, please use dotphrase <dot>triageresponse and select Refer to ADS to document.      Daniella Espinoza RN 8:50 AM 11/3/2023  Reason for Disposition   Taking antibiotic > 24 hours and wound infection symptoms are WORSE (e.g., draining pus, pain, red streak, spreading redness, swelling)    Additional Information   Negative: Shock  suspected (e.g., cold/pale/clammy skin, too weak to stand, low BP, rapid pulse)   Negative: Widespread rash and bright red, sunburn-like and too weak to stand   Negative: Sounds like a life-threatening emergency to the triager   Negative: Animal bite wound infection on antibiotic   Negative: Cellulitis on antibiotic   Negative: Widespread rash and drug rash suspected (i.e., allergic reaction to antibiotic)   Negative: SEVERE pain in the wound   Negative: SEVERE pain with bending of finger (or toe) and wound on hand (or foot)   Negative: Widespread rash and bright red, sunburn-like   Negative: Fever > 103 F (39.4 C)   Negative: Black (necrotic), dark purple, or blisters develop in area of wound   Negative: Patient sounds very sick or weak to the triager   Negative: Finger wound and entire finger swollen   Negative: New-onset fever > 100.0 F (37.8 C)   Negative: New-onset red streak runs from wound   Negative: Caller has URGENT question and triager unable to answer question    Protocols used: Wound Infection on Antibiotic Follow-up Call-A-[place]

## 2023-11-03 NOTE — TELEPHONE ENCOUNTER
Can change tramadol to oycodone or hydrocodone as she has tolerated them in the past . Ok to go without any bra . I can see her on Monday or Tuesday as an add on , but I would give the antibiotic more time  would like to avoid surgical consult or drainage at this point but can refer her if she wants

## 2023-11-06 ENCOUNTER — OFFICE VISIT (OUTPATIENT)
Dept: FAMILY MEDICINE | Facility: CLINIC | Age: 68
End: 2023-11-06
Payer: COMMERCIAL

## 2023-11-06 ENCOUNTER — APPOINTMENT (OUTPATIENT)
Dept: CT IMAGING | Facility: HOSPITAL | Age: 68
End: 2023-11-06
Payer: COMMERCIAL

## 2023-11-06 ENCOUNTER — HOSPITAL ENCOUNTER (EMERGENCY)
Facility: HOSPITAL | Age: 68
Discharge: HOME | End: 2023-11-07
Attending: STUDENT IN AN ORGANIZED HEALTH CARE EDUCATION/TRAINING PROGRAM | Admitting: STUDENT IN AN ORGANIZED HEALTH CARE EDUCATION/TRAINING PROGRAM
Payer: COMMERCIAL

## 2023-11-06 VITALS
HEART RATE: 83 BPM | TEMPERATURE: 98 F | SYSTOLIC BLOOD PRESSURE: 125 MMHG | RESPIRATION RATE: 18 BRPM | OXYGEN SATURATION: 95 % | DIASTOLIC BLOOD PRESSURE: 74 MMHG

## 2023-11-06 DIAGNOSIS — N61.1 BREAST ABSCESS: Primary | ICD-10-CM

## 2023-11-06 DIAGNOSIS — J02.9 SORE THROAT: Primary | ICD-10-CM

## 2023-11-06 DIAGNOSIS — R05.1 ACUTE COUGH: ICD-10-CM

## 2023-11-06 DIAGNOSIS — N61.1 BREAST ABSCESS: ICD-10-CM

## 2023-11-06 LAB
ALBUMIN SERPL BCG-MCNC: 4.6 G/DL (ref 3.5–5.2)
ALBUMIN UR-MCNC: NEGATIVE MG/DL
ALP SERPL-CCNC: 105 U/L (ref 35–104)
ALT SERPL W P-5'-P-CCNC: 16 U/L (ref 0–50)
ANION GAP SERPL CALCULATED.3IONS-SCNC: 11 MMOL/L (ref 7–15)
APPEARANCE UR: CLEAR
AST SERPL W P-5'-P-CCNC: 23 U/L (ref 0–45)
BASOPHILS # BLD AUTO: 0 10E3/UL (ref 0–0.2)
BASOPHILS NFR BLD AUTO: 0 %
BILIRUB SERPL-MCNC: 0.3 MG/DL
BILIRUB UR QL STRIP: NEGATIVE
BUN SERPL-MCNC: 22 MG/DL (ref 8–23)
CALCIUM SERPL-MCNC: 10.4 MG/DL (ref 8.8–10.2)
CHLORIDE SERPL-SCNC: 99 MMOL/L (ref 98–107)
COLOR UR AUTO: ABNORMAL
CREAT SERPL-MCNC: 1.18 MG/DL (ref 0.51–0.95)
DEPRECATED HCO3 PLAS-SCNC: 26 MMOL/L (ref 22–29)
DEPRECATED S PYO AG THROAT QL EIA: NEGATIVE
EGFRCR SERPLBLD CKD-EPI 2021: 51 ML/MIN/1.73M2
EOSINOPHIL # BLD AUTO: 0.3 10E3/UL (ref 0–0.7)
EOSINOPHIL NFR BLD AUTO: 3 %
ERYTHROCYTE [DISTWIDTH] IN BLOOD BY AUTOMATED COUNT: 13.2 % (ref 10–15)
GLUCOSE SERPL-MCNC: 154 MG/DL (ref 70–99)
GLUCOSE UR STRIP-MCNC: >1000 MG/DL
GROUP A STREP BY PCR: NOT DETECTED
HCT VFR BLD AUTO: 37.8 % (ref 35–47)
HGB BLD-MCNC: 12.5 G/DL (ref 11.7–15.7)
HGB UR QL STRIP: NEGATIVE
IMM GRANULOCYTES # BLD: 0 10E3/UL
IMM GRANULOCYTES NFR BLD: 0 %
KETONES UR STRIP-MCNC: NEGATIVE MG/DL
LACTATE SERPL-SCNC: 1 MMOL/L (ref 0.7–2)
LEUKOCYTE ESTERASE UR QL STRIP: ABNORMAL
LYMPHOCYTES # BLD AUTO: 1.7 10E3/UL (ref 0.8–5.3)
LYMPHOCYTES NFR BLD AUTO: 21 %
MCH RBC QN AUTO: 29.7 PG (ref 26.5–33)
MCHC RBC AUTO-ENTMCNC: 33.1 G/DL (ref 31.5–36.5)
MCV RBC AUTO: 90 FL (ref 78–100)
MONOCYTES # BLD AUTO: 0.8 10E3/UL (ref 0–1.3)
MONOCYTES NFR BLD AUTO: 11 %
NEUTROPHILS # BLD AUTO: 5.2 10E3/UL (ref 1.6–8.3)
NEUTROPHILS NFR BLD AUTO: 65 %
NITRATE UR QL: NEGATIVE
NRBC # BLD AUTO: 0 10E3/UL
NRBC BLD AUTO-RTO: 0 /100
NT-PROBNP SERPL-MCNC: 59 PG/ML (ref 0–900)
PH UR STRIP: 5 [PH] (ref 5–7)
PLATELET # BLD AUTO: 302 10E3/UL (ref 150–450)
POTASSIUM SERPL-SCNC: 4.4 MMOL/L (ref 3.4–5.3)
PROT SERPL-MCNC: 8 G/DL (ref 6.4–8.3)
RBC # BLD AUTO: 4.21 10E6/UL (ref 3.8–5.2)
RBC URINE: 1 /HPF
SODIUM SERPL-SCNC: 136 MMOL/L (ref 135–145)
SP GR UR STRIP: 1.03 (ref 1–1.03)
SQUAMOUS EPITHELIAL: <1 /HPF
TROPONIN T SERPL HS-MCNC: 14 NG/L
UROBILINOGEN UR STRIP-MCNC: <2 MG/DL
WBC # BLD AUTO: 8 10E3/UL (ref 4–11)
WBC URINE: 6 /HPF

## 2023-11-06 PROCEDURE — 93005 ELECTROCARDIOGRAM TRACING: CPT

## 2023-11-06 PROCEDURE — 36415 COLL VENOUS BLD VENIPUNCTURE: CPT | Performed by: STUDENT IN AN ORGANIZED HEALTH CARE EDUCATION/TRAINING PROGRAM

## 2023-11-06 PROCEDURE — 81001 URINALYSIS AUTO W/SCOPE: CPT | Performed by: STUDENT IN AN ORGANIZED HEALTH CARE EDUCATION/TRAINING PROGRAM

## 2023-11-06 PROCEDURE — 96375 TX/PRO/DX INJ NEW DRUG ADDON: CPT

## 2023-11-06 PROCEDURE — 96365 THER/PROPH/DIAG IV INF INIT: CPT | Mod: 59

## 2023-11-06 PROCEDURE — 99285 EMERGENCY DEPT VISIT HI MDM: CPT | Mod: 25

## 2023-11-06 PROCEDURE — 83880 ASSAY OF NATRIURETIC PEPTIDE: CPT

## 2023-11-06 PROCEDURE — 250N000011 HC RX IP 250 OP 636

## 2023-11-06 PROCEDURE — 99214 OFFICE O/P EST MOD 30 MIN: CPT | Performed by: STUDENT IN AN ORGANIZED HEALTH CARE EDUCATION/TRAINING PROGRAM

## 2023-11-06 PROCEDURE — 80053 COMPREHEN METABOLIC PANEL: CPT | Performed by: STUDENT IN AN ORGANIZED HEALTH CARE EDUCATION/TRAINING PROGRAM

## 2023-11-06 PROCEDURE — 87651 STREP A DNA AMP PROBE: CPT | Performed by: STUDENT IN AN ORGANIZED HEALTH CARE EDUCATION/TRAINING PROGRAM

## 2023-11-06 PROCEDURE — 71275 CT ANGIOGRAPHY CHEST: CPT

## 2023-11-06 PROCEDURE — 87040 BLOOD CULTURE FOR BACTERIA: CPT | Performed by: STUDENT IN AN ORGANIZED HEALTH CARE EDUCATION/TRAINING PROGRAM

## 2023-11-06 PROCEDURE — 85004 AUTOMATED DIFF WBC COUNT: CPT | Performed by: STUDENT IN AN ORGANIZED HEALTH CARE EDUCATION/TRAINING PROGRAM

## 2023-11-06 PROCEDURE — 250N000011 HC RX IP 250 OP 636: Performed by: STUDENT IN AN ORGANIZED HEALTH CARE EDUCATION/TRAINING PROGRAM

## 2023-11-06 PROCEDURE — 96366 THER/PROPH/DIAG IV INF ADDON: CPT

## 2023-11-06 PROCEDURE — 84484 ASSAY OF TROPONIN QUANT: CPT

## 2023-11-06 PROCEDURE — 250N000011 HC RX IP 250 OP 636: Mod: JZ | Performed by: STUDENT IN AN ORGANIZED HEALTH CARE EDUCATION/TRAINING PROGRAM

## 2023-11-06 PROCEDURE — 83605 ASSAY OF LACTIC ACID: CPT | Performed by: STUDENT IN AN ORGANIZED HEALTH CARE EDUCATION/TRAINING PROGRAM

## 2023-11-06 PROCEDURE — 85025 COMPLETE CBC W/AUTO DIFF WBC: CPT | Performed by: STUDENT IN AN ORGANIZED HEALTH CARE EDUCATION/TRAINING PROGRAM

## 2023-11-06 PROCEDURE — 258N000003 HC RX IP 258 OP 636: Performed by: STUDENT IN AN ORGANIZED HEALTH CARE EDUCATION/TRAINING PROGRAM

## 2023-11-06 RX ORDER — ONDANSETRON 4 MG/1
4 TABLET, ORALLY DISINTEGRATING ORAL ONCE
Status: COMPLETED | OUTPATIENT
Start: 2023-11-06 | End: 2023-11-06

## 2023-11-06 RX ORDER — CEFAZOLIN SODIUM 1 G/50ML
2000 SOLUTION INTRAVENOUS ONCE
Status: COMPLETED | OUTPATIENT
Start: 2023-11-06 | End: 2023-11-06

## 2023-11-06 RX ORDER — BENZONATATE 100 MG/1
100 CAPSULE ORAL 3 TIMES DAILY PRN
Qty: 10 CAPSULE | Refills: 0 | Status: SHIPPED | OUTPATIENT
Start: 2023-11-06 | End: 2024-09-03

## 2023-11-06 RX ORDER — SULFAMETHOXAZOLE/TRIMETHOPRIM 800-160 MG
1 TABLET ORAL 2 TIMES DAILY
Qty: 20 TABLET | Refills: 0 | Status: SHIPPED | OUTPATIENT
Start: 2023-11-06 | End: 2023-11-16

## 2023-11-06 RX ORDER — IOPAMIDOL 755 MG/ML
75 INJECTION, SOLUTION INTRAVASCULAR ONCE
Status: COMPLETED | OUTPATIENT
Start: 2023-11-06 | End: 2023-11-06

## 2023-11-06 RX ADMIN — IOPAMIDOL 75 ML: 755 INJECTION, SOLUTION INTRAVENOUS at 21:24

## 2023-11-06 RX ADMIN — VANCOMYCIN HYDROCHLORIDE 2000 MG: 500 INJECTION, POWDER, LYOPHILIZED, FOR SOLUTION INTRAVENOUS at 19:29

## 2023-11-06 RX ADMIN — HYDROMORPHONE HYDROCHLORIDE 0.5 MG: 1 INJECTION, SOLUTION INTRAMUSCULAR; INTRAVENOUS; SUBCUTANEOUS at 18:41

## 2023-11-06 ASSESSMENT — ACTIVITIES OF DAILY LIVING (ADL)
ADLS_ACUITY_SCORE: 35

## 2023-11-06 NOTE — ED PROVIDER NOTES
EMERGENCY DEPARTMENT ENCOUNTER      NAME: Liana Briceño  AGE: 68 year old female  YOB: 1955  MRN: [id number]  EVALUATION DATE & TIME: 11/06/23 5:35 PM    PCP: Bela Corbin    ED PROVIDER: Mercy Dwyer PA-C      CHIEF COMPLAINT:  Breast Problem      FINAL IMPRESSION:  1. Breast abscess          ED COURSE & MEDICAL DECISION MAKING:  Pertinent Labs & Imaging studies reviewed. (See chart for details)    MDM: The patient is a 68 year old female with history of hypertension, dyslipidemia, coronary artery disease status post cardiac catheterization and stent placement 4/20/2017, type 2 diabetes mellitus not on insulin, obesity, PSVT status post ablation in 2011, who presents to the Emergency Department for evaluation of breast abscess.    Initial vitals reviewed and significant for hypertension.  Repeat vital signs with persistent hypertension in the setting of known history of hypertension.  Reassuringly, the patient is afebrile.  She is resting comfortably in the hospital bed.  She is not ill nor toxic appearing.  On exam, she has a 12 cm x 7 cm area of erythema and tenderness to the left medial breast with a 3 cm x 4 cm area of induration and fluctuance.  Was seen for this on 11/1/2023 and started on a 5-day course of cefuroxime.  Her symptoms have progressed despite taking cefuroxime as prescribed.  She also endorses some dyspnea on exertion and when lying flat which she attributes to the pain from the breast abscess and does note that her shortness of breath is improved once her pain is more controlled, however given her cardiac history and the fact that her symptoms are worse with ambulation did order EKG, troponin, BNP, CT chest which imaging will also be beneficial to assess extent of abscess.  She was seen earlier today in urgent care for her symptoms and did obtain a strep swab which was negative and was sent to the ER for further evaluation.  The patient notes that she did undergo  aspiration of the abscess and the same location 1 year ago which was quite painful for her.    Differential diagnosis includes breast abscess with cellulitis, pulmonary embolism, heart failure, ACS. Reassuringly echo on 09/29/23 with EF 55%.    Work up included laboratory studies, imaging, and ECG.  CBC reassuring with no leukocytosis.  Lactic acid reassuring within normal limits.  CMP largely unremarkable with minimal change from prior.  Troponin within normal limits, BNP within normal limits.  Cultures were drawn and are in process.  Urinalysis with glucosuria, not suggestive of infection.  CT PE run negative for pulmonary embolism.  CT did show some trace pulmonary edema however BNP is normal and lung sounds are reassuring on examination.  ECG with sinus rhythm with premature atrial complexes with aberrant conduction, no STEMI.  Overall work-up most consistent with breast abscess with cellulitis.  The patient was given a dose of IV vancomycin. The patient was given Dilaudid and Norco for her symptoms with improvement.  Discussed the patient with the on-call general surgeon who recommended close follow-up with breast clinic. Plan for discharge to home in stable condition with prescription for Bactrim and close outpatient follow-up with the breast clinic. The patient verbalized understanding and is in agreement with this plan. Emphasized importance of close follow up with breast clinic. Strict return precautions discussed.        ED COURSE:  5:40 PM I met and introduced myself to the patient. I gathered initial history and performed an initial physical exam. We discussed options and plan for diagnostics and treatment here in the ED.  6:20 PM I have staffed the patient with Dr. Brian Kaye, ED physician, who has evaluated the patient and agrees with all aspects of today's care.   10:46 PM I discussed the patient with Dr. Zafar, general surgeon.  10:51 PM I discussed the plan for discharge with the patient, and  patient is agreeable. We discussed supportive cares at home and reasons for return to the ER including new or worsening symptoms - all questions and concerns addressed to the best of my ability. Strict return precautions discussed. Patient to be discharged by RN.    At the conclusion of the encounter I discussed the results of all the tests and the disposition. The questions were answered to the best of my ability. The patient and/or family acknowledged understanding and was agreeable with the care plan.        MEDICATIONS GIVEN IN THE EMERGENCY:  Medications   HYDROcodone-acetaminophen (NORCO) 5-325 MG per tablet 1 tablet (has no administration in time range)   HYDROmorphone (DILAUDID) injection 0.5 mg (0.5 mg Intravenous $Given 11/6/23 1841)   ondansetron (ZOFRAN ODT) ODT tab 4 mg (4 mg Oral Not Given 11/6/23 1844)   vancomycin (VANCOCIN) 2,000 mg in sodium chloride 0.9 % 500 mL intermittent infusion (0 mg Intravenous Stopped 11/6/23 2255)   iopamidol (ISOVUE-370) solution 75 mL (75 mLs Intravenous $Given 11/6/23 2124)       NEW PRESCRIPTIONS STARTED AT TODAY'S ER VISIT  New Prescriptions    SULFAMETHOXAZOLE-TRIMETHOPRIM (BACTRIM DS) 800-160 MG TABLET    Take 1 tablet by mouth 2 times daily for 10 days          =================================================================    HPI    Patient information was obtained from: Patient    Use of Intrepreter: N/A        Liana Briceño is a 68 year old female with pertinent medical history of diabetes, hypertension, CAD, obesity, chronic kidney disease, chronic low back pain, breast cyst, and dyslipidemia who presents to the emergency department for evaluation of a breast problem.     The patient reports that she has been on antibiotics since Wednesday for left breast infection, but she states that she is not getting better and she feels as if she is getting worse. She adds on that the [facility] had extracted the pus on her breast and was unable to numb her adequately which  was very painful. Today she developed a fever and shortness of breath. She gets short of breath when she is walking or sleeping. She has taken Vicodin for the pain, but it only lasts for 4 hours and the pain comes back the last dose at noon today.  Additionally, she is scheduled to meet with her primary care doctor tomorrow.     Per chart review, the patient was seen in clinic on 11/1/2023 she reported developing breast abscess and was started on cefuroxime 500 mg twice daily for her symptoms.  She presented to walk-in clinic earlier today with progressive symptoms developing into an abscess.  It was discussed likely need for surgical drainage which the patient expressed understanding.  The patient was adamant about requiring sedation for this procedure therefore she was sent to the emergency department for further evaluation.  She also endorsed a dry cough and sore throat for the past 3 days with reassuring examination and negative strep test.        PAST MEDICAL HISTORY:  Past Medical History:   Diagnosis Date    Anemia     Breast cyst     Carpal tunnel syndrome     Coronary artery disease due to lipid rich plaque     Dyslipidemia, goal LDL below 70     Esophageal reflux     Essential hypertension     Fatty liver     Goiter diffuse, nontoxic     Hiatal hernia     History of gestational diabetes     History of MRSA infection 07/17/2017    Pilonidal cyst culture    Increased PTH level 11/4/2018    Morbid obesity with BMI of 40.0-44.9, adult (H)     Motion sickness     RADHA on CPAP     Osteoarthritis     Paroxysmal SVT (supraventricular tachycardia) 2011    ablation by Dr. Johnson in 2011    PONV (postoperative nausea and vomiting)     Positive result for methicillin resistant Staphylococcus aureus (MRSA) screening 11/4/2018    Renal disease     S/P hip replacement     Stented coronary artery     Thiamine deficiency 11/4/2018    Type 2 diabetes mellitus (H)     Uncomplicated asthma     Vitamin D deficiency 11/4/2018        PAST SURGICAL HISTORY:  Past Surgical History:   Procedure Laterality Date    ARTHROPLASTY KNEE Right 2022    Procedure: RIGHT TOTAL KNEE ARTHROPLASTY;  Surgeon: Declan Eaton MD;  Location: [facility] Main OR    BIOPSY BREAST      BREAST CYST EXCISION      CARDIAC CATHETERIZATION N/A 2017    Procedure: Coronary Angiogram;  Surgeon: Ariane Biswas MD;  Location: [facility] Cath Lab;  Service:     CARDIAC CATHETERIZATION N/A 2017    Procedure: Coronary Angiogram;  Surgeon: Howard Gutierrez MD;  Location: [facility] Cath Lab;  Service:     CHOLECYSTECTOMY  2000    CORONARY STENT PLACEMENT  2017    BO to RCA by Dr. Biswas    HC REVISE MEDIAN N/CARPAL TUNNEL SURG      Description: Neuroplasty Decompression Median Nerve At Carpal Tunnel;  Recorded: 2009;    IR LUMBAR EPIDURAL STEROID INJECTION  2007    IR LUMBAR EPIDURAL STEROID INJECTION  2007    IR LUMBAR EPIDURAL STEROID INJECTION  2008    IR LUMBAR EPIDURAL STEROID INJECTION  10/8/2008    IR LUMBAR EPIDURAL STEROID INJECTION  10/28/2008    IR LUMBAR EPIDURAL STEROID INJECTION  3/10/2009    WY EXPLO/DRAIN BREAST ABSCESS      Description: Breast Surgery Mastotomy With Drainage Of Abscess    WY L HRT CATH W/NJX L VENTRICULOGRAPHY IMG S&I N/A 2017    Procedure: Left Heart Catheterization with Left Ventriculogram;  Surgeon: Ariane Biswas MD;  Location: [facility] Cath Lab;  Service: Cardiology    WY THYROID LOBECTOMY,UNILAT      RADIOFREQUENCY ABLATION  2011    for PSVT by Dr. Johnson    TOTAL HIP ARTHROPLASTY Right        CURRENT MEDICATIONS:    Prior to Admission Medications   Prescriptions Last Dose Informant Patient Reported? Taking?   Cinnamon Bark POWD   Yes No   Continuous Blood Gluc Sensor (FREESTYLE MIRANDA 2 SENSOR) MISC   No No   Si each every 14 days 1 each every 14 days. Change every 14 days.   HYDROcodone-acetaminophen (NORCO) 5-325 MG tablet   No No   Sig: Take 1 tablet by mouth every  6 hours as needed for severe pain   ONETOUCH DELICA PLUS LANCET 33 gauge Misc   No No   Sig: [ONETOUCH DELICA PLUS LANCET 33 GAUGE MISC] USE TO TEST BLOOD SUGAR 6 TIMES DAILY   Semaglutide, 2 MG/DOSE, (OZEMPIC) 8 MG/3ML pen   No No   Sig: Inject 2 mg Subcutaneous every 7 days   Thiamine HCl (B-1) 100 MG TABS   No No   Sig: Take 100 mg by mouth daily   acetaminophen (TYLENOL) 500 MG tablet   Yes No   Sig: [ACETAMINOPHEN (TYLENOL) 500 MG TABLET] Take 1,000 mg by mouth 3 (three) times a day as needed.   allopurinol (ZYLOPRIM) 100 MG tablet   No No   Sig: Take 1 tablet (100 mg) by mouth daily   aspirin (ASA) 81 MG EC tablet   No No   Sig: Take 1 tablet (81 mg) by mouth daily Resume after done with BID dosing   benzonatate (TESSALON) 100 MG capsule   No No   Sig: Take 1 capsule (100 mg) by mouth 3 times daily as needed for cough   blood glucose test strips   No No   Sig: [BLOOD GLUCOSE TEST STRIPS] Use 1 each As Directed 6 (six) times a day. Prodigy Meter   cefuroxime (CEFTIN) 500 MG tablet   No No   Sig: Take 1 tablet (500 mg) by mouth 2 times daily   cholecalciferol, vitamin D3, (CHOLECALCIFEROL) 1,000 unit tablet   Yes No   Sig: [CHOLECALCIFEROL, VITAMIN D3, (CHOLECALCIFEROL) 1,000 UNIT TABLET] Take 3,000 Units by mouth daily.    cyclobenzaprine (FLEXERIL) 10 MG tablet   No No   Sig: Take 1 tablet (10 mg) by mouth 2 times daily as needed for muscle spasms   docusate sodium (DOK) 100 MG capsule   No No   Sig: TAKE 1 CAPSULE(100 MG) BY MOUTH TWICE DAILY Strength: 100 mg   empagliflozin (JARDIANCE) 10 MG TABS tablet   No No   Sig: Take 1 tablet (10 mg) by mouth daily   famotidine (PEPCID) 20 MG tablet   No No   Sig: Take 1 tablet (20 mg) by mouth 2 times daily   gabapentin (NEURONTIN) 300 MG capsule   No No   Sig: Take 1 capsule (300 mg) by mouth 3 times daily   generic lancets (MICROLET LANCET)   No No   Sig: [GENERIC LANCETS (MICROLET LANCET)] Use 1 each As Directed 6 (six) times a day.   hydrOXYzine (ATARAX) 25 MG  tablet   No No   Sig: Take 1 tablet (25 mg) by mouth 3 times daily as needed for itching   hydrochlorothiazide (HYDRODIURIL) 12.5 MG tablet   No No   Sig: TAKE 1 TABLET(12.5 MG) BY MOUTH DAILY   insulin degludec (TRESIBA FLEXTOUCH) 200 UNIT/ML pen   Yes No   Sig: Inject 8 Units Subcutaneous daily   insulin pen needle (32G X 4 MM) 32G X 4 MM miscellaneous   No No   Sig: Inject 4 each Subcutaneous daily   lisinopril (ZESTRIL) 5 MG tablet   No No   Sig: TAKE 1 AND 1/2 TABLETS BY MOUTH EVERY DAY   melatonin (MELATIN) 3 mg Tab tablet   Yes No   Sig: Take 6 mg by mouth nightly as needed   meloxicam (MOBIC) 15 MG tablet   No No   Sig: Take 1 tablet (15 mg) by mouth daily   nitroGLYcerin (NITROSTAT) 0.3 MG sublingual tablet   No No   Sig: Place 1 tablet (0.3 mg) under the tongue every 5 minutes as needed for chest pain   omega-3 acid ethyl esters (LOVAZA) 1 g capsule   No No   Sig: Take 2 capsules by mouth 2 times daily   omeprazole (PRILOSEC) 40 MG DR capsule   No No   Sig: TAKE 1 CAPSULE BY MOUTH EVERY DAY   rosuvastatin (CRESTOR) 10 MG tablet   No No   Sig: TAKE 1 TABLET(10 MG) BY MOUTH DAILY   traMADol (ULTRAM) 50 MG tablet   No No   Sig: Take 1 tablet (50 mg) by mouth 2 times daily for 30 days   triamcinolone (KENALOG) 0.1 % external cream   No No   Sig: [TRIAMCINOLONE (KENALOG) 0.1 % CREAM] Apply bid   valACYclovir (VALTREX) 500 MG tablet   No No   Sig: TAKE 1 TABLET(500 MG) BY MOUTH DAILY   vitamin B-12 (CYANOCOBALAMIN) 2500 MCG sublingual tablet   No No   Sig: One tab every other day      Facility-Administered Medications: None       ALLERGIES:  Allergies   Allergen Reactions    Latex, Natural Rubber [Latex] Hives    Penicillins Other (See Comments)     Passed out.     Clindamycin Itching    Doxycycline Dizziness    Eggshell Membrane [Egg Shells] Unknown    Erythromycin Itching    Ibuprofen      Ulcer     Naloxone Unknown     itching    Penicillamine Unknown    Pneumococcal 23-Valent Polysaccharide Vaccine  [Pneumococcal Vaccine] Other (See Comments)     pneumonia    Statins-Hmg-Coa Reductase Inhibitors [Statins] Muscle Pain (Myalgia)     Shaking with simvastatin and atorvastatin    Azithromycin Rash       FAMILY HISTORY:  Family History   Problem Relation Age of Onset    Heart Disease Father         unknown type    Valvular heart disease Mother     Kidney Disease Mother     Hypertension Mother     Diabetes Mother     Leukemia Brother          at a young age    No Known Problems Son     Diabetes Paternal Aunt     Kidney Disease Sister     Heart Disease Sister     Other - See Comments Other 25.00        victim of a homicide    Breast Cancer No family hx of        SOCIAL HISTORY:  Social History     Tobacco Use    Smoking status: Former     Packs/day: 0.25     Years: 44.00     Additional pack years: 0.00     Total pack years: 11.00     Types: Cigarettes     Quit date: 2017     Years since quittin.8    Smokeless tobacco: Never    Tobacco comments:     age 18 to age 62 up to 1/2 ppd   Vaping Use    Vaping Use: Never used   Substance Use Topics    Alcohol use: Yes     Alcohol/week: 0.0 standard drinks of alcohol     Comment: Alcoholic Drinks/day: < 1 drink a week    Drug use: No        VITALS:    First Vitals:  Patient Vitals for the past 24 hrs:   BP Temp Temp src Pulse Resp SpO2 Weight   23 (!) 150/72 -- -- 89 -- 96 % --   23 (!) 157/72 -- -- 91 -- 96 % --   23 (!) 169/77 -- -- 93 -- 97 % --   23 (!) 181/75 -- -- 90 23 93 % --   23 1938 (!) 153/72 -- -- 96 -- 97 % --   23 1517 (!) 141/68 98.1  F (36.7  C) Oral 89 20 93 % 108.9 kg (240 lb)       Patient Vitals for the past 24 hrs:   BP Temp Temp src Pulse Resp SpO2 Weight   23 (!) 150/72 -- -- 89 -- 96 % --   23 (!) 157/72 -- -- 91 -- 96 % --   23 (!) 169/77 -- -- 93 -- 97 % --   23 (!) 181/75 -- -- 90 23 93 % --   23 (!) 153/72 -- -- 96 -- 97 % --    11/06/23 1517 (!) 141/68 98.1  F (36.7  C) Oral 89 20 93 % 108.9 kg (240 lb)       PHYSICAL EXAM  VITAL SIGNS: BP (!) 150/72   Pulse 89   Temp 98.1  F (36.7  C) (Oral)   Resp 23   Wt 108.9 kg (240 lb)   LMP  (LMP Unknown)   SpO2 96%   BMI 39.94 kg/m     GENERAL: Awake, alert, answering questions appropriately, resting comfortably in exam bed in no acute distress.  HEENT: Posterior oropharynx clear with no erythema or exudate.  SPEECH:  Easy to understand speech, Normal volume and josé.  PULMONARY: No respiratory distress, Breathing comfortably on room air. Lungs clear to auscultation bilaterally.  CARDIOVASCULAR: Regular rate and rhythm, radial pulses present, symmetric, and normal.  EXTREMITIES: Extremities are warm and well perfused. No lower extremity edema.  NEUROLOGIC: Moving all extremities spontaneously.   SKIN: Exposed areas of skin warm, with 12 cm x 7 cm circumferential area of erythema to the left medial breast involving the the superior portion of the inframammary region with a 3 cm x 4 cm area of induration and fluctuance to the medial aspect of her left breast.  No spontaneous drainage noted.  Extremely tender to palpation overlying the area with skin changes.  PSYCH: Normal mood and affect.         RADIOLOGY/LAB:  Reviewed all pertinent imaging. Please see official radiology report.  All pertinent labs reviewed and interpreted.  Results for orders placed or performed during the hospital encounter of 11/06/23   CT Chest Pulmonary Embolism w Contrast    Impression    IMPRESSION:  1.  Negative for pulmonary emboli.    2.  Some minimal interstitial prominence in both lungs may represent some subtle pulmonary edema   Comprehensive metabolic panel   Result Value Ref Range    Sodium 136 135 - 145 mmol/L    Potassium 4.4 3.4 - 5.3 mmol/L    Carbon Dioxide (CO2) 26 22 - 29 mmol/L    Anion Gap 11 7 - 15 mmol/L    Urea Nitrogen 22.0 8.0 - 23.0 mg/dL    Creatinine 1.18 (H) 0.51 - 0.95 mg/dL    GFR  Estimate 51 (L) >60 mL/min/1.73m2    Calcium 10.4 (H) 8.8 - 10.2 mg/dL    Chloride 99 98 - 107 mmol/L    Glucose 154 (H) 70 - 99 mg/dL    Alkaline Phosphatase 105 (H) 35 - 104 U/L    AST 23 0 - 45 U/L    ALT 16 0 - 50 U/L    Protein Total 8.0 6.4 - 8.3 g/dL    Albumin 4.6 3.5 - 5.2 g/dL    Bilirubin Total 0.3 <=1.2 mg/dL   Result Value Ref Range    Lactic Acid 1.0 0.7 - 2.0 mmol/L   UA with Microscopic reflex to Culture    Specimen: Urine, Midstream   Result Value Ref Range    Color Urine Light Yellow Colorless, Straw, Light Yellow, Yellow    Appearance Urine Clear Clear    Glucose Urine >1000 (A) Negative mg/dL    Bilirubin Urine Negative Negative    Ketones Urine Negative Negative mg/dL    Specific Gravity Urine 1.029 1.001 - 1.030    Blood Urine Negative Negative    pH Urine 5.0 5.0 - 7.0    Protein Albumin Urine Negative Negative mg/dL    Urobilinogen Urine <2.0 <2.0 mg/dL    Nitrite Urine Negative Negative    Leukocyte Esterase Urine 25 Jamar/uL (A) Negative    RBC Urine 1 <=2 /HPF    WBC Urine 6 (H) <=5 /HPF    Squamous Epithelials Urine <1 <=1 /HPF   CBC with platelets and differential   Result Value Ref Range    WBC Count 8.0 4.0 - 11.0 10e3/uL    RBC Count 4.21 3.80 - 5.20 10e6/uL    Hemoglobin 12.5 11.7 - 15.7 g/dL    Hematocrit 37.8 35.0 - 47.0 %    MCV 90 78 - 100 fL    MCH 29.7 26.5 - 33.0 pg    MCHC 33.1 31.5 - 36.5 g/dL    RDW 13.2 10.0 - 15.0 %    Platelet Count 302 150 - 450 10e3/uL    % Neutrophils 65 %    % Lymphocytes 21 %    % Monocytes 11 %    % Eosinophils 3 %    % Basophils 0 %    % Immature Granulocytes 0 %    NRBCs per 100 WBC 0 <1 /100    Absolute Neutrophils 5.2 1.6 - 8.3 10e3/uL    Absolute Lymphocytes 1.7 0.8 - 5.3 10e3/uL    Absolute Monocytes 0.8 0.0 - 1.3 10e3/uL    Absolute Eosinophils 0.3 0.0 - 0.7 10e3/uL    Absolute Basophils 0.0 0.0 - 0.2 10e3/uL    Absolute Immature Granulocytes 0.0 <=0.4 10e3/uL    Absolute NRBCs 0.0 10e3/uL   Result Value Ref Range    Troponin T, High  Sensitivity 14 <=14 ng/L   Nt probnp inpatient (BNP)   Result Value Ref Range    N terminal Pro BNP Inpatient 59 0 - 900 pg/mL         EKG:  Performed at: 06-NOV-2023 18:40     Impression: Sinus rhythm with Premature atrial complexes with Aberrant conduction. Otherwise normal ECG. When compared with ECG of 05-APR-2022 10:40, Aberrant conduction is now Present. Nonspecific T wave abnormality no longer evident in Inferior leads.     Rate: 83 bpm  Rhythm: Sinus rhythm with Premature atrial complexes with Aberrant conduction.   Axis: 65  -7  60  UT Interval: 158  QRS Interval: 70  QTc Interval: 446  ST Changes: No STEMI  Comparison: When compared with ECG of 05-APR-2022 10:40, Aberrant conduction is now Present. Nonspecific T wave abnormality no longer evident in Inferior leads.     EKG results reviewed and interpreted by Dr. Brian Kaye, ED physician.             --------------- ADDITIONAL MDM ---------------  History:  - I considered systemic symptoms of the presenting illness.  - Supplemental history from:       -- patient   - External Record(s) reviewed:       -- Inpatient/outpatient record (clinic note 11/1/2023, urgent care 11/6/23, IM note 12/15/2022), prior imaging (Echo 09/29/2023)       -- see above ED course & MDM for further details    Workup:  - Chart documentation above includes differential considered and any EKGs or imaging independently interpreted by provider.  - In additional to work up documented, I considered the following work up:       -- see above ED course & MDM for further details    External Consultation:  - Discussion of management with another provider:       -- see above ED course & MDM for details    Complicating Factors:  - Care impacted by chronic illness:       -- see above MDM, past medical history, & problem list  - Care affected by social determinants of health:       -- see above social history      Disposition Considerations:  - Discharge       -- I recommended the patient continue  their current prescription strength medication(s) as charted above in current medications list       I, Aarti Eric, am serving as a scribe to document services personally performed by Mercy Dwyer PA-C, based on my observation and the provider's statements to me. I, Mercy Dwyer PA-C attest that Aarti Eric is acting in a scribe capacity, has observed my performance of the services and has documented them in accordance with my direction.         Mercy Dwyer PA-C  Emergency Medicine  [facility] EMERGENCY DEPARTMENT  [address] [place] [id number]  [phone]  Dept: [phone]     Mercy Dwyer PA-C  11/07/23 0038

## 2023-11-06 NOTE — Clinical Note
Liana Briceño was seen and treated in our emergency department on 11/6/2023.  She may return to work on 11/08/2023.       If you have any questions or concerns, please don't hesitate to call.      Mercy Dwyer PA-C

## 2023-11-06 NOTE — ED TRIAGE NOTES
Pt has been on antibiotics since Wed for left breast infection; pt states the area has increased in swelling and pain. Pt last took her pain med oxycodone/acetaminophen one hour ago and was at urgent care where she was seen, given a strep test due to sore throat, and after having her breast examined was sent here for additional evaluation.

## 2023-11-06 NOTE — PROGRESS NOTES
Assessment & Plan     Breast abscess  Seen on 11/1 for left breast mastitis, failed oral treatment with cefuroxime 5 days.  Has developed into abscess that is exquisitely tender to touch, pain somewhat controlled on hydrocodone/tylenol and patient is fatigued with shortness of breath.  Discussed likely need for surgical drainage and she was understanding.  She was adamant about needing sedation for this procedure and I recommended that she discuss that with the provider that she sees at the hospital.  - Called [facility] ED and gave sign out    Sore throat  Acute cough  3 days of dry cough and sore throat.  Patient concerned it is related to the antibiotics however I discussed she likely would not have developed symptoms 4 days after starting antibiotics.  O2 95% without known respiratory disease however exam reassuring against bacterial pneumonia or acute bronchitis.  Defer chest x-ray as patient is presenting to the emergency department for above concern  - Streptococcus A Rapid Screen w/Reflex to PCR - Clinic Collect  - benzonatate (TESSALON) 100 MG capsule  Dispense: 10 capsule; Refill: 0    No follow-ups on file.    Tressa Horn, DO  she/her  [facility]    Subjective     Liana Briceño is a 68 year old female who presents to clinic today for the following health issues:    HPI    L breast: Seen for recurrent mastitis on 11/1.  Treated with cefuroxime and is following up with PCP tomorrow. Went from quarter sized to egg sized, red, painful. Is taking hydrocodone/tylenol every 4.5 hours. Pain with sleep, walking, SOB that is new with fatigue, feeling hot.  Per chart review, has needed I&D in the past 2022. Thinks the antibiotic is giving her a dry cough but that only started on Saturday.     URI Adult  Throat pain started Saturday, burning sensation, dry cough, chills all day   Feels like acid reflux, takes PPI  Onset of symptoms was 3 day(s) ago.  Course of illness is same.    Severity  moderate    Past Medical History:   Diagnosis Date    Anemia     Breast cyst     Carpal tunnel syndrome     Coronary artery disease due to lipid rich plaque     Dyslipidemia, goal LDL below 70     Esophageal reflux     Essential hypertension     Fatty liver     Goiter diffuse, nontoxic     Hiatal hernia     History of gestational diabetes     History of MRSA infection 07/17/2017    Pilonidal cyst culture    Increased PTH level 11/4/2018    Morbid obesity with BMI of 40.0-44.9, adult (H)     Motion sickness     RADHA on CPAP     Osteoarthritis     Paroxysmal SVT (supraventricular tachycardia) 2011    ablation by Dr. Johnson in 2011    PONV (postoperative nausea and vomiting)     Positive result for methicillin resistant Staphylococcus aureus (MRSA) screening 11/4/2018    Renal disease     S/P hip replacement     Stented coronary artery     Thiamine deficiency 11/4/2018    Type 2 diabetes mellitus (H)     Uncomplicated asthma     Vitamin D deficiency 11/4/2018       Allergies   Allergen Reactions    Latex, Natural Rubber [Latex] Hives    Penicillins Other (See Comments)     Passed out.     Clindamycin Itching    Doxycycline Dizziness    Eggshell Membrane [Egg Shells] Unknown    Erythromycin Itching    Ibuprofen      Ulcer     Naloxone Unknown     itching    Penicillamine Unknown    Pneumococcal 23-Valent Polysaccharide Vaccine [Pneumococcal Vaccine] Other (See Comments)     pneumonia    Statins-Hmg-Coa Reductase Inhibitors [Statins] Muscle Pain (Myalgia)     Shaking with simvastatin and atorvastatin    Azithromycin Rash     Current Outpatient Medications   Medication    acetaminophen (TYLENOL) 500 MG tablet    allopurinol (ZYLOPRIM) 100 MG tablet    aspirin (ASA) 81 MG EC tablet    benzonatate (TESSALON) 100 MG capsule    blood glucose test strips    cefuroxime (CEFTIN) 500 MG tablet    cholecalciferol, vitamin D3, (CHOLECALCIFEROL) 1,000 unit tablet    Cinnamon Bark POWD    Continuous Blood Gluc Sensor (FREESTYLE MIRANDA 2  SENSOR) MISC    cyclobenzaprine (FLEXERIL) 10 MG tablet    docusate sodium (DOK) 100 MG capsule    empagliflozin (JARDIANCE) 10 MG TABS tablet    famotidine (PEPCID) 20 MG tablet    gabapentin (NEURONTIN) 300 MG capsule    generic lancets (MICROLET LANCET)    hydrochlorothiazide (HYDRODIURIL) 12.5 MG tablet    HYDROcodone-acetaminophen (NORCO) 5-325 MG tablet    hydrOXYzine (ATARAX) 25 MG tablet    insulin degludec (TRESIBA FLEXTOUCH) 200 UNIT/ML pen    insulin pen needle (32G X 4 MM) 32G X 4 MM miscellaneous    lisinopril (ZESTRIL) 5 MG tablet    melatonin (MELATIN) 3 mg Tab tablet    meloxicam (MOBIC) 15 MG tablet    nitroGLYcerin (NITROSTAT) 0.3 MG sublingual tablet    omega-3 acid ethyl esters (LOVAZA) 1 g capsule    omeprazole (PRILOSEC) 40 MG DR capsule    ONETOUCH DELICA PLUS LANCET 33 gauge Misc    rosuvastatin (CRESTOR) 10 MG tablet    Semaglutide, 2 MG/DOSE, (OZEMPIC) 8 MG/3ML pen    Thiamine HCl (B-1) 100 MG TABS    traMADol (ULTRAM) 50 MG tablet    triamcinolone (KENALOG) 0.1 % external cream    valACYclovir (VALTREX) 500 MG tablet    vitamin B-12 (CYANOCOBALAMIN) 2500 MCG sublingual tablet     No current facility-administered medications for this visit.     Facility-Administered Medications Ordered in Other Visits   Medication    lidocaine 1 % 10 mL      Review of Systems  Constitutional, HEENT, cardiovascular, pulmonary, gi and gu systems are negative, except as otherwise noted.      Objective    /74   Pulse 83   Temp 98  F (36.7  C) (Oral)   Resp 18   LMP  (LMP Unknown)   SpO2 95%   Physical Exam     GENERAL: healthy, alert and no distress  EYES: Eyes grossly normal to inspection, PERRL and conjunctivae and sclerae normal  HENT: ear canals and TM's normal, nose and mouth without ulcers or lesions  RESP: lungs clear to auscultation - no rales, rhonchi or wheezes  BREAST: Left. see photo below. Marked off erythema with marker. Exquisite tenderness to palpation over the centralized loculation,  fluctuance      No results found for any visits on 11/06/23.        The use of [employer] services may have been used to construct the content in this note; any grammatical or spelling errors are non-intentional. Please contact the author of this note directly if you are in need of any clarification.

## 2023-11-06 NOTE — ED NOTES
Expected Patient Referral to ED  2:33 PM    Referring Clinic/Provider:  Urgent care Dr. Kory Murillo    Reason for referral/Clinical facts:  L breast mastitis (cefuroxime) on 11/1, now abscess, not controlled with tylenol and oxy. No I&D done yet.    Recommendations provided:  Send to ED for further evaluation    Caller was informed that this institution does possess the capabilities and/or resources to provide for patient and should be transferred to our facility.    Discussed that if direct admit is sought and any hurdles are encountered, this ED would be happy to see the patient and evaluate.    Informed caller that recommendations provided are recommendations based only on the facts provided and that they responsible to accept or reject the advice, or to seek a formal in person consultation as needed and that this ED will see/treat patient should they arrive.      Sylvain Waters MD  [facility] EMERGENCY DEPARTMENT  [address] [place] [id number]  [phone]       Sylvain Waters MD  11/06/23 6833

## 2023-11-07 ENCOUNTER — TELEPHONE (OUTPATIENT)
Dept: SURGERY | Facility: CLINIC | Age: 68
End: 2023-11-07

## 2023-11-07 ENCOUNTER — OFFICE VISIT (OUTPATIENT)
Dept: SURGERY | Facility: CLINIC | Age: 68
End: 2023-11-07
Payer: COMMERCIAL

## 2023-11-07 ENCOUNTER — PATIENT OUTREACH (OUTPATIENT)
Dept: ONCOLOGY | Facility: CLINIC | Age: 68
End: 2023-11-07

## 2023-11-07 ENCOUNTER — HOSPITAL ENCOUNTER (EMERGENCY)
Facility: HOSPITAL | Age: 68
Discharge: STILL ADMITTED OR ADMINISTRATIVE | End: 2023-11-08
Payer: COMMERCIAL

## 2023-11-07 VITALS
OXYGEN SATURATION: 95 % | RESPIRATION RATE: 23 BRPM | HEART RATE: 92 BPM | SYSTOLIC BLOOD PRESSURE: 162 MMHG | TEMPERATURE: 98.1 F | BODY MASS INDEX: 39.94 KG/M2 | WEIGHT: 240 LBS | DIASTOLIC BLOOD PRESSURE: 77 MMHG

## 2023-11-07 VITALS
WEIGHT: 250.4 LBS | BODY MASS INDEX: 41.72 KG/M2 | SYSTOLIC BLOOD PRESSURE: 122 MMHG | DIASTOLIC BLOOD PRESSURE: 80 MMHG | HEIGHT: 65 IN

## 2023-11-07 DIAGNOSIS — N61.1 BREAST ABSCESS: ICD-10-CM

## 2023-11-07 PROCEDURE — 250N000013 HC RX MED GY IP 250 OP 250 PS 637: Performed by: STUDENT IN AN ORGANIZED HEALTH CARE EDUCATION/TRAINING PROGRAM

## 2023-11-07 PROCEDURE — 10060 I&D ABSCESS SIMPLE/SINGLE: CPT | Performed by: SURGERY

## 2023-11-07 RX ORDER — HYDROCODONE BITARTRATE AND ACETAMINOPHEN 5; 325 MG/1; MG/1
1 TABLET ORAL ONCE
Status: COMPLETED | OUTPATIENT
Start: 2023-11-07 | End: 2023-11-07

## 2023-11-07 RX ADMIN — HYDROCODONE BITARTRATE AND ACETAMINOPHEN 1 TABLET: 5; 325 TABLET ORAL at 00:45

## 2023-11-07 ASSESSMENT — ACTIVITIES OF DAILY LIVING (ADL): ADLS_ACUITY_SCORE: 35

## 2023-11-07 NOTE — ED NOTES
Expected Patient Referral to ED  11:48 AM    Referring Clinic/Provider:  Surgery clinic    Reason for referral/Clinical facts:  Near syncopal event postop    Recommendations provided:  Send to ED for further evaluation    Caller was informed that this institution does possess the capabilities and/or resources to provide for patient and should be transferred to our facility.    Discussed that if direct admit is sought and any hurdles are encountered, this ED would be happy to see the patient and evaluate.    Informed caller that recommendations provided are recommendations based only on the facts provided and that they responsible to accept or reject the advice, or to seek a formal in person consultation as needed and that this ED will see/treat patient should they arrive.      Maximiliano Griffin MD  [facility] EMERGENCY DEPARTMENT  [address] [place] [id number]  [phone]       Maximiliano Griffin MD  11/07/23 1148

## 2023-11-07 NOTE — PHARMACY-VANCOMYCIN DOSING SERVICE
Pharmacy Vancomycin Initial Note  Date of Service 2023  Patient's  1955  68 year old, female    Indication: Abscess    Current estimated CrCl = Estimated Creatinine Clearance: 57.6 mL/min (A) (based on SCr of 1.18 mg/dL (H)).    Creatinine for last 3 days  2023:  3:59 PM Creatinine 1.18 mg/dL    Recent Vancomycin Level(s) for last 3 days  No results found for requested labs within last 3 days.      Vancomycin IV Administrations (past 72 hours)        No vancomycin orders with administrations in past 72 hours.                    Nephrotoxins and other renal medications (From now, onward)      Start     Dose/Rate Route Frequency Ordered Stop    23 1900  vancomycin (VANCOCIN) 2,000 mg in sodium chloride 0.9 % 500 mL intermittent infusion         2,000 mg  over 2 Hours Intravenous ONCE 23 1842              Contrast Orders - past 72 hours (72h ago, onward)      None                  Plan:  Start vancomycin  2000 mg IV once.   Please consult pharmacy again if wish to continue therapy     Xenia Dunn, MOE

## 2023-11-07 NOTE — PROGRESS NOTES
New Patient Oncology Nurse Navigator Note     Referring provider: Mercy Dwyer PA-C      Referring Clinic/Organization: [facility] Emergency Dept      Referred to (specialty:) Breast Provider Referral     Requested provider (if applicable): NA     Date Referral Received: November 6, 2023     Evaluation for:  Benign breast condition  N61.1 (ICD-10-CM) - Breast abscess      Clinical History (per Nurse review of records provided):      Patient presented to [facility] ED with breast concerns. Per exam in ED:    She has a 12 cm x 7 cm area of erythema and tenderness to the left medial breast with a 3 cm x 4 cm area of induration and fluctuance.  Was seen for this on 11/1/2023 and started on a 5-day course of cefuroxime.  Her symptoms have progressed despite taking cefuroxime as prescribed. She also endorses some dyspnea on exertion and when lying flat which she attributes to the pain from the breast abscess and does note that her shortness of breath is improved once her pain is more controlled, however given her cardiac history and the fact that her symptoms are worse with ambulation did order EKG, troponin, BNP, CT chest which imaging will also be beneficial to assess extent of abscess.  She was seen earlier today in urgent care for her symptoms and did obtain a strep swab which was negative and was sent to the ER for further evaluation.  The patient notes that she did undergo aspiration of the abscess and the same location 1 year ago which was quite painful for her.     11/29/22: US Breast left and Aspiration  Targeted ultrasound evaluation by the technologist and radiologist  demonstrates a heterogeneous fluid collection, 3.0 x 1.9 x 2.1 cm,  9:00 position 16 cm from nipple with surrounding inflammatory change  and overlying skin thickening.  Mild overlying skin erythema.  Well  healed surgical scar on clinical exam. Diagnostic mammography discussed with patient.  Deferred today given left  breast pain.    12/2/22: Diagnostic Mammogram and Ultrasound   Mammogram:  Increased trabecular thickening at 9:00, posterior depth corresponding  with region of known breast abscess. No suspicious finding is  identified. Enlarged left axillary lymph nodes, presumed to be  reactive.     Review of prior studies shows history of abscess at the same location  in 2017.     Ultrasound:  On physical examination, the left breast at 9:00, 16 cm from nipple,  the region of abscess is erythematous, indurated and warm to touch.     Targeted ultrasound evaluation was performed by the technologist and  radiologist. Left breast ultrasound at 9:00, 16 cm from nipple  demonstrates persistent collection.   The patient reports improved  symptoms with new antibiotic therapy.  Thus, repeat aspiration was  deferred today.Enlarged left axillary lymph nodes with uniform cortical thickening are noted in the left axilla, presumed to be reactive.    IMPRESSION: BI-RADS CATEGORY: 3 - Probably Benign.     RECOMMENDED FOLLOW-UP: Short Interval Follow-up.   Recommend completion of antibiotics and return for left breast and  left axillary ultrasound in 2 weeks, and possible ultrasound guided  aspiration.  The patient was advised to return sooner if her symptoms  worsen.    12/06/23: Ultrasound guided Aspiration of left breast    12/12/22: Ultrasound of left breast     History/Family History: Left breast abscess follow-up. Patient started  a new antibiotic treatment 3 days prior. Patient reports improving  symptoms.     Findings: Targeted ultrasound evaluation was performed by the  technologist and radiologist.  In the left breast at the 9:00 position 6 cm from the nipple breast  abscess is still present. This is filled with complex fluid. This is  similar to slightly decreased in size when compared to prior exam  12/6/2022.                                                                      IMPRESSION: BI-RADS CATEGORY: 3 - Probably Benign.      RECOMMENDED FOLLOW-UP: Short Interval Follow-up.     Recommend follow-up in 7-10 days after patient finishes most recent  course of antibiotics to ensure continued improvement. Patient advised  to return sooner if she noticed the area to be worsening.    Records Location: See Bookmarked material     Records Needed: NA     Additional testing needed prior to consult: NA    Payor: [facility] / Plan: [employer] [facility] MEDICARE ADVANTAGE / Product Type: HMO /     November 7, 2023  Referral received and reviewed and patient already scheduled with Dr. Jonel lovell at [facility] location. Will attach referral to today's appointment.    Debby NOLASCO, RN   Oncology Nurse Navigator   [facility]   [phone] / [phone]

## 2023-11-07 NOTE — ED NOTES
Pt presents NAD. SKIN: NWD.   HEENT: normocephalic, PERRL, clear x 3.   CHEST: symmetrical rise, CEBBS, pt c/o chest pain, c/o SOB with exertion. 12 x 7 cm of erythema to left chest.  ABD: NTND, no N&V or diarrhea.  EXT: SIMPSON x 4  Rest of exam unremarkable.

## 2023-11-07 NOTE — LETTER
11/7/2023         RE: Liana Briceño  [address] [place] [address]        Dear Colleague,    Thank you for referring your patient, Liana Briceño, to the [facility] [place]. Please see a copy of my visit note below.    HPI:  Liana Briceño is a 68 year old female who was referred to me by Bela Corbin for evaluation of left breast abscess.  She first noted this on 11/1.  She was prescribed cefuroxime by her PCP.  The pain and swelling worsened, prompting presentation to the ED yesterday.  She was then set up for urgent clinic today.  She has not had any fevers/chills, otherwise feeling well.     Allergies:Latex, natural rubber [latex]; Penicillins; Clindamycin; Doxycycline; Eggshell membrane [egg shells]; Erythromycin; Ibuprofen; Naloxone; Penicillamine; Pneumococcal 23-valent polysaccharide vaccine [pneumococcal vaccine]; Statins-hmg-coa reductase inhibitors [statins]; and Azithromycin    Past Medical History:   Diagnosis Date     Anemia      Breast cyst      Carpal tunnel syndrome      Coronary artery disease due to lipid rich plaque      Dyslipidemia, goal LDL below 70      Esophageal reflux      Essential hypertension      Fatty liver      Goiter diffuse, nontoxic      Hiatal hernia      History of gestational diabetes      History of MRSA infection 07/17/2017    Pilonidal cyst culture     Increased PTH level 11/4/2018     Morbid obesity with BMI of 40.0-44.9, adult (H)      Motion sickness      RADHA on CPAP      Osteoarthritis      Paroxysmal SVT (supraventricular tachycardia) 2011    ablation by Dr. Johnson in 2011     PONV (postoperative nausea and vomiting)      Positive result for methicillin resistant Staphylococcus aureus (MRSA) screening 11/4/2018     Renal disease      S/P hip replacement      Stented coronary artery      Thiamine deficiency 11/4/2018     Type 2 diabetes mellitus (H)      Uncomplicated asthma      Vitamin D deficiency 11/4/2018        Past Surgical History:   Procedure Laterality Date     ARTHROPLASTY KNEE Right 4/8/2022    Procedure: RIGHT TOTAL KNEE ARTHROPLASTY;  Surgeon: Declan Eaton MD;  Location: [facility] Main OR     BIOPSY BREAST       BREAST CYST EXCISION       CARDIAC CATHETERIZATION N/A 4/24/2017    Procedure: Coronary Angiogram;  Surgeon: Ariane Biswas MD;  Location: [facility] Cath Lab;  Service:      CARDIAC CATHETERIZATION N/A 5/19/2017    Procedure: Coronary Angiogram;  Surgeon: Howard Gutierrez MD;  Location: [facility] Cath Lab;  Service:      CHOLECYSTECTOMY  2000     CORONARY STENT PLACEMENT  04/24/2017    BO to RCA by Dr. Biswas     HC REVISE MEDIAN N/CARPAL TUNNEL SURG      Description: Neuroplasty Decompression Median Nerve At Carpal Tunnel;  Recorded: 05/22/2009;     IR LUMBAR EPIDURAL STEROID INJECTION  6/22/2007     IR LUMBAR EPIDURAL STEROID INJECTION  11/2/2007     IR LUMBAR EPIDURAL STEROID INJECTION  8/6/2008     IR LUMBAR EPIDURAL STEROID INJECTION  10/8/2008     IR LUMBAR EPIDURAL STEROID INJECTION  10/28/2008     IR LUMBAR EPIDURAL STEROID INJECTION  3/10/2009     KS EXPLO/DRAIN BREAST ABSCESS      Description: Breast Surgery Mastotomy With Drainage Of Abscess     KS L HRT CATH W/NJX L VENTRICULOGRAPHY IMG S&I N/A 4/24/2017    Procedure: Left Heart Catheterization with Left Ventriculogram;  Surgeon: Ariane Biswas MD;  Location: [facility] Cath Lab;  Service: Cardiology     KS THYROID LOBECTOMY,UNILAT       RADIOFREQUENCY ABLATION  07/05/2011    for PSVT by Dr. Johnson     TOTAL HIP ARTHROPLASTY Right      Current Outpatient Medications   Medication Sig Dispense Refill     acetaminophen (TYLENOL) 500 MG tablet [ACETAMINOPHEN (TYLENOL) 500 MG TABLET] Take 1,000 mg by mouth 3 (three) times a day as needed.       allopurinol (ZYLOPRIM) 100 MG tablet Take 1 tablet (100 mg) by mouth daily 90 tablet 3     aspirin (ASA) 81 MG EC tablet Take 1 tablet (81 mg) by mouth daily Resume after done with BID  dosing  0     benzonatate (TESSALON) 100 MG capsule Take 1 capsule (100 mg) by mouth 3 times daily as needed for cough 10 capsule 0     blood glucose test strips [BLOOD GLUCOSE TEST STRIPS] Use 1 each As Directed 6 (six) times a day. Prodigy Meter 300 strip 3     cefuroxime (CEFTIN) 500 MG tablet Take 1 tablet (500 mg) by mouth 2 times daily 20 tablet 0     cholecalciferol, vitamin D3, (CHOLECALCIFEROL) 1,000 unit tablet [CHOLECALCIFEROL, VITAMIN D3, (CHOLECALCIFEROL) 1,000 UNIT TABLET] Take 3,000 Units by mouth daily.        Cinnamon Bark POWD        Continuous Blood Gluc Sensor (FREESTYLE MIRANDA 2 SENSOR) MISC 1 each every 14 days 1 each every 14 days. Change every 14 days. 6 each 3     cyclobenzaprine (FLEXERIL) 10 MG tablet Take 1 tablet (10 mg) by mouth 2 times daily as needed for muscle spasms 30 tablet 0     docusate sodium (DOK) 100 MG capsule TAKE 1 CAPSULE(100 MG) BY MOUTH TWICE DAILY Strength: 100 mg 180 capsule 3     empagliflozin (JARDIANCE) 10 MG TABS tablet Take 1 tablet (10 mg) by mouth daily 90 tablet 1     famotidine (PEPCID) 20 MG tablet Take 1 tablet (20 mg) by mouth 2 times daily 30 tablet 3     gabapentin (NEURONTIN) 300 MG capsule Take 1 capsule (300 mg) by mouth 3 times daily 270 capsule 3     generic lancets (MICROLET LANCET) [GENERIC LANCETS (MICROLET LANCET)] Use 1 each As Directed 6 (six) times a day. 300 each 3     hydrochlorothiazide (HYDRODIURIL) 12.5 MG tablet TAKE 1 TABLET(12.5 MG) BY MOUTH DAILY 90 tablet 3     HYDROcodone-acetaminophen (NORCO) 5-325 MG tablet Take 1 tablet by mouth every 6 hours as needed for severe pain 18 tablet 0     hydrOXYzine (ATARAX) 25 MG tablet Take 1 tablet (25 mg) by mouth 3 times daily as needed for itching 20 tablet 0     insulin degludec (TRESIBA FLEXTOUCH) 200 UNIT/ML pen Inject 8 Units Subcutaneous daily 63 mL 1     insulin pen needle (32G X 4 MM) 32G X 4 MM miscellaneous Inject 4 each Subcutaneous daily 120 each 3     lisinopril (ZESTRIL) 5 MG  tablet TAKE 1 AND 1/2 TABLETS BY MOUTH EVERY  tablet 2     melatonin (MELATIN) 3 mg Tab tablet Take 6 mg by mouth nightly as needed       meloxicam (MOBIC) 15 MG tablet Take 1 tablet (15 mg) by mouth daily 14 tablet 0     nitroGLYcerin (NITROSTAT) 0.3 MG sublingual tablet Place 1 tablet (0.3 mg) under the tongue every 5 minutes as needed for chest pain 100 tablet 3     omega-3 acid ethyl esters (LOVAZA) 1 g capsule Take 2 capsules by mouth 2 times daily 360 capsule 1     omeprazole (PRILOSEC) 40 MG DR capsule TAKE 1 CAPSULE BY MOUTH EVERY DAY 90 capsule 3     ONETOUCH DELICA PLUS LANCET 33 gauge Misc [ONETOUCH DELICA PLUS LANCET 33 GAUGE MISC] USE TO TEST BLOOD SUGAR 6 TIMES DAILY 600 each 3     rosuvastatin (CRESTOR) 10 MG tablet TAKE 1 TABLET(10 MG) BY MOUTH DAILY 90 tablet 0     Semaglutide, 2 MG/DOSE, (OZEMPIC) 8 MG/3ML pen Inject 2 mg Subcutaneous every 7 days 9 mL 3     sulfamethoxazole-trimethoprim (BACTRIM DS) 800-160 MG tablet Take 1 tablet by mouth 2 times daily for 10 days 20 tablet 0     Thiamine HCl (B-1) 100 MG TABS Take 100 mg by mouth daily 90 tablet 3     traMADol (ULTRAM) 50 MG tablet Take 1 tablet (50 mg) by mouth 2 times daily for 30 days 60 tablet 0     triamcinolone (KENALOG) 0.1 % external cream [TRIAMCINOLONE (KENALOG) 0.1 % CREAM] Apply bid 30 g 0     valACYclovir (VALTREX) 500 MG tablet TAKE 1 TABLET(500 MG) BY MOUTH DAILY 90 tablet 3     vitamin B-12 (CYANOCOBALAMIN) 2500 MCG sublingual tablet One tab every other day 60 tablet 3       Family History   Problem Relation Age of Onset     Heart Disease Father         unknown type     Valvular heart disease Mother      Kidney Disease Mother      Hypertension Mother      Diabetes Mother      Leukemia Brother          at a young age     No Known Problems Son      Diabetes Paternal Aunt      Kidney Disease Sister      Heart Disease Sister      Other - See Comments Other 25.00        victim of a homicide     Breast Cancer No family hx of  "        reports that she quit smoking about 6 years ago. Her smoking use included cigarettes. She has a 11.00 pack-year smoking history. She has never used smokeless tobacco. She reports current alcohol use. She reports that she does not use drugs.      /80   Ht 1.651 m (5' 5\")   Wt 113.6 kg (250 lb 6.4 oz)   LMP  (LMP Unknown)   BMI 41.67 kg/m    Body mass index is 41.67 kg/m .    EXAM:  GENERAL: female in NAD  HEENT: Pupils are round and reactive, sclera are anicteric.   NECK:  No obvious masses or deformities  CV: RRR  PULM: Non-labored breathing on RA  ABDOMEN: Soft, nt/nd  NEURO: No obvious deficits noted.  EXT: No edema, no obvious deformities or any other abnormalities    LABS: WBC normal yesterday    IMAGES:     CT chest for some SOB at that time demonstrated a ~3x5cm subcutaneous abscess of the medial left breast.    Assessment/Plan:   Liana Briceño is a 68 year old female presenting with left breast abscess.    Today we discussed treatment options including aspiration vs I&D given its very close proximity to the skin.  Risks and benefits of surgery explained and they wish to proceed with bedside I&D.  Written informed consent was obtained.  See procedure note below.      Unfortunately after I completed the procedure and was tying the vessel loop she became unresponsive and the left side of her face began twitching.  She also was apneic for a period of time.  She had a pulse throughout.  I asked our clinic to call 911 and she was transferred via ambulance to [place] for further evaluation.  I updated her significant other in person and let the ED know of her expected arrival.  I will plan to see her in clinic next week.  She will continue her already prescribed antibiotics.       Bedside Procedure Note    Pre-operative diagnosis: Left breast abscess   Post-operative diagnosis Same   Procedure:    Surgeon: Guillermo Remy MD    Anesthesia: Local (1% lidocaine with epinephrine)    Estimated blood " loss: Minimal   Drains: Blue Vessel Loop   Specimens: Culture swab   Findings: Purulent abscess cavity.   Complications: Vasovagal vs unknown LOC.       Procedure Details    Positioning on a procedure bed was accomplished.    The left medial breast was prepared with sterile prep.    1% lidocaine, 10 cc, was infiltrated in the skin overlying the lesion.    2 separate skin incisions were made at the medial an lateral aspects of the abscess.    Purulent material was encountered and sent for culture.    This was all expressed and then the cavity flushed with normal saline.    A blue vessel loop was then placed between the two incisions and tied above the skin.    A sterile dressing was applied.    MD Guillermo King MD  General Surgeon  [facility]  Surgery [address] [address]?      Again, thank you for allowing me to participate in the care of your patient.        Sincerely,        Giullermo Remy MD

## 2023-11-07 NOTE — TELEPHONE ENCOUNTER
Patient was seen by Dr. Remy today she passed out and was taken by ambulance to the hospital she is calling because she thought Dr. Remy told her should would need some kind of follow-up and calling to find out what.    Please call and advise.    Thanks!

## 2023-11-07 NOTE — PROGRESS NOTES
HPI:  Liana Briceño is a 68 year old female who was referred to me by Bela Corbin for evaluation of left breast abscess.  She first noted this on 11/1.  She was prescribed cefuroxime by her PCP.  The pain and swelling worsened, prompting presentation to the ED yesterday.  She was then set up for urgent clinic today.  She has not had any fevers/chills, otherwise feeling well.     Allergies:Latex, natural rubber [latex]; Penicillins; Clindamycin; Doxycycline; Eggshell membrane [egg shells]; Erythromycin; Ibuprofen; Naloxone; Penicillamine; Pneumococcal 23-valent polysaccharide vaccine [pneumococcal vaccine]; Statins-hmg-coa reductase inhibitors [statins]; and Azithromycin    Past Medical History:   Diagnosis Date    Anemia     Breast cyst     Carpal tunnel syndrome     Coronary artery disease due to lipid rich plaque     Dyslipidemia, goal LDL below 70     Esophageal reflux     Essential hypertension     Fatty liver     Goiter diffuse, nontoxic     Hiatal hernia     History of gestational diabetes     History of MRSA infection 07/17/2017    Pilonidal cyst culture    Increased PTH level 11/4/2018    Morbid obesity with BMI of 40.0-44.9, adult (H)     Motion sickness     RADHA on CPAP     Osteoarthritis     Paroxysmal SVT (supraventricular tachycardia) 2011    ablation by Dr. Johnson in 2011    PONV (postoperative nausea and vomiting)     Positive result for methicillin resistant Staphylococcus aureus (MRSA) screening 11/4/2018    Renal disease     S/P hip replacement     Stented coronary artery     Thiamine deficiency 11/4/2018    Type 2 diabetes mellitus (H)     Uncomplicated asthma     Vitamin D deficiency 11/4/2018       Past Surgical History:   Procedure Laterality Date    ARTHROPLASTY KNEE Right 4/8/2022    Procedure: RIGHT TOTAL KNEE ARTHROPLASTY;  Surgeon: Declan Eaton MD;  Location: [facility] Main OR    BIOPSY BREAST      BREAST CYST EXCISION      CARDIAC CATHETERIZATION N/A 4/24/2017    Procedure:  Coronary Angiogram;  Surgeon: Ariane Biswas MD;  Location: [facility] Cath Lab;  Service:     CARDIAC CATHETERIZATION N/A 5/19/2017    Procedure: Coronary Angiogram;  Surgeon: Howard Gutierrez MD;  Location: [facility] Cath Lab;  Service:     CHOLECYSTECTOMY  2000    CORONARY STENT PLACEMENT  04/24/2017    BO to RCA by Dr. Biswas    HC REVISE MEDIAN N/CARPAL TUNNEL SURG      Description: Neuroplasty Decompression Median Nerve At Carpal Tunnel;  Recorded: 05/22/2009;    IR LUMBAR EPIDURAL STEROID INJECTION  6/22/2007    IR LUMBAR EPIDURAL STEROID INJECTION  11/2/2007    IR LUMBAR EPIDURAL STEROID INJECTION  8/6/2008    IR LUMBAR EPIDURAL STEROID INJECTION  10/8/2008    IR LUMBAR EPIDURAL STEROID INJECTION  10/28/2008    IR LUMBAR EPIDURAL STEROID INJECTION  3/10/2009    RI EXPLO/DRAIN BREAST ABSCESS      Description: Breast Surgery Mastotomy With Drainage Of Abscess    RI L HRT CATH W/NJX L VENTRICULOGRAPHY IMG S&I N/A 4/24/2017    Procedure: Left Heart Catheterization with Left Ventriculogram;  Surgeon: Ariane Biswas MD;  Location: [facility] Cath Lab;  Service: Cardiology    RI THYROID LOBECTOMY,UNILAT      RADIOFREQUENCY ABLATION  07/05/2011    for PSVT by Dr. Johnson    TOTAL HIP ARTHROPLASTY Right      Current Outpatient Medications   Medication Sig Dispense Refill    acetaminophen (TYLENOL) 500 MG tablet [ACETAMINOPHEN (TYLENOL) 500 MG TABLET] Take 1,000 mg by mouth 3 (three) times a day as needed.      allopurinol (ZYLOPRIM) 100 MG tablet Take 1 tablet (100 mg) by mouth daily 90 tablet 3    aspirin (ASA) 81 MG EC tablet Take 1 tablet (81 mg) by mouth daily Resume after done with BID dosing  0    benzonatate (TESSALON) 100 MG capsule Take 1 capsule (100 mg) by mouth 3 times daily as needed for cough 10 capsule 0    blood glucose test strips [BLOOD GLUCOSE TEST STRIPS] Use 1 each As Directed 6 (six) times a day. Prodigy Meter 300 strip 3    cefuroxime (CEFTIN) 500 MG tablet Take 1 tablet (500 mg) by  mouth 2 times daily 20 tablet 0    cholecalciferol, vitamin D3, (CHOLECALCIFEROL) 1,000 unit tablet [CHOLECALCIFEROL, VITAMIN D3, (CHOLECALCIFEROL) 1,000 UNIT TABLET] Take 3,000 Units by mouth daily.       Cinnamon Bark POWD       Continuous Blood Gluc Sensor (FREESTYLE MIRANDA 2 SENSOR) [facility] 1 each every 14 days 1 each every 14 days. Change every 14 days. 6 each 3    cyclobenzaprine (FLEXERIL) 10 MG tablet Take 1 tablet (10 mg) by mouth 2 times daily as needed for muscle spasms 30 tablet 0    docusate sodium (DOK) 100 MG capsule TAKE 1 CAPSULE(100 MG) BY MOUTH TWICE DAILY Strength: 100 mg 180 capsule 3    empagliflozin (JARDIANCE) 10 MG TABS tablet Take 1 tablet (10 mg) by mouth daily 90 tablet 1    famotidine (PEPCID) 20 MG tablet Take 1 tablet (20 mg) by mouth 2 times daily 30 tablet 3    gabapentin (NEURONTIN) 300 MG capsule Take 1 capsule (300 mg) by mouth 3 times daily 270 capsule 3    generic lancets (MICROLET LANCET) [GENERIC LANCETS (MICROLET LANCET)] Use 1 each As Directed 6 (six) times a day. 300 each 3    hydrochlorothiazide (HYDRODIURIL) 12.5 MG tablet TAKE 1 TABLET(12.5 MG) BY MOUTH DAILY 90 tablet 3    HYDROcodone-acetaminophen (NORCO) 5-325 MG tablet Take 1 tablet by mouth every 6 hours as needed for severe pain 18 tablet 0    hydrOXYzine (ATARAX) 25 MG tablet Take 1 tablet (25 mg) by mouth 3 times daily as needed for itching 20 tablet 0    insulin degludec (TRESIBA FLEXTOUCH) 200 UNIT/ML pen Inject 8 Units Subcutaneous daily 63 mL 1    insulin pen needle (32G X 4 MM) 32G X 4 MM miscellaneous Inject 4 each Subcutaneous daily 120 each 3    lisinopril (ZESTRIL) 5 MG tablet TAKE 1 AND 1/2 TABLETS BY MOUTH EVERY  tablet 2    melatonin (MELATIN) 3 mg Tab tablet Take 6 mg by mouth nightly as needed      meloxicam (MOBIC) 15 MG tablet Take 1 tablet (15 mg) by mouth daily 14 tablet 0    nitroGLYcerin (NITROSTAT) 0.3 MG sublingual tablet Place 1 tablet (0.3 mg) under the tongue every 5 minutes as needed  "for chest pain 100 tablet 3    omega-3 acid ethyl esters (LOVAZA) 1 g capsule Take 2 capsules by mouth 2 times daily 360 capsule 1    omeprazole (PRILOSEC) 40 MG DR capsule TAKE 1 CAPSULE BY MOUTH EVERY DAY 90 capsule 3    ONETOUCH DELICA PLUS LANCET 33 gauge Misc [ONETOUCH DELICA PLUS LANCET 33 GAUGE MISC] USE TO TEST BLOOD SUGAR 6 TIMES DAILY 600 each 3    rosuvastatin (CRESTOR) 10 MG tablet TAKE 1 TABLET(10 MG) BY MOUTH DAILY 90 tablet 0    Semaglutide, 2 MG/DOSE, (OZEMPIC) 8 MG/3ML pen Inject 2 mg Subcutaneous every 7 days 9 mL 3    sulfamethoxazole-trimethoprim (BACTRIM DS) 800-160 MG tablet Take 1 tablet by mouth 2 times daily for 10 days 20 tablet 0    Thiamine HCl (B-1) 100 MG TABS Take 100 mg by mouth daily 90 tablet 3    traMADol (ULTRAM) 50 MG tablet Take 1 tablet (50 mg) by mouth 2 times daily for 30 days 60 tablet 0    triamcinolone (KENALOG) 0.1 % external cream [TRIAMCINOLONE (KENALOG) 0.1 % CREAM] Apply bid 30 g 0    valACYclovir (VALTREX) 500 MG tablet TAKE 1 TABLET(500 MG) BY MOUTH DAILY 90 tablet 3    vitamin B-12 (CYANOCOBALAMIN) 2500 MCG sublingual tablet One tab every other day 60 tablet 3       Family History   Problem Relation Age of Onset    Heart Disease Father         unknown type    Valvular heart disease Mother     Kidney Disease Mother     Hypertension Mother     Diabetes Mother     Leukemia Brother          at a young age    No Known Problems Son     Diabetes Paternal Aunt     Kidney Disease Sister     Heart Disease Sister     Other - See Comments Other 25.00        victim of a homicide    Breast Cancer No family hx of         reports that she quit smoking about 6 years ago. Her smoking use included cigarettes. She has a 11.00 pack-year smoking history. She has never used smokeless tobacco. She reports current alcohol use. She reports that she does not use drugs.      /80   Ht 1.651 m (5' 5\")   Wt 113.6 kg (250 lb 6.4 oz)   LMP  (LMP Unknown)   BMI 41.67 kg/m    Body mass " index is 41.67 kg/m .    EXAM:  GENERAL: female in NAD  HEENT: Pupils are round and reactive, sclera are anicteric.   NECK:  No obvious masses or deformities  CV: RRR  PULM: Non-labored breathing on RA  ABDOMEN: Soft, nt/nd  NEURO: No obvious deficits noted.  EXT: No edema, no obvious deformities or any other abnormalities    LABS: WBC normal yesterday    IMAGES:     CT chest for some SOB at that time demonstrated a ~3x5cm subcutaneous abscess of the medial left breast.    Assessment/Plan:   Liana Briceño is a 68 year old female presenting with left breast abscess.    Today we discussed treatment options including aspiration vs I&D given its very close proximity to the skin.  Risks and benefits of surgery explained and they wish to proceed with bedside I&D.  Written informed consent was obtained.  See procedure note below.      Unfortunately after I completed the procedure and was tying the vessel loop she became unresponsive and the left side of her face began twitching.  She also was apneic for a period of time.  She had a pulse throughout.  I asked our clinic to call 911 and she was transferred via ambulance to [place] for further evaluation.  I updated her significant other in person and let the ED know of her expected arrival.  I will plan to see her in clinic next week.  She will continue her already prescribed antibiotics.       Bedside Procedure Note    Pre-operative diagnosis: Left breast abscess   Post-operative diagnosis Same   Procedure:    Surgeon: Guillermo Remy MD    Anesthesia: Local (1% lidocaine with epinephrine)    Estimated blood loss: Minimal   Drains: Blue Vessel Loop   Specimens: Culture swab   Findings: Purulent abscess cavity.   Complications: Vasovagal vs unknown LOC.       Procedure Details    Positioning on a procedure bed was accomplished.    The left medial breast was prepared with sterile prep.    1% lidocaine, 10 cc, was infiltrated in the skin overlying the lesion.    2 separate  skin incisions were made at the medial an lateral aspects of the abscess.    Purulent material was encountered and sent for culture.    This was all expressed and then the cavity flushed with normal saline.    A blue vessel loop was then placed between the two incisions and tied above the skin.    A sterile dressing was applied.    MD Guillermo iKng MD  General Surgeon  [facility]  Surgery [address] [address]?

## 2023-11-07 NOTE — ED PROVIDER NOTES
Emergency Department Midlevel Supervisory Note     I personally saw the patient and performed a substantive portion of the visit including all aspects of the medical decision making.    ED Course:  6:20 PM Mercy Dwyer PA-C staffed patient with me. I agree with their assessment and plan of management, and I will see the patient.  6:28 PM I met with the patient to introduce myself, gather additional history, perform my initial exam, and discuss the plan.     Brief HPI:     Liana Briceño is a 68 year old female who presents for evaluation of breast problem.     The patient reports that she has been on antibiotics since Wednesday for left breast infection, but she states that she is not getting better and she feels as if she is getting worse. She adds on that the [facility] had extracted the pus on her breast and was unable to numb her. Today she developed a fever and shortness of breath. She gets short of breath when she is walking or sleeping. She has taken tylenol and ibuprofen for the pain, but it only lasts for 4 hours and the pain comes back. She had taken tylenol and ibuprofen today at 12 PM. To add on, she is scheduled to meet with her primary care doctor tomorrow.        I, Aarti Eric, am serving as a scribe to document services personally performed by Brian Kaye MD, based on my observations and the provider's statements to me.   I, Brian Kaye MD, that Aarti Eric was acting in a scribe capacity, has observed my performance of the services and has documented them in accordance with my direction.    Brief Physical Exam: BP (!) 141/68   Pulse 89   Temp 98.1  F (36.7  C) (Oral)   Resp 20   Wt 108.9 kg (240 lb)   LMP  (LMP Unknown)   SpO2 93%   BMI 39.94 kg/m    Constitutional:  Alert, in no acute distress  EYES: Conjunctivae clear  HENT:  Atraumatic  Respiratory:  Respirations even, unlabored, in no acute respiratory distress  Cardiovascular:  Regular rate and rhythm, good peripheral perfusion  GI:  Soft, non-distended, non-tender  Musculoskeletal:  Moves all 4 extremities equally, grossly symmetrical strength  Integument: Warm & dry. No appreciable rash, erythema.  Neurologic:  Alert & oriented, speech clear and fluent, no focal deficits noted  Psych: Normal mood and affect       MDM:    ED Course as of 11/06/23 1856   Mon Nov 06, 2023   1832 Patient is a 68-year-old obese diabetic hypertensive female history of CAD here with left breast abscess.  Is a present for the last week or so.  Has been on cefuroxime.  No worsening pain with also patient reported that she also has shortness of breath and dyspnea on exertion with the pain.  Dates when she takes the medication, the shortness of breath and chest discomfort gets better.  No fevers chills nausea vomiting    Feels well her vitals are stable.  Heart and lungs are normal.  Patient has approximately 12 x 7 cm of erythema however there is a central area approximately 3 x 4 cm of fluctuance that suggest abscess.  We will obtain a CT of her chest to rule out PE or any other pulmonary complaint although I think that her symptoms are most likely from her pain in her chest, this will also evaluate the breast abscess.  We will also reach out to the breast center/surgeon to see if we can get her seen in clinic this week.   1843 EKG sinus 83, no STEMI, no inversions or depressions few PVCs QTc 446, unchanged from previous which was in April 2022.   -Labs reassuring.  CT normal.  I suspect patient symptoms are from her pain and she agrees that she is long as her pain is controlled does not have any cardiopulmonary symptoms.  Midlevel spoke with general surgery who will see patient hopefully tomorrow in clinic.  She was sent with Bactrim.    Diagnosis:  Breast abscess    Labs and Imaging:  Results for orders placed or performed during the hospital encounter of 11/06/23   Comprehensive metabolic panel   Result Value Ref Range    Sodium 136 135 - 145 mmol/L    Potassium 4.4  3.4 - 5.3 mmol/L    Carbon Dioxide (CO2) 26 22 - 29 mmol/L    Anion Gap 11 7 - 15 mmol/L    Urea Nitrogen 22.0 8.0 - 23.0 mg/dL    Creatinine 1.18 (H) 0.51 - 0.95 mg/dL    GFR Estimate 51 (L) >60 mL/min/1.73m2    Calcium 10.4 (H) 8.8 - 10.2 mg/dL    Chloride 99 98 - 107 mmol/L    Glucose 154 (H) 70 - 99 mg/dL    Alkaline Phosphatase 105 (H) 35 - 104 U/L    AST 23 0 - 45 U/L    ALT 16 0 - 50 U/L    Protein Total 8.0 6.4 - 8.3 g/dL    Albumin 4.6 3.5 - 5.2 g/dL    Bilirubin Total 0.3 <=1.2 mg/dL   Result Value Ref Range    Lactic Acid 1.0 0.7 - 2.0 mmol/L   UA with Microscopic reflex to Culture    Specimen: Urine, Midstream   Result Value Ref Range    Color Urine Light Yellow Colorless, Straw, Light Yellow, Yellow    Appearance Urine Clear Clear    Glucose Urine >1000 (A) Negative mg/dL    Bilirubin Urine Negative Negative    Ketones Urine Negative Negative mg/dL    Specific Gravity Urine 1.029 1.001 - 1.030    Blood Urine Negative Negative    pH Urine 5.0 5.0 - 7.0    Protein Albumin Urine Negative Negative mg/dL    Urobilinogen Urine <2.0 <2.0 mg/dL    Nitrite Urine Negative Negative    Leukocyte Esterase Urine 25 Jamar/uL (A) Negative    RBC Urine 1 <=2 /HPF    WBC Urine 6 (H) <=5 /HPF    Squamous Epithelials Urine <1 <=1 /HPF   CBC with platelets and differential   Result Value Ref Range    WBC Count 8.0 4.0 - 11.0 10e3/uL    RBC Count 4.21 3.80 - 5.20 10e6/uL    Hemoglobin 12.5 11.7 - 15.7 g/dL    Hematocrit 37.8 35.0 - 47.0 %    MCV 90 78 - 100 fL    MCH 29.7 26.5 - 33.0 pg    MCHC 33.1 31.5 - 36.5 g/dL    RDW 13.2 10.0 - 15.0 %    Platelet Count 302 150 - 450 10e3/uL    % Neutrophils 65 %    % Lymphocytes 21 %    % Monocytes 11 %    % Eosinophils 3 %    % Basophils 0 %    % Immature Granulocytes 0 %    NRBCs per 100 WBC 0 <1 /100    Absolute Neutrophils 5.2 1.6 - 8.3 10e3/uL    Absolute Lymphocytes 1.7 0.8 - 5.3 10e3/uL    Absolute Monocytes 0.8 0.0 - 1.3 10e3/uL    Absolute Eosinophils 0.3 0.0 - 0.7 10e3/uL     Absolute Basophils 0.0 0.0 - 0.2 10e3/uL    Absolute Immature Granulocytes 0.0 <=0.4 10e3/uL    Absolute NRBCs 0.0 10e3/uL   Result Value Ref Range    Troponin T, High Sensitivity 14 <=14 ng/L   Nt probnp inpatient (BNP)   Result Value Ref Range    N terminal Pro BNP Inpatient 59 0 - 900 pg/mL     I have reviewed the relevant laboratory and radiology studies    Procedures:  I was present for the key portions of procedures documented in midlevel note, see midlevel note for further details.    EKG: Personally reviewed and interpreted as documented below;      Brian Kaye MD  [facility] EMERGENCY DEPARTMENT  [facility] [facility] [id number] [phone]     Brian Kaye DO  11/07/23 0006

## 2023-11-07 NOTE — DISCHARGE INSTRUCTIONS
You were seen in the emergency department [facility] for breast abscess.  Please follow-up with the breast clinic as soon as possible; call [phone] at 8 tomorrow morning to schedule an appointment.  A prescription was sent for antibiotics to your pharmacy, please take them as prescribed.  Return to the emergency department if you develop any new or worsening symptoms and including fevers, confusion, worsening pain.

## 2023-11-08 ENCOUNTER — TELEPHONE (OUTPATIENT)
Dept: SURGERY | Facility: CLINIC | Age: 68
End: 2023-11-08

## 2023-11-08 DIAGNOSIS — N61.1 BREAST ABSCESS: Primary | ICD-10-CM

## 2023-11-08 PROBLEM — E27.9 ADRENAL NODULE (H): Status: ACTIVE | Noted: 2023-06-26

## 2023-11-08 PROBLEM — D64.9 NORMOCYTIC ANEMIA: Status: ACTIVE | Noted: 2023-06-26

## 2023-11-08 PROBLEM — E83.42 HYPOMAGNESEMIA: Status: ACTIVE | Noted: 2023-06-26

## 2023-11-08 PROBLEM — R19.7 DIARRHEA: Status: ACTIVE | Noted: 2023-06-26

## 2023-11-08 NOTE — TELEPHONE ENCOUNTER
Called Ms. Briceño to check in after ER visit yesterday as well as ensure she had antibiotic coverage.    She says she's feeling well.  No acute findings during ER visit.  The vessel loop did fall out in one piece.      I said it's fine for drain to stay out.  She has a 10 day prescription of bactrim which should cover through our next clinic visit.    Discussed warnings signs of unresolved/worsening infection and when to call/ present for evaluation.    Guillermo Remy MD

## 2023-11-08 NOTE — TELEPHONE ENCOUNTER
"I called to check in with Liana. She is doing better today. She notes some mild discomfort \"stinging\" from the incision but states it's tolerable. She does have a Rx for Percocet for another medical issue but does not want take this due to side effects of nausea and stomach ache. We discussed use of Tylenol to take scheduled for any discomfort. She reports that the vessel loop has fallen out. She has changed the gauze and reports reddish-pink drainage today. We discussed changing this as needed today for wound care. Informed her that Dr. Remy will try reaching out to her later today to check in as well. She was scheduled to return for a re-check on 11/14/23.   "

## 2023-11-09 ENCOUNTER — TELEPHONE (OUTPATIENT)
Dept: SURGERY | Facility: CLINIC | Age: 68
End: 2023-11-09
Payer: COMMERCIAL

## 2023-11-09 DIAGNOSIS — N61.1 BREAST ABSCESS: Primary | ICD-10-CM

## 2023-11-09 PROCEDURE — 99207 PR NO BILLABLE SERVICE THIS VISIT: CPT | Performed by: SURGERY

## 2023-11-09 RX ORDER — LINEZOLID 600 MG/1
600 TABLET, FILM COATED ORAL 2 TIMES DAILY
Qty: 14 TABLET | Refills: 0 | Status: SHIPPED | OUTPATIENT
Start: 2023-11-09 | End: 2024-01-15

## 2023-11-09 NOTE — TELEPHONE ENCOUNTER
Reviewed culture results and sensitivities.  Tried calling patient multiple times at multiple contacts.  None of her VMs are setup to leave a message.  I've sent the prescription for linezolid to her listed pharmacy.  Will try to contact her again later today.    Guillermo Remy MD     Connected with patient today, will pickup linzeolizid. Will call if needs sent to another pharmacy due to cost.  Discussed good rx options.

## 2023-11-11 LAB
BACTERIA BLD CULT: NO GROWTH
BACTERIA BLD CULT: NO GROWTH

## 2023-11-14 ENCOUNTER — OFFICE VISIT (OUTPATIENT)
Dept: SURGERY | Facility: CLINIC | Age: 68
End: 2023-11-14
Payer: COMMERCIAL

## 2023-11-14 DIAGNOSIS — N61.1 BREAST ABSCESS: Primary | ICD-10-CM

## 2023-11-14 PROCEDURE — 99024 POSTOP FOLLOW-UP VISIT: CPT | Performed by: SURGERY

## 2023-11-14 NOTE — LETTER
11/14/2023         RE: Liana Briceño  [address] [place] [address]        Dear Colleague,    Thank you for referring your patient, Liana Briceño, to the [facility] [place]. Please see a copy of my visit note below.    FOLLOW-UP  Nov 14, 2023    Liana Briceño is a 68 year old female who returns for follow-up for after I&D of breast abscess.    Treatment to date:  11/7- I&D of left breast abscess    HPI:    Since her last visit, she has been doing well. No fever/chills.  Drainage has since stopped. She does not feel any fluid accumulating.    PE:    Left breast incision c/d/I, some firmness but significantly improved, no clear abscess at this point    ASSESSMENT:    Liana Briceño is a 68 year old female with left breast abscess s/p I&D      PLAN:  Counseled her to continue current prescription of antibiotics  She is meeting with her PCP today, she has not had a mammogram for 2 years she thinks, I recommend she have screening mammo after this all subsides      Guillermo Remy MD      Again, thank you for allowing me to participate in the care of your patient.        Sincerely,        Guillermo Remy MD

## 2023-11-14 NOTE — PROGRESS NOTES
FOLLOW-UP  Nov 14, 2023    Liana Briceño is a 68 year old female who returns for follow-up for after I&D of breast abscess.    Treatment to date:  11/7- I&D of left breast abscess    HPI:    Since her last visit, she has been doing well. No fever/chills.  Drainage has since stopped. She does not feel any fluid accumulating.    PE:    Left breast incision c/d/I, some firmness but significantly improved, no clear abscess at this point    ASSESSMENT:    Liana Briceño is a 68 year old female with left breast abscess s/p I&D      PLAN:  Counseled her to continue current prescription of antibiotics  She is meeting with her PCP today, she has not had a mammogram for 2 years she thinks, I recommend she have screening mammo after this all subsides      Guillermo Remy MD

## 2023-11-16 ENCOUNTER — OFFICE VISIT (OUTPATIENT)
Dept: INTERNAL MEDICINE | Facility: CLINIC | Age: 68
End: 2023-11-16
Payer: COMMERCIAL

## 2023-11-16 VITALS
BODY MASS INDEX: 41.67 KG/M2 | SYSTOLIC BLOOD PRESSURE: 118 MMHG | HEART RATE: 90 BPM | DIASTOLIC BLOOD PRESSURE: 50 MMHG | OXYGEN SATURATION: 96 % | TEMPERATURE: 97.9 F | HEIGHT: 65 IN | RESPIRATION RATE: 18 BRPM

## 2023-11-16 DIAGNOSIS — Z29.11 NEED FOR VACCINATION AGAINST RESPIRATORY SYNCYTIAL VIRUS: ICD-10-CM

## 2023-11-16 DIAGNOSIS — K21.9 GASTROESOPHAGEAL REFLUX DISEASE WITHOUT ESOPHAGITIS: ICD-10-CM

## 2023-11-16 DIAGNOSIS — N39.46 MIXED INCONTINENCE: ICD-10-CM

## 2023-11-16 DIAGNOSIS — M54.50 CHRONIC LEFT-SIDED LOW BACK PAIN WITHOUT SCIATICA: Primary | ICD-10-CM

## 2023-11-16 DIAGNOSIS — M10.9 ACUTE GOUTY ARTHROPATHY: ICD-10-CM

## 2023-11-16 DIAGNOSIS — G89.29 CHRONIC LEFT-SIDED LOW BACK PAIN WITHOUT SCIATICA: Primary | ICD-10-CM

## 2023-11-16 DIAGNOSIS — N61.1 BREAST ABSCESS: ICD-10-CM

## 2023-11-16 PROCEDURE — 99214 OFFICE O/P EST MOD 30 MIN: CPT | Performed by: INTERNAL MEDICINE

## 2023-11-16 RX ORDER — ALLOPURINOL 100 MG/1
100 TABLET ORAL DAILY
Qty: 90 TABLET | Refills: 3 | Status: SHIPPED | OUTPATIENT
Start: 2023-11-16

## 2023-11-16 RX ORDER — RESPIRATORY SYNCYTIAL VIRUS VACCINE 120MCG/0.5
0.5 KIT INTRAMUSCULAR ONCE
Qty: 1 EACH | Refills: 0 | Status: SHIPPED | OUTPATIENT
Start: 2023-11-16 | End: 2023-11-16

## 2023-11-16 RX ORDER — HYDROCODONE BITARTRATE AND ACETAMINOPHEN 5; 325 MG/1; MG/1
1 TABLET ORAL DAILY
Qty: 30 TABLET | Refills: 0 | Status: SHIPPED | OUTPATIENT
Start: 2023-11-16 | End: 2023-12-16

## 2023-11-16 RX ORDER — OMEPRAZOLE 40 MG/1
CAPSULE, DELAYED RELEASE ORAL
Qty: 90 CAPSULE | Refills: 3 | Status: SHIPPED | OUTPATIENT
Start: 2023-11-16

## 2023-11-16 NOTE — PROGRESS NOTES
"  Assessment & Plan     Need for vaccination against respiratory syncytial virus  Discussed  - respiratory syncytial virus vaccine, bivalent (ABRYSVO) injection  Dispense: 1 each; Refill: 0    Acute gouty arthropathy  Stable on zyloric  - allopurinol (ZYLOPRIM) 100 MG tablet  Dispense: 90 tablet; Refill: 3    Gastroesophageal reflux disease without esophagitis    - omeprazole (PRILOSEC) 40 MG DR capsule  Dispense: 90 capsule; Refill: 3    Chronic left-sided low back pain without sciatica  Continues to have ongoing problems.  She used to see spine doctor but did not really have a good response to steroid injections.  I did order an MRI of the lumbar spine.  She still needs to get scheduled.  Limited amounts of hydrocodone may be helpful does not take more than 1 or 2 a day.  She did not respond to tramadol.  - Spine  Referral  - HYDROcodone-acetaminophen (NORCO) 5-325 MG tablet  Dispense: 30 tablet; Refill: 0    Mixed incontinence  She also has ongoing problems with both stress and urgency.  She has tried and failed medications in the past would like to see urogynecologist  - Adult Uro/Gyn  Referral    Breast abscess  Complete resolution of infection wound is healed completely.  There is no residual overlie skin breakdown.  She does not have any intertrigo or any other issues at  or new other infection.  She had to be treated with linezolid as she did not respond to cefuroxime or Bactrim.  She is penicillin allergic.         MED REC REQUIRED  Post Medication Reconciliation Status: discharge medications reconciled, continue medications without change  BMI:   Estimated body mass index is 41.67 kg/m  as calculated from the following:    Height as of this encounter: 1.651 m (5' 5\").    Weight as of 11/7/23: 113.6 kg (250 lb 6.4 oz).           Bela Corbin MD  [facility] [place]   Liana is a 68 year old, presenting for the following health issues:  Frank's " had a history of diabetes and obesity hypertension chronic back pain.  Unfortunately she had a relapse of sepsis on the same occasion she had last time this was daughters on December exam Bactrim surgical drainage was attempted but she had a syncopal episode and had to be given to the ER.  Chest pain is extremely intense.  She was seen by infectious diseases and put on  Linezolid , now she is back to baseline she also also discuss chronic back pain and her incontinenceHospital F/U (11/7 hospital visit/11/14 surgery visit for breast abscess/)      11/16/2023    12:22 PM   Additional Questions   Roomed by SALOME Boyer     Creatinine   Date Value Ref Range Status   11/06/2023 1.18 (H) 0.51 - 0.95 mg/dL Final         HPI         Current Outpatient Medications   Medication    acetaminophen (TYLENOL) 500 MG tablet    allopurinol (ZYLOPRIM) 100 MG tablet    aspirin (ASA) 81 MG EC tablet    benzonatate (TESSALON) 100 MG capsule    cefuroxime (CEFTIN) 500 MG tablet    cholecalciferol, vitamin D3, (CHOLECALCIFEROL) 1,000 unit tablet    Cinnamon Bark POWD    cyclobenzaprine (FLEXERIL) 10 MG tablet    docusate sodium (DOK) 100 MG capsule    empagliflozin (JARDIANCE) 10 MG TABS tablet    gabapentin (NEURONTIN) 300 MG capsule    hydrochlorothiazide (HYDRODIURIL) 12.5 MG tablet    HYDROcodone-acetaminophen (NORCO) 5-325 MG tablet    hydrOXYzine (ATARAX) 25 MG tablet    insulin pen needle (32G X 4 MM) 32G X 4 MM miscellaneous    linezolid (ZYVOX) 600 MG tablet    lisinopril (ZESTRIL) 5 MG tablet    melatonin (MELATIN) 3 mg Tab tablet    nitroGLYcerin (NITROSTAT) 0.3 MG sublingual tablet    omega-3 acid ethyl esters (LOVAZA) 1 g capsule    omeprazole (PRILOSEC) 40 MG DR capsule    rosuvastatin (CRESTOR) 10 MG tablet    Semaglutide, 2 MG/DOSE, (OZEMPIC) 8 MG/3ML pen    Thiamine HCl (B-1) 100 MG TABS    triamcinolone (KENALOG) 0.1 % external cream    valACYclovir (VALTREX) 500 MG tablet    vitamin B-12 (CYANOCOBALAMIN) 2500 MCG  "sublingual tablet    Continuous Blood Gluc Sensor (FREESTYLE MIRANDA 2 SENSOR) MISC    meloxicam (MOBIC) 15 MG tablet    ONETOUCH DELICA PLUS LANCET 33 gauge Misc     No current facility-administered medications for this visit.     Facility-Administered Medications Ordered in Other Visits   Medication    lidocaine 1 % 10 mL           Review of Systems         Objective    /50 (BP Location: Left arm, Patient Position: Sitting, Cuff Size: Adult Regular)   Pulse 90   Temp 97.9  F (36.6  C) (Tympanic)   Resp 18   Ht 1.651 m (5' 5\")   LMP  (LMP Unknown)   SpO2 96%   BMI 41.67 kg/m    Body mass index is 41.67 kg/m .  Physical Exam   GENERAL: healthy, alert and no distress  NECK: no adenopathy, no asymmetry, masses, or scars and thyroid normal to palpation  RESP: lungs clear to auscultation - no rales, rhonchi or wheezes  BREAST: normal without masses, tenderness or nipple discharge and no palpable axillary masses or adenopathy wound of the abscess I&D is well-healed  CV: regular rate and rhythm, normal S1 S2, no S3 or S4, no murmur, click or rub, no peripheral edema and peripheral pulses strong  ABDOMEN: soft, nontender, no hepatosplenomegaly, no masses and bowel sounds normal  MS: no gross musculoskeletal defects noted, no edema                      "

## 2023-11-20 LAB
ATRIAL RATE - MUSE: 83 BPM
DIASTOLIC BLOOD PRESSURE - MUSE: NORMAL MMHG
INTERPRETATION ECG - MUSE: NORMAL
P AXIS - MUSE: 65 DEGREES
PR INTERVAL - MUSE: 158 MS
QRS DURATION - MUSE: 70 MS
QT - MUSE: 380 MS
QTC - MUSE: 446 MS
R AXIS - MUSE: -7 DEGREES
SYSTOLIC BLOOD PRESSURE - MUSE: NORMAL MMHG
T AXIS - MUSE: 60 DEGREES
VENTRICULAR RATE- MUSE: 83 BPM

## 2023-11-21 PROBLEM — N61.1 BREAST ABSCESS: Status: ACTIVE | Noted: 2023-11-21

## 2023-11-21 PROBLEM — N39.46 MIXED INCONTINENCE: Status: ACTIVE | Noted: 2023-11-21

## 2023-11-21 PROBLEM — M10.9 ACUTE GOUTY ARTHROPATHY: Status: ACTIVE | Noted: 2023-11-21

## 2023-11-22 DIAGNOSIS — L30.9 DERMATITIS: ICD-10-CM

## 2023-11-22 DIAGNOSIS — M54.40 ACUTE MIDLINE LOW BACK PAIN WITH SCIATICA, SCIATICA LATERALITY UNSPECIFIED: ICD-10-CM

## 2023-11-22 DIAGNOSIS — E11.9 TYPE 2 DIABETES MELLITUS WITHOUT COMPLICATION, WITHOUT LONG-TERM CURRENT USE OF INSULIN (H): Chronic | ICD-10-CM

## 2023-11-22 DIAGNOSIS — E11.42 DIABETIC POLYNEUROPATHY ASSOCIATED WITH TYPE 2 DIABETES MELLITUS (H): ICD-10-CM

## 2023-11-24 RX ORDER — MELOXICAM 15 MG/1
15 TABLET ORAL DAILY
Qty: 14 TABLET | Refills: 0 | Status: SHIPPED | OUTPATIENT
Start: 2023-11-24 | End: 2024-01-15

## 2023-11-24 RX ORDER — TRIAMCINOLONE ACETONIDE 1 MG/G
CREAM TOPICAL
Qty: 30 G | Refills: 0 | Status: SHIPPED | OUTPATIENT
Start: 2023-11-24 | End: 2024-01-15

## 2023-11-24 RX ORDER — GABAPENTIN 300 MG/1
300 CAPSULE ORAL 3 TIMES DAILY
Qty: 270 CAPSULE | Refills: 0 | Status: SHIPPED | OUTPATIENT
Start: 2023-11-24

## 2023-11-26 DIAGNOSIS — B00.9 HSV (HERPES SIMPLEX VIRUS) INFECTION: ICD-10-CM

## 2023-11-27 RX ORDER — VALACYCLOVIR HYDROCHLORIDE 500 MG/1
TABLET, FILM COATED ORAL
Qty: 90 TABLET | Refills: 3 | Status: SHIPPED | OUTPATIENT
Start: 2023-11-27

## 2023-11-29 ENCOUNTER — OFFICE VISIT (OUTPATIENT)
Dept: UROLOGY | Facility: CLINIC | Age: 68
End: 2023-11-29
Attending: UROLOGY
Payer: COMMERCIAL

## 2023-11-29 VITALS — DIASTOLIC BLOOD PRESSURE: 76 MMHG | HEART RATE: 88 BPM | SYSTOLIC BLOOD PRESSURE: 147 MMHG | OXYGEN SATURATION: 96 %

## 2023-11-29 DIAGNOSIS — N39.46 MIXED INCONTINENCE: ICD-10-CM

## 2023-11-29 LAB
ALBUMIN UR-MCNC: NEGATIVE MG/DL
APPEARANCE UR: CLEAR
BILIRUB UR QL STRIP: NEGATIVE
COLOR UR AUTO: YELLOW
GLUCOSE UR STRIP-MCNC: >=1000 MG/DL
HGB UR QL STRIP: ABNORMAL
KETONES UR STRIP-MCNC: NEGATIVE MG/DL
LEUKOCYTE ESTERASE UR QL STRIP: ABNORMAL
NITRATE UR QL: NEGATIVE
PH UR STRIP: 5.5 [PH] (ref 5–7)
RBC #/AREA URNS AUTO: NORMAL /HPF
SP GR UR STRIP: 1.01 (ref 1–1.03)
UROBILINOGEN UR STRIP-ACNC: 1 E.U./DL
WBC #/AREA URNS AUTO: NORMAL /HPF

## 2023-11-29 PROCEDURE — 52000 CYSTOURETHROSCOPY: CPT | Performed by: UROLOGY

## 2023-11-29 PROCEDURE — 99205 OFFICE O/P NEW HI 60 MIN: CPT | Mod: 25 | Performed by: UROLOGY

## 2023-11-29 PROCEDURE — 81001 URINALYSIS AUTO W/SCOPE: CPT | Performed by: UROLOGY

## 2023-11-29 NOTE — PROGRESS NOTES
"Liana Briceño is a 68 year old female seen in consultation for UTI. Consult from Bela Corbin.      11/6/23  UA: negative except for > 1000 mg sugar    BC: negative      Now with several  concerns:    Incontinence x \"many\" years, progressive. Urge > stress > insensate, wears 2-3 pads per day, one at nite (usually dry in the morning).     2. Inability to stop stream    3. \"I feel like I have a neurological problem. When I have sex I get a real bad itch in my fingers and toes\"    4. \"Itch and foul smell in my vagina since taking abx for breast abscess.\"      Seen by Gyn many yrs ago for incontinence, started on Kegel's, did not help. No  eval, no hx bladder surgery, no use of bladder meds.      Denies dysuria, gross hematuria, daytime frequency; noc x 2-3.    Hx 1 vag delivery, no hyster. Not on HRT.    Some constipation issues when she takes the narcotics for back pain.    Drinks 1 decaf coffee, 2 decaf soda, 4-5 bottles of [place] per day. (Did not complete voiding diary).    Hx for DM x 10 yrs, no using her realtime sugar monitor, states that her am sugar was 130 yesterday. No breakfast today but [employer] fish sandwich and fries for lunch. Notes from 9/23 reflect suboptimal compliance with Ozempic and Jaridance. Has gym membership at [employer] but has not been there since June.    On hydrochlorothiazide for HTN.      Reviewed PMH in detail including anemia, CAD, GERD, THN, goiter, hx MRSA, morbid obesity, RADHA on CPAP, arthritis, SVT, DM type 2, cardiac stents, asthma, vasovagal, breast abscess, diarrhea, gastroenteritis, chest pain, renal insufficiency, hx total knee, carotid artery stenosis, vaginal bleeding, HA, gouty arthritis, possible pyelonephritis, lumbar nephropathy        Past Medical History:   Diagnosis Date    Anemia     Breast cyst     Carpal tunnel syndrome     Coronary artery disease due to lipid rich plaque     Dyslipidemia, goal LDL below 70     Esophageal reflux     Essential hypertension "     Fatty liver     Goiter diffuse, nontoxic     Hiatal hernia     History of gestational diabetes     History of MRSA infection 07/17/2017    Pilonidal cyst culture    Increased PTH level 11/4/2018    Morbid obesity with BMI of 40.0-44.9, adult (H)     Motion sickness     RADHA on CPAP     Osteoarthritis     Paroxysmal SVT (supraventricular tachycardia) 2011    ablation by Dr. Johnson in 2011    PONV (postoperative nausea and vomiting)     Positive result for methicillin resistant Staphylococcus aureus (MRSA) screening 11/4/2018    Renal disease     S/P hip replacement     Stented coronary artery     Thiamine deficiency 11/4/2018    Type 2 diabetes mellitus (H)     Uncomplicated asthma     Vitamin D deficiency 11/4/2018       Past Surgical History:   Procedure Laterality Date    ARTHROPLASTY KNEE Right 4/8/2022    Procedure: RIGHT TOTAL KNEE ARTHROPLASTY;  Surgeon: Declan Eaton MD;  Location: [facility] Main OR    BIOPSY BREAST      BREAST CYST EXCISION      CARDIAC CATHETERIZATION N/A 4/24/2017    Procedure: Coronary Angiogram;  Surgeon: Ariane Biswas MD;  Location: [facility] Cath Lab;  Service:     CARDIAC CATHETERIZATION N/A 5/19/2017    Procedure: Coronary Angiogram;  Surgeon: Howard Gutierrez MD;  Location: [facility] Cath Lab;  Service:     CHOLECYSTECTOMY  2000    CORONARY STENT PLACEMENT  04/24/2017    BO to RCA by Dr. Biswas    HC REVISE MEDIAN N/CARPAL TUNNEL SURG      Description: Neuroplasty Decompression Median Nerve At Carpal Tunnel;  Recorded: 05/22/2009;    IR LUMBAR EPIDURAL STEROID INJECTION  6/22/2007    IR LUMBAR EPIDURAL STEROID INJECTION  11/2/2007    IR LUMBAR EPIDURAL STEROID INJECTION  8/6/2008    IR LUMBAR EPIDURAL STEROID INJECTION  10/8/2008    IR LUMBAR EPIDURAL STEROID INJECTION  10/28/2008    IR LUMBAR EPIDURAL STEROID INJECTION  3/10/2009    AL EXPLO/DRAIN BREAST ABSCESS      Description: Breast Surgery Mastotomy With Drainage Of Abscess    AL L HRT CATH W/NJX L  VENTRICULOGRAPHY IM S&I N/A 2017    Procedure: Left Heart Catheterization with Left Ventriculogram;  Surgeon: Ariane Biswas MD;  Location: [facility] Cath Lab;  Service: Cardiology    NC THYROID LOBECTOMY,UNILAT      RADIOFREQUENCY ABLATION  2011    for PSVT by Dr. Johnson    TOTAL HIP ARTHROPLASTY Right        Social History     Socioeconomic History    Marital status:      Spouse name: Not on file    Number of children: 1    Years of education: Not on file    Highest education level: Not on file   Occupational History    Not on file   Tobacco Use    Smoking status: Former     Packs/day: 0.25     Years: 44.00     Additional pack years: 0.00     Total pack years: 11.00     Types: Cigarettes     Quit date: 2017     Years since quittin.8    Smokeless tobacco: Never    Tobacco comments:     age 18 to age 62 up to 1/2 ppd   Vaping Use    Vaping Use: Never used   Substance and Sexual Activity    Alcohol use: Yes     Alcohol/week: 0.0 standard drinks of alcohol     Comment: Alcoholic Drinks/day: < 1 drink a week    Drug use: No    Sexual activity: Not on file   Other Topics Concern    Parent/sibling w/ CABG, MI or angioplasty before 65F 55M? Not Asked   Social History Narrative    Single but lives with her significant other, has one son, she is a .  She is a PCA.   Disability for LBP. Work injury.     Social Determinants of Health     Financial Resource Strain: Low Risk  (2023)    Financial Resource Strain     Within the past 12 months, have you or your family members you live with been unable to get utilities (heat, electricity) when it was really needed?: No   Food Insecurity: Low Risk  (2023)    Food Insecurity     Within the past 12 months, did you worry that your food would run out before you got money to buy more?: No     Within the past 12 months, did the food you bought just not last and you didn t have money to get more?: No   Transportation Needs: Low Risk   (11/1/2023)    Transportation Needs     Within the past 12 months, has lack of transportation kept you from medical appointments, getting your medicines, non-medical meetings or appointments, work, or from getting things that you need?: No   Physical Activity: Not on file   Stress: Not on file   Social Connections: Not on file   Interpersonal Safety: Low Risk  (11/1/2023)    Interpersonal Safety     Do you feel physically and emotionally safe where you currently live?: Yes     Within the past 12 months, have you been hit, slapped, kicked or otherwise physically hurt by someone?: No     Within the past 12 months, have you been humiliated or emotionally abused in other ways by your partner or ex-partner?: No   Housing Stability: Low Risk  (11/1/2023)    Housing Stability     Do you have housing? : Yes     Are you worried about losing your housing?: No       Current Outpatient Medications   Medication Sig Dispense Refill    acetaminophen (TYLENOL) 500 MG tablet [ACETAMINOPHEN (TYLENOL) 500 MG TABLET] Take 1,000 mg by mouth 3 (three) times a day as needed.      allopurinol (ZYLOPRIM) 100 MG tablet Take 1 tablet (100 mg) by mouth daily 90 tablet 3    aspirin (ASA) 81 MG EC tablet Take 1 tablet (81 mg) by mouth daily Resume after done with BID dosing  0    benzonatate (TESSALON) 100 MG capsule Take 1 capsule (100 mg) by mouth 3 times daily as needed for cough 10 capsule 0    cholecalciferol, vitamin D3, (CHOLECALCIFEROL) 1,000 unit tablet [CHOLECALCIFEROL, VITAMIN D3, (CHOLECALCIFEROL) 1,000 UNIT TABLET] Take 3,000 Units by mouth daily.       Cinnamon Bark POWD       cyclobenzaprine (FLEXERIL) 10 MG tablet Take 1 tablet (10 mg) by mouth 2 times daily as needed for muscle spasms 30 tablet 0    docusate sodium (DOK) 100 MG capsule TAKE 1 CAPSULE(100 MG) BY MOUTH TWICE DAILY Strength: 100 mg 180 capsule 3    empagliflozin (JARDIANCE) 10 MG TABS tablet Take 1 tablet (10 mg) by mouth daily 90 tablet 1    gabapentin (NEURONTIN)  300 MG capsule Take 1 capsule (300 mg) by mouth 3 times daily 270 capsule 0    hydrochlorothiazide (HYDRODIURIL) 12.5 MG tablet TAKE 1 TABLET(12.5 MG) BY MOUTH DAILY 90 tablet 3    hydrOXYzine (ATARAX) 25 MG tablet Take 1 tablet (25 mg) by mouth 3 times daily as needed for itching 20 tablet 0    insulin pen needle (32G X 4 MM) 32G X 4 MM miscellaneous Inject 4 each Subcutaneous daily 120 each 3    lisinopril (ZESTRIL) 5 MG tablet TAKE 1 AND 1/2 TABLETS BY MOUTH EVERY  tablet 2    melatonin (MELATIN) 3 mg Tab tablet Take 6 mg by mouth nightly as needed      nitroGLYcerin (NITROSTAT) 0.3 MG sublingual tablet Place 1 tablet (0.3 mg) under the tongue every 5 minutes as needed for chest pain 100 tablet 3    omega-3 acid ethyl esters (LOVAZA) 1 g capsule Take 2 capsules by mouth 2 times daily 360 capsule 1    omeprazole (PRILOSEC) 40 MG DR capsule TAKE 1 CAPSULE BY MOUTH EVERY DAY 90 capsule 3    rosuvastatin (CRESTOR) 10 MG tablet TAKE 1 TABLET(10 MG) BY MOUTH DAILY 90 tablet 0    Semaglutide, 2 MG/DOSE, (OZEMPIC) 8 MG/3ML pen Inject 2 mg Subcutaneous every 7 days 9 mL 3    Thiamine HCl (B-1) 100 MG TABS Take 100 mg by mouth daily 90 tablet 3    valACYclovir (VALTREX) 500 MG tablet TAKE 1 TABLET(500 MG) BY MOUTH DAILY 90 tablet 3    vitamin B-12 (CYANOCOBALAMIN) 2500 MCG sublingual tablet One tab every other day 60 tablet 3    cefuroxime (CEFTIN) 500 MG tablet Take 1 tablet (500 mg) by mouth 2 times daily (Patient not taking: Reported on 11/29/2023) 20 tablet 0    Continuous Blood Gluc Sensor (FREESTYLE MIRANDA 2 SENSOR) MISC 1 each every 14 days 1 each every 14 days. Change every 14 days. (Patient not taking: Reported on 11/16/2023) 6 each 3    HYDROcodone-acetaminophen (NORCO) 5-325 MG tablet Take 1 tablet by mouth daily for 30 days (Patient not taking: Reported on 11/29/2023) 30 tablet 0    linezolid (ZYVOX) 600 MG tablet Take 1 tablet (600 mg) by mouth 2 times daily (Patient not taking: Reported on 11/29/2023)  "14 tablet 0    meloxicam (MOBIC) 15 MG tablet Take 1 tablet (15 mg) by mouth daily (Patient not taking: Reported on 11/29/2023) 14 tablet 0    ONETOUCH DELICA PLUS LANCET 33 gauge Misc [ONETOUCH DELICA PLUS LANCET 33 GAUGE MISC] USE TO TEST BLOOD SUGAR 6 TIMES DAILY (Patient not taking: Reported on 11/16/2023) 600 each 3    triamcinolone (KENALOG) 0.1 % external cream [TRIAMCINOLONE (KENALOG) 0.1 % CREAM] Apply bid (Patient not taking: Reported on 11/29/2023) 30 g 0       Physical Exam:    GENL: NAD.  5'5\"  250#   ABD: Soft, non-tender, no masses.    EG: Obese-estrogenized, no masses.    VAGINA: Moderately-estrogenized, no masses.    BN HYPERMOBILITY: Mild.    CYSTOCELE: Grade 1.    APICAL PROLAPSE: Mild.    RECTOCELE: Mild.    BIMANUAL: No mass or tenderness.    Cysto:    (Informed consent obtained. Pause for cause performed)   Sterile prep.    17 Fr scope inserted through urethra. Systematic examination w 70 degree lens.   PVR: 10 cc   MUCOSA: Normal without lesion   ORIFICES: Normal location and morphology   CAPACITY: 700 cc; no pain with filling   Scope withdrawn without untoward effect.    Valsalva:   Mild hypermobility, trace leakage noted.    (Pt tolerated procedure without difficulty).                  Today:    Results for orders placed or performed in visit on 11/29/23   UA reflex to Microscopic     Status: Abnormal   Result Value Ref Range    Color Urine Yellow Colorless, Straw, Light Yellow, Yellow    Appearance Urine Clear Clear    Glucose Urine >=1000 (A) Negative mg/dL    Bilirubin Urine Negative Negative    Ketones Urine Negative Negative mg/dL    Specific Gravity Urine 1.015 1.003 - 1.035    Blood Urine Trace (A) Negative    pH Urine 5.5 5.0 - 7.0    Protein Albumin Urine Negative Negative mg/dL    Urobilinogen Urine 1.0 0.2, 1.0 E.U./dL    Nitrite Urine Negative Negative    Leukocyte Esterase Urine Trace (A) Negative   Urine Microscopic Exam     Status: Normal   Result Value Ref Range    RBC Urine " 0-2 0-2 /HPF /HPF    WBC Urine 0-5 0-5 /HPF /HPF                 IMP:  1. Complex mixed UI, DM with suboptimal control, large fluids, mild BILL on exam  2. Other medical issues        PLAN:  Discussed situation with patient in detail.  Stressed importance of sugar control on bladder control; pt expresses understanding; she'll also try to go to [employer] more often  Suggest some moderation of fluids, especially in face of her HTN and hydrochlorothiazide; pt expresses understanding  RTC 2 mos to review progress, recheck urine  Set realistic expectations  Total time spent in reviewing patient records, discussing history, performing exam, discussing diagnosis, outlining treatment plan and documentation: 60 minutes

## 2023-12-11 ENCOUNTER — ALLIED HEALTH/NURSE VISIT (OUTPATIENT)
Dept: EDUCATION SERVICES | Facility: CLINIC | Age: 68
End: 2023-12-11
Payer: COMMERCIAL

## 2023-12-11 ENCOUNTER — LAB (OUTPATIENT)
Dept: LAB | Facility: CLINIC | Age: 68
End: 2023-12-11
Payer: COMMERCIAL

## 2023-12-11 DIAGNOSIS — N61.1 BREAST ABSCESS: ICD-10-CM

## 2023-12-11 DIAGNOSIS — E11.9 DIABETES MELLITUS (H): Primary | ICD-10-CM

## 2023-12-11 DIAGNOSIS — E11.9 DIABETES MELLITUS (H): ICD-10-CM

## 2023-12-11 LAB
ANION GAP SERPL CALCULATED.3IONS-SCNC: 10 MMOL/L (ref 7–15)
BUN SERPL-MCNC: 19.7 MG/DL (ref 8–23)
CALCIUM SERPL-MCNC: 9.8 MG/DL (ref 8.8–10.2)
CHLORIDE SERPL-SCNC: 102 MMOL/L (ref 98–107)
CREAT SERPL-MCNC: 1.04 MG/DL (ref 0.51–0.95)
CRP SERPL-MCNC: 10.5 MG/L
DEPRECATED HCO3 PLAS-SCNC: 26 MMOL/L (ref 22–29)
EGFRCR SERPLBLD CKD-EPI 2021: 59 ML/MIN/1.73M2
ERYTHROCYTE [DISTWIDTH] IN BLOOD BY AUTOMATED COUNT: 14.8 % (ref 10–15)
GLUCOSE SERPL-MCNC: 122 MG/DL (ref 70–99)
HBA1C MFR BLD: 7.2 % (ref 0–5.6)
HCT VFR BLD AUTO: 33.3 % (ref 35–47)
HGB BLD-MCNC: 11.2 G/DL (ref 11.7–15.7)
MCH RBC QN AUTO: 30.9 PG (ref 26.5–33)
MCHC RBC AUTO-ENTMCNC: 33.6 G/DL (ref 31.5–36.5)
MCV RBC AUTO: 92 FL (ref 78–100)
PLATELET # BLD AUTO: 263 10E3/UL (ref 150–450)
POTASSIUM SERPL-SCNC: 4.1 MMOL/L (ref 3.4–5.3)
PROCALCITONIN SERPL IA-MCNC: 0.09 NG/ML
RBC # BLD AUTO: 3.62 10E6/UL (ref 3.8–5.2)
SODIUM SERPL-SCNC: 138 MMOL/L (ref 135–145)
WBC # BLD AUTO: 7.3 10E3/UL (ref 4–11)

## 2023-12-11 PROCEDURE — 80048 BASIC METABOLIC PNL TOTAL CA: CPT

## 2023-12-11 PROCEDURE — 84145 PROCALCITONIN (PCT): CPT

## 2023-12-11 PROCEDURE — 86140 C-REACTIVE PROTEIN: CPT

## 2023-12-11 PROCEDURE — G0108 DIAB MANAGE TRN  PER INDIV: HCPCS

## 2023-12-11 PROCEDURE — 99207 PR NO CHARGE NURSE ONLY: CPT

## 2023-12-11 PROCEDURE — 83036 HEMOGLOBIN GLYCOSYLATED A1C: CPT

## 2023-12-11 PROCEDURE — 36415 COLL VENOUS BLD VENIPUNCTURE: CPT

## 2023-12-11 PROCEDURE — 85027 COMPLETE CBC AUTOMATED: CPT

## 2023-12-11 NOTE — PROGRESS NOTES
"Diabetes Self-Management Education & Support    Presents for: Individual review    Type of Service: In Person Visit      ASSESSMENT:      Patient seen today for follow-up.  She explains she feels she has not been doing a good job in managing her diabetes.  Patient states she has been cooking pies for the holidays and even started drinking soda and never really drink soda before.  She reports she feels her appetite sometimes is out of control.   At our last visit, she had trouble remembering if she took the Ozempic or not.  And noted she would recognize increased appetite probably related to missing Ozempic.  We had previously discussed marking with a checkmark after taking Ozempic as a way to know whether or not she took it.  Patient states she forgot about this.  She states she still has trouble remembering whether or not she took the Ozempic.  She takes Ozempic on Sundays and notes that it is usually a mentally busy day for her.  She feels taking it on Wednesdays would be easier.  Therefore we will skip this upcoming dose on Sunday, December 17, and then start taking the Ozempic on Wednesdays starting December 20.  She is also taking Jardiance 10 mg/day.    Reviewed maria victoria reports.  She is using Maria Victoria 2 with mitesh on her phone.  Blood sugars are improved since our last visit in September, still not at goal though.  Discussed needing to scan the sensor every 8 hours to not lose data. Missing significant amount of data.     Patient notes she has not yet started going back to the gym but is planning to start tomorrow.  She also wants to start going back to Taoism as she thinks this is helpful for her as well.    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  This has made it somewhat difficult for her to take the time she needs to do the things she needs.  Patient also explains she is being seen by a urologist for \"leaking.\"    REPORTS:             Patient's most recent   Lab " "Results   Component Value Date    A1C 7.2 12/11/2023    A1C 7.8 09/11/2023    A1C 7.1 04/03/2023    A1C 5.9 11/01/2022    A1C 6.1 07/06/2022           is not meeting goal of <7.0    Diabetes knowledge and skills assessment:   Patient is knowledgeable in diabetes management concepts related to: needs ongoing review in all     Continue education with the following diabetes management concepts: Healthy Eating, Being Active, Monitoring, Taking Medication, Problem Solving, Reducing Risks, and Healthy Coping    Based on learning assessment above, most appropriate setting for further diabetes education would be: Individual setting.      PLAN    May switch Ozempic to Wednesdays.  [place]ander on the box when you take the Ozempic.  Hopefully if taking Ozempic consistently this will help with appetite.  Stop drinking soda.  Start going back to the gym.  Scan sensor a minimum of upon waking and bedtime.  Ideally 3 times per day.    Follow-up:  3/11/24 as scheduled, will have A1c done that day as well     See Care Plan for co-developed, patient-state behavior change goals.  AVS provided for patient today.      SUBJECTIVE/OBJECTIVE:  Presents for: Individual review  Accompanied by: Self  Diabetes education in the past 24mo: No  Diabetes type: Type 2  Cultural Influences/Ethnic Background:  Not  or       Diabetes Symptoms & Complications:          Patient Problem List and Family Medical History reviewed for relevant medical history, current medical status, and diabetes risk factors.    Vitals:  LMP  (LMP Unknown)   Estimated body mass index is 41.67 kg/m  as calculated from the following:    Height as of 11/16/23: 1.651 m (5' 5\").    Weight as of 11/7/23: 113.6 kg (250 lb 6.4 oz).   Last 3 BP:   BP Readings from Last 3 Encounters:   11/29/23 (!) 147/76   11/16/23 118/50   11/07/23 122/80       History   Smoking Status    Former    Packs/day: 0.25    Years: 44.00    Types: Cigarettes    Quit date: 1/20/2017   Smokeless " "Tobacco    Never       Labs:  Lab Results   Component Value Date    A1C 7.8 09/11/2023     Lab Results   Component Value Date     11/06/2023     04/10/2022     04/10/2022     Lab Results   Component Value Date    LDL 44 09/11/2023    LDL 59 07/21/2020     Direct Measure HDL   Date Value Ref Range Status   09/11/2023 29 (L) >=50 mg/dL Final   ]  GFR Estimate   Date Value Ref Range Status   11/06/2023 51 (L) >60 mL/min/1.73m2 Final   06/28/2021 56 (L) >60 mL/min/[1.73_m2] Final     GFR, ESTIMATED POCT   Date Value Ref Range Status   06/05/2023 55 (L) >60 mL/min/1.73m2 Final     GFR Estimate If Black   Date Value Ref Range Status   06/28/2021 68 >60 mL/min/[1.73_m2] Final     Lab Results   Component Value Date    CR 1.18 11/06/2023     No results found for: \"MICROALBUMIN\"    Healthy Eating:  Healthy Eating Assessed Today: Yes    Being Active:  Being Active Assessed Today: Yes  Exercise:: Yes    Monitoring:  Monitoring Assessed Today: Yes  Blood Glucose Meter: CGM      Taking Medications:  Diabetes Medication(s)       Sodium-Glucose Co-Transporter 2 (SGLT2) Inhibitors       empagliflozin (JARDIANCE) 10 MG TABS tablet    Take 1 tablet (10 mg) by mouth daily      Incretin Mimetic Agents       Semaglutide, 2 MG/DOSE, (OZEMPIC) 8 MG/3ML pen    Inject 2 mg Subcutaneous every 7 days            Taking Medication Assessed Today: Yes  Problems taking diabetes medications regularly?: Yes  Diabetes medication side effects?: No    Problem Solving:  Problem Solving Assessed Today: Yes              Reducing Risks:  Reducing Risks Assessed Today: Yes    Healthy Coping:  Healthy Coping Assessed Today: Yes  Emotional response to diabetes: Ready to learn  Informal Support system:: Family, Friends  Stage of change: MAINTENANCE (Working to maintain change, with risk of relapse)  Patient Activation Measure Survey Score:       No data to display                  Care Plan and Education Provided:  Care Plan: Diabetes "   Updates made by Samira Joseph RN since 12/11/2023 12:00 AM        Problem: HbA1C Not In Goal         Goal: Establish Regular Follow-Ups with PCP         Task: Discuss schedule for PCP visits with patient Completed 12/11/2023   Responsible User: Samira Joseph RN        Goal: Get HbA1C Level in Goal         Task: Educate patient on diabetes education self-management topics Completed 12/11/2023   Responsible User: Samira Joseph RN        Task: Educate patient on benefits of regular glucose monitoring Completed 12/11/2023   Responsible User: Samira Joseph RN        Problem: Diabetes Self-Management Education Needed to Optimize Self-Care Behaviors         Goal: Being Active - get regular physical activity, working up to at least 150 minutes per week    This Visit's Progress: 20%   Recent Progress: 50%   Note:    Continue to work on daily activity  Will try to go back to the gym       Task: Develop physical activity plan with patient Completed 12/11/2023   Responsible User: Samira Joseph RN        Goal: Monitoring - monitor glucose and ketones as directed    This Visit's Progress: 60%   Recent Progress: 60%   Note:    Monitor BG daily with [employer] mitesh. Scan at least every 8 hours        Goal: Taking Medication - patient is consistently taking medications as directed    This Visit's Progress: 60%   Recent Progress: 60%   Note:    Take medication as prescribed. Document when Ozempic is taken so  you don't forget            Time Spent: 30 minutes  Encounter Type: Individual    Any diabetes medication dose changes were made via the CDE Protocol per the patient's referring provider. A copy of this encounter was shared with the provider.

## 2023-12-11 NOTE — LETTER
12/11/2023         RE: Liana Briceño  [address]  [place] [address]        Dear Colleague,    Thank you for referring your patient, Liana Briceño, to the [facility]. Please see a copy of my visit note below.    Diabetes Self-Management Education & Support    Presents for: Individual review    Type of Service: In Person Visit      ASSESSMENT:      Patient seen today for follow-up.  She explains she feels she has not been doing a good job in managing her diabetes.  Patient states she has been cooking pies for the holidays and even started drinking soda and never really drink soda before.  She reports she feels her appetite sometimes is out of control.   At our last visit, she had trouble remembering if she took the Ozempic or not.  And noted she would recognize increased appetite probably related to missing Ozempic.  We had previously discussed marking with a checkmark after taking Ozempic as a way to know whether or not she took it.  Patient states she forgot about this.  She states she still has trouble remembering whether or not she took the Ozempic.  She takes Ozempic on Sundays and notes that it is usually a mentally busy day for her.  She feels taking it on Wednesdays would be easier.  Therefore we will skip this upcoming dose on Sunday, December 17, and then start taking the Ozempic on Wednesdays starting December 20.  She is also taking Jardiance 10 mg/day.    Reviewed maria victoria reports.  She is using Maria Victoria 2 with mitesh on her phone.  Blood sugars are improved since our last visit in September, still not at goal though.  Discussed needing to scan the sensor every 8 hours to not lose data. Missing significant amount of data.     Patient notes she has not yet started going back to the gym but is planning to start tomorrow.  She also wants to start going back to [employer] as she thinks this is helpful for her as well.    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  This has made it somewhat difficult for her to take the time she needs to do the things she needs.  Patient also explains she is being seen by a urologist for \"leaking.\"    REPORTS:             Patient's most recent   Lab Results   Component Value Date    A1C 7.2 12/11/2023    A1C 7.8 09/11/2023    A1C 7.1 04/03/2023    A1C 5.9 11/01/2022    A1C 6.1 07/06/2022           is not meeting goal of <7.0    Diabetes knowledge and skills assessment:   Patient is knowledgeable in diabetes management concepts related to: needs ongoing review in all     Continue education with the following diabetes management concepts: Healthy Eating, Being Active, Monitoring, Taking Medication, Problem Solving, Reducing Risks, and Healthy Coping    Based on learning assessment above, most appropriate setting for further diabetes education would be: Individual setting.      PLAN    May switch Ozempic to Wednesdays.  Leander on the box when you take the Ozempic.  Hopefully if taking Ozempic consistently this will help with appetite.  Stop drinking soda.  Start going back to the gym.  Scan sensor a minimum of upon waking and bedtime.  Ideally 3 times per day.    Follow-up:  3/11/24 as scheduled, will have A1c done that day as well     See Care Plan for co-developed, patient-state behavior change goals.  AVS provided for patient today.      SUBJECTIVE/OBJECTIVE:  Presents for: Individual review  Accompanied by: Self  Diabetes education in the past 24mo: No  Diabetes type: Type 2  Cultural Influences/Ethnic Background:  Not  or       Diabetes Symptoms & Complications:          Patient Problem List and Family Medical History reviewed for relevant medical history, current medical status, and diabetes risk factors.    Vitals:  LMP  (LMP Unknown)   Estimated body mass index is 41.67 kg/m  as calculated from the following:    Height as of 11/16/23: 1.651 m (5' 5\").    Weight as of 11/7/23: 113.6 kg (250 lb 6.4 oz). " "  Last 3 BP:   BP Readings from Last 3 Encounters:   11/29/23 (!) 147/76   11/16/23 118/50   11/07/23 122/80       History   Smoking Status     Former     Packs/day: 0.25     Years: 44.00     Types: Cigarettes     Quit date: 1/20/2017   Smokeless Tobacco     Never       Labs:  Lab Results   Component Value Date    A1C 7.8 09/11/2023     Lab Results   Component Value Date     11/06/2023     04/10/2022     04/10/2022     Lab Results   Component Value Date    LDL 44 09/11/2023    LDL 59 07/21/2020     Direct Measure HDL   Date Value Ref Range Status   09/11/2023 29 (L) >=50 mg/dL Final   ]  GFR Estimate   Date Value Ref Range Status   11/06/2023 51 (L) >60 mL/min/1.73m2 Final   06/28/2021 56 (L) >60 mL/min/[1.73_m2] Final     GFR, ESTIMATED POCT   Date Value Ref Range Status   06/05/2023 55 (L) >60 mL/min/1.73m2 Final     GFR Estimate If Black   Date Value Ref Range Status   06/28/2021 68 >60 mL/min/[1.73_m2] Final     Lab Results   Component Value Date    CR 1.18 11/06/2023     No results found for: \"MICROALBUMIN\"    Healthy Eating:  Healthy Eating Assessed Today: Yes    Being Active:  Being Active Assessed Today: Yes  Exercise:: Yes    Monitoring:  Monitoring Assessed Today: Yes  Blood Glucose Meter: CGM      Taking Medications:  Diabetes Medication(s)       Sodium-Glucose Co-Transporter 2 (SGLT2) Inhibitors       empagliflozin (JARDIANCE) 10 MG TABS tablet    Take 1 tablet (10 mg) by mouth daily      Incretin Mimetic Agents       Semaglutide, 2 MG/DOSE, (OZEMPIC) 8 MG/3ML pen    Inject 2 mg Subcutaneous every 7 days            Taking Medication Assessed Today: Yes  Problems taking diabetes medications regularly?: Yes  Diabetes medication side effects?: No    Problem Solving:  Problem Solving Assessed Today: Yes              Reducing Risks:  Reducing Risks Assessed Today: Yes    Healthy Coping:  Healthy Coping Assessed Today: Yes  Emotional response to diabetes: Ready to learn  Informal Support " system:: Family, Friends  Stage of change: MAINTENANCE (Working to maintain change, with risk of relapse)  Patient Activation Measure Survey Score:       No data to display                  Care Plan and Education Provided:  Care Plan: Diabetes   Updates made by Samira Joseph RN since 12/11/2023 12:00 AM        Problem: HbA1C Not In Goal         Goal: Establish Regular Follow-Ups with PCP         Task: Discuss schedule for PCP visits with patient Completed 12/11/2023   Responsible User: Samira Joseph RN        Goal: Get HbA1C Level in Goal         Task: Educate patient on diabetes education self-management topics Completed 12/11/2023   Responsible User: Samira Joseph RN        Task: Educate patient on benefits of regular glucose monitoring Completed 12/11/2023   Responsible User: Samira Joseph RN        Problem: Diabetes Self-Management Education Needed to Optimize Self-Care Behaviors         Goal: Being Active - get regular physical activity, working up to at least 150 minutes per week    This Visit's Progress: 20%   Recent Progress: 50%   Note:    Continue to work on daily activity  Will try to go back to the gym       Task: Develop physical activity plan with patient Completed 12/11/2023   Responsible User: Samira Joseph RN        Goal: Monitoring - monitor glucose and ketones as directed    This Visit's Progress: 60%   Recent Progress: 60%   Note:    Monitor BG daily with myhub mitesh. Scan at least every 8 hours        Goal: Taking Medication - patient is consistently taking medications as directed    This Visit's Progress: 60%   Recent Progress: 60%   Note:    Take medication as prescribed. Document when Ozempic is taken so  you don't forget            Time Spent: 30 minutes  Encounter Type: Individual    Any diabetes medication dose changes were made via the CDE Protocol per the patient's referring provider. A copy of this encounter was shared with the provider.

## 2023-12-14 ENCOUNTER — NURSE TRIAGE (OUTPATIENT)
Dept: INTERNAL MEDICINE | Facility: CLINIC | Age: 68
End: 2023-12-14

## 2023-12-14 DIAGNOSIS — B37.31 YEAST INFECTION OF THE VAGINA: Primary | ICD-10-CM

## 2023-12-14 RX ORDER — FLUCONAZOLE 150 MG/1
150 TABLET ORAL
Qty: 3 TABLET | Refills: 0 | Status: SHIPPED | OUTPATIENT
Start: 2023-12-14 | End: 2023-12-21

## 2023-12-14 NOTE — TELEPHONE ENCOUNTER
"Nurse Triage SBAR    Is this a 2nd Level Triage? NO    Situation: pt has had worsening vaginal itching and white discharge in the two weeks following completion of an antibiotic, Linezolid.     Background: Pt was prescribed Linezolid on 11/9/23 by Dr. Guillermo Remy (surgical specialty line) for an associated diagnosis of Breast abscess.     Pt has had historical yeast infections and received fluconozole in the past.     Assessment:   1. SYMPTOM:       Itching and white/cream like discharge  2. LOCATION: \"Where is the itching located?\"      Down on sides, where the hair is   3. ONSET:       Two weeks ago when she finished the linezolid   4. PAIN:       None   5. ITCHING:       Mild itching  6. CAUSE:       In the past pt has had many yeast infections, pt has taken fluconazole in the past for these  7. OTHER SYMPTOMS:       No other symptoms, denies fever/bleeding/painful urination    Protocol Recommended Disposition:   See in Office Today or Tomorrow    Recommendation: Pt is requesting a prescription for fluconazole at this time. She is aware that Dr. Corbin may not see this request until tomorrow. I encouraged her to do an e-visit through [employer] for this concern. She said that she would try that.     If Dr. Corbin is able to provide a prescription she would like the medication sent to [id number] - [place], [place] - [address] AT [facility] [id number]     Routed to provider    Does the patient meet one of the following criteria for ADS visit consideration? 16+ years old, with an [facility] PCP       Reason for Disposition   Rash (e.g., redness, tiny bumps, sore) of genital area and present > 24 hours    Additional Information   Negative: Sounds like a life-threatening emergency to the triager   Negative: Followed a genital area injury (e.g., vagina, vulva)   Negative: Vaginal bleeding is main symptom   Negative: Vaginal discharge is main symptom   Negative: Pain or burning with " passing urine (urination) is main symptom   Negative: Menstrual cramps is main symptom   Negative: Abdomen pain is main symptom   Negative: Pubic lice suspected   Negative: Itching or rash of female genital area (e.g., labia, vagina, vulva)   Negative: Pregnant and labor suspected   Negative: Patient sounds very sick or weak to the triager   Negative: SEVERE pain and not improved 2 hours after pain medicine   Negative: Genital area looks infected (e.g., draining sore, spreading redness) and fever   Negative: Something is hanging out of the vagina and can't easily be pushed back inside   Negative: MILD-MODERATE pain and present > 24 hours  (Exception: Chronic pain.)   Negative: Genital area looks infected (e.g., draining sore, spreading redness)   Negative: Rash with painful tiny water blisters   Negative: MODERATE-SEVERE itching (i.e., interferes with school, work, or sleep)    Protocols used: Vaginal Symptoms-A-OH

## 2023-12-14 NOTE — TELEPHONE ENCOUNTER
New Medication Request    Contacts         Type Contact Phone/Fax    12/14/2023 02:07 PM CST Phone (Incoming) Liana Briceño (Self) [phone] (M)            What medication are you requesting?: Yeast infection    Reason for medication request: Patient was on linezolid (ZYVOX) 600 MG tablet  and has a yeast infection has some itching.     Have you taken this medication before?: Yes:     Controlled Substance Agreement on file:   CSA -- Patient Level:     [Media Unavailable] Controlled Substance Agreement - Opioid - Scan on 1/28/2020   [Media Unavailable] Controlled Substance Agreement - Opioid - Scan on 11/13/2018   [Media Unavailable] Controlled Substance Agreement - Opioid - Scan on 8/2/2017   [Media Unavailable] Controlled Substance Agreement - Opioid - Scan on 6/27/2016         Patient offered an appointment? No    Preferred Pharmacy:   [id number] - [place], [place] - [address]  AT [facility] [address]  [facility] [place] [address]  Phone: [phone] Fax: [phone]      Could we send this information to you in [employer] or would you prefer to receive a phone call?:   Patient would prefer a phone call   Okay to leave a detailed message?: Yes at Cell number on file:    Telephone Information:   Mobile [phone]

## 2024-01-07 DIAGNOSIS — E78.5 HYPERLIPEMIA: ICD-10-CM

## 2024-01-09 RX ORDER — ROSUVASTATIN CALCIUM 10 MG/1
TABLET, COATED ORAL
Qty: 90 TABLET | Refills: 0 | Status: SHIPPED | OUTPATIENT
Start: 2024-01-09 | End: 2024-02-08

## 2024-01-15 ENCOUNTER — OFFICE VISIT (OUTPATIENT)
Dept: INTERNAL MEDICINE | Facility: CLINIC | Age: 69
End: 2024-01-15
Payer: COMMERCIAL

## 2024-01-15 VITALS
WEIGHT: 258.9 LBS | HEIGHT: 65 IN | OXYGEN SATURATION: 97 % | SYSTOLIC BLOOD PRESSURE: 136 MMHG | RESPIRATION RATE: 20 BRPM | TEMPERATURE: 97.7 F | BODY MASS INDEX: 43.13 KG/M2 | DIASTOLIC BLOOD PRESSURE: 60 MMHG | HEART RATE: 76 BPM

## 2024-01-15 DIAGNOSIS — E11.65 TYPE 2 DIABETES MELLITUS WITH HYPERGLYCEMIA, WITH LONG-TERM CURRENT USE OF INSULIN (H): ICD-10-CM

## 2024-01-15 DIAGNOSIS — Z29.11 NEED FOR VACCINATION AGAINST RESPIRATORY SYNCYTIAL VIRUS: ICD-10-CM

## 2024-01-15 DIAGNOSIS — L72.0 EPIDERMOID CYST: Primary | ICD-10-CM

## 2024-01-15 DIAGNOSIS — N39.3 FEMALE STRESS INCONTINENCE: ICD-10-CM

## 2024-01-15 DIAGNOSIS — Z79.4 TYPE 2 DIABETES MELLITUS WITH HYPERGLYCEMIA, WITH LONG-TERM CURRENT USE OF INSULIN (H): ICD-10-CM

## 2024-01-15 PROCEDURE — 99214 OFFICE O/P EST MOD 30 MIN: CPT | Performed by: INTERNAL MEDICINE

## 2024-01-15 RX ORDER — METHION/INOS/CHOL BT/B COM/LIV 110MG-86MG
100 CAPSULE ORAL DAILY
Qty: 90 TABLET | Refills: 3 | Status: SHIPPED | OUTPATIENT
Start: 2024-01-15

## 2024-01-15 RX ORDER — RESPIRATORY SYNCYTIAL VIRUS VACCINE 120MCG/0.5
0.5 KIT INTRAMUSCULAR ONCE
Qty: 1 EACH | Refills: 0 | Status: SHIPPED | OUTPATIENT
Start: 2024-01-15 | End: 2024-01-15

## 2024-01-15 NOTE — LETTER
January 15, 2024      Liana Briceño  [address] [place] [address]        To Whom It May Concern:    Liana Briceño  was seen on  1/15/2024.  Please excuse her  until jan 17th  due to illness. Should be able to work on jan 18th         Sincerely,        Bela Corbin MD

## 2024-01-15 NOTE — PROGRESS NOTES
Assessment & Plan     Type 2 diabetes mellitus with hyperglycemia, with long-term current use of insulin (H)  Well-controlled diabetes she is on Jardiance, baby aspirin ACE inhibitor she is statin intolerant.  Has naturally low cholesterol blood pressure is controlled.  Has been coming for other visits we do need a follow-up visit for her diabetes management discussion of increasing Jardiance, further cardiac risk stratification needs to be done possibly a coronary calcium score.  Her last stress test 3 years ago was negative.  Creatinine   Date Value Ref Range Status   12/11/2023 1.04 (H) 0.51 - 0.95 mg/dL Final       LDL Cholesterol Calculated   Date Value Ref Range Status   09/11/2023 44 <=100 mg/dL Final     LDL Cholesterol Direct   Date Value Ref Range Status   07/21/2020 59 <=129 mg/dl Final       - Thiamine HCl (B-1) 100 MG TABS; Take 100 mg by mouth daily    Need for vaccination against respiratory syncytial virus  She wants to defer this  - respiratory syncytial virus vaccine, bivalent (ABRYSVO) injection; Inject 0.5 mLs into the muscle once for 1 dose    Epidermoid cyst  Typical small 3 cm subcutaneous epidermoid cyst with no out word indication of inflammation, there is no associated cervical lymphadenopathy or cellulitis.  There is no punctum or leakage of discharge.  At this stage ncision and drainage is not needed.  It will recollect if a simple I&D is done.  If she wants a definitive solution complete excision is needed but she had a very bad reaction to her abscess and cyst excision done under local.  But if she gets deeper sedation excision can be possible.  I would recommend seeing a surgeon dermatological surgeon.  For this.  Tell her it may go back away on its own and then she should do no intervention.  - Adult Dermatology  Referral; Future    Female stress incontinence  Did see urology has a grade 1 cystocele and rectocele.  This stage she does not want to surgery tell her there is  "no other treatment for this except pelvic floor rehab and voiding training.  Even if she failed Kegels she may benefit from a more formal pelvic rehabilitation physical therapy.  If this does not work then she should consider other options with urogynecology and she can get a second opinion.  Frequency and polyuria and is not related to her poorly controlled diabetes her higher blood sugar is a reflection of Jardiance and not poorly controlled diabetes.  And while Jardiance does act like a diuretic she had the symptoms pretty prior to initiating Jardiance.  - Adult Urology  Referral; Future  - Physical Therapy Referral; Future        Turn in 4 to 5 months for wellness visit and diabetes review     BMI:   Estimated body mass index is 43.08 kg/m  as calculated from the following:    Height as of this encounter: 1.651 m (5' 5\").    Weight as of this encounter: 117.4 kg (258 lb 14.4 oz).           Bela Corbin MD  [facility]    Edmundo Gaines is a 68 year old, presenting for the following health issues:    Lab Results   Component Value Date    A1C 7.2 12/11/2023    A1C 7.8 09/11/2023    A1C 7.1 04/03/2023    A1C 5.9 11/01/2022    A1C 6.1 07/06/2022     Current Outpatient Medications   Medication    acetaminophen (TYLENOL) 500 MG tablet    allopurinol (ZYLOPRIM) 100 MG tablet    aspirin (ASA) 81 MG EC tablet    benzonatate (TESSALON) 100 MG capsule    cholecalciferol, vitamin D3, (CHOLECALCIFEROL) 1,000 unit tablet    Cinnamon Bark POWD    Continuous Blood Gluc Sensor (FREESTYLE MIRANDA 2 SENSOR) MISC    cyclobenzaprine (FLEXERIL) 10 MG tablet    docusate sodium (DOK) 100 MG capsule    empagliflozin (JARDIANCE) 10 MG TABS tablet    gabapentin (NEURONTIN) 300 MG capsule    hydrochlorothiazide (HYDRODIURIL) 12.5 MG tablet    hydrOXYzine (ATARAX) 25 MG tablet    insulin pen needle (32G X 4 MM) 32G X 4 MM miscellaneous    lisinopril (ZESTRIL) 5 MG tablet    melatonin (MELATIN) 3 " "mg Tab tablet    nitroGLYcerin (NITROSTAT) 0.3 MG sublingual tablet    omega-3 acid ethyl esters (LOVAZA) 1 g capsule    omeprazole (PRILOSEC) 40 MG DR capsule    ONETOUCH DELICA PLUS LANCET 33 gauge Misc    respiratory syncytial virus vaccine, bivalent (ABRYSVO) injection    rosuvastatin (CRESTOR) 10 MG tablet    Semaglutide, 2 MG/DOSE, (OZEMPIC) 8 MG/3ML pen    Thiamine HCl (B-1) 100 MG TABS    valACYclovir (VALTREX) 500 MG tablet    vitamin B-12 (CYANOCOBALAMIN) 2500 MCG sublingual tablet     No current facility-administered medications for this visit.     Facility-Administered Medications Ordered in Other Visits   Medication    lidocaine 1 % 10 mL       Musculoskeletal Problem (Pt c/o lump at stem of spine since 1/11/24. Pt c/o pain with head movement. Pt has tried Tylenol and heat packs for tx, relief noted.)      1/15/2024     9:14 AM   Additional Questions   Roomed by Galo TERRY RN       Musculoskeletal Problem    History of Present Illness       Reason for visit:  Lump at stem of spine  Symptom onset:  3-7 days ago  Symptoms include:  Painful lump at stem of spine  Symptom intensity:  Moderate  Symptom progression:  Improving  Had these symptoms before:  No  What makes it worse:  Nothing  What makes it better:  Tylenol, Heat pack    She eats 2-3 servings of fruits and vegetables daily.She consumes 2 sweetened beverage(s) daily.She exercises with enough effort to increase her heart rate 10 to 19 minutes per day.  She exercises with enough effort to increase her heart rate 3 or less days per week.   She is taking medications regularly.                 Review of Systems         Objective    /60 (BP Location: Left arm, Patient Position: Sitting, Cuff Size: Adult Regular)   Pulse 76   Temp 97.7  F (36.5  C) (Tympanic)   Resp 20   Ht 1.651 m (5' 5\")   Wt 117.4 kg (258 lb 14.4 oz)   LMP  (LMP Unknown)   SpO2 97%   BMI 43.08 kg/m    Body mass index is 43.08 kg/m .  Physical Exam   Nape of the neck " posteriorly there is a 3 cm ballotable mildly tender well-defined oval subcutaneous mass.  There is no central punctum there is no overlying skin discoloration or erythema or warmth  Neck exam shows no cervical lymphadenopathy

## 2024-01-22 ENCOUNTER — TELEPHONE (OUTPATIENT)
Dept: INTERNAL MEDICINE | Facility: CLINIC | Age: 69
End: 2024-01-22

## 2024-01-22 NOTE — TELEPHONE ENCOUNTER
General Call    Contacts         Type Contact Phone/Fax    01/22/2024 02:32 PM CST Phone (Incoming) Liana Briceño (Self) [phone] (M)          Reason for Call:  Recommendation     What are your questions or concerns:  Pt states the next appt for her cyst is not until August and wanting to know if PCP recommends another place to go with this referral. Pt is concerned since Cyst has moved. Please advise and reach out to pt with other recommendations.     Date of last appointment with provider: 01/15/24    Could we send this information to you in [employer] or would you prefer to receive a phone call?:   Patient would prefer a phone call   Okay to leave a detailed message?: Yes at Cell number on file:    Telephone Information:   Mobile [phone]

## 2024-01-23 DIAGNOSIS — E11.65 TYPE 2 DIABETES MELLITUS WITH HYPERGLYCEMIA, WITH LONG-TERM CURRENT USE OF INSULIN (H): ICD-10-CM

## 2024-01-23 DIAGNOSIS — Z79.4 TYPE 2 DIABETES MELLITUS WITH HYPERGLYCEMIA, WITH LONG-TERM CURRENT USE OF INSULIN (H): ICD-10-CM

## 2024-01-23 RX ORDER — SEMAGLUTIDE 1.34 MG/ML
INJECTION, SOLUTION SUBCUTANEOUS
Qty: 9 ML | Refills: 0 | OUTPATIENT
Start: 2024-01-23

## 2024-01-23 NOTE — TELEPHONE ENCOUNTER
Pt is going to check insurance and see what is in her network and see if she can get a sooner appt than August.

## 2024-01-24 DIAGNOSIS — E11.9 TYPE 2 DIABETES MELLITUS WITHOUT COMPLICATION, WITHOUT LONG-TERM CURRENT USE OF INSULIN (H): Primary | Chronic | ICD-10-CM

## 2024-01-24 NOTE — TELEPHONE ENCOUNTER
Pt needs this to be 1mg dose as [facility] does not have 2 mg - pt is out and is supposed to take it today.

## 2024-01-25 NOTE — TELEPHONE ENCOUNTER
I did send in 1mg . If this is a recurring problem we may have to switch to turlicity or mounjaro as ozempic is short nationally

## 2024-02-08 DIAGNOSIS — Z79.4 TYPE 2 DIABETES MELLITUS WITHOUT COMPLICATION, WITH LONG-TERM CURRENT USE OF INSULIN (H): ICD-10-CM

## 2024-02-08 DIAGNOSIS — E11.9 TYPE 2 DIABETES MELLITUS WITHOUT COMPLICATION, WITH LONG-TERM CURRENT USE OF INSULIN (H): ICD-10-CM

## 2024-02-08 DIAGNOSIS — E78.5 HYPERLIPEMIA: ICD-10-CM

## 2024-02-08 RX ORDER — ROSUVASTATIN CALCIUM 10 MG/1
10 TABLET, COATED ORAL DAILY
Qty: 100 TABLET | Refills: 1 | Status: SHIPPED | OUTPATIENT
Start: 2024-02-08

## 2024-02-08 NOTE — PROGRESS NOTES
Patient has [facility] coverage and has signed up for the Medicare Part-D Low Income Subsidy program. With this program, the patient is eligible to get certain prescriptions as a 100-day supply at the 30-day prescription supply cost.      Prescriptions updated to 100-day supply: rosuvastatin and empagliflozin. Ozempic already sent as an extended day prescription.      Brina Flores, PharmD, [facility]  Retail Pharmacy Specialist  [phone]

## 2024-02-19 ENCOUNTER — TELEPHONE (OUTPATIENT)
Dept: EDUCATION SERVICES | Facility: CLINIC | Age: 69
End: 2024-02-19

## 2024-02-19 DIAGNOSIS — E11.9 DIABETES MELLITUS (H): Primary | ICD-10-CM

## 2024-02-19 NOTE — TELEPHONE ENCOUNTER
[facility] Call Center    Phone Message    May a detailed message be left on voicemail: yes     Reason for Call: Medication Refill Request    Has the patient contacted the pharmacy for the refill? Yes   Name of medication being requested: Test strips for her [employer]  machine   Provider who prescribed the medication: Samira Joseph  Pharmacy: [id number] - [place], [place] - [address] AT [facility] [address]   Date medication is needed: ASAP  Patient does not have test strips for this machine and it's her backup. Can you send a new prescription for the test strips to pharmacy above. Her blood sugar was 68 last night and kept getting alarms at night and she was light headed which it was probably right, but she wanted to double check with her Prodigy and could not. If you can't order Prodigy, can there be a new machine and test strips? Please call to discuss further.

## 2024-02-20 RX ORDER — BLOOD SUGAR DIAGNOSTIC
STRIP MISCELLANEOUS
Qty: 50 STRIP | Refills: 11 | Status: SHIPPED | OUTPATIENT
Start: 2024-02-20

## 2024-02-22 ENCOUNTER — TELEPHONE (OUTPATIENT)
Dept: INTERNAL MEDICINE | Facility: CLINIC | Age: 69
End: 2024-02-22

## 2024-02-22 ENCOUNTER — TELEPHONE (OUTPATIENT)
Dept: EDUCATION SERVICES | Facility: CLINIC | Age: 69
End: 2024-02-22

## 2024-02-22 DIAGNOSIS — E11.9 DIABETES MELLITUS (H): Primary | ICD-10-CM

## 2024-02-22 NOTE — TELEPHONE ENCOUNTER
[facility] Call Center    Phone Message    May a detailed message be left on voicemail: yes     Reason for Call: Order(s): Other:   Reason for requested: Patient is calling requesting a test meter and test strips, please carmen if questions..  Date needed: 02/22/2024, ASAP , please call to discuss, she is having issues with  low blood sugars at night and her alarm is going off.     Provider name:     JUANCHO ROSAS      [id number] - [place], [place] - [address] AT [facility] [address]      Action Taken: Message routed to:  [facility] Endo    Travel Screening: Not Applicable

## 2024-02-22 NOTE — PROGRESS NOTES
"Ortho Progress Note      Post-operative Day: 1 Day Post-Op  S/P Procedure(s):  RIGHT TOTAL KNEE ARTHROPLASTY      Assessment: 1 day s/p right TKA    Plan:   Continue PT/OT  Weightbearing status:WBAT  Anticoagulation: ASA 81 PO BID in addition to SCDs, patricia stockings and early ambulation.  Discharge planning: We need to work on pain control prior to discharge. Will see what PT/OT recommends for discharge.      Subjective:  Pain: moderate today, was worse overnight and yesterday.  Chest pain, SOB:  NO  Nausea, vomiting:  NO  Lightheadedness, dizziness:  NO  Neuro:  Patient denies new onset numbness or paresthesias    Objective:  BP (!) 155/73 (BP Location: Right arm)   Pulse 89   Temp 97.6  F (36.4  C) (Oral)   Resp 16   Ht 1.651 m (5' 5\")   Wt 119.3 kg (262 lb 14.4 oz)   SpO2 95%   BMI 43.75 kg/m    Gen: A&O x 3. NAD. Appears comfortable seated in the chair next to the bed.  Wound status: clean, dry and intact dressing without drainage.  Circulation, motion and sensation: Dorsiflexion/plantarflexion intact and equal bilaterally; distal lower extremity sensation is intact and equal bilaterally.  Swelling: mild  Calf tenderness: calves are soft and non-tender bilaterally.    Pertinent Labs   Lab Results: personally reviewed.   No results found for: INR, PROTIME  Lab Results   Component Value Date    WBC 7.6 04/05/2022    HGB 10.2 (L) 04/09/2022    HCT 33.8 (L) 04/05/2022    MCV 92 04/05/2022     04/05/2022     Lab Results   Component Value Date     04/05/2022    CO2 25 04/05/2022         Report completed by:  Uma Borges PA-C  Date: 4/9/2022  Time: 1:42 PM    " Statement Selected

## 2024-02-22 NOTE — TELEPHONE ENCOUNTER
Spoke with pt. She notes prodigy strips were not covered, requesting new meter kit. Advised pt will send. Pt reports she has had the kiah reading her in the 60's two nights in a row. She felt a little lightheaded but not severe. She would like meter to double check BG. Sent.

## 2024-02-22 NOTE — TELEPHONE ENCOUNTER
[facility] Prior Authorization Team Request    Medication:    blood glucose (NO BRAND SPECIFIED) test strip 50 strip 11 2/22/2024 -- No   Sig - Route: 1 strip by In Vitro route daily Dispense brand per insurance coverage at pharmacy discretion. use in case of CGM failure. - In Vitro       NDC (required for Medicaid members):   Insurance Company: [employer] Advantage  BIN:   PCN:   Grp: [id number]  ID: [id number]  CoverMyMeds Key (if applicable):   Additional documentation:     Pharmacy Filling the Rx:    [id number] - [place], [place] - [facility] [address] AT [facility] [address] [phone]        Contact:  [facility] (P [id number]) please send all responses to this pool.  Pharmacy NPI (required for Medicaid members):

## 2024-02-25 ENCOUNTER — HEALTH MAINTENANCE LETTER (OUTPATIENT)
Age: 69
End: 2024-02-25

## 2024-02-26 ENCOUNTER — OFFICE VISIT (OUTPATIENT)
Dept: FAMILY MEDICINE | Facility: CLINIC | Age: 69
End: 2024-02-26
Payer: COMMERCIAL

## 2024-02-26 VITALS
OXYGEN SATURATION: 96 % | SYSTOLIC BLOOD PRESSURE: 122 MMHG | RESPIRATION RATE: 18 BRPM | TEMPERATURE: 97.9 F | HEART RATE: 80 BPM | DIASTOLIC BLOOD PRESSURE: 72 MMHG

## 2024-02-26 DIAGNOSIS — Z20.822 EXPOSURE TO 2019 NOVEL CORONAVIRUS: ICD-10-CM

## 2024-02-26 DIAGNOSIS — R05.1 ACUTE COUGH: ICD-10-CM

## 2024-02-26 DIAGNOSIS — J06.9 VIRAL URI WITH COUGH: Primary | ICD-10-CM

## 2024-02-26 DIAGNOSIS — E11.9 TYPE 2 DIABETES MELLITUS WITHOUT COMPLICATION, WITHOUT LONG-TERM CURRENT USE OF INSULIN (H): Chronic | ICD-10-CM

## 2024-02-26 LAB
FLUAV AG SPEC QL IA: NEGATIVE
FLUBV AG SPEC QL IA: NEGATIVE
SARS-COV-2 RNA RESP QL NAA+PROBE: NEGATIVE

## 2024-02-26 PROCEDURE — 87804 INFLUENZA ASSAY W/OPTIC: CPT | Performed by: NURSE PRACTITIONER

## 2024-02-26 PROCEDURE — 99214 OFFICE O/P EST MOD 30 MIN: CPT | Performed by: NURSE PRACTITIONER

## 2024-02-26 PROCEDURE — 87635 SARS-COV-2 COVID-19 AMP PRB: CPT | Performed by: NURSE PRACTITIONER

## 2024-02-26 ASSESSMENT — ENCOUNTER SYMPTOMS
SORE THROAT: 0
DIARRHEA: 1

## 2024-02-26 NOTE — LETTER
February 26, 2024      Liana Briceño  [address] [place] [address]        To Whom It May Concern:    Liana Briceño  was seen on February 26.  Please excuse her  until February 28 or as per CDC guidelines based on COVID test results due to illness.        Sincerely,        Norma Kaur, CNP

## 2024-02-26 NOTE — PATIENT INSTRUCTIONS
Flu test is negative.  I would assume this is COVID until proven otherwise.    Continue your cough and cold medicine.     Push fluids, Tylenol as needed.  Rest is much as possible.  Watch ProtoExchangehart for your COVID test results.  If you test positive for COVID, please call the phone number in the upper right corner to get an e-visit to discuss COVID medicine.     Come back if suddenly worse

## 2024-02-26 NOTE — PROGRESS NOTES
Assessment & Plan     Acute cough    - Symptomatic COVID-19 Virus (Coronavirus) by PCR Nose  - Influenza A & B Antigen - Clinic Collect    Exposure to 2019 novel coronavirus    - Symptomatic COVID-19 Virus (Coronavirus) by PCR Nose    Viral URI with cough      Type 2 diabetes mellitus without complication, without long-term current use of insulin (H)         Focused exam and history done due to COVID-19 pandemic in a walk-in setting.      History, exam, and vital signs consistent with a viral URI.  Negative flu today.  + COVID exposure.  Is appropriately vaccinated.  Afebrile with normal vital signs and normal exam today.     No red flags.     Isolate pending covid results.      Continue your cough and cold medicine.     Push fluids, Tylenol as needed.  Rest is much as possible.  Watch MyChart for your COVID test results.  If you test positive for COVID, please call the phone number in the upper right corner to get an e-visit to discuss COVID medicine.     Come back if suddenly worse    Recheck if shortness of breath or new fevers develop.  Rest.     OTCs recommended:  Tylenol               No follow-ups on file.    Norma Kaur, [facility] [place]    Edmundo Gaines is a 68 year old female who presents to clinic today for the following health issues:  Chief Complaint   Patient presents with    Nasal Congestion     Cold x 2-3 days, wheezing today, having some stomach pain, coughing (taking some OTC meds), exposure to someone couple of days with covid, chest pain mostly from coughing a lot, pt now reports she had a bout of diarrhea, pt would like to be tested for covid       HPI    Cough started 2-3 days ago assoc with covid exposure at work.      Feeling wheezy, lightheaded    Poor appetite.      Diarrhea just starting today.      Has had her COVID vaccines.     Works at a group home.    Is unsure about fever, but has not felt febrile.     Is taking Robitussin that she says was marketed  for high blood pressure and diabetes.  Is somewhat helpful.      Review of Systems   HENT:  Negative for sore throat.    Cardiovascular:  Positive for chest pain (with coughing and without - currently 0/10.).   Gastrointestinal:  Positive for diarrhea (just today).           Objective    /72   Pulse 80   Temp 97.9  F (36.6  C) (Oral)   Resp 18   LMP  (LMP Unknown)   SpO2 96%   Physical Exam  Constitutional:       General: She is not in acute distress.     Appearance: She is well-developed.   HENT:      Right Ear: Tympanic membrane normal.      Left Ear: Tympanic membrane normal.   Eyes:      General:         Right eye: No discharge.         Left eye: No discharge.      Conjunctiva/sclera: Conjunctivae normal.   Cardiovascular:      Rate and Rhythm: Normal rate and regular rhythm.      Pulses: Normal pulses.      Heart sounds: Normal heart sounds.   Pulmonary:      Effort: Pulmonary effort is normal.      Breath sounds: Normal breath sounds. No wheezing.   Musculoskeletal:         General: Normal range of motion.   Skin:     General: Skin is warm and dry.      Capillary Refill: Capillary refill takes less than 2 seconds.   Neurological:      Mental Status: She is alert and oriented to person, place, and time.   Psychiatric:         Mood and Affect: Mood normal.         Behavior: Behavior normal.         Thought Content: Thought content normal.         Judgment: Judgment normal.            Results for orders placed or performed in visit on 02/26/24 (from the past 24 hour(s))   Influenza A & B Antigen - Clinic Collect    Specimen: Nose; Swab   Result Value Ref Range    Influenza A antigen Negative Negative    Influenza B antigen Negative Negative    Narrative    Test results must be correlated with clinical data. If necessary, results should be confirmed by a molecular assay or viral culture.     *Note: Due to a large number of results and/or encounters for the requested time period, some results have not  been displayed. A complete set of results can be found in Results Review.

## 2024-03-06 NOTE — TELEPHONE ENCOUNTER
Prior Authorization Not Needed per Insurance    Medication: PRODIGY AUTOCODE BLOOD GLUCOSE ROMARIO  Insurance Company:    Expected CoPay: $    Pharmacy Filling the Rx: [id number] - [place], [place] - [facility] [address] [facility]  Pharmacy Notified: Yes  Patient Notified: Patient picked entire system 02/22/2024.

## 2024-03-11 ENCOUNTER — LAB (OUTPATIENT)
Dept: LAB | Facility: CLINIC | Age: 69
End: 2024-03-11
Payer: COMMERCIAL

## 2024-03-11 ENCOUNTER — TELEPHONE (OUTPATIENT)
Dept: EDUCATION SERVICES | Facility: CLINIC | Age: 69
End: 2024-03-11

## 2024-03-11 ENCOUNTER — ALLIED HEALTH/NURSE VISIT (OUTPATIENT)
Dept: EDUCATION SERVICES | Facility: CLINIC | Age: 69
End: 2024-03-11
Payer: COMMERCIAL

## 2024-03-11 DIAGNOSIS — E11.9 DIABETES MELLITUS (H): Primary | ICD-10-CM

## 2024-03-11 DIAGNOSIS — E11.9 DIABETES MELLITUS (H): ICD-10-CM

## 2024-03-11 DIAGNOSIS — E11.9 TYPE 2 DIABETES MELLITUS WITHOUT COMPLICATION, WITHOUT LONG-TERM CURRENT USE OF INSULIN (H): ICD-10-CM

## 2024-03-11 DIAGNOSIS — E11.9 TYPE 2 DIABETES MELLITUS WITHOUT COMPLICATION, WITHOUT LONG-TERM CURRENT USE OF INSULIN (H): Primary | ICD-10-CM

## 2024-03-11 LAB — HBA1C MFR BLD: 6.9 % (ref 0–5.6)

## 2024-03-11 PROCEDURE — G0108 DIAB MANAGE TRN  PER INDIV: HCPCS | Mod: AE

## 2024-03-11 PROCEDURE — 83036 HEMOGLOBIN GLYCOSYLATED A1C: CPT

## 2024-03-11 PROCEDURE — 36415 COLL VENOUS BLD VENIPUNCTURE: CPT

## 2024-03-11 NOTE — LETTER
3/11/2024         RE: Liana Briceño  [address]  [place] [address]        Dear Colleague,    Thank you for referring your patient, Liana Briceño, to the [facility]. Please see a copy of my visit note below.    Diabetes Self-Management Education & Support    Presents for: Individual review    Type of Service: In Person Visit      ASSESSMENT:    Patient seen today for follow up. Reviewed kiah reports together. She is connected to clinic. Pt reports she has not been having trouble with lows anymore.   She switched her Ozempic to Wednesdays and feels she is doing better about not forgetting it. However, she notes sometimes she will forget. Uncertain how often. Discussed marking it on the box after taking, she forgot about this.     Pt notes appetite has been ok. She has been eating 3 meals and trying to watch portions of high carbs. She notes she cut back the soda as well and is having about 1 per day. She reports it is addictive.     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 Pt explains she wishes she had started the gym but has not had time yet. Notes some days there is a lot going on and she just hasn't had the time but wants to start. She also started a senior group where they do activities and talk about  health.     Taking Ozempic 1mg/week and Jardiance 10 mg/day.       REPORTS:             Patient's most recent     Lab Results   Component Value Date    A1C 6.9 03/11/2024    A1C 7.2 12/11/2023    A1C 7.8 09/11/2023    A1C 7.1 04/03/2023    A1C 5.9 11/01/2022          is meeting goal of <7.0    Diabetes knowledge and skills assessment:   Patient is knowledgeable in diabetes management concepts related to: Healthy Eating, Being Active, Monitoring, Taking Medication, Problem Solving, Reducing Risks, and Healthy Coping    Continue education with the following diabetes management concepts: Healthy Eating, Being Active, Monitoring, and Taking  "Medication    Based on learning assessment above, most appropriate setting for further diabetes education would be: Individual setting.      PLAN    Continue to be mindful of cho foods and healthy eating.   Increase exercise as you are able - gym.   Try to scan BG every 8 hours so data is not missing   [place] when Ozempic was taken on box so you don't forget     Follow-up: 3 months as scheduled, to call sooner w/ concerns     See Care Plan for co-developed, patient-state behavior change goals.  AVS provided for patient today.      SUBJECTIVE/OBJECTIVE:  Presents for: Individual review  Accompanied by: Self  Diabetes education in the past 24mo: No  Diabetes type: Type 2  Cultural Influences/Ethnic Background:  Not  or       Diabetes Symptoms & Complications:          Patient Problem List and Family Medical History reviewed for relevant medical history, current medical status, and diabetes risk factors.    Vitals:  LMP  (LMP Unknown)   Estimated body mass index is 43.08 kg/m  as calculated from the following:    Height as of 1/15/24: 1.651 m (5' 5\").    Weight as of 1/15/24: 117.4 kg (258 lb 14.4 oz).   Last 3 BP:   BP Readings from Last 3 Encounters:   02/26/24 122/72   01/15/24 136/60   11/29/23 (!) 147/76       History   Smoking Status     Former     Packs/day: 0.25     Years: 44.00     Types: Cigarettes     Quit date: 1/20/2017   Smokeless Tobacco     Never       Labs:  Lab Results   Component Value Date    A1C 7.2 12/11/2023     Lab Results   Component Value Date     12/11/2023     04/10/2022     04/10/2022     Lab Results   Component Value Date    LDL 44 09/11/2023    LDL 59 07/21/2020     Direct Measure HDL   Date Value Ref Range Status   09/11/2023 29 (L) >=50 mg/dL Final   ]  GFR Estimate   Date Value Ref Range Status   12/11/2023 59 (L) >60 mL/min/1.73m2 Final   06/28/2021 56 (L) >60 mL/min/[1.73_m2] Final     GFR, ESTIMATED POCT   Date Value Ref Range Status   06/05/2023 55 " "(L) >60 mL/min/1.73m2 Final     GFR Estimate If Black   Date Value Ref Range Status   06/28/2021 68 >60 mL/min/[1.73_m2] Final     Lab Results   Component Value Date    CR 1.04 12/11/2023     No results found for: \"MICROALBUMIN\"    Healthy Eating:  Healthy Eating Assessed Today: Yes  Meals include: Breakfast, Lunch, Dinner  Beverages: Water, Soda    Being Active:  Being Active Assessed Today: Yes  Exercise:: Yes    Monitoring:  Monitoring Assessed Today: Yes  Blood Glucose Meter: CGM      Taking Medications:  Diabetes Medication(s)       Sodium-Glucose Co-Transporter 2 (SGLT2) Inhibitors       empagliflozin (JARDIANCE) 10 MG TABS tablet Take 1 tablet (10 mg) by mouth daily       Incretin Mimetic Agents       Semaglutide, 1 MG/DOSE, (OZEMPIC) 4 MG/3ML pen Inject 1 mg Subcutaneous every 7 days     Semaglutide, 2 MG/DOSE, (OZEMPIC) 8 MG/3ML pen Inject 2 mg Subcutaneous every 7 days            Taking Medication Assessed Today: Yes  Problems taking diabetes medications regularly?: Yes  Diabetes medication side effects?: No    Problem Solving:  Problem Solving Assessed Today: Yes              Reducing Risks:  Reducing Risks Assessed Today: Yes    Healthy Coping:  Healthy Coping Assessed Today: Yes  Emotional response to diabetes: Ready to learn  Informal Support system:: Family, Friends  Stage of change: MAINTENANCE (Working to maintain change, with risk of relapse)  Patient Activation Measure Survey Score:       No data to display                  Care Plan and Education Provided:  Care Plan: Diabetes   Updates made by Samira Joseph RN since 3/11/2024 12:00 AM        Problem: Diabetes Self-Management Education Needed to Optimize Self-Care Behaviors         Goal: Being Active - get regular physical activity, working up to at least 150 minutes per week    This Visit's Progress: 30%   Recent Progress: 20%   Note:    Continue to work on daily activity  Will try to go back to the gym       Goal: Monitoring - monitor glucose " and ketones as directed    This Visit's Progress: 70%   Recent Progress: 60%   Note:    Monitor BG daily with [employer] 2 mitesh. Scan at least every 8 hours        Goal: Taking Medication - patient is consistently taking medications as directed    This Visit's Progress: 90%   Recent Progress: 60%   Note:    Take medication as prescribed. Document when Ozempic is taken so  you don't forget            Time Spent: 30 minutes  Encounter Type: Individual    Any diabetes medication dose changes were made via the CDE Protocol per the patient's referring provider. A copy of this encounter was shared with the provider.

## 2024-03-11 NOTE — PROGRESS NOTES
PHYSICAL THERAPY EVALUATION  Type of Visit: Evaluation    See electronic medical record for Abuse and Falls Screening details.    Subjective       Patient presents to therapy today with complaints of urinary incontinence. She also reports some fecal incontinence in the past 3 months. Pain level 0/10.   Date of onset/duration of symptoms is 5-6 years (bladder). Onset was gradual. Symptoms are getting worse.   Patient denies a history of similar symptoms. Patient describes their previous level of function as not limited. Symptoms worsen with coughing, sneezing, laughing, with intercourse. Denies urinary urge. Symptoms improve with voiding more frequently. No previous treatment. She was taught to do kegels in the past, but doesn't remember the details. She also reports chronic low back issue.   Functional limitations: coughing, sneezing, laughing, intercourse.   Pelvic floor: Incontinence rating  up to 10/10. Incontinence frequency  3-4x/week. Voiding frequency  variable x/day. Voiding 1x/night. Fluid intake 32 oz water + 1 coffee/day. Limits fluids because of incontinence.     Presenting condition or subjective complaint:    Date of onset: 01/15/24 (ref date 1/15/2024, onset 5-6 years ago)    Relevant medical history:     Past Medical History:   Diagnosis Date    Anemia     Breast cyst     Carpal tunnel syndrome     Coronary artery disease due to lipid rich plaque     Dyslipidemia, goal LDL below 70     Esophageal reflux     Essential hypertension     Fatty liver     Goiter diffuse, nontoxic     Hiatal hernia     History of gestational diabetes     History of MRSA infection 07/17/2017    Pilonidal cyst culture    Increased PTH level 11/4/2018    Morbid obesity with BMI of 40.0-44.9, adult (H)     Motion sickness     RADHA on CPAP     Osteoarthritis     Paroxysmal SVT (supraventricular tachycardia) 2011    ablation by Dr. Johnson in 2011    PONV (postoperative nausea and vomiting)     Positive result for methicillin  resistant Staphylococcus aureus (MRSA) screening 11/4/2018    Renal disease     S/P hip replacement     Stented coronary artery     Thiamine deficiency 11/4/2018    Type 2 diabetes mellitus (H)     Uncomplicated asthma     Vitamin D deficiency 11/4/2018     Patient Active Problem List   Diagnosis    Diffuse Nontoxic Goiter    Type 2 diabetes mellitus (H)    Essential hypertension    Esophageal reflux    Dyslipidemia, goal LDL below 70    Hiatal hernia    Sensorineural hearing loss    Coronary artery disease due to lipid rich plaque    Morbid obesity with BMI of 40.0-44.9, adult (H)    RADHA on CPAP    Low serum vitamin B12    Elevated ferritin level    Chronic kidney disease, stage 3 (H)    Knee osteoarthritis    Peripheral arterial disease (H24)    Chronic low back pain, unspecified back pain laterality, unspecified whether sciatica present    Benign neoplasm of transverse colon    Diverticular disease of large intestine    Dysphagia    Epigastric pain    History of colonic polyps    Iron deficiency anemia    Polyp of colon    Acute midline low back pain with sciatica, sciatica laterality unspecified    Adrenal nodule (H24)    Diarrhea    Hypomagnesemia    Normocytic anemia    Breast abscess    Mixed incontinence    Acute gouty arthropathy       Dates & types of surgery:    Past Surgical History:   Procedure Laterality Date    ARTHROPLASTY KNEE Right 4/8/2022    Procedure: RIGHT TOTAL KNEE ARTHROPLASTY;  Surgeon: Declan Eaton MD;  Location: [facility] Main OR    BIOPSY BREAST      BREAST CYST EXCISION      CARDIAC CATHETERIZATION N/A 4/24/2017    Procedure: Coronary Angiogram;  Surgeon: Ariane Biswas MD;  Location: [facility] Cath Lab;  Service:     CARDIAC CATHETERIZATION N/A 5/19/2017    Procedure: Coronary Angiogram;  Surgeon: Howard Gutierrez MD;  Location: [facility] Cath Lab;  Service:     CHOLECYSTECTOMY  2000    CORONARY STENT PLACEMENT  04/24/2017    BO to RCA by Dr. Biswas    HC REVISE MEDIAN  N/CARPAL TUNNEL SURG      Description: Neuroplasty Decompression Median Nerve At Carpal Tunnel;  Recorded: 05/22/2009;    IR LUMBAR EPIDURAL STEROID INJECTION  6/22/2007    IR LUMBAR EPIDURAL STEROID INJECTION  11/2/2007    IR LUMBAR EPIDURAL STEROID INJECTION  8/6/2008    IR LUMBAR EPIDURAL STEROID INJECTION  10/8/2008    IR LUMBAR EPIDURAL STEROID INJECTION  10/28/2008    IR LUMBAR EPIDURAL STEROID INJECTION  3/10/2009    NJ EXPLO/DRAIN BREAST ABSCESS      Description: Breast Surgery Mastotomy With Drainage Of Abscess    NJ L HRT CATH W/NJX L VENTRICULOGRAPHY IMG S&I N/A 4/24/2017    Procedure: Left Heart Catheterization with Left Ventriculogram;  Surgeon: Ariane Biswas MD;  Location: [facility] Cath Lab;  Service: Cardiology    NJ THYROID LOBECTOMY,UNILAT      RADIOFREQUENCY ABLATION  07/05/2011    for PSVT by Dr. Johnson    TOTAL HIP ARTHROPLASTY Right          Prior diagnostic imaging/testing results:       Prior therapy history for the same diagnosis, illness or injury:        Prior Level of Function  Independent    Living Environment (patient did not complete paperwork)  Social support:     Type of home:     Stairs to enter the home:         Ramp:     Stairs inside the home:         Help at home:    Equipment owned:       Employment:    works in a group home, part time  Hobbies/Interests:      Patient goals for therapy:   to have something work, better bladder control        Objective      PELVIC EVALUATION  ADDITIONAL HISTORY: (Pt did not complete paper work)  Sex assigned at birth:    Gender identity:      Pronouns:        Bladder History:  Feels bladder filling:    Triggers for feeling of inability to wait to go to the bathroom:      How long can you wait to urinate:    Gets up at night to urinate:      Can stop the flow of urine when urinating:    Volume of urine usually released:     Other issues:    Number of bladder infections in last 12 months:    Fluid intake per day:        Medications taken for  bladder:       Activities causing urine leak:      Amount of urine typically leaked:    Pads used to help with leaking:      yes, 1/day    Bowel History:  Frequency of bowel movement:    Consistency of stool:      Ignores the urge to defecate:    Other bowel issues:    Length of time spent trying to have a bowel movement:      Sexual Function History:  Sexual orientation:      Sexually active:    Lubrication used:      Pelvic pain:      Pain or difficulty with orgasms/erection/ejaculation:      State of menopause:    Hormone medications:             Discussed reason for referral regarding pelvic health needs and external/internal pelvic floor muscle examination with patient/guardian.  Opportunity provided to ask questions and verbal consent for assessment and intervention was given.    POSTURE: Standing Posture: Lordosis decreased, Thoracic kyphosis decreased    LUMBAR SCREEN:   (Degrees) Left AROM Left PROM  Right AROM Right PROM   Hip Flexion  100  94   Hip Extension       Hip Abduction  23  30   Hip Adduction       Hip Internal Rotation  38  29   Hip External Rotation  24  32   Knee Flexion       Knee Extension       Lumbar Side bend 50% (+) 40% (+)   Lumbar Flexion 60% (+)   Lumbar Extension 15-20% (+)     Palpation: Mod tenderness of (B) ant pubis, (B) ant/lat sacrum and SI joint, (B) sacrotub lig (EPF-V), (R) PGLS-N      HIP SCREEN: NA today    BREATHING SYMMETRY: NA today    ABDOMINAL ASSESSMENT  Diastasis Rectus Abdominis (RIOS):  RIOS presence: No    Abdominal Activation/Strength: TA recruitment is fair, Valsalva noted.     PELVIC EXAM  External Visual Inspection:  At rest: Normal    Integumentary:   Introitus: mild tenderness    External Digital Palpation per Perineum:   No tenderness    Internal Digital Palpation:  Per Vagina:  Digital Muscle Performance: P (Power): 4-  E (Endurance): 10 sec  R (Repetitions): 10   Compensations: Abdominals, Gluts, Breath holding  Relaxation Post-Contraction:  Normal  Perineal body lift is fair/poor. Overall contraction strength is fair.     Palpation Tenderness of   (L) Ischiocavernosus  (L) bulbocavernosus  (L) superficial/deep transverse perineal  (R) superficial/deep transverse perineal  (R) bulbocavernosus  (R) Ischiocavernosus      Assessment & Plan   CLINICAL IMPRESSIONS  Medical Diagnosis: Female stress incontinence    Treatment Diagnosis: Female stress incontinence, pelvic floor weakness   Impression/Assessment: Patient is a 68 year old female with urinary stress incontinence complaints.  The following significant findings have been identified: Decreased ROM/flexibility, Decreased strength, Impaired muscle performance, and soft tissue tenderness . These impairments interfere with their ability to perform self care tasks and bladder control  as compared to previous level of function.   Onset was about 5-6 years ago, also fecal incontinence x 3 months. Functional limitations include coughing, laughing, sneezing, bladder control, sexual intercourse. Findings include decreased trunk and hip ROM, tenderness of ant/post pelvis, decreased PF recruitment and muscle control, tenderness of ant pelvic floor (B). . Patient is appropriate for skilled PT services to decrease symptoms and restore function.     Clinical Decision Making (Complexity):  Clinical Presentation: Stable/Uncomplicated  Clinical Presentation Rationale: based on medical and personal factors listed in PT evaluation  Clinical Decision Making (Complexity): Low complexity    PLAN OF CARE  Treatment Interventions:  Modalities: Biofeedback PRN  Interventions: Manual Therapy, Neuromuscular Re-education, Therapeutic Exercise, Self-Care/Home Management    Long Term Goals     PT Goal 1  Goal Identifier: HEP  Goal Description: Patient will be independent with home exercise program and self management of symptoms in 12 weeks.  Rationale: to maximize safety and independence with performance of ADLs and functional  tasks  PT Goal 2  Goal Identifier: stress incontinence  Goal Description: Patient will be able to cough and laugh without urinary leakage in 12 weeks.  Rationale: to maximize safety and independence with performance of ADLs and functional tasks  PT Goal 3  Goal Identifier: urinary frequency  Goal Description: Patient will be able to maintain a regular voiding schedule without incontinence in 12 weeks  Rationale: to maximize safety and independence with performance of ADLs and functional tasks      Frequency of Treatment: 1x/wk  Duration of Treatment: up to 12 visits, time frame dependent on appointment access    Recommended Referrals to Other Professionals:   Education Assessment:        Risks and benefits of evaluation/treatment have been explained.   Patient/Family/caregiver agrees with Plan of Care.     Evaluation Time:     PT Eval, Low Complexity Minutes (28959): 45       Signing Clinician: Eda Corbett PT      [facility]                                                                                   OUTPATIENT PHYSICAL THERAPY      PLAN OF TREATMENT FOR OUTPATIENT REHABILITATION   Patient's Last Name, First Name, Liana Farley YOB: 1955   Provider's Name   [facility]   Medical Record No.  [id number]     Onset Date: 01/15/24 (ref date 1/15/2024, onset 5-6 years ago)  Start of Care Date: 03/12/24     Medical Diagnosis:  Female stress incontinence      PT Treatment Diagnosis:  Female stress incontinence, pelvic floor weakness Plan of Treatment  Frequency/Duration: 1x/wk/ up to 12 visits, time frame dependent on appointment access    Certification date from 03/12/24 to 06/10/24         See note for plan of treatment details and functional goals     Eda Corbett PT                         I CERTIFY THE NEED FOR THESE SERVICES FURNISHED UNDER        THIS PLAN OF TREATMENT AND WHILE UNDER MY CARE     (Physician attestation of this  document indicates review and certification of the therapy plan).              Referring Provider:  Bela Corbin    Initial Assessment  See Epic Evaluation- Start of Care Date: 03/12/24

## 2024-03-11 NOTE — PROGRESS NOTES
Diabetes Self-Management Education & Support    Presents for: Individual review    Type of Service: In Person Visit      ASSESSMENT:    Patient seen today for follow up. Reviewed kiah reports together. She is connected to clinic. Pt reports she has not been having trouble with lows anymore.   She switched her Ozempic to Wednesdays and feels she is doing better about not forgetting it. However, she notes sometimes she will forget. Uncertain how often. Discussed marking it on the box after taking, she forgot about this.     Pt notes appetite has been ok. She has been eating 3 meals and trying to watch portions of high carbs. She notes she cut back the soda as well and is having about 1 per day. She reports it is addictive.     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 Pt explains she wishes she had started the gym but has not had time yet. Notes some days there is a lot going on and she just hasn't had the time but wants to start. She also started a senior group where they do activities and talk about  health.     Taking Ozempic 1mg/week and Jardiance 10 mg/day.       REPORTS:             Patient's most recent     Lab Results   Component Value Date    A1C 6.9 03/11/2024    A1C 7.2 12/11/2023    A1C 7.8 09/11/2023    A1C 7.1 04/03/2023    A1C 5.9 11/01/2022          is meeting goal of <7.0    Diabetes knowledge and skills assessment:   Patient is knowledgeable in diabetes management concepts related to: Healthy Eating, Being Active, Monitoring, Taking Medication, Problem Solving, Reducing Risks, and Healthy Coping    Continue education with the following diabetes management concepts: Healthy Eating, Being Active, Monitoring, and Taking Medication    Based on learning assessment above, most appropriate setting for further diabetes education would be: Individual setting.      PLAN    Continue to be mindful of cho foods and healthy eating.   Increase exercise as you are able - gym.   Try to  "scan BG every 8 hours so data is not missing   [place] when Ozempic was taken on box so you don't forget     Follow-up: 3 months as scheduled, to call sooner w/ concerns     See Care Plan for co-developed, patient-state behavior change goals.  AVS provided for patient today.      SUBJECTIVE/OBJECTIVE:  Presents for: Individual review  Accompanied by: Self  Diabetes education in the past 24mo: No  Diabetes type: Type 2  Cultural Influences/Ethnic Background:  Not  or       Diabetes Symptoms & Complications:          Patient Problem List and Family Medical History reviewed for relevant medical history, current medical status, and diabetes risk factors.    Vitals:  LMP  (LMP Unknown)   Estimated body mass index is 43.08 kg/m  as calculated from the following:    Height as of 1/15/24: 1.651 m (5' 5\").    Weight as of 1/15/24: 117.4 kg (258 lb 14.4 oz).   Last 3 BP:   BP Readings from Last 3 Encounters:   02/26/24 122/72   01/15/24 136/60   11/29/23 (!) 147/76       History   Smoking Status    Former    Packs/day: 0.25    Years: 44.00    Types: Cigarettes    Quit date: 1/20/2017   Smokeless Tobacco    Never       Labs:  Lab Results   Component Value Date    A1C 7.2 12/11/2023     Lab Results   Component Value Date     12/11/2023     04/10/2022     04/10/2022     Lab Results   Component Value Date    LDL 44 09/11/2023    LDL 59 07/21/2020     Direct Measure HDL   Date Value Ref Range Status   09/11/2023 29 (L) >=50 mg/dL Final   ]  GFR Estimate   Date Value Ref Range Status   12/11/2023 59 (L) >60 mL/min/1.73m2 Final   06/28/2021 56 (L) >60 mL/min/[1.73_m2] Final     GFR, ESTIMATED POCT   Date Value Ref Range Status   06/05/2023 55 (L) >60 mL/min/1.73m2 Final     GFR Estimate If Black   Date Value Ref Range Status   06/28/2021 68 >60 mL/min/[1.73_m2] Final     Lab Results   Component Value Date    CR 1.04 12/11/2023     No results found for: \"MICROALBUMIN\"    Healthy Eating:  Healthy Eating " Assessed Today: Yes  Meals include: Breakfast, Lunch, Dinner  Beverages: Water, Soda    Being Active:  Being Active Assessed Today: Yes  Exercise:: Yes    Monitoring:  Monitoring Assessed Today: Yes  Blood Glucose Meter: CGM      Taking Medications:  Diabetes Medication(s)       Sodium-Glucose Co-Transporter 2 (SGLT2) Inhibitors       empagliflozin (JARDIANCE) 10 MG TABS tablet Take 1 tablet (10 mg) by mouth daily       Incretin Mimetic Agents       Semaglutide, 1 MG/DOSE, (OZEMPIC) 4 MG/3ML pen Inject 1 mg Subcutaneous every 7 days     Semaglutide, 2 MG/DOSE, (OZEMPIC) 8 MG/3ML pen Inject 2 mg Subcutaneous every 7 days            Taking Medication Assessed Today: Yes  Problems taking diabetes medications regularly?: Yes  Diabetes medication side effects?: No    Problem Solving:  Problem Solving Assessed Today: Yes              Reducing Risks:  Reducing Risks Assessed Today: Yes    Healthy Coping:  Healthy Coping Assessed Today: Yes  Emotional response to diabetes: Ready to learn  Informal Support system:: Family, Friends  Stage of change: MAINTENANCE (Working to maintain change, with risk of relapse)  Patient Activation Measure Survey Score:       No data to display                  Care Plan and Education Provided:  Care Plan: Diabetes   Updates made by Samira Joseph RN since 3/11/2024 12:00 AM        Problem: Diabetes Self-Management Education Needed to Optimize Self-Care Behaviors         Goal: Being Active - get regular physical activity, working up to at least 150 minutes per week    This Visit's Progress: 30%   Recent Progress: 20%   Note:    Continue to work on daily activity  Will try to go back to the gym       Goal: Monitoring - monitor glucose and ketones as directed    This Visit's Progress: 70%   Recent Progress: 60%   Note:    Monitor BG daily with [employer] mitesh. Scan at least every 8 hours        Goal: Taking Medication - patient is consistently taking medications as directed    This Visit's  Progress: 90%   Recent Progress: 60%   Note:    Take medication as prescribed. Document when Ozempic is taken so  you don't forget            Time Spent: 30 minutes  Encounter Type: Individual    Any diabetes medication dose changes were made via the CDE Protocol per the patient's referring provider. A copy of this encounter was shared with the provider.

## 2024-03-12 ENCOUNTER — THERAPY VISIT (OUTPATIENT)
Dept: PHYSICAL THERAPY | Facility: REHABILITATION | Age: 69
End: 2024-03-12
Attending: INTERNAL MEDICINE
Payer: COMMERCIAL

## 2024-03-12 DIAGNOSIS — N81.89 PELVIC FLOOR WEAKNESS IN FEMALE: Primary | ICD-10-CM

## 2024-03-12 DIAGNOSIS — N39.3 FEMALE STRESS INCONTINENCE: ICD-10-CM

## 2024-03-12 PROCEDURE — 97161 PT EVAL LOW COMPLEX 20 MIN: CPT | Mod: GP | Performed by: PHYSICAL THERAPIST

## 2024-03-12 PROCEDURE — 97110 THERAPEUTIC EXERCISES: CPT | Mod: GP | Performed by: PHYSICAL THERAPIST

## 2024-03-12 NOTE — PROGRESS NOTES
"   03/12/24 0500   Appointment Info   Signing clinician's name / credentials Eda Corbett, PT   Visits Used 1/up to 12   Medical Diagnosis Female stress incontinence   PT Tx Diagnosis Female stress incontinence, pelvic floor weakness   Other pertinent information ref provider: Bela Corbin MD   Progress Note/Certification   Start of Care Date 03/12/24   Onset of illness/injury or Date of Surgery 01/15/24  (ref date 1/15/2024, onset 5-6 years ago)   Therapy Frequency 1x/wk   Predicted Duration up to 12 visits, time frame dependent on appointment access   Certification date from 03/12/24   Certification date to 06/10/24   Progress Note Due Date   (10th visit)   PT Goal 1   Goal Identifier HEP   Goal Description Patient will be independent with home exercise program and self management of symptoms in 12 weeks.   Rationale to maximize safety and independence with performance of ADLs and functional tasks   PT Goal 2   Goal Identifier stress incontinence   Goal Description Patient will be able to cough and laugh without urinary leakage in 12 weeks.   Rationale to maximize safety and independence with performance of ADLs and functional tasks   PT Goal 3   Goal Identifier urinary frequency   Goal Description Patient will be able to maintain a regular voiding schedule without incontinence in 12 weeks   Rationale to maximize safety and independence with performance of ADLs and functional tasks   Subjective Report   Subjective Report see eval   Objective Measure 1   Details see eval   Therapeutic Procedure/Exercise   Therapeutic Procedures: strength, endurance, ROM, flexibility minutes (09464) 10   Ther Proc 1 supine PF endurance contractions   Ther Proc 1 - Details supine 10 x 10\"/ cues for PF lift and AP approximation, 2-3 sets/day   Skilled Intervention ther ex (for PF strength, bladder control)   Ther Proc 2 diaphragmatic breathing   Ther Proc 2 - Details next   Ther Proc 3 hip stretches   Ther Proc 3 - Details next "   Self Care/home Management   Self Care 1 intraabdominal pressure management   Self Care 1 - Details next   Eval/Assessments   PT Eval, Low Complexity Minutes (40099) 45   Plan   Home program [facility]   Updates to plan of care needs to complete outcome measure on follow up   Plan for next session Continue with POC, including manual therapy, home exercise program, self management and patient education   Comments   Comments initial assessment: kosta is a 68 year old female with urinary stress incontinence complaints.  The following significant findings have been identified: Decreased ROM/flexibility, Decreased strength, Impaired muscle performance, and soft tissue tenderness . These impairments interfere with their ability to perform self care tasks and bladder control  as compared to previous level of function.   Onset was about 5-6 years ago, also fecal incontinence x 3 months. Functional limitations include coughing, laughing, sneezing, bladder control, sexual intercourse. Findings include decreased trunk and hip ROM, tenderness of ant/post pelvis, decreased PF recruitment and muscle control, tenderness of ant pelvic floor (B). . Patient is appropriate for skilled PT services to decrease symptoms and restore function.   Pelvic Health Informed Consent Statement Discussed with patient/guardian reason for referral regarding pelvic health needs and external/internal pelvic floor muscle examination.  Opportunity provided to ask questions and verbal consent for assessment and intervention was given.   Total Session Time   Timed Code Treatment Minutes 10   Total Treatment Time (sum of timed and untimed services) 55         DISCHARGE  Reason for Discharge: Patient chooses to discontinue therapy.  Patient canceled follow up appointments.     Equipment Issued:     Discharge Plan: Patient to continue home program.    Referring Provider:  Bela Corbin

## 2024-03-13 ENCOUNTER — TRANSFERRED RECORDS (OUTPATIENT)
Dept: HEALTH INFORMATION MANAGEMENT | Facility: CLINIC | Age: 69
End: 2024-03-13

## 2024-03-14 ENCOUNTER — TRANSCRIBE ORDERS (OUTPATIENT)
Dept: OTHER | Age: 69
End: 2024-03-14

## 2024-03-14 DIAGNOSIS — E11.40 DIABETIC NEUROPATHY (H): Primary | ICD-10-CM

## 2024-03-14 DIAGNOSIS — F45.8 NEUROPATHIC PRURITUS: ICD-10-CM

## 2024-04-03 ENCOUNTER — LAB (OUTPATIENT)
Dept: LAB | Facility: CLINIC | Age: 69
End: 2024-04-03
Payer: COMMERCIAL

## 2024-04-03 ENCOUNTER — OFFICE VISIT (OUTPATIENT)
Dept: NEUROLOGY | Facility: CLINIC | Age: 69
End: 2024-04-03
Payer: COMMERCIAL

## 2024-04-03 VITALS — DIASTOLIC BLOOD PRESSURE: 76 MMHG | OXYGEN SATURATION: 98 % | SYSTOLIC BLOOD PRESSURE: 158 MMHG | HEART RATE: 80 BPM

## 2024-04-03 DIAGNOSIS — F45.8 NEUROPATHIC PRURITUS: ICD-10-CM

## 2024-04-03 DIAGNOSIS — E11.42 DIABETIC POLYNEUROPATHY ASSOCIATED WITH TYPE 2 DIABETES MELLITUS (H): ICD-10-CM

## 2024-04-03 PROCEDURE — 84207 ASSAY OF VITAMIN B-6: CPT | Mod: 90

## 2024-04-03 PROCEDURE — 84443 ASSAY THYROID STIM HORMONE: CPT

## 2024-04-03 PROCEDURE — 99000 SPECIMEN HANDLING OFFICE-LAB: CPT

## 2024-04-03 PROCEDURE — 99417 PROLNG OP E/M EACH 15 MIN: CPT | Performed by: STUDENT IN AN ORGANIZED HEALTH CARE EDUCATION/TRAINING PROGRAM

## 2024-04-03 PROCEDURE — 86334 IMMUNOFIX E-PHORESIS SERUM: CPT | Performed by: STUDENT IN AN ORGANIZED HEALTH CARE EDUCATION/TRAINING PROGRAM

## 2024-04-03 PROCEDURE — 82607 VITAMIN B-12: CPT

## 2024-04-03 PROCEDURE — 99205 OFFICE O/P NEW HI 60 MIN: CPT | Performed by: STUDENT IN AN ORGANIZED HEALTH CARE EDUCATION/TRAINING PROGRAM

## 2024-04-03 PROCEDURE — 86431 RHEUMATOID FACTOR QUANT: CPT

## 2024-04-03 PROCEDURE — 84155 ASSAY OF PROTEIN SERUM: CPT

## 2024-04-03 PROCEDURE — 36415 COLL VENOUS BLD VENIPUNCTURE: CPT

## 2024-04-03 PROCEDURE — 84165 PROTEIN E-PHORESIS SERUM: CPT | Performed by: STUDENT IN AN ORGANIZED HEALTH CARE EDUCATION/TRAINING PROGRAM

## 2024-04-03 RX ORDER — PREGABALIN 75 MG/1
75 CAPSULE ORAL AT BEDTIME
Qty: 60 CAPSULE | Refills: 1 | Status: SHIPPED | OUTPATIENT
Start: 2024-04-03

## 2024-04-03 RX ORDER — CALCIUM CARBONATE/VITAMIN D3 600 MG-10
1 TABLET ORAL
COMMUNITY
Start: 2024-01-15 | End: 2024-08-21 | Stop reason: ALTCHOICE

## 2024-04-03 RX ORDER — LANCETS
EACH MISCELLANEOUS DAILY
COMMUNITY
Start: 2024-02-22

## 2024-04-03 NOTE — PATIENT INSTRUCTIONS
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   We will check labs to look for any other causes that may contribute to neuropathy.   Maintain your diabetes under good control and avoid fluctuation of glucose as much as possible  For symptomatic management, try pregabalin (Lyrica) at night time. You can stop gabapentin.   You should also try topical lidocaine cream or capsaicin cream or IcyHot cream

## 2024-04-03 NOTE — LETTER
4/3/2024         RE: Liana Briceño  [address] [place] [address]        Dear Colleague,    Thank you for referring your patient, Liana Briceño, to the [facility]. Please see a copy of my visit note below.    CHIEF COMPLAINT / REASON FOR VISIT  Peripheral neuropathy    Referred by Dr. Rosario    HISTORY OF PRESENT ILLNESS   is a 68 year old female presenting to [facility] for evaluation of peripheral neuropathy.    Her medical history significant for diabetes mellitus.  This was diagnosed more than 15 years ago.  Initial hemoglobin A1c was about 15% at diagnosis.  The diabetes was not well-controlled for about 3 years but has been relatively good control after initiation of insulin.  Hemoglobin A1c ranges from 6.9 to 7.8% in the last year.    She started to notice numbness, burning/sharp pain, tingling sensation in the feet about 2 to 3 years ago or even longer.  The symptoms were initially mild and intermittent but have become more intense and persistent in the past year.  The numbness stays in the feet and has not ascended up above ankles.  She also reports contact allodynia especially at nighttime.  She has to wear socks to sleep.  No persistent numbness/tingling sensation in the hands.  She reports occasional itching in the fingers that came on when she is upset then spontaneously resolved.  He has chronic lower back pain but denies radiating pain.  She has had multiple injections to the back with partial benefit.  She takes gabapentin 300 mg at nighttime as needed.  The medication helps alleviate the burning sensation in the feet but she does not like the side effect of feeling loopy.    She occasionally gets mild orthostatic lightheadedness that spontaneously go away after a few seconds.  She has never passed out.  There has been no change in ability to sweat.  Her weight has been relatively stable.    She was a former smoker, 1-1/2 pack/day for 50  years, quit in 2016    I have reviewed prior investigations as listed below.   -Hemoglobin A1c 6.9 to 7.8% in the past 1 years.  -EMG performed by Dr. Wilson, [facility], 3/25/2019: Bilateral sural sensory responses were absent.  Bilateral tibial compound muscle action potential amplitudes were reduced with mild slowing of conduction velocities.  Needle EMG of bilateral lower extremities were normal.  The findings are consistent with a length dependent axonal sensorimotor peripheral neuropathy.    REVIEW OF SYSTEMS  All negative except for what indicated in the HPI. The following portions of the patient's history were reviewed and updated as appropriate: allergies, current medications, family history, medical history, surgical history, social history, and problem list.     PAST MEDICAL/SURGICAL HISTORY   As stated in HPI    PHYSICAL EXAM    NEUROLOGICAL EXAMINATION  Mental status: normal.  Speech: normal.  Muscle strength: Normal muscle strength 5/5 proximally and distally in upper and lower limbs.  Sensation: Sensation to light touch, pinprick, cold temperature were moderately (-2) reduced in the feet up to ankles.  Absent vibration sense in the toes.  Deep tendon reflexes: Normal reflexes in the upper and lower limbs including except absent bilateral ankle reflexes  High arched feet: Absent  Hammertoes: Absent  Coordination: normal rapid alternating movements and finger to nose testing  Gait: normal.  Walk on heels: yes, bilaterally.  Walk on toes: yes, bilaterally.    Getting up from seated position without pushing on chair: yes.  Posture: normal.  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 Briceño's clinical presentation and exam findings are consistent with a length dependent sensory predominant peripheral neuropathy.  Pattern of neuropathy and long history  of diabetes mellitus are consistent with diabetic neuropathy.  She does not have significant signs or symptoms of autonomic involvement.  We will obtain labs to look for other common causes of neuropathy that may contribute to her symptoms.  We discussed that management of peripheral neuropathy depends on the cause.  In her case, it is important to maintain her diabetes mellitus under well-controlled and avoid fluctuation of glucose level as much as possible.  For symptomatic management, I recommend a trial of pregabalin as she has had some benefit from gabapentin but could not tolerate the side effect.  She would also benefit from topical lidocaine cream or capsaicin cream.    Multiple questions were answered.  I will follow-up on the lab results and discussed with her through [employer] messages.    I spent a total of 75 minutes on the day of the visit for chart review, face-to-face visit, counseling/coordination of care, and documentation. Please see the note for further information on patient assessment and treatment.       PATIENT EDUCATION  Ready to learn, no apparent learning barriers were identified; learning preferences include listening.  Explained diagnosis and treatment plan; patient expressed understanding of the content.      Again, thank you for allowing me to participate in the care of your patient.        Sincerely,        Neftaly Bunn MD

## 2024-04-03 NOTE — PROGRESS NOTES
CHIEF COMPLAINT / REASON FOR VISIT  Peripheral neuropathy    Referred by Dr. Rosario    HISTORY OF PRESENT ILLNESS   is a 68 year old female presenting to [facility] for evaluation of peripheral neuropathy.    Her medical history significant for diabetes mellitus.  This was diagnosed more than 15 years ago.  Initial hemoglobin A1c was about 15% at diagnosis.  The diabetes was not well-controlled for about 3 years but has been relatively good control after initiation of insulin.  Hemoglobin A1c ranges from 6.9 to 7.8% in the last year.    She started to notice numbness, burning/sharp pain, tingling sensation in the feet about 2 to 3 years ago or even longer.  The symptoms were initially mild and intermittent but have become more intense and persistent in the past year.  The numbness stays in the feet and has not ascended up above ankles.  She also reports contact allodynia especially at nighttime.  She has to wear socks to sleep.  No persistent numbness/tingling sensation in the hands.  She reports occasional itching in the fingers that came on when she is upset then spontaneously resolved.  He has chronic lower back pain but denies radiating pain.  She has had multiple injections to the back with partial benefit.  She takes gabapentin 300 mg at nighttime as needed.  The medication helps alleviate the burning sensation in the feet but she does not like the side effect of feeling loopy.    She occasionally gets mild orthostatic lightheadedness that spontaneously go away after a few seconds.  She has never passed out.  There has been no change in ability to sweat.  Her weight has been relatively stable.    She was a former smoker, 1-1/2 pack/day for 50 years, quit in 2016    I have reviewed prior investigations as listed below.   -Hemoglobin A1c 6.9 to 7.8% in the past 1 years.  -EMG performed by Dr. Wilson, [facility], 3/25/2019: Bilateral sural sensory responses were absent.  Bilateral  tibial compound muscle action potential amplitudes were reduced with mild slowing of conduction velocities.  Needle EMG of bilateral lower extremities were normal.  The findings are consistent with a length dependent axonal sensorimotor peripheral neuropathy.    REVIEW OF SYSTEMS  All negative except for what indicated in the HPI. The following portions of the patient's history were reviewed and updated as appropriate: allergies, current medications, family history, medical history, surgical history, social history, and problem list.     PAST MEDICAL/SURGICAL HISTORY   As stated in HPI    PHYSICAL EXAM    NEUROLOGICAL EXAMINATION  Mental status: normal.  Speech: normal.  Muscle strength: Normal muscle strength 5/5 proximally and distally in upper and lower limbs.  Sensation: Sensation to light touch, pinprick, cold temperature were moderately (-2) reduced in the feet up to ankles.  Absent vibration sense in the toes.  Deep tendon reflexes: Normal reflexes in the upper and lower limbs including except absent bilateral ankle reflexes  High arched feet: Absent  Hammertoes: Absent  Coordination: normal rapid alternating movements and finger to nose testing  Gait: normal.  Walk on heels: yes, bilaterally.  Walk on toes: yes, bilaterally.    Getting up from seated position without pushing on chair: yes.  Posture: normal.  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 Briceño's clinical presentation and exam findings are consistent with a length dependent sensory predominant peripheral neuropathy.  Pattern of neuropathy and long history of diabetes mellitus are consistent with diabetic neuropathy.  She does not have significant signs or symptoms of autonomic involvement.  We will obtain labs to look for other common causes of neuropathy that may contribute to her symptoms.  We discussed  that management of peripheral neuropathy depends on the cause.  In her case, it is important to maintain her diabetes mellitus under well-controlled and avoid fluctuation of glucose level as much as possible.  For symptomatic management, I recommend a trial of pregabalin as she has had some benefit from gabapentin but could not tolerate the side effect.  She would also benefit from topical lidocaine cream or capsaicin cream.    Multiple questions were answered.  I will follow-up on the lab results and discussed with her through [employer] messages.    I spent a total of 75 minutes on the day of the visit for chart review, face-to-face visit, counseling/coordination of care, and documentation. Please see the note for further information on patient assessment and treatment.       PATIENT EDUCATION  Ready to learn, no apparent learning barriers were identified; learning preferences include listening.  Explained diagnosis and treatment plan; patient expressed understanding of the content.

## 2024-04-04 DIAGNOSIS — G89.29 CHRONIC LOW BACK PAIN, UNSPECIFIED BACK PAIN LATERALITY, UNSPECIFIED WHETHER SCIATICA PRESENT: ICD-10-CM

## 2024-04-04 DIAGNOSIS — M54.50 CHRONIC LOW BACK PAIN, UNSPECIFIED BACK PAIN LATERALITY, UNSPECIFIED WHETHER SCIATICA PRESENT: ICD-10-CM

## 2024-04-04 LAB
RHEUMATOID FACT SERPL-ACNC: <10 IU/ML
TOTAL PROTEIN SERUM FOR ELP: 7.3 G/DL (ref 6.4–8.3)
TSH SERPL DL<=0.005 MIU/L-ACNC: 1.87 UIU/ML (ref 0.3–4.2)
VIT B12 SERPL-MCNC: 1484 PG/ML (ref 232–1245)

## 2024-04-05 LAB
ALBUMIN SERPL ELPH-MCNC: 4.4 G/DL (ref 3.7–5.1)
ALPHA1 GLOB SERPL ELPH-MCNC: 0.3 G/DL (ref 0.2–0.4)
ALPHA2 GLOB SERPL ELPH-MCNC: 0.6 G/DL (ref 0.5–0.9)
B-GLOBULIN SERPL ELPH-MCNC: 1 G/DL (ref 0.6–1)
GAMMA GLOB SERPL ELPH-MCNC: 1 G/DL (ref 0.7–1.6)
M PROTEIN SERPL ELPH-MCNC: 0 G/DL
PATH REPORT.COMMENTS IMP SPEC: NORMAL
PATH REPORT.COMMENTS IMP SPEC: NORMAL
PROT PATTERN SERPL ELPH-IMP: NORMAL
PROT PATTERN SERPL IFE-IMP: NORMAL

## 2024-04-05 RX ORDER — CYCLOBENZAPRINE HCL 10 MG
10 TABLET ORAL 2 TIMES DAILY PRN
Qty: 30 TABLET | Refills: 0 | Status: SHIPPED | OUTPATIENT
Start: 2024-04-05 | End: 2024-08-21 | Stop reason: ALTCHOICE

## 2024-04-07 LAB — PYRIDOXAL PHOS SERPL-SCNC: 126.7 NMOL/L

## 2024-05-10 ENCOUNTER — OFFICE VISIT (OUTPATIENT)
Dept: INTERNAL MEDICINE | Facility: CLINIC | Age: 69
End: 2024-05-10
Payer: COMMERCIAL

## 2024-05-10 VITALS
SYSTOLIC BLOOD PRESSURE: 136 MMHG | DIASTOLIC BLOOD PRESSURE: 64 MMHG | OXYGEN SATURATION: 97 % | TEMPERATURE: 97.7 F | HEIGHT: 65 IN | HEART RATE: 63 BPM | BODY MASS INDEX: 43.08 KG/M2 | RESPIRATION RATE: 18 BRPM

## 2024-05-10 DIAGNOSIS — G89.29 CHRONIC LOW BACK PAIN, UNSPECIFIED BACK PAIN LATERALITY, UNSPECIFIED WHETHER SCIATICA PRESENT: ICD-10-CM

## 2024-05-10 DIAGNOSIS — E27.9 ADRENAL NODULE (H): ICD-10-CM

## 2024-05-10 DIAGNOSIS — M17.9 OSTEOARTHRITIS OF KNEE, UNSPECIFIED LATERALITY, UNSPECIFIED OSTEOARTHRITIS TYPE: ICD-10-CM

## 2024-05-10 DIAGNOSIS — I73.9 PERIPHERAL ARTERIAL DISEASE (H): ICD-10-CM

## 2024-05-10 DIAGNOSIS — M54.50 CHRONIC LOW BACK PAIN, UNSPECIFIED BACK PAIN LATERALITY, UNSPECIFIED WHETHER SCIATICA PRESENT: ICD-10-CM

## 2024-05-10 DIAGNOSIS — E66.01 MORBID OBESITY WITH BMI OF 40.0-44.9, ADULT (H): ICD-10-CM

## 2024-05-10 DIAGNOSIS — Z29.11 NEED FOR VACCINATION AGAINST RESPIRATORY SYNCYTIAL VIRUS: ICD-10-CM

## 2024-05-10 DIAGNOSIS — M10.9 ACUTE GOUTY ARTHROPATHY: ICD-10-CM

## 2024-05-10 DIAGNOSIS — F11.20 CONTINUOUS OPIOID DEPENDENCE (H): Primary | ICD-10-CM

## 2024-05-10 DIAGNOSIS — N18.31 STAGE 3A CHRONIC KIDNEY DISEASE (H): ICD-10-CM

## 2024-05-10 PROCEDURE — 99214 OFFICE O/P EST MOD 30 MIN: CPT | Performed by: INTERNAL MEDICINE

## 2024-05-10 RX ORDER — CYCLOBENZAPRINE HCL 10 MG
10 TABLET ORAL 3 TIMES DAILY PRN
Qty: 90 TABLET | Refills: 1 | Status: SHIPPED | OUTPATIENT
Start: 2024-05-10

## 2024-05-10 RX ORDER — RESPIRATORY SYNCYTIAL VIRUS VACCINE 120MCG/0.5
0.5 KIT INTRAMUSCULAR ONCE
Qty: 1 EACH | Refills: 0 | Status: SHIPPED | OUTPATIENT
Start: 2024-05-10 | End: 2024-05-10

## 2024-05-10 RX ORDER — HYDROCODONE BITARTRATE AND ACETAMINOPHEN 5; 325 MG/1; MG/1
1 TABLET ORAL EVERY 6 HOURS PRN
Qty: 18 TABLET | Refills: 0 | Status: SHIPPED | OUTPATIENT
Start: 2024-05-10 | End: 2024-07-12

## 2024-05-10 ASSESSMENT — ENCOUNTER SYMPTOMS: BACK PAIN: 1

## 2024-05-10 ASSESSMENT — PAIN SCALES - GENERAL: PAINLEVEL: EXTREME PAIN (8)

## 2024-05-10 NOTE — PROGRESS NOTES
"  Assessment & Plan     Need for vaccination against respiratory syncytial virus    - respiratory syncytial virus vaccine, bivalent (ABRYSVO) injection; Inject 0.5 mLs into the muscle once for 1 dose    Peripheral arterial disease (H24)      Adrenal nodule (H24)      Morbid obesity with BMI of 40.0-44.9, adult (H)      Stage 3a chronic kidney disease (H)  Creatinine   Date Value Ref Range Status   12/11/2023 1.04 (H) 0.51 - 0.95 mg/dL Final         Continuous opioid dependence (H)  Sparing intermittent use of hydrocodone .  Chronic low back pain, unspecified back pain laterality, unspecified whether sciatica present  Current flare up was triggered by bowling . Has mild radiation but no neurological defecit   Treat with pain meds , PT . MRI scan if no improvement   - Physical Therapy  Referral; Future  - HYDROcodone-acetaminophen (NORCO) 5-325 MG tablet; Take 1 tablet by mouth every 6 hours as needed  - cyclobenzaprine (FLEXERIL) 10 MG tablet; Take 1 tablet (10 mg) by mouth 3 times daily as needed for muscle spasms    Osteoarthritis of knee, unspecified laterality, unspecified osteoarthritis type      Acute gouty arthropathy              BMI  Estimated body mass index is 43.08 kg/m  as calculated from the following:    Height as of this encounter: 1.651 m (5' 5\").    Weight as of 1/15/24: 117.4 kg (258 lb 14.4 oz).             Edmundo Gaines is a 68 year old, presenting for the following health issues:  Back Pain and Recheck Medication (Pt reports that she is here to discuss back pain and and handicap renew,med refill.)      5/10/2024     3:13 PM   Additional Questions   Roomed by nayla   Accompanied by alone         5/10/2024     3:13 PM   Patient Reported Additional Medications   Patient reports taking the following new medications none     Back Pain     History of Present Illness       Reason for visit:  Back pain    She eats 2-3 servings of fruits and vegetables daily.She consumes 0 sweetened " "beverage(s) daily.She exercises with enough effort to increase her heart rate 9 or less minutes per day.  She exercises with enough effort to increase her heart rate 3 or less days per week.   She is taking medications regularly.                     Objective    /64 (BP Location: Left arm, Patient Position: Sitting, Cuff Size: Adult Large)   Pulse 63   Temp 97.7  F (36.5  C) (Tympanic)   Resp 18   Ht 1.651 m (5' 5\")   LMP  (LMP Unknown)   SpO2 97%   BMI 43.08 kg/m    Body mass index is 43.08 kg/m .  Physical Exam   GENERAL: alert and no distress  NECK: no adenopathy, no asymmetry, masses, or scars  RESP: lungs clear to auscultation - no rales, rhonchi or wheezes  CV: regular rate and rhythm, normal S1 S2, no S3 or S4, no murmur, click or rub, no peripheral edema  MS: no gross musculoskeletal defects noted, no edema  Cannot bend due to pain   Heel left is normal           Signed Electronically by: Bela Corbin MD    "

## 2024-05-30 DIAGNOSIS — E78.2 MIXED HYPERLIPIDEMIA: ICD-10-CM

## 2024-05-30 RX ORDER — OMEGA-3-ACID ETHYL ESTERS 1 G/1
2 CAPSULE, LIQUID FILLED ORAL 2 TIMES DAILY
Qty: 360 CAPSULE | Refills: 1 | Status: SHIPPED | OUTPATIENT
Start: 2024-05-30

## 2024-06-03 ENCOUNTER — TELEPHONE (OUTPATIENT)
Dept: INTERNAL MEDICINE | Facility: CLINIC | Age: 69
End: 2024-06-03

## 2024-06-03 NOTE — TELEPHONE ENCOUNTER
Sharon 3, 2024    [employer] [facility] Statement Certifying Physician for [employer]raputic Shoes was received via fax for Dr. Corbin.  Patient label was attached to paperwork and placed in provider's inbox to be signed.    Raquel Blackwell

## 2024-06-05 NOTE — TELEPHONE ENCOUNTER
June 5, 2024    [employer] [facility] Statement Certifying Physician for Therapeutic Shoes was picked up from outbox of Dr. Corbin and sent via fax to [phone].    Raquel Blackwell

## 2024-06-30 ENCOUNTER — PATIENT OUTREACH (OUTPATIENT)
Dept: CARE COORDINATION | Facility: CLINIC | Age: 69
End: 2024-06-30

## 2024-07-08 ENCOUNTER — NURSE TRIAGE (OUTPATIENT)
Dept: NURSING | Facility: CLINIC | Age: 69
End: 2024-07-08

## 2024-07-08 ENCOUNTER — OFFICE VISIT (OUTPATIENT)
Dept: FAMILY MEDICINE | Facility: CLINIC | Age: 69
End: 2024-07-08
Payer: COMMERCIAL

## 2024-07-08 VITALS
RESPIRATION RATE: 18 BRPM | OXYGEN SATURATION: 98 % | SYSTOLIC BLOOD PRESSURE: 135 MMHG | TEMPERATURE: 99.4 F | DIASTOLIC BLOOD PRESSURE: 71 MMHG | HEART RATE: 75 BPM

## 2024-07-08 DIAGNOSIS — U07.1 INFECTION DUE TO 2019 NOVEL CORONAVIRUS: Primary | ICD-10-CM

## 2024-07-08 DIAGNOSIS — J06.9 VIRAL URI WITH COUGH: Primary | ICD-10-CM

## 2024-07-08 DIAGNOSIS — R05.1 ACUTE COUGH: ICD-10-CM

## 2024-07-08 LAB — SARS-COV-2 RNA RESP QL NAA+PROBE: POSITIVE

## 2024-07-08 PROCEDURE — 99213 OFFICE O/P EST LOW 20 MIN: CPT | Performed by: NURSE PRACTITIONER

## 2024-07-08 PROCEDURE — 87635 SARS-COV-2 COVID-19 AMP PRB: CPT | Performed by: NURSE PRACTITIONER

## 2024-07-08 NOTE — TELEPHONE ENCOUNTER
Coronavirus (COVID-19) Notification    Caller Name (Patient, parent, daughter/son, grandparent, etc)  Patient     Reason for call  Notify of Positive Coronavirus (COVID-19) lab results, assess symptoms,  review [facility] recommendations    Lab Result    Lab test:  2019-nCoV rRt-PCR or SARS-CoV-2 PCR    Oropharyngeal AND/OR nasopharyngeal swabs is POSITIVE for 2019-nCoV RNA/SARS-COV-2 PCR (COVID-19 virus)      Gather patient reported symptoms   Assessment   Current Symptoms at time of phone call, reported by patient: (if no symptoms, document: No symptoms] Congestion, headache, sore throat. Sore stomach, loss of appetite   Date of symptom(s) onset (if applicable) 7/7/24     If at time of call, Patients symptoms have worsened, the Patient should contact 911 or have someone drive them to Emergency Dept promptly:    If Patient calling 911, inform 911 personal that you have tested positive for the Coronavirus (COVID-19).  Place mask on and await 911 to arrive.  If Emergency Dept, If possible, please have another adult drive you to the Emergency Dept but you need to wear mask when in contact with other people.      Treatment Options:   Is patient interested in discussing COVID treatment? Yes.   Is this a [place] or [facility] Patient? No:     Are you an agent trained to scheduling? Yes.     Review information with Patient    Your result was positive. This means you have COVID-19 (coronavirus).    How can I protect others?    These guidelines are for isolating before returning to work, school or .    If you DO have symptoms  Stay home and away from others   For at least 5 days after your symptoms started, AND  You are fever free for 24 hours (with no medicine that reduces fever), AND  Your other symptoms are better  Wear a mask for 10 full days anytime you are around others    If you DON'T have symptoms  Stay home and away from others for at least 5 days after your positive test  Wear a mask for 10 full days  anytime you are around others    There may be different guidelines for healthcare facilities.  Please check with the specific sites before arriving.    If you have been told by a doctor that you were severely ill with COVID-19 or are immunocompromised, you should isolate for at least 10 days.    You should not go back to work until you meet the guidelines above for ending your home isolation. You don't need to be retested for COVID-19 before going back to work--studies show that you won't spread the virus if it's been at least 10 days since your symptoms started (or 20 days, if you have a weak immune system).    Employers, schools, and daycares: This is an official notice for this person's medical guidelines for returning in-person.  They must meet the above guidelines before going back to work, school or  in person.    You will receive a positive COVID-19 letter via [employer] or the mail soon with additional self-care information.    Would you like me to review some of that information with you now?  No    If you were tested for an upcoming procedure, please contact your provider for next steps.    Cassie Saenz

## 2024-07-08 NOTE — PROGRESS NOTES
Assessment & Plan     Acute cough    - Symptomatic COVID-19 Virus (Coronavirus) by PCR Nose    Viral URI with cough         Focused exam and history done due to COVID-19 pandemic in a walk-in setting.      History, exam, and vital signs consistent with a viral URI x 1 day.     No red flags on exam.     Isolate pending covid results.  Advised to assume is COVID until proven otherwise.    Recheck if shortness of breath or new fevers develop.  Rest.     OTCs recommended: None [   ].  Dextromethorphan  [  ], guaifenesin [ x ], pseudoephedrine [   ], Afrin for no greater than 3 days [  ], Tylenol [ x ].                Return in about 1 week (around 7/15/2024) for If no better.    Norma Kaur, [facility]    Edmundo Gaines is a 69 year old female who presents to clinic today for the following health issues:  Chief Complaint   Patient presents with    Pharyngitis     Loss of taste, sore throat, sinus pressure, cough, fever, abdominal soreness. X 1 day. Also has some weakness.     HPI    Abd pain, sinus pressure, cough, loss of taste x 1 day.      Sig other getting sick as well.      Sweats starting yesterday.       Felt febrile.      Feeling weak.      Type II diabetic.       Review of Systems    See HPI         Objective    /71 (BP Location: Right arm, Patient Position: Sitting, Cuff Size: Adult Regular)   Pulse 75   Temp 99.4  F (37.4  C) (Oral)   Resp 18   LMP  (LMP Unknown)   SpO2 98%   Physical Exam  Constitutional:       General: She is not in acute distress.     Appearance: She is well-developed.   HENT:      Nose: Congestion present.      Mouth/Throat:      Pharynx: Posterior oropharyngeal erythema (Mild) present. No oropharyngeal exudate.      Tonsils: No tonsillar exudate. 1+ on the right. 1+ on the left.   Eyes:      General:         Right eye: No discharge.         Left eye: No discharge.      Conjunctiva/sclera: Conjunctivae normal.   Pulmonary:      Effort:  Pulmonary effort is normal.      Breath sounds: Normal breath sounds. No wheezing.   Musculoskeletal:         General: Normal range of motion.   Lymphadenopathy:      Cervical: Cervical adenopathy present.   Skin:     General: Skin is warm and dry.      Capillary Refill: Capillary refill takes less than 2 seconds.   Neurological:      Mental Status: She is alert and oriented to person, place, and time.   Psychiatric:         Mood and Affect: Mood normal.         Behavior: Behavior normal.         Thought Content: Thought content normal.         Judgment: Judgment normal.

## 2024-07-08 NOTE — TELEPHONE ENCOUNTER
RN COVID TREATMENT VISIT  07/08/24      The patient has been triaged and does not require a higher level of care.    Liana Briceño  69 year old  Current weight? 250    Has the patient been seen by a primary care provider at an [facility] within the past two years? Yes.   Have you been in close proximity to/do you have a known exposure to a person with a confirmed case of influenza? No.     General treatment eligibility:  Date of positive COVID test (PCR or at home)?  7/8    Are you or have you been hospitalized for this COVID-19 infection? No.   Have you received monoclonal antibodies or antiviral treatment for COVID-19 since this positive test? No.   Do you have any of the following conditions that place you at risk of being very sick from COVID-19?   - Age 50 years or older  - Diabetes mellitus, type 1 and type 2  - Heart conditions such as cardiomyopathies, congenital heart defects, coronary artery disease, heart arrhythmias, heart failure, hypertension, valve disorders   Yes, patient has at least one high risk condition as noted above.     Current COVID symptoms:   - fever or chills  - cough  - fatigue  - muscle or body aches  - headache  - sore throat  - congestion or runny nose  Yes. Patient has at least one symptom as selected.     How many days since symptoms started? 5 days or less. Established patient, 12 years or older weighing at least 88.2 lbs, who has symptoms that started in the past 5 days, has not been hospitalized nor received treatment already, and is at risk for being very sick from COVID-19.     Treatment eligibility by RN:  Are you currently pregnant or nursing? No  Do you have a clinically significant hypersensitivity to nirmatrelvir or ritonavir, or toxic epidermal necrolysis (TEN) or Gabriel-Mahin Syndrome? No  Do you have a history of hepatitis, any hepatic impairment on the Problem List (such as Child-Henson Class C, cirrhosis, fatty liver  disease, alcoholic liver disease), or was the last liver lab (hepatic panel, ALT, AST, ALK Phos, bilirubin) elevated in the past 6 months? No  Do you have any history of severe renal impairment (eGFR < 30mL/min)? No    Is patient eligible to continue? Yes, patient meets all eligibility requirements for the RN COVID treatment (as denoted by all no responses above).     Current Outpatient Medications   Medication Sig Dispense Refill    acetaminophen (TYLENOL) 500 MG tablet [ACETAMINOPHEN (TYLENOL) 500 MG TABLET] Take 1,000 mg by mouth 3 (three) times a day as needed.      allopurinol (ZYLOPRIM) 100 MG tablet Take 1 tablet (100 mg) by mouth daily 90 tablet 3    aspirin (ASA) 81 MG EC tablet Take 1 tablet (81 mg) by mouth daily Resume after done with BID dosing  0    benzonatate (TESSALON) 100 MG capsule Take 1 capsule (100 mg) by mouth 3 times daily as needed for cough 10 capsule 0    blood glucose (NO BRAND SPECIFIED) lancets standard by In [employer]itro route daily Dispense brand per insurance coverage at pharmacy discretion. use in case of CGM failure. 100 Lancet 3    blood glucose (NO BRAND SPECIFIED) test strip 1 strip by In Vitro route daily Dispense brand per insurance coverage at pharmacy discretion. use in case of CGM failure. 50 strip 11    blood glucose (PRODIGY NO CODING BLOOD GLUC) test strip Use to test blood sugar 1 times daily or as directed. Please check with patient these are the correct strips for her prodigy machine. 50 strip 11    blood glucose monitoring (NO BRAND SPECIFIED) meter device kit Use to test blood sugar 1 times daily, use in case of CGM failure. Dispense brand per insurance coverage at pharmacy discretion. 1 kit 1    blood glucose monitoring (SOFTCLIX) lancets daily      cholecalciferol, vitamin D3, (CHOLECALCIFEROL) 1,000 unit tablet [CHOLECALCIFEROL, VITAMIN D3, (CHOLECALCIFEROL) 1,000 UNIT TABLET] Take 3,000 Units by mouth daily.       Cinnamon Bark POWD       Continuous Blood Gluc Sensor  (FREESTYLE MIRANDA 2 SENSOR) MISC 1 each every 14 days 1 each every 14 days. Change every 14 days. 6 each 3    cyclobenzaprine (FLEXERIL) 10 MG tablet Take 1 tablet (10 mg) by mouth 3 times daily as needed for muscle spasms 90 tablet 1    cyclobenzaprine (FLEXERIL) 10 MG tablet Take 1 tablet (10 mg) by mouth 2 times daily as needed for muscle spasms 30 tablet 0    docusate sodium (DOK) 100 MG capsule TAKE 1 CAPSULE(100 MG) BY MOUTH TWICE DAILY Strength: 100 mg 180 capsule 3    empagliflozin (JARDIANCE) 10 MG TABS tablet Take 1 tablet (10 mg) by mouth daily 100 tablet 1    gabapentin (NEURONTIN) 300 MG capsule Take 1 capsule (300 mg) by mouth 3 times daily 270 capsule 0    hydrochlorothiazide (HYDRODIURIL) 12.5 MG tablet TAKE 1 TABLET(12.5 MG) BY MOUTH DAILY 90 tablet 3    hydrOXYzine (ATARAX) 25 MG tablet Take 1 tablet (25 mg) by mouth 3 times daily as needed for itching 20 tablet 0    insulin pen needle (32G X 4 MM) 32G X 4 MM miscellaneous Inject 4 each Subcutaneous daily 120 each 3    lisinopril (ZESTRIL) 5 MG tablet TAKE 1 AND 1/2 TABLETS BY MOUTH EVERY  tablet 2    melatonin (MELATIN) 3 mg Tab tablet Take 6 mg by mouth nightly as needed      nitroGLYcerin (NITROSTAT) 0.3 MG sublingual tablet Place 1 tablet (0.3 mg) under the tongue every 5 minutes as needed for chest pain 100 tablet 3    omega-3 acid ethyl esters (LOVAZA) 1 g capsule Take 2 capsules by mouth 2 times daily 360 capsule 1    omeprazole (PRILOSEC) 40 MG DR capsule TAKE 1 CAPSULE BY MOUTH EVERY DAY 90 capsule 3    ONETOUCH DELICA PLUS LANCET 33 gauge Misc [ONETOUCH DELICA PLUS LANCET 33 GAUGE MISC] USE TO TEST BLOOD SUGAR 6 TIMES DAILY 600 each 3    pregabalin (LYRICA) 75 MG capsule Take 1 capsule (75 mg) by mouth at bedtime 60 capsule 1    rosuvastatin (CRESTOR) 10 MG tablet Take 1 tablet (10 mg) by mouth daily 100 tablet 1    Semaglutide, 2 MG/DOSE, (OZEMPIC) 8 MG/3ML pen Inject 2 mg Subcutaneous every 7 days 9 mL 3    Thiamine HCl (B-1) 100  MG TABS Take 100 mg by mouth daily 90 tablet 3    Thiamine Mononitrate (B1) 100 MG TABS Take 1 tablet by mouth daily at 2 pm      valACYclovir (VALTREX) 500 MG tablet TAKE 1 TABLET(500 MG) BY MOUTH DAILY 90 tablet 3    vitamin B-12 (CYANOCOBALAMIN) 2500 MCG sublingual tablet One tab every other day 60 tablet 3       Medications from List 1 of the standing order (on medications that exclude the use of Paxlovid) that patient is taking: NONE. Is patient taking Bertha's Wort? No  Is patient taking [place]'s Wort or any meds from List 1? No.   Medications from List 2 of the standing order (on meds that provider needs to adjust) that patient is taking: NONE. Is patient on any of the meds from List 2? No.   Medications from List 3 of standing order (on meds that a RN needs to adjust) that patient is taking: rosuvastatin (Crestor): Instructed patient to stop rosuvastatin while taking Paxlovid and restart rosuvastatin 1 day after the completion of Paxlovid.  Is patient on any meds from List 3? Yes. Patient is on meds from list 3. No meds require a provider visit and at least one med required RN to adjust.     Paxlovid has an approximate 90% reduction in hospitalization. Paxlovid can possibly cause altered sense of taste, diarrhea (loose, watery stools), high blood pressure, muscle aches.     Would patient like a Paxlovid prescription?   Yes.   Lab Results   Component Value Date    GFRESTIMATED 59 (L) 12/11/2023       Was last eGFR reduced? Yes, eGFR 30-59 and will require reduced renal function dose of Paxlovid. Prescription sent to [place] pharmacy.   [facility]    Temporary change to home medications: Instructed patient to stop rosuvastatin while taking Paxlovid and restart rosuvastatin 1 day after the completion of Paxlovid.    All medication adjustments (holds, etc) were discussed with the patient and patient was asked to repeat back (teachback) their med adjustment.  Did patient understand med adjustment? Yes,  patient repeated back and understood correctly.        Reviewed the following instructions with the patient:    Paxlovid (nimatrelvir and ritonavir)    How it works  Two medicines (nirmatrelvir and ritonavir) are taken together. They stop the virus from growing. Less amount of virus is easier for your body to fight.    How to take  Medicine comes in a daily container with both medicine tablets. Take by mouth twice daily (once in the morning, once at night) for 5 days.  The number of tablets to take varies by patient.  Don't chew or break capsules. Swallow whole.    When to take  Take as soon as possible after positive COVID-19 test result, and within 5 days of your first symptoms.    Possible side effects  Can cause altered sense of taste, diarrhea (loose, watery stools), high blood pressure, muscle aches.    Adela Roberson RN         Reason for Disposition   HIGH RISK patient (e.g., weak immune system, age > 64 years, obesity with BMI of 30 or higher, pregnant, chronic lung disease or other chronic medical condition) and COVID symptoms (e.g., cough, fever)  (Exceptions: Already seen by doctor or NP/PA and no new or worsening symptoms.)    Additional Information   Negative: SEVERE difficulty breathing (e.g., struggling for each breath, speaks in single words)   Negative: Bluish (or gray) lips or face now   Negative: Difficult to awaken or acting confused (e.g., disoriented, slurred speech)   Negative: Shock suspected (e.g., cold/pale/clammy skin, too weak to stand, low BP, rapid pulse)   Negative: Sounds like a life-threatening emergency to the triager   Negative: Diagnosed or suspected COVID-19 and symptoms lasting 3 or more weeks   Negative: COVID-19 exposure and no symptoms   Negative: COVID-19 vaccine reaction suspected (e.g., fever, headache, muscle aches) occurring 1 to 3 days after getting vaccine   Negative: COVID-19 vaccine, questions about   Negative: Lives with someone known to have influenza (flu test  "positive) and flu-like symptoms (e.g., cough, runny nose, sore throat, SOB; with or without fever)   Negative: Possible COVID-19 symptoms and triager concerned about severity of symptoms or other causes   Negative: COVID-19 and breastfeeding, questions about   Negative: SEVERE or constant chest pain or pressure  (Exception: Mild central chest pain, present only when coughing.)   Negative: MODERATE difficulty breathing (e.g., speaks in phrases, SOB even at rest, pulse 100-120)   Negative: Headache and stiff neck (can't touch chin to chest)   Negative: Oxygen level (e.g., pulse oximetry) 90% or lower   Negative: Chest pain or pressure  (Exception: MILD central chest pain, present only when coughing.)   Negative: Drinking very little and dehydration suspected (e.g., no urine > 12 hours, very dry mouth, very lightheaded)   Negative: Patient sounds very sick or weak to the triager   Negative: MILD difficulty breathing (e.g., minimal/no SOB at rest, SOB with walking, pulse <100)   Negative: Fever > 103 F (39.4 C)   Negative: Fever > 101 F (38.3 C) and over 60 years of age   Negative: Fever > 100.0 F (37.8 C) and bedridden (e.g., CVA, chronic illness, recovering from surgery)    Answer Assessment - Initial Assessment Questions  1. COVID-19 DIAGNOSIS: \"How do you know that you have COVID?\" (e.g., positive lab test or self-test, diagnosed by doctor or NP/PA, symptoms after exposure).      Lab test positive done at   2. COVID-19 EXPOSURE: \"Was there any known exposure to COVID before the symptoms began?\" CDC Definition of close contact: within 6 feet (2 meters) for a total of 15 minutes or more over a 24-hour period.       unknown  3. ONSET: \"When did the COVID-19 symptoms start?\"       Yesterday 7/7  4. WORST SYMPTOM: \"What is your worst symptom?\" (e.g., cough, fever, shortness of breath, muscle aches)      Cough  5. COUGH: \"Do you have a cough?\" If Yes, ask: \"How bad is the cough?\"        Cough is bad, sore abdomen due to " "coughing  6. FEVER: \"Do you have a fever?\" If Yes, ask: \"What is your temperature, how was it measured, and when did it start?\"      Yesterday felt like she had one but did not take temp  7. RESPIRATORY STATUS: \"Describe your breathing?\" (e.g., normal; shortness of breath, wheezing, unable to speak)       denies  8. BETTER-SAME-WORSE: \"Are you getting better, staying the same or getting worse compared to yesterday?\"  If getting worse, ask, \"In what way?\"      Stayed the same since yesterday  9. OTHER SYMPTOMS: \"Do you have any other symptoms?\"  (e.g., chills, fatigue, headache, loss of smell or taste, muscle pain, sore throat)      Congestion, headache, sinus pressure, sore throat, loss of appetite, fatigue, muscle pain in abdomen.   10. HIGH RISK DISEASE: \"Do you have any chronic medical problems?\" (e.g., asthma, heart or lung disease, weak immune system, obesity, etc.)        diabetes  11. VACCINE: \"Have you had the COVID-19 vaccine?\" If Yes, ask: \"Which one, how many shots, when did you get it?\"        Yes, 3-4, last in 2022  12. PREGNANCY: \"Is there any chance you are pregnant?\" \"When was your last menstrual period?\"        no  13. O2 SATURATION MONITOR:  \"Do you use an oxygen saturation monitor (pulse oximeter) at home?\" If Yes, ask \"What is your reading (oxygen level) today?\" \"What is your usual oxygen saturation reading?\" (e.g., 95%)        unknown    Protocols used: Coronavirus (COVID-19) Diagnosed or [id number]    "

## 2024-07-08 NOTE — PATIENT INSTRUCTIONS
Try Mucinex, Tylenol, push fluids, rest.      Come back if more short of breath, fevers over 100.4.      Assume this is covid until proven otherwise.

## 2024-07-09 DIAGNOSIS — I10 ESSENTIAL HYPERTENSION WITH GOAL BLOOD PRESSURE LESS THAN 140/90: ICD-10-CM

## 2024-07-10 RX ORDER — LISINOPRIL 5 MG/1
TABLET ORAL
Qty: 135 TABLET | Refills: 2 | Status: SHIPPED | OUTPATIENT
Start: 2024-07-10

## 2024-07-12 DIAGNOSIS — M54.50 CHRONIC LOW BACK PAIN, UNSPECIFIED BACK PAIN LATERALITY, UNSPECIFIED WHETHER SCIATICA PRESENT: ICD-10-CM

## 2024-07-12 DIAGNOSIS — G89.29 CHRONIC LOW BACK PAIN, UNSPECIFIED BACK PAIN LATERALITY, UNSPECIFIED WHETHER SCIATICA PRESENT: ICD-10-CM

## 2024-07-12 RX ORDER — HYDROCODONE BITARTRATE AND ACETAMINOPHEN 5; 325 MG/1; MG/1
1 TABLET ORAL EVERY 6 HOURS PRN
Qty: 18 TABLET | Refills: 0 | Status: SHIPPED | OUTPATIENT
Start: 2024-07-12 | End: 2024-09-17

## 2024-07-12 NOTE — TELEPHONE ENCOUNTER
Please let pt know that she missed her wellness visit an f she hasn't scheduled one she should reschedule

## 2024-07-12 NOTE — TELEPHONE ENCOUNTER
Lm on vm of rx refill approval and to call back to schedule her awv, since she missed her last one.

## 2024-07-12 NOTE — TELEPHONE ENCOUNTER
Medication Question or Refill    Contacts       Contact Date/Time Type Contact Phone/Fax    07/12/2024 02:13 PM CDT Phone (Incoming) Liana Briceño (Self) [phone] (M)            What medication are you calling about (include dose and sig)?:    Disp Refills Start End BORA   HYDROcodone-acetaminophen (NORCO) 5-325 MG tablet 18 tablet 0 5/10/2024 5/15/2024 --   Sig - Route: Take 1 tablet by mouth every 6 hours as needed - Oral   Sent to pharmacy as: HYDROcodone-Acetaminophen 5-325 MG Oral Tablet (NORCO)   Class: E-Prescribe   Earliest Fill Date: 5/10/2024   Order: [id number]   E-Prescribing Status: Receipt confirmed by pharmacy (5/10/2024  3:47 PM CDT)       Preferred Pharmacy:  [id number] - [place], [place] - [email or web address]  AT [facility] [address]  [address] [place] [address]  Phone: [phone] Fax: [phone]      Controlled Substance Agreement on file:   CSA -- Patient Level:     [Media Unavailable] Controlled Substance Agreement - Opioid - Scan on 1/28/2020   [Media Unavailable] Controlled Substance Agreement - Opioid - Scan on 11/13/2018   [Media Unavailable] Controlled Substance Agreement - Opioid - Scan on 8/2/2017   [Media Unavailable] Controlled Substance Agreement - Opioid - Scan on 6/27/2016       Who prescribed the medication?: Dr Corbin    Do you need a refill? Yes    Patient offered an appointment? No    Do you have any questions or concerns?  No      Could we send this information to you in [facility] or would you prefer to receive a phone call?:   Patient would prefer a phone call   Okay to leave a detailed message?: Yes at Cell number on file:    Telephone Information:   Mobile [phone]

## 2024-08-05 ENCOUNTER — TELEPHONE (OUTPATIENT)
Dept: INTERNAL MEDICINE | Facility: CLINIC | Age: 69
End: 2024-08-05
Payer: COMMERCIAL

## 2024-08-05 NOTE — TELEPHONE ENCOUNTER
August 5, 2024    [facility] was received via fax for Dr. Corbin.  Patient label was attached to paperwork and placed in provider's inbox to be signed.    Raquel Blackwell

## 2024-08-13 NOTE — TELEPHONE ENCOUNTER
August 13, 2024    [facility] was picked up from outbox of Dr. Corbin and sent via fax to [phone].    Raquel Blackwell

## 2024-08-18 ENCOUNTER — OFFICE VISIT (OUTPATIENT)
Dept: FAMILY MEDICINE | Facility: CLINIC | Age: 69
End: 2024-08-18
Payer: COMMERCIAL

## 2024-08-18 VITALS
OXYGEN SATURATION: 96 % | RESPIRATION RATE: 20 BRPM | TEMPERATURE: 98.2 F | HEART RATE: 88 BPM | DIASTOLIC BLOOD PRESSURE: 74 MMHG | SYSTOLIC BLOOD PRESSURE: 118 MMHG

## 2024-08-18 DIAGNOSIS — L03.011 CELLULITIS OF RIGHT FINGER: Primary | ICD-10-CM

## 2024-08-18 PROCEDURE — 99213 OFFICE O/P EST LOW 20 MIN: CPT | Performed by: PHYSICIAN ASSISTANT

## 2024-08-18 RX ORDER — FLUCONAZOLE 150 MG/1
150 TABLET ORAL
Qty: 2 TABLET | Refills: 0 | Status: SHIPPED | OUTPATIENT
Start: 2024-08-18 | End: 2024-08-22

## 2024-08-18 RX ORDER — CEPHALEXIN 500 MG/1
500 CAPSULE ORAL 2 TIMES DAILY
Qty: 14 CAPSULE | Refills: 0 | Status: SHIPPED | OUTPATIENT
Start: 2024-08-18 | End: 2024-08-21 | Stop reason: SINTOL

## 2024-08-18 NOTE — PROGRESS NOTES
Patient presents with:  Finger: Right pointer finger pain. Hurts to the touch. Symptoms started Thursday evening.      Clinical Decision Making:  Suspect cellulitis of right pointer finger.  This is mild.  No paronychia noted.  Given patient's past medical history of diabetes will treat empirically.  Patient started on Keflex twice daily for 7 days.  She was also given Diflucan in case yeast infection develops.      ICD-10-CM    1. Cellulitis of right finger  L03.011 cephALEXin (KEFLEX) 500 MG capsule     fluconazole (DIFLUCAN) 150 MG tablet          Patient Instructions   1) Take antibiotic as prescribed. Take this medication with food to avoid stomach upset.   2) Tylenol as needed for fever or pain.  3) May take Diflucan if a yeast infection develops.   4) I recommend warm water soak with or without epsom salt.       HPI:  Liana Briceño is a 69 year old female with past medical history of opioid dependence, type 2 diabetes, gout who presents today with concerns of right pointer finger pain that started 3 days ago. NO KNOWN INJURY.     History obtained from the patient.    Problem List:  2024-05: Continuous opioid dependence (H)  2023-11: Breast abscess  2023-11: Mixed incontinence  2023-11: Acute gouty arthropathy  2023-07: Acute midline low back pain with sciatica, sciatica   laterality unspecified  2023-06: Adrenal nodule (H24)  2023-06: Diarrhea  2023-06: Hypomagnesemia  2023-06: Normocytic anemia  2022-11: Peripheral arterial disease (H24)  2022-11: Chronic low back pain, unspecified back pain laterality,   unspecified whether sciatica present  2022-09: Iron deficiency anemia  2022-09: Epigastric pain  2022-04: Knee osteoarthritis  2021-06: Chronic kidney disease, stage 3 (H)  2019-11: Benign neoplasm of transverse colon  2019-11: History of colonic polyps  2019-11: Diverticular disease of large intestine  2019-11: Polyp of colon  2018-11: Low serum vitamin B12  2018-11: Elevated ferritin level  2017-05:  Coronary artery disease due to lipid rich plaque  2017: Stenosis of left carotid artery  2017: Unstable angina pectoris (H)  2016-: Unstable angina (H)  2013: Dysphagia  Diffuse Nontoxic Goiter  Type 2 diabetes mellitus (H)  Essential hypertension  Esophageal reflux  Dyslipidemia, goal LDL below 70  Hiatal hernia  Sensorineural hearing loss  Morbid obesity with BMI of 40.0-44.9, adult (H)  RADHA on CPAP  Fatty liver  Paroxysmal SVT -- S/P ablation  Morbid obesity (H)      Past Medical History:   Diagnosis Date    Anemia     Breast cyst     Carpal tunnel syndrome     Coronary artery disease due to lipid rich plaque     Dyslipidemia, goal LDL below 70     Esophageal reflux     Essential hypertension     Fatty liver     Goiter diffuse, nontoxic     Hiatal hernia     History of gestational diabetes     History of MRSA infection 2017    Pilonidal cyst culture    Increased PTH level 2018    Morbid obesity with BMI of 40.0-44.9, adult (H)     Motion sickness     RADHA on CPAP     Osteoarthritis     Paroxysmal SVT (supraventricular tachycardia) (H24)     ablation by Dr. Johnson in     PONV (postoperative nausea and vomiting)     Positive result for methicillin resistant Staphylococcus aureus (MRSA) screening 2018    Renal disease     S/P hip replacement     Stented coronary artery     Thiamine deficiency 2018    Type 2 diabetes mellitus (H)     Uncomplicated asthma     Vitamin D deficiency 2018       Social History     Tobacco Use    Smoking status: Former     Current packs/day: 0.00     Average packs/day: 0.3 packs/day for 44.0 years (11.0 ttl pk-yrs)     Types: Cigarettes     Start date: 1973     Quit date: 2017     Years since quittin.5    Smokeless tobacco: Never    Tobacco comments:     age 18 to age 62 up to 2 ppd   Substance Use Topics    Alcohol use: Yes     Alcohol/week: 0.0 standard drinks of alcohol     Comment: Alcoholic Drinks/day: < 1 drink a week          Review of Systems    Vitals:    08/18/24 1307   BP: 118/74   Pulse: 88   Resp: 20   Temp: 98.2  F (36.8  C)   TempSrc: Tympanic   SpO2: 96%       Physical Exam  Vitals and nursing note reviewed.   Constitutional:       General: She is not in acute distress.     Appearance: She is not toxic-appearing or diaphoretic.   HENT:      Head: Normocephalic and atraumatic.      Right Ear: External ear normal.      Left Ear: External ear normal.   Eyes:      Conjunctiva/sclera: Conjunctivae normal.   Pulmonary:      Effort: Pulmonary effort is normal. No respiratory distress.   Musculoskeletal:        Hands:    Neurological:      Mental Status: She is alert.   Psychiatric:         Mood and Affect: Mood normal.         Behavior: Behavior normal.         Thought Content: Thought content normal.         Judgment: Judgment normal.         At the end of the encounter, I discussed results, diagnosis, medications. Discussed red flags for immediate return to clinic/ER, as well as indications for follow up if no improvement. Patient understood and agreed to plan. Patient was stable for discharge.

## 2024-08-18 NOTE — PATIENT INSTRUCTIONS
1) Take antibiotic as prescribed. Take this medication with food to avoid stomach upset.   2) Tylenol as needed for fever or pain.  3) May take Diflucan if a yeast infection develops.   4) I recommend warm water soak with or without epsom salt.

## 2024-08-20 ENCOUNTER — HOSPITAL ENCOUNTER (EMERGENCY)
Facility: HOSPITAL | Age: 69
Discharge: HOME | End: 2024-08-20
Payer: COMMERCIAL

## 2024-08-20 ENCOUNTER — APPOINTMENT (OUTPATIENT)
Dept: RADIOLOGY | Facility: HOSPITAL | Age: 69
End: 2024-08-20
Payer: COMMERCIAL

## 2024-08-20 VITALS
TEMPERATURE: 97.9 F | HEART RATE: 73 BPM | RESPIRATION RATE: 18 BRPM | DIASTOLIC BLOOD PRESSURE: 65 MMHG | OXYGEN SATURATION: 97 % | SYSTOLIC BLOOD PRESSURE: 136 MMHG | BODY MASS INDEX: 41.8 KG/M2 | WEIGHT: 251.2 LBS

## 2024-08-20 DIAGNOSIS — L03.011 PARONYCHIA OF FINGER OF RIGHT HAND: ICD-10-CM

## 2024-08-20 PROCEDURE — 99283 EMERGENCY DEPT VISIT LOW MDM: CPT | Mod: 25

## 2024-08-20 PROCEDURE — 250N000013 HC RX MED GY IP 250 OP 250 PS 637

## 2024-08-20 PROCEDURE — 10060 I&D ABSCESS SIMPLE/SINGLE: CPT | Mod: F6

## 2024-08-20 PROCEDURE — 73140 X-RAY EXAM OF FINGER(S): CPT | Mod: RT

## 2024-08-20 RX ORDER — OXYCODONE HYDROCHLORIDE 5 MG/1
5 TABLET ORAL ONCE
Status: COMPLETED | OUTPATIENT
Start: 2024-08-20 | End: 2024-08-20

## 2024-08-20 RX ORDER — ONDANSETRON 4 MG/1
4 TABLET, ORALLY DISINTEGRATING ORAL ONCE
Status: COMPLETED | OUTPATIENT
Start: 2024-08-20 | End: 2024-08-20

## 2024-08-20 RX ADMIN — OXYCODONE HYDROCHLORIDE 5 MG: 5 TABLET ORAL at 19:42

## 2024-08-20 ASSESSMENT — COLUMBIA-SUICIDE SEVERITY RATING SCALE - C-SSRS
2. HAVE YOU ACTUALLY HAD ANY THOUGHTS OF KILLING YOURSELF IN THE PAST MONTH?: NO
1. IN THE PAST MONTH, HAVE YOU WISHED YOU WERE DEAD OR WISHED YOU COULD GO TO SLEEP AND NOT WAKE UP?: NO
6. HAVE YOU EVER DONE ANYTHING, STARTED TO DO ANYTHING, OR PREPARED TO DO ANYTHING TO END YOUR LIFE?: NO

## 2024-08-20 ASSESSMENT — ACTIVITIES OF DAILY LIVING (ADL)
ADLS_ACUITY_SCORE: 38
ADLS_ACUITY_SCORE: 36
ADLS_ACUITY_SCORE: 38

## 2024-08-20 NOTE — Clinical Note
Lianajake Briceño was seen and treated in our emergency department on 8/20/2024.         Sincerely,     [facility] Emergency Department

## 2024-08-20 NOTE — ED PROVIDER NOTES
EMERGENCY DEPARTMENT ENCOUNTER      NAME: Liana Briceño  AGE: 69 year old female  YOB: 1955  MRN: [id number]  EVALUATION DATE & TIME: No admission date for patient encounter.    PCP: Blea Corbin    ED PROVIDER: Mercy Dwyer PA-C      CHIEF COMPLAINT:  Nausea and Hand Pain      FINAL IMPRESSION:  1. Paronychia of finger of right hand          ED COURSE & MEDICAL DECISION MAKING:  Pertinent Labs & Imaging studies reviewed. (See chart for details)    ED Course as of 09/04/24 0443   Tue Aug 20, 2024   1635 Per chart review, patient was seen on 8/18/2024 in clinic for right pointer finger pain onset 5 days ago.  Exam suspicious for mild cellulitis, no paronychia noted.  Given patient's history of diabetes, she was treated empirically with Keflex twice daily for 7 days and also given Diflucan in case yeast infection were to develop.   1810 The patient is a right-hand-dominant 69-year-old female with history of type 2 diabetes and pretension presenting to the emergency department for evaluation of the right second finger pain for the past week with progressive swelling of the nailbed.  She was seen in clinic for this 2 days ago with exam findings suspicious for possible mild cellulitis and started on Keflex empirically, 4 doses taken thus far.  The swelling developed in the past 2 days, prompting patient's presentation to the emergency department.  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  No identified injury or foreign body exposure that she is aware of, she is concerned this could be contributing to current symptoms.  She has been taking her Vicodin that she has at home for her back pain with improved pain.  She took her Vicodin this afternoon and then ate [place] and developed some nausea afterwards, which resolved completely after napping.  No vomiting or abdominal pain.  Patient declines Zofran here.   No fevers, chills.   1812 Initial vital signs reviewed and significant for elevated blood pressure.  Patient is afebrile.  On exam, she is clinically well-appearing and nontoxic-appearing in no acute distress.  She does have fluctuance and tenderness at the nail fold on the right second finger consistent with paronychia, no streaking erythema, crepitus, pain out of proportion, or spontaneous drainage.  Distal CMS intact.  Is able to flex and extend against resistance at the MCP, PIP, and DIP of the finger.   1814 Using shared decision making, patient would like to proceed with x-ray to definitively rule out radiopaque foreign body.  X-ray of the finger was obtained and is reassuring with no radiopaque foreign body seen, normal joint spaces and alignment, no fracture.  Overall, patient history, exam, workup most consistent with paronychia.  No tenderness to palpation overlying flexor surface to suggest flexor tenosynovitis.  No streaking erythema to suggest progressive cellulitis or lymphangitis.  No warmth or swelling overlying joint to suggest septic joint or gout/pseudogout.     Patient agreeable with plan for I&D for paronychia.  She is already on Keflex, recommend continuing course as previously prescribed.     Patient given home dose of Vicodin for chronic back pain.  She has a ride home from the emergency department.  Paronychia was drained with expression of purulent drainage.  Patient tolerated the procedure well.  Pain improved after the procedure.   Please see procedure note for additional details.    Discussed plan for discharge home with continuation of Keflex as previously prescribed and close outpatient follow-up with primary care clinician. The patient verbalized understanding and is comfortable with this plan. Symptomatic home cares discussed. Emphasized importance of follow up with primary care clinician. Strict return precautions discussed.     At the conclusion of the encounter I discussed the  results of all of the tests and the disposition. The questions were answered. The patient or family acknowledged understanding and was agreeable with the care plan.       ED COURSE:  5:35 PM I met and introduced myself to the patient. I gathered initial history and performed an initial physical exam. We discussed options and plan for diagnostics and treatment here in the ED.  8:08 PM  I discussed the plan for discharge with the patient, and patient is agreeable. We discussed supportive cares at home and reasons for return to the ER including new or worsening symptoms - all questions and concerns addressed to the best of my ability. Strict return precautions discussed. Patient to be discharged by RN.        MEDICATIONS GIVEN IN THE EMERGENCY:  Medications   oxyCODONE (ROXICODONE) tablet 5 mg (5 mg Oral $Given 8/20/24 1942)   ondansetron (ZOFRAN ODT) ODT tab 4 mg (4 mg Oral Not Given 8/20/24 2009)       NEW PRESCRIPTIONS STARTED AT TODAY'S ER VISIT  Discharge Medication List as of 8/20/2024  8:48 PM             =================================================================    HPI    Patient information was obtained from: Patient    Use of Intrepreter: N/A      Liana Briceño is a 69 year old female with pertinent medical history of Type II diabetes, hypertension, who presents to the emergency department for evaluation of nausea and right index finger pain and swelling.     The patient reports right index finger pain and swelling since 8/15/2024. She has been taking Keflex to treat a possible infection, she has taken 6 doses. Patient is right-handed.     Of note, patient took a Vicadin at 12 PM for pain relief with improvement. This caused her to be nauseous and fatigued. She has tried 1000 mg of Tylenol with no relief.     Denies fever, chills, vomiting, or any other associated symptoms at this time.    Per chart review, patient was seen on 8/18/2024 in clinic for right pointer finger pain onset 5 days ago.  Exam  suspicious for mild cellulitis, no paronychia noted.  Given patient's history of diabetes, she was treated empirically with Keflex twice daily for 7 days and also given Diflucan in case yeast infection were to develop.    PAST MEDICAL HISTORY:  Past Medical History:   Diagnosis Date    Anemia     Breast cyst     Carpal tunnel syndrome     Coronary artery disease due to lipid rich plaque     Dyslipidemia, goal LDL below 70     Esophageal reflux     Essential hypertension     Fatty liver     Goiter diffuse, nontoxic     Hiatal hernia     History of gestational diabetes     History of MRSA infection 07/17/2017    Pilonidal cyst culture    Increased PTH level 11/4/2018    Morbid obesity with BMI of 40.0-44.9, adult (H)     Motion sickness     RADHA on CPAP     Osteoarthritis     Paroxysmal SVT (supraventricular tachycardia) (H24) 2011    ablation by Dr. Johnson in 2011    PONV (postoperative nausea and vomiting)     Positive result for methicillin resistant Staphylococcus aureus (MRSA) screening 11/4/2018    Renal disease     S/P hip replacement     Stented coronary artery     Thiamine deficiency 11/4/2018    Type 2 diabetes mellitus (H)     Uncomplicated asthma     Vitamin D deficiency 11/4/2018       PAST SURGICAL HISTORY:  Past Surgical History:   Procedure Laterality Date    ARTHROPLASTY KNEE Right 4/8/2022    Procedure: RIGHT TOTAL KNEE ARTHROPLASTY;  Surgeon: Declan Eaton MD;  Location: [facility] Main OR    BIOPSY BREAST      BREAST CYST EXCISION      CARDIAC CATHETERIZATION N/A 4/24/2017    Procedure: Coronary Angiogram;  Surgeon: Ariane Biswas MD;  Location: [facility] Cath Lab;  Service:     CARDIAC CATHETERIZATION N/A 5/19/2017    Procedure: Coronary Angiogram;  Surgeon: Howard Gutierrez MD;  Location: [facility] Cath Lab;  Service:     CHOLECYSTECTOMY  2000    CORONARY STENT PLACEMENT  04/24/2017    BO to RCA by Dr. Biswas    HC REVISE MEDIAN N/CARPAL TUNNEL SURG      Description: Neuroplasty  Decompression Median Nerve At Carpal Tunnel;  Recorded: 2009;    IR LUMBAR EPIDURAL STEROID INJECTION  2007    IR LUMBAR EPIDURAL STEROID INJECTION  2007    IR LUMBAR EPIDURAL STEROID INJECTION  2008    IR LUMBAR EPIDURAL STEROID INJECTION  10/8/2008    IR LUMBAR EPIDURAL STEROID INJECTION  10/28/2008    IR LUMBAR EPIDURAL STEROID INJECTION  3/10/2009    KY EXPLO/DRAIN BREAST ABSCESS      Description: Breast Surgery Mastotomy With Drainage Of Abscess    KY L HRT CATH W/NJX L VENTRICULOGRAPHY IMG S&I N/A 2017    Procedure: Left Heart Catheterization with Left Ventriculogram;  Surgeon: Ariane Biswas MD;  Location: [facility] Cath Lab;  Service: Cardiology    KY THYROID LOBECTOMY,UNILAT      RADIOFREQUENCY ABLATION  2011    for PSVT by Dr. Johnson    TOTAL HIP ARTHROPLASTY Right        CURRENT MEDICATIONS:    Prior to Admission Medications   Prescriptions Last Dose Informant Patient Reported? Taking?   Cinnamon Bark POWD   Yes No   Continuous Blood Gluc Sensor (FREESTYLE MIRANDA 2 SENSOR) MISC   No No   Si each every 14 days 1 each every 14 days. Change every 14 days.   HYDROcodone-acetaminophen (NORCO) 5-325 MG tablet   No No   Sig: Take 1 tablet by mouth every 6 hours as needed   ONETOUCH DELICA PLUS LANCET 33 gauge Misc   No No   Sig: [ONETOUCH DELICA PLUS LANCET 33 GAUGE MISC] USE TO TEST BLOOD SUGAR 6 TIMES DAILY   Semaglutide, 2 MG/DOSE, (OZEMPIC) 8 MG/3ML pen   No No   Sig: Inject 2 mg Subcutaneous every 7 days   Thiamine HCl (B-1) 100 MG TABS   No No   Sig: Take 100 mg by mouth daily   acetaminophen (TYLENOL) 500 MG tablet   Yes No   Sig: [ACETAMINOPHEN (TYLENOL) 500 MG TABLET] Take 1,000 mg by mouth 3 (three) times a day as needed.   allopurinol (ZYLOPRIM) 100 MG tablet   No No   Sig: Take 1 tablet (100 mg) by mouth daily   aspirin (ASA) 81 MG EC tablet   No No   Sig: Take 1 tablet (81 mg) by mouth daily Resume after done with BID dosing   blood glucose (NO BRAND SPECIFIED)  lancets standard   No No   Sig: by In Vitro route daily Dispense brand per insurance coverage at pharmacy discretion. use in case of CGM failure.   blood glucose (NO BRAND SPECIFIED) test strip   No No   Si strip by In Vitro route daily Dispense brand per insurance coverage at pharmacy discretion. use in case of CGM failure.   blood glucose (PRODIGY NO CODING BLOOD GLUC) test strip   No No   Sig: Use to test blood sugar 1 times daily or as directed. Please check with patient these are the correct strips for her prodigy machine.   blood glucose monitoring (NO BRAND SPECIFIED) meter device kit   No No   Sig: Use to test blood sugar 1 times daily, use in case of CGM failure. Dispense brand per insurance coverage at pharmacy discretion.   blood glucose monitoring (SOFTCLIX) lancets   Yes No   Sig: daily   cholecalciferol, vitamin D3, (CHOLECALCIFEROL) 1,000 unit tablet   Yes No   Sig: [CHOLECALCIFEROL, VITAMIN D3, (CHOLECALCIFEROL) 1,000 UNIT TABLET] Take 3,000 Units by mouth daily.    cyclobenzaprine (FLEXERIL) 10 MG tablet   No No   Sig: Take 1 tablet (10 mg) by mouth 3 times daily as needed for muscle spasms   docusate sodium (DOK) 100 MG capsule   No No   Sig: TAKE 1 CAPSULE(100 MG) BY MOUTH TWICE DAILY Strength: 100 mg   empagliflozin (JARDIANCE) 10 MG TABS tablet   No No   Sig: Take 1 tablet (10 mg) by mouth daily   fluconazole (DIFLUCAN) 150 MG tablet   No No   Sig: Take 1 tablet (150 mg) by mouth every 72 hours for 2 doses   gabapentin (NEURONTIN) 300 MG capsule   No No   Sig: Take 1 capsule (300 mg) by mouth 3 times daily   hydrOXYzine (ATARAX) 25 MG tablet   No No   Sig: Take 1 tablet (25 mg) by mouth 3 times daily as needed for itching   hydrochlorothiazide (HYDRODIURIL) 12.5 MG tablet   No No   Sig: TAKE 1 TABLET(12.5 MG) BY MOUTH DAILY   insulin pen needle (32G X 4 MM) 32G X 4 MM miscellaneous   No No   Sig: Inject 4 each Subcutaneous daily   lisinopril (ZESTRIL) 5 MG tablet   No No   Sig: TAKE 1 AND  1/2 TABLETS BY MOUTH EVERY DAY   melatonin (MELATIN) 3 mg Tab tablet   Yes No   Sig: Take 6 mg by mouth nightly as needed   nitroGLYcerin (NITROSTAT) 0.3 MG sublingual tablet   No No   Sig: Place 1 tablet (0.3 mg) under the tongue every 5 minutes as needed for chest pain   omega-3 acid ethyl esters (LOVAZA) 1 g capsule   No No   Sig: Take 2 capsules by mouth 2 times daily   omeprazole (PRILOSEC) 40 MG DR capsule   No No   Sig: TAKE 1 CAPSULE BY MOUTH EVERY DAY   pregabalin (LYRICA) 75 MG capsule   No No   Sig: Take 1 capsule (75 mg) by mouth at bedtime   rosuvastatin (CRESTOR) 10 MG tablet   No No   Sig: Take 1 tablet (10 mg) by mouth daily   valACYclovir (VALTREX) 500 MG tablet   No No   Sig: TAKE 1 TABLET(500 MG) BY MOUTH DAILY   vitamin B-12 (CYANOCOBALAMIN) 2500 MCG sublingual tablet   No No   Sig: One tab every other day      Facility-Administered Medications: None       ALLERGIES:  Allergies   Allergen Reactions    Latex Hives, Itching and Rash    Penicillins Other (See Comments)     Passed out.     Clindamycin Itching    Doxycycline Dizziness    Eggshell Membrane [Egg Shells] Unknown    Erythromycin Itching    Ibuprofen      Ulcer     Keflex [Cephalexin] Nausea    Naloxone Unknown     itching    Penicillamine Unknown    Pneumococcal 23-Valent Polysaccharide Vaccine [Pneumococcal Vaccine] Other (See Comments)     pneumonia    Statins-Hmg-Coa Reductase Inhibitors [Statins] Muscle Pain (Myalgia)     Shaking with simvastatin and atorvastatin    Azithromycin Rash       FAMILY HISTORY:  Family History   Problem Relation Age of Onset    Heart Disease Father         unknown type    Valvular heart disease Mother     Kidney Disease Mother     Hypertension Mother     Diabetes Mother     Leukemia Brother          at a young age    No Known Problems Son     Diabetes Paternal Aunt     Kidney Disease Sister     Heart Disease Sister     Other - See Comments Other 25.00        victim of a homicide    Breast Cancer No  family hx of        SOCIAL HISTORY:  Social History     Tobacco Use    Smoking status: Former     Current packs/day: 0.00     Average packs/day: 0.3 packs/day for 44.0 years (11.0 ttl pk-yrs)     Types: Cigarettes     Start date: 1973     Quit date: 2017     Years since quittin.6    Smokeless tobacco: Never    Tobacco comments:     age 18 to age 62 up to 1/2 ppd   Vaping Use    Vaping status: Never Used   Substance Use Topics    Alcohol use: Yes     Alcohol/week: 0.0 standard drinks of alcohol     Comment: Alcoholic Drinks/day: < 1 drink a week    Drug use: No        VITALS:    First Vitals:  No data found.      No data found.        PHYSICAL EXAM  VITAL SIGNS: /65   Pulse 73   Temp 97.9  F (36.6  C) (Oral)   Resp 18   Wt 113.9 kg (251 lb 3.2 oz)   LMP  (LMP Unknown)   SpO2 97%   BMI 41.80 kg/m     GENERAL: Awake, alert, answering questions appropriately, in no acute distress.  HEENT: Sclera antiicteric.   SPEECH:  Easy to understand speech, Normal volume and josé. Normal phonation.  PULMONARY: No respiratory distress, Breathing comfortably on room air. Lungs clear to auscultation bilaterally.  CARDIOVASCULAR: Regular rate and rhythm, radial, dorsalis pedis, and posterior tibialis pulses present, symmetric, and normal.  ABDOMINAL: Soft, Nondistended, Nontender, No rebound or guarding. Bowel sounds present.  EXTREMITIES: Extremities are warm and well perfused. No lower extremity edema. Fluctuance and tenderness at the nail fold on the right second finger, no streaking erythema, crepitus, pain out of proportion, or spontaneous drainage. Distal CMS intact. Is able to flex and extend against resistance at the MCP, PIP, and DIP of the finger. Compartments soft, no tenderness to flexor surface.   NEUROLOGIC: GCS 15. Alert, oriented. CN III-XII grossly intact. Moving all extremities spontaneously.   SKIN: Exposed areas of skin warm, dry, no rashes.  PSYCH: Normal mood and  affect.      RADIOLOGY/LAB:  Reviewed all pertinent imaging. Please see official radiology report.  All pertinent labs reviewed and interpreted.  Results for orders placed or performed during the hospital encounter of 08/20/24   XR Finger Right G/E 2 Views    Impression    IMPRESSION: 3 views of the index finger. There is soft tissue swelling distally. No radiopaque foreign body. Normal joint spaces and alignment. No fracture.           EKG:  None      PROCEDURES:     PROCEDURE: Incision and Drainage   INDICATIONS: Localized abscess   PROCEDURE PROVIDER: Mercy Dwyer PA-C   SITE: Right second finger   MEDICATION: None   NOTE: The area was prepped with chlorhexidine and draped off in the usual sterile fashion.  The area of maximal fluctuance was gently lifted using a blunt fill needle at the nailfold to allow for drainage.  The abscess was drained.  A sterile dressing was placed over the area.   COMPLEXITY: Simple    Simple = single, furuncle, paronychia, superficial  Complex = multiple or abscess requiring probing, loculations, packing placement   COMPLICATIONS: Patient tolerated procedure well, without complication        Medical Decision Making  Obtained supplemental history:Supplemental history obtained?: No  Reviewed external records: External records reviewed?: Documented in chart and Outpatient Record: [facility]  Care impacted by chronic illness:Diabetes and Hypertension  Care significantly affected by social determinants of health:N/A  Did you consider but not order tests?: Work up considered but not performed and documented in chart, if applicable  Did you interpret images independently?: Independent interpretation of ECG and images noted in documentation, when applicable.  Consultation discussion with other provider:Did you involve another provider (consultant, , pharmacy, etc.)?: No  Discharge. I recommended the patient continue their current prescription strength  medication(s): Keflex. See documentation for any additional details.        CRITICAL CARE:  Not applicable      I, Michelle Steele, am serving as a scribe to document services personally performed by Mercy Dwyer PA-C, based on my observation and the provider's statements to me. I, Mercy Dwyer PA-C attest that Michelle Steele, is acting in a scribe capacity, has observed my performance of the services and has documented them in accordance with my direction.         Mercy Dwyer PA-C  Emergency Medicine  [facility] EMERGENCY DEPARTMENT  [address] [place] [id number]  [phone]  Dept: [phone]     Mercy Dwyer PA-C  09/04/24 0444

## 2024-08-20 NOTE — ED TRIAGE NOTES
Pt to ED today with complaint of right hand pointer finger pain. Finger swollen at the nail cubical. No drainage. Was seen at walk in clinic on Sunday for this pain. Prescribed antibiotics which she states she has been taking. Has Rx of oxycodone for back pain, she states she has been taking the oxy for the finger pain and is now feeling nauseous. Comes to ED today due to continuation of symptoms      Triage Assessment (Adult)       Row Name 08/20/24 1157          Triage Assessment    Airway WDL WDL        Respiratory WDL    Respiratory WDL WDL        Skin Circulation/Temperature WDL    Skin Circulation/Temperature WDL WDL        Cardiac WDL    Cardiac WDL WDL        Peripheral/Neurovascular WDL    Peripheral Neurovascular WDL WDL        Cognitive/Neuro/Behavioral WDL    Cognitive/Neuro/Behavioral WDL WDL        Mac Coma Scale    Best Eye Response 4-->(E4) spontaneous     Best Motor Response 6-->(M6) obeys commands     Best Verbal Response 5-->(V5) oriented     [place] Coma Scale Score 15

## 2024-08-20 NOTE — Clinical Note
Liana Briceño was seen and treated in our emergency department on 8/20/2024.  She may return to work on 08/21/2024.       If you have any questions or concerns, please don't hesitate to call.      Mercy Dwyer PA-C

## 2024-08-21 ENCOUNTER — PATIENT OUTREACH (OUTPATIENT)
Dept: INTERNAL MEDICINE | Facility: CLINIC | Age: 69
End: 2024-08-21
Payer: COMMERCIAL

## 2024-08-21 ENCOUNTER — TELEPHONE (OUTPATIENT)
Dept: INTERNAL MEDICINE | Facility: CLINIC | Age: 69
End: 2024-08-21
Payer: COMMERCIAL

## 2024-08-21 NOTE — TELEPHONE ENCOUNTER
Patient notified of Dr. Corbin's response. She verbalized understanding. Patient will notify the clinic if nausea does not subside or she develops worsening symptoms, such as abdominal pain.     She will notify the clinic if her nail infection worsens. She is agreeable to soak her finger in Epsom salt water.    Cephalexin removed from medication list. Patient has no further questions at this time.

## 2024-08-21 NOTE — TELEPHONE ENCOUNTER
"S: Nausea     B: Patient in the Emergency Department yesterday, 8/20/24 for paronychia of finger. Per ED note,\"She took her Vicodin this afternoon and then ate [place] and developed some nausea afterwards, which resolved completely after napping.\"    8/18/24 Patient prescribed Keflex 500 mg twice daily for 7 days for her finger.     A: Patient calls to report nausea. She questions if it is from the antibiotics. Patient states yesterday was her second day of antibiotics, but she only took Keflex once around 1430 (not twice daily as prescribed). Patient has not taken Keflex today due to not feeling well. She has been trying to eat bland foods. She obtained a Sprite to try to drink as well.     Denies finger. Reports her finger is not bothering her today.     R: Informed patient we would notify Dr. Corbin of symptoms and return her call with any recommendations.     Recommended patient continue to try to stay hydrated with clear liquids and eat bland foods if she can.   "

## 2024-08-21 NOTE — TELEPHONE ENCOUNTER
"  Transitions of Care Outreach  Chief Complaint   Patient presents with    [facility] F/U     TCM outreach for ER visit on 8/20/2024       Most Recent Admission Date: 8/20/2024   Most Recent Admission Diagnosis:      Most Recent Discharge Date: 8/20/2024   Most Recent Discharge Diagnosis: Paronychia of finger of right hand - L03.011     Transitions of Care Assessment    Discharge Assessment  How are you doing now that you are home?: \"not very good\"  How are your symptoms? (Red Flag symptoms escalate to triage hotline per guidelines): Unchanged  Do you know how to contact your clinic care team if you have future questions or changes to your health status? : Yes  Does the patient have their discharge instructions? : Yes  Does the patient have questions regarding their discharge instructions? : No  Were you started on any new medications or were there changes to any of your previous medications? : No  Does the patient have all of their medications?: Yes  Do you have questions regarding any of your medications? : No  Do you have all of your needed medical supplies or equipment (DME)?  (i.e. oxygen tank, CPAP, cane, etc.): Yes    Follow up Plan     Discharge Follow-Up  Discharge follow up appointment scheduled in alignment with recommended follow up timeframe or Transitions of Risk Category? (Low = within 30 days; Moderate= within 14 days; High= within 7 days): Yes  Discharge Follow Up Appointment Date: 09/03/24  Discharge Follow Up Appointment Scheduled with?: Primary Care Provider    Future Appointments   Date Time Provider Department Center   9/3/2024  8:40 AM [facility]       Outpatient Plan as outlined on AVS reviewed with patient.    For any urgent concerns, please contact our 24 hour nurse triage line: [phone] ([phone])       Bill Fine RN     "

## 2024-08-21 NOTE — TELEPHONE ENCOUNTER
She can try staying off Keflex then she will not need anything for nausea.  She can soak her fingers in Epsom salt water.  If the nail infection comes back we can restart and give her Zofran or give her some other alternative antibiotic

## 2024-08-21 NOTE — DISCHARGE INSTRUCTIONS
You were seen in the emergency department today for your symptoms.  Please continue taking the antibiotic that you were prescribed previously.  Please see the following for instructions on how to best care for yourself at home.  Follow-up in clinic with your primary care clinician in the next 48-72 hours for close recheck.    Return to the emergency department if you develop any new, worsening, or concerning symptoms including but not limited to the following:   Call your doctor now or seek immediate medical care if:    You have signs of new or worsening infection, such as:  Increased pain, swelling, warmth, or redness.  Red streaks leading from the infected skin.  Pus draining from the area.  A fever.   Watch closely for changes in your health, and be sure to contact your doctor if:    You do not get better as expected.

## 2024-08-23 NOTE — TELEPHONE ENCOUNTER
RN spoke with patient's  Panchito (on CTC). Panchito states patient is gone for the day with some friends. RN notified Panchito that Dr. Corbin would like to see a photo of what her nail currently looks like. Panchito will notify patient.

## 2024-08-23 NOTE — TELEPHONE ENCOUNTER
Attempted to call pt, no answer (1/3). Left message requesting pt to call back clinic.    If patient calls back please see if she is able to send a picture or submit the E-visit as requested below from Dr Corbin..

## 2024-08-26 NOTE — TELEPHONE ENCOUNTER
Spoke with patient and she reports that the skin was torn off of her finger cause she picking at it. Reports that there is quite a bit of redness. RN requested that patient sends a [facility] message with a photo attached. Patient agreeable to this.

## 2024-08-27 NOTE — TELEPHONE ENCOUNTER
It does not look like an infection to me it looks like she just tore off the skin do not think more antibiotics will help and will cause more side effects.  Recommend continuing Epsom salts and letting me know if the actual nail itself is tender or oozing discharge.  She can put a little smear of Neosporin over-the-counter over the edges of the nails.

## 2024-08-28 NOTE — TELEPHONE ENCOUNTER
Spoke with patient and relayed information below from Dr Corbin. Patient verbalized understanding and states she would like to have the Keflex added to her allergy list due to her reaction.

## 2024-09-03 ENCOUNTER — OFFICE VISIT (OUTPATIENT)
Dept: INTERNAL MEDICINE | Facility: CLINIC | Age: 69
End: 2024-09-03
Payer: COMMERCIAL

## 2024-09-03 ENCOUNTER — HOSPITAL ENCOUNTER (OUTPATIENT)
Dept: CT IMAGING | Facility: HOSPITAL | Age: 69
Discharge: HOME | End: 2024-09-03
Attending: INTERNAL MEDICINE | Admitting: INTERNAL MEDICINE
Payer: COMMERCIAL

## 2024-09-03 ENCOUNTER — ANCILLARY PROCEDURE (OUTPATIENT)
Dept: GENERAL RADIOLOGY | Facility: CLINIC | Age: 69
End: 2024-09-03
Attending: INTERNAL MEDICINE
Payer: COMMERCIAL

## 2024-09-03 VITALS
BODY MASS INDEX: 41.65 KG/M2 | OXYGEN SATURATION: 100 % | TEMPERATURE: 97.4 F | HEART RATE: 75 BPM | WEIGHT: 250 LBS | HEIGHT: 65 IN | DIASTOLIC BLOOD PRESSURE: 67 MMHG | RESPIRATION RATE: 14 BRPM | SYSTOLIC BLOOD PRESSURE: 110 MMHG

## 2024-09-03 DIAGNOSIS — G89.29 OTHER CHRONIC PAIN: ICD-10-CM

## 2024-09-03 DIAGNOSIS — E27.9 ADRENAL NODULE (H): ICD-10-CM

## 2024-09-03 DIAGNOSIS — I25.10 CORONARY ARTERY DISEASE DUE TO LIPID RICH PLAQUE: ICD-10-CM

## 2024-09-03 DIAGNOSIS — Z29.11 NEED FOR VACCINATION AGAINST RESPIRATORY SYNCYTIAL VIRUS: ICD-10-CM

## 2024-09-03 DIAGNOSIS — I10 ESSENTIAL HYPERTENSION: Chronic | ICD-10-CM

## 2024-09-03 DIAGNOSIS — F11.20 CONTINUOUS OPIOID DEPENDENCE (H): ICD-10-CM

## 2024-09-03 DIAGNOSIS — L03.011 PARONYCHIA, FINGER, RIGHT: ICD-10-CM

## 2024-09-03 DIAGNOSIS — I25.83 CORONARY ARTERY DISEASE DUE TO LIPID RICH PLAQUE: ICD-10-CM

## 2024-09-03 DIAGNOSIS — M19.049 LOCALIZED OSTEOARTHRITIS OF HAND, UNSPECIFIED LATERALITY: ICD-10-CM

## 2024-09-03 DIAGNOSIS — R10.9 RIGHT FLANK PAIN: ICD-10-CM

## 2024-09-03 DIAGNOSIS — E11.9 TYPE 2 DIABETES MELLITUS WITHOUT COMPLICATION, WITHOUT LONG-TERM CURRENT USE OF INSULIN (H): Primary | ICD-10-CM

## 2024-09-03 LAB
ALBUMIN SERPL BCG-MCNC: 4.4 G/DL (ref 3.5–5.2)
ALBUMIN UR-MCNC: NEGATIVE MG/DL
ALP SERPL-CCNC: 101 U/L (ref 40–150)
ALT SERPL W P-5'-P-CCNC: 24 U/L (ref 0–50)
ANION GAP SERPL CALCULATED.3IONS-SCNC: 11 MMOL/L (ref 7–15)
APPEARANCE UR: CLEAR
AST SERPL W P-5'-P-CCNC: 27 U/L (ref 0–45)
BASOPHILS # BLD AUTO: 0 10E3/UL (ref 0–0.2)
BASOPHILS NFR BLD AUTO: 0 %
BILIRUB SERPL-MCNC: 0.2 MG/DL
BILIRUB UR QL STRIP: NEGATIVE
BUN SERPL-MCNC: 21.8 MG/DL (ref 8–23)
CALCIUM SERPL-MCNC: 9.7 MG/DL (ref 8.8–10.4)
CHLORIDE SERPL-SCNC: 104 MMOL/L (ref 98–107)
CHOLEST SERPL-MCNC: 120 MG/DL
COLOR UR AUTO: YELLOW
CREAT SERPL-MCNC: 1.06 MG/DL (ref 0.51–0.95)
EGFRCR SERPLBLD CKD-EPI 2021: 57 ML/MIN/1.73M2
EOSINOPHIL # BLD AUTO: 0.2 10E3/UL (ref 0–0.7)
EOSINOPHIL NFR BLD AUTO: 3 %
ERYTHROCYTE [DISTWIDTH] IN BLOOD BY AUTOMATED COUNT: 14.3 % (ref 10–15)
FASTING STATUS PATIENT QL REPORTED: ABNORMAL
FASTING STATUS PATIENT QL REPORTED: ABNORMAL
GLUCOSE SERPL-MCNC: 171 MG/DL (ref 70–99)
GLUCOSE UR STRIP-MCNC: >=1000 MG/DL
HBA1C MFR BLD: 6.6 % (ref 0–5.6)
HCO3 SERPL-SCNC: 25 MMOL/L (ref 22–29)
HCT VFR BLD AUTO: 35 % (ref 35–47)
HDLC SERPL-MCNC: 31 MG/DL
HGB BLD-MCNC: 11.2 G/DL (ref 11.7–15.7)
HGB UR QL STRIP: NEGATIVE
IMM GRANULOCYTES # BLD: 0 10E3/UL
IMM GRANULOCYTES NFR BLD: 0 %
KETONES UR STRIP-MCNC: NEGATIVE MG/DL
LDLC SERPL CALC-MCNC: 41 MG/DL
LEUKOCYTE ESTERASE UR QL STRIP: NEGATIVE
LYMPHOCYTES # BLD AUTO: 1.9 10E3/UL (ref 0.8–5.3)
LYMPHOCYTES NFR BLD AUTO: 32 %
MCH RBC QN AUTO: 29.8 PG (ref 26.5–33)
MCHC RBC AUTO-ENTMCNC: 32 G/DL (ref 31.5–36.5)
MCV RBC AUTO: 93 FL (ref 78–100)
MONOCYTES # BLD AUTO: 0.6 10E3/UL (ref 0–1.3)
MONOCYTES NFR BLD AUTO: 9 %
NEUTROPHILS # BLD AUTO: 3.3 10E3/UL (ref 1.6–8.3)
NEUTROPHILS NFR BLD AUTO: 56 %
NITRATE UR QL: NEGATIVE
NONHDLC SERPL-MCNC: 89 MG/DL
PH UR STRIP: 5.5 [PH] (ref 5–8)
PLATELET # BLD AUTO: 252 10E3/UL (ref 150–450)
POTASSIUM SERPL-SCNC: 4.6 MMOL/L (ref 3.4–5.3)
PROT SERPL-MCNC: 7.7 G/DL (ref 6.4–8.3)
RBC # BLD AUTO: 3.76 10E6/UL (ref 3.8–5.2)
SODIUM SERPL-SCNC: 140 MMOL/L (ref 135–145)
SP GR UR STRIP: 1.01 (ref 1–1.03)
TRIGL SERPL-MCNC: 238 MG/DL
UROBILINOGEN UR STRIP-ACNC: 2 E.U./DL
WBC # BLD AUTO: 5.9 10E3/UL (ref 4–11)

## 2024-09-03 PROCEDURE — 74176 CT ABD & PELVIS W/O CONTRAST: CPT

## 2024-09-03 PROCEDURE — 81003 URINALYSIS AUTO W/O SCOPE: CPT | Performed by: INTERNAL MEDICINE

## 2024-09-03 PROCEDURE — 99214 OFFICE O/P EST MOD 30 MIN: CPT | Performed by: INTERNAL MEDICINE

## 2024-09-03 PROCEDURE — 80053 COMPREHEN METABOLIC PANEL: CPT | Performed by: INTERNAL MEDICINE

## 2024-09-03 PROCEDURE — 80061 LIPID PANEL: CPT | Performed by: INTERNAL MEDICINE

## 2024-09-03 PROCEDURE — 73130 X-RAY EXAM OF HAND: CPT | Mod: TC | Performed by: RADIOLOGY

## 2024-09-03 PROCEDURE — 36415 COLL VENOUS BLD VENIPUNCTURE: CPT | Performed by: INTERNAL MEDICINE

## 2024-09-03 PROCEDURE — 87635 SARS-COV-2 COVID-19 AMP PRB: CPT | Performed by: INTERNAL MEDICINE

## 2024-09-03 PROCEDURE — 85025 COMPLETE CBC W/AUTO DIFF WBC: CPT | Performed by: INTERNAL MEDICINE

## 2024-09-03 PROCEDURE — 83036 HEMOGLOBIN GLYCOSYLATED A1C: CPT | Performed by: INTERNAL MEDICINE

## 2024-09-03 PROCEDURE — G2211 COMPLEX E/M VISIT ADD ON: HCPCS | Performed by: INTERNAL MEDICINE

## 2024-09-03 ASSESSMENT — ANXIETY QUESTIONNAIRES
GAD7 TOTAL SCORE: 0
8. IF YOU CHECKED OFF ANY PROBLEMS, HOW DIFFICULT HAVE THESE MADE IT FOR YOU TO DO YOUR WORK, TAKE CARE OF THINGS AT HOME, OR GET ALONG WITH OTHER PEOPLE?: SOMEWHAT DIFFICULT
GAD7 TOTAL SCORE: 0
GAD7 TOTAL SCORE: 0
7. FEELING AFRAID AS IF SOMETHING AWFUL MIGHT HAPPEN: NOT AT ALL

## 2024-09-03 ASSESSMENT — PATIENT HEALTH QUESTIONNAIRE - PHQ9
10. IF YOU CHECKED OFF ANY PROBLEMS, HOW DIFFICULT HAVE THESE PROBLEMS MADE IT FOR YOU TO DO YOUR WORK, TAKE CARE OF THINGS AT HOME, OR GET ALONG WITH OTHER PEOPLE: NOT DIFFICULT AT ALL
SUM OF ALL RESPONSES TO PHQ QUESTIONS 1-9: 4
SUM OF ALL RESPONSES TO PHQ QUESTIONS 1-9: 4

## 2024-09-03 ASSESSMENT — PAIN SCALES - GENERAL: PAINLEVEL: EXTREME PAIN (8)

## 2024-09-03 NOTE — PROGRESS NOTES
Assessment & Plan     Need for vaccination against respiratory syncytial virus    - RSV vaccine, bivalent, ABRYSVO, injection; Inject 0.5 mLs into the muscle once for 1 dose. Pharmacist administered    Type 2 diabetes mellitus without complication, without long-term current use of insulin (H)  Lab Results   Component Value Date    A1C 6.6 09/03/2024    A1C 6.9 03/11/2024    A1C 7.2 12/11/2023    A1C 7.8 09/11/2023    A1C 7.1 04/03/2023     Improving control has a CGM is on aspirin and ACE inhibitor  Is on maximum dose Ozempic and on low-dose Jardiance which we will continue for now has declined statins due to allergies and trial of multiple  - HEMOGLOBIN A1C; Future  - HEMOGLOBIN A1C    Coronary artery disease due to lipid rich plaque    - Lipid panel reflex to direct LDL Non-fasting; Future  - Lipid panel reflex to direct LDL Non-fasting    Paronychia, finger, right  Is resolved still has some skin healing to done be done.  Continue Epsom salt    Continuous opioid dependence (H)  Uses hydrocodone intermittently for her back pain.  But is not helping her current pain    Localized osteoarthritis of hand, unspecified laterality  Her locking of her fingers that is typical of degenerative arthritis she has no synovitis on exam.  Another physician has done rheumatological workup which is negative.  Recommend OT baseline x-ray and possible hand Ortho referral  - XR Hand Left G/E 3 Views; Future  - Orthopedic  Referral; Future  - Occupational Therapy  Referral; Future    Adrenal nodule (H24)  Pain on CT incidental no follow-up needed    Essential hypertension  Well-controlled    Other chronic pain      Right flank pain  Pain is very typical of referred pain from the lower back.  She does have tenderness she has no urinary symptoms or digestive symptoms except nausea she is moving her bowels and flatulence.  She has not been throwing up.  Pain is not colicky but constant.  Will get a CT scan to rule out  "any colitis versus appendix problems and kidney stones.  Last MRI lower back was done 4 years ago.  I do think she may have to consider going to the spine clinic.  If CT does not show any other cause for pain.  She would like COVID testing because a lot of the places she worked in has COVID  - Symptomatic COVID-19 Virus (Coronavirus) by PCR Nose  - UA Macroscopic with reflex to Microscopic and Culture - Lab Collect; Future  - CBC with platelets and differential; Future  - CT Abdomen Pelvis w/o Contrast; Future  - Comprehensive metabolic panel; Future  - UA Macroscopic with reflex to Microscopic and Culture - Lab Collect  - CBC with platelets and differential  - Comprehensive metabolic panel        MED REC REQUIRED  Post Medication Reconciliation Status: discharge medications reconciled and changed, per note/orders  BMI  Estimated body mass index is 41.91 kg/m  as calculated from the following:    Height as of this encounter: 1.645 m (5' 4.76\").    Weight as of this encounter: 113.4 kg (250 lb).             Edmundo Gaines is a 69 year old, presenting for the following health issues:    Lack of eergy , and nausea , vicodin makes her stomach upset . 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 Fingers on left hand are sore and \"locking.\"  She would like a Covid test. She was positive July 7th. Back pain right side and groin pain.  She has been very dizzy. She would like to get an electric wheelchair to be covered under Medicare. She is requesting more hydrocodone.)      9/3/2024     8:35 AM   Additional Questions   Roomed by Cyndee ISIDRO     HPI                   Objective    /67 (BP Location: Left arm, Patient Position: Sitting, Cuff Size: Adult Regular)   Pulse 75   Temp 97.4  F (36.3  C) (Tympanic)   Resp 14   Ht 1.645 m (5' 4.76\")   Wt 113.4 kg (250 lb)   LMP  (LMP Unknown)   SpO2 100%   BMI 41.91 kg/m  "   Body mass index is 41.91 kg/m .  Physical Exam   GENERAL: alert and no distress  NECK: no adenopathy, no asymmetry, masses, or scars  RESP: lungs clear to auscultation - no rales, rhonchi or wheezes  CV: regular rate and rhythm, normal S1 S2, no S3 or S4, no murmur, click or rub, no peripheral edema  ABDOMEN: tenderness RLQ, no organomegaly or masses, bowel sounds normal, and hernia none   MS: no gross musculoskeletal defects noted, no edema            Signed Electronically by: Bela Corbin MD

## 2024-09-04 ENCOUNTER — TELEPHONE (OUTPATIENT)
Dept: INTERNAL MEDICINE | Facility: CLINIC | Age: 69
End: 2024-09-04
Payer: COMMERCIAL

## 2024-09-04 LAB — SARS-COV-2 RNA RESP QL NAA+PROBE: NEGATIVE

## 2024-09-04 NOTE — TELEPHONE ENCOUNTER
Test Results    Contacts       Contact Date/Time Type Contact Phone/Fax    09/04/2024 11:44 AM CDT Phone (Incoming) Liana Briceño (Self) [phone] (M)            Who ordered the test:  [facility]    Type of test: Lab and MRI, Covid    Date of test:  09/03/2024   Where was the test performed:  [facility]    What are your questions/concerns?:  Saw the results on  but didn't understand them    Could we send this information to you in [facility] or would you prefer to receive a phone call?:   Patient would prefer a phone call   Okay to leave a detailed message?: Yes at Cell number on file:    Telephone Information:   Mobile [phone]

## 2024-09-05 NOTE — TELEPHONE ENCOUNTER
Contacted patient who stated she no longer has any questions. Advised to return call if questions arise.

## 2024-09-06 NOTE — PROGRESS NOTES
ASSESSMENT & PLAN    Liana was seen today for pain.    Diagnoses and all orders for this visit:    Trigger ring finger of left hand    Localized osteoarthritis of hand, unspecified laterality  -     Orthopedic  Referral    Patient presents with a trigger finger of her left hand. We discussed different options for treatment. Will try some home exercises and wearing brace. Previously scheduled Hand therapy appointment on Oct 8 so will have them do rehab. If no improvement in a few weeks then can consider injection.         Eliazar Lopez MD  [facility] [place]    -----------------------------------------------------------------------------------------      SUBJECTIVE  Liana Briceño is a/an 69 year old     Reason for Visit:  Injured/painful/body part: left ring finger  Date of injury/Onset: 2-3 weeks ago  Cause: insidious  What are your symptoms: rubbing the hand.   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       REVIEW OF SYSTEMS:  Positive ROS was noted in the HPI, otherwise negative.       OBJECTIVE:  LMP  (LMP Unknown)    Gen: no acute distress  left Hand Exam  No deformities, erythema, warmth, edema, ecchymosis.  No atrophy or hypertrophy appreciated.  Palpation:    Scaphoid: negative   Lunate:negative   Hook of Hamate:negative   MCPs:negative   DIPs:negative   PIPs:negative   Flexor tendon: tender nodule on ring finger  ROM is symmetric and achieve full flexion and extension. Slight catching on flexion of the 4th digit.   Strength:  strength normal. radial, median and ulnar nerves grossly intact.  Neuro: sensation intact  Special tests   Finkelstein: negative   Carpal Tunnels provocation: negative   TFCC  grind: negative   CMC grind: negative   UCL laxity: negative           RADIOLOGY:  Previous imaging reviewed and listed are the impressions: XR 9/3/24 IMPRESSION: Normal alignment. Mild degenerative arthritis of the thumb IP and MCP joints.. No erosions. No fracture.

## 2024-09-16 ENCOUNTER — OFFICE VISIT (OUTPATIENT)
Dept: ORTHOPEDICS | Facility: CLINIC | Age: 69
End: 2024-09-16
Attending: INTERNAL MEDICINE
Payer: COMMERCIAL

## 2024-09-16 DIAGNOSIS — M19.049 LOCALIZED OSTEOARTHRITIS OF HAND, UNSPECIFIED LATERALITY: ICD-10-CM

## 2024-09-16 DIAGNOSIS — M65.342 TRIGGER RING FINGER OF LEFT HAND: Primary | ICD-10-CM

## 2024-09-16 PROCEDURE — 99203 OFFICE O/P NEW LOW 30 MIN: CPT | Performed by: STUDENT IN AN ORGANIZED HEALTH CARE EDUCATION/TRAINING PROGRAM

## 2024-09-16 NOTE — PATIENT INSTRUCTIONS
Thanks for coming in today! During the visit we talked about:     1) Place an ice pack (you can use a bag of frozen vegetables or other commercial products) over the injured area.  To avoid tissue injury from the cold, place a cloth or towel between the ice and the skin.  It is recommended that ice can be applied 3-5 times a day for up to 20 minutes at a time during the first 24 to 72 hours.  Can repeat every 2 hours as needed.    2) Do home exercises    3) Wear the brace as much as possible     4) Attend Hand Therapy scheduled for Oct 8.     Let me know if you have any questions or concerns. You can send a leemail message or call the clinic.     Eliazar Lopez MD, MPH

## 2024-09-16 NOTE — LETTER
9/16/2024      Liana Briceño  [address] [place] [address]      Dear Colleague,    Thank you for referring your patient, Liana Briceño, to the [facility]. Please see a copy of my visit note below.    ASSESSMENT & PLAN    Liana was seen today for pain.    Diagnoses and all orders for this visit:    Trigger ring finger of left hand    Localized osteoarthritis of hand, unspecified laterality  -     Orthopedic  Referral    Patient presents with a trigger finger of her left hand. We discussed different options for treatment. Will try some home exercises and wearing brace. Previously scheduled Hand therapy appointment on Oct 8 so will have them do rehab. If no improvement in a few weeks then can consider injection.         Eliazar Lopez MD  [facility]    -----------------------------------------------------------------------------------------      SUBJECTIVE  Liana Briceño is a/an 69 year old     Reason for Visit:  Injured/painful/body part: left ring finger  Date of injury/Onset: 2-3 weeks ago  Cause: insidious  What are your symptoms: rubbing the hand.   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       REVIEW OF SYSTEMS:  Positive ROS was noted in the HPI, otherwise negative.       OBJECTIVE:  LMP  (LMP Unknown)    Gen: no acute distress  left Hand Exam  No deformities, erythema, warmth, edema, ecchymosis.  No atrophy or hypertrophy appreciated.  Palpation:    Scaphoid: negative   Lunate:negative   Hook of Hamate:negative   MCPs:negative   DIPs:negative   PIPs:negative   Flexor tendon: tender nodule on ring finger  ROM is symmetric and achieve full flexion and  extension. Slight catching on flexion of the 4th digit.   Strength:  strength normal. radial, median and ulnar nerves grossly intact.  Neuro: sensation intact  Special tests   Finkelstein: negative   Carpal Tunnels provocation: negative   TFCC grind: negative   CMC grind: negative   UCL laxity: negative           RADIOLOGY:  Previous imaging reviewed and listed are the impressions: XR 9/3/24 IMPRESSION: Normal alignment. Mild degenerative arthritis of the thumb IP and MCP joints.. No erosions. No fracture.      Again, thank you for allowing me to participate in the care of your patient.        Sincerely,        Eliazar Lopez MD

## 2024-09-17 DIAGNOSIS — M54.50 CHRONIC LOW BACK PAIN, UNSPECIFIED BACK PAIN LATERALITY, UNSPECIFIED WHETHER SCIATICA PRESENT: ICD-10-CM

## 2024-09-17 DIAGNOSIS — G89.29 CHRONIC LOW BACK PAIN, UNSPECIFIED BACK PAIN LATERALITY, UNSPECIFIED WHETHER SCIATICA PRESENT: ICD-10-CM

## 2024-09-17 RX ORDER — HYDROCODONE BITARTRATE AND ACETAMINOPHEN 5; 325 MG/1; MG/1
1 TABLET ORAL EVERY 6 HOURS PRN
Qty: 18 TABLET | Refills: 0 | Status: SHIPPED | OUTPATIENT
Start: 2024-09-17

## 2024-10-01 ENCOUNTER — TELEPHONE (OUTPATIENT)
Dept: INTERNAL MEDICINE | Facility: CLINIC | Age: 69
End: 2024-10-01
Payer: COMMERCIAL

## 2024-10-01 NOTE — TELEPHONE ENCOUNTER
October 1, 2024    [employer] and  was received via fax for Dr. Corbin.  Patient label was attached to paperwork and placed in provider's inbox to be signed.    Raquel Blackwell

## 2024-10-04 NOTE — TELEPHONE ENCOUNTER
October 4, 2024    [employer] and   was picked up from outbox of Dr. Corbin and sent via fax to [phone].    Jose Raul Mena

## 2024-10-15 ENCOUNTER — THERAPY VISIT (OUTPATIENT)
Dept: OCCUPATIONAL THERAPY | Facility: CLINIC | Age: 69
End: 2024-10-15
Attending: INTERNAL MEDICINE
Payer: COMMERCIAL

## 2024-10-15 DIAGNOSIS — M19.049 LOCALIZED OSTEOARTHRITIS OF HAND, UNSPECIFIED LATERALITY: Primary | ICD-10-CM

## 2024-10-15 DIAGNOSIS — M65.342 TRIGGER RING FINGER OF LEFT HAND: ICD-10-CM

## 2024-10-15 DIAGNOSIS — M79.641 BILATERAL HAND PAIN: ICD-10-CM

## 2024-10-15 DIAGNOSIS — M79.642 BILATERAL HAND PAIN: ICD-10-CM

## 2024-10-15 PROCEDURE — 97760 ORTHOTIC MGMT&TRAING 1ST ENC: CPT | Mod: GO | Performed by: OCCUPATIONAL THERAPIST

## 2024-10-15 PROCEDURE — 97165 OT EVAL LOW COMPLEX 30 MIN: CPT | Mod: GO | Performed by: OCCUPATIONAL THERAPIST

## 2024-10-15 PROCEDURE — 97530 THERAPEUTIC ACTIVITIES: CPT | Mod: GO | Performed by: OCCUPATIONAL THERAPIST

## 2024-10-15 NOTE — PROGRESS NOTES
OCCUPATIONAL THERAPY EVALUATION  Type of Visit: Evaluation        Fall Risk Screen:  Fall screen completed by: OT  Have you fallen 2 or more times in the past year?: Yes  Have you fallen and had an injury in the past year?: No  Is patient a fall risk?: Department fall risk interventions implemented    Subjective      Presenting condition or subjective complaint: Trigger finger  Date of onset: 09/03/24 (Referral)    Relevant medical history: -- (See EMR.)   Dates & types of surgery: See EMR.    Patient comes to therapy today for evaluation and treatment of bilateral hand pain. She endorses triggering of her left ring finger, beginning with insidious onset approximately 8 weeks ago. She also rather recently experienced a paronychia to her right hand. She endorses painful clicking and snapping to her left ring finger primarily, occasionally stuck at night, she also feels similar symptoms on her right ring finger. She was provided with a brace by our sports medicine clinic which she subsequently lost. She is a type II diabetic. Denies pain to the thumbs despite documented arthritic changes.     Per Dr. Lopez 9/16/2024: Patient presents with a trigger finger of her left hand. We discussed different options for treatment. Will try some home exercises and wearing brace. Previously scheduled Hand therapy appointment on Oct 8 so will have them do rehab. If no improvement in a few weeks then can consider injection.     Imaging Impression 9/3/2024: Normal alignment. Mild degenerative arthritis of the thumb IP and MCP joints. No erosions. No fracture.     Prior diagnostic imaging/testing results: X-ray     Prior therapy history for the same diagnosis, illness or injury: No      Prior Level of Function  Transfers: Independent  Ambulation: Independent  ADL: Independent  IADL: Driving, Finances, Housekeeping, Laundry, Meal preparation, Medication management, Yard work    Living Environment  Patient reports that she resides in a  private residence with her son and significant other.  She admits that she is independent with necessary ADL and IADL, however she receives some assistance with heavier activities from her family.  Does not generally ambulate using an assistive device.    Employment: Yes    Hobbies/Interests:      Patient goals for therapy: Bend the finger without snapping       Objective   ADDITIONAL HISTORY:  Right hand dominant  Patient reports symptoms of pain, stiffness/loss of motion, weakness/loss of strength, and edema  Transportation: drives  Currently works in a group home, part-time.     Functional Outcome Measure:   Upper Extremity Functional Index Score:      (A lower score indicates greater disability.)    PAIN:  Pain Level at Rest: 2/10  Pain Level with Use: 10/10  Pain Location: Left ring finger P1 and A1 pulley   Pain Quality: Aching and Sharp  Pain Frequency: intermittent  Pain is Worst: daytime or nighttime  Pain is Exacerbated By: Forceful, repetitive gripping, picking things up.   Pain is Relieved By: rest  Pain Progression: Unchanged    POSTURE: Normal     EDEMA:  Mild to palpation overlying the left ring finger A1 pulley      SENSATION: Patient with diabetic peripheral neuropathy in the feet per patient report, WNL in the hands.      ROM:  AROM of the digits bilaterally is WNL, painful with active composite fist and passive composite flexion to the left ring finger.    SPECIAL TESTS:   Stage of Stenosing Tenosynovitis (SST) Left Right   Finger  Stage II at the moment, per patient report will achieve stage IV while asleep at times.  Stage I   Stage 1:  Normal  Stage 2:  Uneven motion of tendon  Stage 3:  Triggering, clicking, catching  Stage 4:  Locking in extension or flexion; unlocked by active motion  Stage 5:  Locking in extension or flexion; unlocked by passive motion  Stage 6:  Finger locked in extension or flexion    STRENGTH:  NT secondary to time constraints today.     PALPATION:  Positive TTP to the L  RF A1 pulley, negative right.     Assessment & Plan   CLINICAL IMPRESSIONS  Medical Diagnosis: Localized osteoarthritis of hand, unspecified laterality, trigger finger (ring) of left hand    Treatment Diagnosis: Localized osteoarthritis of hand, unspecified laterality, trigger finger (ring) of left hand, bilateral hand pain    Impression/Assessment: Pt is a 69 year old female presenting to Occupational Therapy due to left ring finger trigger, localized osteoarthritis of hand, unspecified laterality, bilateral hand pain.  The following significant findings have been identified: Impaired activity tolerance, Impaired coordination, Impaired ROM, Impaired sensation, Impaired strength, and Pain.  These identified deficits interfere with their ability to perform self care tasks, work tasks, recreational activities, household chores, driving , medication management, financial management,  yard work, care of others, and meal planning and preparation as compared to previous level of function.   Patient's limitations or Problem List includes: Pain, Decreased ROM/motion, Increased edema, Weakness, Sensory disturbance, Hypomobility, Decreased , Decreased pinch, Decreased coordination, Decreased dexterity, and Tightness in musculature of the bilateral hand, thumb, long finger, and ring finger which interferes with the patient's ability to perform Self Care Tasks (dressing, hygiene/toileting), Work Tasks, Sleep Patterns, Recreational Activities, Household Chores, and Driving  as compared to previous level of function.    Clinical Decision Making (Complexity):  Assessment of Occupational Performance: 3-5 Performance Deficits  Occupational Performance Limitations: toileting, dressing, hygiene and grooming, communication management, driving and community mobility, health management and maintenance, home establishment and management, meal preparation and cleanup, shopping, sleep, work, and social participation  Clinical Decision  Making (Complexity): Low complexity    PLAN OF CARE  Treatment Interventions:  Modalities:  US  Therapeutic Exercise:  AROM, AAROM, PROM, Tendon Gliding, Blocking, Reverse Blocking, Place and Hold, Contract Relax, Extensor Tracking, Isotonics, Isometrics, and Stabilization  Neuromuscular re-education:  Nerve Gliding, Coordination/Dexterity, Kinesthetic Training, Proprioceptive Training, Posture, Kinesiotaping, Strain Counter Strain, Isometrics, and Stabilization  Manual Techniques:  Coordination/Dexterity, Joint mobilization, Friction massage, Myofascial release, and Manual edema mobilization  Orthotic Fabrication:  Static, Finger based, Hand based, and Forearm based  Self Care:  Self Care Tasks, Ergonomic Considerations, and Work Tasks    Long Term Goals   OT Goal 1  Goal Identifier: Goal I  Goal Description: Patient will exhibit stenosing flexor tenosynovitis of the left ring finger stage II or less that is without pain.  Rationale: In order to maximize safety and independence with performance of self-care activities;In order to maximize safety and independence with ADL/IADLs  Goal Progress: New  Target Date: 12/10/24      Frequency of Treatment: 1x/week  Duration of Treatment: 8 weeks     Education Assessment: Learner/Method: Patient;Pictures/Video;Demonstration;Listening;Reading     Risks and benefits of evaluation/treatment have been explained.   Patient/Family/caregiver agrees with Plan of Care.     Evaluation Time:    OT Eval, Low Complexity Minutes (38439): 10    Signing Clinician: INGE D[facility]                                                                                   OUTPATIENT OCCUPATIONAL THERAPY      PLAN OF TREATMENT FOR OUTPATIENT REHABILITATION   Patient's Last Name, First Name, Liana Farley YOB: 1955   Provider's Name   [facility]   Medical Record No.  [id number]     Onset Date:  09/03/24 (Referral) Start of Care Date: 10/15/24     Medical Diagnosis:  Localized osteoarthritis of hand, unspecified laterality, trigger finger (ring) of left hand      OT Treatment Diagnosis:  Localized osteoarthritis of hand, unspecified laterality, trigger finger (ring) of left hand, bilateral hand pain Plan of Treatment  Frequency/Duration:1x/week/8 weeks    Certification date from 10/15/24   To 12/10/24        See note for plan of treatment details and functional goals     Bautistaedyta Mcdaniel OT                         I CERTIFY THE NEED FOR THESE SERVICES FURNISHED UNDER        THIS PLAN OF TREATMENT AND WHILE UNDER MY CARE     (Physician attestation of this document indicates review and certification of the therapy plan).              Referring Provider:  Bela Corbin    Initial Assessment  See Epic Evaluation- 10/15/24

## 2024-10-28 ENCOUNTER — TELEPHONE (OUTPATIENT)
Dept: INTERNAL MEDICINE | Facility: CLINIC | Age: 69
End: 2024-10-28
Payer: COMMERCIAL

## 2024-10-28 DIAGNOSIS — E78.5 HYPERLIPEMIA: ICD-10-CM

## 2024-10-28 DIAGNOSIS — I65.29 STENOSIS OF CAROTID ARTERY, UNSPECIFIED LATERALITY: Primary | ICD-10-CM

## 2024-10-28 DIAGNOSIS — E78.2 MIXED HYPERLIPIDEMIA: ICD-10-CM

## 2024-10-28 DIAGNOSIS — I67.1 INTERNAL CAROTID ANEURYSM: ICD-10-CM

## 2024-10-28 NOTE — TELEPHONE ENCOUNTER
Order/Referral Request    Who is requesting: Patient    Orders being requested: Neurology    Reason service is needed/diagnosis: Seen in ED on 10/27 and was diagnosed with aneurysm. ED referred to [facility] neuro but they do not accept her insurance. Pt is requesting [facility] Neurology as soon as possible. Please call Pt once referral is set in [facility] as Pt also needs to communicate with her employer. Hospital follow up appointment has been scheduled with PCP for 11/5 at 8:40 AM.    Does patient have a preference on a Group/Provider/Facility? [facility]    Does patient have an appointment scheduled?: No    Where to send orders: Place orders within Epic    Could we send this information to you in [facility] or would you prefer to receive a phone call?:   Patient would prefer a phone call     Okay to leave a detailed message?: Yes at Cell number on file:    Telephone Information:   Mobile [phone]

## 2024-10-29 DIAGNOSIS — Z79.4 TYPE 2 DIABETES MELLITUS WITHOUT COMPLICATION, WITH LONG-TERM CURRENT USE OF INSULIN (H): ICD-10-CM

## 2024-10-29 DIAGNOSIS — I10 ESSENTIAL HYPERTENSION: ICD-10-CM

## 2024-10-29 DIAGNOSIS — E11.9 TYPE 2 DIABETES MELLITUS WITHOUT COMPLICATION, WITH LONG-TERM CURRENT USE OF INSULIN (H): ICD-10-CM

## 2024-10-29 RX ORDER — ASPIRIN 81 MG/1
81 TABLET, CHEWABLE ORAL DAILY
Qty: 90 TABLET | Refills: 3 | Status: SHIPPED | OUTPATIENT
Start: 2024-10-29

## 2024-10-29 RX ORDER — HYDROCHLOROTHIAZIDE 12.5 MG/1
12.5 TABLET ORAL DAILY
Qty: 90 TABLET | Refills: 3 | Status: SHIPPED | OUTPATIENT
Start: 2024-10-29

## 2024-10-29 NOTE — TELEPHONE ENCOUNTER
I did referral . Is she on aspirn ? Recommend baby apsirn . Confirm if she denies statin . She needs to follow up with me in 1-2 months

## 2024-10-29 NOTE — TELEPHONE ENCOUNTER
Patient returns call to request an update if she can get the referral as she is very worried about the aneurysm.

## 2024-10-30 DIAGNOSIS — E78.5 HYPERLIPEMIA: ICD-10-CM

## 2024-10-30 RX ORDER — ROSUVASTATIN CALCIUM 10 MG/1
10 TABLET, COATED ORAL DAILY
Qty: 100 TABLET | Refills: 1 | OUTPATIENT
Start: 2024-10-30

## 2024-10-30 RX ORDER — ROSUVASTATIN CALCIUM 10 MG/1
10 TABLET, COATED ORAL DAILY
Qty: 100 TABLET | Refills: 3 | Status: SHIPPED | OUTPATIENT
Start: 2024-10-30

## 2024-10-30 NOTE — TELEPHONE ENCOUNTER
Patient confirms she is taking a baby aspirin daily. Patient states she is currently taking rosuvastatin 10 MG daily.  Patient states she has been taking medication for over a year and has taken this regularly.  Per chart review, last Rx was sent on 2/8/24 for 90 days plus one refill.  Patient denies being out of medication or missing doses.     Writer spoke to pharmacy who stated patient last picked up medication on 8/14/2024.  No refills remaining.  Rx pended as patient will be due to refill.

## 2024-11-04 ENCOUNTER — TELEPHONE (OUTPATIENT)
Dept: INTERNAL MEDICINE | Facility: CLINIC | Age: 69
End: 2024-11-04
Payer: COMMERCIAL

## 2024-11-04 NOTE — TELEPHONE ENCOUNTER
November 4, 2024    [facility] Documentation Request RE: Insulin Pump/CGM and/or Supplies picked up the paperwork from the .    Fax number:  [phone]    Raquel Blackwell

## 2024-11-05 ENCOUNTER — OFFICE VISIT (OUTPATIENT)
Dept: INTERNAL MEDICINE | Facility: CLINIC | Age: 69
End: 2024-11-05
Payer: COMMERCIAL

## 2024-11-05 VITALS
BODY MASS INDEX: 41.9 KG/M2 | HEART RATE: 68 BPM | TEMPERATURE: 97.5 F | OXYGEN SATURATION: 97 % | DIASTOLIC BLOOD PRESSURE: 66 MMHG | HEIGHT: 65 IN | WEIGHT: 251.5 LBS | RESPIRATION RATE: 18 BRPM | SYSTOLIC BLOOD PRESSURE: 122 MMHG

## 2024-11-05 DIAGNOSIS — I65.22 STENOSIS OF LEFT CAROTID ARTERY: Chronic | ICD-10-CM

## 2024-11-05 DIAGNOSIS — I67.1 ANEURYSM OF INTRACRANIAL PORTION OF LEFT INTERNAL CAROTID ARTERY: ICD-10-CM

## 2024-11-05 DIAGNOSIS — E11.9 TYPE 2 DIABETES MELLITUS WITHOUT COMPLICATION, WITHOUT LONG-TERM CURRENT USE OF INSULIN (H): Primary | ICD-10-CM

## 2024-11-05 PROBLEM — M54.40 ACUTE MIDLINE LOW BACK PAIN WITH SCIATICA, SCIATICA LATERALITY UNSPECIFIED: Status: RESOLVED | Noted: 2023-07-21 | Resolved: 2024-11-05

## 2024-11-05 PROBLEM — M65.342 TRIGGER RING FINGER OF LEFT HAND: Status: RESOLVED | Noted: 2024-10-15 | Resolved: 2024-11-05

## 2024-11-05 PROBLEM — K63.5 POLYP OF COLON: Status: RESOLVED | Noted: 2019-11-21 | Resolved: 2024-11-05

## 2024-11-05 PROBLEM — R10.13 EPIGASTRIC PAIN: Status: RESOLVED | Noted: 2022-09-09 | Resolved: 2024-11-05

## 2024-11-05 PROBLEM — M79.642 BILATERAL HAND PAIN: Status: RESOLVED | Noted: 2024-10-15 | Resolved: 2024-11-05

## 2024-11-05 PROBLEM — Z86.0100 HISTORY OF COLONIC POLYPS: Status: RESOLVED | Noted: 2019-11-25 | Resolved: 2024-11-05

## 2024-11-05 PROBLEM — R19.7 DIARRHEA: Status: RESOLVED | Noted: 2023-06-26 | Resolved: 2024-11-05

## 2024-11-05 PROBLEM — D64.9 NORMOCYTIC ANEMIA: Status: RESOLVED | Noted: 2023-06-26 | Resolved: 2024-11-05

## 2024-11-05 PROBLEM — M19.049 LOCALIZED OSTEOARTHRITIS OF HAND: Status: RESOLVED | Noted: 2024-10-15 | Resolved: 2024-11-05

## 2024-11-05 PROBLEM — M79.641 BILATERAL HAND PAIN: Status: RESOLVED | Noted: 2024-10-15 | Resolved: 2024-11-05

## 2024-11-05 PROCEDURE — 99207 PR NO CHARGE LOS: CPT | Performed by: INTERNAL MEDICINE

## 2024-11-05 NOTE — PROGRESS NOTES
Assessment & Plan     1. Type 2 diabetes mellitus without complication, without long-term current use of insulin (H) (Primary)  Lab Results   Component Value Date    A1C 6.6 09/03/2024    A1C 6.9 03/11/2024    A1C 7.2 12/11/2023    A1C 7.8 09/11/2023    A1C 7.1 04/03/2023     Well controlled     2. Stenosis of left carotid artery    Unchanged 50% last imaging was done CTA neck in 2021  3. Aneurysm of intracranial portion of left internal carotid artery  MRA never done in the past but reported as unchanged   2.5 mm outpouching arising from the supraclinoid left ICA, which may represent an aneurysm or infundibulum, the former favored.   She reports ongoing symptoms of left sided numbness and vibration of the left breast   Symptoms do not appear related to the ICA , however patient is upset that she could be having a serious problem or stroke and it was ignored and she had her hospital follow up so late and she had to advocate for herself to get a neurology appointment   [facility]    Of not according to our documentation, symptoms were not related to triage nurse and only a neurology referral was requested and there is no documentation of an urgent request to see me   In addition , it is the ER providers responsibility to address symptoms if patient is still concerned that they are present   She is also upset that we asked about statin and aspirin use . I did tell patient that is practice to verify med reconciliation for patients on sake    At this point patient is upset with lack of urgency and communication and declined further intervention on our part .  I directed her to send a message to risk management and that I am happy to see her as her physician but she has the right to change providers and health care facilities   MED REC REQUIRED  Post Medication Reconciliation Status: discharge medications reconciled, continue medications without change  BMI  Estimated body mass index is  "42.11 kg/m  as calculated from the following:    Height as of this encounter: 1.646 m (5' 4.8\").    Weight as of this encounter: 114.1 kg (251 lb 8 oz).             Edmundo Gaines is a 69 year old, presenting for the following health issues:    Hospital F/U (Follow up after 10/27/24 ER visit at [facility])      11/5/2024     8:30 AM   Additional Questions   Roomed by Galo TERRY RN     HPI                   Objective    /66 (BP Location: Left arm, Patient Position: Sitting, Cuff Size: Adult Regular)   Pulse 68   Temp 97.5  F (36.4  C) (Tympanic)   Resp 18   Ht 1.646 m (5' 4.8\")   Wt 114.1 kg (251 lb 8 oz)   LMP  (LMP Unknown)   SpO2 97%   BMI 42.11 kg/m    Body mass index is 42.11 kg/m .  Physical Exam     Patient declined           Signed Electronically by: Bela Corbin MD    "

## 2024-11-05 NOTE — PATIENT INSTRUCTIONS
11/05/2024 1:00 PM CSTAppointment  Neurology at [facility]   [address] [place] [address]   [phone]   Susie Ba PA-C   [address] [place] [id number]   [phone] (Work)   [phone] (Fax)

## 2024-11-07 ENCOUNTER — THERAPY VISIT (OUTPATIENT)
Dept: OCCUPATIONAL THERAPY | Facility: CLINIC | Age: 69
End: 2024-11-07
Payer: COMMERCIAL

## 2024-11-07 DIAGNOSIS — M79.642 BILATERAL HAND PAIN: ICD-10-CM

## 2024-11-07 DIAGNOSIS — M79.641 BILATERAL HAND PAIN: ICD-10-CM

## 2024-11-07 DIAGNOSIS — M65.341 TRIGGER RING FINGER OF RIGHT HAND: Primary | ICD-10-CM

## 2024-11-07 PROCEDURE — 97110 THERAPEUTIC EXERCISES: CPT | Mod: GO | Performed by: OCCUPATIONAL THERAPIST

## 2024-11-07 PROCEDURE — 97763 ORTHC/PROSTC MGMT SBSQ ENC: CPT | Mod: GO | Performed by: OCCUPATIONAL THERAPIST

## 2024-11-07 NOTE — PROGRESS NOTES
"   11/07/24 0500   Appointment Info   Treating Provider TEJINDER Dang, OTR/L, CHT   Total/Authorized Visits 8 (POC)   Visits Used 2   Medical Diagnosis Localized osteoarthritis of hand, unspecified laterality, trigger finger (ring) of left hand   OT Tx Diagnosis Localized osteoarthritis of hand, unspecified laterality, trigger finger (ring) of left hand, bilateral hand pain   Quick Add  Certification   Progress Note/Certification   Start Of Care Date 10/15/24   Onset of Illness/Injury or Date of Surgery 09/03/24  (Referral)   Therapy Frequency 1x/week   Predicted Duration 8 weeks   Certification date from 10/15/24   Certification date to 12/10/24   Progress Note Due Date 12/10/24   Progress Note Completed Date 10/15/24   Goals   OT Goals 1   OT Goal 1   Goal Identifier Goal I   Goal Description Patient will exhibit stenosing flexor tenosynovitis of the left ring finger stage II or less that is without pain.   Rationale In order to maximize safety and independence with performance of self-care activities;In order to maximize safety and independence with ADL/IADLs   Goal Progress Ongoing   Target Date 12/10/24   Subjective Report   Subjective Report I have been wearing the brace and doing the exercises as you asked, I do not think things are any better.  With everything else that has been going on lately, I want to talk to the doctor about an injection.   Objective Measures   Objective Measures Objective Measure 1;Objective Measure 2;Objective Measure 3   Objective Measure 2   Objective Measure \"Still 10/10 at times,\" per patient   Details Pain with activity   Objective Measure 3   Objective Measure Palpation over the right ring finger A1 pulley still yields only stage II stenosing flexor tenosynovitis today, tender to palpation.   Details Observations and provocative tests   Treatment Interventions (OT)   Interventions Therapeutic Activity;Therapeutic Procedure/Exercise;Orthotics   Therapeutic " Procedure/Exercise   Therapeutic Procedure: strength, endurance, ROM, flexibillity minutes (12981) 10   PTRx Ther Proc 1 Intrinsics Active Range of Motion Hook Fist   PTRx Ther Proc 1 - Details HEP, reviewed reiterated temporary avoidance of active, full composite fist that is symptomatic.  Hook fist only.  2 x 10 in clinic with education and assessment of tolerance.   Skilled Intervention For improved soft tissue extensibility and tendon excursion, improving tendon gliding and reducing pain with ADL, IADL, work, leisure and sleep.   Patient Response/Progress Tolerated well, patient demonstrated and verbalized understanding.   Ther Proc 1 Objective measurements to assess growth towards goals, facilitate differential and his HEP.   Therapeutic Activity   Therapeutic Activity Minutes (11093) 5   Therapeutic Activities Ther Act 2   Ther Act 1 Patient is educated on expected course of recovery for stenosing flexor tenosynovitis, complicated by chronic nature and/or type II DM.  Emphasized soft tissue protection principles, avoiding direct blows to the A1 pulley and asymptomatic, active fist.  Continue with oval 8 or relative motion orthotic, may set up a follow-up with the orthopedist at her discretion.   PTRx Ther Act 1 Trigger Finger   PTRx Ther Act 1 - Details Beige brace at night mix in black one during the day. You can feel at the base of the left ring finger with your other hand if there's still clicking there keep wearing the brace at last part of the time.    PTRx Ther Act 2 Friction Massage   PTRx Ther Act 2 - Details HEP, reviewed.  Keep gentle.  Should not exacerbate pain or triggering.   PTRx Ther Act 3 Warmth   PTRx Ther Act 3 - Details HEP   Skilled Intervention For improved soft tissue extensibility and tendon excursion, improving tendon gliding and reducing pain with ADL, IADL, work, leisure and sleep.   Patient Response/Progress Tolerated well, patient demonstrated and verbalized understanding.    Orthotics   Orthotic Assessment, Initial session minutes (47320) 15   Type of Orthotic Patient is fitted with a custom fabricated, relative motion orthotic placing the right middle and ring fingers in relative extension, reducing FDS excursion and subsequent triggering beneath the A1 pulley.   Wear Schedule Beige brace at night mix in black one during the day. You can feel at the base of the left ring finger with your other hand if there's still clicking there keep wearing the brace at last part of the time.   Skilled Intervention See above.   Patient Response/Progress Tolerated well, patient demonstrated and verbalized understanding.   Education   Learner/Method Patient;Pictures/Video;Demonstration;Listening;Reading   Plan   Home program Per PT Rx, see above regarding orthotic wear schedule.       PLAN  Continue therapy per current plan of care.    Beginning/End Dates of Progress Note Reporting Period:  10/15/24 to 11/07/2024    Referring Provider:  Bela Corbin

## 2024-11-11 ENCOUNTER — OFFICE VISIT (OUTPATIENT)
Dept: ORTHOPEDICS | Facility: CLINIC | Age: 69
End: 2024-11-11
Payer: COMMERCIAL

## 2024-11-11 DIAGNOSIS — M65.341 TRIGGER RING FINGER OF RIGHT HAND: Primary | ICD-10-CM

## 2024-11-11 PROCEDURE — 20600 DRAIN/INJ JOINT/BURSA W/O US: CPT | Mod: F8 | Performed by: STUDENT IN AN ORGANIZED HEALTH CARE EDUCATION/TRAINING PROGRAM

## 2024-11-11 PROCEDURE — 99207 PR DROP WITH A PROCEDURE: CPT | Performed by: STUDENT IN AN ORGANIZED HEALTH CARE EDUCATION/TRAINING PROGRAM

## 2024-11-11 RX ADMIN — LIDOCAINE HYDROCHLORIDE 0.5 ML: 10 INJECTION, SOLUTION INFILTRATION; PERINEURAL at 10:36

## 2024-11-11 RX ADMIN — TRIAMCINOLONE ACETONIDE 20 MG: 40 INJECTION, SUSPENSION INTRA-ARTICULAR; INTRAMUSCULAR at 10:36

## 2024-11-11 NOTE — LETTER
11/11/2024      Liana Briceño  [address]  [place] [address]      Dear Colleague,    Thank you for referring your patient, Liana Briceño, to the [facility]. Please see a copy of my visit note below.    ASSESSMENT & PLAN    Liana was seen today for pain.    Diagnoses and all orders for this visit:    Trigger ring finger of right hand  -     Small Joint Injection/Arthrocentesis: R ring PIP      Patient has been doing Hand therapy with minimal relief. Patient requests injection which is completed without complication. Recommend she restart her hand therapy, wear her splint more consistently and use ice for anti-inflammatory. Follow up in 6 weeks if no improvement            Eliazar Lopez MD  [facility]    -----------------------------------------------------------------------------------------      SUBJECTIVE  Liana Briceño is a/an 69 year old who presents for follow up of ring finger left hand pain. The DOI was: Chronic. Last seen on 9/16/2024.    Since the last visit:   Patient has worn brace which was helpful. She references persistent clicking but the pain has improved. If she does not wear the brace at night she has pain in the morning    Symptoms: improved.   Percent back to normal: 20%  Any new symptoms: None.   What treatments have you done: Splint, HEP    ice and casting/splinting/bracing  REVIEW OF SYSTEMS:  Positive ROS was noted in the HPI, otherwise negative.       OBJECTIVE:  LMP  (LMP Unknown)    Gen: no acute distress  Left hand exam shows no deformity or swelling. TTP along the flexor tendon of the ring finger at the nodule. Mild clicking but no locking on flexion. Normal strength. Neurovascularly intact.      RADIOLOGY:  None        Small Joint Injection/Arthrocentesis: R ring PIP    Date/Time: 11/11/2024 10:36 AM    Performed by: Eliazar Lopez MD  Authorized by: Eliazar Lopez MD    Needle  Size:  25 G  Guidance: landmark     Approach:  Volar  Location:  Ring finger    Site:  R ring PIP                    Medications:  20 mg triamcinolone 40 MG/ML; 0.5 mL lidocaine 1 %        Outcome:  Tolerated well, no immediate complications  Procedure discussed: discussed risks, benefits, and alternatives    Consent Given by:  Patient  Timeout: timeout called immediately prior to procedure    Prep: patient was prepped and draped in usual sterile fashion             Again, thank you for allowing me to participate in the care of your patient.        Sincerely,        Eliazar Lopez MD

## 2024-11-11 NOTE — PATIENT INSTRUCTIONS
1. Trigger ring finger of right hand      Continue wearing brace  Continue hand therapy   Place an ice pack (you can use a bag of frozen vegetables or other commercial products) over the injured area.  To avoid tissue injury from the cold, place a cloth or towel between the ice and the skin.  It is recommended that ice can be applied 3-5 times a day for up to 20 minutes at a time during the first 24 to 72 hours.  Can repeat every 2 hours as needed.

## 2024-11-11 NOTE — PROGRESS NOTES
ASSESSMENT & PLAN    Liana was seen today for pain.    Diagnoses and all orders for this visit:    Trigger ring finger of right hand  -     Small Joint Injection/Arthrocentesis: R ring PIP      Patient has been doing Hand therapy with minimal relief. Patient requests injection which is completed without complication. Recommend she restart her hand therapy, wear her splint more consistently and use ice for anti-inflammatory. Follow up in 6 weeks if no improvement            Eliazar Lopez MD  [facility]    -----------------------------------------------------------------------------------------      SUBJECTIVE  Liana Briceño is a/an 69 year old who presents for follow up of ring finger left hand pain. The DOI was: Chronic. Last seen on 9/16/2024.    Since the last visit:   Patient has worn brace which was helpful. She references persistent clicking but the pain has improved. If she does not wear the brace at night she has pain in the morning    Symptoms: improved.   Percent back to normal: 20%  Any new symptoms: None.   What treatments have you done: Splint, HEP    ice and casting/splinting/bracing  REVIEW OF SYSTEMS:  Positive ROS was noted in the HPI, otherwise negative.       OBJECTIVE:  LMP  (LMP Unknown)    Gen: no acute distress  Left hand exam shows no deformity or swelling. TTP along the flexor tendon of the ring finger at the nodule. Mild clicking but no locking on flexion. Normal strength. Neurovascularly intact.      RADIOLOGY:  None        Small Joint Injection/Arthrocentesis: R ring PIP    Date/Time: 11/11/2024 10:36 AM    Performed by: Eliazar Lopez MD  Authorized by: Eliazar Lopez MD    Needle Size:  25 G  Guidance: landmark     Approach:  Volar  Location:  Ring finger    Site:  R ring PIP                    Medications:  20 mg triamcinolone 40 MG/ML; 0.5 mL lidocaine 1 %        Outcome:  Tolerated well, no immediate complications  Procedure  discussed: discussed risks, benefits, and alternatives    Consent Given by:  Patient  Timeout: timeout called immediately prior to procedure    Prep: patient was prepped and draped in usual sterile fashion

## 2024-11-12 RX ORDER — TRIAMCINOLONE ACETONIDE 40 MG/ML
20 INJECTION, SUSPENSION INTRA-ARTICULAR; INTRAMUSCULAR
Status: COMPLETED | OUTPATIENT
Start: 2024-11-11 | End: 2024-11-11

## 2024-11-12 RX ORDER — LIDOCAINE HYDROCHLORIDE 10 MG/ML
0.5 INJECTION, SOLUTION INFILTRATION; PERINEURAL
Status: COMPLETED | OUTPATIENT
Start: 2024-11-11 | End: 2024-11-11

## 2024-11-18 DIAGNOSIS — K21.9 GASTROESOPHAGEAL REFLUX DISEASE WITHOUT ESOPHAGITIS: ICD-10-CM

## 2024-11-19 RX ORDER — OMEPRAZOLE 40 MG/1
CAPSULE, DELAYED RELEASE ORAL
Qty: 90 CAPSULE | Refills: 2 | Status: SHIPPED | OUTPATIENT
Start: 2024-11-19

## 2024-12-11 DIAGNOSIS — B00.9 HSV (HERPES SIMPLEX VIRUS) INFECTION: ICD-10-CM

## 2024-12-11 DIAGNOSIS — M54.50 CHRONIC LOW BACK PAIN, UNSPECIFIED BACK PAIN LATERALITY, UNSPECIFIED WHETHER SCIATICA PRESENT: ICD-10-CM

## 2024-12-11 DIAGNOSIS — G89.29 CHRONIC LOW BACK PAIN, UNSPECIFIED BACK PAIN LATERALITY, UNSPECIFIED WHETHER SCIATICA PRESENT: ICD-10-CM

## 2024-12-11 RX ORDER — HYDROCODONE BITARTRATE AND ACETAMINOPHEN 5; 325 MG/1; MG/1
1 TABLET ORAL EVERY 6 HOURS PRN
Qty: 18 TABLET | Refills: 0 | Status: SHIPPED | OUTPATIENT
Start: 2024-12-11

## 2024-12-11 RX ORDER — VALACYCLOVIR HYDROCHLORIDE 500 MG/1
500 TABLET, FILM COATED ORAL DAILY
Qty: 90 TABLET | Refills: 3 | Status: SHIPPED | OUTPATIENT
Start: 2024-12-11

## 2024-12-31 DIAGNOSIS — M10.9 ACUTE GOUTY ARTHROPATHY: ICD-10-CM

## 2024-12-31 RX ORDER — ALLOPURINOL 100 MG/1
100 TABLET ORAL DAILY
Qty: 90 TABLET | Refills: 0 | Status: SHIPPED | OUTPATIENT
Start: 2024-12-31

## 2025-01-04 DIAGNOSIS — E11.9 TYPE 2 DIABETES MELLITUS WITHOUT COMPLICATION, WITHOUT LONG-TERM CURRENT USE OF INSULIN (H): Chronic | ICD-10-CM

## 2025-01-06 RX ORDER — SEMAGLUTIDE 2.68 MG/ML
INJECTION, SOLUTION SUBCUTANEOUS
Qty: 9 ML | Refills: 0 | Status: SHIPPED | OUTPATIENT
Start: 2025-01-06

## 2025-01-15 ENCOUNTER — HOSPITAL ENCOUNTER (EMERGENCY)
Facility: HOSPITAL | Age: 70
Discharge: HOME | End: 2025-01-15
Payer: COMMERCIAL

## 2025-01-15 ENCOUNTER — HOSPITAL ENCOUNTER (OUTPATIENT)
Dept: GENERAL RADIOLOGY | Facility: HOSPITAL | Age: 70
Discharge: HOME | End: 2025-01-15
Attending: PHYSICIAN ASSISTANT
Payer: COMMERCIAL

## 2025-01-15 ENCOUNTER — OFFICE VISIT (OUTPATIENT)
Dept: URGENT CARE | Facility: URGENT CARE | Age: 70
End: 2025-01-15
Payer: COMMERCIAL

## 2025-01-15 ENCOUNTER — APPOINTMENT (OUTPATIENT)
Dept: CT IMAGING | Facility: HOSPITAL | Age: 70
End: 2025-01-15
Payer: COMMERCIAL

## 2025-01-15 VITALS
WEIGHT: 145 LBS | BODY MASS INDEX: 24.28 KG/M2 | HEART RATE: 76 BPM | RESPIRATION RATE: 20 BRPM | SYSTOLIC BLOOD PRESSURE: 170 MMHG | TEMPERATURE: 98 F | OXYGEN SATURATION: 96 % | DIASTOLIC BLOOD PRESSURE: 68 MMHG

## 2025-01-15 VITALS
SYSTOLIC BLOOD PRESSURE: 117 MMHG | HEART RATE: 73 BPM | TEMPERATURE: 97.6 F | OXYGEN SATURATION: 100 % | DIASTOLIC BLOOD PRESSURE: 60 MMHG | RESPIRATION RATE: 20 BRPM

## 2025-01-15 DIAGNOSIS — R10.84 ABDOMINAL PAIN, GENERALIZED: Primary | ICD-10-CM

## 2025-01-15 DIAGNOSIS — R10.84 ABDOMINAL PAIN, GENERALIZED: ICD-10-CM

## 2025-01-15 DIAGNOSIS — K59.00 CONSTIPATION: ICD-10-CM

## 2025-01-15 DIAGNOSIS — R14.1 ABDOMINAL GAS PAIN: ICD-10-CM

## 2025-01-15 DIAGNOSIS — K59.00 CONSTIPATION, UNSPECIFIED CONSTIPATION TYPE: ICD-10-CM

## 2025-01-15 LAB
ALBUMIN SERPL BCG-MCNC: 4.6 G/DL (ref 3.5–5.2)
ALBUMIN UR-MCNC: NEGATIVE MG/DL
ALP SERPL-CCNC: 94 U/L (ref 40–150)
ALT SERPL W P-5'-P-CCNC: 28 U/L (ref 0–50)
ANION GAP SERPL CALCULATED.3IONS-SCNC: 8 MMOL/L (ref 7–15)
APPEARANCE UR: CLEAR
AST SERPL W P-5'-P-CCNC: 31 U/L (ref 0–45)
BASOPHILS # BLD AUTO: 0 10E3/UL (ref 0–0.2)
BASOPHILS NFR BLD AUTO: 0 %
BILIRUB DIRECT SERPL-MCNC: <0.2 MG/DL (ref 0–0.3)
BILIRUB SERPL-MCNC: 0.3 MG/DL
BILIRUB UR QL STRIP: NEGATIVE
BUN SERPL-MCNC: 18.3 MG/DL (ref 8–23)
CALCIUM SERPL-MCNC: 10.1 MG/DL (ref 8.8–10.4)
CHLORIDE SERPL-SCNC: 103 MMOL/L (ref 98–107)
COLOR UR AUTO: ABNORMAL
CREAT SERPL-MCNC: 1.06 MG/DL (ref 0.51–0.95)
EGFRCR SERPLBLD CKD-EPI 2021: 57 ML/MIN/1.73M2
EOSINOPHIL # BLD AUTO: 0.2 10E3/UL (ref 0–0.7)
EOSINOPHIL NFR BLD AUTO: 3 %
ERYTHROCYTE [DISTWIDTH] IN BLOOD BY AUTOMATED COUNT: 13.2 % (ref 10–15)
GLUCOSE SERPL-MCNC: 116 MG/DL (ref 70–99)
GLUCOSE UR STRIP-MCNC: >1000 MG/DL
HCO3 SERPL-SCNC: 28 MMOL/L (ref 22–29)
HCT VFR BLD AUTO: 37.8 % (ref 35–47)
HGB BLD-MCNC: 12.3 G/DL (ref 11.7–15.7)
HGB UR QL STRIP: NEGATIVE
IMM GRANULOCYTES # BLD: 0 10E3/UL
IMM GRANULOCYTES NFR BLD: 0 %
KETONES UR STRIP-MCNC: NEGATIVE MG/DL
LEUKOCYTE ESTERASE UR QL STRIP: ABNORMAL
LIPASE SERPL-CCNC: 23 U/L (ref 13–60)
LYMPHOCYTES # BLD AUTO: 2.6 10E3/UL (ref 0.8–5.3)
LYMPHOCYTES NFR BLD AUTO: 35 %
MCH RBC QN AUTO: 29.9 PG (ref 26.5–33)
MCHC RBC AUTO-ENTMCNC: 32.5 G/DL (ref 31.5–36.5)
MCV RBC AUTO: 92 FL (ref 78–100)
MONOCYTES # BLD AUTO: 0.6 10E3/UL (ref 0–1.3)
MONOCYTES NFR BLD AUTO: 8 %
NEUTROPHILS # BLD AUTO: 3.9 10E3/UL (ref 1.6–8.3)
NEUTROPHILS NFR BLD AUTO: 54 %
NITRATE UR QL: NEGATIVE
NRBC # BLD AUTO: 0 10E3/UL
NRBC BLD AUTO-RTO: 0 /100
PH UR STRIP: 6.5 [PH] (ref 5–7)
PLATELET # BLD AUTO: 283 10E3/UL (ref 150–450)
POTASSIUM SERPL-SCNC: 4.8 MMOL/L (ref 3.4–5.3)
PROT SERPL-MCNC: 7.9 G/DL (ref 6.4–8.3)
RBC # BLD AUTO: 4.12 10E6/UL (ref 3.8–5.2)
RBC URINE: <1 /HPF
SODIUM SERPL-SCNC: 139 MMOL/L (ref 135–145)
SP GR UR STRIP: 1.03 (ref 1–1.03)
SQUAMOUS EPITHELIAL: 1 /HPF
UROBILINOGEN UR STRIP-MCNC: <2 MG/DL
WBC # BLD AUTO: 7.3 10E3/UL (ref 4–11)
WBC URINE: 3 /HPF

## 2025-01-15 PROCEDURE — 80048 BASIC METABOLIC PNL TOTAL CA: CPT

## 2025-01-15 PROCEDURE — 99214 OFFICE O/P EST MOD 30 MIN: CPT | Performed by: PHYSICIAN ASSISTANT

## 2025-01-15 PROCEDURE — 85014 HEMATOCRIT: CPT

## 2025-01-15 PROCEDURE — 85041 AUTOMATED RBC COUNT: CPT

## 2025-01-15 PROCEDURE — 85004 AUTOMATED DIFF WBC COUNT: CPT

## 2025-01-15 PROCEDURE — 82248 BILIRUBIN DIRECT: CPT

## 2025-01-15 PROCEDURE — 36415 COLL VENOUS BLD VENIPUNCTURE: CPT

## 2025-01-15 PROCEDURE — 99285 EMERGENCY DEPT VISIT HI MDM: CPT | Mod: 25

## 2025-01-15 PROCEDURE — 74019 RADEX ABDOMEN 2 VIEWS: CPT

## 2025-01-15 PROCEDURE — 81001 URINALYSIS AUTO W/SCOPE: CPT

## 2025-01-15 PROCEDURE — 83690 ASSAY OF LIPASE: CPT

## 2025-01-15 PROCEDURE — 250N000011 HC RX IP 250 OP 636

## 2025-01-15 PROCEDURE — 80053 COMPREHEN METABOLIC PANEL: CPT

## 2025-01-15 PROCEDURE — 74177 CT ABD & PELVIS W/CONTRAST: CPT

## 2025-01-15 RX ORDER — POLYETHYLENE GLYCOL 3350 17 G/17G
1 POWDER, FOR SOLUTION ORAL 2 TIMES DAILY
Qty: 170 G | Refills: 0 | Status: CANCELLED | OUTPATIENT
Start: 2025-01-15 | End: 2025-01-20

## 2025-01-15 RX ORDER — ONDANSETRON 2 MG/ML
4 INJECTION INTRAMUSCULAR; INTRAVENOUS ONCE
Status: COMPLETED | OUTPATIENT
Start: 2025-01-15 | End: 2025-01-15

## 2025-01-15 RX ORDER — ONDANSETRON 4 MG/1
4 TABLET, ORALLY DISINTEGRATING ORAL EVERY 8 HOURS PRN
Qty: 20 TABLET | Refills: 0 | Status: SHIPPED | OUTPATIENT
Start: 2025-01-15 | End: 2025-01-15

## 2025-01-15 RX ORDER — ONDANSETRON 4 MG/1
4 TABLET, ORALLY DISINTEGRATING ORAL EVERY 8 HOURS PRN
Qty: 20 TABLET | Refills: 0 | Status: SHIPPED | OUTPATIENT
Start: 2025-01-15

## 2025-01-15 RX ORDER — ALUMINA, MAGNESIA, AND SIMETHICONE 2400; 2400; 240 MG/30ML; MG/30ML; MG/30ML
30 SUSPENSION ORAL ONCE
Status: COMPLETED | OUTPATIENT
Start: 2025-01-15 | End: 2025-01-15

## 2025-01-15 RX ORDER — DOCUSATE SODIUM 100 MG/1
100 CAPSULE, LIQUID FILLED ORAL 3 TIMES DAILY PRN
Qty: 10 CAPSULE | Refills: 0 | Status: CANCELLED | OUTPATIENT
Start: 2025-01-15 | End: 2025-01-20

## 2025-01-15 RX ORDER — IOPAMIDOL 755 MG/ML
71 INJECTION, SOLUTION INTRAVASCULAR ONCE
Status: COMPLETED | OUTPATIENT
Start: 2025-01-15 | End: 2025-01-15

## 2025-01-15 RX ORDER — POLYETHYLENE GLYCOL 3350 17 G/17G
1 POWDER, FOR SOLUTION ORAL 2 TIMES DAILY
Qty: 255 G | Refills: 0 | Status: SHIPPED | OUTPATIENT
Start: 2025-01-15

## 2025-01-15 RX ADMIN — ALUMINA, MAGNESIA, AND SIMETHICONE 30 ML: 2400; 2400; 240 SUSPENSION ORAL at 15:33

## 2025-01-15 RX ADMIN — IOPAMIDOL 71 ML: 755 INJECTION, SOLUTION INTRAVENOUS at 18:30

## 2025-01-15 ASSESSMENT — COLUMBIA-SUICIDE SEVERITY RATING SCALE - C-SSRS
1. IN THE PAST MONTH, HAVE YOU WISHED YOU WERE DEAD OR WISHED YOU COULD GO TO SLEEP AND NOT WAKE UP?: NO
2. HAVE YOU ACTUALLY HAD ANY THOUGHTS OF KILLING YOURSELF IN THE PAST MONTH?: NO
6. HAVE YOU EVER DONE ANYTHING, STARTED TO DO ANYTHING, OR PREPARED TO DO ANYTHING TO END YOUR LIFE?: NO

## 2025-01-15 ASSESSMENT — ACTIVITIES OF DAILY LIVING (ADL)
ADLS_ACUITY_SCORE: 53
ADLS_ACUITY_SCORE: 53

## 2025-01-15 NOTE — ED PROVIDER NOTES
EMERGENCY DEPARTMENT ENCOUNTER      NAME: Liana Briceño  AGE: 69 year old female  YOB: 1955  MRN: [id number]  EVALUATION DATE & TIME: No admission date for patient encounter.    PCP: Bela Corbin    ED PROVIDER: Nahed Ansari PA-C    CHIEF COMPLAINT  Abdominal Pain      MEDICAL DECISION MAKING AND ED COURSE  Pertinent Labs & Imaging studies reviewed (See chart for details)       Medical Decision Making  Obtained supplemental history:Supplemental history obtained?: No  Reviewed external records: External records reviewed?: Documented in chart  Care impacted by chronic illness:Chronic Kidney Disease, Diabetes, Heart Disease, Hypertension, and Other: Morbid Obesity   Care significantly affected by social determinants of health:{SOCIAL DETERMINANTS OF HEALTH:382723}  Did you consider but not order tests?: Work up considered but not performed and documented in chart, if applicable  {Did you interpret images independently?:360876}  Consultation discussion with other provider:{Did you involve another provider (consultant, MH, pharmacy, etc.)?:004462}  {ADMIT VS D/C:302503}    {[facility] DOCUMENTATION:969135}    *** minutes of critical care time     MEDICATIONS GIVEN IN THE EMERGENCY:  Medications   ondansetron (ZOFRAN) injection 4 mg (has no administration in time range)       NEW PRESCRIPTIONS STARTED AT TODAY'S ER VISIT  New Prescriptions    No medications on file     Modified Medications    No medications on file       =================================================================    HPI    Patient information was obtained from: Patient  Use of : N/A     Liana Briceño is a 69 year old female with a pertinent history of hypertension, type 2 diabetes, dyslipidemia, CAD, morbid obesity, stage 3 chronic kidney disease who presents to this ED by personal car for evaluation of abdominal pain.     The patient endorses changes to her bowel movements and constipation for the past 3 weeks. Prior to  this, she had regular bowel movements every day, but has since not had daily bowel movements. She also reports nausea without vomiting every day and noticed an increase in abdominal pain and symptoms when eating. Denies urinary symptoms and fevers. She did take Doculax and Peptobismol and had a large bowel movement, but only took them once.     She was seen at urgent care today and underwent an X-ray. It showed mild to moderate stool burden but no bowel obstruction. Patient denies history of bowel obstruction and abdominal surgery.     Per chart review, patient presented to [facility] on 1/15/25 for abdominal pain. Imaging showed mild to moderate colonic stool burden. Bowel gas pattern is normal. Nothing for obstruction or free air. No evidence for renal stones. Cholecystectomy. Right total hip arthroplasty . Patient referred to [facility] Emergency Department for additional testing and imaging.     PHYSICAL EXAM    BP (!) 151/81   Pulse 76   Temp 98  F (36.7  C) (Oral)   Resp 20   Wt 65.8 kg (145 lb)   LMP  (LMP Unknown)   SpO2 99%   BMI 24.28 kg/m    Constitutional: Well developed, Well nourished, NAD, GCS ***  HENT: Normocephalic, Atraumatic, Bilateral external ears normal, Oropharynx normal, mucous membranes moist, Nose normal. Neck- *** Normal range of motion, No tenderness, Supple, No stridor. ***   Eyes: PERRL, EOMI, Conjunctiva normal, No discharge.   Respiratory: Normal breath sounds, No respiratory distress, No wheezing, Speaks full sentences easily. No cough. ***   Cardiovascular: Normal heart rate, Regular rhythm, *** No murmurs, No rubs, No gallops. Chest wall nontender. ***   GI: No excessive obesity. Bowel sounds normal, Soft, No tenderness, No masses, No flank tenderness. No rebound or guarding. ***   : Chaperone ***   Musculoskeletal: 2+ DP pulses. No edema. *** No cyanosis, No clubbing. Good range of motion in all major joints. No tenderness  to palpation or major deformities noted. No tenderness of the CTLS spine. ***   Integument: Warm, Dry, No erythema, No rash.    Neurologic: Alert & oriented x 3, Normal motor function, Normal sensory function, No focal deficits noted. Normal gait. ***   Psychiatric: Affect normal, Judgment normal, Mood normal. Cooperative. ***     LAB:  All pertinent labs reviewed and interpreted.       RADIOLOGY:  Reviewed all pertinent imaging. Please see official radiology report.  CT Abdomen Pelvis w Contrast    (Results Pending)       EKG:    Performed at: ***    Impression: ***    Rate: ***  Rhythm: ***  Axis: ***  RI Interval: ***  QRS Interval: ***  QTc Interval: ***  ST Changes: ***  Comparison: ***    ***  *** independently reviewed and interpreted the EKG(s) documented above.    PROCEDURES:   ***      [facility] Documentation:   CMS Diagnoses: {Sepsis/Septic Shock/Stemi/Stroke:550070}             I, Dalton Saenz, am serving as a scribe to document services personally performed by Nahed Ansari PA-C, based on my observation and the provider's statements to me. I, Nahed Ansari PA-C, attest that Dalton Saenz is acting in a scribe capacity, has observed my performance of the services and has documented them in accordance with my direction.    Nahed Ansari PA-C  [facility] EMERGENCY DEPARTMENT  [address] [place] [id number] [phone]    interpreted.  Results for orders placed or performed during the hospital encounter of 01/15/25   CT Abdomen Pelvis w Contrast    Impression    IMPRESSION:   1.  Large amount stool throughout the colon suggesting the clinical diagnosis of constipation.    2.  No acute process seen.   Basic metabolic panel   Result Value Ref Range    Sodium 139 135 - 145 mmol/L    Potassium 4.8 3.4 - 5.3 mmol/L    Chloride 103 98 - 107 mmol/L    Carbon Dioxide (CO2) 28 22 - 29 mmol/L    Anion Gap 8 7 - 15 mmol/L    Urea Nitrogen 18.3 8.0 - 23.0 mg/dL    Creatinine 1.06 (H) 0.51 - 0.95 mg/dL    GFR Estimate 57 (L) >60 mL/min/1.73m2    Calcium 10.1 8.8 - 10.4 mg/dL    Glucose 116 (H) 70 - 99 mg/dL   Hepatic function panel   Result Value Ref Range    Protein Total 7.9 6.4 - 8.3 g/dL    Albumin 4.6 3.5 - 5.2 g/dL    Bilirubin Total 0.3 <=1.2 mg/dL    Alkaline Phosphatase 94 40 - 150 U/L    AST 31 0 - 45 U/L    ALT 28 0 - 50 U/L    Bilirubin Direct <0.20 0.00 - 0.30 mg/dL   Result Value Ref Range    Lipase 23 13 - 60 U/L   UA with Microscopic reflex to Culture    Specimen: Urine, Midstream   Result Value Ref Range    Color Urine Light Yellow Colorless, Straw, Light Yellow, Yellow    Appearance Urine Clear Clear    Glucose Urine >1000 (A) Negative mg/dL    Bilirubin Urine Negative Negative    Ketones Urine Negative Negative mg/dL    Specific Gravity Urine 1.030 1.001 - 1.030    Blood Urine Negative Negative    pH Urine 6.5 5.0 - 7.0    Protein Albumin Urine Negative Negative mg/dL    Urobilinogen Urine <2.0 <2.0 mg/dL    Nitrite Urine Negative Negative    Leukocyte Esterase Urine 25 Jamar/uL (A) Negative    RBC Urine <1 <=2 /HPF    WBC Urine 3 <=5 /HPF    Squamous Epithelials Urine 1 <=1 /HPF   CBC with platelets and differential   Result Value Ref Range    WBC Count 7.3 4.0 - 11.0 10e3/uL    RBC Count 4.12 3.80 - 5.20 10e6/uL    Hemoglobin 12.3 11.7 - 15.7 g/dL    Hematocrit 37.8 35.0 - 47.0 %    MCV 92 78 - 100 fL    MCH 29.9 26.5 - 33.0 pg     MCHC 32.5 31.5 - 36.5 g/dL    RDW 13.2 10.0 - 15.0 %    Platelet Count 283 150 - 450 10e3/uL    % Neutrophils 54 %    % Lymphocytes 35 %    % Monocytes 8 %    % Eosinophils 3 %    % Basophils 0 %    % Immature Granulocytes 0 %    NRBCs per 100 WBC 0 <1 /100    Absolute Neutrophils 3.9 1.6 - 8.3 10e3/uL    Absolute Lymphocytes 2.6 0.8 - 5.3 10e3/uL    Absolute Monocytes 0.6 0.0 - 1.3 10e3/uL    Absolute Eosinophils 0.2 0.0 - 0.7 10e3/uL    Absolute Basophils 0.0 0.0 - 0.2 10e3/uL    Absolute Immature Granulocytes 0.0 <=0.4 10e3/uL    Absolute NRBCs 0.0 10e3/uL       RADIOLOGY:  Reviewed all pertinent imaging. Please see official radiology report.  CT Abdomen Pelvis w Contrast   Final Result   IMPRESSION:    1.  Large amount stool throughout the colon suggesting the clinical diagnosis of constipation.      2.  No acute process seen.        I, Dalton Saenz, am serving as a scribe to document services personally performed by Nahed Ansari PA-C, based on my observation and the provider's statements to me. I, Nahed Ansari PA-C, attest that Dalton Saenz is acting in a scribe capacity, has observed my performance of the services and has documented them in accordance with my direction.    Nahed Ansari PA-C  [facility] EMERGENCY DEPARTMENT  [address] [place] [id number] [phone]      Nahed Ansari PA-C  01/24/25 1494

## 2025-01-15 NOTE — ED NOTES
"Expected Patient Referral to ED  4:29 PM    Referring Clinic/Provider:  [facility]    Reason for referral/Clinical facts:  Abdominal \"discomfort\" but \"not pain\" for 3 weeks, normal BM yesterday but didn't have a bowel movement today so took laxatives with spicy chicken and felt nauseated so she went to urgent care. Passing flatus and belching. No vomiting. PA did x-ray and then referred her to the ER because she thought there was an obstruction on the x-ray, patient already on the way to the ER. Abdominal x-ray was read by radiology during pre-arrival notification phone call, per radiology there are no signs of obstruction, patient already headed to the ER before radiology read and told by walk-in clinic she may have an obstruction.    Recommendations provided:  Send to ED for further evaluation    Caller was informed that this institution does possess the capabilities and/or resources to provide for patient and should be transferred to our facility.    Discussed that if direct admit is sought and any hurdles are encountered, this ED would be happy to see the patient and evaluate.    Informed caller that recommendations provided are recommendations based only on the facts provided and that they responsible to accept or reject the advice, or to seek a formal in person consultation as needed and that this ED will see/treat patient should they arrive.      Ariane Andujar MD  [facility] EMERGENCY DEPARTMENT  [address] [place] [id number]  [phone]       Ariane Andujar MD  01/15/25 1635    "

## 2025-01-15 NOTE — ED TRIAGE NOTES
Abdominal pain for a couple of weeks, went to clinic today and xray showing possible bowel obstruction and sent to ED for further work up.     Triage Assessment (Adult)       Row Name 01/15/25 1649          Triage Assessment    Airway WDL WDL        Respiratory WDL    Respiratory WDL WDL        Skin Circulation/Temperature WDL    Skin Circulation/Temperature WDL WDL        Cardiac WDL    Cardiac WDL WDL        Peripheral/Neurovascular WDL    Peripheral Neurovascular WDL WDL

## 2025-01-15 NOTE — PATIENT INSTRUCTIONS
Please go to the [facility] for additional testing and imaging based on your abdominal xray here at urgent care.

## 2025-01-15 NOTE — ED NOTES
"Patient given ice chips per request and provider approval. Says pain is \"manageable\" at this time and declines need for medication. Friend present at bedside.   "

## 2025-01-15 NOTE — Clinical Note
Liana Briceño was seen and treated in our emergency department on 1/15/2025.  She may return to work on 01/18/2025.       If you have any questions or concerns, please don't hesitate to call.      Nahed Ansari PA-C

## 2025-01-16 NOTE — DISCHARGE INSTRUCTIONS
You are seen in the emergency department to rule out a bowel obstruction.  Your labs here are reassuring, urine without infection, your CT does reveal a large stool burden in the colon but no evidence of actual obstruction.  For the nausea I am going to prescribe ondansetron which can help.  Take it before you try to eat a meal.  And I will also recommend a laxative that can help clear things out.  If you start developing fevers, vomiting, worsening pain, or are still not having bowel movements after several days of taking the medication, recommend you follow back up.

## 2025-01-19 DIAGNOSIS — E11.65 TYPE 2 DIABETES MELLITUS WITH HYPERGLYCEMIA, WITH LONG-TERM CURRENT USE OF INSULIN (H): ICD-10-CM

## 2025-01-19 DIAGNOSIS — Z79.4 TYPE 2 DIABETES MELLITUS WITH HYPERGLYCEMIA, WITH LONG-TERM CURRENT USE OF INSULIN (H): ICD-10-CM

## 2025-01-20 RX ORDER — CALCIUM CARBONATE/VITAMIN D3 600 MG-10
1 TABLET ORAL DAILY
Qty: 90 TABLET | Refills: 3 | Status: SHIPPED | OUTPATIENT
Start: 2025-01-20 | End: 2025-01-22

## 2025-01-22 ENCOUNTER — OFFICE VISIT (OUTPATIENT)
Dept: INTERNAL MEDICINE | Facility: CLINIC | Age: 70
End: 2025-01-22
Payer: COMMERCIAL

## 2025-01-22 VITALS
RESPIRATION RATE: 20 BRPM | WEIGHT: 245.9 LBS | BODY MASS INDEX: 40.97 KG/M2 | HEIGHT: 65 IN | HEART RATE: 75 BPM | SYSTOLIC BLOOD PRESSURE: 119 MMHG | DIASTOLIC BLOOD PRESSURE: 64 MMHG | OXYGEN SATURATION: 94 % | TEMPERATURE: 97.5 F

## 2025-01-22 DIAGNOSIS — M10.9 ACUTE GOUTY ARTHROPATHY: Primary | ICD-10-CM

## 2025-01-22 DIAGNOSIS — E11.9 TYPE 2 DIABETES MELLITUS WITHOUT COMPLICATION, UNSPECIFIED WHETHER LONG TERM INSULIN USE (H): ICD-10-CM

## 2025-01-22 DIAGNOSIS — Z12.31 VISIT FOR SCREENING MAMMOGRAM: ICD-10-CM

## 2025-01-22 DIAGNOSIS — Z29.11 NEED FOR VACCINATION AGAINST RESPIRATORY SYNCYTIAL VIRUS: ICD-10-CM

## 2025-01-22 DIAGNOSIS — G47.30 SLEEP APNEA, UNSPECIFIED TYPE: ICD-10-CM

## 2025-01-22 DIAGNOSIS — F11.20 CONTINUOUS OPIOID DEPENDENCE (H): ICD-10-CM

## 2025-01-22 DIAGNOSIS — I67.1 CEREBRAL ANEURYSM, NONRUPTURED: ICD-10-CM

## 2025-01-22 DIAGNOSIS — Z23 NEED FOR PROPHYLACTIC VACCINATION AGAINST HEPATITIS A: ICD-10-CM

## 2025-01-22 DIAGNOSIS — Z12.11 SCREENING FOR COLON CANCER: ICD-10-CM

## 2025-01-22 DIAGNOSIS — N18.31 STAGE 3A CHRONIC KIDNEY DISEASE (H): ICD-10-CM

## 2025-01-22 DIAGNOSIS — E11.42 DIABETIC POLYNEUROPATHY ASSOCIATED WITH TYPE 2 DIABETES MELLITUS (H): ICD-10-CM

## 2025-01-22 LAB
EST. AVERAGE GLUCOSE BLD GHB EST-MCNC: 143 MG/DL
HBA1C MFR BLD: 6.6 % (ref 0–5.6)

## 2025-01-22 PROCEDURE — 83036 HEMOGLOBIN GLYCOSYLATED A1C: CPT | Performed by: INTERNAL MEDICINE

## 2025-01-22 PROCEDURE — 36415 COLL VENOUS BLD VENIPUNCTURE: CPT | Performed by: INTERNAL MEDICINE

## 2025-01-22 ASSESSMENT — PAIN SCALES - GENERAL: PAINLEVEL_OUTOF10: NO PAIN (0)

## 2025-01-22 NOTE — PROGRESS NOTES
Assessment & Plan     Need for prophylactic vaccination against hepatitis A  Low risk    Need for vaccination against respiratory syncytial virus  Can wait till age 73     Acute gouty arthropathy  In remission     Type 2 diabetes mellitus without complication, unspecified whether long term insulin use (H)  Lab Results   Component Value Date    A1C 6.6 09/03/2024    A1C 6.9 03/11/2024    A1C 7.2 12/11/2023    A1C 7.8 09/11/2023    A1C 7.1 04/03/2023     Excellent diabetes control however she is probably experiencing constipation from ozempic and may need to reduce the dose and jardaicne could be increased to compensate . Benjie decide if concervative care of constipation doesn't work    - Adult Diabetes Education  Referral; Future  - Hemoglobin A1c; Future    Visit for screening mammogram  Is past due . Recommend annual mammogram   - MA Screening Bilateral w/ Jose; Future    Screening for colon cancer  Is past due . Needs it due to change in bowel habits and polyp surveillance  - Colonoscopy Screening  Referral; Future    Diabetic polyneuropathy associated with type 2 diabetes mellitus (H)  She stopped the gabapentin and realized her tremor has gone.  She had a facial tremor that sent her to the emergency room she was thinking she had a stroke.  In the workup a small cerebral aneurysm was picked up she is now on a route for surveillance MRA.  Pregabalin was offered but she has not taken it.  No other source for her symptoms were discovered and the risk of TIA is low however neurologist did order event monitor and echo results are not available.  As she is going outside the system at [facility].  Continuous opioid dependence (H)  He does not continuously use opioid but does use more than 3 prescriptions in the year of 18 tablets she uses for as needed's flareups of her back    Body mass index (BMI) 40.0-44.9, adult (H)  She is still on Ozempic but dose may need to be adjusted    Stage 3a chronic  "kidney disease (H)  GFR Estimate   Date Value Ref Range Status   01/15/2025 57 (L) >60 mL/min/1.73m2 Final     Comment:     eGFR calculated using 2021 CKD-EPI equation.   09/03/2024 57 (L) >60 mL/min/1.73m2 Final     Comment:     eGFR calculated using 2021 CKD-EPI equation.   12/11/2023 59 (L) >60 mL/min/1.73m2 Final   06/28/2021 56 (L) >60 mL/min/[1.73_m2] Final   05/19/2021 59 (L) >60 mL/min/1.73m2 Final   01/19/2021 >60 >60 mL/min/1.73m2 Final   09/02/2020 53 (L) >60 mL/min/1.73m2 Final     GFR, ESTIMATED POCT   Date Value Ref Range Status   06/05/2023 55 (L) >60 mL/min/1.73m2 Final     GFR has been stable he is on an SGLT2 inhibitor blood pressure is controlled    Sleep apnea, unspecified type  Remote history she I do agree with neurologist that her sleep apnea should be reevaluated.  - Adult Sleep Eval & Management  Referral; Future  Constipation is most likely due to Ozempic she is had a normal CT scan with no suspicious lesion in with no obstruction.  Labs were essentially unchanged and stable.  Did give her counseling on the use of a high-fiber diet avoid prune juice because of its calories.  Intermittent use of MiraLAX this is acceptable.      MED REC REQUIRED  Post Medication Reconciliation Status: discharge medications reconciled, continue medications without change  BMI  Estimated body mass index is 41.56 kg/m  as calculated from the following:    Height as of this encounter: 1.638 m (5' 4.5\").    Weight as of this encounter: 111.5 kg (245 lb 14.4 oz).             Edmundo Gaines is a 69 year old, presenting for the following health issues:  Hospital F/U (Patient reports they are here following up from visit to [facility] ED on 1/15/24 admitted for constipation and abdominal pain. Needs a refill on Vitamin D. Still reports having stomach issues. )  Feels like she has small pellet like stools , with ongoing pain right ifront lateral abdomen , ssome loss of stools with passing gas , some weight " "loss    2 weeks ago had some trouble with gas, some constiaption , took dulcolax , eating a soft diet   No prior h/o constipation , was given miraalx which didn't work but then used prune juice and had loose stools and and now has barely   Adrenal adenoma   Lab Results   Component Value Date    A1C 6.6 09/03/2024    A1C 6.9 03/11/2024    A1C 7.2 12/11/2023    A1C 7.8 09/11/2023    A1C 7.1 04/03/2023 1/22/2025    11:57 AM   Additional Questions   Roomed by Costa   Accompanied by alone         1/22/2025    11:57 AM   Patient Reported Additional Medications   Patient reports taking the following new medications none     HPI                   Objective    /64 (BP Location: Left arm, Patient Position: Sitting, Cuff Size: Adult Large)   Pulse 75   Temp 97.5  F (36.4  C) (Temporal)   Resp 20   Ht 1.638 m (5' 4.5\")   Wt 111.5 kg (245 lb 14.4 oz)   LMP  (LMP Unknown)   SpO2 94%   BMI 41.56 kg/m    Body mass index is 41.56 kg/m .  Physical Exam               Signed Electronically by: Bela Corbin MD    "

## 2025-01-22 NOTE — PATIENT INSTRUCTIONS
Regimen for constipation includes    Increasing fibre ( metamucil and Citracel  )   PLUS colace ( stool softener)   Plus or minus laxative ( miralax or duloclax ) please read the labels that identify the type of constipation meds

## 2025-01-23 ENCOUNTER — PATIENT OUTREACH (OUTPATIENT)
Dept: CARE COORDINATION | Facility: CLINIC | Age: 70
End: 2025-01-23

## 2025-01-23 ENCOUNTER — OFFICE VISIT (OUTPATIENT)
Dept: FAMILY MEDICINE | Facility: CLINIC | Age: 70
End: 2025-01-23
Payer: COMMERCIAL

## 2025-01-23 VITALS
BODY MASS INDEX: 41.4 KG/M2 | RESPIRATION RATE: 20 BRPM | OXYGEN SATURATION: 98 % | HEART RATE: 82 BPM | TEMPERATURE: 98.2 F | DIASTOLIC BLOOD PRESSURE: 60 MMHG | SYSTOLIC BLOOD PRESSURE: 110 MMHG | WEIGHT: 245 LBS

## 2025-01-23 DIAGNOSIS — E11.9 TYPE 2 DIABETES MELLITUS WITHOUT COMPLICATION, WITHOUT LONG-TERM CURRENT USE OF INSULIN (H): Chronic | ICD-10-CM

## 2025-01-23 DIAGNOSIS — K59.03 DRUG-INDUCED CONSTIPATION: ICD-10-CM

## 2025-01-23 LAB — URATE SERPL-MCNC: 5.2 MG/DL (ref 2.4–5.7)

## 2025-01-23 RX ORDER — LANOLIN ALCOHOL/MO/W.PET/CERES
100 CREAM (GRAM) TOPICAL DAILY
Qty: 90 TABLET | Refills: 0 | Status: SHIPPED | OUTPATIENT
Start: 2025-01-23

## 2025-01-23 ASSESSMENT — PAIN SCALES - GENERAL: PAINLEVEL_OUTOF10: NO PAIN (0)

## 2025-01-23 NOTE — PROGRESS NOTES
Assessment & Plan     Type 2 diabetes mellitus without complication, without long-term current use of insulin (H)  Drug-induced constipation  - Diabetes well controlled and has CGM, requested in reducing dose to minimize side effects of constipation.  - Dose reduced below.  - Semaglutide, 1 MG/DOSE, (OZEMPIC) 4 MG/3ML pen; Inject 1 mg subcutaneously every 7 days.  - Vitamin B1 whole blood; Future  - thiamine (B-1) 100 MG tablet; Take 1 tablet (100 mg) by mouth daily.  - Vitamin B1 whole blood          Patient Instructions   Start lower ozempic dose on Wednesday when you are due for your next dose.     Increase fiber -   Metamucil  Probiotic - pill or yogurt with probiotics or yakult probiotic drinks   Increase water  Increase fiber intake     Subjective   Liana is a 69 year old, presenting for the following health issues:  Medication Reaction (Side effects of ozemptic)      1/23/2025     1:29 PM   Additional Questions   Roomed by Carissa   Accompanied by alone         1/23/2025     1:29 PM   Patient Reported Additional Medications   Patient reports taking the following new medications none     HPI     The first year of ozempic was great. Seven months ago dose was increased to 2 mg/week. Yesterday recommendations were to drink more water and fiber. Every time she eats she gets nauseous. Over the last week she is having daily BM. She has been wearing a pull over the last week. Constipation started around 3 weeks ago.             Objective    /60 (BP Location: Right arm, Patient Position: Sitting, Cuff Size: Adult Regular)   Pulse 82   Temp 98.2  F (36.8  C) (Temporal)   Resp 20   Wt 111.1 kg (245 lb)   LMP  (LMP Unknown)   SpO2 98%   BMI 41.40 kg/m    Body mass index is 41.4 kg/m .  Physical Exam               Signed Electronically by: Edy Spann MD

## 2025-01-23 NOTE — PATIENT INSTRUCTIONS
Start lower ozempic dose on Wednesday when you are due for your next dose.     Increase fiber -   Metamucil  Probiotic - pill or yogurt with probiotics or yakult probiotic drinks   Increase water  Increase fiber intake

## 2025-01-27 LAB — VIT B1 PYROPHOSHATE BLD-SCNC: 181 NMOL/L

## 2025-02-09 ENCOUNTER — HEALTH MAINTENANCE LETTER (OUTPATIENT)
Age: 70
End: 2025-02-09

## 2025-03-09 ENCOUNTER — OFFICE VISIT (OUTPATIENT)
Dept: URGENT CARE | Facility: URGENT CARE | Age: 70
End: 2025-03-09
Payer: COMMERCIAL

## 2025-03-09 ENCOUNTER — HOSPITAL ENCOUNTER (OUTPATIENT)
Dept: GENERAL RADIOLOGY | Facility: HOSPITAL | Age: 70
Discharge: HOME | End: 2025-03-09
Payer: COMMERCIAL

## 2025-03-09 ENCOUNTER — HEALTH MAINTENANCE LETTER (OUTPATIENT)
Age: 70
End: 2025-03-09

## 2025-03-09 VITALS
WEIGHT: 252 LBS | DIASTOLIC BLOOD PRESSURE: 69 MMHG | SYSTOLIC BLOOD PRESSURE: 134 MMHG | HEART RATE: 92 BPM | BODY MASS INDEX: 42.59 KG/M2 | TEMPERATURE: 97.5 F | RESPIRATION RATE: 20 BRPM | OXYGEN SATURATION: 95 %

## 2025-03-09 DIAGNOSIS — M79.672 LEFT FOOT PAIN: ICD-10-CM

## 2025-03-09 DIAGNOSIS — M10.9 ACUTE GOUT INVOLVING TOE OF LEFT FOOT, UNSPECIFIED CAUSE: ICD-10-CM

## 2025-03-09 DIAGNOSIS — M79.672 LEFT FOOT PAIN: Primary | ICD-10-CM

## 2025-03-09 LAB
CRP SERPL-MCNC: 4.87 MG/L
ERYTHROCYTE [DISTWIDTH] IN BLOOD BY AUTOMATED COUNT: 13.7 % (ref 10–15)
HCT VFR BLD AUTO: 32.7 % (ref 35–47)
HGB BLD-MCNC: 10.9 G/DL (ref 11.7–15.7)
MCH RBC QN AUTO: 31.4 PG (ref 26.5–33)
MCHC RBC AUTO-ENTMCNC: 33.3 G/DL (ref 31.5–36.5)
MCV RBC AUTO: 94 FL (ref 78–100)
PLATELET # BLD AUTO: 236 10E3/UL (ref 150–450)
RBC # BLD AUTO: 3.47 10E6/UL (ref 3.8–5.2)
URATE SERPL-MCNC: 6.9 MG/DL (ref 2.4–5.7)
WBC # BLD AUTO: 6.5 10E3/UL (ref 4–11)

## 2025-03-09 PROCEDURE — 84550 ASSAY OF BLOOD/URIC ACID: CPT

## 2025-03-09 PROCEDURE — 3078F DIAST BP <80 MM HG: CPT

## 2025-03-09 PROCEDURE — 99214 OFFICE O/P EST MOD 30 MIN: CPT

## 2025-03-09 PROCEDURE — 1125F AMNT PAIN NOTED PAIN PRSNT: CPT

## 2025-03-09 PROCEDURE — 3075F SYST BP GE 130 - 139MM HG: CPT

## 2025-03-09 PROCEDURE — 36415 COLL VENOUS BLD VENIPUNCTURE: CPT

## 2025-03-09 PROCEDURE — 86140 C-REACTIVE PROTEIN: CPT

## 2025-03-09 PROCEDURE — 85027 COMPLETE CBC AUTOMATED: CPT

## 2025-03-09 PROCEDURE — 73630 X-RAY EXAM OF FOOT: CPT | Mod: LT

## 2025-03-09 RX ORDER — PREDNISONE 20 MG/1
40 TABLET ORAL DAILY
Qty: 10 TABLET | Refills: 0 | Status: SHIPPED | OUTPATIENT
Start: 2025-03-09

## 2025-03-09 ASSESSMENT — PAIN SCALES - GENERAL: PAINLEVEL_OUTOF10: SEVERE PAIN (10)

## 2025-03-09 NOTE — PROGRESS NOTES
Assessment & Plan     Left foot pain  Acute gout involving toe of left foot, unspecified cause  Sudden onset left foot pain of the left great toe starting about 3:00 AM.  Was unable to get back to sleep afterwards, pain was quite intense.  Pain feels throbbing particularly in the anterior and lateral sides of the left great toe.  Also noticed some swelling in this area.  No rash or warmth.  No other joints affected.  No known trauma or injury.  Did have some relief with Tylenol, topical IcyHot, elevating her foot.  Does have a history of gout for which she has been on allopurinol for many years.  Exam today with mild swelling of the lateral left great toe and mild tenderness palpation particular along the anterior toe.  Range of motion intact, strength intact, dorsalis pedis pulse palpable, skin warm and well-perfused.  Patient denies any fevers, chills, recent illness.  Differential diagnosis included stress fracture, muscle strain/sprain, gout.  Left foot XR with first MTP joint arthrosis but otherwise unremarkable for evidence of fracture or definitive erosive change.  Given XR findings, low suspicion for stress fracture.  Given lack of trauma to the foot, lack of rash or warmth on the foot, and lack of systemic symptoms of illness, very low suspicion for septic arthritis.  Patient has a history of gout for which she takes allopurinol but does report recent missed doses of Ozempic and recent changes in diet to include more salty and fatty foods, raising the possibility that this is a gout flare.  Therefore, we will treat for possible gout flare with 5-day course of prednisone.  Unable to perform synovial fluid analysis but will obtain uric acid level, CRP, and CBC for further diagnostic clarity.  Advised that she continue taking her allopurinol and follow-up with PCP in the next 2-3 days for recheck and dose adjustments as needed.  - XR Foot Left G/E 3 Views  - predniSONE (DELTASONE) 20 MG tablet  Dispense: 10  tablet; Refill: 0  - Uric acid  - CBC with platelets  - CRP, inflammation  - Ankle/Foot Bracing Supplies Order Cast Shoe, Open Toe; Left    Fatmata Saab MD  [facility] LOURDES Gaines is a 69 year old female who presents to clinic today for the following health issues:  Chief Complaint   Patient presents with    Musculoskeletal Problem     Left foot joint pain.          3/9/2025     4:26 PM   Additional Questions   Roomed by fidel EASLEY  Patient presenting today for evaluation of left foot pain.  Woke up suddenly with left foot pain at about 3:00 this morning.  Was unable to get back to sleep, the pain was intense.  Initially, the pain, especially with pressure applied, would get past a 10 out of 10 on the pain scale.  Initially, the pain was such that she could not even put a sheet or a sock on.  Pain is since calmed down a bit since using Tylenol and a topical IcyHot.  Has noticed a new raised bump laterally that was not there before.  No rash.  Does note tingling and numbness in the toes as well.  At baseline, she has peripheral neuropathy but states that this tingling and numbness feels different.  Does not recall any pain like this in the past.  Denies any recent trauma or injury to the toe.  Does affirm a history of gout.  Reports being on allopurinol for a long time.  Is unsure when her last gout flare was but notes that was sometime ago.  Thinks that previous gout flares up in her feet but she is unsure.  Yesterday's events including going to a [employer] buffet which included a lot of seafood, crab, pot roast, corn, mashed potatoes, chicken.  Patient did notice yesterday today her boots felt tight but attributed that to wearing thick socks.        Objective    /69 (BP Location: Right arm, Patient Position: Sitting)   Pulse 92   Temp 97.5  F (36.4  C) (Oral)   Resp 20   Wt 114.3 kg (252 lb)   LMP  (LMP Unknown)   SpO2 95%   BMI 42.59 kg/m     Physical Exam   GENERAL: alert and no distress  NECK: no asymmetry, masses, or scars  RESP: nonlabored breathing on room air, no cough  CV: skin is warm and well-perfused, left foot with palpable dorsalis pedis pulse  MS: left foot with mild edema lateral to the left great toe, left great toe with tenderness to palpation particularly anteriorly, left toes ROM intact with normal strength, no exacerbation of pain with strength testing  SKIN: no suspicious lesions or rashes on exposed skin including no significant redness or warmth of the left foot  NEURO: normal strength and tone, mentation intact and speech normal  PSYCH: mentation appears normal, affect normal/bright    Xray - Reviewed and interpreted by me.    Radiology review also noted as below:  IMPRESSION: There is no evidence of an acute fracture. Mild first MTP joint arthrosis. No definitive erosive change or abnormal soft tissue calcification. Atherosclerotic vascular calcifications.     Results for orders placed or performed in visit on 03/09/25 (from the past 24 hours)   CBC with platelets   Result Value Ref Range    WBC Count 6.5 4.0 - 11.0 10e3/uL    RBC Count 3.47 (L) 3.80 - 5.20 10e6/uL    Hemoglobin 10.9 (L) 11.7 - 15.7 g/dL    Hematocrit 32.7 (L) 35.0 - 47.0 %    MCV 94 78 - 100 fL    MCH 31.4 26.5 - 33.0 pg    MCHC 33.3 31.5 - 36.5 g/dL    RDW 13.7 10.0 - 15.0 %    Platelet Count 236 150 - 450 10e3/uL     *Note: Due to a large number of results and/or encounters for the requested time period, some results have not been displayed. A complete set of results can be found in Results Review.

## 2025-03-09 NOTE — LETTER
3/9/2025    Liana Briceño   1955        To Whom it May Concern;    Liana Briceño was seen and treated at my clinic on Mar 9, 2025. Please excuse her from work 3/10/25-3/11/25 for her recovery.    Sincerely,        Fatmata Saab MD

## 2025-03-10 ENCOUNTER — TELEPHONE (OUTPATIENT)
Dept: INTERNAL MEDICINE | Facility: CLINIC | Age: 70
End: 2025-03-10
Payer: COMMERCIAL

## 2025-03-10 NOTE — TELEPHONE ENCOUNTER
Patient notified of Dr. Corbin's response. Patient verbalized understanding and is agreeable to take prednisone 20 mg daily instead of 40 mg daily.

## 2025-03-10 NOTE — TELEPHONE ENCOUNTER
"Pt was in urgent care yesterday and given: Prednisone 20mg tablet \"Take 2 tablets (40 mg) by mouth daily. \"    Pt states she thought that was too much and only took 1 tablet yesterday around 8pm.     Pt states this AM notices herself having SOB with ambulation and states it is due to the prednisone.     Pt states has been on prednisone tapers in the past and has had no issue with them.     She states her gout is slightly better today also.     Pt wanting to know if she can take 10mg daily instead of the 40mg daily of her prednisone.   "

## 2025-03-10 NOTE — TELEPHONE ENCOUNTER
Ok to do that but I recommend She take one of the 20mg instead   But if she wants 10mg .she can also cut the 20mg in half ( that may be hard to do as they are small pills )

## 2025-03-13 ENCOUNTER — NURSE TRIAGE (OUTPATIENT)
Dept: INTERNAL MEDICINE | Facility: CLINIC | Age: 70
End: 2025-03-13

## 2025-03-13 ENCOUNTER — OFFICE VISIT (OUTPATIENT)
Dept: FAMILY MEDICINE | Facility: CLINIC | Age: 70
End: 2025-03-13
Payer: COMMERCIAL

## 2025-03-13 ENCOUNTER — ANCILLARY PROCEDURE (OUTPATIENT)
Dept: GENERAL RADIOLOGY | Facility: CLINIC | Age: 70
End: 2025-03-13
Payer: COMMERCIAL

## 2025-03-13 ENCOUNTER — TELEPHONE (OUTPATIENT)
Dept: FAMILY MEDICINE | Facility: CLINIC | Age: 70
End: 2025-03-13

## 2025-03-13 VITALS
HEART RATE: 85 BPM | OXYGEN SATURATION: 97 % | HEIGHT: 65 IN | BODY MASS INDEX: 41.99 KG/M2 | RESPIRATION RATE: 18 BRPM | SYSTOLIC BLOOD PRESSURE: 116 MMHG | TEMPERATURE: 98 F | WEIGHT: 252 LBS | DIASTOLIC BLOOD PRESSURE: 83 MMHG

## 2025-03-13 DIAGNOSIS — J18.9 PNEUMONIA OF BOTH LOWER LOBES DUE TO INFECTIOUS ORGANISM: Primary | ICD-10-CM

## 2025-03-13 DIAGNOSIS — J06.9 URI WITH COUGH AND CONGESTION: Primary | ICD-10-CM

## 2025-03-13 DIAGNOSIS — E11.9 TYPE 2 DIABETES MELLITUS WITHOUT COMPLICATION, WITH LONG-TERM CURRENT USE OF INSULIN (H): ICD-10-CM

## 2025-03-13 DIAGNOSIS — J06.9 URI WITH COUGH AND CONGESTION: ICD-10-CM

## 2025-03-13 DIAGNOSIS — E11.9 TYPE 2 DIABETES MELLITUS WITHOUT COMPLICATION, WITHOUT LONG-TERM CURRENT USE OF INSULIN (H): Chronic | ICD-10-CM

## 2025-03-13 DIAGNOSIS — Z79.4 TYPE 2 DIABETES MELLITUS WITHOUT COMPLICATION, WITH LONG-TERM CURRENT USE OF INSULIN (H): ICD-10-CM

## 2025-03-13 DIAGNOSIS — B00.9 HSV (HERPES SIMPLEX VIRUS) INFECTION: ICD-10-CM

## 2025-03-13 LAB
DEPRECATED S PYO AG THROAT QL EIA: NEGATIVE
FLUAV RNA SPEC QL NAA+PROBE: NEGATIVE
FLUBV RNA RESP QL NAA+PROBE: NEGATIVE
RSV RNA SPEC NAA+PROBE: NEGATIVE
S PYO DNA THROAT QL NAA+PROBE: NOT DETECTED
SARS-COV-2 RNA RESP QL NAA+PROBE: NEGATIVE

## 2025-03-13 RX ORDER — VALACYCLOVIR HYDROCHLORIDE 500 MG/1
500 TABLET, FILM COATED ORAL DAILY
Qty: 90 TABLET | Refills: 3 | OUTPATIENT
Start: 2025-03-13

## 2025-03-13 RX ORDER — LEVOFLOXACIN 750 MG/1
750 TABLET, FILM COATED ORAL DAILY
Qty: 10 TABLET | Refills: 0 | Status: SHIPPED | OUTPATIENT
Start: 2025-03-13 | End: 2025-03-23

## 2025-03-13 RX ORDER — ALBUTEROL SULFATE 90 UG/1
2 INHALANT RESPIRATORY (INHALATION) EVERY 6 HOURS PRN
Qty: 18 G | Refills: 0 | Status: SHIPPED | OUTPATIENT
Start: 2025-03-13

## 2025-03-13 RX ORDER — BENZONATATE 100 MG/1
100 CAPSULE ORAL 2 TIMES DAILY PRN
Qty: 30 CAPSULE | Refills: 0 | Status: SHIPPED | OUTPATIENT
Start: 2025-03-13

## 2025-03-13 ASSESSMENT — PAIN SCALES - GENERAL: PAINLEVEL_OUTOF10: MODERATE PAIN (6)

## 2025-03-13 NOTE — TELEPHONE ENCOUNTER
Patient was in to see Josette today and mentioned needing refills. Refills pending to be sent to provider.

## 2025-03-13 NOTE — TELEPHONE ENCOUNTER
Called patient to discuss that x-ray results do indicate concerns for bilateral lower lobe pneumonia.  Prescribing levofloxacin daily for 10 days, as patient has a number of antibiotic allergies that prevent her from taking many other antibiotics that will cover this type of infection well.  Would like her to follow-up in about a week if symptoms are not improving. Offered education on medications including appropriate dosing, possible side effects, and possible adverse effects.  Education given on return to clinic instructions as well as alarm signs that would require the need for immediate medical attention.  Patient attested to understanding.    Josette Everett DNP FNP-C  Family Nurse Practitioner - Same Day Provider  [facility] [place]

## 2025-03-13 NOTE — TELEPHONE ENCOUNTER
Nurse Triage SBAR    Is this a 2nd Level Triage? NO    Situation:  Patient calls stating she feels sick and would like to be seen today.     Background:  69 year old female with multiple comorbidities. Symptoms began yesterday.     Assessment:  Patient states that she developed a deep cough with phlegm and lost her voice yesterday. She is having a hard time coughing up the phlegm. States that she has a sore throat. Denies ear pain. Denies any fevers. Denies shortness of breath. Patient does not have covid tests at home so she has not tested.     Protocol Recommended Disposition:   See in Office Today or Tomorrow    Recommendation:  Patient requested an appointment. Scheduled with same day provider.     Routed to provider as FYI    Does the patient meet one of the following criteria for ADS visit consideration? 16+ years old, with an [facility] PCP     TIP  Providers, please consider if this condition is appropriate for management at one of our Acute and Diagnostic Services sites.     If patient is a good candidate, please use dotphrase <dot>triageresponse and select Refer to ADS to document.    Reason for Disposition   Patient wants to be seen    Additional Information   Negative: SEVERE difficulty breathing (e.g., struggling for each breath, speaks in single words)   Negative: Very weak (can't stand)   Negative: Sounds like a life-threatening emergency to the triager   Negative: Symptoms of COVID-19 (e.g., cough, fever, SOB, or others) and COVID-19 is widespread in the community   Negative: Symptoms of COVID-19 (e.g., cough, fever, SOB, or others) and within 14 days of COVID-19 EXPOSURE   Negative: Symptoms of FLU (e.g., cough, runny nose, SOB, sore throat; with or without fever) and within 14 days of EXPOSURE (close contact) with someone diagnosed with influenza (e.g., flu test positive)   Negative: Difficulty breathing and not from stuffy nose (e.g., not relieved by cleaning out the nose)   Negative: Runny nose is  "caused by pollen or other allergies   Negative: Cough is main symptom   Negative: Sore throat is main symptom   Negative: Patient sounds very sick or weak to the triager   Negative: Fever > 103 F (39.4 C)   Negative: Fever > 101 F (38.3 C) and over 60 years of age   Negative: Fever > 100 F (37.8 C) and has diabetes mellitus or a weak immune system (e.g., HIV positive, cancer chemotherapy, organ transplant, splenectomy, chronic steroids)   Negative: Fever > 100 F (37.8 C) and bedridden (e.g., CVA, chronic illness, recovering from surgery)   Negative: Fever present > 3 days (72 hours)   Negative: Fever returns after gone for over 24 hours and symptoms worse or not improved   Negative: Sinus pain (not just congestion) and fever   Negative: Earache   Negative: Sinus congestion (pressure, fullness) present > 10 days   Negative: Nasal discharge present > 10 days   Negative: Using nasal washes and pain medicine > 24 hours and sinus pain (lower forehead, cheekbone, or eye) persists    Answer Assessment - Initial Assessment Questions  1. ONSET: \"When did the nasal discharge start?\"       Started yesterday I was coughing sad  2. AMOUNT: \"How much discharge is there?\"         3. COUGH: \"Do you have a cough?\" If Yes, ask: \"Describe the color of your mucus.\" (e.g., clear, white, yellow, green)      It's really bad, so bad that I'm incontinent.   4. RESPIRATORY DISTRESS: \"Describe your breathing.\"       My breathing feels normal.   5. FEVER: \"Do you have a fever?\" If Yes, ask: \"What is your temperature, how was it measured, and when did it start?\"      No  6. SEVERITY: \"Overall, how bad are you feeling right now?\" (e.g., doesn't interfere with normal activities, staying home from school/work, staying in bed)       Pretty bad   7. OTHER SYMPTOMS: \"Do you have any other symptoms?\" (e.g., earache, mouth sores, sore throat, wheezing)      Sore throat. Slight headache. Lost voice.   8. PREGNANCY: \"Is there any chance you are " "pregnant?\" \"When was your last menstrual period?\"      NA    Protocols used: Common Cold-A-[place]    "

## 2025-03-13 NOTE — PROGRESS NOTES
Assessment & Plan     (J06.9) URI with cough and congestion  (primary encounter diagnosis)  Comment: Acute. No signs of respiratory distress, vital signs stable, and no red flag signs requiring immediate need for medical attention.  Considering concerns for chest congestion and productive cough, adding on a chest x-ray today to rule out concerns for pneumonia.  Testing for influenza and COVID, as patient has not been vaccinated against these.  Severity of sore throat, and physical examination findings to prompt the need for strep testing today.  Rapid strep testing is negative, and did educate patient that positive PCR strep testing, and active infection noted on chest x-ray would be the 2 diagnoses at the moment that would warrant the need for more immediate antibiotic treatment.  Otherwise it is much more likely that patient symptoms are related to a viral upper respiratory illness.  Adding on albuterol and Tessalon Perles for cough management alongside a number of other supportive therapy options listed in the after visit summary that will be helpful to manage her concerns moving forward.  Will update the plan of care appropriately based on respiratory viral panel results. Offered education on medications including appropriate dosing, possible side effects, and possible adverse effects.  Education given on return to clinic instructions as well as alarm signs that would require the need for immediate medical attention.  Patient attested to understanding.  Plan: XR Chest 2 Views, Influenza A/B, RSV and         SARS-CoV2 PCR (COVID-19), Streptococcus A Rapid        Screen w/Reflex to PCR - Clinic Collect,         albuterol (PROAIR HFA/PROVENTIL HFA/VENTOLIN         HFA) 108 (90 Base) MCG/ACT inhaler, benzonatate        (TESSALON) 100 MG capsule, Group A         Streptococcus PCR Throat Swab     This progress note has been dictated, with use of voice recognition software. Any grammatical, typographical, or context  errors are unintentional and inherent to use of voice recognition software.     Patient is requesting refills for a number of her medications to manage chronic disease.  Educated patient that these need to be medications that are refilled by her primary care provider.  Patient showed up late for her visit today, so there were surely not enough time to give adequate attention to each of these chronic concerns, and refill these medications safely and appropriately.  We educated her that this is something that needs to be done with her primary care provider, and would like her to reach out to her PCP for further guidance.    Ordering of each unique test  Prescription drug management  I spent a total of 22 minutes on the day of the visit.   Time spent by me today doing chart review, history and exam, documentation and further activities per the note    FUTURE APPOINTMENTS:       - Follow-up visit in 1 week if symptoms are not improving       -Follow-up with primary care provider at earliest convenience for chronic disease management       - Follow-up for annual visit or as needed  Patient Instructions   We will test you for a number of things today that can explain your symptoms. Many of these are viral illnesses that will be well treated with the options listed below    We will test you today for strep throat    If your rapid strep test was positive today, we will treat with a course of antibiotics. Please complete the full course of antibiotics regardless of symptom improvement.  This medication may cause some stomach upset, so you can take it with food to prevent this. You will be contagious until you have completed 24 hours of the medication, so avoid coming in close contact with others, and especially sharing beverages or food.  Please discard and replace your toothbrush after 24 hours on antibiotics to avoid reinfection.    If your rapid strep test was negative today, we will need to wait for the strep PCR results  to have a definitive diagnosis. If both test are negative, this likely means that your illness is related to a viral infection.  Viral infections generally clear up on their own within a week but there are some things you can do at home to make yourself more comfortable.      I have prescribed you an inhaler to help with your coughing. It is called albuterol, and it will help to open up the airways to your lungs. You can use 2 puffs when you are coughing, wheezing, or feeling short of breath. If your symptoms do not improve after a few minutes, you can use 2 more puffs. I would encourage you to be seen in the clinic if you are needing more than 4 puffs to resolve your breathing concerns.     I have also prescribed a medication called Tessalon Perels. This is a cough medication that can be useful to prevent coughing jags overnight, and help you get a good nights rest. This medication can be used during the day, but it can often make people a bit drowsy, so I would encourage you to take it in the evening for the first time to determine how your body reacts to the medication.    Cepacol lozenges: These are lozenges that you will suck on like a cough drops or hard candy.  This will help to manage your throat pains that you are able to continue to eat and drink    Saline spray: I would like you to  with saline spray over-the-counter.  This works best when you lean slightly forward, insert the spray nozzle into your nostril, and direct the nozzle towards the opposite side of your face.  This is sometimes easier to be done in the shower over the sink as it can get a little bit messy.  You will know that you have done this correctly if your eyes slightly water after spraying the solution.  You can do this as prescribed on the box for as long as it takes to treat your symptoms.    Ibuprofen and Tylenol: You can take ibuprofen and Tylenol as prescribed on the box around the clock.  You can either take them on  alternating schedules or you can take them together.  These medications work differently in your body, and are safe to be taken together.    Humidifier: Using a humidifier in the area that you sleep or taking a very hot shower to inhale some of the vaporized steam can help to open up your nasal passages and sinuses and encourage them to drain.    Rest: get adequate rest including 7-8 hours of sleep, and low activity levels during the day to encourage healing. Avoid high impact exercise and rigorous physical labor    Nutrition: support healing by fueling your body with healthy foods. Fruits, vegetables, and whole foods are all great options!    Hydration: increase fluid intake. Illness leads to increased dehydration, so your body needs more fluid intake than it might when you are healthy. Prather and sugar free teas are great options. Try to avoid beverages that cause more dehydration including coffee, soda, energy drinks, and alcohol.     Please follow-up in about 1 week if symptoms are not improving.    It is important to seek immediate medical attention if you are having symptoms of chest pain, fever, shortness of breath, palpitations, or any changes in your mental status.      Edmundo Gaines is a 69 year old, presenting for the following health issues:  office visit (Patient reports they are here with cold-like symptoms. Reports yesterday she started coughing, so bad that she had urge to urinate. This morning woke up with sore throat and no voice. Reports a deep cough in her chest. About 1 hour ago she had diarrhea andAlso reports achiness. Reports she needs ozempic filled right away, has been without it, needs a 90 day supply. Also needs refill and 90 -day supply on valtrex, jardiance, hydrochlorothiazide, and B1. Reports R side abdomen pain. Would like covid test)        3/13/2025    11:07 AM   Additional Questions   Roomed by Costa   Accompanied by alone         3/13/2025    11:07 AM   Patient Reported  "Additional Medications   Patient reports taking the following new medications none     History of Present Illness       Reason for visit:  Cold symptoms  Symptom onset:  1-3 days ago   She is taking medications regularly.      Liana is a 69-year-old female with a past medical history significant for type 2 diabetes, nontoxic goiter, hypertension, GERD, dyslipidemia, hiatal hernia, sensorineural hearing loss, obesity, RADHA on CPAP, chronic kidney disease, osteoarthritis of the knees, peripheral arterial disease, chronic low back pain, iron deficiency anemia, breast abscess, mixed incontinence, acute gout, trigger finger, and an aneurysm of the left internal carotid artery who presents today with concerns for respiratory symptoms.  Patient reports that beginning on Tuesday, March 11 she was experiencing symptoms of cough, chest congestion, and significant sore throat that have only worsened this time has gone on.  She declines any chest pain, shortness of breath, or difficulty swallowing, and has not had a fever throughout this course of illness.  She also declines any sinus pressure congestion, ear pain, headaches, blurry or double vision, or lightheadedness or dizziness.  She does attest to some increase stomach upset with diarrhea and the more recent days.  She has not tested herself for COVID at home, and is not immunized against influenza or COVID.  She has not been around anyone more recently with similar symptoms to the best of her knowledge.        Review of Systems  Constitutional, HEENT, cardiovascular, pulmonary, gi and gu systems are negative, except as otherwise noted.      Objective    /83 (BP Location: Left arm, Patient Position: Sitting, Cuff Size: Adult Large)   Pulse 85   Temp 98  F (36.7  C) (Temporal)   Resp 18   Ht 1.638 m (5' 4.5\")   Wt 114.3 kg (252 lb)   LMP  (LMP Unknown)   SpO2 97%   BMI 42.59 kg/m    Body mass index is 42.59 kg/m .  Physical Exam   GENERAL: alert, no distress, " and fatigued  EYES: Eyes grossly normal to inspection, PERRL and conjunctivae and sclerae normal  HENT: normal cephalic/atraumatic, ear canals and TM's normal, nose and mouth without ulcers or lesions, oral mucous membranes moist, and tonsillar erythema  NECK: no adenopathy, no asymmetry, masses, or scars  RESP: Easy rate, depth, and work of breathing without use of accessory muscles.  Diminished breath sounds in bilateral lower lobes.  No rales, wheezing, or rhonchi  CV: regular rate and rhythm, normal S1 S2, no S3 or S4, no murmur, click or rub, no peripheral edema  ABDOMEN: soft, nontender, no hepatosplenomegaly, no masses and bowel sounds normal  MS: no gross musculoskeletal defects noted, no edema    Results for orders placed or performed in visit on 03/13/25 (from the past 24 hours)   Streptococcus A Rapid Screen w/Reflex to PCR - Clinic Collect    Specimen: Throat; Swab   Result Value Ref Range    Group A Strep antigen Negative Negative     *Note: Due to a large number of results and/or encounters for the requested time period, some results have not been displayed. A complete set of results can be found in Results Review.       Josette Everett DNP FNP-C  Family Nurse Practitioner - Same Day Provider  [facility] [place]    Signed Electronically by: FRANCO Gill CNP

## 2025-03-19 DIAGNOSIS — M10.9 ACUTE GOUTY ARTHROPATHY: ICD-10-CM

## 2025-03-19 DIAGNOSIS — I10 ESSENTIAL HYPERTENSION WITH GOAL BLOOD PRESSURE LESS THAN 140/90: ICD-10-CM

## 2025-03-19 RX ORDER — LISINOPRIL 5 MG/1
TABLET ORAL
Qty: 135 TABLET | Refills: 2 | Status: SHIPPED | OUTPATIENT
Start: 2025-03-19

## 2025-03-19 NOTE — TELEPHONE ENCOUNTER
Patient reports that when she tried 2mg it was too much for her so she would like to stay on 1mg for 1 more month before increasing.

## 2025-03-20 RX ORDER — ALLOPURINOL 100 MG/1
100 TABLET ORAL DAILY
Qty: 90 TABLET | Refills: 0 | Status: SHIPPED | OUTPATIENT
Start: 2025-03-20

## 2025-03-24 ENCOUNTER — E-VISIT (OUTPATIENT)
Dept: URGENT CARE | Facility: CLINIC | Age: 70
End: 2025-03-24
Payer: COMMERCIAL

## 2025-03-24 ENCOUNTER — TELEPHONE (OUTPATIENT)
Dept: INTERNAL MEDICINE | Facility: CLINIC | Age: 70
End: 2025-03-24

## 2025-03-24 DIAGNOSIS — B37.31 CANDIDAL VULVOVAGINITIS: Primary | ICD-10-CM

## 2025-03-24 PROCEDURE — 99207 PR NON-BILLABLE SERV PER CHARTING: CPT | Performed by: EMERGENCY MEDICINE

## 2025-03-24 RX ORDER — FLUCONAZOLE 150 MG/1
150 TABLET ORAL ONCE
Qty: 1 TABLET | Refills: 0 | Status: SHIPPED | OUTPATIENT
Start: 2025-03-24 | End: 2025-03-24

## 2025-03-24 NOTE — PATIENT INSTRUCTIONS
Thank you for choosing us for your care. I have placed an order for a prescription so that you can start treatment. View your full visit summary for details by clicking on the link below. Your pharmacist will able to address any questions you may have about the medication.     If you re not feeling better within 2-3 days, please schedule an appointment.  You can schedule an appointment right here in [facility], or call [phone]  If the visit is for the same symptoms as your eVisit, we ll refund the cost of your eVisit if seen within seven days.    Vaginal Yeast Infection: Care Instructions  Overview     A vaginal yeast infection is the growth of too many yeast cells in the vagina. This is a common problem. Itching, vaginal discharge and irritation, and other symptoms can bother you. But yeast infections don't often cause other health problems.  Some medicines can increase your risk of getting a yeast infection. These include antibiotics, hormones, and steroids. You may also be more likely to get a yeast infection if you are pregnant, have diabetes, douche, or wear tight clothes.  With treatment, most yeast infections get better in a few days.  Follow-up care is a key part of your treatment and safety. Be sure to make and go to all appointments, and call your doctor if you are having problems. It's also a good idea to know your test results and keep a list of the medicines you take.  How can you care for yourself at home?  Take your medicines exactly as prescribed. Call your doctor if you think you are having a problem with your medicine.  Ask your doctor about over-the-counter (OTC) medicines for yeast infections. If you use an OTC treatment, read and follow all instructions on the label.  Don't use tampons while using a vaginal cream or suppository. The tampons can absorb the medicine. Use pads instead.  Wear loose cotton clothing. Don't wear nylon or other fabric that holds body heat and moisture close to the  "skin.  Try sleeping without underwear.  Don't scratch. Relieve itching with a cold pack or a cool bath.  Don't wash your vulva more than once a day. Use plain water or a mild, unscented soap. Air-dry the vulva.  Change out of wet or damp clothes as soon as possible.  If you are using a vaginal medicine, don't have sex until you have finished your treatment. But if you do have sex, don't depend on a condom or diaphragm for birth control. The oil in some vaginal medicines weakens latex.  Don't douche or use powders, sprays, or perfumes in your vagina or on your vulva. These items can change the normal balance of organisms in your vagina.  When should you call for help?   Call your doctor now or seek immediate medical care if:    You have new or increased pain in your vagina or pelvis.   Watch closely for changes in your health, and be sure to contact your doctor if:    You have unexpected vaginal bleeding.     You have a fever.     You are not getting better after 2 days.     Your symptoms come back after you finish your medicines.   Where can you learn more?  Go to https://www.[employer].net/patiented  Enter F639 in the search box to learn more about \"Vaginal Yeast Infection: Care Instructions.\"  Current as of: April 30, 2024  Content Version: 14.4    8004-9002 [employer].   Care instructions adapted under license by your healthcare professional. If you have questions about a medical condition or this instruction, always ask your healthcare professional. [employer] disclaims any warranty or liability for your use of this information.    "

## 2025-03-24 NOTE — TELEPHONE ENCOUNTER
Pt calls in requesting Diflucan script due to yeast infection after taking antibiotics for Pneumonia. Encouraged pt to complete Evisit as PCP is out of office. Walked pt through how to complete.

## 2025-04-14 ENCOUNTER — OFFICE VISIT (OUTPATIENT)
Dept: FAMILY MEDICINE | Facility: CLINIC | Age: 70
End: 2025-04-14
Payer: COMMERCIAL

## 2025-04-14 ENCOUNTER — ANCILLARY PROCEDURE (OUTPATIENT)
Dept: GENERAL RADIOLOGY | Facility: CLINIC | Age: 70
End: 2025-04-14
Payer: COMMERCIAL

## 2025-04-14 VITALS
HEART RATE: 79 BPM | HEIGHT: 65 IN | BODY MASS INDEX: 42.59 KG/M2 | TEMPERATURE: 97.9 F | RESPIRATION RATE: 16 BRPM | DIASTOLIC BLOOD PRESSURE: 72 MMHG | OXYGEN SATURATION: 99 % | SYSTOLIC BLOOD PRESSURE: 120 MMHG

## 2025-04-14 DIAGNOSIS — L60.0 INGROWN TOENAIL OF RIGHT FOOT: ICD-10-CM

## 2025-04-14 DIAGNOSIS — M25.521 RIGHT ELBOW PAIN: ICD-10-CM

## 2025-04-14 DIAGNOSIS — M25.521 RIGHT ELBOW PAIN: Primary | ICD-10-CM

## 2025-04-14 PROCEDURE — 3074F SYST BP LT 130 MM HG: CPT

## 2025-04-14 PROCEDURE — 99214 OFFICE O/P EST MOD 30 MIN: CPT

## 2025-04-14 PROCEDURE — 73080 X-RAY EXAM OF ELBOW: CPT | Mod: TC | Performed by: RADIOLOGY

## 2025-04-14 PROCEDURE — 1125F AMNT PAIN NOTED PAIN PRSNT: CPT

## 2025-04-14 PROCEDURE — 3078F DIAST BP <80 MM HG: CPT

## 2025-04-14 RX ORDER — MUPIROCIN 20 MG/G
OINTMENT TOPICAL 3 TIMES DAILY
Qty: 30 G | Refills: 0 | Status: SHIPPED | OUTPATIENT
Start: 2025-04-14

## 2025-04-14 RX ORDER — ACETAMINOPHEN 500 MG
1000 TABLET ORAL 3 TIMES DAILY PRN
Qty: 90 TABLET | Refills: 0 | Status: SHIPPED | OUTPATIENT
Start: 2025-04-14

## 2025-04-14 ASSESSMENT — PAIN SCALES - GENERAL: PAINLEVEL_OUTOF10: SEVERE PAIN (10)

## 2025-04-14 NOTE — PATIENT INSTRUCTIONS
Elbow Pain    Pain Management  Begin taking medications to manage pain today as we discussed. These will work best to manage your pain when taken around the clock rather than every so often when your pain is worse  -Tylenol 325-650mg every 4-6 hours  -Volteran Gel topically to the area as needed    Apply cold packs to the area for 20min at a time 4 times per day. This will help to reduce swelling and offer some numbing to manage pain. You can also use warm packs to manage pain if this provides better relief.    Movement  It is very important to maintain your normal daily activities as tolerated. Avoiding sitting, laying, standing, or remaining in one position for extended periods of time. Continuing to stretch and move your body will help to encourage healing and keep your muscles loose.    I have attached some exercises to your after visit summary that you can try out for the pain    Next Step  If this plan doesn't seem to be helping after 2-4 weeks then I want you to come back to see me, and we can discuss what the next options might be. Seek emergency medical help If pain becomes unbearable, is not responding to pain medication, or is accompanied by fever, warmth, or loss of function.

## 2025-04-14 NOTE — PROGRESS NOTES
Assessment & Plan     (M25.521) Right elbow pain  (primary encounter diagnosis)  Comment: Subacute and stable.  No findings that would warrant the need for immediate medical attention.  Not concern for septic joint.  Seems most likely to be a bursitis or an deep soft tissue bruise, but considering the point tenderness over the lateral epicondyles, and pain that is only reproduced with increased pressure in the area, adding on an x-ray of the elbow to rule out concerns for fracture.  Recommending ice, rest, gentle stretching, Tylenol, and Voltaren gel in the meantime.  Will update the plan of care more appropriately when x-ray results are available. Offered education on medications including appropriate dosing, possible side effects, and possible adverse effects.  Education given on return to clinic instructions as well as alarm signs that would require the need for immediate medical attention.  Patient attested to understanding.  Plan: XR Elbow Right G/E 3 Views, acetaminophen         (TYLENOL) 500 MG tablet    (L60.0) Ingrown toenail of right foot  Comment: Acute and stable.  No concerns for cellulitis or severe infection.  Adding on Bactroban to prevent progressive infectious etiology, especially due to patient's type 2 diabetes and peripheral neuropathy.  Is important that this is managed more quickly related to past medical history to ensure that this does not become an infectious concern with further breakdown.  Referring patient to podiatry for further management. Offered education on medications including appropriate dosing, possible side effects, and possible adverse effects.  Education given on return to clinic instructions as well as alarm signs that would require the need for immediate medical attention.  Patient attested to understanding.  Plan: Orthopedic  Referral, mupirocin         (BACTROBAN) 2 % external ointment      This progress note has been dictated, with use of voice recognition  software. Any grammatical, typographical, or context errors are unintentional and inherent to use of voice recognition software.     Ordering of each unique test  Prescription drug management  I spent a total of 18 minutes on the day of the visit.   Time spent by me today doing chart review, history and exam, documentation and further activities per the note    FUTURE APPOINTMENTS:       - Follow-up visit in 2 weeks if symptoms are not improving       - Follow-up for annual visit or as needed  Patient Instructions   Elbow Pain    Pain Management  Begin taking medications to manage pain today as we discussed. These will work best to manage your pain when taken around the clock rather than every so often when your pain is worse  -Tylenol 325-650mg every 4-6 hours  -Volteran Gel topically to the area as needed    Apply cold packs to the area for 20min at a time 4 times per day. This will help to reduce swelling and offer some numbing to manage pain. You can also use warm packs to manage pain if this provides better relief.    Movement  It is very important to maintain your normal daily activities as tolerated. Avoiding sitting, laying, standing, or remaining in one position for extended periods of time. Continuing to stretch and move your body will help to encourage healing and keep your muscles loose.    I have attached some exercises to your after visit summary that you can try out for the pain    Next Step  If this plan doesn't seem to be helping after 2-4 weeks then I want you to come back to see me, and we can discuss what the next options might be. Seek emergency medical help If pain becomes unbearable, is not responding to pain medication, or is accompanied by fever, warmth, or loss of function.      Edmundo Gaines is a 69 year old, presenting for the following health issues:  Elbow Pain and Recheck Medication (Pt reports that she's here to discuss having rt elbow pain for about 3 weeks, and rt toe pain that  "she's had for a week.)        4/14/2025     3:01 PM   Additional Questions   Roomed by nayla   Accompanied by alone         4/14/2025     3:01 PM   Patient Reported Additional Medications   Patient reports taking the following new medications none     History of Present Illness       Reason for visit:  Rt elbow pain   She is taking medications regularly.      Liana is a 69-year-old female with a past medical history significant for sensorineural hearing loss, diabetic polyneuropathy, RADHA on CPAP, GERD, obesity, diverticular disease of the large intestine, dyslipidemia, hypertension, PAD, osteoarthritis of the knee, and continuous opioid dependence who presents today with concerns for elbow pain and ingrown toenail.  Patient reports that approximately 3 weeks ago she bumped her right elbow on the car door, and this elicited pain that she describes as the \"funny bone sensation\".  She notes that since that time she has been experiencing discomfort mainly when she applies pressure to the right elbow.  She declines any swelling, heat, redness, numbness, tingling, or weakness in the extremity.  She has full range of motion, and is able to use the extremity normally, and reports that the pain is only reproduced when she rests on the right elbow, or when she bumps her elbow into something.  She has been trying out Tylenol for management, which has been very helpful in the meantime.  She is very concerned that it is broken.  She declines any pain that radiates up into her shoulder or down into her hand.  Patient also notes that a week ago the second toe on her right foot became mildly inflamed, red, and irritated.  It does rub against her shoes, which is what caused her to notice that the pain was worsening.  She declines any numbness, tingling, open sores or lesions, or colorful drainage from the area.  Pain is localized to the second toe on her right foot, and she is very worried about an ingrown toenail.  She otherwise " "feels well, and declines any chest pain, shortness of breath, lightheadedness or dizziness, blurry or double vision, fever, chills, body aches, or vomiting.      Review of Systems  Constitutional, HEENT, cardiovascular, pulmonary, gi and gu systems are negative, except as otherwise noted.      Objective    /72 (BP Location: Left arm, Patient Position: Sitting, Cuff Size: Adult Large)   Pulse 79   Temp 97.9  F (36.6  C) (Tympanic)   Resp 16   Ht 1.638 m (5' 4.5\")   LMP  (LMP Unknown)   SpO2 99%   Breastfeeding No   BMI 42.59 kg/m    Body mass index is 42.59 kg/m .  Physical Exam   GENERAL: alert and no distress  EYES: Eyes grossly normal to inspection, PERRL and conjunctivae and sclerae normal  HENT: ear canals and TM's normal, nose and mouth without ulcers or lesions  NECK: no adenopathy, no asymmetry, masses, or scars  RESP: lungs clear to auscultation - no rales, rhonchi or wheezes  CV: regular rate and rhythm, normal S1 S2, no S3 or S4, no murmur, click or rub, no peripheral edema  ABDOMEN: soft, nontender, no hepatosplenomegaly, no masses and bowel sounds normal  MS: no gross musculoskeletal defects noted, no edema.  Point tenderness over lateral epicondyles and right elbow.  Range of motion, strength, and sensation intact.  No pain reproduced with range of motion.  SKIN: Erythema and tenderness to bilateral aspects of the nailbed of the right second toe.  No purulent drainage, streaking or spreading redness, and patient still able to move extremity  NEURO: Normal strength and tone, mentation intact and speech normal    Josette Everett DNP FNP-C  Family Nurse Practitioner - Same Day Provider  [facility] [place]        Signed Electronically by: FRANCO Gill CNP    "

## 2025-04-15 ENCOUNTER — PATIENT OUTREACH (OUTPATIENT)
Dept: CARE COORDINATION | Facility: CLINIC | Age: 70
End: 2025-04-15
Payer: COMMERCIAL

## 2025-04-17 ENCOUNTER — PATIENT OUTREACH (OUTPATIENT)
Dept: CARE COORDINATION | Facility: CLINIC | Age: 70
End: 2025-04-17
Payer: COMMERCIAL

## 2025-04-23 ENCOUNTER — OFFICE VISIT (OUTPATIENT)
Dept: FAMILY MEDICINE | Facility: CLINIC | Age: 70
End: 2025-04-23
Payer: COMMERCIAL

## 2025-04-23 VITALS
SYSTOLIC BLOOD PRESSURE: 130 MMHG | OXYGEN SATURATION: 98 % | RESPIRATION RATE: 20 BRPM | HEIGHT: 64 IN | TEMPERATURE: 97.2 F | DIASTOLIC BLOOD PRESSURE: 77 MMHG | BODY MASS INDEX: 42.03 KG/M2 | HEART RATE: 83 BPM | WEIGHT: 246.2 LBS

## 2025-04-23 DIAGNOSIS — Z23 NEED FOR VACCINATION: ICD-10-CM

## 2025-04-23 DIAGNOSIS — E66.01 MORBID OBESITY WITH BMI OF 40.0-44.9, ADULT (H): Primary | Chronic | ICD-10-CM

## 2025-04-23 DIAGNOSIS — N18.31 STAGE 3A CHRONIC KIDNEY DISEASE (H): ICD-10-CM

## 2025-04-23 DIAGNOSIS — M54.50 CHRONIC LOW BACK PAIN, UNSPECIFIED BACK PAIN LATERALITY, UNSPECIFIED WHETHER SCIATICA PRESENT: ICD-10-CM

## 2025-04-23 DIAGNOSIS — E53.8 LOW SERUM VITAMIN B12: Chronic | ICD-10-CM

## 2025-04-23 DIAGNOSIS — M25.521 RIGHT ELBOW PAIN: ICD-10-CM

## 2025-04-23 DIAGNOSIS — E11.42 DIABETIC POLYNEUROPATHY ASSOCIATED WITH TYPE 2 DIABETES MELLITUS (H): ICD-10-CM

## 2025-04-23 DIAGNOSIS — L60.0 INGROWN TOENAIL: ICD-10-CM

## 2025-04-23 DIAGNOSIS — M10.9 ACUTE GOUTY ARTHROPATHY: ICD-10-CM

## 2025-04-23 DIAGNOSIS — G47.33 OSA ON CPAP: Chronic | ICD-10-CM

## 2025-04-23 DIAGNOSIS — F11.20 CONTINUOUS OPIOID DEPENDENCE (H): ICD-10-CM

## 2025-04-23 DIAGNOSIS — I73.9 PERIPHERAL ARTERIAL DISEASE: ICD-10-CM

## 2025-04-23 DIAGNOSIS — G89.29 CHRONIC LOW BACK PAIN, UNSPECIFIED BACK PAIN LATERALITY, UNSPECIFIED WHETHER SCIATICA PRESENT: ICD-10-CM

## 2025-04-23 PROBLEM — N61.1 BREAST ABSCESS: Status: RESOLVED | Noted: 2023-11-21 | Resolved: 2025-04-23

## 2025-04-23 LAB
EST. AVERAGE GLUCOSE BLD GHB EST-MCNC: 148 MG/DL
HBA1C MFR BLD: 6.8 % (ref 0–5.6)

## 2025-04-23 PROCEDURE — 3075F SYST BP GE 130 - 139MM HG: CPT | Performed by: INTERNAL MEDICINE

## 2025-04-23 PROCEDURE — 3078F DIAST BP <80 MM HG: CPT | Performed by: INTERNAL MEDICINE

## 2025-04-23 PROCEDURE — 36415 COLL VENOUS BLD VENIPUNCTURE: CPT | Performed by: INTERNAL MEDICINE

## 2025-04-23 PROCEDURE — 99214 OFFICE O/P EST MOD 30 MIN: CPT | Performed by: INTERNAL MEDICINE

## 2025-04-23 PROCEDURE — 83036 HEMOGLOBIN GLYCOSYLATED A1C: CPT | Performed by: INTERNAL MEDICINE

## 2025-04-23 PROCEDURE — 80053 COMPREHEN METABOLIC PANEL: CPT | Performed by: INTERNAL MEDICINE

## 2025-04-23 RX ORDER — LIDOCAINE 50 MG/G
1 PATCH TOPICAL EVERY 24 HOURS
Qty: 30 PATCH | Refills: 1 | Status: SHIPPED | OUTPATIENT
Start: 2025-04-23

## 2025-04-23 RX ORDER — HYDROCODONE BITARTRATE AND ACETAMINOPHEN 5; 325 MG/1; MG/1
1 TABLET ORAL EVERY 6 HOURS PRN
Qty: 18 TABLET | Refills: 0 | Status: SHIPPED | OUTPATIENT
Start: 2025-04-23

## 2025-04-23 NOTE — PROGRESS NOTES
Assessment & Plan     (E66.01,  Z68.41) Morbid obesity with BMI of 40.0-44.9, adult (H)  (primary encounter diagnosis)  Comment: Noted, on semaglutide.  Wt slightly down from last visit.    (G47.33) RADHA on CPAP  Comment: Noted, uses regularly.    (E53.8) Low serum vitamin B12  Comment: Noted- cont B12.    (N18.31) Stage 3a chronic kidney disease (H)  Comment: Noted, check labs today.  Avoid NSAIDs.    (I73.9) Peripheral arterial disease  Comment: Noted in history, on ASA.  Has history of coronary artery stents.    (M10.9) Acute gouty arthropathy  Comment: Noted, on allopurinol, last uric acid <6.    (F11.20) Continuous opioid dependence (H)  Comment: Uses Norco rarely for acute flares of chronic pain- I agreed to assume this Rx with plan for 18 tablets every 4 mos at most. I discussed concerns of opiates for chronic pain.  Needs CSA next visit.  Plan: HYDROcodone-acetaminophen (NORCO) 5-325 MG         tablet    (M25.521) Right elbow pain  Comment: Newer pain- bilateral epicondyle tendonitis, possible bursa pain- XR with humeral periosteal growth?  Recommend ortho referral, repeat XR of humerus.  Plan: Orthopedic  Referral, lidocaine         (LIDODERM) 5 % patch, HYDROcodone-acetaminophen        (NORCO) 5-325 MG tablet    (M54.50,  G89.29) Chronic low back pain, unspecified back pain laterality, unspecified whether sciatica present  Comment: Longstanding, manages with exercise mostly.  Plan: HYDROcodone-acetaminophen (NORCO) 5-325 MG         tablet    (E11.42) Diabetic polyneuropathy associated with type 2 diabetes mellitus (H)  Comment: Noted, check A1C today.  Plan: Adult Eye  Referral, Comprehensive         metabolic panel (BMP + Alb, Alk Phos, ALT, AST,        Total. Bili, TP), Hemoglobin A1c, Adult         Diabetes Education  Referral    (L60.0) Ingrown toenail  Comment: Refer to podiatry.  Plan: Orthopedic  Referral            BMI  Estimated body mass index is 42.24 kg/m   "as calculated from the following:    Height as of this encounter: 1.626 m (5' 4.02\").    Weight as of this encounter: 111.7 kg (246 lb 3.2 oz).             Edmundo Gaines is a 69 year old, presenting for the following health issues:  Consult      4/23/2025    10:38 AM   Additional Questions   Roomed by yanet   Accompanied by self     History of Present Illness       Reason for visit:  Rt elbow pain   She is taking medications regularly.        Has used hydrocodone as needed for low back pain.  Has had >20 cortisone shots in her back.    Has been on disability since she was 52.  Can't stand for over 5-10 minutes due to pain.  Can't walk >200 ft due to back pain.  Has learned to deal with back pain.  Not interested in any back surgeries.    Recently was being seen for right elbow pain.  Very painful.     Lately, has been getting lightheadedness even when sitting in chair.  Felt like she hadn't gotten enough sleep.  Felt better after drinking water.  No chest pain/pressure.  No SOB.  Noticing some sternal pain when exerting herself.  No palpitations.  Feels mild- would like to monitor symptoms, defer stress test for now.    Has carotid artery blockages.  History of coronary artery stents.    Exercises at the gym regularly.    Weight down from 252 lbs on 3/13/25 down to 246 lbs today.    Hydrocodone 18 tablets 5-325. Takes 1-2 per week when back pain flaring, continue 18 tablets every 4 months          Objective    BP (!) 140/76   Pulse 83   Temp 97.2  F (36.2  C) (Temporal)   Resp 20   Ht 1.626 m (5' 4.02\")   Wt 111.7 kg (246 lb 3.2 oz)   LMP  (LMP Unknown)   SpO2 98%   BMI 42.24 kg/m    Body mass index is 42.24 kg/m .  Physical Exam   GENERAL: alert and no distress  RESP: lungs clear to auscultation - no rales, rhonchi or wheezes  CV: regular rate and rhythm, normal S1 S2, no S3 or S4, no murmur, click or rub, no peripheral edema  MS: no gross musculoskeletal defects noted, no edema  PSYCH: mentation appears " normal, affect normal/bright            Signed Electronically by: Lisa Ramires, DO

## 2025-04-23 NOTE — PATIENT INSTRUCTIONS
I recommend you see one of my sports medicine colleagues- Dr. Latonia Thibodeaux for your elbow.    I will send hydrocodone into the pharmacy (18 tablets every 4 months).    Come back and see me to keep getting to know each other.

## 2025-04-24 LAB
ALBUMIN SERPL BCG-MCNC: 4.4 G/DL (ref 3.5–5.2)
ALP SERPL-CCNC: 90 U/L (ref 40–150)
ALT SERPL W P-5'-P-CCNC: 29 U/L (ref 0–50)
ANION GAP SERPL CALCULATED.3IONS-SCNC: 10 MMOL/L (ref 7–15)
AST SERPL W P-5'-P-CCNC: 28 U/L (ref 0–45)
BILIRUB SERPL-MCNC: 0.3 MG/DL
BUN SERPL-MCNC: 21.2 MG/DL (ref 8–23)
CALCIUM SERPL-MCNC: 10.1 MG/DL (ref 8.8–10.4)
CHLORIDE SERPL-SCNC: 104 MMOL/L (ref 98–107)
CREAT SERPL-MCNC: 1.07 MG/DL (ref 0.51–0.95)
EGFRCR SERPLBLD CKD-EPI 2021: 57 ML/MIN/1.73M2
GLUCOSE SERPL-MCNC: 137 MG/DL (ref 70–99)
HCO3 SERPL-SCNC: 27 MMOL/L (ref 22–29)
POTASSIUM SERPL-SCNC: 4.1 MMOL/L (ref 3.4–5.3)
PROT SERPL-MCNC: 7.6 G/DL (ref 6.4–8.3)
SODIUM SERPL-SCNC: 141 MMOL/L (ref 135–145)

## 2025-04-28 ENCOUNTER — PATIENT OUTREACH (OUTPATIENT)
Dept: CARE COORDINATION | Facility: CLINIC | Age: 70
End: 2025-04-28
Payer: COMMERCIAL

## 2025-05-09 ENCOUNTER — TELEPHONE (OUTPATIENT)
Dept: FAMILY MEDICINE | Facility: CLINIC | Age: 70
End: 2025-05-09
Payer: COMMERCIAL

## 2025-05-09 DIAGNOSIS — I67.1 ANEURYSM OF INTRACRANIAL PORTION OF LEFT INTERNAL CAROTID ARTERY: Primary | ICD-10-CM

## 2025-05-09 NOTE — TELEPHONE ENCOUNTER
Dr. Ramires --    Patient asking for referral to cardiology.   Pended below. Please fill out questions.     Thank you,  KRAIG HuffmanN RN  [facility]

## 2025-05-09 NOTE — TELEPHONE ENCOUNTER
General Call      Reason for Call:  patient called and wonders if Franck Moncada at [facility]  would refer her for Cardiology.    Please contact patient.  Thank you.    What are your questions or concerns:  no    Date of last appointment with provider: May    Could we send this information to you in [place] or would you prefer to receive a phone call?:   Patient would prefer a phone call   Okay to leave a detailed message?: Yes at Cell number on file:    Telephone Information:   Mobile [phone]

## 2025-05-10 NOTE — TELEPHONE ENCOUNTER
Signed!  I am not clear why- but think it is because of her history of coronary artery disease?  Can you please check with her if there was another reason?

## 2025-05-12 ENCOUNTER — PATIENT OUTREACH (OUTPATIENT)
Dept: CARE COORDINATION | Facility: CLINIC | Age: 70
End: 2025-05-12
Payer: COMMERCIAL

## 2025-05-12 NOTE — TELEPHONE ENCOUNTER
Patient confirms this is to discuss her CAD. Referral scheduling number given to patient.    KRAIG GlaserN, PHN, AMB-BC (she/her)  [facility] RN

## 2025-05-14 ENCOUNTER — PATIENT OUTREACH (OUTPATIENT)
Dept: CARE COORDINATION | Facility: CLINIC | Age: 70
End: 2025-05-14
Payer: COMMERCIAL

## 2025-05-15 ENCOUNTER — TELEPHONE (OUTPATIENT)
Dept: FAMILY MEDICINE | Facility: CLINIC | Age: 70
End: 2025-05-15
Payer: COMMERCIAL

## 2025-05-15 DIAGNOSIS — E66.01 MORBID OBESITY WITH BMI OF 40.0-44.9, ADULT (H): ICD-10-CM

## 2025-05-15 DIAGNOSIS — E11.9 TYPE 2 DIABETES MELLITUS WITHOUT COMPLICATION, WITHOUT LONG-TERM CURRENT USE OF INSULIN (H): Primary | ICD-10-CM

## 2025-05-15 DIAGNOSIS — E11.42 DIABETIC POLYNEUROPATHY ASSOCIATED WITH TYPE 2 DIABETES MELLITUS (H): ICD-10-CM

## 2025-05-15 NOTE — TELEPHONE ENCOUNTER
Dr. Ramires - see below, appreciate advisement on dose increase (please advise if visit needed)    Patient calling to ask if she can increase semaglutide back to 2 mg  Patient notes poorer BG control and increased appetite  No severe hyperglycemia or sx concerns, but pt notes more spikes throughout the day  Previously reduced dose to 1 mg due to constipation  Has since started using supplemental fiber and increasing water intake and feels she would be able to better manage on the 2mg dose    Routing to clinician for advisement.    ANGELES Church, KRAIGN, PHN, AMB-BC (she/her)  [facility] RN

## 2025-05-22 ENCOUNTER — OFFICE VISIT (OUTPATIENT)
Dept: PODIATRY | Facility: CLINIC | Age: 70
End: 2025-05-22
Payer: COMMERCIAL

## 2025-05-22 DIAGNOSIS — L84 CALLUS: ICD-10-CM

## 2025-05-22 DIAGNOSIS — B35.1 ONYCHOMYCOSIS: ICD-10-CM

## 2025-05-22 DIAGNOSIS — E11.42 DIABETIC POLYNEUROPATHY ASSOCIATED WITH TYPE 2 DIABETES MELLITUS (H): Primary | ICD-10-CM

## 2025-05-22 DIAGNOSIS — G64 PERIPHERAL NERVOUS SYSTEM DISEASE: ICD-10-CM

## 2025-05-22 ASSESSMENT — PAIN SCALES - GENERAL: PAINLEVEL_OUTOF10: MODERATE PAIN (5)

## 2025-05-22 NOTE — LETTER
5/22/2025      Liana Briceño  [address] [place] [address]      Dear Colleague,    Thank you for referring your patient, Liana Briceño, to the [facility]. Please see a copy of my visit note below.    CC: Diabetic foot check     HPI: Liana Briceño is a 69 year old female who presents to clinic for diabetic foot check.  Patient reports that their PCP informed the patient that they should be seen by podiatrist for diabetic foot evaluation.  Patient has painful nails and calluses and would like these evaluated as well.  She states that she did have diabetic shoes and inserts but that the shoes were too expensive and she has never gotten new ones.  She states this was back in 2022.    Past Surgical History:   Procedure Laterality Date     ARTHROPLASTY KNEE Right 4/8/2022    Procedure: RIGHT TOTAL KNEE ARTHROPLASTY;  Surgeon: Declan Eaton MD;  Location: [facility] Main OR     BIOPSY BREAST       BREAST CYST EXCISION       CARDIAC CATHETERIZATION N/A 4/24/2017    Procedure: Coronary Angiogram;  Surgeon: Ariane Biswas MD;  Location: [facility] Cath Lab;  Service:      CARDIAC CATHETERIZATION N/A 5/19/2017    Procedure: Coronary Angiogram;  Surgeon: Howard Gutierrez MD;  Location: [facility] Cath Lab;  Service:      CHOLECYSTECTOMY  2000     CORONARY STENT PLACEMENT  04/24/2017    BO to RCA by Dr. Biswas     HC REVISE MEDIAN N/CARPAL TUNNEL SURG      Description: Neuroplasty Decompression Median Nerve At Carpal Tunnel;  Recorded: 05/22/2009;     IR LUMBAR EPIDURAL STEROID INJECTION  6/22/2007     IR LUMBAR EPIDURAL STEROID INJECTION  11/2/2007     IR LUMBAR EPIDURAL STEROID INJECTION  8/6/2008     IR LUMBAR EPIDURAL STEROID INJECTION  10/8/2008     IR LUMBAR EPIDURAL STEROID INJECTION  10/28/2008     IR LUMBAR EPIDURAL STEROID INJECTION  3/10/2009     WY EXPLO/DRAIN BREAST ABSCESS      Description: Breast Surgery Mastotomy With Drainage Of Abscess     WY L HRT CATH W/NJX L  VENTRICULOGRAPHY IMG S&I N/A 4/24/2017    Procedure: Left Heart Catheterization with Left Ventriculogram;  Surgeon: Ariane Biswas MD;  Location: [facility] Cath Lab;  Service: Cardiology     SC THYROID LOBECTOMY,UNILAT       RADIOFREQUENCY ABLATION  07/05/2011    for PSVT by Dr. Johnson     TOTAL HIP ARTHROPLASTY Right      Past Medical History:   Diagnosis Date     Anemia      Breast cyst      Carpal tunnel syndrome      Coronary artery disease due to lipid rich plaque      Dyslipidemia, goal LDL below 70      Esophageal reflux      Essential hypertension      Fatty liver      Goiter diffuse, nontoxic      Hiatal hernia      History of gestational diabetes      History of MRSA infection 07/17/2017    Pilonidal cyst culture     Increased PTH level 11/4/2018     Morbid obesity with BMI of 40.0-44.9, adult (H)      Motion sickness      RADHA on CPAP      Osteoarthritis      Paroxysmal SVT (supraventricular tachycardia) 2011    ablation by Dr. Johnson in 2011     PONV (postoperative nausea and vomiting)      Positive result for methicillin resistant Staphylococcus aureus (MRSA) screening 11/4/2018     Renal disease      S/P hip replacement      Stented coronary artery      Thiamine deficiency 11/4/2018     Type 2 diabetes mellitus (H)      Uncomplicated asthma      Vitamin D deficiency 11/4/2018     Medications:  Current Outpatient Medications   Medication Sig Dispense Refill     acetaminophen (TYLENOL) 500 MG tablet Take 2 tablets (1,000 mg) by mouth 3 times daily as needed for pain. 90 tablet 0     albuterol (PROAIR HFA/PROVENTIL HFA/VENTOLIN HFA) 108 (90 Base) MCG/ACT inhaler Inhale 2 puffs into the lungs every 6 hours as needed for shortness of breath, wheezing or cough. 18 g 0     allopurinol (ZYLOPRIM) 100 MG tablet Take 1 tablet (100 mg) by mouth daily. 90 tablet 0     aspirin (ASA) 81 MG chewable tablet Take 1 tablet (81 mg) by mouth daily. 90 tablet 3     cholecalciferol, vitamin D3, (CHOLECALCIFEROL) 1,000 unit  tablet [CHOLECALCIFEROL, VITAMIN D3, (CHOLECALCIFEROL) 1,000 UNIT TABLET] Take 3,000 Units by mouth daily.        Cinnamon Bark POWD        Continuous Blood Gluc Sensor (FREESTYLE MIRANDA 2 SENSOR) MISC 1 each every 14 days 1 each every 14 days. Change every 14 days. 6 each 3     cyclobenzaprine (FLEXERIL) 10 MG tablet Take 1 tablet (10 mg) by mouth 3 times daily as needed for muscle spasms 90 tablet 1     docusate sodium (DOK) 100 MG capsule TAKE 1 CAPSULE(100 MG) BY MOUTH TWICE DAILY Strength: 100 mg 180 capsule 3     empagliflozin (JARDIANCE) 10 MG TABS tablet Take 1 tablet (10 mg) by mouth daily. 90 tablet 0     hydroCHLOROthiazide 12.5 MG tablet TAKE 1 TABLET(12.5 MG) BY MOUTH DAILY 90 tablet 3     HYDROcodone-acetaminophen (NORCO) 5-325 MG tablet Take 1 tablet by mouth every 6 hours as needed for severe pain. 18 tablet 0     lidocaine (LIDODERM) 5 % patch Place 1 patch over 12 hours onto the skin every 24 hours. To prevent lidocaine toxicity, patient should be patch free for 12 hrs daily. 30 patch 1     lisinopril (ZESTRIL) 5 MG tablet TAKE 1 AND 1/2 TABLETS BY MOUTH EVERY  tablet 2     melatonin (MELATIN) 3 mg Tab tablet Take 6 mg by mouth nightly as needed       mupirocin (BACTROBAN) 2 % external ointment Apply topically 3 times daily. 30 g 0     nitroGLYcerin (NITROSTAT) 0.3 MG sublingual tablet Place 1 tablet (0.3 mg) under the tongue every 5 minutes as needed for chest pain 100 tablet 3     omega-3 acid ethyl esters (LOVAZA) 1 g capsule Take 2 capsules by mouth 2 times daily 360 capsule 1     omeprazole (PRILOSEC) 40 MG DR capsule TAKE 1 CAPSULE BY MOUTH EVERY DAY 90 capsule 2     ondansetron (ZOFRAN ODT) 4 MG ODT tab Take 1 tablet (4 mg) by mouth every 8 hours as needed for nausea or vomiting. 20 tablet 0     ONETOUCH DELICA PLUS LANCET 33 gauge Misc [ONETOUCH DELICA PLUS LANCET 33 GAUGE MISC] USE TO TEST BLOOD SUGAR 6 TIMES DAILY 600 each 3     rosuvastatin (CRESTOR) 10 MG tablet Take 1 tablet (10  mg) by mouth daily. 100 tablet 3     Semaglutide, 2 MG/DOSE, (OZEMPIC) 8 MG/3ML pen Inject 2 mg subcutaneously every 7 days. 9 mL 3     thiamine (B-1) 100 MG tablet Take 1 tablet (100 mg) by mouth daily. 90 tablet 0     valACYclovir (VALTREX) 500 MG tablet Take 1 tablet (500 mg) by mouth daily. 90 tablet 3     vitamin B-12 (CYANOCOBALAMIN) 2500 MCG sublingual tablet One tab every other day 60 tablet 3     Allergies:  Latex, Penicillins, Clindamycin, Doxycycline, Eggshell membrane [egg shells], Erythromycin, Ibuprofen, Keflex [cephalexin], Naloxone, Penicillamine, Pneumococcal 23-valent polysaccharide vaccine [pneumococcal vaccine], Statins-hmg-coa reductase inhibitors [statins], and Azithromycin  Social History     Socioeconomic History     Marital status:      Spouse name: Not on file     Number of children: 1     Years of education: Not on file     Highest education level: Not on file   Occupational History     Not on file   Tobacco Use     Smoking status: Former     Current packs/day: 0.00     Average packs/day: 0.3 packs/day for 44.0 years (11.0 ttl pk-yrs)     Types: Cigarettes     Start date: 1973     Quit date: 2017     Years since quittin.3     Passive exposure: Past     Smokeless tobacco: Never     Tobacco comments:     age 18 to age 62 up to 1/2 ppd   Vaping Use     Vaping status: Never Used   Substance and Sexual Activity     Alcohol use: Yes     Alcohol/week: 0.0 standard drinks of alcohol     Comment: Alcoholic Drinks/day: < 1 drink a week     Drug use: No     Sexual activity: Not on file   Other Topics Concern     Parent/sibling w/ CABG, MI or angioplasty before 65F 55M? Not Asked   Social History Narrative    Single but lives with her significant other, has one son, she is a .  She is a PCA.   Disability for LBP. Work injury.     Social Drivers of Health     Financial Resource Strain: Low Risk  (2025)    Financial Resource Strain      Within the past 12 months, have  you or your family members you live with been unable to get utilities (heat, electricity) when it was really needed?: No   Food Insecurity: Low Risk  (2025)    Food Insecurity      Within the past 12 months, did you worry that your food would run out before you got money to buy more?: No      Within the past 12 months, did the food you bought just not last and you didn t have money to get more?: No   Transportation Needs: Low Risk  (2025)    Transportation Needs      Within the past 12 months, has lack of transportation kept you from medical appointments, getting your medicines, non-medical meetings or appointments, work, or from getting things that you need?: No   Physical Activity: Not on file   Stress: Not on file   Social Connections: Unknown (2023)    Received from [facility]    Social Connections      Frequency of Communication with Friends and Family: Not on file   Interpersonal Safety: Low Risk  (2025)    Interpersonal Safety      Do you feel physically and emotionally safe where you currently live?: Yes      Within the past 12 months, have you been hit, slapped, kicked or otherwise physically hurt by someone?: No      Within the past 12 months, have you been humiliated or emotionally abused in other ways by your partner or ex-partner?: No   Housing Stability: Low Risk  (2025)    Housing Stability      Do you have housing? : Yes      Are you worried about losing your housing?: No     Family History   Problem Relation Age of Onset     Heart Disease Father         unknown type     Valvular heart disease Mother      Kidney Disease Mother      Hypertension Mother      Diabetes Mother      Leukemia Brother          at a young age     No Known Problems Son      Diabetes Paternal Aunt      Kidney Disease Sister      Heart Disease Sister      Other - See Comments Other 25.00        victim of a homicide     Breast Cancer No family hx of        Medical  records were reviewed and are summarized above.    Review of Systems    PHYSICAL EXAM:  Vital signs:LMP  (LMP Unknown)      Focused lower extremity physical exam:     Derm: Skin is warm, dry, intact.  Hyperkeratotic tissue noted to the plantar aspect of the fifth metatarsal bases, fifth metatarsal heads, and lateral aspect of the PIPJ of the fifth digits bilaterally.  No open wounds, laceration or abrasions noted. Nails are thickened, dystrophic, elongated, discolored, with subungual debris x 10. No ecchymosis or erythema noted.     Vasc: Dorsalis pedis pulses, 1/4 bilateral. Posterior tibial pulses 1/4 bilateral. Cap fill time < 3 seconds to the digits.  Mild nonpitting edema is present to the bilateral lower extremity. Hair growth absent to the toes.     Neuro: Protective sensation diminished via SWMF examination to the feet. Gross sensation intact.     MSK:   - Pain and tenderness palpation to the dorsal distal aspects of the nails x 10.  - Muscle strength 5/5 with dorsiflexion, plantarflexion, eversion, inversion of the feet. Compartments soft and compressible.    Labs: A1c: 6.8%    Assessment:   -Type 2 diabetes with peripheral neuropathy  - Painful onychomycosis  - Hyperkeratoses x 6    Plan:  - Patient was seen and evaluated in clinic by myself.   - Type 2 diabetes with peripheral neuropathy:  Discussed the patient's type 2 diabetes.  Discussed the patient's peripheral neuropathy.  Discussed hyperglycemia, A1c, and its effect on the peripheral nerves especially the nerves to the eyes and toes.  Discussed pedal foot checks and what to look for.  Discussed shear force first ground reactive forces.  Discussed that this is how ulcerations typically begin to start.  Discussed the importance with diabetic shoes and inserts.  Discussed the importance of an accommodative multilayered insert for this.  Accommodative inserts were ordered for the patient with metatarsal padding.    - Painful onychomycosis:  Discussed  the patient's onychomycosis.  Discussed the nail dystrophy.  Discussed the supple debris.  Discussed the difference between traumatic dystrophy of the nail versus onychomycosis of the nail.  Discussed topical versus oral antifungals.  Discussed antifungal treatment of the nail versus trimming and debridement of the nail.    - Procedure - nail debridement 6 or greater:  After verbal and written consent were obtained, all nails, x 10, were manually debrided without complication.  Patient noted immediate relief.  Patient tolerated procedure well.    Hyperkeratosis x 6:  After verbal and written consent were obtained, the hyperkeratotic tissue to the feet were trimmed with a sterile 10 blade.  No ulcerations, abrasions, or lacerations were noted post procedure.  Patient tolerated procedure well and noted immediate relief.    - Patient may follow-up in 9 to 12 weeks or sooner should problems arise.    Jacob Gonzalez DPM      Again, thank you for allowing me to participate in the care of your patient.        Sincerely,        Jacob Gonzalez DPM    Electronically signed

## 2025-05-22 NOTE — PATIENT INSTRUCTIONS
What are Prescription Custom Orthotics?  Custom orthotics are specially-made devices designed to support and comfort your feet. Prescription orthotics are crafted for you and no one else. They match the contours of your feet precisely and are designed for the way you move. Orthotics are only manufactured after a podiatrist has conducted a complete evaluation of your feet, ankles, and legs, so the orthotic can accommodate your unique foot structure and pathology.  Prescription orthotics are divided into two categories:  Functional orthotics are designed to control abnormal motion. They may be used to treat foot pain caused by abnormal motion; they can also be used to treat injuries such as shin splints or tendinitis. Functional orthotics are usually crafted of a semi-rigid material such as plastic or graphite.  Accommodative orthotics are softer and meant to provide additional cushioning and support. They can be used to treat diabetic foot ulcers, painful calluses on the bottom of the foot, and other uncomfortable conditions.  Podiatrists use orthotics to treat foot problems such as plantar fasciitis, bursitis, tendinitis, diabetic foot ulcers, and foot, ankle, and heel pain. Clinical research studies have shown that podiatrist-prescribed foot orthotics decrease foot pain and improve function.  Orthotics typically cost more than shoe inserts purchased in a retail store, but the additional cost is usually well worth it. Unlike shoe inserts, orthotics are molded to fit each individual foot, so you can be sure that your orthotics fit and do what they're supposed to do. Prescription orthotics are also made of top-notch materials and last many years when cared for properly. Insurance often helps pay for prescription orthotics.  What are Shoe Inserts?   You've seen them at the grocery store and at the mall. You've probably even seen them on TV and online. Shoe inserts are any kind of non-prescription foot support designed  to be worn inside a shoe. Pre-packaged, mass produced, arch supports are shoe inserts. So are the  custom-made  insoles and foot supports that you can order online or at retail stores. Unless the device has been prescribed by a doctor and crafted for your specific foot, it's a shoe insert, not a custom orthotic device--despite what the ads might say.  Shoe inserts can be very helpful for a variety of foot ailments, including flat arches and foot and leg pain. They can cushion your feet, provide comfort, and support your arches, but they can't correct biomechanical foot problems or cure long-standing foot issues.  The most common types of shoe inserts are:  Arch supports: Some people have high arches. Others have low arches or flat feet. Arch supports generally have a  bumped-up  appearance and are designed to support the foot's natural arch.   Insoles: Insoles slip into your shoe to provide extra cushioning and support. Insoles are often made of gel, foam, or plastic.   Heel liners: Heel liners, sometimes called heel pads or heel cups, provide extra cushioning in the heel region. They may be especially useful for patients who have foot pain caused by age-related thinning of the heels' natural fat pads.   Foot cushions: Do your shoes rub against your heel or your toes? Foot cushions come in many different shapes and sizes and can be used as a barrier between you and your shoe.  Choosing an Over-the-Counter Shoe Insert  Selecting a shoe insert from the wide variety of devices on the market can be overwhelming. Here are some podiatrist-tested tips to help you find the insert that best meets your needs:  Consider your health. Do you have diabetes? Problems with circulation? An over-the-counter insert may not be your best bet. Diabetes and poor circulation increase your risk of foot ulcers and infections, so schedule an appointment with a podiatrist. He or she can help you select a solution that won't cause additional  health problems.   Think about the purpose. Are you planning to run a marathon, or do you just need a little arch support in your work shoes? Look for a product that fits your planned level of activity.   Bring your shoes. For the insert to be effective, it has to fit into your shoes. So bring your sneakers, dress shoes, or work boots--whatever you plan to wear with your insert. Look for an insert that will fit the contours of your shoe.   Try them on. If all possible, slip the insert into your shoe and try it out. Walk around a little. How does it feel? Don't assume that feelings of pressure will go away with continued wear. (If you can't try the inserts at the store, ask about the store's return policy and hold on to your receipt.)    Please call one of the [place] locations below to schedule an appointment. If you received a prescription please bring it with you to your appointment. Some locations are limited to what they carry.    Office Locations    [facility]  [address] [place] [address]  Home Medical Equipment, [address]   Phone: [phone]   Orthotics and Prosthetics, [address]   Phone: [phone]    [facility]   Home Medical Equipment   [id number], [place] [address]  Phone :[phone]  Orthotics and Prosthetics   [address], [address], [facility] [address]  Phone:[phone]          [facility] [place]  [address]  [address]   [place] [address]   Phone: [phone]    [facility] Professional Bldg.  [address]  [place] [address]  Phone: [phone]    [facility]  [address]  [place] [address]  Phone: [phone]  Fax: [phone]    [facility] Medical [facility]ldg.   [address]  [place] [address]  Phone:  [phone]    [facility]  [address]  [place] [address]  Phone: [phone]    [facility]  [address]  [place] [address]  Phone: [phone]    [place]   [address] [place] [address]   Phone: [phone]    WEARING YOUR CUSTOM FOOT ORTHOTICS   Most insurance plans cover one pair of orthotics per year. You must check with your   insurance plan to see what your payment responsibility will be. Please call your   insurance company by calling the number on the back of your insurance card.   Orthotic's are non-refundable and non-returnable.   Orthotics are made of various designs. Some orthotics are covered with material that extends beyond your toes. If your orthotic is of this design, you will likely need to trim the toe end to get a proper fit. The insole from your shoe can be used as a template. Simply overlay the shoe insert on top of the custom orthotic. Align the heel end while tracing the length of the insert onto the custom orthotic. Use a large scissor to trim the toe end until you get a proper fit in the shoe.   The orthotic needs to be pushed as far back in the shoe as possible. The heel portion should not ride forward so as not to irritate your heel.   Orthotics are designed to work with socks. Excessive perspiration will shorten the life span of the orthotics. Remove the orthotic from the shoe frequently for proper drying.   The break-in period lasts for weeks. People new to orthotics will likely experience new aches and pains. The orthotic is forcing your foot into a new position. Arch, foot and leg muscle aches and fatigue are common during these weeks. Minor discomfort can be considered normal break in phenomenon. Start wearing your orthotic around your home your first day. Limited activity for one to two hours is recommended. You can increase one or two additional hours each  day provided the aches and pains are subsiding. The degree of discomfort, fatigue and problems will dictate the speed of break in. You may require multiple weeks to work up to full time use.   Do not continue wearing your orthotics if they are creating problems such as blisters or sores. Do not hesitate to call the clinic to speak with a nurse regarding orthotic   break in, fit, trimming, etc. You may also need to see the doctor if the orthotics are   simply not working out. Adjustments are sometimes made to improve orthotic   function.     Orthotics will only work in certain styles and types of shoes. Orthotics rarely work in dress shoes. Slip-ons, clogs, sandals and heels are particularly troublesome. Specially designed orthotics may be necessary for these types of shoes. Your custom orthotic was designed for activities that require appropriate walking or running shoes. Lace up athletic shoes, walking shoes or work boots should work appropriately. You may need a wider or longer shoe. Shoes with a removable  or insert work best. In general, you want to remove an insert from the shoe before placing the orthotic into the shoe. Shoes without a removable liner may not work as well.     When purchasing new shoes, bring your orthotics along to get a proper fit. Shop at stores that are familiar with orthotics.   Frequent washing of the orthotic may shorten the life span of the top cover. The top cover can be replaced but will generally last one to five years depending on use and foot perspiration.

## 2025-05-22 NOTE — PROGRESS NOTES
CC: Diabetic foot check     HPI: Liana Briceño is a 69 year old female who presents to clinic for diabetic foot check.  Patient reports that their PCP informed the patient that they should be seen by podiatrist for diabetic foot evaluation.  Patient has painful nails and calluses and would like these evaluated as well.  She states that she did have diabetic shoes and inserts but that the shoes were too expensive and she has never gotten new ones.  She states this was back in 2022.    Past Surgical History:   Procedure Laterality Date    ARTHROPLASTY KNEE Right 4/8/2022    Procedure: RIGHT TOTAL KNEE ARTHROPLASTY;  Surgeon: Declan Eaton MD;  Location: [facility] Main OR    BIOPSY BREAST      BREAST CYST EXCISION      CARDIAC CATHETERIZATION N/A 4/24/2017    Procedure: Coronary Angiogram;  Surgeon: Ariane Biswas MD;  Location: [facility] Cath Lab;  Service:     CARDIAC CATHETERIZATION N/A 5/19/2017    Procedure: Coronary Angiogram;  Surgeon: Howard Gutierrez MD;  Location: [facility] Cath Lab;  Service:     CHOLECYSTECTOMY  2000    CORONARY STENT PLACEMENT  04/24/2017    BO to RCA by Dr. Biswas    HC REVISE MEDIAN N/CARPAL TUNNEL SURG      Description: Neuroplasty Decompression Median Nerve At Carpal Tunnel;  Recorded: 05/22/2009;    IR LUMBAR EPIDURAL STEROID INJECTION  6/22/2007    IR LUMBAR EPIDURAL STEROID INJECTION  11/2/2007    IR LUMBAR EPIDURAL STEROID INJECTION  8/6/2008    IR LUMBAR EPIDURAL STEROID INJECTION  10/8/2008    IR LUMBAR EPIDURAL STEROID INJECTION  10/28/2008    IR LUMBAR EPIDURAL STEROID INJECTION  3/10/2009    ME EXPLO/DRAIN BREAST ABSCESS      Description: Breast Surgery Mastotomy With Drainage Of Abscess    ME L HRT CATH W/NJX L VENTRICULOGRAPHY IMG S&I N/A 4/24/2017    Procedure: Left Heart Catheterization with Left Ventriculogram;  Surgeon: Ariane Biswas MD;  Location: [facility] Cath Lab;  Service: Cardiology    ME THYROID LOBECTOMY,UNILAT      RADIOFREQUENCY ABLATION   07/05/2011    for PSVT by Dr. Johnson    TOTAL HIP ARTHROPLASTY Right      Past Medical History:   Diagnosis Date    Anemia     Breast cyst     Carpal tunnel syndrome     Coronary artery disease due to lipid rich plaque     Dyslipidemia, goal LDL below 70     Esophageal reflux     Essential hypertension     Fatty liver     Goiter diffuse, nontoxic     Hiatal hernia     History of gestational diabetes     History of MRSA infection 07/17/2017    Pilonidal cyst culture    Increased PTH level 11/4/2018    Morbid obesity with BMI of 40.0-44.9, adult (H)     Motion sickness     RADHA on CPAP     Osteoarthritis     Paroxysmal SVT (supraventricular tachycardia) 2011    ablation by Dr. Johnson in 2011    PONV (postoperative nausea and vomiting)     Positive result for methicillin resistant Staphylococcus aureus (MRSA) screening 11/4/2018    Renal disease     S/P hip replacement     Stented coronary artery     Thiamine deficiency 11/4/2018    Type 2 diabetes mellitus (H)     Uncomplicated asthma     Vitamin D deficiency 11/4/2018     Medications:  Current Outpatient Medications   Medication Sig Dispense Refill    acetaminophen (TYLENOL) 500 MG tablet Take 2 tablets (1,000 mg) by mouth 3 times daily as needed for pain. 90 tablet 0    albuterol (PROAIR HFA/PROVENTIL HFA/VENTOLIN HFA) 108 (90 Base) MCG/ACT inhaler Inhale 2 puffs into the lungs every 6 hours as needed for shortness of breath, wheezing or cough. 18 g 0    allopurinol (ZYLOPRIM) 100 MG tablet Take 1 tablet (100 mg) by mouth daily. 90 tablet 0    aspirin (ASA) 81 MG chewable tablet Take 1 tablet (81 mg) by mouth daily. 90 tablet 3    cholecalciferol, vitamin D3, (CHOLECALCIFEROL) 1,000 unit tablet [CHOLECALCIFEROL, VITAMIN D3, (CHOLECALCIFEROL) 1,000 UNIT TABLET] Take 3,000 Units by mouth daily.       Cinnamon Bark POWD       Continuous Blood Gluc Sensor (FREESTYLE MIRANDA 2 SENSOR) MISC 1 each every 14 days 1 each every 14 days. Change every 14 days. 6 each 3     cyclobenzaprine (FLEXERIL) 10 MG tablet Take 1 tablet (10 mg) by mouth 3 times daily as needed for muscle spasms 90 tablet 1    docusate sodium (DOK) 100 MG capsule TAKE 1 CAPSULE(100 MG) BY MOUTH TWICE DAILY Strength: 100 mg 180 capsule 3    empagliflozin (JARDIANCE) 10 MG TABS tablet Take 1 tablet (10 mg) by mouth daily. 90 tablet 0    hydroCHLOROthiazide 12.5 MG tablet TAKE 1 TABLET(12.5 MG) BY MOUTH DAILY 90 tablet 3    HYDROcodone-acetaminophen (NORCO) 5-325 MG tablet Take 1 tablet by mouth every 6 hours as needed for severe pain. 18 tablet 0    lidocaine (LIDODERM) 5 % patch Place 1 patch over 12 hours onto the skin every 24 hours. To prevent lidocaine toxicity, patient should be patch free for 12 hrs daily. 30 patch 1    lisinopril (ZESTRIL) 5 MG tablet TAKE 1 AND 1/2 TABLETS BY MOUTH EVERY  tablet 2    melatonin (MELATIN) 3 mg Tab tablet Take 6 mg by mouth nightly as needed      mupirocin (BACTROBAN) 2 % external ointment Apply topically 3 times daily. 30 g 0    nitroGLYcerin (NITROSTAT) 0.3 MG sublingual tablet Place 1 tablet (0.3 mg) under the tongue every 5 minutes as needed for chest pain 100 tablet 3    omega-3 acid ethyl esters (LOVAZA) 1 g capsule Take 2 capsules by mouth 2 times daily 360 capsule 1    omeprazole (PRILOSEC) 40 MG DR capsule TAKE 1 CAPSULE BY MOUTH EVERY DAY 90 capsule 2    ondansetron (ZOFRAN ODT) 4 MG ODT tab Take 1 tablet (4 mg) by mouth every 8 hours as needed for nausea or vomiting. 20 tablet 0    ONETOUCH DELICA PLUS LANCET 33 gauge Misc [ONETOUCH DELICA PLUS LANCET 33 GAUGE MISC] USE TO TEST BLOOD SUGAR 6 TIMES DAILY 600 each 3    rosuvastatin (CRESTOR) 10 MG tablet Take 1 tablet (10 mg) by mouth daily. 100 tablet 3    Semaglutide, 2 MG/DOSE, (OZEMPIC) 8 MG/3ML pen Inject 2 mg subcutaneously every 7 days. 9 mL 3    thiamine (B-1) 100 MG tablet Take 1 tablet (100 mg) by mouth daily. 90 tablet 0    valACYclovir (VALTREX) 500 MG tablet Take 1 tablet (500 mg) by mouth daily.  90 tablet 3    vitamin B-12 (CYANOCOBALAMIN) 2500 MCG sublingual tablet One tab every other day 60 tablet 3     Allergies:  Latex, Penicillins, Clindamycin, Doxycycline, Eggshell membrane [egg shells], Erythromycin, Ibuprofen, Keflex [cephalexin], Naloxone, Penicillamine, Pneumococcal 23-valent polysaccharide vaccine [pneumococcal vaccine], Statins-hmg-coa reductase inhibitors [statins], and Azithromycin  Social History     Socioeconomic History    Marital status:      Spouse name: Not on file    Number of children: 1    Years of education: Not on file    Highest education level: Not on file   Occupational History    Not on file   Tobacco Use    Smoking status: Former     Current packs/day: 0.00     Average packs/day: 0.3 packs/day for 44.0 years (11.0 ttl pk-yrs)     Types: Cigarettes     Start date: 1973     Quit date: 2017     Years since quittin.3     Passive exposure: Past    Smokeless tobacco: Never    Tobacco comments:     age 18 to age 62 up to 1/2 ppd   Vaping Use    Vaping status: Never Used   Substance and Sexual Activity    Alcohol use: Yes     Alcohol/week: 0.0 standard drinks of alcohol     Comment: Alcoholic Drinks/day: < 1 drink a week    Drug use: No    Sexual activity: Not on file   Other Topics Concern    Parent/sibling w/ CABG, MI or angioplasty before 65F 55M? Not Asked   Social History Narrative    Single but lives with her significant other, has one son, she is a .  She is a PCA.   Disability for LBP. Work injury.     Social Drivers of Health     Financial Resource Strain: Low Risk  (2025)    Financial Resource Strain     Within the past 12 months, have you or your family members you live with been unable to get utilities (heat, electricity) when it was really needed?: No   Food Insecurity: Low Risk  (2025)    Food Insecurity     Within the past 12 months, did you worry that your food would run out before you got money to buy more?: No     Within the past  12 months, did the food you bought just not last and you didn t have money to get more?: No   Transportation Needs: Low Risk  (2025)    Transportation Needs     Within the past 12 months, has lack of transportation kept you from medical appointments, getting your medicines, non-medical meetings or appointments, work, or from getting things that you need?: No   Physical Activity: Not on file   Stress: Not on file   Social Connections: Unknown (2023)    Received [facility] [employer] [facility]    Social Connections     Frequency of Communication with Friends and Family: Not on file   Interpersonal Safety: Low Risk  (2025)    Interpersonal Safety     Do you feel physically and emotionally safe where you currently live?: Yes     Within the past 12 months, have you been hit, slapped, kicked or otherwise physically hurt by someone?: No     Within the past 12 months, have you been humiliated or emotionally abused in other ways by your partner or ex-partner?: No   Housing Stability: Low Risk  (2025)    Housing Stability     Do you have housing? : Yes     Are you worried about losing your housing?: No     Family History   Problem Relation Age of Onset    Heart Disease Father         unknown type    Valvular heart disease Mother     Kidney Disease Mother     Hypertension Mother     Diabetes Mother     Leukemia Brother          at a young age    No Known Problems Son     Diabetes Paternal Aunt     Kidney Disease Sister     Heart Disease Sister     Other - See Comments Other 25.00        victim of a homicide    Breast Cancer No family hx of        Medical records were reviewed and are summarized above.    Review of Systems    PHYSICAL EXAM:  Vital signs:LMP  (LMP Unknown)      Focused lower extremity physical exam:     Derm: Skin is warm, dry, intact.  Hyperkeratotic tissue noted to the plantar aspect of the fifth metatarsal bases, fifth metatarsal heads, and lateral aspect of the PIPJ  of the fifth digits bilaterally.  No open wounds, laceration or abrasions noted. Nails are thickened, dystrophic, elongated, discolored, with subungual debris x 10. No ecchymosis or erythema noted.     Vasc: Dorsalis pedis pulses, 1/4 bilateral. Posterior tibial pulses 1/4 bilateral. Cap fill time < 3 seconds to the digits.  Mild nonpitting edema is present to the bilateral lower extremity. Hair growth absent to the toes.     Neuro: Protective sensation diminished via SWMF examination to the feet. Gross sensation intact.     MSK:   - Pain and tenderness palpation to the dorsal distal aspects of the nails x 10.  - Muscle strength 5/5 with dorsiflexion, plantarflexion, eversion, inversion of the feet. Compartments soft and compressible.    Labs: A1c: 6.8%    Assessment:   -Type 2 diabetes with peripheral neuropathy  - Painful onychomycosis  - Hyperkeratoses x 6    Plan:  - Patient was seen and evaluated in clinic by myself.   - Type 2 diabetes with peripheral neuropathy:  Discussed the patient's type 2 diabetes.  Discussed the patient's peripheral neuropathy.  Discussed hyperglycemia, A1c, and its effect on the peripheral nerves especially the nerves to the eyes and toes.  Discussed pedal foot checks and what to look for.  Discussed shear force first ground reactive forces.  Discussed that this is how ulcerations typically begin to start.  Discussed the importance with diabetic shoes and inserts.  Discussed the importance of an accommodative multilayered insert for this.  Accommodative inserts were ordered for the patient with metatarsal padding.    - Painful onychomycosis:  Discussed the patient's onychomycosis.  Discussed the nail dystrophy.  Discussed the supple debris.  Discussed the difference between traumatic dystrophy of the nail versus onychomycosis of the nail.  Discussed topical versus oral antifungals.  Discussed antifungal treatment of the nail versus trimming and debridement of the nail.    - Procedure -  nail debridement 6 or greater:  After verbal and written consent were obtained, all nails, x 10, were manually debrided without complication.  Patient noted immediate relief.  Patient tolerated procedure well.    Hyperkeratosis x 6:  After verbal and written consent were obtained, the hyperkeratotic tissue to the feet were trimmed with a sterile 10 blade.  No ulcerations, abrasions, or lacerations were noted post procedure.  Patient tolerated procedure well and noted immediate relief.    - Patient may follow-up in 9 to 12 weeks or sooner should problems arise.    Jacob Gonzalez DPM

## 2025-06-03 ENCOUNTER — OFFICE VISIT (OUTPATIENT)
Dept: OPTOMETRY | Facility: CLINIC | Age: 70
End: 2025-06-03
Attending: INTERNAL MEDICINE
Payer: COMMERCIAL

## 2025-06-03 DIAGNOSIS — H01.135 ECZEMATOUS DERMATITIS OF UPPER AND LOWER EYELIDS OF BOTH EYES: ICD-10-CM

## 2025-06-03 DIAGNOSIS — H01.131 ECZEMATOUS DERMATITIS OF UPPER AND LOWER EYELIDS OF BOTH EYES: ICD-10-CM

## 2025-06-03 DIAGNOSIS — H52.4 PRESBYOPIA: ICD-10-CM

## 2025-06-03 DIAGNOSIS — E11.9 TYPE 2 DIABETES MELLITUS WITHOUT RETINOPATHY (H): Primary | ICD-10-CM

## 2025-06-03 DIAGNOSIS — H01.134 ECZEMATOUS DERMATITIS OF UPPER AND LOWER EYELIDS OF BOTH EYES: ICD-10-CM

## 2025-06-03 DIAGNOSIS — E11.42 DIABETIC POLYNEUROPATHY ASSOCIATED WITH TYPE 2 DIABETES MELLITUS (H): ICD-10-CM

## 2025-06-03 DIAGNOSIS — H52.03 HYPEROPIA OF BOTH EYES WITH ASTIGMATISM: ICD-10-CM

## 2025-06-03 DIAGNOSIS — H52.203 HYPEROPIA OF BOTH EYES WITH ASTIGMATISM: ICD-10-CM

## 2025-06-03 DIAGNOSIS — H01.132 ECZEMATOUS DERMATITIS OF UPPER AND LOWER EYELIDS OF BOTH EYES: ICD-10-CM

## 2025-06-03 PROCEDURE — 92015 DETERMINE REFRACTIVE STATE: CPT | Performed by: OPTOMETRIST

## 2025-06-03 PROCEDURE — 2023F DILAT RTA XM W/O RTNOPTHY: CPT | Performed by: OPTOMETRIST

## 2025-06-03 PROCEDURE — 92004 COMPRE OPH EXAM NEW PT 1/>: CPT | Performed by: OPTOMETRIST

## 2025-06-03 ASSESSMENT — REFRACTION_MANIFEST
OS_CYLINDER: +0.75
OD_CYLINDER: +1.25
OD_ADD: +2.50
OD_SPHERE: +0.50
OD_SPHERE: +3.00
OS_SPHERE: +2.00
OS_SPHERE: +1.00
OS_AXIS: 180
OD_AXIS: 153
OS_CYLINDER: +1.25
OS_AXIS: 012
OD_AXIS: 138
OS_ADD: +2.50
OD_CYLINDER: +1.00
METHOD_AUTOREFRACTION: 1

## 2025-06-03 ASSESSMENT — KERATOMETRY
OS_K2POWER_DIOPTERS: 44.75
OD_AXISANGLE_DEGREES: 178
OS_AXISANGLE2_DEGREES: 122
OS_AXISANGLE_DEGREES: 32
OS_K1POWER_DIOPTERS: 44
OD_AXISANGLE2_DEGREES: 88
OD_K1POWER_DIOPTERS: 44.37
OD_K2POWER_DIOPTERS: 45.12

## 2025-06-03 ASSESSMENT — CONF VISUAL FIELD
OD_SUPERIOR_NASAL_RESTRICTION: 0
OS_INFERIOR_NASAL_RESTRICTION: 0
OS_SUPERIOR_NASAL_RESTRICTION: 0
METHOD: COUNTING FINGERS
OD_INFERIOR_TEMPORAL_RESTRICTION: 0
OD_INFERIOR_NASAL_RESTRICTION: 0
OD_NORMAL: 1
OS_INFERIOR_TEMPORAL_RESTRICTION: 0
OS_SUPERIOR_TEMPORAL_RESTRICTION: 0
OD_SUPERIOR_TEMPORAL_RESTRICTION: 0
OS_NORMAL: 1

## 2025-06-03 ASSESSMENT — VISUAL ACUITY
METHOD: SNELLEN - LINEAR
OS_SC: 20/40
OD_SC: 20/40
OS_SC: 20/200
OD_SC: 20/100

## 2025-06-03 ASSESSMENT — CUP TO DISC RATIO
OD_RATIO: 0.35
OS_RATIO: 0.4

## 2025-06-03 ASSESSMENT — TONOMETRY
OS_IOP_MMHG: 15
IOP_METHOD: APPLANATION
OD_IOP_MMHG: 15

## 2025-06-03 ASSESSMENT — EXTERNAL EXAM - RIGHT EYE: OD_EXAM: NORMAL

## 2025-06-03 ASSESSMENT — EXTERNAL EXAM - LEFT EYE: OS_EXAM: NORMAL

## 2025-06-03 NOTE — PROGRESS NOTES
Chief Complaint   Patient presents with    Diabetic Eye Exam       Lab Results   Component Value Date    A1C 6.8 04/23/2025    A1C 6.6 01/22/2025    A1C 6.6 09/03/2024    A1C 6.9 03/11/2024    A1C 7.2 12/11/2023            Last Eye Exam: 1 year ago   Dilated Previously: Yes, side effects of dilation explained today    What are you currently using to see?  readers    Distance Vision Acuity: Noticed gradual change in both eyes unaided     Near Vision Acuity: Satisfied with vision while reading and using computer with readers    Eye Comfort: good  Do you use eye drops? : No      Susie Fry - Optometric Assistant      Medical, surgical and family histories reviewed and updated 6/3/2025.       OBJECTIVE: See Ophthalmology exam    ASSESSMENT:    ICD-10-CM    1. Type 2 diabetes mellitus without retinopathy (H)  E11.9 EYE EXAM (SIMPLE-NONBILLABLE)      2. Diabetic polyneuropathy associated with type 2 diabetes mellitus (H)  E11.42 Adult Eye  Referral     EYE EXAM (SIMPLE-NONBILLABLE)    Noted, check A1C today.      3. Hyperopia of both eyes with astigmatism  H52.03 REFRACTION    H52.203       4. Presbyopia  H52.4 REFRACTION      5. Eczematous dermatitis of upper and lower eyelids of both eyes  H01.131     H01.132     H01.135     H01.134           PLAN:  Bifocal or progressive addition lens prescription   Aquaphor to lids   Liana Briecño aware  eye exam results will be sent to Lisa Ramires.

## 2025-06-03 NOTE — PATIENT INSTRUCTIONS
Use a mild cleanser on face and eyelids such as cetaphil, and apply aquaphor (over the counter) to lids     Bifocal or progressive addition lens for optimal vision      Patient Education   Diabetes weakens the blood vessels all over the body, including the eyes. Damage to the blood vessels in the eyes can cause swelling or bleeding into part of the eye (called the retina). This is called diabetic retinopathy (NÉSTOR-tin-AH-puh-thee). If not treated, this disease can cause vision loss or blindness.   Symptoms may include blurred or distorted vision, but many people have no symptoms. It's important to see your eye doctor regularly to check for problems.   Early treatment and good control can help protect your vision. Here are the things you can do to help prevent vision loss:      1. Keep your blood sugar levels under tight control.      2. Bring high blood pressure under control.      3. No smoking.      4. Have yearly dilated eye exams.

## 2025-06-03 NOTE — LETTER
6/3/2025      Liana Briceño  [address] [place] [address]      Dear Colleague,    Thank you for referring your patient, [facility]iceño[facility]. Please see a copy of my visit note below.    Chief Complaint   Patient presents with     Diabetic Eye Exam       Lab Results   Component Value Date    A1C 6.8 04/23/2025    A1C 6.6 01/22/2025    A1C 6.6 09/03/2024    A1C 6.9 03/11/2024    A1C 7.2 12/11/2023            Last Eye Exam: 1 year ago   Dilated Previously: Yes, side effects of dilation explained today    What are you currently using to see?  readers    Distance Vision Acuity: Noticed gradual change in both eyes unaided     Near Vision Acuity: Satisfied with vision while reading and using computer with readers    Eye Comfort: good  Do you use eye drops? : No      Susie Fry - Optometric Assistant      Medical, surgical and family histories reviewed and updated 6/3/2025.       OBJECTIVE: See Ophthalmology exam    ASSESSMENT:    ICD-10-CM    1. Type 2 diabetes mellitus without retinopathy (H)  E11.9 EYE EXAM (SIMPLE-NONBILLABLE)      2. Diabetic polyneuropathy associated with type 2 diabetes mellitus (H)  E11.42 Adult Eye  Referral     EYE EXAM (SIMPLE-NONBILLABLE)    Noted, check A1C today.      3. Hyperopia of both eyes with astigmatism  H52.03 REFRACTION    H52.203       4. Presbyopia  H52.4 REFRACTION      5. Eczematous dermatitis of upper and lower eyelids of both eyes  H01.131     H01.132     H01.135     H01.134           PLAN:  Bifocal or progressive addition lens prescription   Aquaphor to lids   Liana Briceño aware  eye exam results will be sent to Lisa Ramires.    Again, thank you for allowing me to participate in the care of your patient.        Sincerely,        Khadra Stockton OD    Electronically signed

## 2025-06-04 DIAGNOSIS — E78.2 MIXED HYPERLIPIDEMIA: ICD-10-CM

## 2025-06-05 RX ORDER — OMEGA-3-ACID ETHYL ESTERS 1 G/1
2 CAPSULE, LIQUID FILLED ORAL 2 TIMES DAILY
Qty: 360 CAPSULE | Refills: 1 | Status: SHIPPED | OUTPATIENT
Start: 2025-06-05

## 2025-06-16 DIAGNOSIS — E11.9 TYPE 2 DIABETES MELLITUS WITHOUT COMPLICATION, WITHOUT LONG-TERM CURRENT USE OF INSULIN (H): Chronic | ICD-10-CM

## 2025-06-16 RX ORDER — LANOLIN ALCOHOL/MO/W.PET/CERES
100 CREAM (GRAM) TOPICAL DAILY
Qty: 90 TABLET | Refills: 0 | OUTPATIENT
Start: 2025-06-16

## 2025-06-16 NOTE — TELEPHONE ENCOUNTER
Pending Prescriptions:                       Disp   Refills    thiamine (B-1) 100 MG tablet              90 tab*0            Sig: Take 1 tablet (100 mg) by mouth daily.

## 2025-06-23 ENCOUNTER — TELEPHONE (OUTPATIENT)
Dept: UROLOGY | Facility: CLINIC | Age: 70
End: 2025-06-23
Payer: COMMERCIAL

## 2025-06-23 ENCOUNTER — PATIENT OUTREACH (OUTPATIENT)
Dept: CARE COORDINATION | Facility: CLINIC | Age: 70
End: 2025-06-23
Payer: COMMERCIAL

## 2025-06-23 NOTE — TELEPHONE ENCOUNTER
[facility] Call Center    Phone Message    May a detailed message be left on voicemail: yes     Reason for Call: Other: Patient calling for appt per referral for Uro/Gyn patient does not want to see Dr Mcgowan. Per protocol please ok to change provider.     Action Taken: Other: [facility]    Travel Screening: Not Applicable     Date of Service:

## 2025-06-23 NOTE — CONFIDENTIAL NOTE
Okay to change providers.    Cristina JONAS RN   [facility], Urology   6/23/2025 2:12 PM

## 2025-06-25 ENCOUNTER — PATIENT OUTREACH (OUTPATIENT)
Dept: CARE COORDINATION | Facility: CLINIC | Age: 70
End: 2025-06-25
Payer: COMMERCIAL

## 2025-07-03 ENCOUNTER — TELEPHONE (OUTPATIENT)
Dept: EDUCATION SERVICES | Facility: CLINIC | Age: 70
End: 2025-07-03

## 2025-07-03 ENCOUNTER — ALLIED HEALTH/NURSE VISIT (OUTPATIENT)
Dept: EDUCATION SERVICES | Facility: CLINIC | Age: 70
End: 2025-07-03
Attending: INTERNAL MEDICINE
Payer: COMMERCIAL

## 2025-07-03 DIAGNOSIS — E11.42 DIABETIC POLYNEUROPATHY ASSOCIATED WITH TYPE 2 DIABETES MELLITUS (H): ICD-10-CM

## 2025-07-03 RX ORDER — HYDROCHLOROTHIAZIDE 12.5 MG/1
CAPSULE ORAL
Qty: 6 EACH | Refills: 1 | Status: CANCELLED | OUTPATIENT
Start: 2025-07-03

## 2025-07-03 NOTE — PROGRESS NOTES
Diabetes Self-Management Education & Support    Presents for: Individual review    Type of Service: In Person Visit      Assessment      Patient seen today for follow up. She is using Maria Victoria 2 with mitesh on her phone. She would like to change to Maria Victoria 3. We downloaded Maria Victoria 3 mitehs on her phone today, due to connection issues on the phone and having to reset  her password, this took almost the entirety of our visit. Pt reports she is getting her supplies from [employer], unable to e-prescribe to [employer]. Will send order form for Maria Victoria 3 to PCP to sign, will then send to [employer].       Taking Ozempic 2 mg/week and Jardiance 10 mg/day. Tolerating well.   Feels she has been doing well with remembering to take medications.     She has not yet started the gym but would still like to. 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 She feels she has a lot more time for herself now.     See CGM Reports in Monitoring section below.      Glucose Patterns & Trends:  Time in range of  mg/dL, 84% of the time. Patient meeting ADA Time in Range Targets., Time below range of 70 mg/dL, 0% of the time., and Glucose Variability (coefficient of variation): 22.1% (goal is 36% or less to lessen risk for hypoglycemia)                Care Plan and Education Provided:  Healthy Eating: Balanced meals, Consistency in amount and timing of carbohydrate intake, Plate planning method, and Portion control  Being Active: Finding a physical activity routine that works for you, Precautions to take with exercise, and Relationship of activity to glucose  Monitoring: Individual glucose targets and CGM instruction: Patient was instructed on Freestyle Maria Victoria System: Freestyle Maria Victoria sensor: insertion technique, sensor site location and rotation, insulin administration in relation to sensor placement, sensor wear, reasons to remove sensor (MRI,  "CT, diathermy), Vitamin C & Aspirin effects on sensor  Taking Medication: When to take medication(s)  Problem Solving: High glucose - causes, signs/symptoms, treatment and prevention, Low glucose - causes, signs/symptoms, treatment and prevention, Rule of 15 and carrying a carbohydrate source at all times in case of low glucose, and When to call a health care provider  Reducing Risks: Goal for A1c, how it relates to glucose and how often to check  Healthy Coping: Benefits of making appropriate lifestyle changes    Patient verbalized understanding of diabetes self-management education concepts discussed, opportunities for ongoing education and support, and recommendations provided today.    Plan      Continue to work on being mindful of diet and increasing activity     See Care Plan for co-developed, patient-state behavior change goals.      Update after visit - will continue with Maria Victoria 2 at this time instead of changing to Maria Victoria 3     Subjective/Objective  Liana is an 69 year old, presenting for the following diabetes education related to: Individual review  Accompanied by: Self  Diabetes education in the past 24mo: No  Diabetes type: Type 2  Cultural Influences/Ethnic Background:  Not  or       Diabetes Symptoms & Complications:          Patient Problem List and Family Medical History reviewed for relevant medical history, current medical status, and diabetes risk factors.    Vitals:  LMP  (LMP Unknown)   Estimated body mass index is 42.24 kg/m  as calculated from the following:    Height as of 4/23/25: 1.626 m (5' 4.02\").    Weight as of 4/23/25: 111.7 kg (246 lb 3.2 oz).   Last 3 BP:   BP Readings from Last 3 Encounters:   06/20/25 130/77   04/23/25 130/77   04/14/25 120/72       History   Smoking Status    Former    Types: Cigarettes   Smokeless Tobacco    Never       Labs:  Lab Results   Component Value Date    A1C 6.8 04/23/2025     Lab Results   Component Value Date     04/23/2025    GLC " 163 04/10/2022     04/10/2022     Lab Results   Component Value Date    LDL 41 09/03/2024    LDL 59 07/21/2020     Direct Measure HDL   Date Value Ref Range Status   09/03/2024 31 (L) >=50 mg/dL Final     GFR Estimate   Date Value Ref Range Status   04/23/2025 57 (L) >60 mL/min/1.73m2 Final     Comment:     eGFR calculated using 2021 CKD-EPI equation.   06/28/2021 56 (L) >60 mL/min/[1.73_m2] Final     GFR, ESTIMATED POCT   Date Value Ref Range Status   06/05/2023 55 (L) >60 mL/min/1.73m2 Final     GFR Estimate If Black   Date Value Ref Range Status   06/28/2021 68 >60 mL/min/[1.73_m2] Final     Lab Results   Component Value Date    CR 1.07 04/23/2025     Lab Results   Component Value Date    UCRR 193 04/05/2022    UCRR 193 04/05/2022    UCRR 190 04/05/2022           7/3/2025   Healthy Eating   Healthy Eating Assessed Today Yes   Meals include Breakfast;Lunch;Dinner   Beverages Water;Soda         7/3/2025   Being Active   Being Active Assessed Today Yes   Exercise: Yes         7/3/2025   Monitoring   Monitoring Assessed Today Yes   Blood Glucose Meter CGM       Diabetes Medication(s)       Sodium-Glucose Co-Transporter 2 (SGLT2) Inhibitors       empagliflozin (JARDIANCE) 10 MG TABS tablet Take 1 tablet (10 mg) by mouth daily.       Incretin Mimetic Agents       Semaglutide, 2 MG/DOSE, (OZEMPIC) 8 MG/3ML pen Inject 2 mg subcutaneously every 7 days.              7/3/2025   Taking Medications   Taking Medication Assessed Today Yes   Problems taking diabetes medications regularly? Yes   Diabetes medication side effects? No         7/3/2025   Problem Solving   Problem Solving Assessed Today Yes           7/3/2025   Reducing Risks   Reducing Risks Assessed Today Yes         7/3/2025   Healthy Coping: Diabetes Distress Assessment   Healthy Coping Assessed Today Yes   Informal Support system: Family;Friends         Time Spent: 60 minutes  Encounter Type: Individual    Any diabetes medication dose changes were made via  the CDCES Standing Orders under the patient's referring provider.

## 2025-07-03 NOTE — LETTER
7/3/2025         RE: Liana Briceño  [address] [place] [address]        Dear Colleague,    Thank you for referring your patient, Liana Briceño, to the [facility]. Please see a copy of my visit note below.    Diabetes Self-Management Education & Support    Presents for: Individual review    Type of Service: In Person Visit      Assessment      Patient seen today for follow up. She is using Maria Victoria 2 with mitesh on her phone. She would like to change to Maria Victoria 3. We downloaded Maria Victoria 3 mitesh on her phone today, due to connection issues on the phone and having to reset  her password, this took almost the entirety of our visit. Pt reports she is getting her supplies from [employer], unable to e-prescribe to [employer]. Will send order form for Maria Victoria 3 to PCP to sign, will then send to [employer].       Taking Ozempic 2 mg/week and Jardiance 10 mg/day. Tolerating well.   Feels she has been doing well with remembering to take medications.     She has not yet started the gym but would still like to. 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 She feels she has a lot more time for herself now.     See CGM Reports in Monitoring section below.      Glucose Patterns & Trends:  Time in range of  mg/dL, 84% of the time. Patient meeting ADA Time in Range Targets., Time below range of 70 mg/dL, 0% of the time., and Glucose Variability (coefficient of variation): 22.1% (goal is 36% or less to lessen risk for hypoglycemia)                Care Plan and Education Provided:  Healthy Eating: Balanced meals, Consistency in amount and timing of carbohydrate intake, Plate planning method, and Portion control  Being Active: Finding a physical activity routine that works for you, Precautions to take with exercise, and Relationship of activity to glucose  Monitoring: Individual glucose targets and CGM  "instruction: Patient was instructed on Freestyle Maria Victoria System: Freestyle Maria Victoria sensor: insertion technique, sensor site location and rotation, insulin administration in relation to sensor placement, sensor wear, reasons to remove sensor (MRI, CT, diathermy), Vitamin C & Aspirin effects on sensor  Taking Medication: When to take medication(s)  Problem Solving: High glucose - causes, signs/symptoms, treatment and prevention, Low glucose - causes, signs/symptoms, treatment and prevention, Rule of 15 and carrying a carbohydrate source at all times in case of low glucose, and When to call a health care provider  Reducing Risks: Goal for A1c, how it relates to glucose and how often to check  Healthy Coping: Benefits of making appropriate lifestyle changes    Patient verbalized understanding of diabetes self-management education concepts discussed, opportunities for ongoing education and support, and recommendations provided today.    Plan    Will switch to Maria Victoria 3     Continue to work on being mindful of diet and increasing activity     See Care Plan for co-developed, patient-state behavior change goals.          Subjective/Objective  Liana is an 69 year old, presenting for the following diabetes education related to: Individual review  Accompanied by: Self  Diabetes education in the past 24mo: No  Diabetes type: Type 2  Cultural Influences/Ethnic Background:  Not  or       Diabetes Symptoms & Complications:          Patient Problem List and Family Medical History reviewed for relevant medical history, current medical status, and diabetes risk factors.    Vitals:  LMP  (LMP Unknown)   Estimated body mass index is 42.24 kg/m  as calculated from the following:    Height as of 4/23/25: 1.626 m (5' 4.02\").    Weight as of 4/23/25: 111.7 kg (246 lb 3.2 oz).   Last 3 BP:   BP Readings from Last 3 Encounters:   06/20/25 130/77   04/23/25 130/77   04/14/25 120/72       History   Smoking Status     Former     Types: " Cigarettes   Smokeless Tobacco     Never       Labs:  Lab Results   Component Value Date    A1C 6.8 04/23/2025     Lab Results   Component Value Date     04/23/2025     04/10/2022     04/10/2022     Lab Results   Component Value Date    LDL 41 09/03/2024    LDL 59 07/21/2020     Direct Measure HDL   Date Value Ref Range Status   09/03/2024 31 (L) >=50 mg/dL Final     GFR Estimate   Date Value Ref Range Status   04/23/2025 57 (L) >60 mL/min/1.73m2 Final     Comment:     eGFR calculated using 2021 CKD-EPI equation.   06/28/2021 56 (L) >60 mL/min/[1.73_m2] Final     GFR, ESTIMATED POCT   Date Value Ref Range Status   06/05/2023 55 (L) >60 mL/min/1.73m2 Final     GFR Estimate If Black   Date Value Ref Range Status   06/28/2021 68 >60 mL/min/[1.73_m2] Final     Lab Results   Component Value Date    CR 1.07 04/23/2025     Lab Results   Component Value Date    UCRR 193 04/05/2022    UCRR 193 04/05/2022    UCRR 190 04/05/2022           7/3/2025   Healthy Eating   Healthy Eating Assessed Today Yes   Meals include Breakfast;Lunch;Dinner   Beverages Water;Soda         7/3/2025   Being Active   Being Active Assessed Today Yes   Exercise: Yes         7/3/2025   Monitoring   Monitoring Assessed Today Yes   Blood Glucose Meter CGM       Diabetes Medication(s)       Sodium-Glucose Co-Transporter 2 (SGLT2) Inhibitors       empagliflozin (JARDIANCE) 10 MG TABS tablet Take 1 tablet (10 mg) by mouth daily.       Incretin Mimetic Agents       Semaglutide, 2 MG/DOSE, (OZEMPIC) 8 MG/3ML pen Inject 2 mg subcutaneously every 7 days.              7/3/2025   Taking Medications   Taking Medication Assessed Today Yes   Problems taking diabetes medications regularly? Yes   Diabetes medication side effects? No         7/3/2025   Problem Solving   Problem Solving Assessed Today Yes           7/3/2025   Reducing Risks   Reducing Risks Assessed Today Yes         7/3/2025   Healthy Coping: Diabetes Distress Assessment   Healthy  Coping Assessed Today Yes   Informal Support system: Family;Friends         Time Spent: 60 minutes  Encounter Type: Individual    Any diabetes medication dose changes were [facility] the [facility] under the patient's referring provider.

## 2025-07-03 NOTE — TELEPHONE ENCOUNTER
Change of plans. Will stick with Maria Victoria 2, concern whether Maria Victoria 3 would be covered.

## 2025-07-03 NOTE — TELEPHONE ENCOUNTER
Hello,     I have a supplies order form for Maria Victoria 3 that needs to be signed by PCP and faxed to [employer].     What is a good fax number I can send this to get to PCP team?     Thanks!  Samira

## 2025-07-08 ENCOUNTER — TELEPHONE (OUTPATIENT)
Dept: FAMILY MEDICINE | Facility: CLINIC | Age: 70
End: 2025-07-08
Payer: COMMERCIAL

## 2025-07-08 NOTE — TELEPHONE ENCOUNTER
Medication Question or Refill        What medication are you calling about (include dose and sig)?: [place]    Preferred Pharmacy:   [id number] - [place], [place] - [address] [facility] [address]  [address]  [facility] [address]  Phone: [phone] Fax: [phone]     Controlled Substance Agreement on file:   CSA -- Patient Level:     [Media Unavailable] Controlled Substance Agreement - Opioid - Scan on 1/28/2020   [Media Unavailable] Controlled Substance Agreement - Opioid - Scan on 11/13/2018   [Media Unavailable] Controlled Substance Agreement - Opioid - Scan on 8/2/2017   [Media Unavailable] Controlled Substance Agreement - Opioid - Scan on 6/27/2016       Who prescribed the medication?: Dr. Ramires    Do you need a refill? Yes    When did you use the medication last? Unsure.    Patient offered an appointment? Yes: Patient declined.     Do you have any questions or concerns?  No  Pull a muscle from moving boxes      Could we send this information to you in [place] [place] or would you prefer to receive a phone call?:   Patient would prefer a phone call   Okay to leave a detailed message?: Yes at Cell number on file:    Telephone Information:   Mobile [phone]

## 2025-07-09 NOTE — TELEPHONE ENCOUNTER
I had written down 18 tablets every 4 months as our agreement in terms of frequency.  I last prescribed 4/23/25 so she would not be due until 8/23/25.

## 2025-07-09 NOTE — TELEPHONE ENCOUNTER
Left this detailed provider response, with the given permission.  If this is an acute injury, she may need a visit to address the pain.  Gerardo, Triage RN  [facility]/ [phone]

## 2025-07-10 NOTE — TELEPHONE ENCOUNTER
Writer called patient. Patient had been lifting some boxes due to moving.   Patient states pain is getting better and is doing exercises.     Patient stated understanding and will call if things get worse. Understands that will not most likely be able to be seen by Dr. Ramires for an urgent/acute visit.   Patient reiterated that she understands that at this time does not need to be seen due to getting better.     KRAIG HuffmanN RN  [facility]

## 2025-07-24 ENCOUNTER — TELEPHONE (OUTPATIENT)
Dept: PODIATRY | Facility: CLINIC | Age: 70
End: 2025-07-24

## 2025-07-24 NOTE — TELEPHONE ENCOUNTER
Spoke to patient and she did not have her cancelder with her. She will call back to reschedule her appointment with Dr. Gonzalez. Send letter in two week in not schedule. First No show letter send. [phone]

## 2025-07-28 DIAGNOSIS — M10.9 ACUTE GOUTY ARTHROPATHY: ICD-10-CM

## 2025-07-29 RX ORDER — ALLOPURINOL 100 MG/1
100 TABLET ORAL DAILY
Qty: 90 TABLET | Refills: 4 | Status: SHIPPED | OUTPATIENT
Start: 2025-07-29

## 2025-08-13 ENCOUNTER — HOSPITAL ENCOUNTER (OUTPATIENT)
Dept: GENERAL RADIOLOGY | Facility: HOSPITAL | Age: 70
Discharge: HOME | End: 2025-08-13
Attending: PHYSICIAN ASSISTANT
Payer: COMMERCIAL

## 2025-08-13 ENCOUNTER — OFFICE VISIT (OUTPATIENT)
Dept: URGENT CARE | Facility: URGENT CARE | Age: 70
End: 2025-08-13
Payer: COMMERCIAL

## 2025-08-13 ENCOUNTER — TELEPHONE (OUTPATIENT)
Dept: FAMILY MEDICINE | Facility: CLINIC | Age: 70
End: 2025-08-13
Payer: COMMERCIAL

## 2025-08-13 VITALS
TEMPERATURE: 97.8 F | SYSTOLIC BLOOD PRESSURE: 121 MMHG | RESPIRATION RATE: 19 BRPM | OXYGEN SATURATION: 97 % | HEIGHT: 64 IN | DIASTOLIC BLOOD PRESSURE: 70 MMHG | BODY MASS INDEX: 41.32 KG/M2 | WEIGHT: 242 LBS | HEART RATE: 78 BPM

## 2025-08-13 DIAGNOSIS — M25.521 RIGHT ELBOW PAIN: Primary | ICD-10-CM

## 2025-08-13 PROCEDURE — 73070 X-RAY EXAM OF ELBOW: CPT | Mod: RT

## 2025-08-13 PROCEDURE — 3074F SYST BP LT 130 MM HG: CPT | Performed by: PHYSICIAN ASSISTANT

## 2025-08-13 PROCEDURE — 99213 OFFICE O/P EST LOW 20 MIN: CPT | Performed by: PHYSICIAN ASSISTANT

## 2025-08-13 PROCEDURE — 3078F DIAST BP <80 MM HG: CPT | Performed by: PHYSICIAN ASSISTANT

## 2025-08-19 ENCOUNTER — OFFICE VISIT (OUTPATIENT)
Dept: ORTHOPEDICS | Facility: CLINIC | Age: 70
End: 2025-08-19
Attending: PHYSICIAN ASSISTANT
Payer: COMMERCIAL

## 2025-08-19 VITALS — WEIGHT: 248 LBS | BODY MASS INDEX: 42.34 KG/M2 | HEIGHT: 64 IN

## 2025-08-19 DIAGNOSIS — K25.3 ACUTE GASTRIC ULCER, UNSPECIFIED WHETHER GASTRIC ULCER HEMORRHAGE OR PERFORATION PRESENT: ICD-10-CM

## 2025-08-19 DIAGNOSIS — E11.9 TYPE 2 DIABETES MELLITUS WITHOUT COMPLICATION, WITHOUT LONG-TERM CURRENT USE OF INSULIN (H): Chronic | ICD-10-CM

## 2025-08-19 DIAGNOSIS — N18.31 STAGE 3A CHRONIC KIDNEY DISEASE (H): ICD-10-CM

## 2025-08-19 DIAGNOSIS — M77.11 LATERAL EPICONDYLITIS OF RIGHT ELBOW: Primary | ICD-10-CM

## 2025-08-19 SDOH — HEALTH STABILITY: PHYSICAL HEALTH: ON AVERAGE, HOW MANY MINUTES DO YOU ENGAGE IN EXERCISE AT THIS LEVEL?: 30 MIN

## 2025-08-19 SDOH — HEALTH STABILITY: PHYSICAL HEALTH: ON AVERAGE, HOW MANY DAYS PER WEEK DO YOU ENGAGE IN MODERATE TO STRENUOUS EXERCISE (LIKE A BRISK WALK)?: 2 DAYS

## 2025-08-20 ENCOUNTER — TELEPHONE (OUTPATIENT)
Dept: ORTHOPEDICS | Facility: CLINIC | Age: 70
End: 2025-08-20
Payer: COMMERCIAL

## (undated) DEVICE — ADH SKIN CLOSURE PREMIERPRO EXOFIN 1.0ML 3470

## (undated) DEVICE — GLOVE BIOGEL PI ULTRATOUCH G SZ 8.5 42185

## (undated) DEVICE — DRAPE SHEET REV FOLD 3/4 9349

## (undated) DEVICE — CUSTOM PACK TOTAL KNEE SOP5BTKHEC

## (undated) DEVICE — PLATE GROUNDING ADULT W/CORD 9165L

## (undated) DEVICE — HOLDER LIMB VELCRO OR 0814-1533

## (undated) DEVICE — SOL NACL 0.9% INJ 1000ML BAG 2B1324X

## (undated) DEVICE — BLADE SAW SAGITTAL STRK DUAL CUT 4118-135-090

## (undated) DEVICE — SOL NACL 0.9% IRRIG 1000ML BOTTLE 2F7124

## (undated) DEVICE — GLOVE BIOGEL PI INDICATOR 8.0 LF 41680

## (undated) DEVICE — SUCTION MANIFOLD NEPTUNE 2 SYS 4 PORT 0702-020-000

## (undated) DEVICE — SUTURE VICRYL+ 2 27IN CP UNDYED VCP195H

## (undated) DEVICE — A3 SUPPLIES- SEE NURSING INFO PAGE

## (undated) DEVICE — SU STRATAFIX PDS PLUS 1 CT-1 18" SXPP1A404

## (undated) DEVICE — SU MONOCRYL 3-0 PS-2 18" UND MCP497G

## (undated) DEVICE — SOL WATER IRRIG 1000ML BOTTLE 2F7114

## (undated) DEVICE — SUTURE VICRYL+ 1 27IN CT-1 UND VCP261H

## (undated) DEVICE — GLOVE BIOGEL PI ULTRATOUCH G SZ 8.0 42180

## (undated) DEVICE — GLOVE UNDER INDICATOR PI SZ 8.5 LF 41685

## (undated) DEVICE — DECANTER VIAL 2006S

## (undated) DEVICE — SUTURE VICRYL+ 2-0 27IN CT-1 UND VCP259H

## (undated) DEVICE — CUSTOM PACK TOTAL KNEE ACCESSORY SOP5BTAHEA

## (undated) DEVICE — DRESSING MEPILEX BORDER POST-OP 4X10

## (undated) RX ORDER — FENTANYL CITRATE 50 UG/ML
INJECTION, SOLUTION INTRAMUSCULAR; INTRAVENOUS
Status: DISPENSED
Start: 2022-04-08

## (undated) RX ORDER — PROPOFOL 10 MG/ML
INJECTION, EMULSION INTRAVENOUS
Status: DISPENSED
Start: 2022-04-08

## (undated) RX ORDER — CEFAZOLIN SODIUM 1 G/3ML
INJECTION, POWDER, FOR SOLUTION INTRAMUSCULAR; INTRAVENOUS
Status: DISPENSED
Start: 2022-04-08